# Patient Record
Sex: FEMALE | Race: BLACK OR AFRICAN AMERICAN | NOT HISPANIC OR LATINO | URBAN - METROPOLITAN AREA
[De-identification: names, ages, dates, MRNs, and addresses within clinical notes are randomized per-mention and may not be internally consistent; named-entity substitution may affect disease eponyms.]

---

## 2016-06-13 RX ORDER — INSULIN GLARGINE 100 [IU]/ML
40 INJECTION, SOLUTION SUBCUTANEOUS
Qty: 0 | Refills: 0 | COMMUNITY
Start: 2016-06-13

## 2017-03-27 ENCOUNTER — INPATIENT (INPATIENT)
Facility: HOSPITAL | Age: 77
LOS: 2 days | Discharge: HOME HEALTH | End: 2017-03-30
Attending: INTERNAL MEDICINE | Admitting: INTERNAL MEDICINE
Payer: MEDICARE

## 2017-03-27 VITALS
RESPIRATION RATE: 16 BRPM | SYSTOLIC BLOOD PRESSURE: 184 MMHG | OXYGEN SATURATION: 97 % | TEMPERATURE: 99 F | DIASTOLIC BLOOD PRESSURE: 101 MMHG | HEIGHT: 66 IN | HEART RATE: 90 BPM | WEIGHT: 182.1 LBS

## 2017-03-27 DIAGNOSIS — I25.10 ATHEROSCLEROTIC HEART DISEASE OF NATIVE CORONARY ARTERY WITHOUT ANGINA PECTORIS: ICD-10-CM

## 2017-03-27 DIAGNOSIS — E78.5 HYPERLIPIDEMIA, UNSPECIFIED: ICD-10-CM

## 2017-03-27 DIAGNOSIS — Z41.8 ENCOUNTER FOR OTHER PROCEDURES FOR PURPOSES OTHER THAN REMEDYING HEALTH STATE: ICD-10-CM

## 2017-03-27 DIAGNOSIS — I50.9 HEART FAILURE, UNSPECIFIED: ICD-10-CM

## 2017-03-27 DIAGNOSIS — R55 SYNCOPE AND COLLAPSE: ICD-10-CM

## 2017-03-27 DIAGNOSIS — E11.9 TYPE 2 DIABETES MELLITUS WITHOUT COMPLICATIONS: ICD-10-CM

## 2017-03-27 DIAGNOSIS — I10 ESSENTIAL (PRIMARY) HYPERTENSION: ICD-10-CM

## 2017-03-27 LAB
ALBUMIN SERPL ELPH-MCNC: 4.1 G/DL — SIGNIFICANT CHANGE UP (ref 3.3–5)
ALP SERPL-CCNC: 67 U/L — SIGNIFICANT CHANGE UP (ref 40–120)
ALT FLD-CCNC: 12 U/L — SIGNIFICANT CHANGE UP (ref 4–33)
APTT BLD: 33.5 SEC — SIGNIFICANT CHANGE UP (ref 27.5–37.4)
AST SERPL-CCNC: 15 U/L — SIGNIFICANT CHANGE UP (ref 4–32)
BASOPHILS # BLD AUTO: 0.01 K/UL — SIGNIFICANT CHANGE UP (ref 0–0.2)
BASOPHILS NFR BLD AUTO: 0.2 % — SIGNIFICANT CHANGE UP (ref 0–2)
BILIRUB SERPL-MCNC: 0.4 MG/DL — SIGNIFICANT CHANGE UP (ref 0.2–1.2)
BUN SERPL-MCNC: 11 MG/DL — SIGNIFICANT CHANGE UP (ref 7–23)
CALCIUM SERPL-MCNC: 9.8 MG/DL — SIGNIFICANT CHANGE UP (ref 8.4–10.5)
CHLORIDE SERPL-SCNC: 103 MMOL/L — SIGNIFICANT CHANGE UP (ref 98–107)
CK MB BLD-MCNC: 2.1 — SIGNIFICANT CHANGE UP (ref 0–2.5)
CK MB BLD-MCNC: 2.75 NG/ML — SIGNIFICANT CHANGE UP (ref 1–4.7)
CK MB BLD-MCNC: 3.2 NG/ML — SIGNIFICANT CHANGE UP (ref 1–4.7)
CK MB BLD-MCNC: 3.28 NG/ML — SIGNIFICANT CHANGE UP (ref 1–4.7)
CK MB BLD-MCNC: SIGNIFICANT CHANGE UP (ref 0–2.5)
CK SERPL-CCNC: 116 U/L — SIGNIFICANT CHANGE UP (ref 25–170)
CK SERPL-CCNC: 146 U/L — SIGNIFICANT CHANGE UP (ref 25–170)
CK SERPL-CCNC: 152 U/L — SIGNIFICANT CHANGE UP (ref 25–170)
CO2 SERPL-SCNC: 24 MMOL/L — SIGNIFICANT CHANGE UP (ref 22–31)
CREAT SERPL-MCNC: 0.72 MG/DL — SIGNIFICANT CHANGE UP (ref 0.5–1.3)
EOSINOPHIL # BLD AUTO: 0.02 K/UL — SIGNIFICANT CHANGE UP (ref 0–0.5)
EOSINOPHIL NFR BLD AUTO: 0.3 % — SIGNIFICANT CHANGE UP (ref 0–6)
GLUCOSE SERPL-MCNC: 105 MG/DL — HIGH (ref 70–99)
HCT VFR BLD CALC: 38.9 % — SIGNIFICANT CHANGE UP (ref 34.5–45)
HGB BLD-MCNC: 12.4 G/DL — SIGNIFICANT CHANGE UP (ref 11.5–15.5)
IMM GRANULOCYTES NFR BLD AUTO: 0.2 % — SIGNIFICANT CHANGE UP (ref 0–1.5)
INR BLD: 1.04 — SIGNIFICANT CHANGE UP (ref 0.88–1.17)
LYMPHOCYTES # BLD AUTO: 1.53 K/UL — SIGNIFICANT CHANGE UP (ref 1–3.3)
LYMPHOCYTES # BLD AUTO: 23.1 % — SIGNIFICANT CHANGE UP (ref 13–44)
MCHC RBC-ENTMCNC: 27.7 PG — SIGNIFICANT CHANGE UP (ref 27–34)
MCHC RBC-ENTMCNC: 31.9 % — LOW (ref 32–36)
MCV RBC AUTO: 86.8 FL — SIGNIFICANT CHANGE UP (ref 80–100)
MONOCYTES # BLD AUTO: 0.46 K/UL — SIGNIFICANT CHANGE UP (ref 0–0.9)
MONOCYTES NFR BLD AUTO: 7 % — SIGNIFICANT CHANGE UP (ref 2–14)
NEUTROPHILS # BLD AUTO: 4.58 K/UL — SIGNIFICANT CHANGE UP (ref 1.8–7.4)
NEUTROPHILS NFR BLD AUTO: 69.2 % — SIGNIFICANT CHANGE UP (ref 43–77)
PLATELET # BLD AUTO: 258 K/UL — SIGNIFICANT CHANGE UP (ref 150–400)
PMV BLD: 11.1 FL — SIGNIFICANT CHANGE UP (ref 7–13)
POTASSIUM SERPL-MCNC: 4.2 MMOL/L — SIGNIFICANT CHANGE UP (ref 3.5–5.3)
POTASSIUM SERPL-SCNC: 4.2 MMOL/L — SIGNIFICANT CHANGE UP (ref 3.5–5.3)
PROT SERPL-MCNC: 8 G/DL — SIGNIFICANT CHANGE UP (ref 6–8.3)
PROTHROM AB SERPL-ACNC: 11.7 SEC — SIGNIFICANT CHANGE UP (ref 9.8–13.1)
RBC # BLD: 4.48 M/UL — SIGNIFICANT CHANGE UP (ref 3.8–5.2)
RBC # FLD: 13.3 % — SIGNIFICANT CHANGE UP (ref 10.3–14.5)
SODIUM SERPL-SCNC: 142 MMOL/L — SIGNIFICANT CHANGE UP (ref 135–145)
TROPONIN T SERPL-MCNC: < 0.06 NG/ML — SIGNIFICANT CHANGE UP (ref 0–0.06)
WBC # BLD: 6.61 K/UL — SIGNIFICANT CHANGE UP (ref 3.8–10.5)
WBC # FLD AUTO: 6.61 K/UL — SIGNIFICANT CHANGE UP (ref 3.8–10.5)

## 2017-03-27 PROCEDURE — 71010: CPT | Mod: 26

## 2017-03-27 RX ORDER — TRAMADOL HYDROCHLORIDE 50 MG/1
50 TABLET ORAL ONCE
Qty: 0 | Refills: 0 | Status: DISCONTINUED | OUTPATIENT
Start: 2017-03-27 | End: 2017-03-27

## 2017-03-27 RX ORDER — INSULIN GLARGINE 100 [IU]/ML
40 INJECTION, SOLUTION SUBCUTANEOUS AT BEDTIME
Qty: 0 | Refills: 0 | Status: DISCONTINUED | OUTPATIENT
Start: 2017-03-27 | End: 2017-03-30

## 2017-03-27 RX ORDER — INSULIN LISPRO 100/ML
VIAL (ML) SUBCUTANEOUS
Qty: 0 | Refills: 0 | Status: DISCONTINUED | OUTPATIENT
Start: 2017-03-27 | End: 2017-03-30

## 2017-03-27 RX ORDER — SODIUM CHLORIDE 9 MG/ML
1000 INJECTION, SOLUTION INTRAVENOUS
Qty: 0 | Refills: 0 | Status: DISCONTINUED | OUTPATIENT
Start: 2017-03-27 | End: 2017-03-30

## 2017-03-27 RX ORDER — LISINOPRIL 2.5 MG/1
5 TABLET ORAL DAILY
Qty: 0 | Refills: 0 | Status: DISCONTINUED | OUTPATIENT
Start: 2017-03-27 | End: 2017-03-27

## 2017-03-27 RX ORDER — ASPIRIN/CALCIUM CARB/MAGNESIUM 324 MG
325 TABLET ORAL ONCE
Qty: 0 | Refills: 0 | Status: COMPLETED | OUTPATIENT
Start: 2017-03-27 | End: 2017-03-27

## 2017-03-27 RX ORDER — DEXTROSE 50 % IN WATER 50 %
25 SYRINGE (ML) INTRAVENOUS ONCE
Qty: 0 | Refills: 0 | Status: DISCONTINUED | OUTPATIENT
Start: 2017-03-27 | End: 2017-03-30

## 2017-03-27 RX ORDER — ACETAMINOPHEN 500 MG
650 TABLET ORAL EVERY 6 HOURS
Qty: 0 | Refills: 0 | Status: DISCONTINUED | OUTPATIENT
Start: 2017-03-27 | End: 2017-03-30

## 2017-03-27 RX ORDER — DEXTROSE 50 % IN WATER 50 %
12.5 SYRINGE (ML) INTRAVENOUS ONCE
Qty: 0 | Refills: 0 | Status: DISCONTINUED | OUTPATIENT
Start: 2017-03-27 | End: 2017-03-30

## 2017-03-27 RX ORDER — ASPIRIN/CALCIUM CARB/MAGNESIUM 324 MG
81 TABLET ORAL DAILY
Qty: 0 | Refills: 0 | Status: DISCONTINUED | OUTPATIENT
Start: 2017-03-28 | End: 2017-03-30

## 2017-03-27 RX ORDER — GLUCAGON INJECTION, SOLUTION 0.5 MG/.1ML
1 INJECTION, SOLUTION SUBCUTANEOUS ONCE
Qty: 0 | Refills: 0 | Status: DISCONTINUED | OUTPATIENT
Start: 2017-03-27 | End: 2017-03-30

## 2017-03-27 RX ORDER — AMLODIPINE BESYLATE 2.5 MG/1
5 TABLET ORAL DAILY
Qty: 0 | Refills: 0 | Status: DISCONTINUED | OUTPATIENT
Start: 2017-03-27 | End: 2017-03-30

## 2017-03-27 RX ORDER — DEXTROSE 50 % IN WATER 50 %
1 SYRINGE (ML) INTRAVENOUS ONCE
Qty: 0 | Refills: 0 | Status: DISCONTINUED | OUTPATIENT
Start: 2017-03-27 | End: 2017-03-30

## 2017-03-27 RX ORDER — ATORVASTATIN CALCIUM 80 MG/1
80 TABLET, FILM COATED ORAL AT BEDTIME
Qty: 0 | Refills: 0 | Status: DISCONTINUED | OUTPATIENT
Start: 2017-03-27 | End: 2017-03-30

## 2017-03-27 RX ORDER — GABAPENTIN 400 MG/1
100 CAPSULE ORAL
Qty: 0 | Refills: 0 | Status: DISCONTINUED | OUTPATIENT
Start: 2017-03-27 | End: 2017-03-30

## 2017-03-27 RX ORDER — ENOXAPARIN SODIUM 100 MG/ML
40 INJECTION SUBCUTANEOUS EVERY 24 HOURS
Qty: 0 | Refills: 0 | Status: DISCONTINUED | OUTPATIENT
Start: 2017-03-27 | End: 2017-03-30

## 2017-03-27 RX ORDER — INSULIN LISPRO 100/ML
VIAL (ML) SUBCUTANEOUS AT BEDTIME
Qty: 0 | Refills: 0 | Status: DISCONTINUED | OUTPATIENT
Start: 2017-03-27 | End: 2017-03-30

## 2017-03-27 RX ORDER — LISINOPRIL 2.5 MG/1
5 TABLET ORAL DAILY
Qty: 0 | Refills: 0 | Status: DISCONTINUED | OUTPATIENT
Start: 2017-03-28 | End: 2017-03-30

## 2017-03-27 RX ORDER — METOPROLOL TARTRATE 50 MG
25 TABLET ORAL
Qty: 0 | Refills: 0 | Status: DISCONTINUED | OUTPATIENT
Start: 2017-03-27 | End: 2017-03-29

## 2017-03-27 RX ORDER — ASPIRIN/CALCIUM CARB/MAGNESIUM 324 MG
81 TABLET ORAL DAILY
Qty: 0 | Refills: 0 | Status: DISCONTINUED | OUTPATIENT
Start: 2017-03-27 | End: 2017-03-27

## 2017-03-27 RX ADMIN — ATORVASTATIN CALCIUM 80 MILLIGRAM(S): 80 TABLET, FILM COATED ORAL at 22:35

## 2017-03-27 RX ADMIN — AMLODIPINE BESYLATE 5 MILLIGRAM(S): 2.5 TABLET ORAL at 19:36

## 2017-03-27 RX ADMIN — Medication 25 MILLIGRAM(S): at 19:36

## 2017-03-27 RX ADMIN — Medication 650 MILLIGRAM(S): at 12:25

## 2017-03-27 RX ADMIN — Medication 325 MILLIGRAM(S): at 12:25

## 2017-03-27 RX ADMIN — Medication 650 MILLIGRAM(S): at 13:15

## 2017-03-27 RX ADMIN — GABAPENTIN 100 MILLIGRAM(S): 400 CAPSULE ORAL at 19:36

## 2017-03-27 RX ADMIN — LISINOPRIL 5 MILLIGRAM(S): 2.5 TABLET ORAL at 12:25

## 2017-03-27 RX ADMIN — TRAMADOL HYDROCHLORIDE 50 MILLIGRAM(S): 50 TABLET ORAL at 22:35

## 2017-03-27 RX ADMIN — ENOXAPARIN SODIUM 40 MILLIGRAM(S): 100 INJECTION SUBCUTANEOUS at 19:37

## 2017-03-27 RX ADMIN — INSULIN GLARGINE 40 UNIT(S): 100 INJECTION, SOLUTION SUBCUTANEOUS at 22:35

## 2017-03-27 RX ADMIN — TRAMADOL HYDROCHLORIDE 50 MILLIGRAM(S): 50 TABLET ORAL at 23:30

## 2017-03-27 NOTE — H&P ADULT. - OTHER
Echo, March 2012: Mild segmental left ventricular systolic dysfunction with mild hypokinesis of the basal lateral and infero-lateral walls. Normal right ventricular size and function.

## 2017-03-27 NOTE — H&P ADULT. - RS GEN PE MLT RESP DETAILS PC
no rhonchi/clear to auscultation bilaterally/airway patent/no chest wall tenderness/no intercostal retractions/no rales/good air movement/no wheezes/breath sounds equal/respirations non-labored

## 2017-03-27 NOTE — H&P ADULT. - PROBLEM SELECTOR PLAN 1
Admit to telemetry, serial EKGs, serial cardiac enzymes, orthostatics  Fall precautions, ambulate with assistance  Check CBC, CMP, TSH, FLP, HgA1C, UA  Echocardiogram ordered  Will consider ischemic evaluation  PT eval ordered  F/U MD note

## 2017-03-27 NOTE — H&P ADULT. - PSH
S/P primary angioplasty with coronary stent  x1 in 2006 at [facility]  S/P TKR (total knee replacement)  left

## 2017-03-27 NOTE — ED PROVIDER NOTE - OBJECTIVE STATEMENT
76yo F w/ PMH T2DM, HTN, CAD s/p stent 2006, CVS w/o residual deficits, GERD brought in by daughter after syncopal episode this AM and onset of substernal chest pain. Pt reports eating breakfast, then going upstairs, then while dressing became dizzy, and fell forward onto abdomen on bed. Pt states she lost consciousness and next remembers being woken up by daughter. Denies hitting head. After episode pt said she began to have substernal chest pain that radiated bilaterally under both breasts, with some L neck and jaw pain. Denies diaphoresis. She also is complaining of some RLQ pain at baseline, but which was worse this AM. Her FS before breakfast was 139, which is lower than usual for her. Of note, she stopped taking her jentadueto Friday upon instruction from her PCP to decrease diarrhea. Denies fevers/chills. Denies dysuria. Denies sob.

## 2017-03-27 NOTE — PATIENT PROFILE ADULT. - NS TRANSFER PATIENT BELONGINGS
Clothing/Cell Phone/PDA (specify)/Jewelry/Money (specify)/2 rings, purse, clothes, pink I phone cell and ,

## 2017-03-27 NOTE — ED ADULT NURSE NOTE - CHIEF COMPLAINT QUOTE
pt c/o mid sternal chest pain radiating under both breasts x 30 minutes with dizziness. Daughter states pt had syncopal episode at home. Found pt half on bed and half on floor this morning passed out. [id number]

## 2017-03-27 NOTE — H&P ADULT. - PROBLEM SELECTOR PLAN 3
Monitor on telemetry  Continue Lisinopril, Metoprolol  Strict I&Os, monitor daily weights, 1500 cc fluid restriction, sodium restriction  Echocardiogram ordered

## 2017-03-27 NOTE — ED ADULT TRIAGE NOTE - CHIEF COMPLAINT QUOTE
pt c/o mid sternal chest pain radiating under both breasts x 30 minutes with dizziness. Daughter states pt had syncopal episode at home. Found pt half on bed and half on floor this morning passed out. SK=[id number]

## 2017-03-27 NOTE — H&P ADULT. - ASSESSMENT
78 y/o female with a PMHx of CAD S/P stent placement, ischemic cardiomyopathy with mild LV dysfunction, CVA with no residual deficits, HTN, HLD, DM and GERD presents to ED with chest pain, shortness of breath and a syncopal episode.

## 2017-03-27 NOTE — H&P ADULT. - NEGATIVE GASTROINTESTINAL SYMPTOMS
no nausea/no constipation/no change in bowel habits/no diarrhea/no flatulence/no vomiting/no melena/no hematochezia/no abdominal pain

## 2017-03-27 NOTE — H&P ADULT. - NEGATIVE CARDIOVASCULAR SYMPTOMS
no claudication/no peripheral edema/no dyspnea on exertion/no paroxysmal nocturnal dyspnea/no orthopnea

## 2017-03-27 NOTE — H&P ADULT. - PMH
CAD (coronary artery disease)  S/P stent placemenet 2006  CHF (congestive heart failure)    CVA (cerebral vascular accident)  no residual deficits  DM (diabetes mellitus)    GERD (gastroesophageal reflux disease)    HLD (hyperlipidemia)    HTN (hypertension)

## 2017-03-27 NOTE — ED PROVIDER NOTE - ATTENDING CONTRIBUTION TO CARE
Attending note:   After face to face evaluation of this patient, I concur with above noted hx, pe, and care plan for this patient. +Syncope again with no clear explanation.   +Palpitations on sitting up in ED, with no dysrhythmias.  Syncope onto her bed with no blunt trauma.     Evaluation in progress

## 2017-03-27 NOTE — H&P ADULT. - NEUROLOGICAL DETAILS
normal strength/alert and oriented x 3/responds to verbal commands/sensation intact/cranial nerves intact/responds to pain

## 2017-03-27 NOTE — H&P ADULT. - ATTENDING COMMENTS
Assessment  1. Syncope  2. Ischemic cardiomyopathy  3. H/o CVA  4. HTN      Plan  1. Neurology consult  2. TTE  3. ASA, statins, and beta blockers

## 2017-03-27 NOTE — H&P ADULT. - NEGATIVE OPHTHALMOLOGIC SYMPTOMS
no pain R/no loss of vision L/no blurred vision R/no loss of vision R/no blurred vision L/no pain L/no photophobia/no diplopia

## 2017-03-27 NOTE — H&P ADULT. - HISTORY OF PRESENT ILLNESS
78 y/o female with a PMHx of CAD S/P stent placement, ischemic cardiomyopathy with mild LV dysfunction, CVA with no residual deficits, HTN, HLD, DM and GERD presents to ED S/P syncopal episode this morning. Pt was getting ready for a doctor's appointment this morning when she started to experience dizziness described as "the room spinning," associated with shortness of breath and palpitations. 76 y/o female with a PMHx of CAD S/P stent placement, ischemic cardiomyopathy with mild LV dysfunction, CVA with no residual deficits, HTN, HLD, DM and GERD presents to ED S/P syncopal episode this morning. Pt was getting ready for a doctor's appointment this morning when she started to experience dizziness described as "the room spinning," associated with shortness of breath and palpitations. Shortly after, pt lost consciousness and was found by her daughter face down on her bed. Pt denies head or bodily trauma, but does not know how long she was unconscious for. Upon waking up, pt was complaining of muscle tightness (especially in her neck), right sided chest pain with radiation all across her chest, and down her right arm and right leg. Pt continued to experience shortness of breath, palpitations, and dizziness. Pt was brought in her by daughter. By the time she arrived to the ED, pt reports improvements in her symptoms. Pt denies fever, chills, recent travel, headache, visual deficits, orthopnea, abdominal pain, N/V/D/C, hematochezia, melena, dysuria, hematuria, tongue lacerations, seizures, convulsions, hemiparesis, urinary or fecal incontinence. Upon arrival to ED, EKG revealed NSR at 89 bpm with Q wave AVL, LVH. CE x1: Negative. CXR: Slightly underinflated but otherwise clear lungs. No pleural effusions or pneumothorax. Stable cardiac and mediastinal silhouettes including a left coronary stent. Trachea midline. Generalized osteopenia and spine and bilateral shoulder degenerative changes again noted. No acute or focally aggressive appearing osseous abnormalities.

## 2017-03-27 NOTE — H&P ADULT. - PROBLEM SELECTOR PLAN 2
Monitor on telemetry, serial cardiac enzymes, serial EKGs  Continue ASA, Lipitor, Metoprolol, Lisinopril  Will consider ischemic evaluation  Echocardiogram ordered  F/U MD note

## 2017-03-27 NOTE — ED PROVIDER NOTE - PMH
CAD (coronary artery disease)  s/p stent 2006  CVA (cerebral vascular accident)  no residual deficits  DM (diabetes mellitus)  x 5 years  GERD (gastroesophageal reflux disease)    HTN (hypertension)    Syncope and collapse

## 2017-03-27 NOTE — ED PROVIDER NOTE - CHIEF COMPLAINT
The patient is a 77y Female complaining of The patient is a 77y Female complaining of chest pain after syncopal episode at home.

## 2017-03-27 NOTE — H&P ADULT. - NEGATIVE NEUROLOGICAL SYMPTOMS
no facial palsy/no paresthesias/no tremors/no hemiparesis/no weakness/no loss of sensation/no difficulty walking/no confusion/no generalized seizures/no headache/no transient paralysis/no focal seizures

## 2017-03-27 NOTE — ED PROVIDER NOTE - PSH
CVA (cerebral infarction)    S/P primary angioplasty with coronary stent  x1 in 2006 at [facility]  S/P TKR (total knee replacement)  left

## 2017-03-28 LAB
APPEARANCE UR: SIGNIFICANT CHANGE UP
BILIRUB UR-MCNC: NEGATIVE — SIGNIFICANT CHANGE UP
BLOOD UR QL VISUAL: NEGATIVE — SIGNIFICANT CHANGE UP
BUN SERPL-MCNC: 18 MG/DL — SIGNIFICANT CHANGE UP (ref 7–23)
CALCIUM SERPL-MCNC: 9.4 MG/DL — SIGNIFICANT CHANGE UP (ref 8.4–10.5)
CHLORIDE SERPL-SCNC: 102 MMOL/L — SIGNIFICANT CHANGE UP (ref 98–107)
CHOLEST SERPL-MCNC: 115 MG/DL — LOW (ref 120–199)
CO2 SERPL-SCNC: 22 MMOL/L — SIGNIFICANT CHANGE UP (ref 22–31)
COLOR SPEC: SIGNIFICANT CHANGE UP
CREAT SERPL-MCNC: 0.83 MG/DL — SIGNIFICANT CHANGE UP (ref 0.5–1.3)
GLUCOSE SERPL-MCNC: 219 MG/DL — HIGH (ref 70–99)
GLUCOSE UR-MCNC: 500 — SIGNIFICANT CHANGE UP
HBA1C BLD-MCNC: 10.7 % — HIGH (ref 4–5.6)
HCT VFR BLD CALC: 37.4 % — SIGNIFICANT CHANGE UP (ref 34.5–45)
HDLC SERPL-MCNC: 45 MG/DL — SIGNIFICANT CHANGE UP (ref 45–65)
HGB BLD-MCNC: 11.9 G/DL — SIGNIFICANT CHANGE UP (ref 11.5–15.5)
KETONES UR-MCNC: NEGATIVE — SIGNIFICANT CHANGE UP
LEUKOCYTE ESTERASE UR-ACNC: HIGH
LIPID PNL WITH DIRECT LDL SERPL: 66 MG/DL — SIGNIFICANT CHANGE UP
MAGNESIUM SERPL-MCNC: 1.8 MG/DL — SIGNIFICANT CHANGE UP (ref 1.6–2.6)
MCHC RBC-ENTMCNC: 27.5 PG — SIGNIFICANT CHANGE UP (ref 27–34)
MCHC RBC-ENTMCNC: 31.8 % — LOW (ref 32–36)
MCV RBC AUTO: 86.6 FL — SIGNIFICANT CHANGE UP (ref 80–100)
MUCOUS THREADS # UR AUTO: SIGNIFICANT CHANGE UP
NITRITE UR-MCNC: NEGATIVE — SIGNIFICANT CHANGE UP
NON-SQ EPI CELLS # UR AUTO: 1 — SIGNIFICANT CHANGE UP
PH UR: 6 — SIGNIFICANT CHANGE UP (ref 4.6–8)
PLATELET # BLD AUTO: 262 K/UL — SIGNIFICANT CHANGE UP (ref 150–400)
PMV BLD: 11.4 FL — SIGNIFICANT CHANGE UP (ref 7–13)
POTASSIUM SERPL-MCNC: 4 MMOL/L — SIGNIFICANT CHANGE UP (ref 3.5–5.3)
POTASSIUM SERPL-SCNC: 4 MMOL/L — SIGNIFICANT CHANGE UP (ref 3.5–5.3)
PROT UR-MCNC: 100 — HIGH
RBC # BLD: 4.32 M/UL — SIGNIFICANT CHANGE UP (ref 3.8–5.2)
RBC # FLD: 13.6 % — SIGNIFICANT CHANGE UP (ref 10.3–14.5)
RBC CASTS # UR COMP ASSIST: HIGH (ref 0–?)
SODIUM SERPL-SCNC: 140 MMOL/L — SIGNIFICANT CHANGE UP (ref 135–145)
SP GR SPEC: 1.02 — SIGNIFICANT CHANGE UP (ref 1–1.03)
SQUAMOUS # UR AUTO: SIGNIFICANT CHANGE UP
TRIGL SERPL-MCNC: 201 MG/DL — HIGH (ref 10–149)
TSH SERPL-MCNC: 3.11 UIU/ML — SIGNIFICANT CHANGE UP (ref 0.27–4.2)
UROBILINOGEN FLD QL: NORMAL E.U. — SIGNIFICANT CHANGE UP (ref 0.1–0.2)
WBC # BLD: 6.19 K/UL — SIGNIFICANT CHANGE UP (ref 3.8–10.5)
WBC # FLD AUTO: 6.19 K/UL — SIGNIFICANT CHANGE UP (ref 3.8–10.5)
WBC UR QL: >50 — HIGH (ref 0–?)

## 2017-03-28 PROCEDURE — 93306 TTE W/DOPPLER COMPLETE: CPT | Mod: 26

## 2017-03-28 RX ADMIN — Medication 650 MILLIGRAM(S): at 18:30

## 2017-03-28 RX ADMIN — ENOXAPARIN SODIUM 40 MILLIGRAM(S): 100 INJECTION SUBCUTANEOUS at 17:52

## 2017-03-28 RX ADMIN — Medication 650 MILLIGRAM(S): at 17:52

## 2017-03-28 RX ADMIN — INSULIN GLARGINE 40 UNIT(S): 100 INJECTION, SOLUTION SUBCUTANEOUS at 22:22

## 2017-03-28 RX ADMIN — Medication 25 MILLIGRAM(S): at 06:21

## 2017-03-28 RX ADMIN — Medication 2: at 17:52

## 2017-03-28 RX ADMIN — Medication 25 MILLIGRAM(S): at 17:52

## 2017-03-28 RX ADMIN — LISINOPRIL 5 MILLIGRAM(S): 2.5 TABLET ORAL at 06:21

## 2017-03-28 RX ADMIN — Medication 1: at 13:15

## 2017-03-28 RX ADMIN — Medication 2: at 09:19

## 2017-03-28 RX ADMIN — ATORVASTATIN CALCIUM 80 MILLIGRAM(S): 80 TABLET, FILM COATED ORAL at 22:21

## 2017-03-28 RX ADMIN — Medication 81 MILLIGRAM(S): at 13:15

## 2017-03-28 RX ADMIN — GABAPENTIN 100 MILLIGRAM(S): 400 CAPSULE ORAL at 17:52

## 2017-03-28 RX ADMIN — AMLODIPINE BESYLATE 5 MILLIGRAM(S): 2.5 TABLET ORAL at 06:21

## 2017-03-28 RX ADMIN — GABAPENTIN 100 MILLIGRAM(S): 400 CAPSULE ORAL at 06:21

## 2017-03-29 LAB
BASOPHILS # BLD AUTO: 0.01 K/UL — SIGNIFICANT CHANGE UP (ref 0–0.2)
BASOPHILS NFR BLD AUTO: 0.2 % — SIGNIFICANT CHANGE UP (ref 0–2)
BUN SERPL-MCNC: 15 MG/DL — SIGNIFICANT CHANGE UP (ref 7–23)
CALCIUM SERPL-MCNC: 9 MG/DL — SIGNIFICANT CHANGE UP (ref 8.4–10.5)
CHLORIDE SERPL-SCNC: 99 MMOL/L — SIGNIFICANT CHANGE UP (ref 98–107)
CO2 SERPL-SCNC: 22 MMOL/L — SIGNIFICANT CHANGE UP (ref 22–31)
CREAT SERPL-MCNC: 0.73 MG/DL — SIGNIFICANT CHANGE UP (ref 0.5–1.3)
EOSINOPHIL # BLD AUTO: 0.07 K/UL — SIGNIFICANT CHANGE UP (ref 0–0.5)
EOSINOPHIL NFR BLD AUTO: 1.3 % — SIGNIFICANT CHANGE UP (ref 0–6)
GLUCOSE SERPL-MCNC: 210 MG/DL — HIGH (ref 70–99)
HCT VFR BLD CALC: 37.1 % — SIGNIFICANT CHANGE UP (ref 34.5–45)
HGB BLD-MCNC: 12 G/DL — SIGNIFICANT CHANGE UP (ref 11.5–15.5)
IMM GRANULOCYTES NFR BLD AUTO: 0.2 % — SIGNIFICANT CHANGE UP (ref 0–1.5)
LYMPHOCYTES # BLD AUTO: 2.11 K/UL — SIGNIFICANT CHANGE UP (ref 1–3.3)
LYMPHOCYTES # BLD AUTO: 39.8 % — SIGNIFICANT CHANGE UP (ref 13–44)
MAGNESIUM SERPL-MCNC: 1.8 MG/DL — SIGNIFICANT CHANGE UP (ref 1.6–2.6)
MCHC RBC-ENTMCNC: 27.7 PG — SIGNIFICANT CHANGE UP (ref 27–34)
MCHC RBC-ENTMCNC: 32.3 % — SIGNIFICANT CHANGE UP (ref 32–36)
MCV RBC AUTO: 85.7 FL — SIGNIFICANT CHANGE UP (ref 80–100)
MONOCYTES # BLD AUTO: 0.36 K/UL — SIGNIFICANT CHANGE UP (ref 0–0.9)
MONOCYTES NFR BLD AUTO: 6.8 % — SIGNIFICANT CHANGE UP (ref 2–14)
NEUTROPHILS # BLD AUTO: 2.74 K/UL — SIGNIFICANT CHANGE UP (ref 1.8–7.4)
NEUTROPHILS NFR BLD AUTO: 51.7 % — SIGNIFICANT CHANGE UP (ref 43–77)
PLATELET # BLD AUTO: 267 K/UL — SIGNIFICANT CHANGE UP (ref 150–400)
PMV BLD: 11.2 FL — SIGNIFICANT CHANGE UP (ref 7–13)
POTASSIUM SERPL-MCNC: 4 MMOL/L — SIGNIFICANT CHANGE UP (ref 3.5–5.3)
POTASSIUM SERPL-SCNC: 4 MMOL/L — SIGNIFICANT CHANGE UP (ref 3.5–5.3)
RBC # BLD: 4.33 M/UL — SIGNIFICANT CHANGE UP (ref 3.8–5.2)
RBC # FLD: 13.4 % — SIGNIFICANT CHANGE UP (ref 10.3–14.5)
SODIUM SERPL-SCNC: 137 MMOL/L — SIGNIFICANT CHANGE UP (ref 135–145)
WBC # BLD: 5.3 K/UL — SIGNIFICANT CHANGE UP (ref 3.8–10.5)
WBC # FLD AUTO: 5.3 K/UL — SIGNIFICANT CHANGE UP (ref 3.8–10.5)

## 2017-03-29 PROCEDURE — 99222 1ST HOSP IP/OBS MODERATE 55: CPT

## 2017-03-29 RX ORDER — CEFTRIAXONE 500 MG/1
1 INJECTION, POWDER, FOR SOLUTION INTRAMUSCULAR; INTRAVENOUS ONCE
Qty: 0 | Refills: 0 | Status: COMPLETED | OUTPATIENT
Start: 2017-03-29 | End: 2017-03-29

## 2017-03-29 RX ORDER — CEFTRIAXONE 500 MG/1
INJECTION, POWDER, FOR SOLUTION INTRAMUSCULAR; INTRAVENOUS
Qty: 0 | Refills: 0 | Status: DISCONTINUED | OUTPATIENT
Start: 2017-03-29 | End: 2017-03-30

## 2017-03-29 RX ORDER — CEFTRIAXONE 500 MG/1
1 INJECTION, POWDER, FOR SOLUTION INTRAMUSCULAR; INTRAVENOUS EVERY 24 HOURS
Qty: 0 | Refills: 0 | Status: DISCONTINUED | OUTPATIENT
Start: 2017-03-30 | End: 2017-03-30

## 2017-03-29 RX ADMIN — GABAPENTIN 100 MILLIGRAM(S): 400 CAPSULE ORAL at 05:09

## 2017-03-29 RX ADMIN — CEFTRIAXONE 100 GRAM(S): 500 INJECTION, POWDER, FOR SOLUTION INTRAMUSCULAR; INTRAVENOUS at 10:00

## 2017-03-29 RX ADMIN — Medication 81 MILLIGRAM(S): at 12:29

## 2017-03-29 RX ADMIN — LISINOPRIL 5 MILLIGRAM(S): 2.5 TABLET ORAL at 05:09

## 2017-03-29 RX ADMIN — ATORVASTATIN CALCIUM 80 MILLIGRAM(S): 80 TABLET, FILM COATED ORAL at 21:59

## 2017-03-29 RX ADMIN — Medication 650 MILLIGRAM(S): at 06:05

## 2017-03-29 RX ADMIN — Medication 25 MILLIGRAM(S): at 05:09

## 2017-03-29 RX ADMIN — Medication 2: at 12:29

## 2017-03-29 RX ADMIN — ENOXAPARIN SODIUM 40 MILLIGRAM(S): 100 INJECTION SUBCUTANEOUS at 17:13

## 2017-03-29 RX ADMIN — AMLODIPINE BESYLATE 5 MILLIGRAM(S): 2.5 TABLET ORAL at 05:09

## 2017-03-29 RX ADMIN — INSULIN GLARGINE 40 UNIT(S): 100 INJECTION, SOLUTION SUBCUTANEOUS at 21:59

## 2017-03-29 RX ADMIN — GABAPENTIN 100 MILLIGRAM(S): 400 CAPSULE ORAL at 17:12

## 2017-03-29 RX ADMIN — Medication 1: at 17:12

## 2017-03-29 RX ADMIN — Medication 2: at 09:59

## 2017-03-29 RX ADMIN — Medication 650 MILLIGRAM(S): at 05:09

## 2017-03-29 NOTE — DISCHARGE NOTE ADULT - CARE PLAN
Principal Discharge DX:	Syncopal episodes  Goal:	prevent future occurrences  Instructions for follow-up, activity and diet:	Keep hydrated to prevent dehydration. Sit or lie down right away if feeling dizzy or faint, move slowly and let yourself get used to one position before you move to another position, move your legs often if you must sit or  one position for a long time. Avoid exercise outside on a hot day. Follow up with primary care provider in 1 week. Please make appointment with your neurolgoist, Dr. Nissenbaum to schedule MRA Head and Neck.  Secondary Diagnosis:	CAD (coronary artery disease)  Instructions for follow-up, activity and diet:	Continue aspirin and Plavix, do not stop unless instructed by physician. Follow up with cardiologist in 1 week. Continue low cholesterol diet. Do not smoke, or drink alcohol.  Secondary Diagnosis:	DM (diabetes mellitus)  Instructions for follow-up, activity and diet:	Monitor fingersticks, low carbohydrate diet, minimize glucose intake.  Follow up with primary care physician for routine Hemoglobin A1C checks. Routine yearly eye and podiatry exams.  Secondary Diagnosis:	HLD (hyperlipidemia)  Instructions for follow-up, activity and diet:	Continue with medications as prescribed. Decrease the total number of fat you eat, include fish in your diet, eat foods with high fiber. Limit alcohol and do not smoke. Maintain a healthy weight and exercise regulary. Follow up with primary care provider in 1 week.  Secondary Diagnosis:	HTN (hypertension)  Instructions for follow-up, activity and diet:	Continue medications as prescribed. Monitor Blood pressure. Eat a low salt diet. Exercise to maintain a healthy weight. Limit alcohol and do not smoke. Follow up with primary care physician in 1 week. If any worsening symptoms notify provider.  Secondary Diagnosis:	CHF (congestive heart failure) Principal Discharge DX:	Syncopal episodes  Goal:	prevent future occurrences  Instructions for follow-up, activity and diet:	Keep hydrated to prevent dehydration. Sit or lie down right away if feeling dizzy or faint, move slowly and let yourself get used to one position before you move to another position, move your legs often if you must sit or  one position for a long time. Avoid exercise outside on a hot day. Follow up with primary care provider in 1 week. Please make appointment with your neurolgoist, Dr. Nissenbaum to schedule MRA Head and Neck.  Secondary Diagnosis:	CAD (coronary artery disease)  Instructions for follow-up, activity and diet:	Continue aspirin and Plavix, do not stop unless instructed by physician. Follow up with cardiologist in 1 week. Continue low cholesterol diet. Do not smoke, or drink alcohol.  Secondary Diagnosis:	DM (diabetes mellitus)  Instructions for follow-up, activity and diet:	Monitor fingersticks, low carbohydrate diet, minimize glucose intake.  Follow up with primary care physician for routine Hemoglobin A1C checks. Routine yearly eye and podiatry exams.  Secondary Diagnosis:	HLD (hyperlipidemia)  Instructions for follow-up, activity and diet:	Continue with medications as prescribed. Decrease the total number of fat you eat, include fish in your diet, eat foods with high fiber. Limit alcohol and do not smoke. Maintain a healthy weight and exercise regulary. Follow up with primary care provider in 1 week.  Secondary Diagnosis:	HTN (hypertension)  Instructions for follow-up, activity and diet:	Continue medications as prescribed. Monitor Blood pressure. Eat a low salt diet. Exercise to maintain a healthy weight. Limit alcohol and do not smoke. Follow up with primary care physician in 1 week. If any worsening symptoms notify provider.  Secondary Diagnosis:	UTI (urinary tract infection)  Instructions for follow-up, activity and diet:	Finish course of antibiotics.  Check Urine with PMD in 2 weeks. Principal Discharge DX:	Syncopal episodes  Goal:	prevent future occurrences  Instructions for follow-up, activity and diet:	Keep hydrated to prevent dehydration. Sit or lie down right away if feeling dizzy or faint, move slowly and let yourself get used to one position before you move to another position, move your legs often if you must sit or  one position for a long time. Avoid exercise outside on a hot day. Follow up with primary care provider in 1 week. Please make appointment with your neurolgoist, Dr. Nissenbaum to schedule MRA Head and Neck.  Secondary Diagnosis:	CAD (coronary artery disease)  Instructions for follow-up, activity and diet:	Continue aspirin, do not stop unless instructed by physician. Follow up with cardiologist in 1 week. Continue low cholesterol diet. Do not smoke, or drink alcohol.  Secondary Diagnosis:	DM (diabetes mellitus)  Instructions for follow-up, activity and diet:	Monitor fingersticks, low carbohydrate diet, minimize glucose intake.  Follow up with primary care physician for routine Hemoglobin A1C checks. Routine yearly eye and podiatry exams.  Secondary Diagnosis:	HLD (hyperlipidemia)  Instructions for follow-up, activity and diet:	Continue with medications as prescribed. Decrease the total number of fat you eat, include fish in your diet, eat foods with high fiber. Limit alcohol and do not smoke. Maintain a healthy weight and exercise regulary. Follow up with primary care provider in 1 week.  Secondary Diagnosis:	HTN (hypertension)  Instructions for follow-up, activity and diet:	Continue medications as prescribed. Monitor Blood pressure. Eat a low salt diet. Exercise to maintain a healthy weight. Limit alcohol and do not smoke. Follow up with primary care physician in 1 week. If any worsening symptoms notify provider.  Secondary Diagnosis:	UTI (urinary tract infection)  Instructions for follow-up, activity and diet:	Finish course of antibiotics.  Check Urine with PMD in 2 weeks.

## 2017-03-29 NOTE — DISCHARGE NOTE ADULT - HOSPITAL COURSE
76 y/o female with a PMHx of CAD S/P stent placement, ischemic cardiomyopathy with mild LV dysfunction, CVA with no residual deficits, HTN, HLD, DM and GERD presents to ED with chest pain, shortness of breath and a syncopal episode.     On admission EKG showed NSR at 89 bpm with Q wave AVL, LVH. ACS ruled out by two sets of negative cardiac enzymes. Chest xray showed slightly underinflated but otherwise clear lungs. No pleural effusions or pneumothorax. Stable cardiac and mediastinal silhouettes including a left coronary stent. Trachea midline. Generalized osteopenia and spine and bilateral shoulder degenerative changes again noted. No acute or focally aggressive appearing osseous abnormalities.    Echocardiogram showed mitral annular calcification, otherwise normal mitral valve. Minimal mitral regurgitation. Normal left ventricular systolic function. No segmental wall motion abnormalities. Mild diastolic dysfunction (Stage I). Normal right ventricular size and function.    Patient will have outpatient MRA Head and Neck with neurologist, Dr. Nissenbaum. Patient is stable and cleared for discharge. 78 y/o female with a PMHx of CAD S/P stent placement, ischemic cardiomyopathy with mild LV dysfunction, CVA with no residual deficits, HTN, HLD, DM and GERD presents to ED with chest pain, shortness of breath and a syncopal episode.     On admission EKG showed NSR at 89 bpm with Q wave AVL, LVH. ACS ruled out by two sets of negative cardiac enzymes. Chest xray showed slightly underinflated but otherwise clear lungs. No pleural effusions or pneumothorax. Stable cardiac and mediastinal silhouettes including a left coronary stent. Trachea midline. Generalized osteopenia and spine and bilateral shoulder degenerative changes again noted. No acute or focally aggressive appearing osseous abnormalities.    Echocardiogram showed mitral annular calcification, otherwise normal mitral valve. Minimal mitral regurgitation. Normal left ventricular systolic function. No segmental wall motion abnormalities. Mild diastolic dysfunction (Stage I). Normal right ventricular size and function.    s/p cardiac cath 3/30: LAD stent patent, OM stent patent, RCA 20%, EF 60%, LVEDP 16, RFA accessed  post cath there was no hematoma or bleed and pt. was stable for DC  D/W Dr. Arenas-the patient does NOT have any heart failure.    Patient will have outpatient MRA Head and Neck with neurologist, Dr. Nissenbaum. Patient is stable and cleared for discharge.

## 2017-03-29 NOTE — DISCHARGE NOTE ADULT - MEDICATION SUMMARY - MEDICATIONS TO TAKE
I will START or STAY ON the medications listed below when I get home from the hospital:    acetaminophen 325 mg oral tablet  -- 2 tab(s) by mouth every 6 hours, As needed, Mild Pain (1 - 3)  -- Indication: For pain    aspirin 81 mg oral delayed release tablet  -- 1 tab(s) by mouth once a day  -- Indication: For CVA (cerebral vascular accident)    lisinopril 5 mg oral tablet  -- 1 tab(s) by mouth once a day  -- Indication: For HTN (hypertension)    gabapentin 100 mg oral capsule  -- 1 cap(s) by mouth 2 times a day  -- Indication: For Neuropathy    insulin glargine 100 units/mL subcutaneous solution  -- 40  subcutaneous once a day (at bedtime)  -- Indication: For DM (diabetes mellitus)    atorvastatin 80 mg oral tablet  -- 1 tab(s) by mouth once a day (at bedtime)  -- Indication: For HLD (hyperlipidemia)    amLODIPine 5 mg oral tablet  -- 1 tab(s) by mouth once a day  -- Indication: For HTN (hypertension)    Ceftin 250 mg oral tablet  -- 1 tab(s) by mouth 2 times a day  -- Finish all this medication unless otherwise directed by prescriber.  Medication should be taken with plenty of water.  Take with food or milk.    -- Indication: For UTI

## 2017-03-29 NOTE — DISCHARGE NOTE ADULT - PROVIDER TOKENS
TOKEN:'[id number]' TOKVÍCTOR:'[id number]',QASIM:'[id number]' TOKEN:'[id number]:'[id number]'

## 2017-03-29 NOTE — DISCHARGE NOTE ADULT - PATIENT PORTAL LINK FT
“You can access the Follow[employer] Patient Portal, offered by [facility], by registering with the following website: [email or web address]”

## 2017-03-29 NOTE — DISCHARGE NOTE ADULT - SECONDARY DIAGNOSIS.
CAD (coronary artery disease) DM (diabetes mellitus) HLD (hyperlipidemia) HTN (hypertension) CHF (congestive heart failure) UTI (urinary tract infection)

## 2017-03-29 NOTE — DISCHARGE NOTE ADULT - CARE PROVIDER_API CALL
Dilcia Arenas (DO), Cardiology; Internal Medicine  [address]  [place] [address]  Phone: [phone]  Fax: [phone] Dilcia Arenas (), Cardiology; Internal Medicine  [address]  [place] [address]  Phone: [phone]  Fax: [phone]    Goldberg, Eric (MD), Internal Medicine  [address] [place] [address]  Phone: [phone]  Fax: [phone] Dilcia Arenas (DO), Cardiology; Internal Medicine  [address] [facility] [address]  [place] [address]  Phone: [phone]  Fax: [phone]    Goldberg, Eric (MD), Internal Medicine  [address] [place] [address]  Phone: [phone]  Fax: [phone]    Nissenbaum, Michael A (MD), Neurology  [address]  [place] [address]  Phone: [phone]  Fax: [phone]

## 2017-03-29 NOTE — DISCHARGE NOTE ADULT - PLAN OF CARE
prevent future occurrences Keep hydrated to prevent dehydration. Sit or lie down right away if feeling dizzy or faint, move slowly and let yourself get used to one position before you move to another position, move your legs often if you must sit or  one position for a long time. Avoid exercise outside on a hot day. Follow up with primary care provider in 1 week. Please make appointment with your neurolgoist, Dr. Nissenbaum to schedule MRA Head and Neck. Continue aspirin and Plavix, do not stop unless instructed by physician. Follow up with cardiologist in 1 week. Continue low cholesterol diet. Do not smoke, or drink alcohol. Monitor fingersticks, low carbohydrate diet, minimize glucose intake.  Follow up with primary care physician for routine Hemoglobin A1C checks. Routine yearly eye and podiatry exams. Continue with medications as prescribed. Decrease the total number of fat you eat, include fish in your diet, eat foods with high fiber. Limit alcohol and do not smoke. Maintain a healthy weight and exercise regulary. Follow up with primary care provider in 1 week. Continue medications as prescribed. Monitor Blood pressure. Eat a low salt diet. Exercise to maintain a healthy weight. Limit alcohol and do not smoke. Follow up with primary care physician in 1 week. If any worsening symptoms notify provider. Finish course of antibiotics.  Check Urine with PMD in 2 weeks. Continue aspirin, do not stop unless instructed by physician. Follow up with cardiologist in 1 week. Continue low cholesterol diet. Do not smoke, or drink alcohol.

## 2017-03-29 NOTE — DISCHARGE NOTE ADULT - ADDITIONAL INSTRUCTIONS
No driving x 24 hours. No strenuous activity for three weeks. Monitor site of procedure and notify your doctor for any  bleeding / swelling/redness/ discharge/pain. You may shower but no baths or swimming x one week. No heavy lifting x 1 week. No driving x 24 hours. No strenuous activity for three weeks. Monitor site of procedure and notify your doctor for any  bleeding / swelling/redness/ discharge/pain. You may shower but no baths or swimming x one week. No heavy lifting x 1 week.    f/U with Dr. Nissenbaum(neurologist) for outpatient MRA of head and neck-please call and make apt.

## 2017-03-29 NOTE — DISCHARGE NOTE ADULT - MEDICATION SUMMARY - MEDICATIONS TO STOP TAKING
I will STOP taking the medications listed below when I get home from the hospital:    metoprolol tartrate 25 mg oral tablet  -- 1 tab(s) by mouth 2 times a day    metroNIDAZOLE 500 mg oral tablet  -- 1 tab(s) by mouth every 8 hours    ciprofloxacin 500 mg oral tablet  -- 1 tab(s) by mouth every 12 hours

## 2017-03-30 VITALS
SYSTOLIC BLOOD PRESSURE: 142 MMHG | RESPIRATION RATE: 16 BRPM | TEMPERATURE: 98 F | HEART RATE: 76 BPM | DIASTOLIC BLOOD PRESSURE: 88 MMHG | OXYGEN SATURATION: 99 %

## 2017-03-30 LAB
BASOPHILS # BLD AUTO: 0.01 K/UL — SIGNIFICANT CHANGE UP (ref 0–0.2)
BASOPHILS NFR BLD AUTO: 0.2 % — SIGNIFICANT CHANGE UP (ref 0–2)
BUN SERPL-MCNC: 12 MG/DL — SIGNIFICANT CHANGE UP (ref 7–23)
BUN SERPL-MCNC: 12 MG/DL — SIGNIFICANT CHANGE UP (ref 7–23)
CALCIUM SERPL-MCNC: 9.7 MG/DL — SIGNIFICANT CHANGE UP (ref 8.4–10.5)
CALCIUM SERPL-MCNC: 9.7 MG/DL — SIGNIFICANT CHANGE UP (ref 8.4–10.5)
CHLORIDE SERPL-SCNC: 102 MMOL/L — SIGNIFICANT CHANGE UP (ref 98–107)
CHLORIDE SERPL-SCNC: 102 MMOL/L — SIGNIFICANT CHANGE UP (ref 98–107)
CO2 SERPL-SCNC: 24 MMOL/L — SIGNIFICANT CHANGE UP (ref 22–31)
CO2 SERPL-SCNC: 24 MMOL/L — SIGNIFICANT CHANGE UP (ref 22–31)
CREAT SERPL-MCNC: 0.81 MG/DL — SIGNIFICANT CHANGE UP (ref 0.5–1.3)
CREAT SERPL-MCNC: 0.81 MG/DL — SIGNIFICANT CHANGE UP (ref 0.5–1.3)
EOSINOPHIL # BLD AUTO: 0.06 K/UL — SIGNIFICANT CHANGE UP (ref 0–0.5)
EOSINOPHIL NFR BLD AUTO: 1 % — SIGNIFICANT CHANGE UP (ref 0–6)
GLUCOSE SERPL-MCNC: 138 MG/DL — HIGH (ref 70–99)
GLUCOSE SERPL-MCNC: 138 MG/DL — HIGH (ref 70–99)
HCT VFR BLD CALC: 39.6 % — SIGNIFICANT CHANGE UP (ref 34.5–45)
HCT VFR BLD CALC: 39.6 % — SIGNIFICANT CHANGE UP (ref 34.5–45)
HGB BLD-MCNC: 12.7 G/DL — SIGNIFICANT CHANGE UP (ref 11.5–15.5)
HGB BLD-MCNC: 12.7 G/DL — SIGNIFICANT CHANGE UP (ref 11.5–15.5)
IMM GRANULOCYTES NFR BLD AUTO: 0.2 % — SIGNIFICANT CHANGE UP (ref 0–1.5)
LYMPHOCYTES # BLD AUTO: 2.31 K/UL — SIGNIFICANT CHANGE UP (ref 1–3.3)
LYMPHOCYTES # BLD AUTO: 39.8 % — SIGNIFICANT CHANGE UP (ref 13–44)
MAGNESIUM SERPL-MCNC: 1.9 MG/DL — SIGNIFICANT CHANGE UP (ref 1.6–2.6)
MCHC RBC-ENTMCNC: 27.7 PG — SIGNIFICANT CHANGE UP (ref 27–34)
MCHC RBC-ENTMCNC: 27.7 PG — SIGNIFICANT CHANGE UP (ref 27–34)
MCHC RBC-ENTMCNC: 32.1 % — SIGNIFICANT CHANGE UP (ref 32–36)
MCHC RBC-ENTMCNC: 32.1 % — SIGNIFICANT CHANGE UP (ref 32–36)
MCV RBC AUTO: 86.5 FL — SIGNIFICANT CHANGE UP (ref 80–100)
MCV RBC AUTO: 86.5 FL — SIGNIFICANT CHANGE UP (ref 80–100)
MONOCYTES # BLD AUTO: 0.51 K/UL — SIGNIFICANT CHANGE UP (ref 0–0.9)
MONOCYTES NFR BLD AUTO: 8.8 % — SIGNIFICANT CHANGE UP (ref 2–14)
NEUTROPHILS # BLD AUTO: 2.91 K/UL — SIGNIFICANT CHANGE UP (ref 1.8–7.4)
NEUTROPHILS NFR BLD AUTO: 50 % — SIGNIFICANT CHANGE UP (ref 43–77)
PLATELET # BLD AUTO: 269 K/UL — SIGNIFICANT CHANGE UP (ref 150–400)
PLATELET # BLD AUTO: 269 K/UL — SIGNIFICANT CHANGE UP (ref 150–400)
PMV BLD: 11.5 FL — SIGNIFICANT CHANGE UP (ref 7–13)
PMV BLD: 11.5 FL — SIGNIFICANT CHANGE UP (ref 7–13)
POTASSIUM SERPL-MCNC: 4.2 MMOL/L — SIGNIFICANT CHANGE UP (ref 3.5–5.3)
POTASSIUM SERPL-MCNC: 4.2 MMOL/L — SIGNIFICANT CHANGE UP (ref 3.5–5.3)
POTASSIUM SERPL-SCNC: 4.2 MMOL/L — SIGNIFICANT CHANGE UP (ref 3.5–5.3)
POTASSIUM SERPL-SCNC: 4.2 MMOL/L — SIGNIFICANT CHANGE UP (ref 3.5–5.3)
RBC # BLD: 4.58 M/UL — SIGNIFICANT CHANGE UP (ref 3.8–5.2)
RBC # BLD: 4.58 M/UL — SIGNIFICANT CHANGE UP (ref 3.8–5.2)
RBC # FLD: 13.5 % — SIGNIFICANT CHANGE UP (ref 10.3–14.5)
RBC # FLD: 13.5 % — SIGNIFICANT CHANGE UP (ref 10.3–14.5)
SODIUM SERPL-SCNC: 141 MMOL/L — SIGNIFICANT CHANGE UP (ref 135–145)
SODIUM SERPL-SCNC: 141 MMOL/L — SIGNIFICANT CHANGE UP (ref 135–145)
WBC # BLD: 5.81 K/UL — SIGNIFICANT CHANGE UP (ref 3.8–10.5)
WBC # BLD: 5.81 K/UL — SIGNIFICANT CHANGE UP (ref 3.8–10.5)
WBC # FLD AUTO: 5.81 K/UL — SIGNIFICANT CHANGE UP (ref 3.8–10.5)
WBC # FLD AUTO: 5.81 K/UL — SIGNIFICANT CHANGE UP (ref 3.8–10.5)

## 2017-03-30 PROCEDURE — 93458 L HRT ARTERY/VENTRICLE ANGIO: CPT | Mod: 26

## 2017-03-30 RX ORDER — ATORVASTATIN CALCIUM 80 MG/1
1 TABLET, FILM COATED ORAL
Qty: 0 | Refills: 0 | DISCHARGE
Start: 2017-03-30

## 2017-03-30 RX ORDER — CEFUROXIME AXETIL 250 MG
1 TABLET ORAL
Qty: 10 | Refills: 0 | OUTPATIENT
Start: 2017-03-30 | End: 2017-04-04

## 2017-03-30 RX ORDER — ASPIRIN/CALCIUM CARB/MAGNESIUM 324 MG
1 TABLET ORAL
Qty: 0 | Refills: 0 | DISCHARGE
Start: 2017-03-30

## 2017-03-30 RX ORDER — GABAPENTIN 400 MG/1
1 CAPSULE ORAL
Qty: 0 | Refills: 0 | COMMUNITY
Start: 2017-03-30

## 2017-03-30 RX ORDER — ACETAMINOPHEN 500 MG
2 TABLET ORAL
Qty: 0 | Refills: 0 | COMMUNITY
Start: 2017-03-30

## 2017-03-30 RX ORDER — INSULIN LISPRO 100/ML
0 VIAL (ML) SUBCUTANEOUS
Qty: 0 | Refills: 0 | COMMUNITY
Start: 2017-03-30

## 2017-03-30 RX ORDER — LISINOPRIL 2.5 MG/1
1 TABLET ORAL
Qty: 0 | Refills: 0 | DISCHARGE
Start: 2017-03-30

## 2017-03-30 RX ORDER — AMLODIPINE BESYLATE 2.5 MG/1
1 TABLET ORAL
Qty: 0 | Refills: 0 | DISCHARGE
Start: 2017-03-30

## 2017-03-30 RX ORDER — SODIUM CHLORIDE 9 MG/ML
500 INJECTION INTRAMUSCULAR; INTRAVENOUS; SUBCUTANEOUS
Qty: 0 | Refills: 0 | Status: DISCONTINUED | OUTPATIENT
Start: 2017-03-30 | End: 2017-03-30

## 2017-03-30 RX ADMIN — LISINOPRIL 5 MILLIGRAM(S): 2.5 TABLET ORAL at 06:23

## 2017-03-30 RX ADMIN — GABAPENTIN 100 MILLIGRAM(S): 400 CAPSULE ORAL at 17:12

## 2017-03-30 RX ADMIN — AMLODIPINE BESYLATE 5 MILLIGRAM(S): 2.5 TABLET ORAL at 06:23

## 2017-03-30 RX ADMIN — CEFTRIAXONE 100 GRAM(S): 500 INJECTION, POWDER, FOR SOLUTION INTRAMUSCULAR; INTRAVENOUS at 11:01

## 2017-03-30 RX ADMIN — Medication 81 MILLIGRAM(S): at 11:01

## 2017-03-30 RX ADMIN — GABAPENTIN 100 MILLIGRAM(S): 400 CAPSULE ORAL at 06:23

## 2017-05-17 ENCOUNTER — INPATIENT (INPATIENT)
Facility: HOSPITAL | Age: 77
LOS: 5 days | Discharge: HOME | End: 2017-05-23
Attending: INTERNAL MEDICINE | Admitting: INTERNAL MEDICINE
Payer: MEDICARE

## 2017-05-17 VITALS
OXYGEN SATURATION: 100 % | TEMPERATURE: 98 F | SYSTOLIC BLOOD PRESSURE: 156 MMHG | HEART RATE: 86 BPM | HEIGHT: 66 IN | DIASTOLIC BLOOD PRESSURE: 91 MMHG | RESPIRATION RATE: 16 BRPM | WEIGHT: 182.1 LBS

## 2017-05-17 LAB
ALBUMIN SERPL ELPH-MCNC: 3.9 G/DL — SIGNIFICANT CHANGE UP (ref 3.3–5)
ALP SERPL-CCNC: 69 U/L — SIGNIFICANT CHANGE UP (ref 40–120)
ALT FLD-CCNC: 14 U/L — SIGNIFICANT CHANGE UP (ref 4–33)
APTT BLD: 33 SEC — SIGNIFICANT CHANGE UP (ref 27.5–37.4)
AST SERPL-CCNC: 17 U/L — SIGNIFICANT CHANGE UP (ref 4–32)
BILIRUB SERPL-MCNC: 0.3 MG/DL — SIGNIFICANT CHANGE UP (ref 0.2–1.2)
BUN SERPL-MCNC: 17 MG/DL — SIGNIFICANT CHANGE UP (ref 7–23)
CALCIUM SERPL-MCNC: 9.3 MG/DL — SIGNIFICANT CHANGE UP (ref 8.4–10.5)
CHLORIDE SERPL-SCNC: 101 MMOL/L — SIGNIFICANT CHANGE UP (ref 98–107)
CK MB BLD-MCNC: 2.35 NG/ML — SIGNIFICANT CHANGE UP (ref 1–4.7)
CK MB BLD-MCNC: SIGNIFICANT CHANGE UP (ref 0–2.5)
CK SERPL-CCNC: 140 U/L — SIGNIFICANT CHANGE UP (ref 25–170)
CO2 SERPL-SCNC: 21 MMOL/L — LOW (ref 22–31)
CREAT SERPL-MCNC: 0.8 MG/DL — SIGNIFICANT CHANGE UP (ref 0.5–1.3)
GLUCOSE SERPL-MCNC: 207 MG/DL — HIGH (ref 70–99)
HCT VFR BLD CALC: 36.6 % — SIGNIFICANT CHANGE UP (ref 34.5–45)
HGB BLD-MCNC: 11.7 G/DL — SIGNIFICANT CHANGE UP (ref 11.5–15.5)
INR BLD: 0.97 — SIGNIFICANT CHANGE UP (ref 0.88–1.17)
MCHC RBC-ENTMCNC: 27.7 PG — SIGNIFICANT CHANGE UP (ref 27–34)
MCHC RBC-ENTMCNC: 32 % — SIGNIFICANT CHANGE UP (ref 32–36)
MCV RBC AUTO: 86.7 FL — SIGNIFICANT CHANGE UP (ref 80–100)
PLATELET # BLD AUTO: 219 K/UL — SIGNIFICANT CHANGE UP (ref 150–400)
PMV BLD: 11.1 FL — SIGNIFICANT CHANGE UP (ref 7–13)
POTASSIUM SERPL-MCNC: 4.1 MMOL/L — SIGNIFICANT CHANGE UP (ref 3.5–5.3)
POTASSIUM SERPL-SCNC: 4.1 MMOL/L — SIGNIFICANT CHANGE UP (ref 3.5–5.3)
PROT SERPL-MCNC: 7.7 G/DL — SIGNIFICANT CHANGE UP (ref 6–8.3)
PROTHROM AB SERPL-ACNC: 10.9 SEC — SIGNIFICANT CHANGE UP (ref 9.8–13.1)
RBC # BLD: 4.22 M/UL — SIGNIFICANT CHANGE UP (ref 3.8–5.2)
RBC # FLD: 13.9 % — SIGNIFICANT CHANGE UP (ref 10.3–14.5)
SODIUM SERPL-SCNC: 137 MMOL/L — SIGNIFICANT CHANGE UP (ref 135–145)
TROPONIN T SERPL-MCNC: < 0.06 NG/ML — SIGNIFICANT CHANGE UP (ref 0–0.06)
WBC # BLD: 6.34 K/UL — SIGNIFICANT CHANGE UP (ref 3.8–10.5)
WBC # FLD AUTO: 6.34 K/UL — SIGNIFICANT CHANGE UP (ref 3.8–10.5)

## 2017-05-17 PROCEDURE — 71020: CPT | Mod: 26

## 2017-05-17 PROCEDURE — 70450 CT HEAD/BRAIN W/O DYE: CPT | Mod: 26

## 2017-05-17 PROCEDURE — 71275 CT ANGIOGRAPHY CHEST: CPT | Mod: 26

## 2017-05-17 PROCEDURE — 74174 CTA ABD&PLVS W/CONTRAST: CPT | Mod: 26

## 2017-05-17 RX ORDER — MECLIZINE HCL 12.5 MG
12.5 TABLET ORAL ONCE
Qty: 0 | Refills: 0 | Status: COMPLETED | OUTPATIENT
Start: 2017-05-17 | End: 2017-05-17

## 2017-05-17 RX ADMIN — Medication 12.5 MILLIGRAM(S): at 23:54

## 2017-05-17 NOTE — ED ADULT TRIAGE NOTE - CHIEF COMPLAINT QUOTE
Pt visiting son in the ER when she began having difficulty breathing and pain in her chest, as per daughter she has been experiencing these symptoms for about a week getting progressively worse, history of stents, had appointment with PMD Friday but unable to wait until that time

## 2017-05-17 NOTE — ED ADULT NURSE NOTE - OBJECTIVE STATEMENT
pt a*o x3 c/o sob and chest pain radiating to right side of neck and back. pt denies pain at this time. also c/o dizziness with movement x 2 weeks. vss. nad noted. md assessed. labs sent will monitor

## 2017-05-17 NOTE — ED PROVIDER NOTE - PSH
S/P primary angioplasty with coronary stent  x1 in 2006 at [facility]  S/P TKR (total knee replacement)  left

## 2017-05-17 NOTE — ED PROVIDER NOTE - PHYSICAL EXAMINATION
Lolis att: General: Well appearing, nontoxic, no acute distress; Head: Normocephalic Atraumatic; Eyes: PERRL, EOMI; ENT: Airway patent; Neck: supple; Chest: Lungs clear to auscultation bilateral; Cardiac: Regular rate and rhythm, no murmurs, rubs or gallops; Abdomen: soft, nontender, nondistended; no guarding or rebound; Musculoskeletal: Calves symmetric, nontender, no palpable cord; Skin: No rash, normal skin tone; Neuro: Alert and Oriented to person, place, and time; No focal deficit, CN 2-12 symmetric and intact, Left eye proptosis, RUE 4/5, RLE 4/5 poor effort.

## 2017-05-17 NOTE — ED PROVIDER NOTE - CRANIAL NERVE AND PUPILLARY EXAM
cough reflex intact/CORNEAL REFLEX NOT INTACT/tongue is midline/perrla, eomi, L eye ptosis, decreased sensation L face but equal strength/EXTRA-OCULAR MOVEMENT NOT INTACT:

## 2017-05-18 DIAGNOSIS — K21.9 GASTRO-ESOPHAGEAL REFLUX DISEASE WITHOUT ESOPHAGITIS: ICD-10-CM

## 2017-05-18 DIAGNOSIS — Z29.9 ENCOUNTER FOR PROPHYLACTIC MEASURES, UNSPECIFIED: ICD-10-CM

## 2017-05-18 DIAGNOSIS — E11.9 TYPE 2 DIABETES MELLITUS WITHOUT COMPLICATIONS: ICD-10-CM

## 2017-05-18 DIAGNOSIS — I10 ESSENTIAL (PRIMARY) HYPERTENSION: ICD-10-CM

## 2017-05-18 DIAGNOSIS — R07.9 CHEST PAIN, UNSPECIFIED: ICD-10-CM

## 2017-05-18 DIAGNOSIS — R06.02 SHORTNESS OF BREATH: ICD-10-CM

## 2017-05-18 DIAGNOSIS — I24.9 ACUTE ISCHEMIC HEART DISEASE, UNSPECIFIED: ICD-10-CM

## 2017-05-18 DIAGNOSIS — I50.9 HEART FAILURE, UNSPECIFIED: ICD-10-CM

## 2017-05-18 DIAGNOSIS — E78.4 OTHER HYPERLIPIDEMIA: ICD-10-CM

## 2017-05-18 DIAGNOSIS — R42 DIZZINESS AND GIDDINESS: ICD-10-CM

## 2017-05-18 PROBLEM — I63.9 CEREBRAL INFARCTION, UNSPECIFIED: Chronic | Status: ACTIVE | Noted: 2017-03-27

## 2017-05-18 LAB
BUN SERPL-MCNC: 11 MG/DL — SIGNIFICANT CHANGE UP (ref 7–23)
CALCIUM SERPL-MCNC: 9.1 MG/DL — SIGNIFICANT CHANGE UP (ref 8.4–10.5)
CHLORIDE SERPL-SCNC: 103 MMOL/L — SIGNIFICANT CHANGE UP (ref 98–107)
CHOLEST SERPL-MCNC: 158 MG/DL — SIGNIFICANT CHANGE UP (ref 120–199)
CK MB BLD-MCNC: 2.2 NG/ML — SIGNIFICANT CHANGE UP (ref 1–4.7)
CK MB BLD-MCNC: SIGNIFICANT CHANGE UP (ref 0–2.5)
CK SERPL-CCNC: 121 U/L — SIGNIFICANT CHANGE UP (ref 25–170)
CK SERPL-CCNC: 124 U/L — SIGNIFICANT CHANGE UP (ref 25–170)
CO2 SERPL-SCNC: 24 MMOL/L — SIGNIFICANT CHANGE UP (ref 22–31)
CREAT SERPL-MCNC: 0.72 MG/DL — SIGNIFICANT CHANGE UP (ref 0.5–1.3)
GLUCOSE SERPL-MCNC: 129 MG/DL — HIGH (ref 70–99)
HBA1C BLD-MCNC: 10.6 % — HIGH (ref 4–5.6)
HCT VFR BLD CALC: 36.6 % — SIGNIFICANT CHANGE UP (ref 34.5–45)
HDLC SERPL-MCNC: 51 MG/DL — SIGNIFICANT CHANGE UP (ref 45–65)
HGB BLD-MCNC: 11.6 G/DL — SIGNIFICANT CHANGE UP (ref 11.5–15.5)
LIPID PNL WITH DIRECT LDL SERPL: 90 MG/DL — SIGNIFICANT CHANGE UP
MCHC RBC-ENTMCNC: 27.5 PG — SIGNIFICANT CHANGE UP (ref 27–34)
MCHC RBC-ENTMCNC: 31.7 % — LOW (ref 32–36)
MCV RBC AUTO: 86.7 FL — SIGNIFICANT CHANGE UP (ref 80–100)
NT-PROBNP SERPL-SCNC: 44.5 PG/ML — SIGNIFICANT CHANGE UP
PLATELET # BLD AUTO: 212 K/UL — SIGNIFICANT CHANGE UP (ref 150–400)
PMV BLD: 11 FL — SIGNIFICANT CHANGE UP (ref 7–13)
POTASSIUM SERPL-MCNC: 3.9 MMOL/L — SIGNIFICANT CHANGE UP (ref 3.5–5.3)
POTASSIUM SERPL-SCNC: 3.9 MMOL/L — SIGNIFICANT CHANGE UP (ref 3.5–5.3)
RBC # BLD: 4.22 M/UL — SIGNIFICANT CHANGE UP (ref 3.8–5.2)
RBC # FLD: 14 % — SIGNIFICANT CHANGE UP (ref 10.3–14.5)
SODIUM SERPL-SCNC: 141 MMOL/L — SIGNIFICANT CHANGE UP (ref 135–145)
TRIGL SERPL-MCNC: 117 MG/DL — SIGNIFICANT CHANGE UP (ref 10–149)
TROPONIN T SERPL-MCNC: < 0.06 NG/ML — SIGNIFICANT CHANGE UP (ref 0–0.06)
TROPONIN T SERPL-MCNC: < 0.06 NG/ML — SIGNIFICANT CHANGE UP (ref 0–0.06)
TSH SERPL-MCNC: 2.65 UIU/ML — SIGNIFICANT CHANGE UP (ref 0.27–4.2)
WBC # BLD: 5.28 K/UL — SIGNIFICANT CHANGE UP (ref 3.8–10.5)
WBC # FLD AUTO: 5.28 K/UL — SIGNIFICANT CHANGE UP (ref 3.8–10.5)

## 2017-05-18 PROCEDURE — 93306 TTE W/DOPPLER COMPLETE: CPT | Mod: 26

## 2017-05-18 PROCEDURE — 93970 EXTREMITY STUDY: CPT | Mod: 26

## 2017-05-18 RX ORDER — ATORVASTATIN CALCIUM 80 MG/1
80 TABLET, FILM COATED ORAL AT BEDTIME
Qty: 0 | Refills: 0 | Status: DISCONTINUED | OUTPATIENT
Start: 2017-05-18 | End: 2017-05-23

## 2017-05-18 RX ORDER — LISINOPRIL 2.5 MG/1
5 TABLET ORAL DAILY
Qty: 0 | Refills: 0 | Status: DISCONTINUED | OUTPATIENT
Start: 2017-05-18 | End: 2017-05-23

## 2017-05-18 RX ORDER — DEXTROSE 50 % IN WATER 50 %
25 SYRINGE (ML) INTRAVENOUS ONCE
Qty: 0 | Refills: 0 | Status: DISCONTINUED | OUTPATIENT
Start: 2017-05-18 | End: 2017-05-23

## 2017-05-18 RX ORDER — INSULIN LISPRO 100/ML
VIAL (ML) SUBCUTANEOUS
Qty: 0 | Refills: 0 | Status: DISCONTINUED | OUTPATIENT
Start: 2017-05-18 | End: 2017-05-23

## 2017-05-18 RX ORDER — SODIUM CHLORIDE 9 MG/ML
3 INJECTION INTRAMUSCULAR; INTRAVENOUS; SUBCUTANEOUS EVERY 8 HOURS
Qty: 0 | Refills: 0 | Status: DISCONTINUED | OUTPATIENT
Start: 2017-05-18 | End: 2017-05-23

## 2017-05-18 RX ORDER — SODIUM CHLORIDE 9 MG/ML
1000 INJECTION, SOLUTION INTRAVENOUS
Qty: 0 | Refills: 0 | Status: DISCONTINUED | OUTPATIENT
Start: 2017-05-18 | End: 2017-05-23

## 2017-05-18 RX ORDER — ACETAMINOPHEN 500 MG
650 TABLET ORAL EVERY 6 HOURS
Qty: 0 | Refills: 0 | Status: DISCONTINUED | OUTPATIENT
Start: 2017-05-18 | End: 2017-05-23

## 2017-05-18 RX ORDER — GABAPENTIN 400 MG/1
100 CAPSULE ORAL
Qty: 0 | Refills: 0 | Status: DISCONTINUED | OUTPATIENT
Start: 2017-05-18 | End: 2017-05-23

## 2017-05-18 RX ORDER — DEXTROSE 50 % IN WATER 50 %
12.5 SYRINGE (ML) INTRAVENOUS ONCE
Qty: 0 | Refills: 0 | Status: DISCONTINUED | OUTPATIENT
Start: 2017-05-18 | End: 2017-05-23

## 2017-05-18 RX ORDER — INSULIN GLARGINE 100 [IU]/ML
50 INJECTION, SOLUTION SUBCUTANEOUS AT BEDTIME
Qty: 0 | Refills: 0 | Status: DISCONTINUED | OUTPATIENT
Start: 2017-05-18 | End: 2017-05-23

## 2017-05-18 RX ORDER — GLUCAGON INJECTION, SOLUTION 0.5 MG/.1ML
1 INJECTION, SOLUTION SUBCUTANEOUS ONCE
Qty: 0 | Refills: 0 | Status: DISCONTINUED | OUTPATIENT
Start: 2017-05-18 | End: 2017-05-23

## 2017-05-18 RX ORDER — AMLODIPINE BESYLATE 2.5 MG/1
5 TABLET ORAL DAILY
Qty: 0 | Refills: 0 | Status: DISCONTINUED | OUTPATIENT
Start: 2017-05-18 | End: 2017-05-23

## 2017-05-18 RX ORDER — DEXTROSE 50 % IN WATER 50 %
1 SYRINGE (ML) INTRAVENOUS ONCE
Qty: 0 | Refills: 0 | Status: DISCONTINUED | OUTPATIENT
Start: 2017-05-18 | End: 2017-05-23

## 2017-05-18 RX ORDER — INSULIN GLARGINE 100 [IU]/ML
40 INJECTION, SOLUTION SUBCUTANEOUS
Qty: 0 | Refills: 0 | COMMUNITY

## 2017-05-18 RX ORDER — INSULIN LISPRO 100/ML
VIAL (ML) SUBCUTANEOUS AT BEDTIME
Qty: 0 | Refills: 0 | Status: DISCONTINUED | OUTPATIENT
Start: 2017-05-18 | End: 2017-05-23

## 2017-05-18 RX ORDER — ENOXAPARIN SODIUM 100 MG/ML
40 INJECTION SUBCUTANEOUS EVERY 24 HOURS
Qty: 0 | Refills: 0 | Status: DISCONTINUED | OUTPATIENT
Start: 2017-05-18 | End: 2017-05-23

## 2017-05-18 RX ORDER — ASPIRIN/CALCIUM CARB/MAGNESIUM 324 MG
81 TABLET ORAL DAILY
Qty: 0 | Refills: 0 | Status: DISCONTINUED | OUTPATIENT
Start: 2017-05-18 | End: 2017-05-23

## 2017-05-18 RX ORDER — ASPIRIN/CALCIUM CARB/MAGNESIUM 324 MG
325 TABLET ORAL ONCE
Qty: 0 | Refills: 0 | Status: COMPLETED | OUTPATIENT
Start: 2017-05-18 | End: 2017-05-18

## 2017-05-18 RX ADMIN — INSULIN GLARGINE 50 UNIT(S): 100 INJECTION, SOLUTION SUBCUTANEOUS at 21:57

## 2017-05-18 RX ADMIN — LISINOPRIL 5 MILLIGRAM(S): 2.5 TABLET ORAL at 05:15

## 2017-05-18 RX ADMIN — SODIUM CHLORIDE 3 MILLILITER(S): 9 INJECTION INTRAMUSCULAR; INTRAVENOUS; SUBCUTANEOUS at 21:54

## 2017-05-18 RX ADMIN — Medication 325 MILLIGRAM(S): at 01:36

## 2017-05-18 RX ADMIN — Medication 81 MILLIGRAM(S): at 12:19

## 2017-05-18 RX ADMIN — Medication: at 12:46

## 2017-05-18 RX ADMIN — SODIUM CHLORIDE 3 MILLILITER(S): 9 INJECTION INTRAMUSCULAR; INTRAVENOUS; SUBCUTANEOUS at 13:01

## 2017-05-18 RX ADMIN — GABAPENTIN 100 MILLIGRAM(S): 400 CAPSULE ORAL at 05:15

## 2017-05-18 RX ADMIN — GABAPENTIN 100 MILLIGRAM(S): 400 CAPSULE ORAL at 17:02

## 2017-05-18 RX ADMIN — ATORVASTATIN CALCIUM 80 MILLIGRAM(S): 80 TABLET, FILM COATED ORAL at 21:58

## 2017-05-18 RX ADMIN — ENOXAPARIN SODIUM 40 MILLIGRAM(S): 100 INJECTION SUBCUTANEOUS at 05:15

## 2017-05-18 RX ADMIN — SODIUM CHLORIDE 3 MILLILITER(S): 9 INJECTION INTRAMUSCULAR; INTRAVENOUS; SUBCUTANEOUS at 05:17

## 2017-05-18 RX ADMIN — AMLODIPINE BESYLATE 5 MILLIGRAM(S): 2.5 TABLET ORAL at 05:15

## 2017-05-18 RX ADMIN — Medication 8: at 17:19

## 2017-05-18 NOTE — CONSULT NOTE ADULT - PROBLEM SELECTOR RECOMMENDATION 9
The etiology of SOB on exertion seems to be cardiac in nature: Patient has no pulmonary history and has never smoked: cta: no dissection as well as no ??pe: the report being updated: no emphysema on lung windows: given increased pedal edema and DM, cardiac evaluation is warranted   will need outpatient full PFT

## 2017-05-18 NOTE — H&P ADULT - ATTENDING COMMENTS
Pt. seen and examined.  Agree with above.    -Admit to tele  -Check TTE  -Discuss with radiology to see if PE ruled out on CT scan  -Further workup pending above.

## 2017-05-18 NOTE — H&P ADULT - PSH
S/P primary angioplasty with coronary stent  x1 in 2006 at [facility]  S/P TKR (total knee replacement)  left

## 2017-05-18 NOTE — H&P ADULT - HISTORY OF PRESENT ILLNESS
77F with a history of CAD s/p PCI, CHF, not on Lasix , HTN, Dyslipidemia, DM2, experiencing intermittent exertional substernal chest tightness that radiates to the L shoulder, episodes last for a few minutes and resolve on their own, Positive CEDEÑO after ambulating 1/2 block, positive dietary indiscretions with salty foods, b/l LE swelling, and dizziness on exertion. The pt denies falls, LOC, HA, tinnitus, focal areas of weakness, orthopnea.

## 2017-05-18 NOTE — H&P ADULT - NSHPLABSRESULTS_GEN_ALL_CORE
11.7   6.34  )-----------( 219      ( 17 May 2017 21:48 )             36.6   05-17    137  |  101  |  17  ----------------------------<  207<H>  4.1   |  21<L>  |  0.80    Ca    9.3      17 May 2017 21:48    TPro  7.7  /  Alb  3.9  /  TBili  0.3  /  DBili  x   /  AST  17  /  ALT  14  /  AlkPhos  69  05-17  CARDIAC MARKERS ( 17 May 2017 21:48 )  x     / < 0.06 ng/mL / 140 u/L / 2.35 ng/mL / x    EKG NSR @ 87b/ min ,LVH, Q wave V1 V2

## 2017-05-18 NOTE — H&P ADULT - NEGATIVE NEUROLOGICAL SYMPTOMS
no focal seizures/no headache/no weakness/no syncope/no transient paralysis/no loss of sensation/no difficulty walking

## 2017-05-18 NOTE — CONSULT NOTE ADULT - SUBJECTIVE AND OBJECTIVE BOX
Patient is a 77y old  Female who presents with a chief complaint of cp, sob (18 May 2017 05:17)      HPI:  77F with a history of CAD s/p PCI, CHF, not on Lasix , HTN, Dyslipidemia, DM2, experiencing intermittent exertional substernal chest tightness that radiates to the L shoulder, episodes last for a few minutes and resolve on their own, Positive CEDEÑO after ambulating 1/2 block, positive dietary indiscretions with salty foods, b/l LE swelling, and dizziness on exertion. The pt denies falls, LOC, HA, tinnitus, focal areas of weakness, orthopnea.    PATIENT ALSO SAID THAT SHE FELT DIZZY AFTER SHE CAME OUT OF HER CAR AND AS SOON AS SHE STARTED WALKING SHE GOT SOB : HER EXRCIASE CAPACITY IS ABOUT H JOSHUA A BLOCK: DENIES ANY PULMONARY HISTORY       [ ] Hx of PE/DVT  [ ] Hx COPD  [ ] Hx of Asthma  [ ] Hx of Hospitalization  [ ]  Hx of BiPAP/CPAP use  [ ] Hx of MEGAN    Allergies    No Known Allergies    Intolerances        PAST MEDICAL & SURGICAL HISTORY:  CHF (congestive heart failure)  HLD (hyperlipidemia)  Syncope and collapse  CVA (cerebral vascular accident): no residual deficits  GERD (gastroesophageal reflux disease)  DM (diabetes mellitus)  CAD (coronary artery disease): S/P stent placemenet 2006  HTN (hypertension)  S/P TKR (total knee replacement): left  S/P primary angioplasty with coronary stent: x1 in 2006 at [facility]  CVA (cerebral infarction)      FAMILY HISTORY:  No pertinent family history in first degree relatives      Social History:     REVIEW OF SYSTEMS      General:	DIZZINESS     Skin/Breast: NONE   	  Ophthalmologic:NONE  	  ENMT:	SELVIN     Respiratory and Thorax:SOB  	  Cardiovascular:	CHEST PAIN     Gastrointestinal:	NONE     Genitourinary:	NO SYMTPOMS     Musculoskeletal:	NONE     Neurological:	DIZZINESS     Psychiatric:	NONE     Hematology/Lymphatics:	NONE    Endocrine:	N    Allergic/Immunologic:	N    MEDICATIONS  (STANDING):  enoxaparin Injectable 40milliGRAM(s) SubCutaneous every 24 hours  sodium chloride 0.9% lock flush 3milliLiter(s) IV Push every 8 hours  insulin glargine Injectable (LANTUS) 50Unit(s) SubCutaneous at bedtime  insulin lispro (HumaLOG) corrective regimen sliding scale  SubCutaneous three times a day before meals  insulin lispro (HumaLOG) corrective regimen sliding scale  SubCutaneous at bedtime  dextrose 5%. 1000milliLiter(s) IV Continuous <Continuous>  dextrose 50% Injectable 12.5Gram(s) IV Push once  dextrose 50% Injectable 25Gram(s) IV Push once  dextrose 50% Injectable 25Gram(s) IV Push once  aspirin enteric coated 81milliGRAM(s) Oral daily  lisinopril 5milliGRAM(s) Oral daily  gabapentin 100milliGRAM(s) Oral two times a day  atorvastatin 80milliGRAM(s) Oral at bedtime  amLODIPine   Tablet 5milliGRAM(s) Oral daily    MEDICATIONS  (PRN):  dextrose Gel 1Dose(s) Oral once PRN Blood Glucose LESS THAN 70 milliGRAM(s)/deciliter  glucagon  Injectable 1milliGRAM(s) IntraMuscular once PRN Glucose LESS THAN 70 milligrams/deciliter  acetaminophen   Tablet 650milliGRAM(s) Oral every 6 hours PRN mild and moderate pain       T(F): 98.1, Max: 98.8 (05-18 @ 04:20)  HR: 73 (67 - 86)  BP: 134/78 (123/83 - 159/85)  BP(mean): --  ABP: --  RR: 18 (14 - 18)  SpO2: 98% (98% - 100%)  Wt(kg): --  CVP(mm Hg): --  CO: --  CI: --  PA: --  PA(mean): --  PA(direct): --  PCWP: --  LA: --  RA: --  SVR: --  SVRI: --  PVR: --  PVRI: --         I&O's Summary      Physical Exam:     heent: selvin   cons: no jvd  ext: 1 edema  skin: no adam  resp: no wheezing: o crackles  cvs: s1s2 normal: no significant murmur heard  GI: BS+  CNS: AXOX3  : NO LOPEZ     Labs:    CARDIAC MARKERS ( 18 May 2017 06:25 )  x     / < 0.06 ng/mL / 121 u/L / x     / x      CARDIAC MARKERS ( 18 May 2017 06:22 )  x     / < 0.06 ng/mL / 124 u/L / 2.20 ng/mL / x      CARDIAC MARKERS ( 17 May 2017 21:48 )  x     / < 0.06 ng/mL / 140 u/L / 2.35 ng/mL / x          CBC Full  -  ( 18 May 2017 06:25 )  WBC Count : 5.28 K/uL  Hemoglobin : 11.6 g/dL  Hematocrit : 36.6 %  Platelet Count - Automated : 212 K/uL  Mean Cell Volume : 86.7 fL  Mean Cell Hemoglobin : 27.5 pg  Mean Cell Hemoglobin Concentration : 31.7 %  Auto Neutrophil # : x  Auto Lymphocyte # : x  Auto Monocyte # : x  Auto Eosinophil # : x  Auto Basophil # : x  Auto Neutrophil % : x  Auto Lymphocyte % : x  Auto Monocyte % : x  Auto Eosinophil % : x  Auto Basophil % : x    05-18    141  |  103  |  11  ----------------------------<  129<H>  3.9   |  24  |  0.72    Ca    9.1      18 May 2017 06:25    TPro  7.7  /  Alb  3.9  /  TBili  0.3  /  DBili  x   /  AST  17  /  ALT  14  /  AlkPhos  69  05-17    CAPILLARY BLOOD GLUCOSE  182 (18 May 2017 12:21)  149 (18 May 2017 08:19)    LIVER FUNCTIONS - ( 17 May 2017 21:48 )  Alb: 3.9 g/dL / Pro: 7.7 g/dL / ALK PHOS: 69 u/L / ALT: 14 u/L / AST: 17 u/L / GGT: x           PT/INR - ( 17 May 2017 22:12 )   PT: 10.9 SEC;   INR: 0.97          PTT - ( 17 May 2017 22:12 )  PTT:33.0 SEC    D DImer: NONE       Cultures:             Studies    Chest X-RAYIMPRESSION:         CT SCAN Chest LUNGS AND LARGE AIRWAYS: Patent central airways.  Mild bibasilar   subsegmental atelectasis. No pulmonary nodules.  PLEURA: No pleural effusion.  VESSELS: Precontrast images show no evidence of intramural hematoma.   Postcontrast images show no evidence of thoracic or abdominal aortic   dissection.  The thoracic and abdominal aorta are normal in caliber with   no evidence of aneurysm. The proximal great vessels arising from the   aortic arch are unremarkable.  The celiac artery, SMA, renal arteries,   and YOLANDA are patent. The common iliac and internal and external iliac   arteries are patent and normal in caliber. There is atherosclerosis of   the vasculature.  HEART: Heart size is normal. No pericardial effusion.  MEDIASTINUM AND ELLA: No lymphadenopathy.  CHEST WALL AND LOWER NECK: Within normal limits.    CT Abdomen    Venous Dopplers: LE:   No evidence of deep venous thrombosis in the visualized bilateral lower   extremities.  Others    IMPRESSION:   No acute intracranial hemorrhage, mass effect, or midline shift.    Similar-appearing microvascular disease.

## 2017-05-18 NOTE — H&P ADULT - NEGATIVE ENMT SYMPTOMS
no hearing difficulty/no ear pain/no abnormal taste sensation/no sinus symptoms/no gum bleeding/no tinnitus/no nasal congestion/no nose bleeds

## 2017-05-19 LAB
BUN SERPL-MCNC: 16 MG/DL — SIGNIFICANT CHANGE UP (ref 7–23)
CALCIUM SERPL-MCNC: 9 MG/DL — SIGNIFICANT CHANGE UP (ref 8.4–10.5)
CHLORIDE SERPL-SCNC: 102 MMOL/L — SIGNIFICANT CHANGE UP (ref 98–107)
CO2 SERPL-SCNC: 22 MMOL/L — SIGNIFICANT CHANGE UP (ref 22–31)
CREAT SERPL-MCNC: 0.85 MG/DL — SIGNIFICANT CHANGE UP (ref 0.5–1.3)
GLUCOSE SERPL-MCNC: 175 MG/DL — HIGH (ref 70–99)
HCT VFR BLD CALC: 36.2 % — SIGNIFICANT CHANGE UP (ref 34.5–45)
HGB BLD-MCNC: 11.2 G/DL — LOW (ref 11.5–15.5)
MAGNESIUM SERPL-MCNC: 2 MG/DL — SIGNIFICANT CHANGE UP (ref 1.6–2.6)
MCHC RBC-ENTMCNC: 27.1 PG — SIGNIFICANT CHANGE UP (ref 27–34)
MCHC RBC-ENTMCNC: 30.9 % — LOW (ref 32–36)
MCV RBC AUTO: 87.4 FL — SIGNIFICANT CHANGE UP (ref 80–100)
PHOSPHATE SERPL-MCNC: 3.4 MG/DL — SIGNIFICANT CHANGE UP (ref 2.5–4.5)
PLATELET # BLD AUTO: 213 K/UL — SIGNIFICANT CHANGE UP (ref 150–400)
PMV BLD: 11 FL — SIGNIFICANT CHANGE UP (ref 7–13)
POTASSIUM SERPL-MCNC: 4.1 MMOL/L — SIGNIFICANT CHANGE UP (ref 3.5–5.3)
POTASSIUM SERPL-SCNC: 4.1 MMOL/L — SIGNIFICANT CHANGE UP (ref 3.5–5.3)
RBC # BLD: 4.14 M/UL — SIGNIFICANT CHANGE UP (ref 3.8–5.2)
RBC # FLD: 14 % — SIGNIFICANT CHANGE UP (ref 10.3–14.5)
SODIUM SERPL-SCNC: 139 MMOL/L — SIGNIFICANT CHANGE UP (ref 135–145)
WBC # BLD: 5.82 K/UL — SIGNIFICANT CHANGE UP (ref 3.8–10.5)
WBC # FLD AUTO: 5.82 K/UL — SIGNIFICANT CHANGE UP (ref 3.8–10.5)

## 2017-05-19 RX ADMIN — ATORVASTATIN CALCIUM 80 MILLIGRAM(S): 80 TABLET, FILM COATED ORAL at 22:40

## 2017-05-19 RX ADMIN — Medication 81 MILLIGRAM(S): at 14:58

## 2017-05-19 RX ADMIN — ENOXAPARIN SODIUM 40 MILLIGRAM(S): 100 INJECTION SUBCUTANEOUS at 05:51

## 2017-05-19 RX ADMIN — Medication 2: at 17:33

## 2017-05-19 RX ADMIN — Medication 2: at 09:44

## 2017-05-19 RX ADMIN — SODIUM CHLORIDE 3 MILLILITER(S): 9 INJECTION INTRAMUSCULAR; INTRAVENOUS; SUBCUTANEOUS at 22:37

## 2017-05-19 RX ADMIN — SODIUM CHLORIDE 3 MILLILITER(S): 9 INJECTION INTRAMUSCULAR; INTRAVENOUS; SUBCUTANEOUS at 14:57

## 2017-05-19 RX ADMIN — GABAPENTIN 100 MILLIGRAM(S): 400 CAPSULE ORAL at 05:51

## 2017-05-19 RX ADMIN — GABAPENTIN 100 MILLIGRAM(S): 400 CAPSULE ORAL at 17:33

## 2017-05-19 RX ADMIN — SODIUM CHLORIDE 3 MILLILITER(S): 9 INJECTION INTRAMUSCULAR; INTRAVENOUS; SUBCUTANEOUS at 05:51

## 2017-05-19 RX ADMIN — LISINOPRIL 5 MILLIGRAM(S): 2.5 TABLET ORAL at 05:52

## 2017-05-19 RX ADMIN — AMLODIPINE BESYLATE 5 MILLIGRAM(S): 2.5 TABLET ORAL at 05:51

## 2017-05-19 RX ADMIN — INSULIN GLARGINE 50 UNIT(S): 100 INJECTION, SOLUTION SUBCUTANEOUS at 22:39

## 2017-05-19 NOTE — PROGRESS NOTE ADULT - SUBJECTIVE AND OBJECTIVE BOX
Patient is a 77y old  Female who presents with a chief complaint of cp, sob (18 May 2017 05:17)      Pertinent ROS:     MEDICATIONS  (STANDING):  enoxaparin Injectable 40milliGRAM(s) SubCutaneous every 24 hours  sodium chloride 0.9% lock flush 3milliLiter(s) IV Push every 8 hours  insulin glargine Injectable (LANTUS) 50Unit(s) SubCutaneous at bedtime  insulin lispro (HumaLOG) corrective regimen sliding scale  SubCutaneous three times a day before meals  insulin lispro (HumaLOG) corrective regimen sliding scale  SubCutaneous at bedtime  dextrose 5%. 1000milliLiter(s) IV Continuous <Continuous>  dextrose 50% Injectable 12.5Gram(s) IV Push once  dextrose 50% Injectable 25Gram(s) IV Push once  dextrose 50% Injectable 25Gram(s) IV Push once  aspirin enteric coated 81milliGRAM(s) Oral daily  lisinopril 5milliGRAM(s) Oral daily  gabapentin 100milliGRAM(s) Oral two times a day  atorvastatin 80milliGRAM(s) Oral at bedtime  amLODIPine   Tablet 5milliGRAM(s) Oral daily    MEDICATIONS  (PRN):  dextrose Gel 1Dose(s) Oral once PRN Blood Glucose LESS THAN 70 milliGRAM(s)/deciliter  glucagon  Injectable 1milliGRAM(s) IntraMuscular once PRN Glucose LESS THAN 70 milligrams/deciliter  acetaminophen   Tablet 650milliGRAM(s) Oral every 6 hours PRN mild and moderate pain    Vital Signs Last 24 Hrs  T(C): 36.9, Max: 36.9 (05-19 @ 14:10)  T(F): 98.4, Max: 98.4 (05-19 @ 14:10)  HR: 75 (72 - 80)  BP: 132/88 (132/70 - 144/85)  BP(mean): --  RR: 18 (17 - 18)  SpO2: 97% (97% - 99%)    I&O's Summary    I & Os for current day (as of 19 May 2017 15:46)  =============================================  IN: 240 ml / OUT: 0 ml / NET: 240 ml      Physical Exam:   Const: no jvd  Respiratory: clear  CVS:s1s2 normal  GI:benign  CNS: axo 3  SKIN: no rash  : no torre     Labs:    CARDIAC MARKERS ( 18 May 2017 06:25 )  x     / < 0.06 ng/mL / 121 u/L / x     / x      CARDIAC MARKERS ( 18 May 2017 06:22 )  x     / < 0.06 ng/mL / 124 u/L / 2.20 ng/mL / x      CARDIAC MARKERS ( 17 May 2017 21:48 )  x     / < 0.06 ng/mL / 140 u/L / 2.35 ng/mL / x                                11.2   5.82  )-----------( 213      ( 19 May 2017 07:00 )             36.2     05-19    139  |  102  |  16  ----------------------------<  175<H>  4.1   |  22  |  0.85    Ca    9.0      19 May 2017 07:00  Phos  3.4     05-19  Mg     2.0     05-19    TPro  7.7  /  Alb  3.9  /  TBili  0.3  /  DBili  x   /  AST  17  /  ALT  14  /  AlkPhos  69  05-17    CAPILLARY BLOOD GLUCOSE  158 (19 May 2017 11:50)  173 (19 May 2017 08:34)  344 (18 May 2017 17:09)    LIVER FUNCTIONS - ( 17 May 2017 21:48 )  Alb: 3.9 g/dL / Pro: 7.7 g/dL / ALK PHOS: 69 u/L / ALT: 14 u/L / AST: 17 u/L / GGT: x           PT/INR - ( 17 May 2017 22:12 )   PT: 10.9 SEC;   INR: 0.97          PTT - ( 17 May 2017 22:12 )  PTT:33.0 SEC    D DImer  Cultures:     Studies  Chest X-RAY  CT SCAN Chest IMPRESSION:  No evidence of aortic dissection.  no pe  CT Abdomen  Venous Dopplers: LE:   Others

## 2017-05-19 NOTE — PROGRESS NOTE ADULT - SUBJECTIVE AND OBJECTIVE BOX
Subjective:   	pt seen and examined , no chest pain , n/ v at present.  MEDICATIONS:  MEDICATIONS  (STANDING):  enoxaparin Injectable 40milliGRAM(s) SubCutaneous every 24 hours  sodium chloride 0.9% lock flush 3milliLiter(s) IV Push every 8 hours  insulin glargine Injectable (LANTUS) 50Unit(s) SubCutaneous at bedtime  insulin lispro (HumaLOG) corrective regimen sliding scale  SubCutaneous three times a day before meals  insulin lispro (HumaLOG) corrective regimen sliding scale  SubCutaneous at bedtime  dextrose 5%. 1000milliLiter(s) IV Continuous <Continuous>  dextrose 50% Injectable 12.5Gram(s) IV Push once  dextrose 50% Injectable 25Gram(s) IV Push once  dextrose 50% Injectable 25Gram(s) IV Push once  aspirin enteric coated 81milliGRAM(s) Oral daily  lisinopril 5milliGRAM(s) Oral daily  gabapentin 100milliGRAM(s) Oral two times a day  atorvastatin 80milliGRAM(s) Oral at bedtime  amLODIPine   Tablet 5milliGRAM(s) Oral daily      PHYSICAL EXAM:  T(C): 36.8, Max: 37.1 (05-18 @ 04:20)  HR: 72 (67 - 76)  BP: 144/85 (132/70 - 147/99)  RR: 17 (17 - 18)  SpO2: 98% (97% - 100%)  Wt(kg): --  I&O's Summary    I & Os for current day (as of 19 May 2017 03:27)  =============================================  IN: 240 ml / OUT: 0 ml / NET: 240 ml    Height (cm): 167.6 (05-18 @ 05:00)  Weight (kg): 83 (05-18 @ 05:00)  BMI (kg/m2): 29.5 (05-18 @ 05:00)  BSA (m2): 1.93 (05-18 @ 05:00)    Appearance: Normal	  HEENT:   Normal oral mucosa, PERRL, EOMI	  Lymphatic: No lymphadenopathy , +2 pedal edema  Cardiovascular: Normal S1 S2, No JVD, No murmurs , Peripheral pulses palpable 2+ bilaterally  Respiratory: Lungs clear to auscultation, normal effort 	  Gastrointestinal:  Soft, Non-tender, + BS	  Skin: No rashes, No ecchymoses, No cyanosis, warm to touch  Musculoskeletal: Normal range of motion, normal strength  Psychiatry:  Mood & affect appropriate    TELEMETRY: 	  nsr ,   ECG:  	  RADIOLOGY:   cta: No evidence of aortic dissection.  ct head : No acute intracranial hemorrhage, mass effect, or midline shift.  DIAGNOSTIC TESTING:  [ ] Echocardiogram:    1. Mitral annular calcification, otherwise normal mitral  valve. Minimal mitral regurgitation.  2. Normal left ventricular internal dimensions and wall  thicknesses.  3. Normal left ventricular systolic function. No segmental  wall motion abnormalities.  4. Normal right ventricular size and function.  *** Compared with echocardiogram of 3/28/2017, no  significant changes noted  .[ ]  Catheterization  DIAGNOSTIC IMPRESSIONS: Patent stent LAD and LCx  Non-obstructive CAD:  [ ] Stress Test:    OTHER: 	        CARDIAC MARKERS:  CARDIAC MARKERS ( 18 May 2017 06:25 )  x     / < 0.06 ng/mL / 121 u/L / x     / x      CARDIAC MARKERS ( 18 May 2017 06:22 )  x     / < 0.06 ng/mL / 124 u/L / 2.20 ng/mL / x      CARDIAC MARKERS ( 17 May 2017 21:48 )  x     / < 0.06 ng/mL / 140 u/L / 2.35 ng/mL / x                                    11.6   5.28  )-----------( 212      ( 18 May 2017 06:25 )             36.6     05-18    141  |  103  |  11  ----------------------------<  129<H>  3.9   |  24  |  0.72    Ca    9.1      18 May 2017 06:25    TPro  7.7  /  Alb  3.9  /  TBili  0.3  /  DBili  x   /  AST  17  /  ALT  14  /  AlkPhos  69  05-17      HgA1c: Hemoglobin A1C, Whole Blood: 10.6 % (05-18 @ 06:25)    TSH: Thyroid Stimulating Hormone, Serum: 2.65 uIU/mL (05-18 @ 06:25)      ASSESSMENT/PLAN: 	77y Female with a history of CAD s/p PCI, CHF, not on Lasix , HTN, Dyslipidemia, DM2, experiencing intermittent exertional substernal chest tightness that radiates to the L shoulder.    cont asa, statin   cont ACE , Norvasc  trend cardiac enzymes  gi / dvt prophylaxis  keep k >4 , mag >2  no further cardiac work up needed at present   d/w dr garvin

## 2017-05-19 NOTE — PHYSICAL THERAPY INITIAL EVALUATION ADULT - PERTINENT HX OF CURRENT PROBLEM, REHAB EVAL
77F with a history of CAD s/p PCI, CHF, not on Lasix , HTN, Dyslipidemia, DM2, experiencing intermittent exertional substernal chest tightness that radiates to the L shoulder, episodes last for a few minutes and resolve on their own, Positive CEDEÑO after ambulating 1/2 block, positive dietary indiscretions with salty foods, b/l LE swelling, and dizziness on exertion.

## 2017-05-20 DIAGNOSIS — I10 ESSENTIAL (PRIMARY) HYPERTENSION: ICD-10-CM

## 2017-05-20 DIAGNOSIS — E11.9 TYPE 2 DIABETES MELLITUS WITHOUT COMPLICATIONS: ICD-10-CM

## 2017-05-20 LAB
BUN SERPL-MCNC: 18 MG/DL — SIGNIFICANT CHANGE UP (ref 7–23)
CALCIUM SERPL-MCNC: 9.4 MG/DL — SIGNIFICANT CHANGE UP (ref 8.4–10.5)
CHLORIDE SERPL-SCNC: 104 MMOL/L — SIGNIFICANT CHANGE UP (ref 98–107)
CO2 SERPL-SCNC: 22 MMOL/L — SIGNIFICANT CHANGE UP (ref 22–31)
CREAT SERPL-MCNC: 0.87 MG/DL — SIGNIFICANT CHANGE UP (ref 0.5–1.3)
GLUCOSE SERPL-MCNC: 144 MG/DL — HIGH (ref 70–99)
HCT VFR BLD CALC: 37.6 % — SIGNIFICANT CHANGE UP (ref 34.5–45)
HGB BLD-MCNC: 11.8 G/DL — SIGNIFICANT CHANGE UP (ref 11.5–15.5)
MAGNESIUM SERPL-MCNC: 2.1 MG/DL — SIGNIFICANT CHANGE UP (ref 1.6–2.6)
MCHC RBC-ENTMCNC: 27.6 PG — SIGNIFICANT CHANGE UP (ref 27–34)
MCHC RBC-ENTMCNC: 31.4 % — LOW (ref 32–36)
MCV RBC AUTO: 87.9 FL — SIGNIFICANT CHANGE UP (ref 80–100)
PLATELET # BLD AUTO: 225 K/UL — SIGNIFICANT CHANGE UP (ref 150–400)
PMV BLD: 11.1 FL — SIGNIFICANT CHANGE UP (ref 7–13)
POTASSIUM SERPL-MCNC: 4.2 MMOL/L — SIGNIFICANT CHANGE UP (ref 3.5–5.3)
POTASSIUM SERPL-SCNC: 4.2 MMOL/L — SIGNIFICANT CHANGE UP (ref 3.5–5.3)
RBC # BLD: 4.28 M/UL — SIGNIFICANT CHANGE UP (ref 3.8–5.2)
RBC # FLD: 13.9 % — SIGNIFICANT CHANGE UP (ref 10.3–14.5)
SODIUM SERPL-SCNC: 143 MMOL/L — SIGNIFICANT CHANGE UP (ref 135–145)
WBC # BLD: 5.92 K/UL — SIGNIFICANT CHANGE UP (ref 3.8–10.5)
WBC # FLD AUTO: 5.92 K/UL — SIGNIFICANT CHANGE UP (ref 3.8–10.5)

## 2017-05-20 PROCEDURE — 70551 MRI BRAIN STEM W/O DYE: CPT | Mod: 26

## 2017-05-20 PROCEDURE — 70547 MR ANGIOGRAPHY NECK W/O DYE: CPT | Mod: 26

## 2017-05-20 RX ADMIN — AMLODIPINE BESYLATE 5 MILLIGRAM(S): 2.5 TABLET ORAL at 06:16

## 2017-05-20 RX ADMIN — INSULIN GLARGINE 50 UNIT(S): 100 INJECTION, SOLUTION SUBCUTANEOUS at 22:06

## 2017-05-20 RX ADMIN — GABAPENTIN 100 MILLIGRAM(S): 400 CAPSULE ORAL at 18:18

## 2017-05-20 RX ADMIN — Medication 81 MILLIGRAM(S): at 11:36

## 2017-05-20 RX ADMIN — ENOXAPARIN SODIUM 40 MILLIGRAM(S): 100 INJECTION SUBCUTANEOUS at 06:16

## 2017-05-20 RX ADMIN — SODIUM CHLORIDE 3 MILLILITER(S): 9 INJECTION INTRAMUSCULAR; INTRAVENOUS; SUBCUTANEOUS at 22:03

## 2017-05-20 RX ADMIN — GABAPENTIN 100 MILLIGRAM(S): 400 CAPSULE ORAL at 06:16

## 2017-05-20 RX ADMIN — SODIUM CHLORIDE 3 MILLILITER(S): 9 INJECTION INTRAMUSCULAR; INTRAVENOUS; SUBCUTANEOUS at 06:14

## 2017-05-20 RX ADMIN — LISINOPRIL 5 MILLIGRAM(S): 2.5 TABLET ORAL at 06:16

## 2017-05-20 RX ADMIN — ATORVASTATIN CALCIUM 80 MILLIGRAM(S): 80 TABLET, FILM COATED ORAL at 22:05

## 2017-05-20 RX ADMIN — Medication 2: at 18:18

## 2017-05-20 RX ADMIN — SODIUM CHLORIDE 3 MILLILITER(S): 9 INJECTION INTRAMUSCULAR; INTRAVENOUS; SUBCUTANEOUS at 11:36

## 2017-05-20 NOTE — PROGRESS NOTE ADULT - SUBJECTIVE AND OBJECTIVE BOX
Patient is a 77y old  Female who presents with a chief complaint of cp, sob (18 May 2017 05:17)      SUBJECTIVE / OVERNIGHT EVENTS: No nausea, vomiting or diarrhea, no fever or chills, no dizziness or chest pain, no dysuria or hematuria, no jt pain or swelling.      MEDICATIONS  (STANDING):  enoxaparin Injectable 40milliGRAM(s) SubCutaneous every 24 hours  sodium chloride 0.9% lock flush 3milliLiter(s) IV Push every 8 hours  insulin glargine Injectable (LANTUS) 50Unit(s) SubCutaneous at bedtime  insulin lispro (HumaLOG) corrective regimen sliding scale  SubCutaneous three times a day before meals  insulin lispro (HumaLOG) corrective regimen sliding scale  SubCutaneous at bedtime  dextrose 5%. 1000milliLiter(s) IV Continuous <Continuous>  dextrose 50% Injectable 12.5Gram(s) IV Push once  dextrose 50% Injectable 25Gram(s) IV Push once  dextrose 50% Injectable 25Gram(s) IV Push once  aspirin enteric coated 81milliGRAM(s) Oral daily  lisinopril 5milliGRAM(s) Oral daily  gabapentin 100milliGRAM(s) Oral two times a day  atorvastatin 80milliGRAM(s) Oral at bedtime  amLODIPine   Tablet 5milliGRAM(s) Oral daily    MEDICATIONS  (PRN):  dextrose Gel 1Dose(s) Oral once PRN Blood Glucose LESS THAN 70 milliGRAM(s)/deciliter  glucagon  Injectable 1milliGRAM(s) IntraMuscular once PRN Glucose LESS THAN 70 milligrams/deciliter  acetaminophen   Tablet 650milliGRAM(s) Oral every 6 hours PRN mild and moderate pain      Vital Signs Last 24 Hrs  T(C): 36.3, Max: 36.9 (05-19 @ 14:10)  HR: 65 (65 - 77)  BP: 132/91 (132/88 - 133/83)  RR: 17 (17 - 18)  SpO2: 97% (97% - 98%)  Wt(kg): --  CAPILLARY BLOOD GLUCOSE  129 (20 May 2017 08:35)  188 (19 May 2017 21:33)  179 (19 May 2017 16:59)    I&O's Summary      PHYSICAL EXAM:  GENERAL: NAD, well-developed  HEAD:  Atraumatic, Normocephalic  EYES: EOMI, PERRLA, conjunctiva and sclera clear  NECK: Supple, No JVD  CHEST/LUNG: Clear to auscultation bilaterally; No wheeze  HEART: Regular rate and rhythm; No murmurs, rubs, or gallops  ABDOMEN: Soft, Nontender, Nondistended; Bowel sounds present  EXTREMITIES:  2+ Peripheral Pulses, No clubbing, cyanosis, or edema  PSYCH: AAOx3  NEUROLOGY: non-focal  SKIN: No rashes or lesions    LABS:                        11.8   5.92  )-----------( 225      ( 20 May 2017 05:44 )             37.6     05-20    143  |  104  |  18  ----------------------------<  144<H>  4.2   |  22  |  0.87    Ca    9.4      20 May 2017 05:44  Phos  3.4     05-19  Mg     2.1     05-20                RADIOLOGY & ADDITIONAL TESTS:    Imaging Personally Reviewed:    Consultant(s) Notes Reviewed:      Care Discussed with Consultants/Other Providers:

## 2017-05-20 NOTE — PROGRESS NOTE ADULT - SUBJECTIVE AND OBJECTIVE BOX
Patient is a 77y old  Female who presents with a chief complaint of cp, sob (18 May 2017 05:17)  pt says her SOB has improved but still feels some SOB.  In addition, she denies any pulmonary history      Any change in ROS: imrpoved SOB     MEDICATIONS  (STANDING):  enoxaparin Injectable 40milliGRAM(s) SubCutaneous every 24 hours  sodium chloride 0.9% lock flush 3milliLiter(s) IV Push every 8 hours  insulin glargine Injectable (LANTUS) 50Unit(s) SubCutaneous at bedtime  insulin lispro (HumaLOG) corrective regimen sliding scale  SubCutaneous three times a day before meals  insulin lispro (HumaLOG) corrective regimen sliding scale  SubCutaneous at bedtime  dextrose 5%. 1000milliLiter(s) IV Continuous <Continuous>  dextrose 50% Injectable 12.5Gram(s) IV Push once  dextrose 50% Injectable 25Gram(s) IV Push once  dextrose 50% Injectable 25Gram(s) IV Push once  aspirin enteric coated 81milliGRAM(s) Oral daily  lisinopril 5milliGRAM(s) Oral daily  gabapentin 100milliGRAM(s) Oral two times a day  atorvastatin 80milliGRAM(s) Oral at bedtime  amLODIPine   Tablet 5milliGRAM(s) Oral daily    MEDICATIONS  (PRN):  dextrose Gel 1Dose(s) Oral once PRN Blood Glucose LESS THAN 70 milliGRAM(s)/deciliter  glucagon  Injectable 1milliGRAM(s) IntraMuscular once PRN Glucose LESS THAN 70 milligrams/deciliter  acetaminophen   Tablet 650milliGRAM(s) Oral every 6 hours PRN mild and moderate pain    Vital Signs Last 24 Hrs  T(C): 36.8, Max: 36.8 (05-19 @ 22:24)  T(F): 98.2, Max: 98.2 (05-19 @ 22:24)  HR: 76 (65 - 77)  BP: 122/81 (122/81 - 133/83)  BP(mean): --  RR: 18 (17 - 18)  SpO2: 99% (97% - 99%)    I&O's Summary        Physical Exam:   GENERAL: NAD, well-groomed, well-developed  HEENT: SELVIN/   Atraumatic, Normocephalic  ENMT: No tonsillar erythema, exudates, or enlargement; Moist mucous membranes, Good dentition, No lesions  NECK: Supple, No JVD, Normal thyroid  CHEST/LUNG: Clear to percussion bilaterally; No rales, rhonchi, wheezing, or rubs  CVS: Regular rate and rhythm; No murmurs, rubs, or gallops  GI: : Soft, Nontender, Nondistended; Bowel sounds present  NERVOUS SYSTEM:  Alert & Oriented X3, Good concentration; Motor Strength 5/5 B/L upper and lower extremities; DTRs 2+ intact and symmetric  EXTREMITIES:  2+ Peripheral Pulses, No clubbing, cyanosis, or edema  LYMPH: No lymphadenopathy noted  SKIN: No rashes or lesions  ENDOCRINOLOGY: No Thyromegaly  PSYCH: Appropriate/demented/others    Labs:                              11.8   5.92  )-----------( 225      ( 20 May 2017 05:44 )             37.6     05-20    143  |  104  |  18  ----------------------------<  144<H>  4.2   |  22  |  0.87    Ca    9.4      20 May 2017 05:44  Phos  3.4     05-19  Mg     2.1     05-20      CAPILLARY BLOOD GLUCOSE  219 (20 May 2017 17:37)  129 (20 May 2017 08:35)  188 (19 May 2017 21:33)          Serum Pro-Brain Natriuretic Peptide: 44.50 pg/mL (05-17 @ 23:50)    Cultures:                                               Studies  Chest X-RAY  CT SCAN Chest       IMPRESSION:  No evidence of aortic dissection.  and no pe   CT Abdomen  Venous Dopplers: LE:   IMPRESSION:     No evidence of deep venous thrombosis in the visualized bilateral lower   extremities.  Others  IMPRESSION:      1. 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 Small left thalamic foci of infarct which   are old as well.    2. 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 Can correlate with Doppler evaluation as   clinically warranted. 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 May 20 2017  1:23PM

## 2017-05-20 NOTE — PROGRESS NOTE ADULT - SUBJECTIVE AND OBJECTIVE BOX
Patient is a 77y old  Female who presents with a chief complaint of dizziness    HPI:  77F with dizziness, improved overnight, still feels off balance. No ha/neck pain, no new weakness      Vital Signs Last 24 Hrs  T(C): 36.3, Max: 36.9 (05-19 @ 14:10)  T(F): 97.3, Max: 98.4 (05-19 @ 14:10)  HR: 65 (65 - 77)  BP: 132/91 (132/88 - 133/83)  BP(mean): --  RR: 17 (17 - 18)  SpO2: 97% (97% - 98%)    Physical Exam    Mental Status- AAO x 3, speech fluent without dysarthria, memory and fund of knowledge intact  CN- 2-12 intact  Motor- 5/5 x 4 ext, nl bulk and tone  Sensory- intact Lt/PP/propriception  Coordination- No dysmetria UE/LE  Gait- deferred

## 2017-05-20 NOTE — PROGRESS NOTE ADULT - SUBJECTIVE AND OBJECTIVE BOX
Subjective:  pt seen and examined , ROS negative  pt complaints at present  	  MEDICATIONS:  MEDICATIONS  (STANDING):  enoxaparin Injectable 40milliGRAM(s) SubCutaneous every 24 hours  sodium chloride 0.9% lock flush 3milliLiter(s) IV Push every 8 hours  insulin glargine Injectable (LANTUS) 50Unit(s) SubCutaneous at bedtime  insulin lispro (HumaLOG) corrective regimen sliding scale  SubCutaneous three times a day before meals  insulin lispro (HumaLOG) corrective regimen sliding scale  SubCutaneous at bedtime  dextrose 5%. 1000milliLiter(s) IV Continuous <Continuous>  dextrose 50% Injectable 12.5Gram(s) IV Push once  dextrose 50% Injectable 25Gram(s) IV Push once  dextrose 50% Injectable 25Gram(s) IV Push once  aspirin enteric coated 81milliGRAM(s) Oral daily  lisinopril 5milliGRAM(s) Oral daily  gabapentin 100milliGRAM(s) Oral two times a day  atorvastatin 80milliGRAM(s) Oral at bedtime  amLODIPine   Tablet 5milliGRAM(s) Oral daily      PHYSICAL EXAM:  T(C): 36.8, Max: 36.9 (05-19 @ 14:10)  HR: 77 (75 - 80)  BP: 133/83 (132/88 - 142/86)  RR: 17 (17 - 18)  SpO2: 98% (97% - 99%)  Wt(kg): --  I&O's Summary    I & Os for current day (as of 20 May 2017 04:53)  =============================================  IN: 240 ml / OUT: 0 ml / NET: 240 ml        Appearance: Normal	  HEENT:   Normal oral mucosa, PERRL, EOMI	  Lymphatic: No lymphadenopathy , +2 pedal  edema  Cardiovascular: Normal S1 S2, No JVD, No murmurs , Peripheral pulses palpable 2+ bilaterally  Respiratory: Lungs clear to auscultation, normal effort 	  Gastrointestinal:  Soft, Non-tender, + BS	  Skin: No rashes, No ecchymoses, No cyanosis, warm to touch  Musculoskeletal: Normal range of motion, normal strength  Psychiatry:  Mood & affect appropriate    TELEMETRY: 	  NSR  ECG:  	Diagnosis Line Normal sinus rhythm  Voltage criteria for left ventricular hypertrophy  RADIOLOGY:  ct angio :  IMPRESSION:  No evidence of aortic dissection.  DIAGNOSTIC TESTING:  [ ] Echocardiogram: CONCLUSIONS:  1. Mitral annular calcification, otherwise normal mitral  valve. Minimal mitral regurgitation.  2. Normal left ventricular internal dimensions and wall  thicknesses.  3. Normal left ventricular systolic function. No segmental  wall motion abnormalities.  4. Normal right ventricular size and function.  *** Compared with echocardiogram of 3/28/2017, no  significant changes noted.  ------------------------------------------------------------------------  Confirmed on  5/18/2017 - 16:50:08 Ciro Varma M.D.  [ ]  Catheterization:  [ ] Stress Test:    OTHER: 	    LABS:	 	    CARDIAC MARKERS:  CARDIAC MARKERS ( 18 May 2017 06:25 )  x     / < 0.06 ng/mL / 121 u/L / x     / x      CARDIAC MARKERS ( 18 May 2017 06:22 )  x     / < 0.06 ng/mL / 124 u/L / 2.20 ng/mL / x      CARDIAC MARKERS ( 17 May 2017 21:48 )  x     / < 0.06 ng/mL / 140 u/L / 2.35 ng/mL / x                                    11.2   5.82  )-----------( 213      ( 19 May 2017 07:00 )             36.2     05-19    139  |  102  |  16  ----------------------------<  175<H>  4.1   |  22  |  0.85    Ca    9.0      19 May 2017 07:00  Phos  3.4     05-19  Mg     2.0     05-19        ASSESSMENT/PLAN: 	77y Female  with a history of CAD s/p PCI, CHF, not on Lasix , HTN, Dyslipidemia, DM2, experiencing intermittent exertional substernal chest tightness that radiates to the L shoulder.    cont asa, statin   cont ACE , Norvasc  trend cardiac enzymes  gi / dvt prophylaxis  keep k >4 , mag >2  nst pending   neuro follow up   dc planning  d/w dr Lindquist Subjective:  pt seen and examined , ROS negative  pt complaints at present  	  MEDICATIONS:  MEDICATIONS  (STANDING):  enoxaparin Injectable 40milliGRAM(s) SubCutaneous every 24 hours  sodium chloride 0.9% lock flush 3milliLiter(s) IV Push every 8 hours  insulin glargine Injectable (LANTUS) 50Unit(s) SubCutaneous at bedtime  insulin lispro (HumaLOG) corrective regimen sliding scale  SubCutaneous three times a day before meals  insulin lispro (HumaLOG) corrective regimen sliding scale  SubCutaneous at bedtime  dextrose 5%. 1000milliLiter(s) IV Continuous <Continuous>  dextrose 50% Injectable 12.5Gram(s) IV Push once  dextrose 50% Injectable 25Gram(s) IV Push once  dextrose 50% Injectable 25Gram(s) IV Push once  aspirin enteric coated 81milliGRAM(s) Oral daily  lisinopril 5milliGRAM(s) Oral daily  gabapentin 100milliGRAM(s) Oral two times a day  atorvastatin 80milliGRAM(s) Oral at bedtime  amLODIPine   Tablet 5milliGRAM(s) Oral daily      PHYSICAL EXAM:  T(C): 36.8, Max: 36.9 (05-19 @ 14:10)  HR: 77 (75 - 80)  BP: 133/83 (132/88 - 142/86)  RR: 17 (17 - 18)  SpO2: 98% (97% - 99%)  Wt(kg): --  I&O's Summary    I & Os for current day (as of 20 May 2017 04:53)  =============================================  IN: 240 ml / OUT: 0 ml / NET: 240 ml        Appearance: Normal	  HEENT:   Normal oral mucosa, PERRL, EOMI	  Lymphatic: No lymphadenopathy , +2 pedal  edema  Cardiovascular: Normal S1 S2, No JVD, No murmurs , Peripheral pulses palpable 2+ bilaterally  Respiratory: Lungs clear to auscultation, normal effort 	  Gastrointestinal:  Soft, Non-tender, + BS	  Skin: No rashes, No ecchymoses, No cyanosis, warm to touch  Musculoskeletal: Normal range of motion, normal strength  Psychiatry:  Mood & affect appropriate    TELEMETRY: 	  NSR  ECG:  	Diagnosis Line Normal sinus rhythm  Voltage criteria for left ventricular hypertrophy  RADIOLOGY:  ct angio :  IMPRESSION:  No evidence of aortic dissection.  DIAGNOSTIC TESTING:  [ ] Echocardiogram: CONCLUSIONS:  1. Mitral annular calcification, otherwise normal mitral  valve. Minimal mitral regurgitation.  2. Normal left ventricular internal dimensions and wall  thicknesses.  3. Normal left ventricular systolic function. No segmental  wall motion abnormalities.  4. Normal right ventricular size and function.  *** Compared with echocardiogram of 3/28/2017, no  significant changes noted.  ------------------------------------------------------------------------  Confirmed on  5/18/2017 - 16:50:08 Ciro Varma M.D.  [ ]  Catheterization:  [ ] Stress Test:    OTHER: 	    LABS:	 	    CARDIAC MARKERS:  CARDIAC MARKERS ( 18 May 2017 06:25 )  x     / < 0.06 ng/mL / 121 u/L / x     / x      CARDIAC MARKERS ( 18 May 2017 06:22 )  x     / < 0.06 ng/mL / 124 u/L / 2.20 ng/mL / x      CARDIAC MARKERS ( 17 May 2017 21:48 )  x     / < 0.06 ng/mL / 140 u/L / 2.35 ng/mL / x                                    11.2   5.82  )-----------( 213      ( 19 May 2017 07:00 )             36.2     05-19    139  |  102  |  16  ----------------------------<  175<H>  4.1   |  22  |  0.85    Ca    9.0      19 May 2017 07:00  Phos  3.4     05-19  Mg     2.0     05-19        ASSESSMENT/PLAN: 	77y Female  with a history of CAD s/p PCI, CHF, not on Lasix , HTN, Dyslipidemia, DM2, experiencing intermittent exertional substernal chest tightness that radiates to the L shoulder.    cont asa, statin   cont ACE , Norvasc  gi / dvt prophylaxis  keep k >4 , mag >2  nst pending   neuro follow up   dc planning  d/w dr Lindquist

## 2017-05-21 LAB
BUN SERPL-MCNC: 19 MG/DL — SIGNIFICANT CHANGE UP (ref 7–23)
CALCIUM SERPL-MCNC: 9.3 MG/DL — SIGNIFICANT CHANGE UP (ref 8.4–10.5)
CHLORIDE SERPL-SCNC: 104 MMOL/L — SIGNIFICANT CHANGE UP (ref 98–107)
CO2 SERPL-SCNC: 21 MMOL/L — LOW (ref 22–31)
CREAT SERPL-MCNC: 0.83 MG/DL — SIGNIFICANT CHANGE UP (ref 0.5–1.3)
GLUCOSE SERPL-MCNC: 122 MG/DL — HIGH (ref 70–99)
HCT VFR BLD CALC: 39.3 % — SIGNIFICANT CHANGE UP (ref 34.5–45)
HGB BLD-MCNC: 12.4 G/DL — SIGNIFICANT CHANGE UP (ref 11.5–15.5)
MAGNESIUM SERPL-MCNC: 2.1 MG/DL — SIGNIFICANT CHANGE UP (ref 1.6–2.6)
MCHC RBC-ENTMCNC: 27.4 PG — SIGNIFICANT CHANGE UP (ref 27–34)
MCHC RBC-ENTMCNC: 31.6 % — LOW (ref 32–36)
MCV RBC AUTO: 86.8 FL — SIGNIFICANT CHANGE UP (ref 80–100)
PLATELET # BLD AUTO: 236 K/UL — SIGNIFICANT CHANGE UP (ref 150–400)
PMV BLD: 11.3 FL — SIGNIFICANT CHANGE UP (ref 7–13)
POTASSIUM SERPL-MCNC: 4.2 MMOL/L — SIGNIFICANT CHANGE UP (ref 3.5–5.3)
POTASSIUM SERPL-SCNC: 4.2 MMOL/L — SIGNIFICANT CHANGE UP (ref 3.5–5.3)
RBC # BLD: 4.53 M/UL — SIGNIFICANT CHANGE UP (ref 3.8–5.2)
RBC # FLD: 13.8 % — SIGNIFICANT CHANGE UP (ref 10.3–14.5)
SODIUM SERPL-SCNC: 140 MMOL/L — SIGNIFICANT CHANGE UP (ref 135–145)
WBC # BLD: 6.42 K/UL — SIGNIFICANT CHANGE UP (ref 3.8–10.5)
WBC # FLD AUTO: 6.42 K/UL — SIGNIFICANT CHANGE UP (ref 3.8–10.5)

## 2017-05-21 RX ADMIN — AMLODIPINE BESYLATE 5 MILLIGRAM(S): 2.5 TABLET ORAL at 06:20

## 2017-05-21 RX ADMIN — LISINOPRIL 5 MILLIGRAM(S): 2.5 TABLET ORAL at 06:20

## 2017-05-21 RX ADMIN — SODIUM CHLORIDE 3 MILLILITER(S): 9 INJECTION INTRAMUSCULAR; INTRAVENOUS; SUBCUTANEOUS at 06:08

## 2017-05-21 RX ADMIN — GABAPENTIN 100 MILLIGRAM(S): 400 CAPSULE ORAL at 06:20

## 2017-05-21 RX ADMIN — INSULIN GLARGINE 50 UNIT(S): 100 INJECTION, SOLUTION SUBCUTANEOUS at 22:29

## 2017-05-21 RX ADMIN — ENOXAPARIN SODIUM 40 MILLIGRAM(S): 100 INJECTION SUBCUTANEOUS at 06:20

## 2017-05-21 RX ADMIN — GABAPENTIN 100 MILLIGRAM(S): 400 CAPSULE ORAL at 17:17

## 2017-05-21 RX ADMIN — Medication 81 MILLIGRAM(S): at 11:30

## 2017-05-21 RX ADMIN — ATORVASTATIN CALCIUM 80 MILLIGRAM(S): 80 TABLET, FILM COATED ORAL at 22:28

## 2017-05-21 RX ADMIN — SODIUM CHLORIDE 3 MILLILITER(S): 9 INJECTION INTRAMUSCULAR; INTRAVENOUS; SUBCUTANEOUS at 22:29

## 2017-05-21 RX ADMIN — SODIUM CHLORIDE 3 MILLILITER(S): 9 INJECTION INTRAMUSCULAR; INTRAVENOUS; SUBCUTANEOUS at 13:07

## 2017-05-21 NOTE — PROGRESS NOTE ADULT - SUBJECTIVE AND OBJECTIVE BOX
Patient is a 77y old  Female who presents with a chief complaint of dizziness    HPI:  No events noted, no new complaints. no sig dizziness at this time, no weakness    MRI - old cva, mild-mod R carotid stenosis  Vital Signs Last 24 Hrs  T(C): 36.6, Max: 36.8 (05-20 @ 15:08)  T(F): 97.9, Max: 98.2 (05-20 @ 15:08)  HR: 66 (66 - 76)  BP: 132/85 (122/81 - 143/89)  BP(mean): --  RR: 17 (17 - 18)  SpO2: 100% (96% - 100%)    Physical Exam    Mental Status- AAO x 3, speech fluent without dysarthria, memory and fund of knowledge intact  CN- 2-12 intact  Motor- 5/5 x 4 ext, nl bulk and tone  Sensory- intact Lt/PP/propriception  Coordination- No dysmetria UE/LE  Gait- deferred

## 2017-05-21 NOTE — PROGRESS NOTE ADULT - SUBJECTIVE AND OBJECTIVE BOX
Patient is a 77y old  Female who presents with a chief complaint of cp, sob (18 May 2017 05:17)      SUBJECTIVE / OVERNIGHT EVENTS: No nausea, vomiting or diarrhea, no fever or chills, no dizziness or chest pain, no dysuria or hematuria, no jt pain or swelling    MEDICATIONS  (STANDING):  enoxaparin Injectable 40milliGRAM(s) SubCutaneous every 24 hours  sodium chloride 0.9% lock flush 3milliLiter(s) IV Push every 8 hours  insulin glargine Injectable (LANTUS) 50Unit(s) SubCutaneous at bedtime  insulin lispro (HumaLOG) corrective regimen sliding scale  SubCutaneous three times a day before meals  insulin lispro (HumaLOG) corrective regimen sliding scale  SubCutaneous at bedtime  dextrose 5%. 1000milliLiter(s) IV Continuous <Continuous>  dextrose 50% Injectable 12.5Gram(s) IV Push once  dextrose 50% Injectable 25Gram(s) IV Push once  dextrose 50% Injectable 25Gram(s) IV Push once  aspirin enteric coated 81milliGRAM(s) Oral daily  lisinopril 5milliGRAM(s) Oral daily  gabapentin 100milliGRAM(s) Oral two times a day  atorvastatin 80milliGRAM(s) Oral at bedtime  amLODIPine   Tablet 5milliGRAM(s) Oral daily    MEDICATIONS  (PRN):  dextrose Gel 1Dose(s) Oral once PRN Blood Glucose LESS THAN 70 milliGRAM(s)/deciliter  glucagon  Injectable 1milliGRAM(s) IntraMuscular once PRN Glucose LESS THAN 70 milligrams/deciliter  acetaminophen   Tablet 650milliGRAM(s) Oral every 6 hours PRN mild and moderate pain      Vital Signs Last 24 Hrs  T(C): 36.6, Max: 36.8 (05-20 @ 15:08)  HR: 66 (66 - 76)  BP: 132/85 (122/81 - 143/89)  RR: 17 (17 - 18)  SpO2: 100% (96% - 100%)  Wt(kg): --  CAPILLARY BLOOD GLUCOSE  115 (21 May 2017 08:30)  225 (20 May 2017 22:33)  219 (20 May 2017 17:37)    I&O's Summary    I & Os for current day (as of 21 May 2017 11:16)  =============================================  IN: 0 ml / OUT: 600 ml / NET: -600 ml      PHYSICAL EXAM:  GENERAL: NAD, well-developed  HEAD:  Atraumatic, Normocephalic  EYES: EOMI, PERRLA, conjunctiva and sclera clear  NECK: Supple, No JVD  CHEST/LUNG: Clear to auscultation bilaterally; No wheeze  HEART: Regular rate and rhythm; No murmurs, rubs, or gallops  ABDOMEN: Soft, Nontender, Nondistended; Bowel sounds present  EXTREMITIES:  2+ Peripheral Pulses, No clubbing, cyanosis, or edema  PSYCH: AAOx3  NEUROLOGY: non-focal  SKIN: No rashes or lesions    LABS:                        12.4   6.42  )-----------( 236      ( 21 May 2017 05:30 )             39.3     05-21    140  |  104  |  19  ----------------------------<  122<H>  4.2   |  21<L>  |  0.83    Ca    9.3      21 May 2017 05:30  Mg     2.1     05-21                  Consultant(s) Notes Reviewed:      Care Discussed with Consultants/Other Providers:

## 2017-05-21 NOTE — PROGRESS NOTE ADULT - SUBJECTIVE AND OBJECTIVE BOX
Subjective: pt seen and examined, no complaints , ROS -  	  MEDICATIONS:  MEDICATIONS  (STANDING):  enoxaparin Injectable 40milliGRAM(s) SubCutaneous every 24 hours  sodium chloride 0.9% lock flush 3milliLiter(s) IV Push every 8 hours  insulin glargine Injectable (LANTUS) 50Unit(s) SubCutaneous at bedtime  insulin lispro (HumaLOG) corrective regimen sliding scale  SubCutaneous three times a day before meals  insulin lispro (HumaLOG) corrective regimen sliding scale  SubCutaneous at bedtime  dextrose 5%. 1000milliLiter(s) IV Continuous <Continuous>  dextrose 50% Injectable 12.5Gram(s) IV Push once  dextrose 50% Injectable 25Gram(s) IV Push once  dextrose 50% Injectable 25Gram(s) IV Push once  aspirin enteric coated 81milliGRAM(s) Oral daily  lisinopril 5milliGRAM(s) Oral daily  gabapentin 100milliGRAM(s) Oral two times a day  atorvastatin 80milliGRAM(s) Oral at bedtime  amLODIPine   Tablet 5milliGRAM(s) Oral daily      PHYSICAL EXAM:  T(C): 36.7, Max: 36.8 (05-20 @ 15:08)  HR: 76 (65 - 76)  BP: 143/89 (122/81 - 143/89)  RR: 17 (17 - 18)  SpO2: 96% (96% - 99%)  Wt(kg): --  I&O's Summary    I & Os for current day (as of 21 May 2017 06:00)  =============================================  IN: 0 ml / OUT: 600 ml / NET: -600 ml        Appearance: Normal	  HEENT:   Normal oral mucosa, PERRL, EOMI	  Lymphatic: No lymphadenopathy , rufina lower ext  edema  Cardiovascular: Normal S1 S2, No JVD, No murmurs , Peripheral pulses palpable 2+ bilaterally  Respiratory: Lungs clear to auscultation, normal effort 	  Gastrointestinal:  Soft, Non-tender, + BS	  Skin: No rashes, No ecchymoses, No cyanosis, warm to touch  Musculoskeletal: Normal range of motion, normal strength  Psychiatry:  Mood & affect appropriate    TELEMETRY: 	    ECG:  	Diagnosis Line Normal sinus rhythm  Voltage criteria for left ventricular hypertrophy  RADIOLOGY:  ct angio :  IMPRESSION:  No evidence of aortic dissection.  DIAGNOSTIC TESTING:  [ ] Echocardiogram: CONCLUSIONS:  1. Mitral annular calcification, otherwise normal mitral  valve. Minimal mitral regurgitation.  2. Normal left ventricular internal dimensions and wall  thicknesses.  3. Normal left ventricular systolic function. No segmental  wall motion abnormalities.  4. Normal right ventricular size and function.  *** Compared with echocardiogram of 3/28/2017, no  significant changes noted.  ------------------------------------------------------------------------  Confirmed on  5/18/2017 - 16:50:08 Ciro Varma M.D.  [ ]  Catheterization:  [ ] Stress Test:    OTHER:   Mri : Head with out contrast  IMPRESSION:      1. 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 Small left thalamic foci of infarct which   are old as well.    2. 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 Can correlate with Doppler evaluation as   clinically warranted. 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    LABS:	 	    CARDIAC MARKERS:  CARDIAC MARKERS ( 18 May 2017 06:25 )  x     / < 0.06 ng/mL / 121 u/L / x     / x      CARDIAC MARKERS ( 18 May 2017 06:22 )  x     / < 0.06 ng/mL / 124 u/L / 2.20 ng/mL / x                                    11.8   5.92  )-----------( 225      ( 20 May 2017 05:44 )             37.6     05-20    143  |  104  |  18  ----------------------------<  144<H>  4.2   |  22  |  0.87    Ca    9.4      20 May 2017 05:44  Phos  3.4     05-19  Mg     2.1     05-20      proBNP:   Lipid Profile:   HgA1c:   TSH:     ASSESSMENT/PLAN: 	77y Female  with a history of CAD s/p PCI, CHF, not on Lasix , HTN, Dyslipidemia, DM2, experiencing intermittent exertional substernal chest tightness that radiates to the L shoulder.    cont asa, statin   cont ACE , Norvasc  gi / dvt prophylaxis  keep k >4 , mag >2  nst pending   neuro follow up  Mri noted.- if nst neg d/c planning     dc planning  d/w dr Lindquist

## 2017-05-22 PROCEDURE — 93016 CV STRESS TEST SUPVJ ONLY: CPT | Mod: GC

## 2017-05-22 PROCEDURE — 78452 HT MUSCLE IMAGE SPECT MULT: CPT | Mod: 26

## 2017-05-22 PROCEDURE — 93018 CV STRESS TEST I&R ONLY: CPT | Mod: GC

## 2017-05-22 RX ADMIN — Medication: at 09:01

## 2017-05-22 RX ADMIN — ATORVASTATIN CALCIUM 80 MILLIGRAM(S): 80 TABLET, FILM COATED ORAL at 22:17

## 2017-05-22 RX ADMIN — ENOXAPARIN SODIUM 40 MILLIGRAM(S): 100 INJECTION SUBCUTANEOUS at 06:03

## 2017-05-22 RX ADMIN — Medication 81 MILLIGRAM(S): at 11:28

## 2017-05-22 RX ADMIN — SODIUM CHLORIDE 3 MILLILITER(S): 9 INJECTION INTRAMUSCULAR; INTRAVENOUS; SUBCUTANEOUS at 06:02

## 2017-05-22 RX ADMIN — GABAPENTIN 100 MILLIGRAM(S): 400 CAPSULE ORAL at 17:16

## 2017-05-22 RX ADMIN — Medication: at 17:24

## 2017-05-22 RX ADMIN — SODIUM CHLORIDE 3 MILLILITER(S): 9 INJECTION INTRAMUSCULAR; INTRAVENOUS; SUBCUTANEOUS at 13:14

## 2017-05-22 RX ADMIN — AMLODIPINE BESYLATE 5 MILLIGRAM(S): 2.5 TABLET ORAL at 06:03

## 2017-05-22 RX ADMIN — LISINOPRIL 5 MILLIGRAM(S): 2.5 TABLET ORAL at 06:03

## 2017-05-22 RX ADMIN — INSULIN GLARGINE 50 UNIT(S): 100 INJECTION, SOLUTION SUBCUTANEOUS at 22:17

## 2017-05-22 RX ADMIN — SODIUM CHLORIDE 3 MILLILITER(S): 9 INJECTION INTRAMUSCULAR; INTRAVENOUS; SUBCUTANEOUS at 22:00

## 2017-05-22 RX ADMIN — GABAPENTIN 100 MILLIGRAM(S): 400 CAPSULE ORAL at 06:03

## 2017-05-22 NOTE — DISCHARGE NOTE ADULT - MEDICATION SUMMARY - MEDICATIONS TO STOP TAKING
I will STOP taking the medications listed below when I get home from the hospital:    Ceftin 250 mg oral tablet  -- 1 tab(s) by mouth 2 times a day  -- Finish all this medication unless otherwise directed by prescriber.  Medication should be taken with plenty of water.  Take with food or milk.

## 2017-05-22 NOTE — DISCHARGE NOTE ADULT - PATIENT PORTAL LINK FT
“You can access the [employer] Patient Portal, offered by [facility], by registering with the following website: [email or web address]”

## 2017-05-22 NOTE — PROGRESS NOTE ADULT - SUBJECTIVE AND OBJECTIVE BOX
Patient is a 77y old  Female who presents with a chief complaint of cp, sob (18 May 2017 05:17)      SUBJECTIVE / OVERNIGHT EVENTS: No nausea, vomiting or diarrhea, no fever or chills, no dizziness or chest pain, no dysuria or hematuria, no jt pain or swelling  Breathing better    MEDICATIONS  (STANDING):  enoxaparin Injectable 40milliGRAM(s) SubCutaneous every 24 hours  sodium chloride 0.9% lock flush 3milliLiter(s) IV Push every 8 hours  insulin glargine Injectable (LANTUS) 50Unit(s) SubCutaneous at bedtime  insulin lispro (HumaLOG) corrective regimen sliding scale  SubCutaneous three times a day before meals  insulin lispro (HumaLOG) corrective regimen sliding scale  SubCutaneous at bedtime  dextrose 5%. 1000milliLiter(s) IV Continuous <Continuous>  dextrose 50% Injectable 12.5Gram(s) IV Push once  dextrose 50% Injectable 25Gram(s) IV Push once  dextrose 50% Injectable 25Gram(s) IV Push once  aspirin enteric coated 81milliGRAM(s) Oral daily  lisinopril 5milliGRAM(s) Oral daily  gabapentin 100milliGRAM(s) Oral two times a day  atorvastatin 80milliGRAM(s) Oral at bedtime  amLODIPine   Tablet 5milliGRAM(s) Oral daily    MEDICATIONS  (PRN):  dextrose Gel 1Dose(s) Oral once PRN Blood Glucose LESS THAN 70 milliGRAM(s)/deciliter  glucagon  Injectable 1milliGRAM(s) IntraMuscular once PRN Glucose LESS THAN 70 milligrams/deciliter  acetaminophen   Tablet 650milliGRAM(s) Oral every 6 hours PRN mild and moderate pain      Vital Signs Last 24 Hrs  T(C): 36.4, Max: 36.6 (05-21 @ 22:19)  HR: 78 (78 - 84)  BP: 132/68 (116/78 - 132/68)  RR: 17 (17 - 17)  SpO2: 96% (96% - 98%)  Wt(kg): --  CAPILLARY BLOOD GLUCOSE  138 (22 May 2017 13:27)  162 (22 May 2017 08:32)  181 (21 May 2017 21:43)  116 (21 May 2017 17:40)    I&O's Summary      PHYSICAL EXAM:  GENERAL: NAD, well-developed  HEAD:  Atraumatic, Normocephalic  EYES: EOMI, PERRLA, conjunctiva and sclera clear  NECK: Supple, No JVD  CHEST/LUNG: Clear to auscultation bilaterally; No wheeze  HEART: Regular rate and rhythm; No murmurs, rubs, or gallops  ABDOMEN: Soft, Nontender, Nondistended; Bowel sounds present  EXTREMITIES:  2+ Peripheral Pulses, No clubbing, cyanosis, or edema  PSYCH: AAOx3  NEUROLOGY: non-focal  SKIN: No rashes or lesions    LABS:                        12.4   6.42  )-----------( 236      ( 21 May 2017 05:30 )             39.3     05-21    140  |  104  |  19  ----------------------------<  122<H>  4.2   |  21<L>  |  0.83    Ca    9.3      21 May 2017 05:30  Mg     2.1     05-21                  Consultant(s) Notes Reviewed:      Care Discussed with Consultants/Other Providers:

## 2017-05-22 NOTE — PROGRESS NOTE ADULT - SUBJECTIVE AND OBJECTIVE BOX
Patient is a 77y old  Female who presents with a chief complaint of cp, sob (22 May 2017 16:49)  denies any SOB now       Any change in ROS: none     MEDICATIONS  (STANDING):  enoxaparin Injectable 40milliGRAM(s) SubCutaneous every 24 hours  sodium chloride 0.9% lock flush 3milliLiter(s) IV Push every 8 hours  insulin glargine Injectable (LANTUS) 50Unit(s) SubCutaneous at bedtime  insulin lispro (HumaLOG) corrective regimen sliding scale  SubCutaneous three times a day before meals  insulin lispro (HumaLOG) corrective regimen sliding scale  SubCutaneous at bedtime  dextrose 5%. 1000milliLiter(s) IV Continuous <Continuous>  dextrose 50% Injectable 12.5Gram(s) IV Push once  dextrose 50% Injectable 25Gram(s) IV Push once  dextrose 50% Injectable 25Gram(s) IV Push once  aspirin enteric coated 81milliGRAM(s) Oral daily  lisinopril 5milliGRAM(s) Oral daily  gabapentin 100milliGRAM(s) Oral two times a day  atorvastatin 80milliGRAM(s) Oral at bedtime  amLODIPine   Tablet 5milliGRAM(s) Oral daily    MEDICATIONS  (PRN):  dextrose Gel 1Dose(s) Oral once PRN Blood Glucose LESS THAN 70 milliGRAM(s)/deciliter  glucagon  Injectable 1milliGRAM(s) IntraMuscular once PRN Glucose LESS THAN 70 milligrams/deciliter  acetaminophen   Tablet 650milliGRAM(s) Oral every 6 hours PRN mild and moderate pain    Vital Signs Last 24 Hrs  T(C): 36.4, Max: 36.6 (05-21 @ 22:19)  T(F): 97.6, Max: 97.8 (05-21 @ 22:19)  HR: 78 (78 - 84)  BP: 132/68 (116/78 - 132/68)  BP(mean): --  RR: 17 (17 - 17)  SpO2: 96% (96% - 98%)    I&O's Summary        Physical Exam:   GENERAL: NAD, well-groomed, well-developed  HEENT: SELVIN/   Atraumatic, Normocephalic  ENMT: No tonsillar erythema, exudates, or enlargement; Moist mucous membranes, Good dentition, No lesions  NECK: Supple, No JVD, Normal thyroid  CHEST/LUNG: Clear to percussion bilaterally; No rales, rhonchi, wheezing, or rubs  CVS: Regular rate and rhythm; No murmurs, rubs, or gallops  GI: : Soft, Nontender, Nondistended; Bowel sounds present  NERVOUS SYSTEM:  Alert & Oriented X3, Good concentration; Motor Strength 5/5 B/L upper and lower extremities; DTRs 2+ intact and symmetric  EXTREMITIES:  2+ Peripheral Pulses, No clubbing, cyanosis, or edema  LYMPH: No lymphadenopathy noted  SKIN: No rashes or lesions  ENDOCRINOLOGY: No Thyromegaly  PSYCH: Appropriate/demented/others    Labs:                              12.4   6.42  )-----------( 236      ( 21 May 2017 05:30 )             39.3     05-21    140  |  104  |  19  ----------------------------<  122<H>  4.2   |  21<L>  |  0.83    Ca    9.3      21 May 2017 05:30  Mg     2.1     05-21      CAPILLARY BLOOD GLUCOSE  171 (22 May 2017 17:23)  138 (22 May 2017 13:27)  162 (22 May 2017 08:32)  181 (21 May 2017 21:43)            Cultures:                                               Studies  Chest X-RAY  CT SCAN Chest   CT Abdomen  Venous Dopplers: LE:   Others

## 2017-05-22 NOTE — DISCHARGE NOTE ADULT - NS AS DC HF EDUCATION INSTRUCTIONS
Low salt diet/Monitor Weight Daily/Call Primary Care Provider for follow-up after discharge/Activities as tolerated/Report weight gain of 2 or more pounds in one day or 3 or more pounds in one week, worsening shortness of breath, fatigue, weakness, increased swelling of hands and feet to primary care provider

## 2017-05-22 NOTE — PROGRESS NOTE ADULT - PROBLEM SELECTOR PLAN 1
A1c  > 10  but surprisingly FS are less than 200  Will monitor for now  Consider endo
FS are less than 200  A1c However is > 10  Will monitor FS for now  Consider endo
? why: not orthostatic, ct head normal: MRI noted: old infarct: defer to neurology
? why: not orthostatic, ct head normal: neuro to see
FS are less than 200  A1c However is > 10  Will monitor FS for now  Consider endo
MRI done: neuro tania nunez
not dizzy now

## 2017-05-22 NOTE — PROGRESS NOTE ADULT - PROBLEM SELECTOR PROBLEM 2
CHF (congestive heart failure)
CHF (congestive heart failure)
Shortness of breath
CHF (congestive heart failure)
Shortness of breath

## 2017-05-22 NOTE — PROGRESS NOTE ADULT - SUBJECTIVE AND OBJECTIVE BOX
Subjective: pt seen and examined, no complaints  	  MEDICATIONS:    enoxaparin Injectable 40milliGRAM(s) SubCutaneous every 24 hours  sodium chloride 0.9% lock flush 3milliLiter(s) IV Push every 8 hours  insulin glargine Injectable (LANTUS) 50Unit(s) SubCutaneous at bedtime  insulin lispro (HumaLOG) corrective regimen sliding scale  SubCutaneous three times a day before meals  insulin lispro (HumaLOG) corrective regimen sliding scale  SubCutaneous at bedtime  dextrose 5%. 1000milliLiter(s) IV Continuous <Continuous>  dextrose Gel 1Dose(s) Oral once PRN  dextrose 50% Injectable 12.5Gram(s) IV Push once  dextrose 50% Injectable 25Gram(s) IV Push once  dextrose 50% Injectable 25Gram(s) IV Push once  glucagon  Injectable 1milliGRAM(s) IntraMuscular once PRN  acetaminophen   Tablet 650milliGRAM(s) Oral every 6 hours PRN  aspirin enteric coated 81milliGRAM(s) Oral daily  lisinopril 5milliGRAM(s) Oral daily  gabapentin 100milliGRAM(s) Oral two times a day  atorvastatin 80milliGRAM(s) Oral at bedtime  amLODIPine   Tablet 5milliGRAM(s) Oral daily                            12.4   6.42  )-----------( 236      ( 21 May 2017 05:30 )             39.3       05-21    140  |  104  |  19  ----------------------------<  122<H>  4.2   |  21<L>  |  0.83    Ca    9.3      21 May 2017 05:30  Mg     2.1     05-21              T(C): 36.3, Max: 36.6 (05-21 @ 22:19)  HR: 78 (78 - 84)  BP: 118/72 (116/78 - 118/72)  RR: 17 (17 - 17)  SpO2: 96% (96% - 98%)  Wt(kg): --    I&O's Summary        Appearance: Normal	  HEENT:   Normal oral mucosa, PERRL, EOMI	  Lymphatic: No lymphadenopathy , rufina lower ext  edema  Cardiovascular: Normal S1 S2, No JVD, No murmurs , Peripheral pulses palpable 2+ bilaterally  Respiratory: Lungs clear to auscultation, normal effort 	  Gastrointestinal:  Soft, Non-tender, + BS	  Skin: No rashes, No ecchymoses, No cyanosis, warm to touch  Musculoskeletal: Normal range of motion, normal strength  Psychiatry:  Mood & affect appropriate    TELEMETRY: 	  NSR no events    ECG:  	Diagnosis Line Normal sinus rhythm  Voltage criteria for left ventricular hypertrophy  RADIOLOGY:  ct angio :  IMPRESSION:  No evidence of aortic dissection.  DIAGNOSTIC TESTING:  [ ] Echocardiogram: CONCLUSIONS:  1. Mitral annular calcification, otherwise normal mitral  valve. Minimal mitral regurgitation.  2. Normal left ventricular internal dimensions and wall  thicknesses.  3. Normal left ventricular systolic function. No segmental  wall motion abnormalities.  4. Normal right ventricular size and function.  *** Compared with echocardiogram of 3/28/2017, no  significant changes noted.  ------------------------------------------------------------------------  Confirmed on  5/18/2017 - 16:50:08 Ciro Varma M.D.      [ ]  Catheterization: DIAGNOSTIC IMPRESSIONS: Patent stent LAD and LCx  Non-obstructive CAD  DIAGNOSTIC RECOMMENDATIONS: Medical therapy  INTERVENTIONAL IMPRESSIONS: Patent stent LAD and LCx  Non-obstructive CAD  INTERVENTIONAL RECOMMENDATIONS: Medical therapy  Prepared and signed by  Nia Izaguirre M.D.  Signed 04/02/2017 15:36:14      [ ] Stress Test:    OTHER:     Mri : Head with out contrast  IMPRESSION:      1. 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 Small left thalamic foci of infarct which   are old as well.    2. 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 Can correlate with Doppler evaluation as   clinically warranted. 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    LABS:	 	    CARDIAC MARKERS:  CARDIAC MARKERS ( 18 May 2017 06:25 )  x     / < 0.06 ng/mL / 121 u/L / x     / x      CARDIAC MARKERS ( 18 May 2017 06:22 )  x     / < 0.06 ng/mL / 124 u/L / 2.20 ng/mL / x                                    11.8   5.92  )-----------( 225      ( 20 May 2017 05:44 )             37.6     05-20    143  |  104  |  18  ----------------------------<  144<H>  4.2   |  22  |  0.87    Ca    9.4      20 May 2017 05:44  Phos  3.4     05-19  Mg     2.1     05-20      proBNP:   Lipid Profile:   HgA1c:   TSH:     ASSESSMENT/PLAN: 	77y Female  with a history of CAD s/p PCI, CHF, not on Lasix , HTN, Dyslipidemia, DM2, experiencing intermittent exertional substernal chest tightness that radiates to the L shoulder who ruled out for ACS with non obstructive CAD and patent stents  on cath last month :     -cont asa, statin   -cont ACE , Norvasc  - gi / dvt prophylaxis  - keep k >4 , mag >2  - neuro work up negative - no further inpatient studies per neuro - can have outpatient w/u of possible mild left carotid stenosis per neuro  - nst pending  - negative dc home   - d/w patient and family at bedside   - outpatient f/u with Dr. Koenig upon dc

## 2017-05-22 NOTE — PROGRESS NOTE ADULT - PROBLEM SELECTOR PROBLEM 1
DM (diabetes mellitus)
DM (diabetes mellitus)
Dizziness
DM (diabetes mellitus)
Dizziness

## 2017-05-22 NOTE — PROGRESS NOTE ADULT - PROBLEM SELECTOR PROBLEM 3
HTN (hypertension)
HTN (hypertension)
Essential hypertension
HTN (hypertension)

## 2017-05-22 NOTE — PROGRESS NOTE ADULT - PROBLEM SELECTOR PLAN 3
Monitor   acceptable for now
controlled

## 2017-05-22 NOTE — DISCHARGE NOTE ADULT - CARE PLAN
Principal Discharge DX:	CAD (coronary artery disease)  Goal:	prevent worsening CAD  Instructions for follow-up, activity and diet:	follow up with Cardiology in one week - call for appointment

## 2017-05-22 NOTE — DISCHARGE NOTE ADULT - HOSPITAL COURSE
77F with a history of CAD s/p PCI, CHF, not on Lasix , HTN, Dyslipidemia, DM2, experiencing intermittent exertional substernal chest tightness that radiates to the L shoulder, episodes last for a few minutes and resolve on their own.    +CP- r/o ACS- CTPA showed no evidence of aortic dissection    orthostatic negative   NSR @ 87b/ min , LVH, Q wave V1 V2  CE negative X 3  H & H = 11.7/ 36.6  BUN/ Creatinine= 17/ 0.8  HgbA1C 10.6%  Glucose = 207  BNP = 44.5  5/18: CTA Chest Abd , Pelvis: No evidence of aortic dissection  5/18: CTH: No acute intracranial hemorrhage, mass effect, or midline shift.  Similar-appearing microvascular disease.  5/18: LE doppler: no DVT  5/18; Pulm: DR JONES  The etiology of SOB on exertion seems to be cardiac in nature: Patient has no pulmonary history and has never smoked: cta: no dissection as well as no ?pe: the report being updated: no emphysema on lung windows: given increased pedal edema and DM, cardiac evaluation is warranted will need outpatient full PFT. Check orthostatics    5/18: Echo: EF 69% Mitral annular calcification, otherwise normal mitral valve. Minimal mitral regurgitation.  Normal left ventricular internal dimensions and wall  thicknesses. Normal left ventricular systolic function. No segmental wall motion abnormalities. Normal right ventricular size and function.  Compared with echocardiogram of 3/28/2017, no significant changes noted.  5/19/17 > Pulm > 77F admitted for ACS, DIZZINESS AND CHEST PAIN ON EXERTION: HAS NO PULMONARY HISTORY   Patient has been doing pretty good: today she tells me she is not SOB     5/20 MRI/A Head/neck: 1. 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 Small left thalamic foci of infarct which   are old as well. 2. 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 Can correlate with Doppler evaluation as clinically warranted. 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 * There are medium sized, moderate defects in anterolateral, inferolateral walls that are fixed,  suggestive of infarction with mild seamus-infarct inferolateral ischemia. * Post-stress gated wall motion analysis was performed (LVEF = 52 %;LVEDV = 85 ml.), revealing normal overall LV  function, despite segmental wall motion abnoralitiy. There was proximal to mid anterolateral hypokinesis and  diminished systolic thickening.    Discussed with MD - pt stable for discharge home, pt with no complaints. ST reviewed with MD

## 2017-05-22 NOTE — DISCHARGE NOTE ADULT - MEDICATION SUMMARY - MEDICATIONS TO TAKE
I will START or STAY ON the medications listed below when I get home from the hospital:    acetaminophen 325 mg oral tablet  -- 2 tab(s) by mouth every 6 hours, As needed, Mild Pain (1 - 3)  -- Indication: For Pain    aspirin 81 mg oral delayed release tablet  -- 1 tab(s) by mouth once a day  -- Indication: For CAD    lisinopril 5 mg oral tablet  -- 1 tab(s) by mouth once a day  -- Indication: For HTN (hypertension)    gabapentin 100 mg oral capsule  -- 1 cap(s) by mouth 2 times a day  -- Indication: For neuropathy    insulin glargine 100 units/mL subcutaneous solution  -- 50  subcutaneous once a day (at bedtime)  -- Indication: For DM (diabetes mellitus)    atorvastatin 80 mg oral tablet  -- 1 tab(s) by mouth once a day (at bedtime)  -- Indication: For HLD    amLODIPine 5 mg oral tablet  -- 1 tab(s) by mouth once a day  -- Indication: For HTN (hypertension)

## 2017-05-22 NOTE — DISCHARGE NOTE ADULT - CARE PROVIDER_API CALL
Fadi Koenig), Cardiology  [address] [facility] [address]  [place] [address]  Phone: [phone]  Fax: [phone]    Arie Aden), Critical Care Medicine; Internal Medicine; Pulmonary Disease; Sleep Medicine  [address] [place] [address]  Phone: [phone]  Fax: [phone]

## 2017-05-22 NOTE — PROGRESS NOTE ADULT - PROBLEM SELECTOR PLAN 2
Feels less SOB  EF nl
Feels less SOB  EF nl  Mx per cardiology
Etiology is not clear  resolved: no real pulm hx: no aortic dissection and no pe  no hx of chf: probnp is normal  need outpatient PFT
Etiology is not clear  resolved: no real pulm hx: no aortic dissection and no pe  no hx of chf: probnp is normal  need outpatient PFT
Etiology is not clear: Improved significantly   resolved: no real pulm hx: no aortic dissection and no pe  no hx of chf: probnp is normal  need outpatient PFT
Feels less SOB  EF nl
resolved: no real pulm mx: no aortic dissection and no pe

## 2017-05-22 NOTE — PROGRESS NOTE ADULT - SUBJECTIVE AND OBJECTIVE BOX
Pt seen and examined, no new complaints, no ha/visual changes, no significant dizziness    Vital Signs Last 24 Hrs  T(C): 36.3, Max: 36.6 (05-21 @ 22:19)  T(F): 97.4, Max: 97.8 (05-21 @ 22:19)  HR: 78 (68 - 84)  BP: 118/72 (116/78 - 130/80)  BP(mean): --  RR: 17 (17 - 17)  SpO2: 96% (96% - 98%)    Physical Exam    Mental Status- AAO x 3, speech fluent without dysarthria, memory and fund of knowledge intact  CN- 2-12 intact  Motor- 5/5 x 4 ext, nl bulk and tone  Sensory- intact Lt/PP/propriception  Coordination- No dysmetria UE/LE  Gait- deferred

## 2017-05-22 NOTE — DISCHARGE NOTE ADULT - ADDITIONAL INSTRUCTIONS
follow up with  on June 1st at 11:45am  follow up with Pulmonary in one week - call for appointment for PFT's

## 2017-05-23 ENCOUNTER — SPOT CHECK NEW (OUTPATIENT)
Dept: URBAN - NONMETROPOLITAN AREA CLINIC 4 | Facility: CLINIC | Age: 77
Setting detail: DERMATOLOGY
End: 2017-05-23

## 2017-05-23 VITALS
HEART RATE: 71 BPM | SYSTOLIC BLOOD PRESSURE: 120 MMHG | OXYGEN SATURATION: 95 % | TEMPERATURE: 98 F | RESPIRATION RATE: 18 BRPM | DIASTOLIC BLOOD PRESSURE: 84 MMHG

## 2017-05-23 DIAGNOSIS — Z08 ENCOUNTER FOR FOLLOW-UP EXAMINATION AFTER COMPLETED TREATMENT FOR MALIGNANT NEOPLASM: ICD-10-CM

## 2017-05-23 LAB
BASOPHILS # BLD AUTO: 0.01 K/UL — SIGNIFICANT CHANGE UP (ref 0–0.2)
BASOPHILS NFR BLD AUTO: 0.1 % — SIGNIFICANT CHANGE UP (ref 0–2)
BUN SERPL-MCNC: 22 MG/DL — SIGNIFICANT CHANGE UP (ref 7–23)
CALCIUM SERPL-MCNC: 8.9 MG/DL — SIGNIFICANT CHANGE UP (ref 8.4–10.5)
CHLORIDE SERPL-SCNC: 104 MMOL/L — SIGNIFICANT CHANGE UP (ref 98–107)
CO2 SERPL-SCNC: 23 MMOL/L — SIGNIFICANT CHANGE UP (ref 22–31)
CREAT SERPL-MCNC: 0.92 MG/DL — SIGNIFICANT CHANGE UP (ref 0.5–1.3)
EOSINOPHIL # BLD AUTO: 0.09 K/UL — SIGNIFICANT CHANGE UP (ref 0–0.5)
EOSINOPHIL NFR BLD AUTO: 1.3 % — SIGNIFICANT CHANGE UP (ref 0–6)
GLUCOSE SERPL-MCNC: 153 MG/DL — HIGH (ref 70–99)
HCT VFR BLD CALC: 37 % — SIGNIFICANT CHANGE UP (ref 34.5–45)
HGB BLD-MCNC: 11.7 G/DL — SIGNIFICANT CHANGE UP (ref 11.5–15.5)
IMM GRANULOCYTES NFR BLD AUTO: 0.1 % — SIGNIFICANT CHANGE UP (ref 0–1.5)
LYMPHOCYTES # BLD AUTO: 2.54 K/UL — SIGNIFICANT CHANGE UP (ref 1–3.3)
LYMPHOCYTES # BLD AUTO: 37.8 % — SIGNIFICANT CHANGE UP (ref 13–44)
MAGNESIUM SERPL-MCNC: 2 MG/DL — SIGNIFICANT CHANGE UP (ref 1.6–2.6)
MCHC RBC-ENTMCNC: 27.7 PG — SIGNIFICANT CHANGE UP (ref 27–34)
MCHC RBC-ENTMCNC: 31.6 % — LOW (ref 32–36)
MCV RBC AUTO: 87.7 FL — SIGNIFICANT CHANGE UP (ref 80–100)
MONOCYTES # BLD AUTO: 0.65 K/UL — SIGNIFICANT CHANGE UP (ref 0–0.9)
MONOCYTES NFR BLD AUTO: 9.7 % — SIGNIFICANT CHANGE UP (ref 2–14)
NEUTROPHILS # BLD AUTO: 3.42 K/UL — SIGNIFICANT CHANGE UP (ref 1.8–7.4)
NEUTROPHILS NFR BLD AUTO: 51 % — SIGNIFICANT CHANGE UP (ref 43–77)
PLATELET # BLD AUTO: 231 K/UL — SIGNIFICANT CHANGE UP (ref 150–400)
PMV BLD: 11.3 FL — SIGNIFICANT CHANGE UP (ref 7–13)
POTASSIUM SERPL-MCNC: 4.2 MMOL/L — SIGNIFICANT CHANGE UP (ref 3.5–5.3)
POTASSIUM SERPL-SCNC: 4.2 MMOL/L — SIGNIFICANT CHANGE UP (ref 3.5–5.3)
RBC # BLD: 4.22 M/UL — SIGNIFICANT CHANGE UP (ref 3.8–5.2)
RBC # FLD: 14 % — SIGNIFICANT CHANGE UP (ref 10.3–14.5)
SODIUM SERPL-SCNC: 141 MMOL/L — SIGNIFICANT CHANGE UP (ref 135–145)
WBC # BLD: 6.72 K/UL — SIGNIFICANT CHANGE UP (ref 3.8–10.5)
WBC # FLD AUTO: 6.72 K/UL — SIGNIFICANT CHANGE UP (ref 3.8–10.5)

## 2017-05-23 PROCEDURE — 11100 BX SKIN SUBCUTANEOUS&/MUCOUS MEMBRANE 1 LESION: CPT

## 2017-05-23 RX ADMIN — ENOXAPARIN SODIUM 40 MILLIGRAM(S): 100 INJECTION SUBCUTANEOUS at 05:58

## 2017-05-23 RX ADMIN — GABAPENTIN 100 MILLIGRAM(S): 400 CAPSULE ORAL at 05:58

## 2017-05-23 RX ADMIN — SODIUM CHLORIDE 3 MILLILITER(S): 9 INJECTION INTRAMUSCULAR; INTRAVENOUS; SUBCUTANEOUS at 05:58

## 2017-05-23 RX ADMIN — AMLODIPINE BESYLATE 5 MILLIGRAM(S): 2.5 TABLET ORAL at 05:58

## 2017-05-23 RX ADMIN — LISINOPRIL 5 MILLIGRAM(S): 2.5 TABLET ORAL at 05:58

## 2017-05-23 NOTE — PROGRESS NOTE ADULT - SUBJECTIVE AND OBJECTIVE BOX
HPI:  Pt seen, dizziness improving, no new complaints      Vital Signs Last 24 Hrs  T(C): 36.7, Max: 36.7 (05-22 @ 22:14)  T(F): 98, Max: 98 (05-22 @ 22:14)  HR: 71 (71 - 78)  BP: 120/84 (120/84 - 135/92)  BP(mean): --  RR: 18 (17 - 18)  SpO2: 95% (95% - 99%)    Physical Exam    Mental Status- AAO x 3, speech fluent without dysarthria, memory and fund of knowledge intact  CN- 2-12 intact  Motor- 5/5 x 4 ext, nl bulk and tone  Sensory- intact Lt/PP/propriception  Coordination- No dysmetria UE/LE  Gait- slightly unsteady

## 2017-05-23 NOTE — PROGRESS NOTE ADULT - SUBJECTIVE AND OBJECTIVE BOX
Subjective: pt seen and examined, no complaints  	  MEDICATIONS:  enoxaparin Injectable 40milliGRAM(s) SubCutaneous every 24 hours  sodium chloride 0.9% lock flush 3milliLiter(s) IV Push every 8 hours  insulin glargine Injectable (LANTUS) 50Unit(s) SubCutaneous at bedtime  insulin lispro (HumaLOG) corrective regimen sliding scale  SubCutaneous three times a day before meals  insulin lispro (HumaLOG) corrective regimen sliding scale  SubCutaneous at bedtime  dextrose 5%. 1000milliLiter(s) IV Continuous <Continuous>  dextrose Gel 1Dose(s) Oral once PRN  dextrose 50% Injectable 12.5Gram(s) IV Push once  dextrose 50% Injectable 25Gram(s) IV Push once  dextrose 50% Injectable 25Gram(s) IV Push once  glucagon  Injectable 1milliGRAM(s) IntraMuscular once PRN  acetaminophen   Tablet 650milliGRAM(s) Oral every 6 hours PRN  aspirin enteric coated 81milliGRAM(s) Oral daily  lisinopril 5milliGRAM(s) Oral daily  gabapentin 100milliGRAM(s) Oral two times a day  atorvastatin 80milliGRAM(s) Oral at bedtime  amLODIPine   Tablet 5milliGRAM(s) Oral daily                            11.7   6.72  )-----------( 231      ( 23 May 2017 05:40 )             37.0       05-23    141  |  104  |  22  ----------------------------<  153<H>  4.2   |  23  |  0.92    Ca    8.9      23 May 2017 05:40  Mg     2.0     05-23              T(C): 36.7, Max: 36.7 (05-22 @ 22:14)  HR: 71 (71 - 78)  BP: 120/84 (120/84 - 135/92)  RR: 18 (17 - 18)  SpO2: 95% (95% - 99%)  Wt(kg): --    I&O's Summary    I & Os for current day (as of 23 May 2017 11:52)  =============================================  IN: 0 ml / OUT: 200 ml / NET: -200 ml      Appearance: Normal	  HEENT:   Normal oral mucosa, PERRL, EOMI	  Lymphatic: No lymphadenopathy , rufina lower ext  edema  Cardiovascular: Normal S1 S2, No JVD, No murmurs , Peripheral pulses palpable 2+ bilaterally  Respiratory: Lungs clear to auscultation, normal effort 	  Gastrointestinal:  Soft, Non-tender, + BS	  Skin: No rashes, No ecchymoses, No cyanosis, warm to touch  Musculoskeletal: Normal range of motion, normal strength  Psychiatry:  Mood & affect appropriate    TELEMETRY: 	  NSR no events    ECG:  	Diagnosis Line Normal sinus rhythm  Voltage criteria for left ventricular hypertrophy  RADIOLOGY:  ct angio :  IMPRESSION:  No evidence of aortic dissection.  DIAGNOSTIC TESTING:  [ ] Echocardiogram: CONCLUSIONS:  1. Mitral annular calcification, otherwise normal mitral  valve. Minimal mitral regurgitation.  2. Normal left ventricular internal dimensions and wall  thicknesses.  3. Normal left ventricular systolic function. No segmental  wall motion abnormalities.  4. Normal right ventricular size and function.  *** Compared with echocardiogram of 3/28/2017, no  significant changes noted.  ------------------------------------------------------------------------  Confirmed on  5/18/2017 - 16:50:08 Ciro Varma M.D.      [ ]  Catheterization: DIAGNOSTIC IMPRESSIONS: Patent stent LAD and LCx  Non-obstructive CAD  DIAGNOSTIC RECOMMENDATIONS: Medical therapy  INTERVENTIONAL IMPRESSIONS: Patent stent LAD and LCx  Non-obstructive CAD  INTERVENTIONAL RECOMMENDATIONS: Medical therapy  Prepared and signed by  Nia Izaguirre M.D.  Signed 04/02/2017 15:36:14      [ ] Stress Test:    OTHER:     Mri : Head with out contrast  IMPRESSION:      1. 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 Small left thalamic foci of infarct which   are old as well.    2. 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 Can correlate with Doppler evaluation as   clinically warranted. 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    LABS:	 	    CARDIAC MARKERS:  CARDIAC MARKERS ( 18 May 2017 06:25 )  x     / < 0.06 ng/mL / 121 u/L / x     / x      CARDIAC MARKERS ( 18 May 2017 06:22 )  x     / < 0.06 ng/mL / 124 u/L / 2.20 ng/mL / x                                    11.8   5.92  )-----------( 225      ( 20 May 2017 05:44 )             37.6     05-20    143  |  104  |  18  ----------------------------<  144<H>  4.2   |  22  |  0.87    Ca    9.4      20 May 2017 05:44  Phos  3.4     05-19  Mg     2.1     05-20      proBNP:   Lipid Profile:   HgA1c:   TSH:     ASSESSMENT/PLAN: 	77y Female  with a history of CAD s/p PCI, CHF, not on Lasix , HTN, Dyslipidemia, DM2, experiencing intermittent exertional substernal chest tightness that radiates to the L shoulder who ruled out for ACS with non obstructive CAD and patent stents  on cath last month  with negative PE/DVT and negative ACS:      -cont asa, statin   -cont ACE , Norvasc  - outpatient PFTS per pulm   - neuro work up negative - no further inpatient studies per neuro - can have outpatient w/u of possible mild left carotid stenosis per neuro  - NST -  There are medium sized, moderate defects in anterolateral, inferolateral walls that are fixed, suggestive of infarction with mild seamus-infarct inferolateral ischemia- as the patients symptoms were atypical, she ruled out for ACS, had no tele events , and h ad a very recent cath with patent stents and otherwise non obs CAD, will c/w medical management   - d/w patient and family at bedside   - outpatient f/u with Dr. Koenig upon dc 6/1 @1595a  - [place] home

## 2017-05-23 NOTE — PROGRESS NOTE ADULT - ASSESSMENT
76 yo F with dizziness, no posterior fossa lesion.  1. May follow up as outpt for asymptomatic carotid stenosis.  2. PT  3. cont asa
77F admitted for ACS, DIZZINESS AND CHEST PAIN ON EXERTION: HAS NO PULMONARY HISTORY     Patient has been doing pretty good: today she tells me she is not SOB
78 yo F with dizziness, no posterior fossa lesion.  1. May follow up as outpt for asymptomatic carotid stenosis.  2. PT  3. cont asa
78 yo F with dizziness, no posterior fossa lesion.  1. May follow up as outpt for asymptomatic carotid stenosis.  2. PT  3. cont asa
77F admitted for ACS, DIZZINESS AND CHEST PAIN ON EXERTION: HAS NO PULMONARY HISTORY     Patient has been doing pretty good: today she tells me she is not SOB

## 2017-05-23 NOTE — PROGRESS NOTE ADULT - ATTENDING COMMENTS
Agree with above.  DC planning
Pt. seen and examined.  -Pt. complains of persistent dizziness today - CTH negative  -Neuro eval   -Pt. reports chest pain - check NST   -DC planning pending above.
Pt. seen and examined.  Agree with above.   -NST with fixed defects and minimal seamus-infarct ischemia  -Given atypical symptoms and mostly fixed defects, recommend medical management of CAD  -No futher CV workup needed at this time  -Neuro follow up.
CARDIOLOGY ATTENDING    Agree with above. MRI showed old infarcts - no new or acute findings. Recent cath showed patent stents. Awaiting NST. D/C home if NST unremarkable and cleared by neuro.
CARDIOLOGY ATTENDING    Patient seen and examined. Agree with above. MRI showed old infarcts - no new or acute findings. Recent cath showed patent stents. Awaiting NST. D/C home if NST unremarkable and cleared by neuro.
Dc plan per cardiology
dc planning

## 2017-06-12 ENCOUNTER — EXCISION (OUTPATIENT)
Dept: URBAN - METROPOLITAN AREA CLINIC 15 | Facility: CLINIC | Age: 77
Setting detail: DERMATOLOGY
End: 2017-06-12

## 2017-06-12 DIAGNOSIS — Z41.9 ENCOUNTER FOR PROCEDURE FOR PURPOSES OTHER THAN REMEDYING HEALTH STATE, UNSPECIFIED: ICD-10-CM

## 2017-06-12 PROCEDURE — 12042 INTMD RPR N-HF/GENIT2.6-7.5: CPT

## 2017-06-12 PROCEDURE — 17311 MOHS 1 STAGE H/N/HF/G: CPT

## 2017-06-19 ENCOUNTER — MOHS SURGERY - FOLLOW UP (OUTPATIENT)
Dept: URBAN - METROPOLITAN AREA CLINIC 15 | Facility: CLINIC | Age: 77
Setting detail: DERMATOLOGY
End: 2017-06-19

## 2017-06-19 DIAGNOSIS — L82.1 OTHER SEBORRHEIC KERATOSIS: ICD-10-CM

## 2017-06-19 DIAGNOSIS — L85.3 XEROSIS CUTIS: ICD-10-CM

## 2017-06-19 DIAGNOSIS — D22.5 MELANOCYTIC NEVI OF TRUNK: ICD-10-CM

## 2017-06-19 PROCEDURE — 99024 POSTOP FOLLOW-UP VISIT: CPT

## 2017-06-28 NOTE — DISCHARGE NOTE ADULT - IF YOU ARE A SMOKER, IT IS IMPORTANT FOR YOUR HEALTH TO STOP SMOKING. PLEASE BE AWARE THAT SECOND HAND SMOKE IS ALSO HARMFUL.
Statement Selected
no abdominal pain, no bloating, no constipation, no diarrhea, no nausea and no vomiting.

## 2017-07-20 ENCOUNTER — OTHER- (OUTPATIENT)
Dept: URBAN - NONMETROPOLITAN AREA CLINIC 4 | Facility: CLINIC | Age: 77
Setting detail: DERMATOLOGY
End: 2017-07-20

## 2017-07-20 DIAGNOSIS — L30.8 OTHER SPECIFIED DERMATITIS: ICD-10-CM

## 2017-07-20 PROCEDURE — 99024 POSTOP FOLLOW-UP VISIT: CPT

## 2017-11-14 ENCOUNTER — INPATIENT (INPATIENT)
Facility: HOSPITAL | Age: 77
LOS: 2 days | Discharge: HOME | End: 2017-11-17
Attending: INTERNAL MEDICINE | Admitting: INTERNAL MEDICINE
Payer: MEDICARE

## 2017-11-14 VITALS
HEART RATE: 86 BPM | SYSTOLIC BLOOD PRESSURE: 177 MMHG | DIASTOLIC BLOOD PRESSURE: 102 MMHG | OXYGEN SATURATION: 98 % | RESPIRATION RATE: 18 BRPM | TEMPERATURE: 98 F

## 2017-11-14 LAB
ALBUMIN SERPL ELPH-MCNC: 4.3 G/DL — SIGNIFICANT CHANGE UP (ref 3.3–5)
ALP SERPL-CCNC: 65 U/L — SIGNIFICANT CHANGE UP (ref 40–120)
ALT FLD-CCNC: 16 U/L — SIGNIFICANT CHANGE UP (ref 4–33)
AST SERPL-CCNC: 18 U/L — SIGNIFICANT CHANGE UP (ref 4–32)
BASE EXCESS BLDV CALC-SCNC: 3 MMOL/L — SIGNIFICANT CHANGE UP
BASOPHILS # BLD AUTO: 0.01 K/UL — SIGNIFICANT CHANGE UP (ref 0–0.2)
BASOPHILS NFR BLD AUTO: 0.1 % — SIGNIFICANT CHANGE UP (ref 0–2)
BILIRUB SERPL-MCNC: 0.4 MG/DL — SIGNIFICANT CHANGE UP (ref 0.2–1.2)
BLOOD GAS VENOUS - CREATININE: 0.82 MG/DL — SIGNIFICANT CHANGE UP (ref 0.5–1.3)
BUN SERPL-MCNC: 15 MG/DL — SIGNIFICANT CHANGE UP (ref 7–23)
CALCIUM SERPL-MCNC: 9.6 MG/DL — SIGNIFICANT CHANGE UP (ref 8.4–10.5)
CHLORIDE BLDV-SCNC: 105 MMOL/L — SIGNIFICANT CHANGE UP (ref 96–108)
CHLORIDE SERPL-SCNC: 101 MMOL/L — SIGNIFICANT CHANGE UP (ref 98–107)
CK MB BLD-MCNC: 2.37 NG/ML — SIGNIFICANT CHANGE UP (ref 1–4.7)
CK MB BLD-MCNC: SIGNIFICANT CHANGE UP (ref 0–2.5)
CK SERPL-CCNC: 92 U/L — SIGNIFICANT CHANGE UP (ref 25–170)
CO2 SERPL-SCNC: 25 MMOL/L — SIGNIFICANT CHANGE UP (ref 22–31)
CREAT SERPL-MCNC: 0.92 MG/DL — SIGNIFICANT CHANGE UP (ref 0.5–1.3)
EOSINOPHIL # BLD AUTO: 0.02 K/UL — SIGNIFICANT CHANGE UP (ref 0–0.5)
EOSINOPHIL NFR BLD AUTO: 0.2 % — SIGNIFICANT CHANGE UP (ref 0–6)
GAS PNL BLDV: 137 MMOL/L — SIGNIFICANT CHANGE UP (ref 136–146)
GLUCOSE BLDV-MCNC: 61 — LOW (ref 70–99)
GLUCOSE SERPL-MCNC: 62 MG/DL — LOW (ref 70–99)
HCO3 BLDV-SCNC: 26 MMOL/L — SIGNIFICANT CHANGE UP (ref 20–27)
HCT VFR BLD CALC: 38.1 % — SIGNIFICANT CHANGE UP (ref 34.5–45)
HCT VFR BLDV CALC: 39.5 % — SIGNIFICANT CHANGE UP (ref 34.5–45)
HGB BLD-MCNC: 12.2 G/DL — SIGNIFICANT CHANGE UP (ref 11.5–15.5)
HGB BLDV-MCNC: 12.9 G/DL — SIGNIFICANT CHANGE UP (ref 11.5–15.5)
IMM GRANULOCYTES # BLD AUTO: 0.03 # — SIGNIFICANT CHANGE UP
IMM GRANULOCYTES NFR BLD AUTO: 0.3 % — SIGNIFICANT CHANGE UP (ref 0–1.5)
LACTATE BLDV-MCNC: 2.9 MMOL/L — HIGH (ref 0.5–2)
LYMPHOCYTES # BLD AUTO: 2.27 K/UL — SIGNIFICANT CHANGE UP (ref 1–3.3)
LYMPHOCYTES # BLD AUTO: 25 % — SIGNIFICANT CHANGE UP (ref 13–44)
MCHC RBC-ENTMCNC: 27.7 PG — SIGNIFICANT CHANGE UP (ref 27–34)
MCHC RBC-ENTMCNC: 32 % — SIGNIFICANT CHANGE UP (ref 32–36)
MCV RBC AUTO: 86.4 FL — SIGNIFICANT CHANGE UP (ref 80–100)
MONOCYTES # BLD AUTO: 0.77 K/UL — SIGNIFICANT CHANGE UP (ref 0–0.9)
MONOCYTES NFR BLD AUTO: 8.5 % — SIGNIFICANT CHANGE UP (ref 2–14)
NEUTROPHILS # BLD AUTO: 5.97 K/UL — SIGNIFICANT CHANGE UP (ref 1.8–7.4)
NEUTROPHILS NFR BLD AUTO: 65.9 % — SIGNIFICANT CHANGE UP (ref 43–77)
NRBC # FLD: 0 — SIGNIFICANT CHANGE UP
PCO2 BLDV: 51 MMHG — SIGNIFICANT CHANGE UP (ref 41–51)
PH BLDV: 7.36 PH — SIGNIFICANT CHANGE UP (ref 7.32–7.43)
PLATELET # BLD AUTO: 233 K/UL — SIGNIFICANT CHANGE UP (ref 150–400)
PMV BLD: 11.5 FL — SIGNIFICANT CHANGE UP (ref 7–13)
PO2 BLDV: 47 MMHG — HIGH (ref 35–40)
POTASSIUM BLDV-SCNC: 3.2 MMOL/L — LOW (ref 3.4–4.5)
POTASSIUM SERPL-MCNC: 3.5 MMOL/L — SIGNIFICANT CHANGE UP (ref 3.5–5.3)
POTASSIUM SERPL-SCNC: 3.5 MMOL/L — SIGNIFICANT CHANGE UP (ref 3.5–5.3)
PROT SERPL-MCNC: 8 G/DL — SIGNIFICANT CHANGE UP (ref 6–8.3)
RBC # BLD: 4.41 M/UL — SIGNIFICANT CHANGE UP (ref 3.8–5.2)
RBC # FLD: 12.9 % — SIGNIFICANT CHANGE UP (ref 10.3–14.5)
SAO2 % BLDV: 79.4 % — SIGNIFICANT CHANGE UP (ref 60–85)
SODIUM SERPL-SCNC: 140 MMOL/L — SIGNIFICANT CHANGE UP (ref 135–145)
TROPONIN T SERPL-MCNC: < 0.06 NG/ML — SIGNIFICANT CHANGE UP (ref 0–0.06)
WBC # BLD: 9.07 K/UL — SIGNIFICANT CHANGE UP (ref 3.8–10.5)
WBC # FLD AUTO: 9.07 K/UL — SIGNIFICANT CHANGE UP (ref 3.8–10.5)

## 2017-11-14 PROCEDURE — 71275 CT ANGIOGRAPHY CHEST: CPT | Mod: 26

## 2017-11-14 PROCEDURE — 70450 CT HEAD/BRAIN W/O DYE: CPT | Mod: 26

## 2017-11-14 PROCEDURE — 71010: CPT | Mod: 26

## 2017-11-14 PROCEDURE — 74174 CTA ABD&PLVS W/CONTRAST: CPT | Mod: 26

## 2017-11-14 RX ORDER — SODIUM CHLORIDE 9 MG/ML
1000 INJECTION INTRAMUSCULAR; INTRAVENOUS; SUBCUTANEOUS ONCE
Qty: 0 | Refills: 0 | Status: COMPLETED | OUTPATIENT
Start: 2017-11-14 | End: 2017-11-14

## 2017-11-14 RX ADMIN — SODIUM CHLORIDE 1000 MILLILITER(S): 9 INJECTION INTRAMUSCULAR; INTRAVENOUS; SUBCUTANEOUS at 22:56

## 2017-11-14 NOTE — ED PROVIDER NOTE - ATTENDING CONTRIBUTION TO CARE
CARLOS Attending Note - Dr. Alvarez  78 y/o female pmh htn, dm, hld, cad, cva c/o near syncope x today that began with left arm pain that then involved the chest and then the back and both legs.  PE: pt is alert and oriented, perrl, ent normal, membranes are moist, neck supple. no lymphadenopathy or thyroid enlargement, No JVD.  Chest clear to P&A, Heart- reg rhythm without murmur, rubs or gallops, radial pulses equal bilaterally.  Abd is soft, non-tender, Bowel sounds are active. no mass or organomegaly. : No CVA tenderness. Neuro:  Pt alert and oriented x 3. Perrl    Distal neurosensory is intact. Motor function is 5/5 strength bilaterally.  No focal deficits. Extremities:  No edema.  Skin: warm and dry.  Impression:  Syncope with chest pain  Plan: Labs, R/O Aortic dissection with CTA, and admission for serial EKG's, and Cardiac enzymes and syncope work-up.

## 2017-11-14 NOTE — ED PROVIDER NOTE - MEDICAL DECISION MAKING DETAILS
78 y/o female w/ chest pain, abd pain, sob, and near syncope- labs, ekg, cta chest/abd, head ct, admit

## 2017-11-14 NOTE — ED ADULT NURSE NOTE - OBJECTIVE STATEMENT
Pt received A&Ox3 to ED Rm 27 with c/o bilat hand pain that radiated to midsternal chest area. Also c/o intermit palp, SOB, dizziness. Denies all other adverse systems at this time. RR equal & unlabored. Awaiting CT/xray results. FAmily at bedside. Will monitor.

## 2017-11-14 NOTE — ED PROVIDER NOTE - OBJECTIVE STATEMENT
78 y/o female pmh htn, dm, hld, cad, cva c/o near syncope x today. Pt admits to being in the kitchen, cooking when she developed left head pain. Pt then developed chest and abd pain, sob and lightheadedness. Pt admits to feeling like she was going to pass out, leaned on the kitchen table and then made it to her bedroom when she fell onto the bed. Pt denies full LOC< but felt like she was going to pass out. Pt now c/o R leg pain. Pt denies palpitations, diaphoresis, n/v/d, weakness, slurred speech, fever or chills.

## 2017-11-14 NOTE — ED ADULT TRIAGE NOTE - CHIEF COMPLAINT QUOTE
Pt c/o intermittent CP, SOB, dizziness, lightheadedness, and rufina arm pain lasting 1h x1 year.  Appears comfortable

## 2017-11-15 DIAGNOSIS — I10 ESSENTIAL (PRIMARY) HYPERTENSION: ICD-10-CM

## 2017-11-15 DIAGNOSIS — R55 SYNCOPE AND COLLAPSE: ICD-10-CM

## 2017-11-15 DIAGNOSIS — E78.5 HYPERLIPIDEMIA, UNSPECIFIED: ICD-10-CM

## 2017-11-15 DIAGNOSIS — I50.9 HEART FAILURE, UNSPECIFIED: ICD-10-CM

## 2017-11-15 DIAGNOSIS — Z29.9 ENCOUNTER FOR PROPHYLACTIC MEASURES, UNSPECIFIED: ICD-10-CM

## 2017-11-15 DIAGNOSIS — I25.10 ATHEROSCLEROTIC HEART DISEASE OF NATIVE CORONARY ARTERY WITHOUT ANGINA PECTORIS: ICD-10-CM

## 2017-11-15 DIAGNOSIS — E11.9 TYPE 2 DIABETES MELLITUS WITHOUT COMPLICATIONS: ICD-10-CM

## 2017-11-15 LAB
APPEARANCE UR: CLEAR — SIGNIFICANT CHANGE UP
BACTERIA # UR AUTO: SIGNIFICANT CHANGE UP
BILIRUB UR-MCNC: NEGATIVE — SIGNIFICANT CHANGE UP
BLOOD UR QL VISUAL: NEGATIVE — SIGNIFICANT CHANGE UP
BUN SERPL-MCNC: 12 MG/DL — SIGNIFICANT CHANGE UP (ref 7–23)
CALCIUM SERPL-MCNC: 8.6 MG/DL — SIGNIFICANT CHANGE UP (ref 8.4–10.5)
CHLORIDE SERPL-SCNC: 99 MMOL/L — SIGNIFICANT CHANGE UP (ref 98–107)
CHOLEST SERPL-MCNC: 175 MG/DL — SIGNIFICANT CHANGE UP (ref 120–199)
CK MB BLD-MCNC: 2.94 NG/ML — SIGNIFICANT CHANGE UP (ref 1–4.7)
CK SERPL-CCNC: 121 U/L — SIGNIFICANT CHANGE UP (ref 25–170)
CO2 SERPL-SCNC: 25 MMOL/L — SIGNIFICANT CHANGE UP (ref 22–31)
COLOR SPEC: SIGNIFICANT CHANGE UP
CREAT SERPL-MCNC: 0.78 MG/DL — SIGNIFICANT CHANGE UP (ref 0.5–1.3)
GLUCOSE BLDC GLUCOMTR-MCNC: 270 MG/DL — HIGH (ref 70–99)
GLUCOSE BLDC GLUCOMTR-MCNC: 286 MG/DL — HIGH (ref 70–99)
GLUCOSE BLDC GLUCOMTR-MCNC: 286 MG/DL — HIGH (ref 70–99)
GLUCOSE BLDC GLUCOMTR-MCNC: 304 MG/DL — HIGH (ref 70–99)
GLUCOSE SERPL-MCNC: 260 MG/DL — HIGH (ref 70–99)
GLUCOSE UR-MCNC: 500 — SIGNIFICANT CHANGE UP
HBA1C BLD-MCNC: 11.7 % — HIGH (ref 4–5.6)
HCT VFR BLD CALC: 36.2 % — SIGNIFICANT CHANGE UP (ref 34.5–45)
HDLC SERPL-MCNC: 51 MG/DL — SIGNIFICANT CHANGE UP (ref 45–65)
HGB BLD-MCNC: 11.3 G/DL — LOW (ref 11.5–15.5)
KETONES UR-MCNC: NEGATIVE — SIGNIFICANT CHANGE UP
LEUKOCYTE ESTERASE UR-ACNC: HIGH
LIPID PNL WITH DIRECT LDL SERPL: 107 MG/DL — SIGNIFICANT CHANGE UP
MAGNESIUM SERPL-MCNC: 1.8 MG/DL — SIGNIFICANT CHANGE UP (ref 1.6–2.6)
MCHC RBC-ENTMCNC: 26.8 PG — LOW (ref 27–34)
MCHC RBC-ENTMCNC: 31.2 % — LOW (ref 32–36)
MCV RBC AUTO: 86 FL — SIGNIFICANT CHANGE UP (ref 80–100)
MUCOUS THREADS # UR AUTO: SIGNIFICANT CHANGE UP
NITRITE UR-MCNC: NEGATIVE — SIGNIFICANT CHANGE UP
NON-SQ EPI CELLS # UR AUTO: <1 — SIGNIFICANT CHANGE UP
NRBC # FLD: 0 — SIGNIFICANT CHANGE UP
PH UR: 6 — SIGNIFICANT CHANGE UP (ref 4.6–8)
PHOSPHATE SERPL-MCNC: 3.7 MG/DL — SIGNIFICANT CHANGE UP (ref 2.5–4.5)
PLATELET # BLD AUTO: 197 K/UL — SIGNIFICANT CHANGE UP (ref 150–400)
PMV BLD: 11.5 FL — SIGNIFICANT CHANGE UP (ref 7–13)
POTASSIUM SERPL-MCNC: 3.9 MMOL/L — SIGNIFICANT CHANGE UP (ref 3.5–5.3)
POTASSIUM SERPL-SCNC: 3.9 MMOL/L — SIGNIFICANT CHANGE UP (ref 3.5–5.3)
PROT UR-MCNC: NEGATIVE — SIGNIFICANT CHANGE UP
RBC # BLD: 4.21 M/UL — SIGNIFICANT CHANGE UP (ref 3.8–5.2)
RBC # FLD: 12.9 % — SIGNIFICANT CHANGE UP (ref 10.3–14.5)
RBC CASTS # UR COMP ASSIST: SIGNIFICANT CHANGE UP (ref 0–?)
SODIUM SERPL-SCNC: 141 MMOL/L — SIGNIFICANT CHANGE UP (ref 135–145)
SP GR SPEC: 1.01 — SIGNIFICANT CHANGE UP (ref 1–1.03)
SQUAMOUS # UR AUTO: SIGNIFICANT CHANGE UP
TRIGL SERPL-MCNC: 132 MG/DL — SIGNIFICANT CHANGE UP (ref 10–149)
TROPONIN T SERPL-MCNC: < 0.06 NG/ML — SIGNIFICANT CHANGE UP (ref 0–0.06)
TSH SERPL-MCNC: 1.76 UIU/ML — SIGNIFICANT CHANGE UP (ref 0.27–4.2)
UROBILINOGEN FLD QL: NORMAL E.U. — SIGNIFICANT CHANGE UP (ref 0.1–0.2)
WBC # BLD: 6.83 K/UL — SIGNIFICANT CHANGE UP (ref 3.8–10.5)
WBC # FLD AUTO: 6.83 K/UL — SIGNIFICANT CHANGE UP (ref 3.8–10.5)
WBC CLUMPS #/AREA URNS HPF: PRESENT — HIGH (ref 0–?)
WBC UR QL: SIGNIFICANT CHANGE UP (ref 0–?)

## 2017-11-15 PROCEDURE — 70553 MRI BRAIN STEM W/O & W/DYE: CPT | Mod: 26

## 2017-11-15 PROCEDURE — 95819 EEG AWAKE AND ASLEEP: CPT | Mod: 26

## 2017-11-15 RX ORDER — INSULIN GLARGINE 100 [IU]/ML
50 INJECTION, SOLUTION SUBCUTANEOUS
Qty: 0 | Refills: 0 | COMMUNITY

## 2017-11-15 RX ORDER — INSULIN GLARGINE 100 [IU]/ML
35 INJECTION, SOLUTION SUBCUTANEOUS AT BEDTIME
Qty: 0 | Refills: 0 | Status: DISCONTINUED | OUTPATIENT
Start: 2017-11-15 | End: 2017-11-16

## 2017-11-15 RX ORDER — LISINOPRIL 2.5 MG/1
5 TABLET ORAL DAILY
Qty: 0 | Refills: 0 | Status: DISCONTINUED | OUTPATIENT
Start: 2017-11-15 | End: 2017-11-17

## 2017-11-15 RX ORDER — INSULIN GLARGINE 100 [IU]/ML
40 INJECTION, SOLUTION SUBCUTANEOUS AT BEDTIME
Qty: 0 | Refills: 0 | Status: DISCONTINUED | OUTPATIENT
Start: 2017-11-15 | End: 2017-11-15

## 2017-11-15 RX ORDER — ATORVASTATIN CALCIUM 80 MG/1
80 TABLET, FILM COATED ORAL AT BEDTIME
Qty: 0 | Refills: 0 | Status: DISCONTINUED | OUTPATIENT
Start: 2017-11-15 | End: 2017-11-17

## 2017-11-15 RX ORDER — DEXTROSE 50 % IN WATER 50 %
12.5 SYRINGE (ML) INTRAVENOUS ONCE
Qty: 0 | Refills: 0 | Status: DISCONTINUED | OUTPATIENT
Start: 2017-11-15 | End: 2017-11-17

## 2017-11-15 RX ORDER — DEXTROSE 50 % IN WATER 50 %
50 SYRINGE (ML) INTRAVENOUS ONCE
Qty: 0 | Refills: 0 | Status: COMPLETED | OUTPATIENT
Start: 2017-11-15 | End: 2017-11-15

## 2017-11-15 RX ORDER — INFLUENZA VIRUS VACCINE 15; 15; 15; 15 UG/.5ML; UG/.5ML; UG/.5ML; UG/.5ML
0.5 SUSPENSION INTRAMUSCULAR ONCE
Qty: 0 | Refills: 0 | Status: DISCONTINUED | OUTPATIENT
Start: 2017-11-15 | End: 2017-11-17

## 2017-11-15 RX ORDER — DEXTROSE 50 % IN WATER 50 %
25 SYRINGE (ML) INTRAVENOUS ONCE
Qty: 0 | Refills: 0 | Status: DISCONTINUED | OUTPATIENT
Start: 2017-11-15 | End: 2017-11-17

## 2017-11-15 RX ORDER — INSULIN LISPRO 100/ML
10 VIAL (ML) SUBCUTANEOUS
Qty: 0 | Refills: 0 | Status: DISCONTINUED | OUTPATIENT
Start: 2017-11-15 | End: 2017-11-16

## 2017-11-15 RX ORDER — INSULIN LISPRO 100/ML
VIAL (ML) SUBCUTANEOUS AT BEDTIME
Qty: 0 | Refills: 0 | Status: DISCONTINUED | OUTPATIENT
Start: 2017-11-15 | End: 2017-11-17

## 2017-11-15 RX ORDER — AMLODIPINE BESYLATE 2.5 MG/1
5 TABLET ORAL DAILY
Qty: 0 | Refills: 0 | Status: DISCONTINUED | OUTPATIENT
Start: 2017-11-15 | End: 2017-11-17

## 2017-11-15 RX ORDER — HYDROCHLOROTHIAZIDE 25 MG
25 TABLET ORAL DAILY
Qty: 0 | Refills: 0 | Status: DISCONTINUED | OUTPATIENT
Start: 2017-11-15 | End: 2017-11-17

## 2017-11-15 RX ORDER — GLUCAGON INJECTION, SOLUTION 0.5 MG/.1ML
1 INJECTION, SOLUTION SUBCUTANEOUS ONCE
Qty: 0 | Refills: 0 | Status: DISCONTINUED | OUTPATIENT
Start: 2017-11-15 | End: 2017-11-17

## 2017-11-15 RX ORDER — INSULIN LISPRO 100/ML
7 VIAL (ML) SUBCUTANEOUS
Qty: 0 | Refills: 0 | Status: DISCONTINUED | OUTPATIENT
Start: 2017-11-15 | End: 2017-11-15

## 2017-11-15 RX ORDER — SODIUM CHLORIDE 9 MG/ML
1000 INJECTION, SOLUTION INTRAVENOUS
Qty: 0 | Refills: 0 | Status: DISCONTINUED | OUTPATIENT
Start: 2017-11-15 | End: 2017-11-17

## 2017-11-15 RX ORDER — INSULIN LISPRO 100/ML
VIAL (ML) SUBCUTANEOUS
Qty: 0 | Refills: 0 | Status: DISCONTINUED | OUTPATIENT
Start: 2017-11-15 | End: 2017-11-17

## 2017-11-15 RX ORDER — ASPIRIN/CALCIUM CARB/MAGNESIUM 324 MG
81 TABLET ORAL DAILY
Qty: 0 | Refills: 0 | Status: DISCONTINUED | OUTPATIENT
Start: 2017-11-15 | End: 2017-11-17

## 2017-11-15 RX ORDER — DEXTROSE 50 % IN WATER 50 %
1 SYRINGE (ML) INTRAVENOUS ONCE
Qty: 0 | Refills: 0 | Status: DISCONTINUED | OUTPATIENT
Start: 2017-11-15 | End: 2017-11-17

## 2017-11-15 RX ORDER — HEPARIN SODIUM 5000 [USP'U]/ML
5000 INJECTION INTRAVENOUS; SUBCUTANEOUS EVERY 12 HOURS
Qty: 0 | Refills: 0 | Status: DISCONTINUED | OUTPATIENT
Start: 2017-11-15 | End: 2017-11-17

## 2017-11-15 RX ADMIN — Medication 25 MILLIGRAM(S): at 06:44

## 2017-11-15 RX ADMIN — Medication 81 MILLIGRAM(S): at 12:29

## 2017-11-15 RX ADMIN — Medication 3: at 17:46

## 2017-11-15 RX ADMIN — LISINOPRIL 5 MILLIGRAM(S): 2.5 TABLET ORAL at 06:44

## 2017-11-15 RX ADMIN — AMLODIPINE BESYLATE 5 MILLIGRAM(S): 2.5 TABLET ORAL at 06:44

## 2017-11-15 RX ADMIN — Medication 7 UNIT(S): at 13:18

## 2017-11-15 RX ADMIN — HEPARIN SODIUM 5000 UNIT(S): 5000 INJECTION INTRAVENOUS; SUBCUTANEOUS at 17:11

## 2017-11-15 RX ADMIN — Medication 3: at 09:56

## 2017-11-15 RX ADMIN — HEPARIN SODIUM 5000 UNIT(S): 5000 INJECTION INTRAVENOUS; SUBCUTANEOUS at 06:44

## 2017-11-15 RX ADMIN — ATORVASTATIN CALCIUM 80 MILLIGRAM(S): 80 TABLET, FILM COATED ORAL at 22:40

## 2017-11-15 RX ADMIN — Medication 10 UNIT(S): at 17:48

## 2017-11-15 RX ADMIN — INSULIN GLARGINE 35 UNIT(S): 100 INJECTION, SOLUTION SUBCUTANEOUS at 22:35

## 2017-11-15 RX ADMIN — Medication 4: at 13:18

## 2017-11-15 RX ADMIN — Medication 50 MILLILITER(S): at 01:12

## 2017-11-15 RX ADMIN — Medication 1: at 22:40

## 2017-11-15 RX ADMIN — Medication 7 UNIT(S): at 09:57

## 2017-11-15 NOTE — H&P ADULT - NSHPREVIEWOFSYSTEMS_GEN_ALL_CORE
Constitutional: No fever, fatigue or weight loss.  Skin: No rash.  Eyes: No recent vision problems or eye pain.  ENT: No congestion, ear pain, or sore throat.  Endocrine: No thyroid problems.  Cardiovascular: + chest pain. palpitation.  Respiratory: No cough, shortness of breath, congestion, or wheezing.  Gastrointestinal: + abdominal pain. No  nausea, vomiting, or diarrhea.  Genitourinary: No dysuria.  Musculoskeletal: No joint swelling.  Neurologic: No headache. + near syncope.

## 2017-11-15 NOTE — CONSULT NOTE ADULT - PROBLEM SELECTOR RECOMMENDATION 9
Will increase Lantus to 35 units at bed time.  Will increase Humalog to 10 units before each meal in addition to Humalog correction scale coverage.  Patient counseled for compliance with consistent low carb diet.

## 2017-11-15 NOTE — H&P ADULT - ASSESSMENT
76 y/o F with h/o HTN, DM, HLD, CAD, CVA, GERD, CHF presents to the ED for near syncope. Admit to telemetry.

## 2017-11-15 NOTE — EEG REPORT - NS EEG TEXT BOX
[facility]  Report of Routine EEG with Video    [facility]: [address], [address] [place] [address], Phone: [phone]  [facility]: [address] [place] [address], Phone: [phone]  Office: [address] [facility], [facility] [address], [place] [address], Phone: [phone]    Patient Name: Sherrill Olivo    Age: 77 y  : 1940  Patient ID: -, MRN #: -, Location: [id number]  Referring Physician: Romero Parrish    EEG #: [id number]  Study Date: 11/15/2017		    Technical Information:					  On Instrument: [id number]  Placement and Labeling of Electrodes:  The EEG was performed utilizing 20 channels referential EEG connections (coronal over temporal over parasagittal montage) using all standard 10-20 electrode placements with EKG.  Recording was at a sampling rate of 256 samples per second per channel.  Time synchronized digital video recording was done simultaneously with EEG recording.  A low light infrared camera was used for low light recording.  Yinka and seizure detection algorithms were utilized.    History:  THIS IS A PORTABLE EEG  76 YO FEMALE  526-A  SYNCOPE AND COLLAPSE  HX- CHF,HLD,DM,CAD,CVA,GERD,HTN  PT ON A PORTABLE CARDIAC MONITOR  CONCERN FOR SEIZURE    Medication	  <Medication>LIPITOR	  <Medication>ZESTRIL	  <Medication>NORVASC	  <Medication>LANTUS	  <Medication>HUMALOG	  <Medication>HEPARIN	  <Medication>ECOTRIN	    Study Interpretation:    FINDINGS:  The background was continuous, spontaneously variable and reactive.  During wakefulness, the posteriorly dominant rhythm consisted of symmetric, well modulated 8.5 Hz activity, with an amplitude to 30 uV, that attenuated to eye opening.  Low amplitude central beta was noted in wakefulness.    Background Slowing:  Generalized slowing: none was present.  Focal slowing: intermittent irregular delta activity over the left frontal region, at times extending cento-temporally.     Sleep Background:  Drowsiness was characterized by fragmentation, attenuation, and slowing of the background activity.      Epileptiform Activity:   No epileptiform discharges were present.    Events:  No clinical events were recorded.  No seizures were recorded.    Activation Procedures:   -Hyperventilation was not performed.    -Photic stimulation was not performed.    Artifacts:  Intermittent myogenic and movement artifacts were noted.    ECG:  The heart rate on single channel ECG was predominantly between 60-80 BPM.    EEG Classification / Summary:  Abnormal Routine EEG Study   -Intermittent delta slowing over the left frontal region    Clinical Impression:  These findings are consistent with mild nonspecific focal dysfunction in the left frontal region. There were no epileptiform abnormalities recorded.        Petrona Cruz DO   Fellow in Neurophysiology/Epilepsy, [facility] [place]      Juarez Crain MD			    Attending Physician, [facility]  Report of Routine EEG with Video    [facility]: [address], [address] [place] [address], Phone: [phone]  [facility]: [address] [place] [address], Phone: [phone]  Office: [address] [facility], [facility] [address], [place] [address], Phone: [phone]    Patient Name: Sherrill Olivo    Age: 77 y  : 1940  Patient ID: -, MRN #: -, Location: [id number]  Referring Physician: Romero Parrish    EEG #: [id number]  Study Date: 11/15/2017		    Technical Information:					  On Instrument: [id number]  Placement and Labeling of Electrodes:  The EEG was performed utilizing 20 channels referential EEG connections (coronal over temporal over parasagittal montage) using all standard 10-20 electrode placements with EKG.  Recording was at a sampling rate of 256 samples per second per channel.  Time synchronized digital video recording was done simultaneously with EEG recording.  A low light infrared camera was used for low light recording.  Yinka and seizure detection algorithms were utilized.    History:  THIS IS A PORTABLE EEG  76 YO FEMALE  526-A  SYNCOPE AND COLLAPSE  HX- CHF,HLD,DM,CAD,CVA,GERD,HTN  PT ON A PORTABLE CARDIAC MONITOR  CONCERN FOR SEIZURE    Medication	  <Medication>LIPITOR	  <Medication>ZESTRIL	  <Medication>NORVASC	  <Medication>LANTUS	  <Medication>HUMALOG	  <Medication>HEPARIN	  <Medication>ECOTRIN	    Study Interpretation:    FINDINGS:  The background was continuous, spontaneously variable and reactive.  During wakefulness, the posteriorly dominant rhythm consisted of symmetric, well modulated 8.5 Hz activity, with an amplitude to 30 uV, that attenuated to eye opening.  Low amplitude central beta was noted in wakefulness.    Background Slowing:  Generalized slowing: none was present.  Focal slowing: intermittent irregular delta activity over the left hemisphere, maximal in the left frontal region    Sleep Background:  Drowsiness was characterized by fragmentation, attenuation, and slowing of the background activity.      Epileptiform Activity:   No epileptiform discharges were present.    Events:  No clinical events were recorded.  No seizures were recorded.    Activation Procedures:   -Hyperventilation was not performed.    -Photic stimulation was not performed.    Artifacts:  Intermittent myogenic and movement artifacts were noted.    ECG:  The heart rate on single channel ECG was predominantly between 60-80 BPM.    EEG Classification / Summary:  Abnormal Routine EEG Study   -Intermittent Slowing, Lateralized Left hemisphere, Maximum Left Frontal    Clinical Impression:  These findings are consistent with mild cerebral dysfunction in the left hemisphere. There were no epileptiform abnormalities recorded.        Petrona Cruz DO   Fellow in Neurophysiology/Epilepsy, [facility] [place]      Juarez Crain MD			    Attending Physician, [facility] [place]

## 2017-11-15 NOTE — H&P ADULT - NSHPPHYSICALEXAM_GEN_ALL_CORE
GENERAL APPEARANCE: Well developed, well nourished, alert and cooperative, and appears to be in no acute distress.  HEAD: normocephalic.  EYES: PERRL, EOMI.   EARS: External auditory canals clear, hearing grossly intact.  NECK: Neck supple, non-tender without lymphadenopathy, masses or thyromegaly.  CARDIAC: Normal S1 and S2. No S3, S4 or murmurs. Rhythm is regular.   LUNGS: Clear to auscultation and percussion without rales, rhonchi, wheezing or diminished breath sounds.  ABDOMEN: Positive bowel sounds. Soft, nondistended, nontender. No guarding or rebound. No masses.  EXTREMITIES: No significant deformity or joint abnormality. No edema. Peripheral pulses intact.   NEUROLOGICAL: CN II-XII intact. Strength and sensation symmetric and intact throughout.   SKIN: Skin normal color, texture and turgor with no lesions or eruptions.  PSYCHIATRIC: The mental examination revealed the patient was oriented to person, place, and time. The patient was able to demonstrate good judgement and reason, without hallucinations, abnormal affect or abnormal behaviors during the examination. Patient is not suicidal.

## 2017-11-15 NOTE — H&P ADULT - HISTORY OF PRESENT ILLNESS
76 y/o F with h/o HTN, DM, HLD, CAD, CVA, GERD, CHF presents to the ED for near syncope. Pt states she was in her kitchen, cooking and she started developing pain. Pt states the pain started 76 y/o F with h/o HTN, DM, HLD, CAD, CVA, GERD, CHF presents to the ED for near syncope. Pt states she was in her kitchen, cooking and she started developing pain. Pt states the pain started in her left arm and it went across her chest into her lower abdomen. Pt states the pain lasted for about 2-3 hours. Pt states she felt like she was going to pass out and made it to the bedroom. Pt denies any LOC or head trauma. Pt states she was laying in her bed the whole entire time until EMS came. Pt denies LOC, syncope,fever, chills, numbness, tingling, dysuria, palpitations, shortness of breath, N/V/D/C, dysuria, urinary/bowel incontinence or any other complaints at this time.

## 2017-11-15 NOTE — CONSULT NOTE ADULT - SUBJECTIVE AND OBJECTIVE BOX
HPI:  76 y/o F with h/o HTN, DM, HLD, CAD, CVA, GERD, CHF presents to the ED for near syncope. Pt states she was in her kitchen, cooking and she started developing pain. Pt states the pain started in her left arm and it went across her chest into her lower abdomen. Pt states the pain lasted for about 2-3 hours. Pt states she felt like she was going to pass out and made it to the bedroom. Pt denies any LOC or head trauma. Pt states she was laying in her bed the whole entire time until EMS came. Pt denies LOC, syncope,fever, chills, numbness, tingling, dysuria, palpitations, shortness of breath, N/V/D/C, dysuria, urinary/bowel incontinence or any other complaints at this time. (15 Nov 2017 04:11)  Patient has history of diabetes, on oral meds at home, no recent hypoglycemic episodes, no polyuria polydipsia. Patient follows up with PCP for diabetes management.    PAST MEDICAL & SURGICAL HISTORY:  CHF (congestive heart failure)  HLD (hyperlipidemia)  CVA (cerebral vascular accident): no residual deficits  GERD (gastroesophageal reflux disease)  DM (diabetes mellitus)  CAD (coronary artery disease): S/P stent placemenet 2006  HTN (hypertension)  S/P TKR (total knee replacement): left  S/P primary angioplasty with coronary stent: x1 in 2006 at [facility]      FAMILY HISTORY:  No pertinent family history in first degree relatives      Social History:    Outpatient Medications:    MEDICATIONS  (STANDING):  amLODIPine   Tablet 5 milliGRAM(s) Oral daily  aspirin enteric coated 81 milliGRAM(s) Oral daily  atorvastatin 80 milliGRAM(s) Oral at bedtime  dextrose 5%. 1000 milliLiter(s) (50 mL/Hr) IV Continuous <Continuous>  dextrose 50% Injectable 12.5 Gram(s) IV Push once  dextrose 50% Injectable 25 Gram(s) IV Push once  dextrose 50% Injectable 25 Gram(s) IV Push once  heparin  Injectable 5000 Unit(s) SubCutaneous every 12 hours  hydrochlorothiazide 25 milliGRAM(s) Oral daily  influenza   Vaccine 0.5 milliLiter(s) IntraMuscular once  insulin glargine Injectable (LANTUS) 35 Unit(s) SubCutaneous at bedtime  insulin lispro (HumaLOG) corrective regimen sliding scale   SubCutaneous three times a day before meals  insulin lispro (HumaLOG) corrective regimen sliding scale   SubCutaneous at bedtime  insulin lispro Injectable (HumaLOG) 10 Unit(s) SubCutaneous three times a day before meals  lisinopril 5 milliGRAM(s) Oral daily    MEDICATIONS  (PRN):  dextrose Gel 1 Dose(s) Oral once PRN Blood Glucose LESS THAN 70 milliGRAM(s)/deciliter  glucagon  Injectable 1 milliGRAM(s) IntraMuscular once PRN Glucose LESS THAN 70 milligrams/deciliter      Allergies    No Known Allergies    Intolerances      Review of Systems:  Constitutional: No fever, no chills  Eyes: No blurry vision  Neuro: No tremors  HEENT: No pain, no neck swelling  Cardiovascular: No chest pain, no palpitations  Respiratory: Has SOB, no cough  GI: No nausea, vomiting, abdominal pain  : No dysuria  Skin: no rash  MSK: Has leg swelling, no foot ulcers.  Psych: no depression  Endocrine: no polyuria, polydipsia    ALL OTHER SYSTEMS REVIEWED AND NEGATIVE    UNABLE TO OBTAIN    PHYSICAL EXAM:  VITALS: T(C): 36.7 (11-15-17 @ 12:53)  T(F): 98 (11-15-17 @ 12:53), Max: 98.5 (11-14-17 @ 18:59)  HR: 74 (11-15-17 @ 12:53) (68 - 86)  BP: 156/97 (11-15-17 @ 12:53) (145/81 - 177/102)  RR:  (16 - 18)  SpO2:  (98% - 100%)  Wt(kg): --  GENERAL: NAD, well-groomed, well-developed  EYES: No proptosis, no lid lag  HEENT:  Atraumatic, Normocephalic  THYROID: Normal size, no palpable nodules  RESPIRATORY: Clear to auscultation bilaterally; No rales, rhonchi, wheezing  CARDIOVASCULAR: Si S2, No murmurs;  GI: Soft, non distended, normal bowel sounds  SKIN: Dry, intact, No rashes or lesions  MUSCULOSKELETAL: Has BL lower extremity edema.  NEURO:  no tremor, sensation decreased in feet BL,    POCT Blood Glucose.: 270 mg/dL (11-15-17 @ 17:42)  POCT Blood Glucose.: 304 mg/dL (11-15-17 @ 12:55)  POCT Blood Glucose.: 286 mg/dL (11-15-17 @ 09:28)                            11.3   6.83  )-----------( 197      ( 15 Nov 2017 03:50 )             36.2       11-15    141  |  99  |  12  ----------------------------<  260<H>  3.9   |  25  |  0.78    EGFR if : 84  EGFR if non : 72    Ca    8.6      11-15  Mg     1.8     11-15  Phos  3.7     11-15    TPro  8.0  /  Alb  4.3  /  TBili  0.4  /  DBili  x   /  AST  18  /  ALT  16  /  AlkPhos  65  11-14      Thyroid Function Tests:  11-15 @ 03:50 TSH 1.76 FreeT4 -- T3 -- Anti TPO -- Anti Thyroglobulin Ab -- TSI --      Hemoglobin A1C, Whole Blood: 11.7 % <H> [4.0 - 5.6] (11-15-17 @ 03:50)      11-15 Chol 175  HDL 51 Trig 132    Radiology:

## 2017-11-15 NOTE — PROGRESS NOTE ADULT - SUBJECTIVE AND OBJECTIVE BOX
consult to be dictated  pt with loc, likely vasovagal, though given prolonged duration r/o seizure  plan  1. mri brain  2. eeg

## 2017-11-15 NOTE — CONSULT NOTE ADULT - SUBJECTIVE AND OBJECTIVE BOX
cc syncope  Elem 78 y/o F with h/o HTN, DM, HLD, CAD, CVA, GERD, CHF presents to the ED for near syncope. Pt states she was in her kitchen, cooking and she started developing pain. Pt states the pain started in her left arm and it went across her chest into her lower abdomen. Pt states the pain lasted for about 2-3 hours. Pt states she felt like she was going to pass out and made it to the bedroom. Pt denies any LOC or head trauma. Pt states she was laying in her bed the whole entire time until EMS came. Pt denies LOC, syncope, fever, chills, numbness, tingling, dysuria, palpitations, shortness of breath, N/V/D/C, dysuria, urinary/bowel incontinence or any other complaints at this time.     Allergies NKDA    REVIEW OF SYSTEMS:    CONSTITUTIONAL: No weakness, fevers or chills  EYES/ENT: No visual changes;  No vertigo or throat pain   NECK: No pain or stiffness  RESPIRATORY: No cough, wheezing, hemoptysis; No shortness of breath  CARDIOVASCULAR: No chest pain or palpitations  GASTROINTESTINAL: No abdominal or epigastric pain. No nausea, vomiting, or hematemesis; No diarrhea or constipation. No melena or hematochezia.  GENITOURINARY: No dysuria, frequency or hematuria  NEUROLOGICAL: No numbness or weakness  SKIN: No itching, burning, rashes, or lesions   All other review of systems is negative unless indicated above.    Home Medications:   * Patient Currently Takes Medications as of 15-Nov-2017 03:40 documented in Structured Notes  · 	lisinopril 5 mg oral tablet: 1 tab(s) orally once a day  · 	atorvastatin 80 mg oral tablet: 1 tab(s) orally once a day (at bedtime)  · 	aspirin 81 mg oral delayed release tablet: 1 tab(s) orally once a day  · 	amLODIPine 5 mg oral tablet: 1 tab(s) orally once a day  · 	insulin glargine 100 units/mL subcutaneous solution: 40  subcutaneous once a day (at bedtime)  · 	NovoLOG 100 units/mL subcutaneous solution: 7 unit(s) subcutaneous 3 times a day  · 	hydroCHLOROthiazide 25 mg oral tablet: 1 tab(s) orally once a day    .    Patient History:   Past Medical History:CAD (coronary artery disease)  S/P stent placemenet 2006  CHF (congestive heart failure)    CVA (cerebral vascular accident)  no residual deficits  DM (diabetes mellitus)    GERD (gastroesophageal reflux disease)    HLD (hyperlipidemia)    HTN (hypertension).    Past Surgical History:  S/P primary angioplasty with coronary stent  x1 in 2006 at [facility]  S/P TKR (total knee replacement)  left.    Family History:  No pertinent family history in first degree relatives.    Social History:  Social History (marital status, living situation, occupation, tobacco use, alcohol and drug use, and sexual history): .  Lives with daughter.  No Nicotine use  No ETOH use

## 2017-11-15 NOTE — H&P ADULT - PROBLEM SELECTOR PLAN 4
Routine blood glucose check.   Continue with current insulin regimen and start sliding scale.   Check hemoglobin a1c.   Low carb diet not well controlled  obtain endo consult

## 2017-11-15 NOTE — H&P ADULT - PSH
S/P primary angioplasty with coronary stent  x1 in 2006 at [facility]  S/P TKR (total knee replacement)  left

## 2017-11-15 NOTE — CONSULT NOTE ADULT - ASSESSMENT
Assessment  DMT2: 77y Female with DM T2 with hyperglycemia on insulin, blood sugars fluctuating , A1C high, had no hypoglycemia,  eating meals,  non compliant with low carb diet.  HTN: Controlled, On med.  HLD:  On Tx.  CKD: Stable  CHF/CAD: On medications, monitored.

## 2017-11-15 NOTE — H&P ADULT - NSHPLABSRESULTS_GEN_ALL_CORE
LABS:                        11.3   6.83  )-----------( 197      ( 15 Nov 2017 03:50 )             36.2     11-14    140  |  101  |  15  ----------------------------<  62<L>  3.5   |  25  |  0.92    Ca    9.6      14 Nov 2017 22:16    TPro  8.0  /  Alb  4.3  /  TBili  0.4  /  DBili  x   /  AST  18  /  ALT  16  /  AlkPhos  65  11-14        CAPILLARY BLOOD GLUCOSE      EKG shows NSR@ 82 bpm TWI in AVL, V1-V2, Q wave in V1-V2 , I, AVL

## 2017-11-15 NOTE — CONSULT NOTE ADULT - ASSESSMENT
78 y/o F with h/o HTN, DM, HLD, CAD, CVA, GERD, CHF presents to the ED for near syncope. Admit to telemetry.      Near syncope.  Plan: Admit to telemetry.    cath recently unremarkable  likely vagal induced  N  Orthostatic every 24 hours.Trend CE.    Previous TTE from May 2017 reviewed.   check cbc,tsh,lipid, hemoglobin a1c, bmp with mag and phos.   f/u MD note EURO FU     CHF (congestive heart failure).  Plan: chronic diastolic  Pt is euvolemic at this time. Pt is euvolemic at this time.   Continue with HCTZ.      HLD (hyperlipidemia).  Plan: Check lipid  Continue with current medications.   Low salt, low cholesterol diet.     DM (diabetes mellitus).  Plan: not well controlled  obtain endo consult. Routine blood glucose check.   Continue with current insulin regimen and start sliding scale.   Check hemoglobin a1c.   Low carb diet      CAD (coronary artery disease).  Plan: Continue with ASA  Low salt diet, low cholesterol diet.      HTN (hypertension). Plan: Routine blood pressure check.  Continue with current medications.   Low salt,low cholesterol, DASH diet.

## 2017-11-16 LAB
BACTERIA UR CULT: SIGNIFICANT CHANGE UP
BUN SERPL-MCNC: 20 MG/DL — SIGNIFICANT CHANGE UP (ref 7–23)
CALCIUM SERPL-MCNC: 8.8 MG/DL — SIGNIFICANT CHANGE UP (ref 8.4–10.5)
CHLORIDE SERPL-SCNC: 100 MMOL/L — SIGNIFICANT CHANGE UP (ref 98–107)
CO2 SERPL-SCNC: 24 MMOL/L — SIGNIFICANT CHANGE UP (ref 22–31)
CREAT SERPL-MCNC: 0.92 MG/DL — SIGNIFICANT CHANGE UP (ref 0.5–1.3)
GLUCOSE BLDC GLUCOMTR-MCNC: 102 MG/DL — HIGH (ref 70–99)
GLUCOSE BLDC GLUCOMTR-MCNC: 117 MG/DL — HIGH (ref 70–99)
GLUCOSE BLDC GLUCOMTR-MCNC: 161 MG/DL — HIGH (ref 70–99)
GLUCOSE BLDC GLUCOMTR-MCNC: 238 MG/DL — HIGH (ref 70–99)
GLUCOSE BLDC GLUCOMTR-MCNC: 80 MG/DL — SIGNIFICANT CHANGE UP (ref 70–99)
GLUCOSE BLDC GLUCOMTR-MCNC: 89 MG/DL — SIGNIFICANT CHANGE UP (ref 70–99)
GLUCOSE SERPL-MCNC: 220 MG/DL — HIGH (ref 70–99)
HCT VFR BLD CALC: 37.9 % — SIGNIFICANT CHANGE UP (ref 34.5–45)
HGB BLD-MCNC: 11.7 G/DL — SIGNIFICANT CHANGE UP (ref 11.5–15.5)
MAGNESIUM SERPL-MCNC: 1.8 MG/DL — SIGNIFICANT CHANGE UP (ref 1.6–2.6)
MCHC RBC-ENTMCNC: 27.1 PG — SIGNIFICANT CHANGE UP (ref 27–34)
MCHC RBC-ENTMCNC: 30.9 % — LOW (ref 32–36)
MCV RBC AUTO: 87.9 FL — SIGNIFICANT CHANGE UP (ref 80–100)
NRBC # FLD: 0 — SIGNIFICANT CHANGE UP
PLATELET # BLD AUTO: 160 K/UL — SIGNIFICANT CHANGE UP (ref 150–400)
PMV BLD: 12.5 FL — SIGNIFICANT CHANGE UP (ref 7–13)
POTASSIUM SERPL-MCNC: 3.9 MMOL/L — SIGNIFICANT CHANGE UP (ref 3.5–5.3)
POTASSIUM SERPL-SCNC: 3.9 MMOL/L — SIGNIFICANT CHANGE UP (ref 3.5–5.3)
RBC # BLD: 4.31 M/UL — SIGNIFICANT CHANGE UP (ref 3.8–5.2)
RBC # FLD: 13.2 % — SIGNIFICANT CHANGE UP (ref 10.3–14.5)
SODIUM SERPL-SCNC: 138 MMOL/L — SIGNIFICANT CHANGE UP (ref 135–145)
SPECIMEN SOURCE: SIGNIFICANT CHANGE UP
WBC # BLD: 5.44 K/UL — SIGNIFICANT CHANGE UP (ref 3.8–10.5)
WBC # FLD AUTO: 5.44 K/UL — SIGNIFICANT CHANGE UP (ref 3.8–10.5)

## 2017-11-16 RX ORDER — INSULIN LISPRO 100/ML
13 VIAL (ML) SUBCUTANEOUS
Qty: 0 | Refills: 0 | Status: DISCONTINUED | OUTPATIENT
Start: 2017-11-16 | End: 2017-11-16

## 2017-11-16 RX ORDER — INSULIN GLARGINE 100 [IU]/ML
42 INJECTION, SOLUTION SUBCUTANEOUS AT BEDTIME
Qty: 0 | Refills: 0 | Status: DISCONTINUED | OUTPATIENT
Start: 2017-11-16 | End: 2017-11-16

## 2017-11-16 RX ORDER — SODIUM CHLORIDE 9 MG/ML
1000 INJECTION INTRAMUSCULAR; INTRAVENOUS; SUBCUTANEOUS
Qty: 0 | Refills: 0 | Status: COMPLETED | OUTPATIENT
Start: 2017-11-16 | End: 2017-11-16

## 2017-11-16 RX ORDER — INSULIN LISPRO 100/ML
10 VIAL (ML) SUBCUTANEOUS
Qty: 0 | Refills: 0 | Status: DISCONTINUED | OUTPATIENT
Start: 2017-11-16 | End: 2017-11-17

## 2017-11-16 RX ORDER — INSULIN GLARGINE 100 [IU]/ML
40 INJECTION, SOLUTION SUBCUTANEOUS AT BEDTIME
Qty: 0 | Refills: 0 | Status: DISCONTINUED | OUTPATIENT
Start: 2017-11-16 | End: 2017-11-17

## 2017-11-16 RX ORDER — ACETAMINOPHEN 500 MG
650 TABLET ORAL EVERY 6 HOURS
Qty: 0 | Refills: 0 | Status: DISCONTINUED | OUTPATIENT
Start: 2017-11-16 | End: 2017-11-17

## 2017-11-16 RX ADMIN — LISINOPRIL 5 MILLIGRAM(S): 2.5 TABLET ORAL at 10:05

## 2017-11-16 RX ADMIN — Medication 650 MILLIGRAM(S): at 22:40

## 2017-11-16 RX ADMIN — Medication 10 UNIT(S): at 18:32

## 2017-11-16 RX ADMIN — SODIUM CHLORIDE 500 MILLILITER(S): 9 INJECTION INTRAMUSCULAR; INTRAVENOUS; SUBCUTANEOUS at 03:11

## 2017-11-16 RX ADMIN — HEPARIN SODIUM 5000 UNIT(S): 5000 INJECTION INTRAVENOUS; SUBCUTANEOUS at 06:34

## 2017-11-16 RX ADMIN — Medication 13 UNIT(S): at 14:53

## 2017-11-16 RX ADMIN — Medication 25 MILLIGRAM(S): at 10:05

## 2017-11-16 RX ADMIN — HEPARIN SODIUM 5000 UNIT(S): 5000 INJECTION INTRAVENOUS; SUBCUTANEOUS at 18:34

## 2017-11-16 RX ADMIN — ATORVASTATIN CALCIUM 80 MILLIGRAM(S): 80 TABLET, FILM COATED ORAL at 22:29

## 2017-11-16 RX ADMIN — Medication 2: at 10:07

## 2017-11-16 RX ADMIN — Medication 81 MILLIGRAM(S): at 14:52

## 2017-11-16 RX ADMIN — INSULIN GLARGINE 40 UNIT(S): 100 INJECTION, SOLUTION SUBCUTANEOUS at 22:30

## 2017-11-16 RX ADMIN — Medication 650 MILLIGRAM(S): at 23:20

## 2017-11-16 RX ADMIN — Medication 10 UNIT(S): at 10:08

## 2017-11-16 RX ADMIN — AMLODIPINE BESYLATE 5 MILLIGRAM(S): 2.5 TABLET ORAL at 10:06

## 2017-11-16 RX ADMIN — Medication 100 MILLIGRAM(S): at 18:35

## 2017-11-16 NOTE — PROGRESS NOTE ADULT - SUBJECTIVE AND OBJECTIVE BOX
mri brain and eeg results reviewed. No emergent findings. may follow up with me as outpt for further rneurological testing

## 2017-11-16 NOTE — PROGRESS NOTE ADULT - SUBJECTIVE AND OBJECTIVE BOX
Patient is a 77y old  Female who presents with a chief complaint of near syncope (15 Nov 2017 04:11)  NAD      INTERVAL HPI/OVERNIGHT EVENTS:  T(C): 36.5 (17 @ 14:40), Max: 37.3 (11-15-17 @ 20:34)  HR: 74 (17 @ 14:40) (69 - 75)  BP: 129/87 (17 @ 14:40) (128/76 - 151/90)  RR: 16 (17 @ 14:40) (16 - 18)  SpO2: 100% (17 @ 14:40) (100% - 100%)  Wt(kg): --  I&O's Summary      LABS:                        11.7   5.44  )-----------( 160      ( 2017 06:10 )             37.9     -    138  |  100  |  20  ----------------------------<  220<H>  3.9   |  24  |  0.92    Ca    8.8      2017 06:50  Phos  3.7     -15  Mg     1.8         TPro  8.0  /  Alb  4.3  /  TBili  0.4  /  DBili  x   /  AST  18  /  ALT  16  /  AlkPhos  65  11-14      Urinalysis Basic - ( 15 Nov 2017 02:50 )    Color: PLYEL / Appearance: CLEAR / S.015 / pH: 6.0  Gluc: 500 / Ketone: NEGATIVE  / Bili: NEGATIVE / Urobili: NORMAL E.U.   Blood: NEGATIVE / Protein: NEGATIVE / Nitrite: NEGATIVE   Leuk Esterase: MODERATE / RBC: 2-5 / WBC 10-25   Sq Epi: MOD / Non Sq Epi: x / Bacteria: FEW      CAPILLARY BLOOD GLUCOSE      POCT Blood Glucose.: 102 mg/dL (2017 14:30)  POCT Blood Glucose.: 89 mg/dL (2017 12:52)  POCT Blood Glucose.: 238 mg/dL (2017 09:16)  POCT Blood Glucose.: 286 mg/dL (15 Nov 2017 21:58)  POCT Blood Glucose.: 270 mg/dL (15 Nov 2017 17:42)        Urinalysis Basic - ( 15 Nov 2017 02:50 )    Color: PLYEL / Appearance: CLEAR / S.015 / pH: 6.0  Gluc: 500 / Ketone: NEGATIVE  / Bili: NEGATIVE / Urobili: NORMAL E.U.   Blood: NEGATIVE / Protein: NEGATIVE / Nitrite: NEGATIVE   Leuk Esterase: MODERATE / RBC: 2-5 / WBC 10-25   Sq Epi: MOD / Non Sq Epi: x / Bacteria: FEW        MEDICATIONS  (STANDING):  amLODIPine   Tablet 5 milliGRAM(s) Oral daily  aspirin enteric coated 81 milliGRAM(s) Oral daily  atorvastatin 80 milliGRAM(s) Oral at bedtime  dextrose 5%. 1000 milliLiter(s) (50 mL/Hr) IV Continuous <Continuous>  dextrose 50% Injectable 12.5 Gram(s) IV Push once  dextrose 50% Injectable 25 Gram(s) IV Push once  dextrose 50% Injectable 25 Gram(s) IV Push once  heparin  Injectable 5000 Unit(s) SubCutaneous every 12 hours  hydrochlorothiazide 25 milliGRAM(s) Oral daily  influenza   Vaccine 0.5 milliLiter(s) IntraMuscular once  insulin glargine Injectable (LANTUS) 42 Unit(s) SubCutaneous at bedtime  insulin lispro (HumaLOG) corrective regimen sliding scale   SubCutaneous three times a day before meals  insulin lispro (HumaLOG) corrective regimen sliding scale   SubCutaneous at bedtime  insulin lispro Injectable (HumaLOG) 13 Unit(s) SubCutaneous three times a day before meals  lisinopril 5 milliGRAM(s) Oral daily    MEDICATIONS  (PRN):  acetaminophen   Tablet. 650 milliGRAM(s) Oral every 6 hours PRN Mild, moderate, or severe pain or temp >99.0F, or at discretion of provider  benzonatate 100 milliGRAM(s) Oral every 8 hours PRN Cough  dextrose Gel 1 Dose(s) Oral once PRN Blood Glucose LESS THAN 70 milliGRAM(s)/deciliter  glucagon  Injectable 1 milliGRAM(s) IntraMuscular once PRN Glucose LESS THAN 70 milligrams/deciliter          PHYSICAL EXAM:  GENERAL: NAD, well-groomed, well-developed  HEAD:  Atraumatic, Normocephalic  CHEST/LUNG: Clear to percussion bilaterally; No rales, rhonchi, wheezing, or rubs  HEART: Regular rate and rhythm; No murmurs, rubs, or gallops  ABDOMEN: Soft, Nontender, Nondistended; Bowel sounds present  EXTREMITIES:  2+ Peripheral Pulses, No clubbing, cyanosis, or edema  LYMPH: No lymphadenopathy noted  SKIN: No rashes or lesions    Care Discussed with Consultants/Other Providers [+ ] YES  [ ] NO

## 2017-11-16 NOTE — PHYSICAL THERAPY INITIAL EVALUATION ADULT - CRITERIA FOR SKILLED THERAPEUTIC INTERVENTIONS
impairments found/anticipated discharge recommendation/rehab potential/anticipated equipment needs at discharge/therapy frequency/predicted duration of therapy intervention

## 2017-11-16 NOTE — PROGRESS NOTE ADULT - SUBJECTIVE AND OBJECTIVE BOX
Subjective: Patient seen and examined. No new events except as noted.     SUBJECTIVE/ROS:  No chest pain, or sob.       MEDICATIONS:  MEDICATIONS  (STANDING):  amLODIPine   Tablet 5 milliGRAM(s) Oral daily  aspirin enteric coated 81 milliGRAM(s) Oral daily  atorvastatin 80 milliGRAM(s) Oral at bedtime  dextrose 5%. 1000 milliLiter(s) (50 mL/Hr) IV Continuous <Continuous>  dextrose 50% Injectable 12.5 Gram(s) IV Push once  dextrose 50% Injectable 25 Gram(s) IV Push once  dextrose 50% Injectable 25 Gram(s) IV Push once  heparin  Injectable 5000 Unit(s) SubCutaneous every 12 hours  hydrochlorothiazide 25 milliGRAM(s) Oral daily  influenza   Vaccine 0.5 milliLiter(s) IntraMuscular once  insulin glargine Injectable (LANTUS) 42 Unit(s) SubCutaneous at bedtime  insulin lispro (HumaLOG) corrective regimen sliding scale   SubCutaneous three times a day before meals  insulin lispro (HumaLOG) corrective regimen sliding scale   SubCutaneous at bedtime  insulin lispro Injectable (HumaLOG) 13 Unit(s) SubCutaneous three times a day before meals  lisinopril 5 milliGRAM(s) Oral daily      PHYSICAL EXAM:  T(C): 36.8 (11-16-17 @ 06:29), Max: 37.3 (11-15-17 @ 20:34)  HR: 72 (11-16-17 @ 10:04) (69 - 75)  BP: 128/76 (11-16-17 @ 10:04) (128/76 - 156/97)  RR: 18 (11-16-17 @ 06:29) (18 - 18)  SpO2: 100% (11-16-17 @ 06:29) (100% - 100%)  Wt(kg): --  I&O's Summary    Height (cm): 167.64 (11-15 @ 12:53)  Weight (kg): 83 (11-15 @ 12:53)  BMI (kg/m2): 29.5 (11-15 @ 12:53)  BSA (m2): 1.93 (11-15 @ 12:53)    JVP: Normal  Neck: supple  Lung: clear   CV: S1 S2 , Murmur:  Abd: soft  Ext: No edema  neuro: Awake / alert  Psych: flat affect  Skin: normal       LABS/DATA:    CARDIAC MARKERS:  CARDIAC MARKERS ( 15 Nov 2017 03:50 )  x     / < 0.06 ng/mL / 121 u/L / 2.94 ng/mL / x      CARDIAC MARKERS ( 14 Nov 2017 22:16 )  x     / < 0.06 ng/mL / 92 u/L / 2.37 ng/mL / x                                    11.7   5.44  )-----------( 160      ( 16 Nov 2017 06:10 )             37.9     11-16    138  |  100  |  20  ----------------------------<  220<H>  3.9   |  24  |  0.92    Ca    8.8      16 Nov 2017 06:50  Phos  3.7     11-15  Mg     1.8     11-16    TPro  8.0  /  Alb  4.3  /  TBili  0.4  /  DBili  x   /  AST  18  /  ALT  16  /  AlkPhos  65  11-14    proBNP:   Lipid Profile:   HgA1c:   TSH:     TELE:  EKG:

## 2017-11-16 NOTE — PROGRESS NOTE ADULT - ASSESSMENT
78 y/o F with h/o HTN, DM, HLD, CAD, CVA, GERD, CHF presents to the ED for near syncope. Admit to telemetry.

## 2017-11-16 NOTE — PROGRESS NOTE ADULT - PROBLEM SELECTOR PLAN 1
Will decrease Lantus to 40 units at bed time.  Will decrease Humalog to 10 units before each meal in addition to Humalog correction scale coverage.  Patient counseled for compliance with consistent low carb diet.

## 2017-11-16 NOTE — PROGRESS NOTE ADULT - ASSESSMENT
78 y/o F with h/o HTN, DM, HLD, CAD, CVA, GERD, CHF presents to the ED for near syncope. Admit to telemetry.      Near syncope.  Plan: Admit to telemetry.    cath recently unremarkable  likely vagal induced  N  Orthostatic every 24 hours.Trend CE.    Previous TTE from May 2017 reviewed.   check cbc,tsh,lipid, hemoglobin a1c, bmp with mag and phos.   f/u MD note EURO FU     CHF (congestive heart failure).  Plan: chronic diastolic  Pt is euvolemic at this time. Pt is euvolemic at this time.   Continue with HCTZ.      HLD (hyperlipidemia).  Plan: Check lipid  Continue with current medications.   Low salt, low cholesterol diet.     DM (diabetes mellitus).  Plan: not well controlled  obtain endo consult. Routine blood glucose check.   Continue with current insulin regimen and start sliding scale.   Check hemoglobin a1c.   Low carb diet      CAD (coronary artery disease).  Plan: Continue with ASA  Low salt diet, low cholesterol diet.      HTN (hypertension). Plan: Routine blood pressure check.  Continue with current medications.

## 2017-11-16 NOTE — PHYSICAL THERAPY INITIAL EVALUATION ADULT - ADDITIONAL COMMENTS
Pt. left sitting at edge of bed, NAD, all lines/devices intact, call deutsch in reach, RN Mamta aware

## 2017-11-16 NOTE — PROGRESS NOTE ADULT - SUBJECTIVE AND OBJECTIVE BOX
Chief complaint  Patient is a 77y old  Female who presents with a chief complaint of near syncope (15 Nov 2017 04:11)   Review of systems  Patient in bed, looks comfortable, no fever,  no hypoglycemia.    Labs and Fingersticks    CAPILLARY BLOOD GLUCOSE      Hemoglobin A1C, Whole Blood: 11.7 <H> (11-15 @ 03:50)    Calcium, Total Serum: 8.8 (11-16 @ 06:50)  Calcium, Total Serum: 8.6 (11-15 @ 03:50)  Calcium, Total Serum: 9.6 (11-14 @ 22:16)  Albumin, Serum: 4.3 (11-14 @ 22:16)    Alanine Aminotransferase (ALT/SGPT): 16 (11-14 @ 22:16)  Alkaline Phosphatase, Serum: 65 (11-14 @ 22:16)  Aspartate Aminotransferase (AST/SGOT): 18 (11-14 @ 22:16)        11-16    138  |  100  |  20  ----------------------------<  220<H>  3.9   |  24  |  0.92    Ca    8.8      16 Nov 2017 06:50  Phos  3.7     11-15  Mg     1.8     11-16    TPro  8.0  /  Alb  4.3  /  TBili  0.4  /  DBili  x   /  AST  18  /  ALT  16  /  AlkPhos  65  11-14                        11.7   5.44  )-----------( 160      ( 16 Nov 2017 06:10 )             37.9     Medications  MEDICATIONS  (STANDING):  amLODIPine   Tablet 5 milliGRAM(s) Oral daily  aspirin enteric coated 81 milliGRAM(s) Oral daily  atorvastatin 80 milliGRAM(s) Oral at bedtime  dextrose 5%. 1000 milliLiter(s) (50 mL/Hr) IV Continuous <Continuous>  dextrose 50% Injectable 12.5 Gram(s) IV Push once  dextrose 50% Injectable 25 Gram(s) IV Push once  dextrose 50% Injectable 25 Gram(s) IV Push once  heparin  Injectable 5000 Unit(s) SubCutaneous every 12 hours  hydrochlorothiazide 25 milliGRAM(s) Oral daily  influenza   Vaccine 0.5 milliLiter(s) IntraMuscular once  insulin glargine Injectable (LANTUS) 40 Unit(s) SubCutaneous at bedtime  insulin lispro (HumaLOG) corrective regimen sliding scale   SubCutaneous three times a day before meals  insulin lispro (HumaLOG) corrective regimen sliding scale   SubCutaneous at bedtime  insulin lispro Injectable (HumaLOG) 10 Unit(s) SubCutaneous three times a day before meals  lisinopril 5 milliGRAM(s) Oral daily      Physical Exam  General: Patient comfortable in bed  Vital Signs Last 12 Hrs  T(F): 97.7 (11-16-17 @ 14:40), Max: 98.2 (11-16-17 @ 06:29)  HR: 74 (11-16-17 @ 14:40) (69 - 74)  BP: 129/87 (11-16-17 @ 14:40) (128/76 - 142/88)  BP(mean): --  RR: 16 (11-16-17 @ 14:40) (16 - 18)  SpO2: 100% (11-16-17 @ 14:40) (100% - 100%)  Neck: No palpable thyroid nodules.  CVS: S1S2, No murmurs  Respiratory: No wheezing, no crepitations  GI: Abdomen soft, bowel sounds positive  Musculoskeletal: Positive edema lower extremities.   Skin: No skin rashes, no ecchymosis    Diagnostics

## 2017-11-16 NOTE — PHYSICAL THERAPY INITIAL EVALUATION ADULT - GENERAL OBSERVATIONS, REHAB EVAL
Consult received, chart reviewed. Patient received sitting at edge of bed, NAD. Patient agreed to EVALUATION from Physical Therapist.

## 2017-11-16 NOTE — PROGRESS NOTE ADULT - ASSESSMENT
Assessment  DMT2: 77y Female with DM T2 with hyperglycemia on insulin, blood sugars trending, A1C high, had no hypoglycemia,  eating meals,  non compliant with low carb diet.  HTN: Controlled, On med.  HLD:  On Tx.  CKD: Stable  CHF/CAD: On medications, monitored.

## 2017-11-17 ENCOUNTER — TRANSCRIPTION ENCOUNTER (OUTPATIENT)
Age: 77
End: 2017-11-17

## 2017-11-17 VITALS
OXYGEN SATURATION: 100 % | TEMPERATURE: 98 F | HEART RATE: 88 BPM | DIASTOLIC BLOOD PRESSURE: 82 MMHG | SYSTOLIC BLOOD PRESSURE: 139 MMHG | RESPIRATION RATE: 17 BRPM

## 2017-11-17 LAB
BUN SERPL-MCNC: 16 MG/DL — SIGNIFICANT CHANGE UP (ref 7–23)
CALCIUM SERPL-MCNC: 9.2 MG/DL — SIGNIFICANT CHANGE UP (ref 8.4–10.5)
CHLORIDE SERPL-SCNC: 101 MMOL/L — SIGNIFICANT CHANGE UP (ref 98–107)
CO2 SERPL-SCNC: 25 MMOL/L — SIGNIFICANT CHANGE UP (ref 22–31)
CREAT SERPL-MCNC: 0.96 MG/DL — SIGNIFICANT CHANGE UP (ref 0.5–1.3)
GLUCOSE BLDC GLUCOMTR-MCNC: 135 MG/DL — HIGH (ref 70–99)
GLUCOSE BLDC GLUCOMTR-MCNC: 152 MG/DL — HIGH (ref 70–99)
GLUCOSE BLDC GLUCOMTR-MCNC: 178 MG/DL — HIGH (ref 70–99)
GLUCOSE SERPL-MCNC: 156 MG/DL — HIGH (ref 70–99)
HCT VFR BLD CALC: 38.3 % — SIGNIFICANT CHANGE UP (ref 34.5–45)
HGB BLD-MCNC: 12.1 G/DL — SIGNIFICANT CHANGE UP (ref 11.5–15.5)
MAGNESIUM SERPL-MCNC: 2 MG/DL — SIGNIFICANT CHANGE UP (ref 1.6–2.6)
MCHC RBC-ENTMCNC: 27.1 PG — SIGNIFICANT CHANGE UP (ref 27–34)
MCHC RBC-ENTMCNC: 31.6 % — LOW (ref 32–36)
MCV RBC AUTO: 85.7 FL — SIGNIFICANT CHANGE UP (ref 80–100)
NRBC # FLD: 0 — SIGNIFICANT CHANGE UP
PLATELET # BLD AUTO: 208 K/UL — SIGNIFICANT CHANGE UP (ref 150–400)
PMV BLD: 11.6 FL — SIGNIFICANT CHANGE UP (ref 7–13)
POTASSIUM SERPL-MCNC: 3.5 MMOL/L — SIGNIFICANT CHANGE UP (ref 3.5–5.3)
POTASSIUM SERPL-SCNC: 3.5 MMOL/L — SIGNIFICANT CHANGE UP (ref 3.5–5.3)
RBC # BLD: 4.47 M/UL — SIGNIFICANT CHANGE UP (ref 3.8–5.2)
RBC # FLD: 13 % — SIGNIFICANT CHANGE UP (ref 10.3–14.5)
SODIUM SERPL-SCNC: 139 MMOL/L — SIGNIFICANT CHANGE UP (ref 135–145)
WBC # BLD: 5 K/UL — SIGNIFICANT CHANGE UP (ref 3.8–10.5)
WBC # FLD AUTO: 5 K/UL — SIGNIFICANT CHANGE UP (ref 3.8–10.5)

## 2017-11-17 PROCEDURE — 93306 TTE W/DOPPLER COMPLETE: CPT | Mod: 26

## 2017-11-17 RX ORDER — INSULIN GLARGINE 100 [IU]/ML
40 INJECTION, SOLUTION SUBCUTANEOUS
Qty: 0 | Refills: 0 | COMMUNITY

## 2017-11-17 RX ORDER — INSULIN LISPRO 100/ML
10 VIAL (ML) SUBCUTANEOUS
Qty: 2 | Refills: 0
Start: 2017-11-17 | End: 2017-12-17

## 2017-11-17 RX ORDER — ENOXAPARIN SODIUM 100 MG/ML
40 INJECTION SUBCUTANEOUS
Qty: 3 | Refills: 0
Start: 2017-11-17 | End: 2017-12-17

## 2017-11-17 RX ORDER — POTASSIUM CHLORIDE 20 MEQ
40 PACKET (EA) ORAL ONCE
Qty: 0 | Refills: 0 | Status: COMPLETED | OUTPATIENT
Start: 2017-11-17 | End: 2017-11-17

## 2017-11-17 RX ORDER — INSULIN ASPART 100 [IU]/ML
7 INJECTION, SOLUTION SUBCUTANEOUS
Qty: 0 | Refills: 0 | COMMUNITY

## 2017-11-17 RX ADMIN — Medication 10 UNIT(S): at 18:09

## 2017-11-17 RX ADMIN — AMLODIPINE BESYLATE 5 MILLIGRAM(S): 2.5 TABLET ORAL at 06:50

## 2017-11-17 RX ADMIN — Medication 25 MILLIGRAM(S): at 06:50

## 2017-11-17 RX ADMIN — Medication 1: at 18:09

## 2017-11-17 RX ADMIN — Medication 1: at 10:12

## 2017-11-17 RX ADMIN — LISINOPRIL 5 MILLIGRAM(S): 2.5 TABLET ORAL at 06:50

## 2017-11-17 RX ADMIN — Medication 81 MILLIGRAM(S): at 13:45

## 2017-11-17 RX ADMIN — Medication 10 UNIT(S): at 13:45

## 2017-11-17 RX ADMIN — HEPARIN SODIUM 5000 UNIT(S): 5000 INJECTION INTRAVENOUS; SUBCUTANEOUS at 06:50

## 2017-11-17 RX ADMIN — Medication 40 MILLIEQUIVALENT(S): at 10:12

## 2017-11-17 RX ADMIN — Medication 10 UNIT(S): at 10:13

## 2017-11-17 NOTE — DISCHARGE NOTE ADULT - MEDICATION SUMMARY - MEDICATIONS TO CHANGE
I will SWITCH the dose or number of times a day I take the medications listed below when I get home from the hospital:    NovoLOG 100 units/mL subcutaneous solution  -- 7 unit(s) subcutaneous 3 times a day

## 2017-11-17 NOTE — PROGRESS NOTE ADULT - ASSESSMENT
76 y/o F with h/o HTN, DM, HLD, CAD, CVA, GERD, CHF presents to the ED for near syncope. Admit to telemetry.      Near syncope.  Plan: Admit to telemetry.    cath recently unremarkable  likely vagal induced  N  Orthostatic every 24 hours.Trend CE.    Previous TTE from May 2017 reviewed.   check cbc,tsh,lipid, hemoglobin a1c, bmp with mag and phos.   f/u MD note EURO FU     CHF (congestive heart failure).  Plan: chronic diastolic  Pt is euvolemic at this time. Pt is euvolemic at this time.   Continue with HCTZ.      HLD (hyperlipidemia).  Plan: Check lipid  Continue with current medications.   Low salt, low cholesterol diet.     DM (diabetes mellitus).  Plan: not well controlled  obtain endo consult. Routine blood glucose check.   Continue with current insulin regimen and start sliding scale.   Check hemoglobin a1c.   Low carb diet      CAD (coronary artery disease).  Plan: Continue with ASA  Low salt diet, low cholesterol diet.      HTN (hypertension). Plan: Routine blood pressure check.  Continue with current medications.

## 2017-11-17 NOTE — PROGRESS NOTE ADULT - SUBJECTIVE AND OBJECTIVE BOX
Chief complaint  Patient is a 77y old  Female who presents with a chief complaint of near syncope (17 Nov 2017 10:36)   Review of systems  Patient in bed, looks comfortable, no fever, no hypoglycemia.    Labs and Fingersticks    CAPILLARY BLOOD GLUCOSE        Calcium, Total Serum: 9.2 (11-17 @ 05:30)  Calcium, Total Serum: 8.8 (11-16 @ 06:50)          11-17    139  |  101  |  16  ----------------------------<  156<H>  3.5   |  25  |  0.96    Ca    9.2      17 Nov 2017 05:30  Mg     2.0     11-17                          12.1   5.00  )-----------( 208      ( 17 Nov 2017 05:30 )             38.3     Medications  MEDICATIONS  (STANDING):  amLODIPine   Tablet 5 milliGRAM(s) Oral daily  aspirin enteric coated 81 milliGRAM(s) Oral daily  atorvastatin 80 milliGRAM(s) Oral at bedtime  dextrose 5%. 1000 milliLiter(s) (50 mL/Hr) IV Continuous <Continuous>  dextrose 50% Injectable 12.5 Gram(s) IV Push once  dextrose 50% Injectable 25 Gram(s) IV Push once  dextrose 50% Injectable 25 Gram(s) IV Push once  heparin  Injectable 5000 Unit(s) SubCutaneous every 12 hours  hydrochlorothiazide 25 milliGRAM(s) Oral daily  influenza   Vaccine 0.5 milliLiter(s) IntraMuscular once  insulin glargine Injectable (LANTUS) 40 Unit(s) SubCutaneous at bedtime  insulin lispro (HumaLOG) corrective regimen sliding scale   SubCutaneous three times a day before meals  insulin lispro (HumaLOG) corrective regimen sliding scale   SubCutaneous at bedtime  insulin lispro Injectable (HumaLOG) 10 Unit(s) SubCutaneous three times a day before meals  lisinopril 5 milliGRAM(s) Oral daily      Physical Exam  General: Patient comfortable in bed  Vital Signs Last 12 Hrs  T(F): 97.8 (11-17-17 @ 11:00), Max: 97.8 (11-17-17 @ 06:45)  HR: 63 (11-17-17 @ 11:00) (63 - 69)  BP: 133/86 (11-17-17 @ 11:00) (133/86 - 137/79)  BP(mean): --  RR: 16 (11-17-17 @ 11:00) (16 - 18)  SpO2: 96% (11-17-17 @ 11:00) (95% - 96%)  Neck: No palpable thyroid nodules.  CVS: S1S2, No murmurs  Respiratory: No wheezing, no crepitations  GI: Abdomen soft, bowel sounds positive  Musculoskeletal: Positive edema lower extremities.   Skin: No skin rashes, no ecchymosis    Diagnostics

## 2017-11-17 NOTE — DISCHARGE NOTE ADULT - PATIENT PORTAL LINK FT
“You can access the [employer] Patient Portal, offered by [facility], by registering with the following website: [email or web address]”

## 2017-11-17 NOTE — DISCHARGE NOTE ADULT - HOSPITAL COURSE
76 y/o F with h/o HTN, DM, HLD, CAD, CVA, GERD, CHF presents to the ED for near syncope. Pt states she was in her kitchen, cooking and she started developing pain. Pt states the pain started in her left arm and it went across her chest into her lower abdomen. Pt states the pain lasted for about 2-3 hours. Pt states she felt like she was going to pass out and made it to the bedroom. Pt denies any LOC or head trauma. Pt states she was laying in her bed the whole entire time until EMS came. Pt denies LOC, syncope,fever, chills, numbness, tingling, dysuria, palpitations, shortness of breath, N/V/D/C, dysuria, urinary/bowel incontinence or any other complaints at this time.     Hospital course:  CEX2: negative  EKG shows NSR @ 92 bpm TWI in AVL, V1-V2 Q waves in V1-V2, I, AVL unchanged from previous ekg  A1C 11.7  CT brain: No CT evidence of acute intracranial hemorrhage, brain edema, mass effect or acute territorial infarct.  Cerebral volume loss and small vessel ischemic changes are grossly stable since 05/17/2017.  Follow-up MRI can be obtained as clinically warranted.   CTA chest and abdomen: No thoracic aortic aneurysm or dissection.  Coronary artery stents in place.  No pulmonary embolism. No evidence of pneumonia or congestive heart failure.Chronic and nonemergent findings as discussed above.  Abdomen/pelvis:No abdominal aortic aneurysm or dissection.  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  Short segment moderate to severe stenosis in the proximal right main renal artery.No evidence of bowel obstruction, active inflammatory process or transabdominal source for infection.Chronic and nonemergent findings as discussed above.  EEG:  mild nonspecific focal dysfunction in the left frontal region. There were no epileptiform abnormalities recorded.   MRI Brain: Stable exam. No evidence of acute cerebral ischemia. Thalamic and left cerebellar lacunae as well as microvascular type white matter changes.     Pt with prior cath noted no significant occlusion. CE negative low suspicion of ACS. TTE ******. + orthostatic and was given fluid. Syncope likely 2/2 to vasovagal vs orthostatic. Neurology consulted, recommended EEG and MRI. EEG with no evidence of seizure, MRI unremarkable. Hospital course also noted with elevated A1C, endocrine consulted and insulin regimen tailored. PT recommended home with PT. Pt cleared for discharge. 76 y/o F with h/o HTN, DM, HLD, CAD, CVA, GERD, CHF presents to the ED for near syncope. Pt states she was in her kitchen, cooking and she started developing pain. Pt states the pain started in her left arm and it went across her chest into her lower abdomen. Pt states the pain lasted for about 2-3 hours. Pt states she felt like she was going to pass out and made it to the bedroom. Pt denies any LOC or head trauma. Pt states she was laying in her bed the whole entire time until EMS came. Pt denies LOC, syncope,fever, chills, numbness, tingling, dysuria, palpitations, shortness of breath, N/V/D/C, dysuria, urinary/bowel incontinence or any other complaints at this time.     Hospital course:  CEX2: negative  EKG shows NSR @ 92 bpm TWI in AVL, V1-V2 Q waves in V1-V2, I, AVL unchanged from previous ekg  A1C 11.7  CT brain: No CT evidence of acute intracranial hemorrhage, brain edema, mass effect or acute territorial infarct.  Cerebral volume loss and small vessel ischemic changes are grossly stable since 05/17/2017.  Follow-up MRI can be obtained as clinically warranted.   CTA chest and abdomen: No thoracic aortic aneurysm or dissection.  Coronary artery stents in place.  No pulmonary embolism. No evidence of pneumonia or congestive heart failure.Chronic and nonemergent findings as discussed above.  Abdomen/pelvis:No abdominal aortic aneurysm or dissection.  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  Short segment moderate to severe stenosis in the proximal right main renal artery.No evidence of bowel obstruction, active inflammatory process or transabdominal source for infection.Chronic and nonemergent findings as discussed above.  EEG:  mild nonspecific focal dysfunction in the left frontal region. There were no epileptiform abnormalities recorded.   MRI Brain: Stable exam. No evidence of acute cerebral ischemia. Thalamic and left cerebellar lacunae as well as microvascular type white matter changes.   TTE: Mitral annular calcification, otherwise normal mitral valve. Minimal mitral regurgitation. Normal left ventricular systolic function. No segmental wall motion abnormalities. Normal right ventricular size and function.    Pt with prior cath noted no significant occlusion. CE negative low suspicion of ACS. TTE unremarkable. + orthostatic and was given fluid. Syncope likely 2/2 to vasovagal vs orthostatic. Neurology consulted, recommended EEG and MRI. EEG with no evidence of seizure, MRI unremarkable. Hospital course also noted with elevated A1C, endocrine consulted and insulin regimen tailored. PT recommended home with PT. Pt cleared for discharge.

## 2017-11-17 NOTE — DISCHARGE NOTE ADULT - PROVIDER TOKENS
FREE:[LAST:[Dr Alonzo],PHONE:[(   )    -],FAX:[(   )    -],ADDRESS:[caardiologist]],FREE:[LAST:[Dr Eric Goldberg],PHONE:[(   )    -],FAX:[(   )    -],ADDRESS:[PCP]],TOKEN:'[id number]'

## 2017-11-17 NOTE — PROGRESS NOTE ADULT - PROBLEM SELECTOR PLAN 1
Will continue current insulin regimen for now. Will continue monitoring FS, log, will Follow up.  DC home on current insulin regimen, FU 2 weeks   Patient counseled for compliance with consistent low carb diet.

## 2017-11-17 NOTE — PROGRESS NOTE ADULT - ASSESSMENT
Assessment  DMT2: 77y Female with DM T2 with hyperglycemia on insulin, blood sugars improved, A1C high, had no hypoglycemia,  eating meals,  non compliant with low carb diet.  HTN: Controlled, On med.  HLD:  On Tx.  CKD: Stable  CHF/CAD: On medications, monitored.

## 2017-11-17 NOTE — DISCHARGE NOTE ADULT - CARE PLAN
Principal Discharge DX:	Near syncope  Goal:	prevent episode of dizziness  Instructions for follow-up, activity and diet:	Fall precaution. Follow up with your PCP in 2-3 weeks. your cardiac enzymes are negative  Secondary Diagnosis:	DM (diabetes mellitus)  Instructions for follow-up, activity and diet:	your A1C is elevated at 11.7, your insulin dose is changed. please follow up with Dr Cisneros in 2 weeks. Continue diet modification. Avoid complex carbohydrates such as bread, pasta, cereal, white rice, white potatoes, etc. Avoid concentrated sugar as found in desserts, candy, soda, juice, etc. Consume a diet based on lean protein (chicken, fish) and vegetables.  Secondary Diagnosis:	CHF (congestive heart failure)  Instructions for follow-up, activity and diet:	continue home medication and follow up with your cardiologist in 2 weeks  Secondary Diagnosis:	HTN (hypertension)  Instructions for follow-up, activity and diet:	Continue medications as currently prescribed. Follow up with your PMD for further management of disease. Dash diet.  Secondary Diagnosis:	HLD (hyperlipidemia)

## 2017-11-17 NOTE — PROGRESS NOTE ADULT - SUBJECTIVE AND OBJECTIVE BOX
Patient is a 77y old  Female who presents with a chief complaint of near syncope (17 Nov 2017 10:36)      INTERVAL HPI/OVERNIGHT EVENTS:  T(C): 36.7 (11-17-17 @ 15:29), Max: 36.7 (11-16-17 @ 19:55)  HR: 88 (11-17-17 @ 15:29) (63 - 88)  BP: 139/82 (11-17-17 @ 15:29) (133/86 - 144/85)  RR: 17 (11-17-17 @ 15:29) (16 - 18)  SpO2: 100% (11-17-17 @ 15:29) (95% - 100%)  Wt(kg): --  I&O's Summary    16 Nov 2017 07:01  -  17 Nov 2017 07:00  --------------------------------------------------------  IN: 600 mL / OUT: 325 mL / NET: 275 mL        LABS:                        12.1   5.00  )-----------( 208      ( 17 Nov 2017 05:30 )             38.3     11-17    139  |  101  |  16  ----------------------------<  156<H>  3.5   |  25  |  0.96    Ca    9.2      17 Nov 2017 05:30  Mg     2.0     11-17          CAPILLARY BLOOD GLUCOSE      POCT Blood Glucose.: 178 mg/dL (17 Nov 2017 17:41)  POCT Blood Glucose.: 135 mg/dL (17 Nov 2017 13:03)  POCT Blood Glucose.: 152 mg/dL (17 Nov 2017 09:05)  POCT Blood Glucose.: 161 mg/dL (16 Nov 2017 22:03)            MEDICATIONS  (STANDING):  amLODIPine   Tablet 5 milliGRAM(s) Oral daily  aspirin enteric coated 81 milliGRAM(s) Oral daily  atorvastatin 80 milliGRAM(s) Oral at bedtime  dextrose 5%. 1000 milliLiter(s) (50 mL/Hr) IV Continuous <Continuous>  dextrose 50% Injectable 12.5 Gram(s) IV Push once  dextrose 50% Injectable 25 Gram(s) IV Push once  dextrose 50% Injectable 25 Gram(s) IV Push once  heparin  Injectable 5000 Unit(s) SubCutaneous every 12 hours  hydrochlorothiazide 25 milliGRAM(s) Oral daily  influenza   Vaccine 0.5 milliLiter(s) IntraMuscular once  insulin glargine Injectable (LANTUS) 40 Unit(s) SubCutaneous at bedtime  insulin lispro (HumaLOG) corrective regimen sliding scale   SubCutaneous three times a day before meals  insulin lispro (HumaLOG) corrective regimen sliding scale   SubCutaneous at bedtime  insulin lispro Injectable (HumaLOG) 10 Unit(s) SubCutaneous three times a day before meals  lisinopril 5 milliGRAM(s) Oral daily    MEDICATIONS  (PRN):  acetaminophen   Tablet. 650 milliGRAM(s) Oral every 6 hours PRN Mild, moderate, or severe pain or temp >99.0F, or at discretion of provider  benzonatate 100 milliGRAM(s) Oral every 8 hours PRN Cough  dextrose Gel 1 Dose(s) Oral once PRN Blood Glucose LESS THAN 70 milliGRAM(s)/deciliter  glucagon  Injectable 1 milliGRAM(s) IntraMuscular once PRN Glucose LESS THAN 70 milligrams/deciliter          PHYSICAL EXAM:  GENERAL: NAD, well-groomed, well-developed  HEAD:  Atraumatic, Normocephalic  CHEST/LUNG: Clear to percussion bilaterally; No rales, rhonchi, wheezing, or rubs  HEART: Regular rate and rhythm; No murmurs, rubs, or gallops  ABDOMEN: Soft, Nontender, Nondistended; Bowel sounds present  EXTREMITIES:  2+ Peripheral Pulses, No clubbing, cyanosis, or edema  LYMPH: No lymphadenopathy noted  SKIN: No rashes or lesions    Care Discussed with Consultants/Other Providers [+ ] YES  [ ] NO

## 2017-11-17 NOTE — DISCHARGE NOTE ADULT - PLAN OF CARE
prevent episode of dizziness Fall precaution. Follow up with your PCP in 2-3 weeks. your cardiac enzymes are negative your A1C is elevated at 11.7, your insulin dose is changed. please follow up with Dr Cisneros in 2 weeks. Continue diet modification. Avoid complex carbohydrates such as bread, pasta, cereal, white rice, white potatoes, etc. Avoid concentrated sugar as found in desserts, candy, soda, juice, etc. Consume a diet based on lean protein (chicken, fish) and vegetables. continue home medication and follow up with your cardiologist in 2 weeks Continue medications as currently prescribed. Follow up with your PMD for further management of disease. Dash diet.

## 2017-11-17 NOTE — PROGRESS NOTE ADULT - SUBJECTIVE AND OBJECTIVE BOX
Subjective: Patient seen and examined. No new events except as noted.     SUBJECTIVE/ROS:    No chest pain, or sob.     MEDICATIONS:  MEDICATIONS  (STANDING):  amLODIPine   Tablet 5 milliGRAM(s) Oral daily  aspirin enteric coated 81 milliGRAM(s) Oral daily  atorvastatin 80 milliGRAM(s) Oral at bedtime  dextrose 5%. 1000 milliLiter(s) (50 mL/Hr) IV Continuous <Continuous>  dextrose 50% Injectable 12.5 Gram(s) IV Push once  dextrose 50% Injectable 25 Gram(s) IV Push once  dextrose 50% Injectable 25 Gram(s) IV Push once  heparin  Injectable 5000 Unit(s) SubCutaneous every 12 hours  hydrochlorothiazide 25 milliGRAM(s) Oral daily  influenza   Vaccine 0.5 milliLiter(s) IntraMuscular once  insulin glargine Injectable (LANTUS) 40 Unit(s) SubCutaneous at bedtime  insulin lispro (HumaLOG) corrective regimen sliding scale   SubCutaneous three times a day before meals  insulin lispro (HumaLOG) corrective regimen sliding scale   SubCutaneous at bedtime  insulin lispro Injectable (HumaLOG) 10 Unit(s) SubCutaneous three times a day before meals  lisinopril 5 milliGRAM(s) Oral daily  potassium chloride    Tablet ER 40 milliEquivalent(s) Oral once      PHYSICAL EXAM:  T(C): 36.6 (11-17-17 @ 06:45), Max: 36.9 (11-16-17 @ 17:00)  HR: 69 (11-17-17 @ 06:45) (69 - 84)  BP: 137/79 (11-17-17 @ 06:45) (123/81 - 144/85)  RR: 18 (11-17-17 @ 06:45) (16 - 18)  SpO2: 95% (11-17-17 @ 06:45) (95% - 100%)  Wt(kg): --  I&O's Summary    16 Nov 2017 07:01  -  17 Nov 2017 07:00  --------------------------------------------------------  IN: 600 mL / OUT: 325 mL / NET: 275 mL          Appearance: Normal	  HEENT:   Normal oral mucosa, PERRL, EOMI	  Cardiovascular: Normal S1 S2,    Murmur:   Neck: JVP normal  Respiratory: Lungs clear to auscultation  Gastrointestinal:  Soft, Non-tender, + BS	  Skin: normal   Neuro: No gross deficits.   Psychiatry:  Mood & affect appropriate  Ext: No edema      LABS/DATA:    CARDIAC MARKERS:  CARDIAC MARKERS ( 15 Nov 2017 03:50 )  x     / < 0.06 ng/mL / 121 u/L / 2.94 ng/mL / x      CARDIAC MARKERS ( 14 Nov 2017 22:16 )  x     / < 0.06 ng/mL / 92 u/L / 2.37 ng/mL / x                                    12.1   5.00  )-----------( 208      ( 17 Nov 2017 05:30 )             38.3     11-17    139  |  101  |  16  ----------------------------<  156<H>  3.5   |  25  |  0.96    Ca    9.2      17 Nov 2017 05:30  Mg     2.0     11-17      proBNP:   Lipid Profile:   HgA1c:   TSH:     TELE:  EKG:

## 2017-11-17 NOTE — DISCHARGE NOTE ADULT - CARE PROVIDER_API CALL
Dr Alonzo,   caardiologist  Phone: (   )    -  Fax: (   )    -    Dr Eric Goldberg,   PCP  Phone: (   )    -  Fax: (   )    -    David Cisneros), EndocrinologyMetabDiabetes; Internal Medicine  [address] [place] [address]  Phone: [phone]  Fax: [phone]

## 2017-11-17 NOTE — PROGRESS NOTE ADULT - PROBLEM SELECTOR PLAN 3
Check lipid  Continue with current medications.   Low salt, low cholesterol diet PHYSICAL EXAM:  GENERAL: NAD, well-groomed, well-developed  HEAD:  Atraumatic, Normocephalic  EYES: EOMI, PERRLA, conjunctiva and sclera clear  ENMT: No tonsillar erythema, exudates, or enlargement; Moist mucous membranes, Good dentition, No lesions.  hard of hearing  NECK: Supple, No JVD, Normal thyroid  CHEST/LUNG: Clear to percussion bilaterally; No rales, rhonchi, wheezing, or rubs  HEART: Regular rate and rhythm; No murmurs, rubs, or gallops  ABDOMEN: Soft, Nontender, Nondistended; Bowel sounds present.  no suprapubic tenderness.  +contreras draining red urine  EXTREMITIES:  2+ Peripheral Pulses, No clubbing, cyanosis, or edema  LYMPH: No lymphadenopathy noted  SKIN: No rashes or lesions  NERVOUS SYSTEM:  Alert & Oriented X1, Good concentration; Motor Strength 5/5 B/L upper and lower extremities; Vital Signs Last 24 Hrs: T(F): 97.5, Max: 97.5 / HR: 91 (52 - 110) / BP: 153/90 (98/55 - 153/90) / RR: 15 (15 - 18) / SpO2: 99% (99% - 100%)    PHYSICAL EXAM:  GENERAL: NAD, well-groomed, well-developed  HEAD:  Atraumatic, Normocephalic  EYES: EOMI, PERRLA, conjunctiva and sclera clear  ENMT: No tonsillar erythema, exudates, or enlargement; Moist mucous membranes, Good dentition, No lesions.  hard of hearing  NECK: Supple, No JVD, Normal thyroid  CHEST/LUNG: Clear to percussion bilaterally; No rales, rhonchi, wheezing, or rubs  HEART: Regular rate and rhythm; + AS murmur, no rubs, or gallops  ABDOMEN: Soft, Nontender, Nondistended; Bowel sounds present.  no suprapubic tenderness.  +contreras draining red urine  EXTREMITIES:  2+ Peripheral Pulses, No clubbing, cyanosis, or edema  LYMPH: No lymphadenopathy noted  SKIN: No rashes or lesions  NERVOUS SYSTEM:  Alert & Oriented X1, Good concentration; Motor Strength 5/5 B/L upper and lower extremities; Vital Signs Last 24 Hrs: T(F): 97.5, Max: 97.5 / HR: 91 (52 - 110) / BP: 153/90 (98/55 - 153/90) / RR: 15 (15 - 18) / SpO2: 99% (99% - 100%)    PHYSICAL EXAM:  GENERAL: NAD, well-groomed, well-developed, AOx1 (name), breathing comfortably on RA  HEAD:  Atraumatic, Normocephalic  EYES: EOMI, PERRLA, conjunctiva and sclera clear  ENMT: No tonsillar erythema, exudates, or enlargement; Moist mucous membranes, Good dentition, No lesions.  hard of hearing  NECK: Supple, No JVD, Normal thyroid  CHEST/LUNG: Clear to percussion bilaterally; No rales, rhonchi, wheezing, or rubs  HEART: Regular rate and rhythm; 4/6 AS murmur, S2 not appreciated, no rubs, or gallops  ABDOMEN: Soft, Nontender, Nondistended; Bowel sounds present.  no suprapubic tenderness.  +Carrion draining red urine, no clots, +blood at urethral meatus  EXTREMITIES:  2+ Peripheral Pulses, No clubbing, cyanosis, or edema  LYMPH: No lymphadenopathy noted  SKIN: No rashes or lesions  NERVOUS SYSTEM:  Alert & Oriented X1, Good concentration; Motor Strength 5/5 B/L upper and lower extremities;

## 2017-11-29 ENCOUNTER — SKIN CHECK (OUTPATIENT)
Dept: URBAN - NONMETROPOLITAN AREA CLINIC 4 | Facility: CLINIC | Age: 77
Setting detail: DERMATOLOGY
End: 2017-11-29

## 2017-11-29 DIAGNOSIS — L82.1 OTHER SEBORRHEIC KERATOSIS: ICD-10-CM

## 2017-11-29 PROCEDURE — 11100 BX SKIN SUBCUTANEOUS&/MUCOUS MEMBRANE 1 LESION: CPT

## 2017-11-29 PROCEDURE — 99024 POSTOP FOLLOW-UP VISIT: CPT

## 2017-12-14 ENCOUNTER — MOHS SURGERY-ROUTINE (OUTPATIENT)
Dept: URBAN - METROPOLITAN AREA CLINIC 15 | Facility: CLINIC | Age: 77
Setting detail: DERMATOLOGY
End: 2017-12-14

## 2017-12-14 DIAGNOSIS — Z85.828 PERSONAL HISTORY OF OTHER MALIGNANT NEOPLASM OF SKIN: ICD-10-CM

## 2017-12-14 DIAGNOSIS — L57.9 SKIN CHANGES DUE TO CHRONIC EXPOSURE TO NONIONIZING RADIATION, UNSPECIFIED: ICD-10-CM

## 2017-12-14 PROCEDURE — 17314 MOHS ADDL STAGE T/A/L: CPT

## 2017-12-14 PROCEDURE — 15271 SKIN SUB GRAFT TRNK/ARM/LEG: CPT

## 2017-12-14 PROCEDURE — 17313 MOHS 1 STAGE T/A/L: CPT

## 2017-12-14 RX ORDER — HYDROCODONE BITARTRATE AND ACETAMINOPHEN 5; 325 MG/1; MG/1
1 TABLET TABLET ORAL
Qty: 8 | Refills: 0
Start: 2017-12-14

## 2017-12-14 RX ORDER — HYDROCODONE BITARTRATE AND ACETAMINOPHEN 5; 325 MG/1; MG/1
1 TABLET TABLET ORAL
Qty: 8 | Refills: 0 | Status: DISCONTINUED
Start: 2017-12-14 | End: 2017-12-14

## 2017-12-14 RX ORDER — MUPIROCIN 20 MG/G
1 APPLICATION OINTMENT TOPICAL BID
Qty: 30 | Refills: 1
Start: 2017-12-14

## 2017-12-14 RX ORDER — DOXYCYCLINE HYCLATE 100 MG/1
1 CAPSULE CAPSULE, GELATIN COATED ORAL BID
Qty: 14 | Refills: 0
Start: 2017-12-14

## 2017-12-28 ENCOUNTER — MOHS SURGERY - FOLLOW UP (OUTPATIENT)
Dept: URBAN - METROPOLITAN AREA CLINIC 15 | Facility: CLINIC | Age: 77
Setting detail: DERMATOLOGY
End: 2017-12-28

## 2017-12-28 DIAGNOSIS — D48.5 NEOPLASM OF UNCERTAIN BEHAVIOR OF SKIN: ICD-10-CM

## 2017-12-28 DIAGNOSIS — B07.8 OTHER VIRAL WARTS: ICD-10-CM

## 2017-12-28 PROCEDURE — 99024 POSTOP FOLLOW-UP VISIT: CPT

## 2018-01-04 ENCOUNTER — MOHS SURGERY - FOLLOW UP (OUTPATIENT)
Dept: URBAN - METROPOLITAN AREA CLINIC 15 | Facility: CLINIC | Age: 78
Setting detail: DERMATOLOGY
End: 2018-01-04

## 2018-01-04 DIAGNOSIS — Z48.817 ENCOUNTER FOR SURGICAL AFTERCARE FOLLOWING SURGERY ON THE SKIN AND SUBCUTANEOUS TISSUE: ICD-10-CM

## 2018-01-04 PROCEDURE — 99024 POSTOP FOLLOW-UP VISIT: CPT

## 2018-05-23 ENCOUNTER — OTHER- (OUTPATIENT)
Dept: URBAN - NONMETROPOLITAN AREA CLINIC 4 | Facility: CLINIC | Age: 78
Setting detail: DERMATOLOGY
End: 2018-05-23

## 2018-05-23 DIAGNOSIS — L70.0 ACNE VULGARIS: ICD-10-CM

## 2018-05-23 PROCEDURE — 11100 BX SKIN SUBCUTANEOUS&/MUCOUS MEMBRANE 1 LESION: CPT

## 2018-05-23 PROCEDURE — 99213 OFFICE O/P EST LOW 20 MIN: CPT

## 2018-05-23 RX ORDER — CLOBETASOL PROPIONATE 0.5 MG/G
1 APPLICATION CREAM TOPICAL BID
Qty: 60 | Refills: 2
Start: 2018-05-23

## 2018-06-21 ENCOUNTER — MOHS SURGERY-ROUTINE (OUTPATIENT)
Dept: URBAN - METROPOLITAN AREA CLINIC 15 | Facility: CLINIC | Age: 78
Setting detail: DERMATOLOGY
End: 2018-06-21

## 2018-06-21 DIAGNOSIS — Z48.817 ENCOUNTER FOR SURGICAL AFTERCARE FOLLOWING SURGERY ON THE SKIN AND SUBCUTANEOUS TISSUE: ICD-10-CM

## 2018-06-21 PROCEDURE — 99213 OFFICE O/P EST LOW 20 MIN: CPT

## 2018-06-21 PROCEDURE — 17313 MOHS 1 STAGE T/A/L: CPT

## 2018-06-21 PROCEDURE — 17314 MOHS ADDL STAGE T/A/L: CPT

## 2018-06-21 PROCEDURE — 15271 SKIN SUB GRAFT TRNK/ARM/LEG: CPT

## 2018-06-21 RX ORDER — FLUOCINONIDE 0.5 MG/G
1 APPLICATION CREAM TOPICAL DAILY
Qty: 60 | Refills: 0
Start: 2018-06-21

## 2018-06-21 RX ORDER — HYDROXYZINE HYDROCHLORIDE 25 MG/1
0.5/HALF TABLET TABLET, FILM COATED ORAL
Qty: 60 | Refills: 0
Start: 2018-06-21

## 2018-07-06 ENCOUNTER — MOHS SURGERY - FOLLOW UP (OUTPATIENT)
Dept: URBAN - METROPOLITAN AREA CLINIC 15 | Facility: CLINIC | Age: 78
Setting detail: DERMATOLOGY
End: 2018-07-06

## 2018-07-06 DIAGNOSIS — Z48.02 ENCOUNTER FOR REMOVAL OF SUTURES: ICD-10-CM

## 2018-07-06 PROCEDURE — 99024 POSTOP FOLLOW-UP VISIT: CPT

## 2018-07-12 ENCOUNTER — EMERGENCY (EMERGENCY)
Facility: HOSPITAL | Age: 78
LOS: 1 days | Discharge: HOME | End: 2018-07-12
Attending: EMERGENCY MEDICINE | Admitting: EMERGENCY MEDICINE
Payer: MEDICARE

## 2018-07-12 VITALS
OXYGEN SATURATION: 100 % | SYSTOLIC BLOOD PRESSURE: 134 MMHG | DIASTOLIC BLOOD PRESSURE: 87 MMHG | RESPIRATION RATE: 16 BRPM | HEART RATE: 84 BPM

## 2018-07-12 VITALS
DIASTOLIC BLOOD PRESSURE: 85 MMHG | HEART RATE: 80 BPM | OXYGEN SATURATION: 98 % | RESPIRATION RATE: 17 BRPM | SYSTOLIC BLOOD PRESSURE: 149 MMHG | TEMPERATURE: 98 F

## 2018-07-12 LAB
ALBUMIN SERPL ELPH-MCNC: 4.6 G/DL — SIGNIFICANT CHANGE UP (ref 3.3–5)
ALP SERPL-CCNC: 68 U/L — SIGNIFICANT CHANGE UP (ref 40–120)
ALT FLD-CCNC: 18 U/L — SIGNIFICANT CHANGE UP (ref 4–33)
APPEARANCE UR: SIGNIFICANT CHANGE UP
AST SERPL-CCNC: 18 U/L — SIGNIFICANT CHANGE UP (ref 4–32)
BACTERIA # UR AUTO: SIGNIFICANT CHANGE UP
BASOPHILS # BLD AUTO: 0.02 K/UL — SIGNIFICANT CHANGE UP (ref 0–0.2)
BASOPHILS NFR BLD AUTO: 0.2 % — SIGNIFICANT CHANGE UP (ref 0–2)
BILIRUB SERPL-MCNC: 0.4 MG/DL — SIGNIFICANT CHANGE UP (ref 0.2–1.2)
BILIRUB UR-MCNC: NEGATIVE — SIGNIFICANT CHANGE UP
BLOOD UR QL VISUAL: HIGH
BUN SERPL-MCNC: 22 MG/DL — SIGNIFICANT CHANGE UP (ref 7–23)
CALCIUM SERPL-MCNC: 9.9 MG/DL — SIGNIFICANT CHANGE UP (ref 8.4–10.5)
CHLORIDE SERPL-SCNC: 99 MMOL/L — SIGNIFICANT CHANGE UP (ref 98–107)
CO2 SERPL-SCNC: 28 MMOL/L — SIGNIFICANT CHANGE UP (ref 22–31)
COLOR SPEC: YELLOW — SIGNIFICANT CHANGE UP
CREAT SERPL-MCNC: 0.99 MG/DL — SIGNIFICANT CHANGE UP (ref 0.5–1.3)
EOSINOPHIL # BLD AUTO: 0.03 K/UL — SIGNIFICANT CHANGE UP (ref 0–0.5)
EOSINOPHIL NFR BLD AUTO: 0.4 % — SIGNIFICANT CHANGE UP (ref 0–6)
GLUCOSE SERPL-MCNC: 70 MG/DL — SIGNIFICANT CHANGE UP (ref 70–99)
GLUCOSE UR-MCNC: >1000 — SIGNIFICANT CHANGE UP
HCT VFR BLD CALC: 39.6 % — SIGNIFICANT CHANGE UP (ref 34.5–45)
HGB BLD-MCNC: 12.4 G/DL — SIGNIFICANT CHANGE UP (ref 11.5–15.5)
IMM GRANULOCYTES # BLD AUTO: 0.02 # — SIGNIFICANT CHANGE UP
IMM GRANULOCYTES NFR BLD AUTO: 0.2 % — SIGNIFICANT CHANGE UP (ref 0–1.5)
KETONES UR-MCNC: NEGATIVE — SIGNIFICANT CHANGE UP
LEUKOCYTE ESTERASE UR-ACNC: HIGH
LYMPHOCYTES # BLD AUTO: 2.61 K/UL — SIGNIFICANT CHANGE UP (ref 1–3.3)
LYMPHOCYTES # BLD AUTO: 31.7 % — SIGNIFICANT CHANGE UP (ref 13–44)
MCHC RBC-ENTMCNC: 27.4 PG — SIGNIFICANT CHANGE UP (ref 27–34)
MCHC RBC-ENTMCNC: 31.3 % — LOW (ref 32–36)
MCV RBC AUTO: 87.4 FL — SIGNIFICANT CHANGE UP (ref 80–100)
MONOCYTES # BLD AUTO: 0.78 K/UL — SIGNIFICANT CHANGE UP (ref 0–0.9)
MONOCYTES NFR BLD AUTO: 9.5 % — SIGNIFICANT CHANGE UP (ref 2–14)
MUCOUS THREADS # UR AUTO: SIGNIFICANT CHANGE UP
NEUTROPHILS # BLD AUTO: 4.77 K/UL — SIGNIFICANT CHANGE UP (ref 1.8–7.4)
NEUTROPHILS NFR BLD AUTO: 58 % — SIGNIFICANT CHANGE UP (ref 43–77)
NITRITE UR-MCNC: NEGATIVE — SIGNIFICANT CHANGE UP
NON-SQ EPI CELLS # UR AUTO: <1 — SIGNIFICANT CHANGE UP
NRBC # FLD: 0 — SIGNIFICANT CHANGE UP
PH UR: 6 — SIGNIFICANT CHANGE UP (ref 4.6–8)
PLATELET # BLD AUTO: 221 K/UL — SIGNIFICANT CHANGE UP (ref 150–400)
PMV BLD: 10.8 FL — SIGNIFICANT CHANGE UP (ref 7–13)
POTASSIUM SERPL-MCNC: 3.7 MMOL/L — SIGNIFICANT CHANGE UP (ref 3.5–5.3)
POTASSIUM SERPL-SCNC: 3.7 MMOL/L — SIGNIFICANT CHANGE UP (ref 3.5–5.3)
PROT SERPL-MCNC: 8.7 G/DL — HIGH (ref 6–8.3)
PROT UR-MCNC: 30 MG/DL — HIGH
RBC # BLD: 4.53 M/UL — SIGNIFICANT CHANGE UP (ref 3.8–5.2)
RBC # FLD: 13 % — SIGNIFICANT CHANGE UP (ref 10.3–14.5)
RBC CASTS # UR COMP ASSIST: SIGNIFICANT CHANGE UP (ref 0–?)
SODIUM SERPL-SCNC: 140 MMOL/L — SIGNIFICANT CHANGE UP (ref 135–145)
SP GR SPEC: 1.02 — SIGNIFICANT CHANGE UP (ref 1–1.04)
SQUAMOUS # UR AUTO: SIGNIFICANT CHANGE UP
UROBILINOGEN FLD QL: NORMAL MG/DL — SIGNIFICANT CHANGE UP
WBC # BLD: 8.23 K/UL — SIGNIFICANT CHANGE UP (ref 3.8–10.5)
WBC # FLD AUTO: 8.23 K/UL — SIGNIFICANT CHANGE UP (ref 3.8–10.5)
WBC UR QL: >50 — HIGH (ref 0–?)

## 2018-07-12 PROCEDURE — 93010 ELECTROCARDIOGRAM REPORT: CPT

## 2018-07-12 PROCEDURE — 99284 EMERGENCY DEPT VISIT MOD MDM: CPT | Mod: 25,GC

## 2018-07-12 RX ORDER — CEPHALEXIN 500 MG
1 CAPSULE ORAL
Qty: 21 | Refills: 0 | OUTPATIENT
Start: 2018-07-12 | End: 2018-07-18

## 2018-07-12 RX ORDER — ACETAMINOPHEN 500 MG
975 TABLET ORAL ONCE
Qty: 0 | Refills: 0 | Status: COMPLETED | OUTPATIENT
Start: 2018-07-12 | End: 2018-07-12

## 2018-07-12 RX ORDER — SODIUM CHLORIDE 9 MG/ML
1000 INJECTION INTRAMUSCULAR; INTRAVENOUS; SUBCUTANEOUS ONCE
Qty: 0 | Refills: 0 | Status: COMPLETED | OUTPATIENT
Start: 2018-07-12 | End: 2018-07-12

## 2018-07-12 RX ORDER — CEFTRIAXONE 500 MG/1
1 INJECTION, POWDER, FOR SOLUTION INTRAMUSCULAR; INTRAVENOUS EVERY 24 HOURS
Qty: 0 | Refills: 0 | Status: DISCONTINUED | OUTPATIENT
Start: 2018-07-12 | End: 2018-07-16

## 2018-07-12 RX ORDER — GABAPENTIN 400 MG/1
300 CAPSULE ORAL ONCE
Qty: 0 | Refills: 0 | Status: COMPLETED | OUTPATIENT
Start: 2018-07-12 | End: 2018-07-12

## 2018-07-12 RX ADMIN — SODIUM CHLORIDE 1000 MILLILITER(S): 9 INJECTION INTRAMUSCULAR; INTRAVENOUS; SUBCUTANEOUS at 19:19

## 2018-07-12 RX ADMIN — GABAPENTIN 300 MILLIGRAM(S): 400 CAPSULE ORAL at 19:19

## 2018-07-12 RX ADMIN — Medication 975 MILLIGRAM(S): at 19:19

## 2018-07-12 RX ADMIN — CEFTRIAXONE 100 GRAM(S): 500 INJECTION, POWDER, FOR SOLUTION INTRAMUSCULAR; INTRAVENOUS at 22:12

## 2018-07-12 NOTE — ED ADULT NURSE NOTE - OBJECTIVE STATEMENT
Patient received AA&Ox3 c/o generalized weakness, frequency, increased neuropathy that started yesterday but worsened today - pt. normally takes lyrica but pain was not tolerable.

## 2018-07-12 NOTE — ED PROVIDER NOTE - OBJECTIVE STATEMENT
77 y/o F with h/o IDDM, CAD s/p stent, HTN, DM presents with complaint of bilateral upper and lower extremity pain, cramping and tingling. Patient reports intermittent neuropathy x years and was previously taking lyrica but does not have any more. Began having pain approximately 2-3 hours prior to arrival and has not taken anything for pain. Was on cruise for 11 days and felt well prior to today. Complaining of generalized weakness and increased urinary frequency. Denies fever, chills, chest pain, SOB, abdominal pain, nausea/vomiting/diarrhea, dysuria.

## 2018-07-12 NOTE — ED PROVIDER NOTE - ATTENDING CONTRIBUTION TO CARE
DR. WILSON, ATTENDING MD-  I performed a face to face bedside interview with patient regarding history of present illness, review of symptoms and past medical history. I completed an independent physical exam.  I have discussed patient's plan of care with the resident.   Documentation as above in the note.    Bruce: c/o diffuse muscle pains and weakness today.  Patient is s/p 11 day cruise but did not have any N/V/D during the cruise.  Did notice having increased urination during the cruise, however.  Patient does not having a history of similar pains in the past for which she was on lyrica.  On my exam, patient is in NAD, Awake, alert and oriented to the situation.  CN2-12 intact, 5/5 strength throughout bilateral upper and lower extremities, no sens deficits, normal FTN.    A/P diffuse myalgias, weakness, unclear etiology.  I am not suspecting CVA, GBS or other acute neurological cause.  Her EKG and her sx do not suggest acute coronary sydnrome.  Possibly UTI given her urinary complaints?  Also has h/o similar pains in past secondary to likely diabetic neuropathy.  [place] check labs including UA, re-assess

## 2018-07-12 NOTE — ED PROVIDER NOTE - MEDICAL DECISION MAKING DETAILS
77 y/o F with h/o CAD s/p stent, DM, HTN presents with bilateral upper and lower extremity cramping, tingling and pain. Likely diabetic neuropathy. Not endorsing any infectious symptoms and has normal exam. labs, IVF, pain control, reassess

## 2018-07-12 NOTE — ED PROVIDER NOTE - PROGRESS NOTE DETAILS
MD CHO:  I was called to evaluate for code stroke.  Pt states she returned from cruise yesterday.  Today at approximately 1:30 felt global fatigue, particularly in b/l LE's, cramping pain in b/l LE's.  No facial droop, clear speech, a&ox3, motor 5/5 x4 ext, distal sensory grossly intact x4 ext, normal gait, normal heel to shin, normal finger to nose.  FS wnl in triage.  No code stroke.

## 2018-07-12 NOTE — ED ADULT TRIAGE NOTE - CHIEF COMPLAINT QUOTE
pt return from an 11 day cruise to  yesterday, c/o dizziness, weakness, and blurred vision today at 2pm. f/s 103.  MD Pelletier ambay for eval. no code stroke called.

## 2018-07-12 NOTE — ED PROVIDER NOTE - CHIEF COMPLAINT
The patient is a 78y Female complaining of The patient is a 78y Female complaining of pain, cramping.

## 2018-07-12 NOTE — ED PROVIDER NOTE - PSH
S/P primary angioplasty with coronary stent  x1 in 2006 at [facility]  S/P TKR (total knee replacement)  left

## 2018-07-12 NOTE — ED PROVIDER NOTE - CARE PLAN
Principal Discharge DX:	Myalgia Principal Discharge DX:	Myalgia  Secondary Diagnosis:	UTI (urinary tract infection)

## 2018-07-13 RX ORDER — CEPHALEXIN 500 MG
1 CAPSULE ORAL
Qty: 21 | Refills: 0
Start: 2018-07-13 | End: 2018-07-19

## 2018-07-14 LAB
BACTERIA UR CULT: SIGNIFICANT CHANGE UP
SPECIMEN SOURCE: SIGNIFICANT CHANGE UP

## 2018-07-15 NOTE — ED POST DISCHARGE NOTE - RESULT SUMMARY
KARLI Morrissey: UCX contaminated. Pt presented w/ urinary frequency. Called pt back, states she is starting to feel better but still has some frequency, is on day 2 of keflex. Recommended follow up with PMD, if sx continue to improve, to repeat UA/UC after completion of abx. If sx persist or if pt develops n/v, fever, flank pain to return to the ER. Pt understands and agrees with plan. Copies of labs faxed to PMD Dr. Lyndsey Gilbert at [phone]

## 2018-08-06 ENCOUNTER — MOHS SURGERY - FOLLOW UP (OUTPATIENT)
Dept: URBAN - METROPOLITAN AREA CLINIC 15 | Facility: CLINIC | Age: 78
Setting detail: DERMATOLOGY
End: 2018-08-06

## 2018-08-06 DIAGNOSIS — L57.0 ACTINIC KERATOSIS: ICD-10-CM

## 2018-08-06 DIAGNOSIS — D22.5 MELANOCYTIC NEVI OF TRUNK: ICD-10-CM

## 2018-08-06 DIAGNOSIS — L82.1 OTHER SEBORRHEIC KERATOSIS: ICD-10-CM

## 2018-08-06 DIAGNOSIS — Z85.828 PERSONAL HISTORY OF OTHER MALIGNANT NEOPLASM OF SKIN: ICD-10-CM

## 2018-08-06 DIAGNOSIS — D18.01 HEMANGIOMA OF SKIN AND SUBCUTANEOUS TISSUE: ICD-10-CM

## 2018-08-06 PROCEDURE — 11900 INJECT SKIN LESIONS </W 7: CPT

## 2018-08-06 PROCEDURE — 29580 STRAPPING UNNA BOOT: CPT

## 2018-08-07 PROBLEM — L28.1 PRURIGO NODULARIS: Status: RESOLVED | Noted: 2018-08-07

## 2018-08-16 ENCOUNTER — OTHER- (OUTPATIENT)
Dept: URBAN - NONMETROPOLITAN AREA CLINIC 4 | Facility: CLINIC | Age: 78
Setting detail: DERMATOLOGY
End: 2018-08-16

## 2018-08-16 DIAGNOSIS — L57.9 SKIN CHANGES DUE TO CHRONIC EXPOSURE TO NONIONIZING RADIATION, UNSPECIFIED: ICD-10-CM

## 2018-08-16 DIAGNOSIS — L23.7 ALLERGIC CONTACT DERMATITIS DUE TO PLANTS, EXCEPT FOOD: ICD-10-CM

## 2018-08-16 PROCEDURE — 99024 POSTOP FOLLOW-UP VISIT: CPT

## 2018-09-19 ENCOUNTER — FOLLOW-UP (OUTPATIENT)
Dept: URBAN - NONMETROPOLITAN AREA CLINIC 4 | Facility: CLINIC | Age: 78
Setting detail: DERMATOLOGY
End: 2018-09-19

## 2018-09-19 DIAGNOSIS — L30.9 DERMATITIS, UNSPECIFIED: ICD-10-CM

## 2018-09-19 PROCEDURE — 99213 OFFICE O/P EST LOW 20 MIN: CPT

## 2018-12-21 NOTE — ED ADULT TRIAGE NOTE - NS ED TRIAGE AVPU SCALE
Telephone Encounter by Adeline Dunham CMA at 05/08/17 03:27 PM     Author:  Adeline Dunham CMA Service:  (none) Author Type:  Certified Medical Assistant     Filed:  05/08/17 03:27 PM Encounter Date:  3/25/2017 Status:  Signed     :  Adeline Dunham CMA (Certified Medical Assistant)            To non- clinical[PR1.1M]    Patient due for an appointment with PCP  PLEASE SCHEDULE.[PR1.1T]      Revision History        User Key Date/Time User Provider Type Action    > PR1.1 05/08/17 03:27 PM Adeline Dunham CMA Certified Medical Assistant Sign    M - Manual, T - Template             Alert-The patient is alert, awake and responds to voice. The patient is oriented to time, place, and person. The triage nurse is able to obtain subjective information.

## 2019-03-05 RX ORDER — SULFACETAMIDE SODIUM 100 MG/ML
1 APPLICATION LOTION TOPICAL DAILY
Qty: 118 | Refills: 3
Start: 2019-03-05

## 2019-04-29 ENCOUNTER — MOHS SURGERY-ROUTINE (OUTPATIENT)
Dept: URBAN - METROPOLITAN AREA CLINIC 11 | Facility: CLINIC | Age: 79
Setting detail: DERMATOLOGY
End: 2019-04-29

## 2019-04-29 PROCEDURE — 17313 MOHS 1 STAGE T/A/L: CPT

## 2019-04-29 PROCEDURE — 15273 SKIN SUB GRFT T/ARM/LG CHILD: CPT

## 2019-05-06 ENCOUNTER — MOHS SURGERY - FOLLOW UP (OUTPATIENT)
Dept: URBAN - METROPOLITAN AREA CLINIC 11 | Facility: CLINIC | Age: 79
Setting detail: DERMATOLOGY
End: 2019-05-06

## 2019-05-06 DIAGNOSIS — L57.0 ACTINIC KERATOSIS: ICD-10-CM

## 2019-05-06 PROCEDURE — 99024 POSTOP FOLLOW-UP VISIT: CPT

## 2019-05-20 ENCOUNTER — MOHS SURGERY - FOLLOW UP (OUTPATIENT)
Dept: URBAN - METROPOLITAN AREA CLINIC 11 | Facility: CLINIC | Age: 79
Setting detail: DERMATOLOGY
End: 2019-05-20

## 2019-05-20 DIAGNOSIS — C44.212 BASAL CELL CARCINOMA OF SKIN OF RIGHT EAR AND EXTERNAL AURICULAR CANAL: ICD-10-CM

## 2019-05-20 PROCEDURE — 99213 OFFICE O/P EST LOW 20 MIN: CPT

## 2019-08-28 ENCOUNTER — INPATIENT (INPATIENT)
Facility: HOSPITAL | Age: 79
LOS: 6 days | Discharge: HOME HEALTH | End: 2019-09-04
Attending: INTERNAL MEDICINE | Admitting: INTERNAL MEDICINE
Payer: MEDICARE

## 2019-08-28 VITALS
RESPIRATION RATE: 16 BRPM | TEMPERATURE: 98 F | SYSTOLIC BLOOD PRESSURE: 159 MMHG | HEART RATE: 65 BPM | DIASTOLIC BLOOD PRESSURE: 92 MMHG | OXYGEN SATURATION: 100 %

## 2019-08-28 PROBLEM — E78.5 HYPERLIPIDEMIA, UNSPECIFIED: Chronic | Status: ACTIVE | Noted: 2017-03-27

## 2019-08-28 PROBLEM — I50.9 HEART FAILURE, UNSPECIFIED: Chronic | Status: ACTIVE | Noted: 2017-03-27

## 2019-08-28 LAB
ALBUMIN SERPL ELPH-MCNC: 4.6 G/DL — SIGNIFICANT CHANGE UP (ref 3.3–5)
ALP SERPL-CCNC: 79 U/L — SIGNIFICANT CHANGE UP (ref 40–120)
ALT FLD-CCNC: 13 U/L — SIGNIFICANT CHANGE UP (ref 4–33)
ANION GAP SERPL CALC-SCNC: 12 MMO/L — SIGNIFICANT CHANGE UP (ref 7–14)
APTT BLD: 34.3 SEC — SIGNIFICANT CHANGE UP (ref 27.5–36.3)
AST SERPL-CCNC: 17 U/L — SIGNIFICANT CHANGE UP (ref 4–32)
BASOPHILS # BLD AUTO: 0.02 K/UL — SIGNIFICANT CHANGE UP (ref 0–0.2)
BASOPHILS NFR BLD AUTO: 0.3 % — SIGNIFICANT CHANGE UP (ref 0–2)
BILIRUB SERPL-MCNC: 0.6 MG/DL — SIGNIFICANT CHANGE UP (ref 0.2–1.2)
BUN SERPL-MCNC: 12 MG/DL — SIGNIFICANT CHANGE UP (ref 7–23)
CALCIUM SERPL-MCNC: 10 MG/DL — SIGNIFICANT CHANGE UP (ref 8.4–10.5)
CHLORIDE SERPL-SCNC: 102 MMOL/L — SIGNIFICANT CHANGE UP (ref 98–107)
CO2 SERPL-SCNC: 26 MMOL/L — SIGNIFICANT CHANGE UP (ref 22–31)
CREAT SERPL-MCNC: 0.89 MG/DL — SIGNIFICANT CHANGE UP (ref 0.5–1.3)
EOSINOPHIL # BLD AUTO: 0.09 K/UL — SIGNIFICANT CHANGE UP (ref 0–0.5)
EOSINOPHIL NFR BLD AUTO: 1.2 % — SIGNIFICANT CHANGE UP (ref 0–6)
GLUCOSE SERPL-MCNC: 121 MG/DL — HIGH (ref 70–99)
HCT VFR BLD CALC: 39 % — SIGNIFICANT CHANGE UP (ref 34.5–45)
HGB BLD-MCNC: 12.3 G/DL — SIGNIFICANT CHANGE UP (ref 11.5–15.5)
IMM GRANULOCYTES NFR BLD AUTO: 0.3 % — SIGNIFICANT CHANGE UP (ref 0–1.5)
INR BLD: 1.02 — SIGNIFICANT CHANGE UP (ref 0.88–1.17)
LIDOCAIN IGE QN: 14.5 U/L — SIGNIFICANT CHANGE UP (ref 7–60)
LYMPHOCYTES # BLD AUTO: 2.54 K/UL — SIGNIFICANT CHANGE UP (ref 1–3.3)
LYMPHOCYTES # BLD AUTO: 32.6 % — SIGNIFICANT CHANGE UP (ref 13–44)
MCHC RBC-ENTMCNC: 27.8 PG — SIGNIFICANT CHANGE UP (ref 27–34)
MCHC RBC-ENTMCNC: 31.5 % — LOW (ref 32–36)
MCV RBC AUTO: 88.2 FL — SIGNIFICANT CHANGE UP (ref 80–100)
MONOCYTES # BLD AUTO: 0.67 K/UL — SIGNIFICANT CHANGE UP (ref 0–0.9)
MONOCYTES NFR BLD AUTO: 8.6 % — SIGNIFICANT CHANGE UP (ref 2–14)
NEUTROPHILS # BLD AUTO: 4.46 K/UL — SIGNIFICANT CHANGE UP (ref 1.8–7.4)
NEUTROPHILS NFR BLD AUTO: 57 % — SIGNIFICANT CHANGE UP (ref 43–77)
NRBC # FLD: 0 K/UL — SIGNIFICANT CHANGE UP (ref 0–0)
NT-PROBNP SERPL-SCNC: 332.3 PG/ML — SIGNIFICANT CHANGE UP
PLATELET # BLD AUTO: 227 K/UL — SIGNIFICANT CHANGE UP (ref 150–400)
PMV BLD: 10.8 FL — SIGNIFICANT CHANGE UP (ref 7–13)
POTASSIUM SERPL-MCNC: 3.9 MMOL/L — SIGNIFICANT CHANGE UP (ref 3.5–5.3)
POTASSIUM SERPL-SCNC: 3.9 MMOL/L — SIGNIFICANT CHANGE UP (ref 3.5–5.3)
PROT SERPL-MCNC: 8.6 G/DL — HIGH (ref 6–8.3)
PROTHROM AB SERPL-ACNC: 11.6 SEC — SIGNIFICANT CHANGE UP (ref 9.8–13.1)
RBC # BLD: 4.42 M/UL — SIGNIFICANT CHANGE UP (ref 3.8–5.2)
RBC # FLD: 13.6 % — SIGNIFICANT CHANGE UP (ref 10.3–14.5)
SODIUM SERPL-SCNC: 140 MMOL/L — SIGNIFICANT CHANGE UP (ref 135–145)
TROPONIN T, HIGH SENSITIVITY: 24 NG/L — SIGNIFICANT CHANGE UP (ref ?–14)
TROPONIN T, HIGH SENSITIVITY: 24 NG/L — SIGNIFICANT CHANGE UP (ref ?–14)
WBC # BLD: 7.8 K/UL — SIGNIFICANT CHANGE UP (ref 3.8–10.5)
WBC # FLD AUTO: 7.8 K/UL — SIGNIFICANT CHANGE UP (ref 3.8–10.5)

## 2019-08-28 PROCEDURE — 93971 EXTREMITY STUDY: CPT | Mod: 26,LT

## 2019-08-28 PROCEDURE — 71275 CT ANGIOGRAPHY CHEST: CPT | Mod: 26

## 2019-08-28 PROCEDURE — 76705 ECHO EXAM OF ABDOMEN: CPT | Mod: 26

## 2019-08-28 RX ORDER — ACETAMINOPHEN 500 MG
975 TABLET ORAL ONCE
Refills: 0 | Status: COMPLETED | OUTPATIENT
Start: 2019-08-28 | End: 2019-08-28

## 2019-08-28 RX ADMIN — Medication 975 MILLIGRAM(S): at 19:00

## 2019-08-28 RX ADMIN — Medication 975 MILLIGRAM(S): at 23:30

## 2019-08-28 NOTE — ED ADULT TRIAGE NOTE - CHIEF COMPLAINT QUOTE
Pt c/o reproducible and tender left sided chest pain x 1 month since having 2 stents placed. Pt denies no change or worsening symptoms.

## 2019-08-28 NOTE — ED PROVIDER NOTE - ATTENDING CONTRIBUTION TO CARE
I performed a face to face bedside interview with patient regarding history of present illness, review of symptoms and past medical history. I completed an independent physical exam.  I have discussed patient's plan of care.   I agree with note as stated above, having amended the EMR as needed to reflect my findings. I have discussed the assessment and plan of care.  This includes during the time I functioned as the attending physician for this patient.  Attending Contribution to Care: agree with plan of resident. hypertension, diabetes presents with chest pain x 3 days and SOB and palpitations x 2 months after pt had stents placed. Chest pain is intermittent, non-radiating and rated 4/10. Pt denies any nausea/vomiting/headaches. Pt visited primary care doctor for symptoms and was advised to go to the ER. Flew earlier this month, has LLE swelling/pain, states pain worse with breathing in. admitting to persistent chest pain, with no sob at rest. cta neg forpe. us neg for dvt. trop equivocal. pt stable for admission for high risk cp.

## 2019-08-28 NOTE — ED ADULT NURSE NOTE - NSIMPLEMENTINTERV_GEN_ALL_ED
Implemented All Fall with Harm Risk Interventions:  [place] to call system. Call bell, personal items and telephone within reach. Instruct patient to call for assistance. Room bathroom lighting operational. Non-slip footwear when patient is off stretcher. Physically safe environment: no spills, clutter or unnecessary equipment. Stretcher in lowest position, wheels locked, appropriate side rails in place. Provide visual cue, wrist band, yellow gown, etc. Monitor gait and stability. Monitor for mental status changes and reorient to person, place, and time. Review medications for side effects contributing to fall risk. Reinforce activity limits and safety measures with patient and family. Provide visual clues: red socks.

## 2019-08-28 NOTE — ED PROVIDER NOTE - PROGRESS NOTE DETAILS
Dr. Andres Hernandez, PGY-2: received sign out this pt awaiting CTA and duplex. Scans without emergent pathology. Admitted to Dr. Reaves for further cardiac testing as heart score is elevated to 5-6. Will admit on tele.

## 2019-08-28 NOTE — ED PROVIDER NOTE - CLINICAL SUMMARY MEDICAL DECISION MAKING FREE TEXT BOX
79 year old female pmhx hypertension, diabetes presents with chest pain x 3 days and SOB and palpitations x 2 months after pt had stents placed. Moderate risk by wells, will get CTA, US abdomen due to RUQ and reassess, if eri PE likely admit vs cdu for cardiac workup for ACS due to decreased exercise tolerance.  vss clinically stable.

## 2019-08-28 NOTE — ED ADULT NURSE NOTE - OBJECTIVE STATEMENT
Pt received in spot 27, A&OX4, NAD.  c/o chest pain and SOB for since stent replacement.  Pt also c/o B/L lower extremity swelling, L worse than R.  Pt well-appearing, respirations even and unlabored on room air.  Unable to obtain labs or IV access.  MD Pritchett aware of need of ultrasounded IV.  Will continue to monitor.

## 2019-08-28 NOTE — ED PROVIDER NOTE - CARE PLAN
Principal Discharge DX:	Chest pain, unspecified type Principal Discharge DX:	Chest pain, unspecified type  Secondary Diagnosis:	Dyspnea

## 2019-08-28 NOTE — ED PROVIDER NOTE - OBJECTIVE STATEMENT
79 year old female pmhx hypertension, diabetes presents with chest pain x 3 days and SOB and palpitations x 2 months after pt had stents placed. Chest pain is intermittent, non-radiating and rated 4/10. Pt denies any nausea/vomiting/headaches. Pt visited primary care doctor for symptoms and was advised to go to the ER. 79 year old female pmhx hypertension, diabetes presents with chest pain x 3 days and SOB and palpitations x 2 months after pt had stents placed.     Chest pain is intermittent, non-radiating and rated 4/10. Pt denies any nausea/vomiting/headaches. Pt visited primary care doctor for symptoms and was advised to go to the ER.   Flew earlier this month, has LLE swelling/pain, states pain worse with breathing in. sTATES she is more tired with less exertion. Denies F/C or cough.

## 2019-08-29 ENCOUNTER — TRANSCRIPTION ENCOUNTER (OUTPATIENT)
Age: 79
End: 2019-08-29

## 2019-08-29 DIAGNOSIS — I10 ESSENTIAL (PRIMARY) HYPERTENSION: ICD-10-CM

## 2019-08-29 DIAGNOSIS — I20.0 UNSTABLE ANGINA: ICD-10-CM

## 2019-08-29 DIAGNOSIS — E11.65 TYPE 2 DIABETES MELLITUS WITH HYPERGLYCEMIA: ICD-10-CM

## 2019-08-29 DIAGNOSIS — I50.32 CHRONIC DIASTOLIC (CONGESTIVE) HEART FAILURE: ICD-10-CM

## 2019-08-29 DIAGNOSIS — Z29.9 ENCOUNTER FOR PROPHYLACTIC MEASURES, UNSPECIFIED: ICD-10-CM

## 2019-08-29 DIAGNOSIS — R07.9 CHEST PAIN, UNSPECIFIED: ICD-10-CM

## 2019-08-29 DIAGNOSIS — I25.110 ATHEROSCLEROTIC HEART DISEASE OF NATIVE CORONARY ARTERY WITH UNSTABLE ANGINA PECTORIS: ICD-10-CM

## 2019-08-29 LAB
ANION GAP SERPL CALC-SCNC: 14 MMO/L — SIGNIFICANT CHANGE UP (ref 7–14)
APTT BLD: 58.3 SEC — HIGH (ref 27.5–36.3)
APTT BLD: 78.7 SEC — HIGH (ref 27.5–36.3)
BASOPHILS # BLD AUTO: 0.02 K/UL — SIGNIFICANT CHANGE UP (ref 0–0.2)
BASOPHILS NFR BLD AUTO: 0.3 % — SIGNIFICANT CHANGE UP (ref 0–2)
BLD GP AB SCN SERPL QL: NEGATIVE — SIGNIFICANT CHANGE UP
BUN SERPL-MCNC: 13 MG/DL — SIGNIFICANT CHANGE UP (ref 7–23)
CALCIUM SERPL-MCNC: 9.1 MG/DL — SIGNIFICANT CHANGE UP (ref 8.4–10.5)
CHLORIDE SERPL-SCNC: 101 MMOL/L — SIGNIFICANT CHANGE UP (ref 98–107)
CHOLEST SERPL-MCNC: 209 MG/DL — HIGH (ref 120–199)
CO2 SERPL-SCNC: 23 MMOL/L — SIGNIFICANT CHANGE UP (ref 22–31)
CREAT SERPL-MCNC: 0.83 MG/DL — SIGNIFICANT CHANGE UP (ref 0.5–1.3)
EOSINOPHIL # BLD AUTO: 0.1 K/UL — SIGNIFICANT CHANGE UP (ref 0–0.5)
EOSINOPHIL NFR BLD AUTO: 1.6 % — SIGNIFICANT CHANGE UP (ref 0–6)
GLUCOSE BLDC GLUCOMTR-MCNC: 138 MG/DL — HIGH (ref 70–99)
GLUCOSE BLDC GLUCOMTR-MCNC: 145 MG/DL — HIGH (ref 70–99)
GLUCOSE BLDC GLUCOMTR-MCNC: 147 MG/DL — HIGH (ref 70–99)
GLUCOSE BLDC GLUCOMTR-MCNC: 150 MG/DL — HIGH (ref 70–99)
GLUCOSE BLDC GLUCOMTR-MCNC: 151 MG/DL — HIGH (ref 70–99)
GLUCOSE BLDC GLUCOMTR-MCNC: 174 MG/DL — HIGH (ref 70–99)
GLUCOSE SERPL-MCNC: 155 MG/DL — HIGH (ref 70–99)
HCT VFR BLD CALC: 36.8 % — SIGNIFICANT CHANGE UP (ref 34.5–45)
HCT VFR BLD CALC: 38.8 % — SIGNIFICANT CHANGE UP (ref 34.5–45)
HDLC SERPL-MCNC: 47 MG/DL — SIGNIFICANT CHANGE UP (ref 45–65)
HGB BLD-MCNC: 11.8 G/DL — SIGNIFICANT CHANGE UP (ref 11.5–15.5)
HGB BLD-MCNC: 12 G/DL — SIGNIFICANT CHANGE UP (ref 11.5–15.5)
IMM GRANULOCYTES NFR BLD AUTO: 0.3 % — SIGNIFICANT CHANGE UP (ref 0–1.5)
LIPID PNL WITH DIRECT LDL SERPL: 140 MG/DL — SIGNIFICANT CHANGE UP
LYMPHOCYTES # BLD AUTO: 2.26 K/UL — SIGNIFICANT CHANGE UP (ref 1–3.3)
LYMPHOCYTES # BLD AUTO: 36.2 % — SIGNIFICANT CHANGE UP (ref 13–44)
MCHC RBC-ENTMCNC: 27.5 PG — SIGNIFICANT CHANGE UP (ref 27–34)
MCHC RBC-ENTMCNC: 28 PG — SIGNIFICANT CHANGE UP (ref 27–34)
MCHC RBC-ENTMCNC: 30.9 % — LOW (ref 32–36)
MCHC RBC-ENTMCNC: 32.1 % — SIGNIFICANT CHANGE UP (ref 32–36)
MCV RBC AUTO: 87.2 FL — SIGNIFICANT CHANGE UP (ref 80–100)
MCV RBC AUTO: 89 FL — SIGNIFICANT CHANGE UP (ref 80–100)
MONOCYTES # BLD AUTO: 0.55 K/UL — SIGNIFICANT CHANGE UP (ref 0–0.9)
MONOCYTES NFR BLD AUTO: 8.8 % — SIGNIFICANT CHANGE UP (ref 2–14)
NEUTROPHILS # BLD AUTO: 3.29 K/UL — SIGNIFICANT CHANGE UP (ref 1.8–7.4)
NEUTROPHILS NFR BLD AUTO: 52.8 % — SIGNIFICANT CHANGE UP (ref 43–77)
NRBC # FLD: 0 K/UL — SIGNIFICANT CHANGE UP (ref 0–0)
NRBC # FLD: 0 K/UL — SIGNIFICANT CHANGE UP (ref 0–0)
PLATELET # BLD AUTO: 210 K/UL — SIGNIFICANT CHANGE UP (ref 150–400)
PLATELET # BLD AUTO: 218 K/UL — SIGNIFICANT CHANGE UP (ref 150–400)
PMV BLD: 10.9 FL — SIGNIFICANT CHANGE UP (ref 7–13)
PMV BLD: 11.5 FL — SIGNIFICANT CHANGE UP (ref 7–13)
POTASSIUM SERPL-MCNC: 3.5 MMOL/L — SIGNIFICANT CHANGE UP (ref 3.5–5.3)
POTASSIUM SERPL-SCNC: 3.5 MMOL/L — SIGNIFICANT CHANGE UP (ref 3.5–5.3)
RBC # BLD: 4.22 M/UL — SIGNIFICANT CHANGE UP (ref 3.8–5.2)
RBC # BLD: 4.36 M/UL — SIGNIFICANT CHANGE UP (ref 3.8–5.2)
RBC # FLD: 13.6 % — SIGNIFICANT CHANGE UP (ref 10.3–14.5)
RBC # FLD: 13.7 % — SIGNIFICANT CHANGE UP (ref 10.3–14.5)
RH IG SCN BLD-IMP: POSITIVE — SIGNIFICANT CHANGE UP
SODIUM SERPL-SCNC: 138 MMOL/L — SIGNIFICANT CHANGE UP (ref 135–145)
TRIGL SERPL-MCNC: 207 MG/DL — HIGH (ref 10–149)
TROPONIN T, HIGH SENSITIVITY: 24 NG/L — SIGNIFICANT CHANGE UP (ref ?–14)
WBC # BLD: 6.24 K/UL — SIGNIFICANT CHANGE UP (ref 3.8–10.5)
WBC # BLD: 6.27 K/UL — SIGNIFICANT CHANGE UP (ref 3.8–10.5)
WBC # FLD AUTO: 6.24 K/UL — SIGNIFICANT CHANGE UP (ref 3.8–10.5)
WBC # FLD AUTO: 6.27 K/UL — SIGNIFICANT CHANGE UP (ref 3.8–10.5)

## 2019-08-29 PROCEDURE — 99223 1ST HOSP IP/OBS HIGH 75: CPT

## 2019-08-29 RX ORDER — ATORVASTATIN CALCIUM 80 MG/1
80 TABLET, FILM COATED ORAL AT BEDTIME
Refills: 0 | Status: DISCONTINUED | OUTPATIENT
Start: 2019-08-29 | End: 2019-09-04

## 2019-08-29 RX ORDER — INSULIN GLARGINE 100 [IU]/ML
30 INJECTION, SOLUTION SUBCUTANEOUS AT BEDTIME
Refills: 0 | Status: DISCONTINUED | OUTPATIENT
Start: 2019-08-29 | End: 2019-09-04

## 2019-08-29 RX ORDER — DEXTROSE 50 % IN WATER 50 %
15 SYRINGE (ML) INTRAVENOUS ONCE
Refills: 0 | Status: DISCONTINUED | OUTPATIENT
Start: 2019-08-29 | End: 2019-09-04

## 2019-08-29 RX ORDER — GLUCAGON INJECTION, SOLUTION 0.5 MG/.1ML
1 INJECTION, SOLUTION SUBCUTANEOUS ONCE
Refills: 0 | Status: DISCONTINUED | OUTPATIENT
Start: 2019-08-29 | End: 2019-09-04

## 2019-08-29 RX ORDER — SODIUM CHLORIDE 9 MG/ML
1000 INJECTION, SOLUTION INTRAVENOUS
Refills: 0 | Status: DISCONTINUED | OUTPATIENT
Start: 2019-08-29 | End: 2019-09-04

## 2019-08-29 RX ORDER — INSULIN LISPRO 100/ML
VIAL (ML) SUBCUTANEOUS
Refills: 0 | Status: DISCONTINUED | OUTPATIENT
Start: 2019-08-29 | End: 2019-08-29

## 2019-08-29 RX ORDER — FUROSEMIDE 40 MG
20 TABLET ORAL ONCE
Refills: 0 | Status: COMPLETED | OUTPATIENT
Start: 2019-08-29 | End: 2019-08-29

## 2019-08-29 RX ORDER — INSULIN LISPRO 100/ML
VIAL (ML) SUBCUTANEOUS
Refills: 0 | Status: DISCONTINUED | OUTPATIENT
Start: 2019-08-29 | End: 2019-09-04

## 2019-08-29 RX ORDER — ASPIRIN/CALCIUM CARB/MAGNESIUM 324 MG
81 TABLET ORAL DAILY
Refills: 0 | Status: DISCONTINUED | OUTPATIENT
Start: 2019-08-30 | End: 2019-09-04

## 2019-08-29 RX ORDER — INSULIN LISPRO 100/ML
VIAL (ML) SUBCUTANEOUS EVERY 6 HOURS
Refills: 0 | Status: DISCONTINUED | OUTPATIENT
Start: 2019-08-29 | End: 2019-08-29

## 2019-08-29 RX ORDER — LOSARTAN POTASSIUM 100 MG/1
100 TABLET, FILM COATED ORAL DAILY
Refills: 0 | Status: DISCONTINUED | OUTPATIENT
Start: 2019-08-29 | End: 2019-09-04

## 2019-08-29 RX ORDER — TICAGRELOR 90 MG/1
90 TABLET ORAL EVERY 12 HOURS
Refills: 0 | Status: DISCONTINUED | OUTPATIENT
Start: 2019-08-29 | End: 2019-09-04

## 2019-08-29 RX ORDER — DEXTROSE 50 % IN WATER 50 %
25 SYRINGE (ML) INTRAVENOUS ONCE
Refills: 0 | Status: DISCONTINUED | OUTPATIENT
Start: 2019-08-29 | End: 2019-09-04

## 2019-08-29 RX ORDER — FUROSEMIDE 40 MG
20 TABLET ORAL DAILY
Refills: 0 | Status: DISCONTINUED | OUTPATIENT
Start: 2019-08-30 | End: 2019-09-04

## 2019-08-29 RX ORDER — INSULIN LISPRO 100/ML
VIAL (ML) SUBCUTANEOUS AT BEDTIME
Refills: 0 | Status: DISCONTINUED | OUTPATIENT
Start: 2019-08-29 | End: 2019-09-04

## 2019-08-29 RX ORDER — ACETAMINOPHEN 500 MG
1000 TABLET ORAL ONCE
Refills: 0 | Status: COMPLETED | OUTPATIENT
Start: 2019-08-29 | End: 2019-08-29

## 2019-08-29 RX ORDER — HEPARIN SODIUM 5000 [USP'U]/ML
5300 INJECTION INTRAVENOUS; SUBCUTANEOUS EVERY 6 HOURS
Refills: 0 | Status: DISCONTINUED | OUTPATIENT
Start: 2019-08-29 | End: 2019-08-29

## 2019-08-29 RX ORDER — DEXTROSE 50 % IN WATER 50 %
12.5 SYRINGE (ML) INTRAVENOUS ONCE
Refills: 0 | Status: DISCONTINUED | OUTPATIENT
Start: 2019-08-29 | End: 2019-09-04

## 2019-08-29 RX ORDER — HEPARIN SODIUM 5000 [USP'U]/ML
INJECTION INTRAVENOUS; SUBCUTANEOUS
Qty: 25000 | Refills: 0 | Status: DISCONTINUED | OUTPATIENT
Start: 2019-08-29 | End: 2019-08-29

## 2019-08-29 RX ORDER — CARVEDILOL PHOSPHATE 80 MG/1
3.12 CAPSULE, EXTENDED RELEASE ORAL EVERY 12 HOURS
Refills: 0 | Status: DISCONTINUED | OUTPATIENT
Start: 2019-08-29 | End: 2019-09-04

## 2019-08-29 RX ORDER — LATANOPROST 0.05 MG/ML
1 SOLUTION/ DROPS OPHTHALMIC; TOPICAL AT BEDTIME
Refills: 0 | Status: DISCONTINUED | OUTPATIENT
Start: 2019-08-29 | End: 2019-09-04

## 2019-08-29 RX ORDER — INSULIN LISPRO 100/ML
VIAL (ML) SUBCUTANEOUS AT BEDTIME
Refills: 0 | Status: DISCONTINUED | OUTPATIENT
Start: 2019-08-29 | End: 2019-08-29

## 2019-08-29 RX ORDER — HEPARIN SODIUM 5000 [USP'U]/ML
5000 INJECTION INTRAVENOUS; SUBCUTANEOUS ONCE
Refills: 0 | Status: COMPLETED | OUTPATIENT
Start: 2019-08-29 | End: 2019-08-29

## 2019-08-29 RX ORDER — ASPIRIN/CALCIUM CARB/MAGNESIUM 324 MG
325 TABLET ORAL ONCE
Refills: 0 | Status: COMPLETED | OUTPATIENT
Start: 2019-08-29 | End: 2019-08-29

## 2019-08-29 RX ADMIN — Medication 400 MILLIGRAM(S): at 22:43

## 2019-08-29 RX ADMIN — LATANOPROST 1 DROP(S): 0.05 SOLUTION/ DROPS OPHTHALMIC; TOPICAL at 22:43

## 2019-08-29 RX ADMIN — CARVEDILOL PHOSPHATE 3.12 MILLIGRAM(S): 80 CAPSULE, EXTENDED RELEASE ORAL at 18:11

## 2019-08-29 RX ADMIN — HEPARIN SODIUM 5000 UNIT(S): 5000 INJECTION INTRAVENOUS; SUBCUTANEOUS at 02:24

## 2019-08-29 RX ADMIN — ATORVASTATIN CALCIUM 80 MILLIGRAM(S): 80 TABLET, FILM COATED ORAL at 22:43

## 2019-08-29 RX ADMIN — TICAGRELOR 90 MILLIGRAM(S): 90 TABLET ORAL at 05:57

## 2019-08-29 RX ADMIN — HEPARIN SODIUM 900 UNIT(S)/HR: 5000 INJECTION INTRAVENOUS; SUBCUTANEOUS at 14:58

## 2019-08-29 RX ADMIN — Medication 20 MILLIGRAM(S): at 13:29

## 2019-08-29 RX ADMIN — HEPARIN SODIUM 900 UNIT(S)/HR: 5000 INJECTION INTRAVENOUS; SUBCUTANEOUS at 07:43

## 2019-08-29 RX ADMIN — CARVEDILOL PHOSPHATE 3.12 MILLIGRAM(S): 80 CAPSULE, EXTENDED RELEASE ORAL at 05:57

## 2019-08-29 RX ADMIN — LOSARTAN POTASSIUM 100 MILLIGRAM(S): 100 TABLET, FILM COATED ORAL at 05:57

## 2019-08-29 RX ADMIN — Medication 1000 MILLIGRAM(S): at 23:30

## 2019-08-29 RX ADMIN — INSULIN GLARGINE 30 UNIT(S): 100 INJECTION, SOLUTION SUBCUTANEOUS at 21:47

## 2019-08-29 RX ADMIN — TICAGRELOR 90 MILLIGRAM(S): 90 TABLET ORAL at 18:11

## 2019-08-29 RX ADMIN — Medication 325 MILLIGRAM(S): at 02:24

## 2019-08-29 RX ADMIN — HEPARIN SODIUM 1000 UNIT(S)/HR: 5000 INJECTION INTRAVENOUS; SUBCUTANEOUS at 02:25

## 2019-08-29 NOTE — H&P ADULT - NSHPSOCIALHISTORY_GEN_ALL_CORE
Lives at home with son or daughter.  Visits family in [place].  Former accountaint.  Denies etoh, tobacco, drugs

## 2019-08-29 NOTE — DISCHARGE NOTE PROVIDER - HOSPITAL COURSE
78 yo F hx of CAD s/p stents 2006, 2019, T2DM, CVA, HTN/HLD p/w chest pain        Hospital Course: Ms. Olivo is an 79 year old woman with a history of CAD s/p stents (2006 LAD/LCx and more recently in 6/2019 in [place]), DM, HTN, HLD, prior CVA who presents with chest pain, palpitations, and shortness of breath. Heparin gtt started for concern for unstable angina. Ms. Olivo is an 79 year old woman with a history of CAD s/p stents (2006 LAD/LCx and more recently in 6/2019 in [place]), DM, HTN, HLD, prior CVA who presents with chest pain, palpitations, and shortness of breath. Heparin gtt started for concern for unstable angina. No significant cardiac enzyme delta and Heparin discontinued. A CTA chest was obtained which ruled out a pulmonary embolus. Pt was started on IV Lasix for volume status. Echocardiogram with Normal left ventricular systolic function. NST obtained which was  abnormal showing anterolateral ischemia, pt was then taken for an angiogram which showed non-osbstructive disease. Pt is now medically cleared for discharge home with outpt cardiology follow up.

## 2019-08-29 NOTE — H&P ADULT - PROBLEM SELECTOR PLAN 2
-Management of possible ACS as above  -Continue prior home coreg, ticag, losartan to replace valsart, ASA  -Check A1c, lipids  -[facility] 3/2017 w/ patent LAD/L Cx stents and <30% stenosis throughout

## 2019-08-29 NOTE — H&P ADULT - PROBLEM SELECTOR PLAN 1
-History concerning for new onset angina which would qualify for unstable angina  -Recent stents per patient - patient reports compliance with DAPT.    -EKG w diffuse T wave flattening.  Troponin 24 x2.  -.3  -CT PE/LED w/o PE or DVT  -NGUYEN score 4, Heart score 6  -Will continue ASA/brillinta/carvedilol/losartan to replace valsart  -Add atorvastatin 80 and check lipids  -Check A1c  -No current chest pain but if chest pain would start nitroglycerin  -Start heparin gtt and keep NPO for possible stress or cath

## 2019-08-29 NOTE — DISCHARGE NOTE PROVIDER - CARE PROVIDER_API CALL
Oma Dumont (MD)  Cardiovascular Disease; Internal Medicine; Interventional Cardiology  [address]  [place] [address]  Phone: [phone]  Fax: [phone]  Follow Up Time: Lyndsey Gilbert (MD)  Medicine  [address] [facility] [place], [address]  [place] [address]  Phone: [phone]  Fax: [phone]  Follow Up Time:

## 2019-08-29 NOTE — H&P ADULT - HISTORY OF PRESENT ILLNESS
Ms. Olivo is an 79 year old woman with a history of CAD s/p stents (2006 LAD/LCx and more recently in 6/2019 in [place]), DM, HTN, HLD, prior CVA who presents with chest pain.    She reports she had a syncopal event in [place] back in June and was admitted to the hospital and had stents placed in her heart.  She was doing ok until about 1 week ago, when she noticed chest tightness, shortness of breath, palpitations associated with exertion.  This would happen with light exertion, last for about 10-15 minutes, and improve with rest.  No associated nausea, vomitting, diaphoresis but possibly some radiation to the left jaw and b/l shoulders.  She rates the pain as a 7-8/10 and feels she probably had more than 5 episodes of pain a day always associated with exertion and none at rest.  She also noticed some chronic LLE swelling.  She went in to see her PCP yesterday and was directed to [facility] ED.    In the ED, she was found to be hypertensive at 140-160s with otherwise unremarkable vitals.  Diagnostics revealed negative troponins x2 over 2 hours.  EKG showed old Q waves in L and V2 and new diffuse nonspecific t wave flattening.  BNP was 332.5.  CT PE/LED showed no DVT or PE.  A RUQ ultrasound was also performed and showed no cholelithiasis or cholecystitis.  She was given apap and admitted to medicine for further evaluation.    On evaluation, she gave the above history and denied current chest pain.  She denies missing any doses of her medicines except for the PM doses yesterday when she was in the hospital. Ms. Olivo is an 79 year old woman with a history of CAD s/p stents (2006 LAD/LCx and more recently in 6/2019 in [place]), DM, HTN, HLD, prior CVA who presents with chest pain.    She reports she had a syncopal event in [place] back in June and was admitted to the hospital and had stents placed in her heart.  She was doing ok until about 1 week ago, when she noticed chest tightness, shortness of breath, palpitations associated with exertion.  This would happen with light exertion, last for about 10-15 minutes, and improve with rest.  No associated nausea, vomitting, diaphoresis but possibly some radiation to the left jaw and b/l shoulders.  She rates the pain as a 7-8/10 and feels she probably had more than 5 episodes of pain a day always associated with exertion and none at rest.  She also noticed some chronic LLE swelling.  Denies previous chest pain with exertion.  She went in to see her PCP yesterday and was directed to [facility] ED.    In the ED, she was found to be hypertensive at 140-160s with otherwise unremarkable vitals.  Diagnostics revealed negative troponins x2 over 2 hours.  EKG showed old Q waves in L and V2 and new diffuse nonspecific t wave flattening.  BNP was 332.5.  CT PE/LED showed no DVT or PE.  A RUQ ultrasound was also performed and showed no cholelithiasis or cholecystitis.  She was given apap and admitted to medicine for further evaluation.    On evaluation, she gave the above history and denied current chest pain.  She denies missing any doses of her medicines except for the PM doses yesterday when she was in the hospital.

## 2019-08-29 NOTE — H&P ADULT - NSHPLABSRESULTS_GEN_ALL_CORE
Diagnostics reviewed and remarkable for:  CBC wnl  Coags wnl  BMP/LFTs wnl  Trop 24 x2  Lipase 14.5  .3  CT pe negative for PE and with bibasilar streaky atelectasis  LED negative for DVT  RUQ w/o cholelithiasis or acute cholecystitis  EKG personally reviewed and Q wave in AVL/V2 old.  Diffuse T wave flattening.  NSR.  Loss of R wave progression in V3

## 2019-08-29 NOTE — CONSULT NOTE ADULT - ATTENDING COMMENTS
Patient seen and examined.  Agree with above.   -Admitted with chest pain and orthopnea for several weeks  -no significant delta trop  -can therefore dc hep  -check cpk  -check tte  -obtain prior cath report    Oma Dumont MD

## 2019-08-29 NOTE — CONSULT NOTE ADULT - SUBJECTIVE AND OBJECTIVE BOX
HISTORY OF PRESENT ILLNESS: HPI:    79 year old woman with a history of CAD s/p stents (2006 LAD/LCx and more recently in 6/2019 in [place] - report in chart), DM, HTN, HLD, prior CVA who presents with chest pain.  She reports she had a syncopal event in [place] (@ [facility] [place]) in June, now s/p cardiac cath w/1 stent.  She was doing ok until about 1 week ago, when she noticed chest tightness, shortness of breath, palpitations associated with exertion.  Endorses sob and manzo relieved by rest.  No associated nausea, vomiting, diaphoresis but possibly some radiation to the left jaw and b/l shoulders.  She rates the pain as a 7-8/10 and feels she probably had more than 5 episodes of pain a day always associated with exertion. Presents with chronic LLE edema on lasix per PMD. Denies cough, palpitations, dizziness, lightheadedness,       PAST MEDICAL & SURGICAL HISTORY:  CHF (congestive heart failure)  HLD (hyperlipidemia)  CVA (cerebral vascular accident): no residual deficits  GERD (gastroesophageal reflux disease)  DM (diabetes mellitus)  CAD (coronary artery disease): S/P stent placemenet 2006, and reportedly 6/2019 in [place]  HTN (hypertension)  S/P TKR (total knee replacement): left  S/P primary angioplasty with coronary stent: x1 in 2006 at [facility]      MEDICATIONS:  MEDICATIONS  (STANDING):  atorvastatin 80 milliGRAM(s) Oral at bedtime  carvedilol 3.125 milliGRAM(s) Oral every 12 hours  dextrose 5%. 1000 milliLiter(s) (50 mL/Hr) IV Continuous <Continuous>  dextrose 50% Injectable 12.5 Gram(s) IV Push once  dextrose 50% Injectable 25 Gram(s) IV Push once  dextrose 50% Injectable 25 Gram(s) IV Push once  insulin glargine Injectable (LANTUS) 30 Unit(s) SubCutaneous at bedtime  insulin lispro (HumaLOG) corrective regimen sliding scale   SubCutaneous every 6 hours  latanoprost 0.005% Ophthalmic Solution 1 Drop(s) Both EYES at bedtime  losartan 100 milliGRAM(s) Oral daily  ticagrelor 90 milliGRAM(s) Oral every 12 hours    Allergies    No Known Allergies    Intolerances      FAMILY HISTORY:  FH: diabetes mellitus    Non-contributary for premature coronary disease or sudden cardiac death    SOCIAL HISTORY:    [ X] Non-smoker  [ ] Smoker  [ ] Alcohol    REVIEW OF SYSTEMS:  [X ]chest pain  [ X ]shortness of breath  [  ]palpitations  [  ]syncope  [ ]near syncope [ ]upper extremity weakness   [ ] lower extremity weakness  [  ]diplopia  [  ]altered mental status   [  ]fevers  [ ]chills [ ]nausea  [ ]vomitting  [  ]dysphagia    [ ]abdominal pain  [ ]melena  [ ]BRBPR    [  ]epistaxis  [  ]rash    [ ]lower extremity edema        [ ] All others negative	  [ ] Unable to obtain    LABS:	 	    CARDIAC MARKERS:                          11.8   6.27  )-----------( 210      ( 29 Aug 2019 13:52 )             36.8     Hb Trend: 11.8<--, 12.0<--, 12.3<--    08-29    138  |  101  |  13  ----------------------------<  155<H>  3.5   |  23  |  0.83    Ca    9.1      29 Aug 2019 06:22    TPro  8.6<H>  /  Alb  4.6  /  TBili  0.6  /  DBili  x   /  AST  17  /  ALT  13  /  AlkPhos  79  08-28    Creatinine Trend: 0.83<--, 0.89<--    Coags:  PT/INR - ( 28 Aug 2019 20:09 )   PT: 11.6 SEC;   INR: 1.02        PTT - ( 29 Aug 2019 13:52 )  PTT:58.3 SEC    proBNP: Serum Pro-Brain Natriuretic Peptide: 332.3 pg/mL (08-28 @ 20:09)    Lipid Profile:   HgA1c:   TSH:         PHYSICAL EXAM:  T(C): 36.7 (08-29-19 @ 11:26), Max: 37.1 (08-28-19 @ 20:37)  HR: 59 (08-29-19 @ 11:26) (59 - 67)  BP: 129/69 (08-29-19 @ 11:26) (129/69 - 168/76)  RR: 18 (08-29-19 @ 11:26) (16 - 18)  SpO2: 99% (08-29-19 @ 11:26) (97% - 100%)  Wt(kg): --  I&O's Summary    28 Aug 2019 07:01  -  29 Aug 2019 07:00  --------------------------------------------------------  IN: 0 mL / OUT: 0 mL / NET: 0 mL        Gen: Appears well in NAD  HEENT:  (-)icterus (-)pallor  CV: N S1 S2 1/6 SHANIQUA (+)2 Pulses B/l  Resp:  diminished BS B/L, normal effort  GI: (+) BS Soft, NT, ND  Lymph:  (-)Edema, (-)obvious lymphadenopathy  Skin: Warm to touch, Normal turgor  Psych: Appropriate mood and affect    TELEMETRY: 	NSR 64, no events overnight       ECG:  	    < from: 12 Lead ECG (08.28.19 @ 15:58) >  Normal sinus rhythm  Moderate voltage criteria for LVH, may be normal variant  Cannot rule out Septal infarct , age undetermined  Abnormal ECG    < end of copied text >    RADIOLOGY:         CXR:   < from: Xray Chest 1 View AP/PA (11.14.17 @ 22:15) >  IMPRESSION: Clear lungs.    HIREN MCCONNELL M.D., RADIOLOGY RESIDENT  This document has been electronically signed.  LUIZ BALDWIN M.D.; ATTENDING RADIOLOGIST  This document has been electronically signed. Nov 15 2017  8:15AM    < end of copied text >    < from: Transthoracic Echocardiogram (11.17.17 @ 13:59) >  CONCLUSIONS:  1. Mitral annular calcification, otherwise normal mitral  valve. Minimal mitral regurgitation.  2. Normal left ventricular systolic function. No segmental  wall motion abnormalities.  3. Normal right ventricular size and function.  ------------------------------------------------------------------------  Confirmed on  11/17/2017 - 16:14:55 by Rohan Varma M.D.    < end of copied text >      ASSESSMENT/PLAN: 	    79 year old woman with a history of CAD s/p stents (2006 LAD/LCx and more recently in 6/2019 in [place] @ [facility] [place]), DM, HTN, HLD, prior CVA who presents with chest pain. Cardiology consulted for r/o ACS, management of CAD s/[place] stents [place] hospital.    -- no cp now, sob improved  -- trop 24/24/24, indeterminant  -- last echo as noted above 2017, nml lv function, no segmental wma  -- check repeat echo eval lv function, structural heart  -- recent cath 5/2019 reports to be faxed from [facility] [place] in [place]  -- cont ASA and Brilinta recent stent, CAD   -- report to be reviewed w/attending  -- further cardiac work up pending above HISTORY OF PRESENT ILLNESS: HPI:    79 year old woman with a history of CAD s/p stents (2006 LAD/LCx and more recently in 6/2019 in [place] - report in chart), DM, HTN, HLD, prior CVA who presents with chest pain.  She reports she had a syncopal event in [place] (@ [facility]) in June, now s/p cardiac cath w/1 stent.  She was doing ok until about 1 week ago, when she noticed chest tightness, shortness of breath, palpitations associated with exertion.  Endorses sob and manzo relieved by rest.  No associated nausea, vomiting, diaphoresis but possibly some radiation to the left jaw and b/l shoulders.  She rates the pain as a 7-8/10 and feels she probably had more than 5 episodes of pain a day always associated with exertion. Presents with chronic LLE edema on lasix per PMD. Denies cough, palpitations, dizziness, lightheadedness,       PAST MEDICAL & SURGICAL HISTORY:  CHF (congestive heart failure)  HLD (hyperlipidemia)  CVA (cerebral vascular accident): no residual deficits  GERD (gastroesophageal reflux disease)  DM (diabetes mellitus)  CAD (coronary artery disease): S/P stent placemenet 2006, and reportedly 6/2019 in [place]  HTN (hypertension)  S/P TKR (total knee replacement): left  S/P primary angioplasty with coronary stent: x1 in 2006 at [facility]      MEDICATIONS:  MEDICATIONS  (STANDING):  atorvastatin 80 milliGRAM(s) Oral at bedtime  carvedilol 3.125 milliGRAM(s) Oral every 12 hours  dextrose 5%. 1000 milliLiter(s) (50 mL/Hr) IV Continuous <Continuous>  dextrose 50% Injectable 12.5 Gram(s) IV Push once  dextrose 50% Injectable 25 Gram(s) IV Push once  dextrose 50% Injectable 25 Gram(s) IV Push once  insulin glargine Injectable (LANTUS) 30 Unit(s) SubCutaneous at bedtime  insulin lispro (HumaLOG) corrective regimen sliding scale   SubCutaneous every 6 hours  latanoprost 0.005% Ophthalmic Solution 1 Drop(s) Both EYES at bedtime  losartan 100 milliGRAM(s) Oral daily  ticagrelor 90 milliGRAM(s) Oral every 12 hours    Allergies    No Known Allergies    Intolerances      FAMILY HISTORY:  FH: diabetes mellitus    Non-contributary for premature coronary disease or sudden cardiac death    SOCIAL HISTORY:    [ X] Non-smoker  [ ] Smoker  [ ] Alcohol    REVIEW OF SYSTEMS:  [X ]chest pain  [ X ]shortness of breath  [  ]palpitations  [  ]syncope  [ ]near syncope [ ]upper extremity weakness   [ ] lower extremity weakness  [  ]diplopia  [  ]altered mental status   [  ]fevers  [ ]chills [ ]nausea  [ ]vomitting  [  ]dysphagia    [ ]abdominal pain  [ ]melena  [ ]BRBPR    [  ]epistaxis  [  ]rash    [ ]lower extremity edema        [ ] All others negative	  [ ] Unable to obtain    LABS:	 	    CARDIAC MARKERS:                          11.8   6.27  )-----------( 210      ( 29 Aug 2019 13:52 )             36.8     Hb Trend: 11.8<--, 12.0<--, 12.3<--    08-29    138  |  101  |  13  ----------------------------<  155<H>  3.5   |  23  |  0.83    Ca    9.1      29 Aug 2019 06:22    TPro  8.6<H>  /  Alb  4.6  /  TBili  0.6  /  DBili  x   /  AST  17  /  ALT  13  /  AlkPhos  79  08-28    Creatinine Trend: 0.83<--, 0.89<--    Coags:  PT/INR - ( 28 Aug 2019 20:09 )   PT: 11.6 SEC;   INR: 1.02        PTT - ( 29 Aug 2019 13:52 )  PTT:58.3 SEC    proBNP: Serum Pro-Brain Natriuretic Peptide: 332.3 pg/mL (08-28 @ 20:09)    Lipid Profile:   HgA1c:   TSH:         PHYSICAL EXAM:  T(C): 36.7 (08-29-19 @ 11:26), Max: 37.1 (08-28-19 @ 20:37)  HR: 59 (08-29-19 @ 11:26) (59 - 67)  BP: 129/69 (08-29-19 @ 11:26) (129/69 - 168/76)  RR: 18 (08-29-19 @ 11:26) (16 - 18)  SpO2: 99% (08-29-19 @ 11:26) (97% - 100%)  Wt(kg): --  I&O's Summary    28 Aug 2019 07:01  -  29 Aug 2019 07:00  --------------------------------------------------------  IN: 0 mL / OUT: 0 mL / NET: 0 mL        Gen: Appears well in NAD  HEENT:  (-)icterus (-)pallor  CV: N S1 S2 1/6 SHANIQUA (+)2 Pulses B/l  Resp:  diminished BS B/L, normal effort  GI: (+) BS Soft, NT, ND  Lymph:  (-)Edema, (-)obvious lymphadenopathy  Skin: Warm to touch, Normal turgor  Psych: Appropriate mood and affect    TELEMETRY: 	NSR 64, no events overnight       ECG:  	    < from: 12 Lead ECG (08.28.19 @ 15:58) >  Normal sinus rhythm  Moderate voltage criteria for LVH, may be normal variant  Cannot rule out Septal infarct , age undetermined  Abnormal ECG    < end of copied text >    RADIOLOGY:         CXR:   < from: Xray Chest 1 View AP/PA (11.14.17 @ 22:15) >  IMPRESSION: Clear lungs.    HIREN MCCONNELL M.D., RADIOLOGY RESIDENT  This document has been electronically signed.  LUIZ BALDWIN M.D.; ATTENDING RADIOLOGIST  This document has been electronically signed. Nov 15 2017  8:15AM    < end of copied text >    < from: Transthoracic Echocardiogram (11.17.17 @ 13:59) >  CONCLUSIONS:  1. Mitral annular calcification, otherwise normal mitral  valve. Minimal mitral regurgitation.  2. Normal left ventricular systolic function. No segmental  wall motion abnormalities.  3. Normal right ventricular size and function.  ------------------------------------------------------------------------  Confirmed on  11/17/2017 - 16:14:55 by Rohan Varma M.D.    < end of copied text >      ASSESSMENT/PLAN: 	    79 year old woman with a history of CAD s/p stents (2006 LAD/LCx and more recently in 6/2019 in [place] @ [facility]), DM, HTN, HLD, prior CVA who presents with chest pain. Cardiology consulted for r/o ACS, management of CAD s/p stents [place] hospital.    -- no cp now, sob improved  -- c/o manzo, vol overload on exam, le pitting edema, start iv lasix 20 mg daily for now  -- trop 24/24/24, indeterminant  -- last echo as noted above 2017, nml lv function, no segmental wma  -- check repeat echo eval lv function, structural heart  -- recent cath 5/2019 reports to be faxed from [facility] in [place]  -- cont ASA and Brilinta recent stent, CAD   -- report to be reviewed w/attending  -- further cardiac work up pending above

## 2019-08-29 NOTE — H&P ADULT - NSHPPHYSICALEXAM_GEN_ALL_CORE
Physical exam:  Constitutional-Vitals signs reviewed and remarkable for hypertensive to 140-160s, awake, alert, not in acute distress  Neuro-awake, alert, appropriately interactive, moving all extremities,  face symmetric  Eyes-pupils equally round and reactive to light, non icteric, no discharge noted  Ears, nose, mouth, throat-no abnormal discharge, moist mucous membranes, oropharynx clear without noted exudate  CV-regular rate and rhythm, no rubs, murmurs, gallops appreciated, trace bilateral pitting extremity edema, increased nonpitting edema on the left, palpable distal pulses (radial, dorsalis pedis, posterior tibial)  Resp-clear to auscultation bilaterally, normal respiratory effort,   GI-abdomen soft and nontender, no rebound or guarding present  -not examined  MSK-normal range of motion of bilateral elbows and knees, no obvious deformities   Skin-warm, dry, no rash appreciated  Psych-calm, cooperative, normal affect, not attending to internal stimuli

## 2019-08-29 NOTE — DISCHARGE NOTE PROVIDER - NSDCCPCAREPLAN_GEN_ALL_CORE_FT
PRINCIPAL DISCHARGE DIAGNOSIS  Diagnosis: Chest pain, unspecified type  Assessment and Plan of Treatment:       SECONDARY DISCHARGE DIAGNOSES  Diagnosis: Dyspnea  Assessment and Plan of Treatment: PRINCIPAL DISCHARGE DIAGNOSIS  Diagnosis: Chest pain, unspecified type  Assessment and Plan of Treatment: Your stress test was abnormal , you underwent a cardiac cath on *** it showed****  Follow up with ***  any new meds for discharge ---?      SECONDARY DISCHARGE DIAGNOSES  Diagnosis: Dyspnea  Assessment and Plan of Treatment: as above  CT Chest negative  likely cardiac cause PRINCIPAL DISCHARGE DIAGNOSIS  Diagnosis: Chest pain, unspecified type  Assessment and Plan of Treatment: Your stress test was abnormal , you underwent an angiogram which showed some atherosclerosis . Continue taking your medications and follow up with your PCP.      SECONDARY DISCHARGE DIAGNOSES  Diagnosis: Dyspnea  Assessment and Plan of Treatment: You had a CT chest which resulted okay.

## 2019-08-29 NOTE — H&P ADULT - NSICDXPASTMEDICALHX_GEN_ALL_CORE_FT
PAST MEDICAL HISTORY:  CAD (coronary artery disease) S/P stent placemenet 2006, and reportedly 6/2019 in [place]    CHF (congestive heart failure)     CVA (cerebral vascular accident) no residual deficits    DM (diabetes mellitus)     GERD (gastroesophageal reflux disease)     HLD (hyperlipidemia)     HTN (hypertension)

## 2019-08-29 NOTE — H&P ADULT - ASSESSMENT
79 year old woman with a history of CAD s/p stents (2006 LAD/LCx and more recently in 6/2019 in [place]), DM, HTN, HLD, prior CVA who presents with chest pain.

## 2019-08-29 NOTE — H&P ADULT - PROBLEM SELECTOR PLAN 5
Reportedly with history of CHF  TTE 11/2017 w/ normal EF  -Hold home lasix in case need for cath with contrast for kidney protection  -Monitor volume status  -Daily weight; I&Os

## 2019-08-29 NOTE — H&P ADULT - NSICDXPASTSURGICALHX_GEN_ALL_CORE_FT
PAST SURGICAL HISTORY:  S/P primary angioplasty with coronary stent x1 in 2006 at [facility]    S/P TKR (total knee replacement) left

## 2019-08-29 NOTE — H&P ADULT - NSHPREVIEWOFSYSTEMS_GEN_ALL_CORE
ROS:  Constitutional:  (+) fatigue; (-) fevers, chills, sweats, unintentional weight loss, sick contacts, recent travel, trauma  Ears/Nose/Mouth/Throat: (+) none ; (-) throat pain, rhinorrhea, dysphagia/odynophagia  CV: (+) chest pain, chronic lower extremity edema, palpitations; (-) chest pain orthopnea, PND  Resp: (+) shortness of breath; (-) cough  GI: (+) chronic RUQ abdominal pain, occasional diarrhea ; (-) nausea, vomitting, constipation, melana, hematochezia  : (+) none; (-) dysuria, hematuria  MSK: (+) none; (-) joint pain  Skin: (+) none; (-) rash  Neuro: (+) none; (-) HA, vision changes, weakness, confusion, lightheadedness/dizziness  Endo: (+) none; (-) heat/cold intolerance  Heme/Lymph: (+) none; (-) swollen lymph nodes

## 2019-08-30 LAB
ANION GAP SERPL CALC-SCNC: 13 MMO/L — SIGNIFICANT CHANGE UP (ref 7–14)
BUN SERPL-MCNC: 15 MG/DL — SIGNIFICANT CHANGE UP (ref 7–23)
CALCIUM SERPL-MCNC: 9.4 MG/DL — SIGNIFICANT CHANGE UP (ref 8.4–10.5)
CHLORIDE SERPL-SCNC: 102 MMOL/L — SIGNIFICANT CHANGE UP (ref 98–107)
CO2 SERPL-SCNC: 24 MMOL/L — SIGNIFICANT CHANGE UP (ref 22–31)
CREAT SERPL-MCNC: 0.94 MG/DL — SIGNIFICANT CHANGE UP (ref 0.5–1.3)
GLUCOSE BLDC GLUCOMTR-MCNC: 116 MG/DL — HIGH (ref 70–99)
GLUCOSE BLDC GLUCOMTR-MCNC: 158 MG/DL — HIGH (ref 70–99)
GLUCOSE BLDC GLUCOMTR-MCNC: 171 MG/DL — HIGH (ref 70–99)
GLUCOSE BLDC GLUCOMTR-MCNC: 196 MG/DL — HIGH (ref 70–99)
GLUCOSE SERPL-MCNC: 156 MG/DL — HIGH (ref 70–99)
HBA1C BLD-MCNC: 6.6 % — HIGH (ref 4–5.6)
POTASSIUM SERPL-MCNC: 3.5 MMOL/L — SIGNIFICANT CHANGE UP (ref 3.5–5.3)
POTASSIUM SERPL-SCNC: 3.5 MMOL/L — SIGNIFICANT CHANGE UP (ref 3.5–5.3)
SODIUM SERPL-SCNC: 139 MMOL/L — SIGNIFICANT CHANGE UP (ref 135–145)

## 2019-08-30 PROCEDURE — 93306 TTE W/DOPPLER COMPLETE: CPT | Mod: 26

## 2019-08-30 RX ORDER — ACETAMINOPHEN 500 MG
650 TABLET ORAL EVERY 6 HOURS
Refills: 0 | Status: DISCONTINUED | OUTPATIENT
Start: 2019-08-30 | End: 2019-09-04

## 2019-08-30 RX ADMIN — Medication 81 MILLIGRAM(S): at 12:33

## 2019-08-30 RX ADMIN — ATORVASTATIN CALCIUM 80 MILLIGRAM(S): 80 TABLET, FILM COATED ORAL at 21:34

## 2019-08-30 RX ADMIN — Medication 20 MILLIGRAM(S): at 05:40

## 2019-08-30 RX ADMIN — INSULIN GLARGINE 30 UNIT(S): 100 INJECTION, SOLUTION SUBCUTANEOUS at 21:34

## 2019-08-30 RX ADMIN — CARVEDILOL PHOSPHATE 3.12 MILLIGRAM(S): 80 CAPSULE, EXTENDED RELEASE ORAL at 05:40

## 2019-08-30 RX ADMIN — LATANOPROST 1 DROP(S): 0.05 SOLUTION/ DROPS OPHTHALMIC; TOPICAL at 21:34

## 2019-08-30 RX ADMIN — LOSARTAN POTASSIUM 100 MILLIGRAM(S): 100 TABLET, FILM COATED ORAL at 05:40

## 2019-08-30 RX ADMIN — Medication 650 MILLIGRAM(S): at 14:48

## 2019-08-30 RX ADMIN — Medication 650 MILLIGRAM(S): at 21:34

## 2019-08-30 RX ADMIN — Medication 1: at 09:09

## 2019-08-30 RX ADMIN — Medication 650 MILLIGRAM(S): at 22:25

## 2019-08-30 RX ADMIN — Medication 650 MILLIGRAM(S): at 13:53

## 2019-08-30 RX ADMIN — TICAGRELOR 90 MILLIGRAM(S): 90 TABLET ORAL at 05:40

## 2019-08-30 RX ADMIN — Medication 1: at 12:32

## 2019-08-30 NOTE — PROGRESS NOTE ADULT - ATTENDING COMMENTS
Patient seen and examined.  Agree with above.  -continue with dapt  -check tte to evaluate LV function  -obtain cath report  -further workup pending above    Oma Dumont MD

## 2019-08-31 LAB
ANION GAP SERPL CALC-SCNC: 14 MMO/L — SIGNIFICANT CHANGE UP (ref 7–14)
BUN SERPL-MCNC: 18 MG/DL — SIGNIFICANT CHANGE UP (ref 7–23)
CALCIUM SERPL-MCNC: 9.4 MG/DL — SIGNIFICANT CHANGE UP (ref 8.4–10.5)
CHLORIDE SERPL-SCNC: 104 MMOL/L — SIGNIFICANT CHANGE UP (ref 98–107)
CO2 SERPL-SCNC: 24 MMOL/L — SIGNIFICANT CHANGE UP (ref 22–31)
CREAT SERPL-MCNC: 0.92 MG/DL — SIGNIFICANT CHANGE UP (ref 0.5–1.3)
GLUCOSE BLDC GLUCOMTR-MCNC: 134 MG/DL — HIGH (ref 70–99)
GLUCOSE BLDC GLUCOMTR-MCNC: 138 MG/DL — HIGH (ref 70–99)
GLUCOSE BLDC GLUCOMTR-MCNC: 149 MG/DL — HIGH (ref 70–99)
GLUCOSE BLDC GLUCOMTR-MCNC: 176 MG/DL — HIGH (ref 70–99)
GLUCOSE SERPL-MCNC: 156 MG/DL — HIGH (ref 70–99)
HCT VFR BLD CALC: 35.1 % — SIGNIFICANT CHANGE UP (ref 34.5–45)
HGB BLD-MCNC: 10.8 G/DL — LOW (ref 11.5–15.5)
MCHC RBC-ENTMCNC: 27.4 PG — SIGNIFICANT CHANGE UP (ref 27–34)
MCHC RBC-ENTMCNC: 30.8 % — LOW (ref 32–36)
MCV RBC AUTO: 89.1 FL — SIGNIFICANT CHANGE UP (ref 80–100)
NRBC # FLD: 0 K/UL — SIGNIFICANT CHANGE UP (ref 0–0)
PLATELET # BLD AUTO: 198 K/UL — SIGNIFICANT CHANGE UP (ref 150–400)
PMV BLD: 11.6 FL — SIGNIFICANT CHANGE UP (ref 7–13)
POTASSIUM SERPL-MCNC: 3.6 MMOL/L — SIGNIFICANT CHANGE UP (ref 3.5–5.3)
POTASSIUM SERPL-SCNC: 3.6 MMOL/L — SIGNIFICANT CHANGE UP (ref 3.5–5.3)
RBC # BLD: 3.94 M/UL — SIGNIFICANT CHANGE UP (ref 3.8–5.2)
RBC # FLD: 13.6 % — SIGNIFICANT CHANGE UP (ref 10.3–14.5)
SODIUM SERPL-SCNC: 142 MMOL/L — SIGNIFICANT CHANGE UP (ref 135–145)
WBC # BLD: 6.08 K/UL — SIGNIFICANT CHANGE UP (ref 3.8–10.5)
WBC # FLD AUTO: 6.08 K/UL — SIGNIFICANT CHANGE UP (ref 3.8–10.5)

## 2019-08-31 RX ORDER — POTASSIUM CHLORIDE 20 MEQ
20 PACKET (EA) ORAL DAILY
Refills: 0 | Status: DISCONTINUED | OUTPATIENT
Start: 2019-08-31 | End: 2019-09-04

## 2019-08-31 RX ADMIN — Medication 20 MILLIEQUIVALENT(S): at 13:16

## 2019-08-31 RX ADMIN — Medication 1: at 13:17

## 2019-08-31 RX ADMIN — Medication 20 MILLIGRAM(S): at 06:18

## 2019-08-31 RX ADMIN — TICAGRELOR 90 MILLIGRAM(S): 90 TABLET ORAL at 06:18

## 2019-08-31 RX ADMIN — Medication 650 MILLIGRAM(S): at 21:51

## 2019-08-31 RX ADMIN — LATANOPROST 1 DROP(S): 0.05 SOLUTION/ DROPS OPHTHALMIC; TOPICAL at 21:45

## 2019-08-31 RX ADMIN — Medication 650 MILLIGRAM(S): at 09:21

## 2019-08-31 RX ADMIN — Medication 650 MILLIGRAM(S): at 22:21

## 2019-08-31 RX ADMIN — CARVEDILOL PHOSPHATE 3.12 MILLIGRAM(S): 80 CAPSULE, EXTENDED RELEASE ORAL at 18:21

## 2019-08-31 RX ADMIN — INSULIN GLARGINE 30 UNIT(S): 100 INJECTION, SOLUTION SUBCUTANEOUS at 21:42

## 2019-08-31 RX ADMIN — LOSARTAN POTASSIUM 100 MILLIGRAM(S): 100 TABLET, FILM COATED ORAL at 06:18

## 2019-08-31 RX ADMIN — CARVEDILOL PHOSPHATE 3.12 MILLIGRAM(S): 80 CAPSULE, EXTENDED RELEASE ORAL at 06:18

## 2019-08-31 RX ADMIN — Medication 81 MILLIGRAM(S): at 13:17

## 2019-08-31 RX ADMIN — ATORVASTATIN CALCIUM 80 MILLIGRAM(S): 80 TABLET, FILM COATED ORAL at 21:45

## 2019-08-31 RX ADMIN — Medication 650 MILLIGRAM(S): at 10:30

## 2019-08-31 RX ADMIN — TICAGRELOR 90 MILLIGRAM(S): 90 TABLET ORAL at 18:21

## 2019-08-31 NOTE — PROGRESS NOTE ADULT - ATTENDING COMMENTS
Patient seen and examined.  Agree with above.   -TTE with normal LV function  -obtain prior cath report  -check nst      Oma Dumont MD

## 2019-09-01 LAB
ANION GAP SERPL CALC-SCNC: 13 MMO/L — SIGNIFICANT CHANGE UP (ref 7–14)
BUN SERPL-MCNC: 20 MG/DL — SIGNIFICANT CHANGE UP (ref 7–23)
CALCIUM SERPL-MCNC: 9.3 MG/DL — SIGNIFICANT CHANGE UP (ref 8.4–10.5)
CHLORIDE SERPL-SCNC: 104 MMOL/L — SIGNIFICANT CHANGE UP (ref 98–107)
CO2 SERPL-SCNC: 24 MMOL/L — SIGNIFICANT CHANGE UP (ref 22–31)
CREAT SERPL-MCNC: 0.91 MG/DL — SIGNIFICANT CHANGE UP (ref 0.5–1.3)
GLUCOSE BLDC GLUCOMTR-MCNC: 135 MG/DL — HIGH (ref 70–99)
GLUCOSE BLDC GLUCOMTR-MCNC: 177 MG/DL — HIGH (ref 70–99)
GLUCOSE BLDC GLUCOMTR-MCNC: 189 MG/DL — HIGH (ref 70–99)
GLUCOSE SERPL-MCNC: 140 MG/DL — HIGH (ref 70–99)
HCT VFR BLD CALC: 32.8 % — LOW (ref 34.5–45)
HGB BLD-MCNC: 10.4 G/DL — LOW (ref 11.5–15.5)
MCHC RBC-ENTMCNC: 27.7 PG — SIGNIFICANT CHANGE UP (ref 27–34)
MCHC RBC-ENTMCNC: 31.7 % — LOW (ref 32–36)
MCV RBC AUTO: 87.5 FL — SIGNIFICANT CHANGE UP (ref 80–100)
NRBC # FLD: 0 K/UL — SIGNIFICANT CHANGE UP (ref 0–0)
PLATELET # BLD AUTO: 211 K/UL — SIGNIFICANT CHANGE UP (ref 150–400)
PMV BLD: 11.3 FL — SIGNIFICANT CHANGE UP (ref 7–13)
POTASSIUM SERPL-MCNC: 3.9 MMOL/L — SIGNIFICANT CHANGE UP (ref 3.5–5.3)
POTASSIUM SERPL-SCNC: 3.9 MMOL/L — SIGNIFICANT CHANGE UP (ref 3.5–5.3)
RBC # BLD: 3.75 M/UL — LOW (ref 3.8–5.2)
RBC # FLD: 13.6 % — SIGNIFICANT CHANGE UP (ref 10.3–14.5)
SODIUM SERPL-SCNC: 141 MMOL/L — SIGNIFICANT CHANGE UP (ref 135–145)
WBC # BLD: 6.78 K/UL — SIGNIFICANT CHANGE UP (ref 3.8–10.5)
WBC # FLD AUTO: 6.78 K/UL — SIGNIFICANT CHANGE UP (ref 3.8–10.5)

## 2019-09-01 PROCEDURE — 93018 CV STRESS TEST I&R ONLY: CPT | Mod: GC

## 2019-09-01 PROCEDURE — 78452 HT MUSCLE IMAGE SPECT MULT: CPT | Mod: 26

## 2019-09-01 PROCEDURE — 93016 CV STRESS TEST SUPVJ ONLY: CPT | Mod: GC

## 2019-09-01 RX ADMIN — INSULIN GLARGINE 30 UNIT(S): 100 INJECTION, SOLUTION SUBCUTANEOUS at 22:38

## 2019-09-01 RX ADMIN — CARVEDILOL PHOSPHATE 3.12 MILLIGRAM(S): 80 CAPSULE, EXTENDED RELEASE ORAL at 17:26

## 2019-09-01 RX ADMIN — Medication 20 MILLIEQUIVALENT(S): at 11:38

## 2019-09-01 RX ADMIN — Medication 1: at 09:25

## 2019-09-01 RX ADMIN — Medication 650 MILLIGRAM(S): at 22:46

## 2019-09-01 RX ADMIN — Medication 20 MILLIGRAM(S): at 05:01

## 2019-09-01 RX ADMIN — Medication 1: at 18:02

## 2019-09-01 RX ADMIN — Medication 650 MILLIGRAM(S): at 23:27

## 2019-09-01 RX ADMIN — Medication 81 MILLIGRAM(S): at 11:38

## 2019-09-01 RX ADMIN — LATANOPROST 1 DROP(S): 0.05 SOLUTION/ DROPS OPHTHALMIC; TOPICAL at 22:37

## 2019-09-01 RX ADMIN — TICAGRELOR 90 MILLIGRAM(S): 90 TABLET ORAL at 05:01

## 2019-09-01 RX ADMIN — CARVEDILOL PHOSPHATE 3.12 MILLIGRAM(S): 80 CAPSULE, EXTENDED RELEASE ORAL at 05:00

## 2019-09-01 RX ADMIN — TICAGRELOR 90 MILLIGRAM(S): 90 TABLET ORAL at 17:26

## 2019-09-01 RX ADMIN — LOSARTAN POTASSIUM 100 MILLIGRAM(S): 100 TABLET, FILM COATED ORAL at 05:00

## 2019-09-01 RX ADMIN — ATORVASTATIN CALCIUM 80 MILLIGRAM(S): 80 TABLET, FILM COATED ORAL at 22:39

## 2019-09-01 NOTE — PROGRESS NOTE ADULT - ATTENDING COMMENTS
Patient seen and examined.  Agree with above.   -NST abnormal showing anterolateral ischemia  -Recommend cardiac cath     Oma Dumont MD

## 2019-09-02 LAB
ANION GAP SERPL CALC-SCNC: 13 MMO/L — SIGNIFICANT CHANGE UP (ref 7–14)
BUN SERPL-MCNC: 21 MG/DL — SIGNIFICANT CHANGE UP (ref 7–23)
CALCIUM SERPL-MCNC: 9.3 MG/DL — SIGNIFICANT CHANGE UP (ref 8.4–10.5)
CHLORIDE SERPL-SCNC: 103 MMOL/L — SIGNIFICANT CHANGE UP (ref 98–107)
CO2 SERPL-SCNC: 22 MMOL/L — SIGNIFICANT CHANGE UP (ref 22–31)
CREAT SERPL-MCNC: 0.9 MG/DL — SIGNIFICANT CHANGE UP (ref 0.5–1.3)
GLUCOSE BLDC GLUCOMTR-MCNC: 114 MG/DL — HIGH (ref 70–99)
GLUCOSE BLDC GLUCOMTR-MCNC: 119 MG/DL — HIGH (ref 70–99)
GLUCOSE BLDC GLUCOMTR-MCNC: 148 MG/DL — HIGH (ref 70–99)
GLUCOSE BLDC GLUCOMTR-MCNC: 158 MG/DL — HIGH (ref 70–99)
GLUCOSE SERPL-MCNC: 142 MG/DL — HIGH (ref 70–99)
HCT VFR BLD CALC: 34.8 % — SIGNIFICANT CHANGE UP (ref 34.5–45)
HGB BLD-MCNC: 11.1 G/DL — LOW (ref 11.5–15.5)
MCHC RBC-ENTMCNC: 27.9 PG — SIGNIFICANT CHANGE UP (ref 27–34)
MCHC RBC-ENTMCNC: 31.9 % — LOW (ref 32–36)
MCV RBC AUTO: 87.4 FL — SIGNIFICANT CHANGE UP (ref 80–100)
NRBC # FLD: 0 K/UL — SIGNIFICANT CHANGE UP (ref 0–0)
PLATELET # BLD AUTO: 219 K/UL — SIGNIFICANT CHANGE UP (ref 150–400)
PMV BLD: 11.3 FL — SIGNIFICANT CHANGE UP (ref 7–13)
POTASSIUM SERPL-MCNC: 3.9 MMOL/L — SIGNIFICANT CHANGE UP (ref 3.5–5.3)
POTASSIUM SERPL-SCNC: 3.9 MMOL/L — SIGNIFICANT CHANGE UP (ref 3.5–5.3)
RBC # BLD: 3.98 M/UL — SIGNIFICANT CHANGE UP (ref 3.8–5.2)
RBC # FLD: 13.5 % — SIGNIFICANT CHANGE UP (ref 10.3–14.5)
SODIUM SERPL-SCNC: 138 MMOL/L — SIGNIFICANT CHANGE UP (ref 135–145)
WBC # BLD: 6.02 K/UL — SIGNIFICANT CHANGE UP (ref 3.8–10.5)
WBC # FLD AUTO: 6.02 K/UL — SIGNIFICANT CHANGE UP (ref 3.8–10.5)

## 2019-09-02 RX ADMIN — INSULIN GLARGINE 30 UNIT(S): 100 INJECTION, SOLUTION SUBCUTANEOUS at 22:01

## 2019-09-02 RX ADMIN — TICAGRELOR 90 MILLIGRAM(S): 90 TABLET ORAL at 18:46

## 2019-09-02 RX ADMIN — Medication 1: at 09:14

## 2019-09-02 RX ADMIN — ATORVASTATIN CALCIUM 80 MILLIGRAM(S): 80 TABLET, FILM COATED ORAL at 22:01

## 2019-09-02 RX ADMIN — Medication 20 MILLIEQUIVALENT(S): at 14:00

## 2019-09-02 RX ADMIN — Medication 81 MILLIGRAM(S): at 14:01

## 2019-09-02 RX ADMIN — TICAGRELOR 90 MILLIGRAM(S): 90 TABLET ORAL at 05:52

## 2019-09-02 RX ADMIN — CARVEDILOL PHOSPHATE 3.12 MILLIGRAM(S): 80 CAPSULE, EXTENDED RELEASE ORAL at 18:46

## 2019-09-02 RX ADMIN — LATANOPROST 1 DROP(S): 0.05 SOLUTION/ DROPS OPHTHALMIC; TOPICAL at 22:01

## 2019-09-02 RX ADMIN — Medication 20 MILLIGRAM(S): at 05:52

## 2019-09-02 RX ADMIN — CARVEDILOL PHOSPHATE 3.12 MILLIGRAM(S): 80 CAPSULE, EXTENDED RELEASE ORAL at 05:51

## 2019-09-02 RX ADMIN — LOSARTAN POTASSIUM 100 MILLIGRAM(S): 100 TABLET, FILM COATED ORAL at 05:51

## 2019-09-02 NOTE — PROGRESS NOTE ADULT - ATTENDING COMMENTS
Patient seen and examined.  Agree with above.  -Cath tomorrow to evaluate abnormal NST    Oma Dumont MD

## 2019-09-03 LAB
ANION GAP SERPL CALC-SCNC: 12 MMO/L — SIGNIFICANT CHANGE UP (ref 7–14)
BUN SERPL-MCNC: 22 MG/DL — SIGNIFICANT CHANGE UP (ref 7–23)
CALCIUM SERPL-MCNC: 9.5 MG/DL — SIGNIFICANT CHANGE UP (ref 8.4–10.5)
CHLORIDE SERPL-SCNC: 101 MMOL/L — SIGNIFICANT CHANGE UP (ref 98–107)
CO2 SERPL-SCNC: 23 MMOL/L — SIGNIFICANT CHANGE UP (ref 22–31)
CREAT SERPL-MCNC: 0.9 MG/DL — SIGNIFICANT CHANGE UP (ref 0.5–1.3)
GLUCOSE BLDC GLUCOMTR-MCNC: 140 MG/DL — HIGH (ref 70–99)
GLUCOSE BLDC GLUCOMTR-MCNC: 146 MG/DL — HIGH (ref 70–99)
GLUCOSE BLDC GLUCOMTR-MCNC: 152 MG/DL — HIGH (ref 70–99)
GLUCOSE BLDC GLUCOMTR-MCNC: 77 MG/DL — SIGNIFICANT CHANGE UP (ref 70–99)
GLUCOSE SERPL-MCNC: 151 MG/DL — HIGH (ref 70–99)
HCT VFR BLD CALC: 35.5 % — SIGNIFICANT CHANGE UP (ref 34.5–45)
HGB BLD-MCNC: 11.4 G/DL — LOW (ref 11.5–15.5)
MCHC RBC-ENTMCNC: 27.9 PG — SIGNIFICANT CHANGE UP (ref 27–34)
MCHC RBC-ENTMCNC: 32.1 % — SIGNIFICANT CHANGE UP (ref 32–36)
MCV RBC AUTO: 86.8 FL — SIGNIFICANT CHANGE UP (ref 80–100)
NRBC # FLD: 0 K/UL — SIGNIFICANT CHANGE UP (ref 0–0)
PLATELET # BLD AUTO: 242 K/UL — SIGNIFICANT CHANGE UP (ref 150–400)
PMV BLD: 11.6 FL — SIGNIFICANT CHANGE UP (ref 7–13)
POTASSIUM SERPL-MCNC: 4.3 MMOL/L — SIGNIFICANT CHANGE UP (ref 3.5–5.3)
POTASSIUM SERPL-SCNC: 4.3 MMOL/L — SIGNIFICANT CHANGE UP (ref 3.5–5.3)
RBC # BLD: 4.09 M/UL — SIGNIFICANT CHANGE UP (ref 3.8–5.2)
RBC # FLD: 13.7 % — SIGNIFICANT CHANGE UP (ref 10.3–14.5)
SODIUM SERPL-SCNC: 136 MMOL/L — SIGNIFICANT CHANGE UP (ref 135–145)
WBC # BLD: 7.06 K/UL — SIGNIFICANT CHANGE UP (ref 3.8–10.5)
WBC # FLD AUTO: 7.06 K/UL — SIGNIFICANT CHANGE UP (ref 3.8–10.5)

## 2019-09-03 PROCEDURE — 93458 L HRT ARTERY/VENTRICLE ANGIO: CPT | Mod: 26

## 2019-09-03 PROCEDURE — 76937 US GUIDE VASCULAR ACCESS: CPT | Mod: 26

## 2019-09-03 RX ADMIN — TICAGRELOR 90 MILLIGRAM(S): 90 TABLET ORAL at 19:04

## 2019-09-03 RX ADMIN — Medication 20 MILLIGRAM(S): at 06:58

## 2019-09-03 RX ADMIN — Medication 81 MILLIGRAM(S): at 11:10

## 2019-09-03 RX ADMIN — INSULIN GLARGINE 30 UNIT(S): 100 INJECTION, SOLUTION SUBCUTANEOUS at 21:14

## 2019-09-03 RX ADMIN — Medication 20 MILLIEQUIVALENT(S): at 11:11

## 2019-09-03 RX ADMIN — LATANOPROST 1 DROP(S): 0.05 SOLUTION/ DROPS OPHTHALMIC; TOPICAL at 21:14

## 2019-09-03 RX ADMIN — LOSARTAN POTASSIUM 100 MILLIGRAM(S): 100 TABLET, FILM COATED ORAL at 06:58

## 2019-09-03 RX ADMIN — CARVEDILOL PHOSPHATE 3.12 MILLIGRAM(S): 80 CAPSULE, EXTENDED RELEASE ORAL at 19:04

## 2019-09-03 RX ADMIN — TICAGRELOR 90 MILLIGRAM(S): 90 TABLET ORAL at 06:58

## 2019-09-03 RX ADMIN — ATORVASTATIN CALCIUM 80 MILLIGRAM(S): 80 TABLET, FILM COATED ORAL at 21:14

## 2019-09-03 RX ADMIN — Medication 1: at 09:05

## 2019-09-03 RX ADMIN — CARVEDILOL PHOSPHATE 3.12 MILLIGRAM(S): 80 CAPSULE, EXTENDED RELEASE ORAL at 06:58

## 2019-09-03 NOTE — PROGRESS NOTE ADULT - ASSESSMENT
Problem/Plan - 1:  ·  Problem: Unstable angina pectoris.  Plan: - telemonitor  cw current meds  ischemia caba as per cards    Problem/Plan - 2:  ·  Problem: Coronary artery disease involving native coronary artery of native heart with unstable angina pectoris.  Plan: -Management of possible ACS as above  -Cards following     Problem/Plan - 3:  ·  Problem: Type 2 diabetes mellitus with hyperglycemia, with long-term current use of insulin.  Plan:cmonitor FS  ISS     Problem/Plan - 4:  ·  Problem: Essential hypertension.  Plan: -Continue home meds    Problem/Plan - 5:  ·  Problem: Chronic diastolic congestive heart failure.  Plan: Reportedly with history of CHFTTE 11/2017 w/ normal EF  - check ECHO   -Daily weight; I&Os.
Problem/Plan - 1:  ·  Problem: Unstable angina pectoris.  Plan: - telemonitor  cw current meds  ischemia caba as per cards    Problem/Plan - 2:  ·  Problem: Coronary artery disease involving native coronary artery of native heart with unstable angina pectoris.  Plan: -Management of possible ACS as above  -Cards following     Problem/Plan - 3:  ·  Problem: Type 2 diabetes mellitus with hyperglycemia, with long-term current use of insulin.  Plan:cmonitor FS  ISS     Problem/Plan - 4:  ·  Problem: Essential hypertension.  Plan: -Continue home meds    Problem/Plan - 5:  ·  Problem: Chronic diastolic congestive heart failure.  Plan: Reportedly with history of CHFTTE 11/2017 w/ normal EF  - check ECHO   -Daily weight; I&Os.
Problem/Plan - 1:  ·  Problem: Unstable angina pectoris.  Plan: - telemonitor  cw current meds  ischemia caba as per cards  cath today     Problem/Plan - 2:  ·  Problem: Coronary artery disease involving native coronary artery of native heart with unstable angina pectoris.  Plan: -Management of possible ACS as above  -Cards following     Problem/Plan - 3:  ·  Problem: Type 2 diabetes mellitus with hyperglycemia, with long-term current use of insulin.  Plan:cmonitor FS  ISS     Problem/Plan - 4:  ·  Problem: Essential hypertension.  Plan: -Continue home meds    Problem/Plan - 5:  ·  Problem: Chronic diastolic congestive heart failure.  Plan: Reportedly with history of CHFTTE 11/2017 w/ normal EF  - check ECHO   -Daily weight; I&Os.
Problem/Plan - 1:  ·  Problem: Unstable angina pectoris.  Plan: - telemonitor  cw current meds  ischemia caba as per cards  check ECHO     Problem/Plan - 2:  ·  Problem: Coronary artery disease involving native coronary artery of native heart with unstable angina pectoris.  Plan: -Management of possible ACS as above  -Cards following     Problem/Plan - 3:  ·  Problem: Type 2 diabetes mellitus with hyperglycemia, with long-term current use of insulin.  Plan:cmonitor FS  ISS     Problem/Plan - 4:  ·  Problem: Essential hypertension.  Plan: -Continue home meds    Problem/Plan - 5:  ·  Problem: Chronic diastolic congestive heart failure.  Plan: Reportedly with history of CHFTTE 11/2017 w/ normal EF  - check ECHO   -Daily weight; I&Os.
Problem/Plan - 1:  ·  Problem: Unstable angina pectoris.  Plan: -History concerning for new onset angina which would qualify for unstable angina  -Recent stents per patient - patient reports compliance with DAPT.    -EKG w diffuse T wave flattening.  Troponin 24 x2.  -.3  -CT PE/LED w/o PE or DVT  -NGUYEN score 4, Heart score 6  -Will continue ASA/brillinta/carvedilol/losartan to replace valsart  -Add atorvastatin 80 and check lipids  -Check A1c  -No current chest pain but if chest pain would start nitroglycerin  -Start heparin gtt and keep NPO for possible stress or cath.     Problem/Plan - 2:  ·  Problem: Coronary artery disease involving native coronary artery of native heart with unstable angina pectoris.  Plan: -Management of possible ACS as above  -Continue prior home coreg, ticag, losartan to replace valsart, ASA  -Check A1c, lipids  -[facility] 3/2017 w/ patent LAD/L Cx stents and <30% stenosis throughout.     Problem/Plan - 3:  ·  Problem: Type 2 diabetes mellitus with hyperglycemia, with long-term current use of insulin.  Plan: Home regimen levemir 40, novolog 10 qac-Dose reduce to lantus 30.  Hold mealtime with NPO status  -A1c, SSI.     Problem/Plan - 4:  ·  Problem: Essential hypertension.  Plan: -Continue home coreg.  Replace valsartan with losartan due to formulary.     Problem/Plan - 5:  ·  Problem: Chronic diastolic congestive heart failure.  Plan: Reportedly with history of CHF  TTE 11/2017 w/ normal EF  -Hold home lasix in case need for cath with contrast for kidney protection  -Monitor volume status  -Daily weight; I&Os.     Problem/Plan - 6:  Problem: Need for prophylactic measure. Plan: Therapeutically anticoagulated.
Problem/Plan - 1:  ·  Problem: Unstable angina pectoris.  Plan: - telemonitor  cw current meds  ischemia caba as per cards  check ECHO     Problem/Plan - 2:  ·  Problem: Coronary artery disease involving native coronary artery of native heart with unstable angina pectoris.  Plan: -Management of possible ACS as above  -Cards following     Problem/Plan - 3:  ·  Problem: Type 2 diabetes mellitus with hyperglycemia, with long-term current use of insulin.  Plan:cmonitor FS  ISS     Problem/Plan - 4:  ·  Problem: Essential hypertension.  Plan: -Continue home meds    Problem/Plan - 5:  ·  Problem: Chronic diastolic congestive heart failure.  Plan: Reportedly with history of CHFTTE 11/2017 w/ normal EF  - check ECHO   -Daily weight; I&Os.

## 2019-09-03 NOTE — CHART NOTE - NSCHARTNOTEFT_GEN_A_CORE
YVONNE LUKE  [id number]    Patient status post cath via RRA. Dressing clean/dry/ intact. Without hematoma. Pulses present, capillary refill appropriate. Patient c/o mild tingling on right thumb.  Site clean, dry, intact.  Will continue to follow closely.      Vital Signs Last 24 Hrs  T(C): 36.9 (03 Sep 2019 21:12), Max: 36.9 (03 Sep 2019 21:12)  T(F): 98.4 (03 Sep 2019 21:12), Max: 98.4 (03 Sep 2019 21:12)  HR: 71 (03 Sep 2019 21:12) (64 - 81)  BP: 141/67 (03 Sep 2019 21:12) (103/70 - 141/67)  BP(mean): --  RR: 16 (03 Sep 2019 21:12) (16 - 18)  SpO2: 96% (03 Sep 2019 21:12) (96% - 100%)

## 2019-09-04 ENCOUNTER — TRANSCRIPTION ENCOUNTER (OUTPATIENT)
Age: 79
End: 2019-09-04

## 2019-09-04 VITALS
RESPIRATION RATE: 16 BRPM | OXYGEN SATURATION: 99 % | SYSTOLIC BLOOD PRESSURE: 127 MMHG | DIASTOLIC BLOOD PRESSURE: 75 MMHG | TEMPERATURE: 98 F | HEART RATE: 66 BPM

## 2019-09-04 LAB
ANION GAP SERPL CALC-SCNC: 15 MMO/L — HIGH (ref 7–14)
BUN SERPL-MCNC: 24 MG/DL — HIGH (ref 7–23)
CALCIUM SERPL-MCNC: 9.5 MG/DL — SIGNIFICANT CHANGE UP (ref 8.4–10.5)
CHLORIDE SERPL-SCNC: 103 MMOL/L — SIGNIFICANT CHANGE UP (ref 98–107)
CO2 SERPL-SCNC: 21 MMOL/L — LOW (ref 22–31)
CREAT SERPL-MCNC: 0.86 MG/DL — SIGNIFICANT CHANGE UP (ref 0.5–1.3)
GLUCOSE BLDC GLUCOMTR-MCNC: 114 MG/DL — HIGH (ref 70–99)
GLUCOSE BLDC GLUCOMTR-MCNC: 161 MG/DL — HIGH (ref 70–99)
GLUCOSE SERPL-MCNC: 161 MG/DL — HIGH (ref 70–99)
HCT VFR BLD CALC: 37 % — SIGNIFICANT CHANGE UP (ref 34.5–45)
HGB BLD-MCNC: 11.2 G/DL — LOW (ref 11.5–15.5)
MCHC RBC-ENTMCNC: 27.1 PG — SIGNIFICANT CHANGE UP (ref 27–34)
MCHC RBC-ENTMCNC: 30.3 % — LOW (ref 32–36)
MCV RBC AUTO: 89.4 FL — SIGNIFICANT CHANGE UP (ref 80–100)
NRBC # FLD: 0 K/UL — SIGNIFICANT CHANGE UP (ref 0–0)
PLATELET # BLD AUTO: 222 K/UL — SIGNIFICANT CHANGE UP (ref 150–400)
PMV BLD: 11.3 FL — SIGNIFICANT CHANGE UP (ref 7–13)
POTASSIUM SERPL-MCNC: 4.5 MMOL/L — SIGNIFICANT CHANGE UP (ref 3.5–5.3)
POTASSIUM SERPL-SCNC: 4.5 MMOL/L — SIGNIFICANT CHANGE UP (ref 3.5–5.3)
RBC # BLD: 4.14 M/UL — SIGNIFICANT CHANGE UP (ref 3.8–5.2)
RBC # FLD: 13.7 % — SIGNIFICANT CHANGE UP (ref 10.3–14.5)
SODIUM SERPL-SCNC: 139 MMOL/L — SIGNIFICANT CHANGE UP (ref 135–145)
WBC # BLD: 6.56 K/UL — SIGNIFICANT CHANGE UP (ref 3.8–10.5)
WBC # FLD AUTO: 6.56 K/UL — SIGNIFICANT CHANGE UP (ref 3.8–10.5)

## 2019-09-04 RX ORDER — ATORVASTATIN CALCIUM 80 MG/1
1 TABLET, FILM COATED ORAL
Qty: 30 | Refills: 0
Start: 2019-09-04 | End: 2019-10-03

## 2019-09-04 RX ADMIN — LOSARTAN POTASSIUM 100 MILLIGRAM(S): 100 TABLET, FILM COATED ORAL at 06:00

## 2019-09-04 RX ADMIN — TICAGRELOR 90 MILLIGRAM(S): 90 TABLET ORAL at 06:00

## 2019-09-04 RX ADMIN — CARVEDILOL PHOSPHATE 3.12 MILLIGRAM(S): 80 CAPSULE, EXTENDED RELEASE ORAL at 06:00

## 2019-09-04 RX ADMIN — Medication 1: at 09:30

## 2019-09-04 RX ADMIN — Medication 20 MILLIGRAM(S): at 06:00

## 2019-09-04 RX ADMIN — Medication 81 MILLIGRAM(S): at 12:12

## 2019-09-04 NOTE — PROGRESS NOTE ADULT - REASON FOR ADMISSION
CC: chest pain

## 2019-09-04 NOTE — PROGRESS NOTE ADULT - PROVIDER SPECIALTY LIST ADULT
Cardiology
Hospitalist
Cardiology
Cardiology

## 2019-09-04 NOTE — PROGRESS NOTE ADULT - SUBJECTIVE AND OBJECTIVE BOX
Patient denies chest pain or shortness of breath.   Review of systems otherwise (-)  	  MEDICATIONS:  MEDICATIONS  (STANDING):  aspirin enteric coated 81 milliGRAM(s) Oral daily  atorvastatin 80 milliGRAM(s) Oral at bedtime  carvedilol 3.125 milliGRAM(s) Oral every 12 hours  dextrose 5%. 1000 milliLiter(s) (50 mL/Hr) IV Continuous <Continuous>  dextrose 50% Injectable 12.5 Gram(s) IV Push once  dextrose 50% Injectable 25 Gram(s) IV Push once  dextrose 50% Injectable 25 Gram(s) IV Push once  furosemide   Injectable 20 milliGRAM(s) IV Push daily  insulin glargine Injectable (LANTUS) 30 Unit(s) SubCutaneous at bedtime  insulin lispro (HumaLOG) corrective regimen sliding scale   SubCutaneous three times a day before meals  insulin lispro (HumaLOG) corrective regimen sliding scale   SubCutaneous at bedtime  latanoprost 0.005% Ophthalmic Solution 1 Drop(s) Both EYES at bedtime  losartan 100 milliGRAM(s) Oral daily  ticagrelor 90 milliGRAM(s) Oral every 12 hours      LABS:	 	    CARDIAC MARKERS:                       11.8   6.27  )-----------( 210      ( 29 Aug 2019 13:52 )             36.8     Hemoglobin: 11.8 g/dL (08-29 @ 13:52)  Hemoglobin: 12.0 g/dL (08-29 @ 05:35)  Hemoglobin: 12.3 g/dL (08-28 @ 20:09)    08-30    139  |  102  |  15  ----------------------------<  156<H>  3.5   |  24  |  0.94    Ca    9.4      30 Aug 2019 07:18    TPro  8.6<H>  /  Alb  4.6  /  TBili  0.6  /  DBili  x   /  AST  17  /  ALT  13  /  AlkPhos  79  08-28    Creatinine Trend: 0.94<--, 0.83<--, 0.89<--    COAGS:       proBNP:   Lipid Profile:   HgA1c: Hemoglobin A1C, Whole Blood: 6.6 % (08-30 @ 07:18)    TSH:     PHYSICAL EXAM:  T(C): 36.7 (08-30-19 @ 10:07), Max: 36.8 (08-29-19 @ 21:39)  HR: 72 (08-30-19 @ 10:07) (71 - 98)  BP: 130/68 (08-30-19 @ 10:07) (130/68 - 143/58)  RR: 17 (08-30-19 @ 10:07) (17 - 18)  SpO2: 96% (08-30-19 @ 10:07) (96% - 98%)  Wt(kg): --  I&O's Summary    29 Aug 2019 07:01  -  30 Aug 2019 07:00  --------------------------------------------------------  IN: 0 mL / OUT: 450 mL / NET: -450 mL    30 Aug 2019 07:01  -  30 Aug 2019 12:47  --------------------------------------------------------  IN: 0 mL / OUT: 200 mL / NET: -200 mL    Gen: Appears well in NAD  HEENT:  (-)icterus (-)pallor  CV: N S1 S2 1/6 SHANIQUA (+)2 Pulses B/l  Resp:  diminished BS at bases B/L, normal effort  GI: (+) BS Soft, NT, ND  Lymph:  (-)Edema, (-)obvious lymphadenopathy  Skin: Warm to touch, Normal turgor  Psych: Appropriate mood and affect      TELEMETRY: 	  NSR 67, with PVCs     ASSESSMENT/PLAN:     79 year old woman with a history of CAD s/p stents (2006 LAD/LCx and more recently in 6/2019 in [place] @ [facility]), DM, HTN, HLD, prior CVA who presents with chest pain. Cardiology consulted for r/o ACS, management of CAD s/[place] stents [place] hospital.    -- no cp now, sob improved  -- volume status improved, cont iv lasix    -- trop 24/24/24, indeterminant  -- last echo as noted above 2017, nml lv function, no segmental wma  -- check repeat echo eval lv function, structural heart  -- recent cath 5/2019 reports to be faxed from [facility] fax [phone] Tel [phone]  -- cont ASA and Brilinta recent stent, CAD   -- further cardiac work up pending above and on receipt of PCI in May 2019
Patient is a 79y old  Female who presents with a chief complaint of CC: chest pain (01 Sep 2019 09:28)      INTERVAL HPI/OVERNIGHT EVENTS:  T(C): 36.3 (09-01-19 @ 17:20), Max: 37.1 (08-31-19 @ 21:47)  HR: 81 (09-01-19 @ 17:20) (62 - 81)  BP: 129/91 (09-01-19 @ 17:20) (129/91 - 149/74)  RR: 18 (09-01-19 @ 17:20) (18 - 18)  SpO2: 97% (09-01-19 @ 17:20) (95% - 100%)  Wt(kg): --  I&O's Summary    31 Aug 2019 07:01  -  01 Sep 2019 07:00  --------------------------------------------------------  IN: 620 mL / OUT: 400 mL / NET: 220 mL    01 Sep 2019 07:01  -  01 Sep 2019 17:27  --------------------------------------------------------  IN: 436 mL / OUT: 0 mL / NET: 436 mL        LABS:                        10.4   6.78  )-----------( 211      ( 01 Sep 2019 04:59 )             32.8     09-01    141  |  104  |  20  ----------------------------<  140<H>  3.9   |  24  |  0.91    Ca    9.3      01 Sep 2019 04:59          CAPILLARY BLOOD GLUCOSE      POCT Blood Glucose.: 177 mg/dL (01 Sep 2019 09:07)  POCT Blood Glucose.: 138 mg/dL (31 Aug 2019 21:21)  POCT Blood Glucose.: 134 mg/dL (31 Aug 2019 17:52)            MEDICATIONS  (STANDING):  aspirin enteric coated 81 milliGRAM(s) Oral daily  atorvastatin 80 milliGRAM(s) Oral at bedtime  carvedilol 3.125 milliGRAM(s) Oral every 12 hours  dextrose 5%. 1000 milliLiter(s) (50 mL/Hr) IV Continuous <Continuous>  dextrose 50% Injectable 12.5 Gram(s) IV Push once  dextrose 50% Injectable 25 Gram(s) IV Push once  dextrose 50% Injectable 25 Gram(s) IV Push once  furosemide   Injectable 20 milliGRAM(s) IV Push daily  insulin glargine Injectable (LANTUS) 30 Unit(s) SubCutaneous at bedtime  insulin lispro (HumaLOG) corrective regimen sliding scale   SubCutaneous three times a day before meals  insulin lispro (HumaLOG) corrective regimen sliding scale   SubCutaneous at bedtime  latanoprost 0.005% Ophthalmic Solution 1 Drop(s) Both EYES at bedtime  losartan 100 milliGRAM(s) Oral daily  potassium chloride    Tablet ER 20 milliEquivalent(s) Oral daily  ticagrelor 90 milliGRAM(s) Oral every 12 hours    MEDICATIONS  (PRN):  acetaminophen   Tablet .. 650 milliGRAM(s) Oral every 6 hours PRN Mild Pain (1 - 3), Moderate Pain (4 - 6)  dextrose 40% Gel 15 Gram(s) Oral once PRN Blood Glucose LESS THAN 70 milliGRAM(s)/deciliter  glucagon  Injectable 1 milliGRAM(s) IntraMuscular once PRN Glucose LESS THAN 70 milligrams/deciliter          PHYSICAL EXAM:  GENERAL: NAD, well-groomed, well-developed  HEAD:  Atraumatic, Normocephalic  CHEST/LUNG: Clear to percussion bilaterally; No rales, rhonchi, wheezing, or rubs  HEART: Regular rate and rhythm; No murmurs, rubs, or gallops  ABDOMEN: Soft, Nontender, Nondistended; Bowel sounds present  EXTREMITIES:  2+ Peripheral Pulses, No clubbing, cyanosis, or edema  LYMPH: No lymphadenopathy noted  SKIN: No rashes or lesions    Care Discussed with Consultants/Other Providers [ ] YES  [ ] NO
Patient is a 79y old  Female who presents with a chief complaint of CC: chest pain (02 Sep 2019 09:53)      INTERVAL HPI/OVERNIGHT EVENTS:  T(C): 36.7 (09-02-19 @ 17:27), Max: 36.9 (09-01-19 @ 22:34)  HR: 76 (09-02-19 @ 17:27) (63 - 76)  BP: 134/80 (09-02-19 @ 17:27) (111/68 - 134/80)  RR: 18 (09-02-19 @ 17:27) (16 - 18)  SpO2: 100% (09-02-19 @ 17:27) (96% - 100%)  Wt(kg): --  I&O's Summary    01 Sep 2019 07:01  -  02 Sep 2019 07:00  --------------------------------------------------------  IN: 736 mL / OUT: 1000 mL / NET: -264 mL    02 Sep 2019 07:01  -  02 Sep 2019 18:56  --------------------------------------------------------  IN: 0 mL / OUT: 500 mL / NET: -500 mL        LABS:                        11.1   6.02  )-----------( 219      ( 02 Sep 2019 06:40 )             34.8     09-02    138  |  103  |  21  ----------------------------<  142<H>  3.9   |  22  |  0.90    Ca    9.3      02 Sep 2019 06:40          CAPILLARY BLOOD GLUCOSE      POCT Blood Glucose.: 114 mg/dL (02 Sep 2019 17:59)  POCT Blood Glucose.: 148 mg/dL (02 Sep 2019 13:05)  POCT Blood Glucose.: 158 mg/dL (02 Sep 2019 09:02)  POCT Blood Glucose.: 135 mg/dL (01 Sep 2019 22:20)            MEDICATIONS  (STANDING):  aspirin enteric coated 81 milliGRAM(s) Oral daily  atorvastatin 80 milliGRAM(s) Oral at bedtime  carvedilol 3.125 milliGRAM(s) Oral every 12 hours  dextrose 5%. 1000 milliLiter(s) (50 mL/Hr) IV Continuous <Continuous>  dextrose 50% Injectable 12.5 Gram(s) IV Push once  dextrose 50% Injectable 25 Gram(s) IV Push once  dextrose 50% Injectable 25 Gram(s) IV Push once  furosemide   Injectable 20 milliGRAM(s) IV Push daily  insulin glargine Injectable (LANTUS) 30 Unit(s) SubCutaneous at bedtime  insulin lispro (HumaLOG) corrective regimen sliding scale   SubCutaneous three times a day before meals  insulin lispro (HumaLOG) corrective regimen sliding scale   SubCutaneous at bedtime  latanoprost 0.005% Ophthalmic Solution 1 Drop(s) Both EYES at bedtime  losartan 100 milliGRAM(s) Oral daily  potassium chloride    Tablet ER 20 milliEquivalent(s) Oral daily  ticagrelor 90 milliGRAM(s) Oral every 12 hours    MEDICATIONS  (PRN):  acetaminophen   Tablet .. 650 milliGRAM(s) Oral every 6 hours PRN Mild Pain (1 - 3), Moderate Pain (4 - 6)  dextrose 40% Gel 15 Gram(s) Oral once PRN Blood Glucose LESS THAN 70 milliGRAM(s)/deciliter  glucagon  Injectable 1 milliGRAM(s) IntraMuscular once PRN Glucose LESS THAN 70 milligrams/deciliter          PHYSICAL EXAM:  GENERAL: NAD, well-groomed, well-developed  HEAD:  Atraumatic, Normocephalic  CHEST/LUNG: Clear to percussion bilaterally; No rales, rhonchi, wheezing, or rubs  HEART: Regular rate and rhythm; No murmurs, rubs, or gallops  ABDOMEN: Soft, Nontender, Nondistended; Bowel sounds present  EXTREMITIES:  2+ Peripheral Pulses, No clubbing, cyanosis, or edema  LYMPH: No lymphadenopathy noted  SKIN: No rashes or lesions    Care Discussed with Consultants/Other Providers [ ] YES  [ ] NO
Patient is a 79y old  Female who presents with a chief complaint of CC: chest pain (03 Sep 2019 07:41)      INTERVAL HPI/OVERNIGHT EVENTS:  T(C): 36.7 (09-03-19 @ 09:36), Max: 36.7 (09-03-19 @ 06:56)  HR: 64 (09-03-19 @ 09:36) (64 - 81)  BP: 103/70 (09-03-19 @ 09:36) (103/70 - 133/77)  RR: 18 (09-03-19 @ 09:36) (16 - 18)  SpO2: 98% (09-03-19 @ 09:36) (96% - 100%)  Wt(kg): --  I&O's Summary    02 Sep 2019 07:01  -  03 Sep 2019 07:00  --------------------------------------------------------  IN: 150 mL / OUT: 500 mL / NET: -350 mL        LABS:                        11.4   7.06  )-----------( 242      ( 03 Sep 2019 07:32 )             35.5     09-03    136  |  101  |  22  ----------------------------<  151<H>  4.3   |  23  |  0.90    Ca    9.5      03 Sep 2019 07:32          CAPILLARY BLOOD GLUCOSE      POCT Blood Glucose.: 146 mg/dL (03 Sep 2019 17:35)  POCT Blood Glucose.: 77 mg/dL (03 Sep 2019 15:26)  POCT Blood Glucose.: 152 mg/dL (03 Sep 2019 08:52)  POCT Blood Glucose.: 119 mg/dL (02 Sep 2019 21:12)            MEDICATIONS  (STANDING):  aspirin enteric coated 81 milliGRAM(s) Oral daily  atorvastatin 80 milliGRAM(s) Oral at bedtime  carvedilol 3.125 milliGRAM(s) Oral every 12 hours  dextrose 5%. 1000 milliLiter(s) (50 mL/Hr) IV Continuous <Continuous>  dextrose 50% Injectable 12.5 Gram(s) IV Push once  dextrose 50% Injectable 25 Gram(s) IV Push once  dextrose 50% Injectable 25 Gram(s) IV Push once  furosemide   Injectable 20 milliGRAM(s) IV Push daily  insulin glargine Injectable (LANTUS) 30 Unit(s) SubCutaneous at bedtime  insulin lispro (HumaLOG) corrective regimen sliding scale   SubCutaneous three times a day before meals  insulin lispro (HumaLOG) corrective regimen sliding scale   SubCutaneous at bedtime  latanoprost 0.005% Ophthalmic Solution 1 Drop(s) Both EYES at bedtime  losartan 100 milliGRAM(s) Oral daily  potassium chloride    Tablet ER 20 milliEquivalent(s) Oral daily  ticagrelor 90 milliGRAM(s) Oral every 12 hours    MEDICATIONS  (PRN):  acetaminophen   Tablet .. 650 milliGRAM(s) Oral every 6 hours PRN Mild Pain (1 - 3), Moderate Pain (4 - 6)  dextrose 40% Gel 15 Gram(s) Oral once PRN Blood Glucose LESS THAN 70 milliGRAM(s)/deciliter  glucagon  Injectable 1 milliGRAM(s) IntraMuscular once PRN Glucose LESS THAN 70 milligrams/deciliter          PHYSICAL EXAM:  GENERAL: NAD, well-groomed, well-developed  HEAD:  Atraumatic, Normocephalic  CHEST/LUNG: Clear to percussion bilaterally; No rales, rhonchi, wheezing, or rubs  HEART: Regular rate and rhythm; No murmurs, rubs, or gallops  ABDOMEN: Soft, Nontender, Nondistended; Bowel sounds present  EXTREMITIES:  2+ Peripheral Pulses, No clubbing, cyanosis, or edema  LYMPH: No lymphadenopathy noted  SKIN: No rashes or lesions    Care Discussed with Consultants/Other Providers [ ] YES  [ ] NO
Patient is a 79y old  Female who presents with a chief complaint of CC: chest pain (29 Aug 2019 16:02)      INTERVAL HPI/OVERNIGHT EVENTS:  T(C): 36.7 (08-29-19 @ 11:26), Max: 37.1 (08-28-19 @ 20:37)  HR: 59 (08-29-19 @ 11:26) (59 - 67)  BP: 129/69 (08-29-19 @ 11:26) (129/69 - 168/76)  RR: 18 (08-29-19 @ 11:26) (16 - 18)  SpO2: 99% (08-29-19 @ 11:26) (97% - 100%)  Wt(kg): --  I&O's Summary    28 Aug 2019 07:01  -  29 Aug 2019 07:00  --------------------------------------------------------  IN: 0 mL / OUT: 0 mL / NET: 0 mL        LABS:                        11.8   6.27  )-----------( 210      ( 29 Aug 2019 13:52 )             36.8     08-29    138  |  101  |  13  ----------------------------<  155<H>  3.5   |  23  |  0.83    Ca    9.1      29 Aug 2019 06:22    TPro  8.6<H>  /  Alb  4.6  /  TBili  0.6  /  DBili  x   /  AST  17  /  ALT  13  /  AlkPhos  79  08-28    PT/INR - ( 28 Aug 2019 20:09 )   PT: 11.6 SEC;   INR: 1.02          PTT - ( 29 Aug 2019 13:52 )  PTT:58.3 SEC    CAPILLARY BLOOD GLUCOSE      POCT Blood Glucose.: 145 mg/dL (29 Aug 2019 12:25)  POCT Blood Glucose.: 174 mg/dL (29 Aug 2019 09:03)  POCT Blood Glucose.: 147 mg/dL (29 Aug 2019 07:46)  POCT Blood Glucose.: 151 mg/dL (29 Aug 2019 06:08)  POCT Blood Glucose.: 87 mg/dL (28 Aug 2019 16:41)            MEDICATIONS  (STANDING):  atorvastatin 80 milliGRAM(s) Oral at bedtime  carvedilol 3.125 milliGRAM(s) Oral every 12 hours  dextrose 5%. 1000 milliLiter(s) (50 mL/Hr) IV Continuous <Continuous>  dextrose 50% Injectable 12.5 Gram(s) IV Push once  dextrose 50% Injectable 25 Gram(s) IV Push once  dextrose 50% Injectable 25 Gram(s) IV Push once  insulin glargine Injectable (LANTUS) 30 Unit(s) SubCutaneous at bedtime  insulin lispro (HumaLOG) corrective regimen sliding scale   SubCutaneous every 6 hours  latanoprost 0.005% Ophthalmic Solution 1 Drop(s) Both EYES at bedtime  losartan 100 milliGRAM(s) Oral daily  ticagrelor 90 milliGRAM(s) Oral every 12 hours    MEDICATIONS  (PRN):  dextrose 40% Gel 15 Gram(s) Oral once PRN Blood Glucose LESS THAN 70 milliGRAM(s)/deciliter  glucagon  Injectable 1 milliGRAM(s) IntraMuscular once PRN Glucose LESS THAN 70 milligrams/deciliter          PHYSICAL EXAM:  GENERAL: NAD, well-groomed, well-developed  HEAD:  Atraumatic, Normocephalic  CHEST/LUNG: Clear to percussion bilaterally; No rales, rhonchi, wheezing, or rubs  HEART: Regular rate and rhythm; No murmurs, rubs, or gallops  ABDOMEN: Soft, Nontender, Nondistended; Bowel sounds present  EXTREMITIES:  2+ Peripheral Pulses, No clubbing, cyanosis, or edema  LYMPH: No lymphadenopathy noted  SKIN: No rashes or lesions    Care Discussed with Consultants/Other Providers [ ] YES  [ ] NO
Patient is a 79y old  Female who presents with a chief complaint of CC: chest pain (30 Aug 2019 12:46)      INTERVAL HPI/OVERNIGHT EVENTS:  T(C): 36.4 (08-30-19 @ 14:12), Max: 36.8 (08-29-19 @ 21:39)  HR: 76 (08-30-19 @ 14:12) (71 - 98)  BP: 146/74 (08-30-19 @ 14:12) (130/68 - 146/74)  RR: 16 (08-30-19 @ 14:12) (16 - 18)  SpO2: 97% (08-30-19 @ 14:12) (96% - 98%)  Wt(kg): --  I&O's Summary    29 Aug 2019 07:01  -  30 Aug 2019 07:00  --------------------------------------------------------  IN: 0 mL / OUT: 450 mL / NET: -450 mL    30 Aug 2019 07:01  -  30 Aug 2019 17:19  --------------------------------------------------------  IN: 0 mL / OUT: 200 mL / NET: -200 mL        LABS:                        11.8   6.27  )-----------( 210      ( 29 Aug 2019 13:52 )             36.8     08-30    139  |  102  |  15  ----------------------------<  156<H>  3.5   |  24  |  0.94    Ca    9.4      30 Aug 2019 07:18    TPro  8.6<H>  /  Alb  4.6  /  TBili  0.6  /  DBili  x   /  AST  17  /  ALT  13  /  AlkPhos  79  08-28    PT/INR - ( 28 Aug 2019 20:09 )   PT: 11.6 SEC;   INR: 1.02          PTT - ( 29 Aug 2019 13:52 )  PTT:58.3 SEC    CAPILLARY BLOOD GLUCOSE      POCT Blood Glucose.: 171 mg/dL (30 Aug 2019 12:26)  POCT Blood Glucose.: 158 mg/dL (30 Aug 2019 08:54)  POCT Blood Glucose.: 150 mg/dL (29 Aug 2019 21:35)  POCT Blood Glucose.: 138 mg/dL (29 Aug 2019 17:40)            MEDICATIONS  (STANDING):  aspirin enteric coated 81 milliGRAM(s) Oral daily  atorvastatin 80 milliGRAM(s) Oral at bedtime  carvedilol 3.125 milliGRAM(s) Oral every 12 hours  dextrose 5%. 1000 milliLiter(s) (50 mL/Hr) IV Continuous <Continuous>  dextrose 50% Injectable 12.5 Gram(s) IV Push once  dextrose 50% Injectable 25 Gram(s) IV Push once  dextrose 50% Injectable 25 Gram(s) IV Push once  furosemide   Injectable 20 milliGRAM(s) IV Push daily  insulin glargine Injectable (LANTUS) 30 Unit(s) SubCutaneous at bedtime  insulin lispro (HumaLOG) corrective regimen sliding scale   SubCutaneous three times a day before meals  insulin lispro (HumaLOG) corrective regimen sliding scale   SubCutaneous at bedtime  latanoprost 0.005% Ophthalmic Solution 1 Drop(s) Both EYES at bedtime  losartan 100 milliGRAM(s) Oral daily  ticagrelor 90 milliGRAM(s) Oral every 12 hours    MEDICATIONS  (PRN):  acetaminophen   Tablet .. 650 milliGRAM(s) Oral every 6 hours PRN Mild Pain (1 - 3), Moderate Pain (4 - 6)  dextrose 40% Gel 15 Gram(s) Oral once PRN Blood Glucose LESS THAN 70 milliGRAM(s)/deciliter  glucagon  Injectable 1 milliGRAM(s) IntraMuscular once PRN Glucose LESS THAN 70 milligrams/deciliter          PHYSICAL EXAM:  GENERAL: NAD, well-groomed, well-developed  HEAD:  Atraumatic, Normocephalic  CHEST/LUNG: Clear to percussion bilaterally; No rales, rhonchi, wheezing, or rubs  HEART: Regular rate and rhythm; No murmurs, rubs, or gallops  ABDOMEN: Soft, Nontender, Nondistended; Bowel sounds present  EXTREMITIES:  2+ Peripheral Pulses, No clubbing, cyanosis, or edema  LYMPH: No lymphadenopathy noted  SKIN: No rashes or lesions    Care Discussed with Consultants/Other Providers [ ] YES  [ ] NO
S:     SOB improved, still w/manzo.  Review of systems otherwise (-)      acetaminophen   Tablet .. 650 milliGRAM(s) Oral every 6 hours PRN  aspirin enteric coated 81 milliGRAM(s) Oral daily  atorvastatin 80 milliGRAM(s) Oral at bedtime  carvedilol 3.125 milliGRAM(s) Oral every 12 hours  dextrose 40% Gel 15 Gram(s) Oral once PRN  dextrose 5%. 1000 milliLiter(s) IV Continuous <Continuous>  dextrose 50% Injectable 12.5 Gram(s) IV Push once  dextrose 50% Injectable 25 Gram(s) IV Push once  dextrose 50% Injectable 25 Gram(s) IV Push once  furosemide   Injectable 20 milliGRAM(s) IV Push daily  glucagon  Injectable 1 milliGRAM(s) IntraMuscular once PRN  insulin glargine Injectable (LANTUS) 30 Unit(s) SubCutaneous at bedtime  insulin lispro (HumaLOG) corrective regimen sliding scale   SubCutaneous three times a day before meals  insulin lispro (HumaLOG) corrective regimen sliding scale   SubCutaneous at bedtime  latanoprost 0.005% Ophthalmic Solution 1 Drop(s) Both EYES at bedtime  losartan 100 milliGRAM(s) Oral daily  potassium chloride    Tablet ER 20 milliEquivalent(s) Oral daily  ticagrelor 90 milliGRAM(s) Oral every 12 hours                        11.1   6.02  )-----------( 219      ( 02 Sep 2019 06:40 )             34.8     Hemoglobin: 11.1 g/dL (09-02 @ 06:40)  Hemoglobin: 10.4 g/dL (09-01 @ 04:59)  Hemoglobin: 10.8 g/dL (08-31 @ 07:11)  Hemoglobin: 11.8 g/dL (08-29 @ 13:52)    09-02    138  |  103  |  21  ----------------------------<  142<H>  3.9   |  22  |  0.90    Ca    9.3      02 Sep 2019 06:40    Creatinine Trend: 0.90<--, 0.91<--, 0.92<--, 0.94<--, 0.83<--, 0.89<--    COAGS:           T(C): 36.7 (09-03-19 @ 06:56), Max: 36.7 (09-02-19 @ 17:27)  HR: 72 (09-03-19 @ 06:56) (72 - 81)  BP: 113/59 (09-03-19 @ 06:56) (113/59 - 134/80)  RR: 16 (09-03-19 @ 06:56) (16 - 18)  SpO2: 98% (09-03-19 @ 06:56) (96% - 100%)  Wt(kg): --    I&O's Summary    02 Sep 2019 07:01  -  03 Sep 2019 07:00  --------------------------------------------------------  IN: 150 mL / OUT: 500 mL / NET: -350 mL          Gen: Appears well in NAD  HEENT:  (-)icterus (-)pallor  CV: N S1 S2 1/6 SHANIQUA (+)2 Pulses B/l  Resp:  CTA b/l, normal effort  GI: (+) BS Soft, NT, ND  Lymph:  (+) Trace B/L LE Edema, (-)obvious lymphadenopathy  Skin: Warm to touch, Normal turgor  Psych: Appropriate mood and affect    TELEMETRY:    NSR 60s, PVC    DATA:  < from: Transthoracic Echocardiogram (08.30.19 @ 16:50) >  CONCLUSIONS:  1. Mitral annular calcification, otherwise normal mitral  valve. Mild mitral regurgitation.  2. Mildly dilated left atrium.  LA volume index = 37 cc/m2.  3. Normal left ventricular systolic function. No segmental  wall motion abnormalities.  4. Mild diastolic dysfunction (Stage I).  5. Normal right ventricular size and function.  ------------------------------------------------------------------------  Confirmed on  8/30/2019 - 17:56:59 by Rohan Varma M.D.  ------------------------------------------------------------------------    < end of copied text >    < from: Nuclear Stress Test-Pharmacologic (09.01.19 @ 08:41) >  IMPRESSIONS:Abnormal Study  * Myocardial Perfusion SPECT results are abnormal.  * There is a medium sized, moderate defect in  anterolateral wall that is predominantly reversible,  suggestive of infarction with moderate seamus-infarct  ischemia.There is a medium sized, moderate defect in  inferolateral wall that is fixed, suggestive of infarct.  * Post-stressgated wall motion analysis was performed  (LVEF = 48 %;LVEDV = 91 ml.), revealing mild hypokinesis.  Ther anterolateral wall appears hypokinetic.    < end of copied text >       ASSESSMENT/PLAN:     79 year old woman with a history of CAD s/p stents (2006 LAD/LCx and more recently in 6/2019 in [place] @ [facility]), DM, HTN, HLD, prior CVA who presents with chest pain. Cardiology consulted for r/o ACS, management of CAD s/p stents at [place]-state hospital.    -- trop 24/24/24, indeterminant  -- cont iv lasix, volume status improved, poss transition to PO lasix in next 24-48 hrs  -- cont ASA and Brilinta recent stent/CAD   -- last echo as noted above 2017, nml lv function, no segmental wma; repeat echo normal lv sys function, no wma   -- recent cath 5/2019 reports to be faxed from [facility] fax [phone] Tel [phone]  -- documentation received (in chart) notes PCI to LAD, awaiting formal cath report   -- Abnormal NST with anterolateral ischemia  -- plan for cardiac cath today, will f/u results   -- further cardiac work up pending above
S:   Patient denies chest pain or shortness of breath.   Review of systems otherwise (-)      acetaminophen   Tablet .. 650 milliGRAM(s) Oral every 6 hours PRN  aspirin enteric coated 81 milliGRAM(s) Oral daily  atorvastatin 80 milliGRAM(s) Oral at bedtime  carvedilol 3.125 milliGRAM(s) Oral every 12 hours  dextrose 40% Gel 15 Gram(s) Oral once PRN  dextrose 5%. 1000 milliLiter(s) IV Continuous <Continuous>  dextrose 50% Injectable 12.5 Gram(s) IV Push once  dextrose 50% Injectable 25 Gram(s) IV Push once  dextrose 50% Injectable 25 Gram(s) IV Push once  furosemide   Injectable 20 milliGRAM(s) IV Push daily  glucagon  Injectable 1 milliGRAM(s) IntraMuscular once PRN  insulin glargine Injectable (LANTUS) 30 Unit(s) SubCutaneous at bedtime  insulin lispro (HumaLOG) corrective regimen sliding scale   SubCutaneous three times a day before meals  insulin lispro (HumaLOG) corrective regimen sliding scale   SubCutaneous at bedtime  latanoprost 0.005% Ophthalmic Solution 1 Drop(s) Both EYES at bedtime  losartan 100 milliGRAM(s) Oral daily  potassium chloride    Tablet ER 20 milliEquivalent(s) Oral daily  ticagrelor 90 milliGRAM(s) Oral every 12 hours                            10.4   6.78  )-----------( 211      ( 01 Sep 2019 04:59 )             32.8       Hemoglobin: 10.4 g/dL (09-01 @ 04:59)  Hemoglobin: 10.8 g/dL (08-31 @ 07:11)  Hemoglobin: 11.8 g/dL (08-29 @ 13:52)  Hemoglobin: 12.0 g/dL (08-29 @ 05:35)  Hemoglobin: 12.3 g/dL (08-28 @ 20:09)    09-01    141  |  104  |  20  ----------------------------<  140<H>  3.9   |  24  |  0.91    Ca    9.3      01 Sep 2019 04:59    Creatinine Trend: 0.91<--, 0.92<--, 0.94<--, 0.83<--, 0.89<--    COAGS:     T(C): 37 (09-01-19 @ 05:05), Max: 37.1 (08-31-19 @ 21:47)  HR: 62 (09-01-19 @ 05:05) (62 - 79)  BP: 137/71 (09-01-19 @ 05:05) (128/85 - 149/74)  RR: 18 (09-01-19 @ 05:05) (18 - 18)  SpO2: 100% (09-01-19 @ 05:05) (95% - 100%)  Wt(kg): --    I&O's Summary    31 Aug 2019 07:01  -  01 Sep 2019 07:00  --------------------------------------------------------  IN: 620 mL / OUT: 400 mL / NET: 220 mL      Gen: Appears well in NAD  HEENT:  (-)icterus (-)pallor  CV: N S1 S2 1/6 SHANIQUA (+)2 Pulses B/l  Resp:  diminished BS at bases B/L, normal effort  GI: (+) BS Soft, NT, ND  Lymph:  (-)Edema, (-)obvious lymphadenopathy  Skin: Warm to touch, Normal turgor  Psych: Appropriate mood and affect      TELEMETRY: 	  NSR 93, with PVCs    DATA:  < from: Transthoracic Echocardiogram (08.30.19 @ 16:50) >  CONCLUSIONS:  1. Mitral annular calcification, otherwise normal mitral  valve. Mild mitral regurgitation.  2. Mildly dilated left atrium.  LA volume index = 37 cc/m2.  3. Normal left ventricular systolic function. No segmental  wall motion abnormalities.  4. Mild diastolic dysfunction (Stage I).  5. Normal right ventricular size and function.  ------------------------------------------------------------------------  Confirmed on  8/30/2019 - 17:56:59 by Rohan Varma M.D.  ------------------------------------------------------------------------    < end of copied text >     ASSESSMENT/PLAN:     79 year old woman with a history of CAD s/p stents (2006 LAD/LCx and more recently in 6/2019 in [place] @ [facility]), DM, HTN, HLD, prior CVA who presents with chest pain. Cardiology consulted for r/o ACS, management of CAD s/[place] stents [place] hospital.    -- no cp now, sob improved  -- trop 24/24/24, indeterminant  -- cont iv lasix, volume status improved, plan to change to po tomorrow   -- cont ASA and Brilinta recent stent, CAD   -- last echo as noted above 2017, nml lv function, no segmental wma  -- repeat echo as noted, normal lv sys function, no wma   -- recent cath 5/2019 reports to be faxed from [facility] fax [phone] Tel [phone]  -- documentation received (in chart) notes PCI to LAD, awaiting formal cath report   -- awaiting resting images of NST today, will follow results   -- further cardiac work up pending above and on receipt of PCI in May 2019
S:  SOB improved      MEDICATIONS  (STANDING):  aspirin enteric coated 81 milliGRAM(s) Oral daily  atorvastatin 80 milliGRAM(s) Oral at bedtime  carvedilol 3.125 milliGRAM(s) Oral every 12 hours  furosemide   Injectable 20 milliGRAM(s) IV Push daily  insulin glargine Injectable (LANTUS) 30 Unit(s) SubCutaneous at bedtime  insulin lispro (HumaLOG) corrective regimen sliding scale   SubCutaneous three times a day before meals  insulin lispro (HumaLOG) corrective regimen sliding scale   SubCutaneous at bedtime  latanoprost 0.005% Ophthalmic Solution 1 Drop(s) Both EYES at bedtime  losartan 100 milliGRAM(s) Oral daily  potassium chloride    Tablet ER 20 milliEquivalent(s) Oral daily  ticagrelor 90 milliGRAM(s) Oral every 12 hours    MEDICATIONS  (PRN):  acetaminophen   Tablet .. 650 milliGRAM(s) Oral every 6 hours PRN Mild Pain (1 - 3), Moderate Pain (4 - 6)  dextrose 40% Gel 15 Gram(s) Oral once PRN Blood Glucose LESS THAN 70 milliGRAM(s)/deciliter  glucagon  Injectable 1 milliGRAM(s) IntraMuscular once PRN Glucose LESS THAN 70 milligrams/deciliter      LABS:                      11.2   6.56  )-----------( 222      ( 04 Sep 2019 05:35 )             37.0     139  |  103  |  24<H>  ----------------------------<  161<H>  4.5   |  21<L>  |  0.86    Ca    9.5      04 Sep 2019 05:35    Creatinine Trend: 0.86<--, 0.90<--, 0.90<--, 0.91<--, 0.92<--, 0.94<--     PHYSICAL EXAM  Vital Signs Last 24 Hrs  T(C): 36.5 (04 Sep 2019 13:01), Max: 36.9 (03 Sep 2019 21:12)  T(F): 97.7 (04 Sep 2019 13:01), Max: 98.4 (03 Sep 2019 21:12)  HR: 66 (04 Sep 2019 13:01) (66 - 80)  BP: 127/75 (04 Sep 2019 13:01) (118/80 - 141/67)  RR: 16 (04 Sep 2019 13:01) (16 - 18)  SpO2: 99% (04 Sep 2019 13:01) (96% - 99%)    Gen: Appears well in NAD  HEENT:  (-)icterus (-)pallor  CV: N S1 S2 1/6 SHANIQUA (+)2 Pulses B/l  Resp:  CTA b/l, normal effort  GI: (+) BS Soft, NT, ND  Lymph:  (+) Trace B/L LE Edema, (-)obvious lymphadenopathy  Skin: Warm to touch, Normal turgor  Psych: Appropriate mood and affect    TELEMETRY:    NSR 60s, PVC    DATA:  < from: Transthoracic Echocardiogram (08.30.19 @ 16:50) >  CONCLUSIONS:  1. Mitral annular calcification, otherwise normal mitral  valve. Mild mitral regurgitation.  2. Mildly dilated left atrium.  LA volume index = 37 cc/m2.  3. Normal left ventricular systolic function. No segmental  wall motion abnormalities.  4. Mild diastolic dysfunction (Stage I).  5. Normal right ventricular size and function.  ------------------------------------------------------------------------  Confirmed on  8/30/2019 - 17:56:59 by Rohan Varma M.D.  ------------------------------------------------------------------------    < end of copied text >    < from: Nuclear Stress Test-Pharmacologic (09.01.19 @ 08:41) >  IMPRESSIONS:Abnormal Study  * Myocardial Perfusion SPECT results are abnormal.  * There is a medium sized, moderate defect in  anterolateral wall that is predominantly reversible,  suggestive of infarction with moderate seamus-infarct  ischemia.There is a medium sized, moderate defect in  inferolateral wall that is fixed, suggestive of infarct.  * Post-stressgated wall motion analysis was performed  (LVEF = 48 %;LVEDV = 91 ml.), revealing mild hypokinesis.  Ther anterolateral wall appears hypokinetic.  < end of copied text >    < from: Cardiac Cath Lab - Adult (09.03.19 @ 14:03) >  VENTRICLES: No left ventriculogram was performed.  CORONARY VESSELS: The coronary circulation is right dominant.  LM:   --  LM: Angiography showed mild atherosclerosis with no flow limiting  lesions.  LAD:   --  Proximal LAD: There was a diffuse 10 % stenosis at the site of a  prior stent. The lesion was eccentric. There was NGUYEN grade 3 flow through  the vessel (brisk flow).  --  Mid LAD: There was a diffuse 20 % stenosis at the site of a prior  stent. The lesion was eccentric. There was NGUYEN grade 3 flow through the  vessel (brisk flow).  --  D1: There was a tubular 30 % stenosis at the site of a prior stent. The  lesion was smoothly contoured. There was NGUYEN grade 3 flow through the  vessel (brisk flow).  CX:   --  Circumflex: Angiography showed mild atherosclerosis with no flow  limiting lesions.  RI:   --  Ramus intermedius: There was a diffuse 30 % stenosis at the site  of a prior stent. The lesion was eccentric. There was NGUYEN grade 3 flow  through the vessel (brisk flow).  RCA:   --  Mid RCA: There was a tubular 20 % stenosis at a site with no  prior intervention. The lesion was eccentric. There was NGUYEN grade 3 flow  through the vessel (brisk flow).  --  RPDA: Angiography showed mild atherosclerosis with no flow limiting  lesions.  --  RPLS: Angiography showed mild atherosclerosis with no flow limiting  lesions.  COMPLICATIONS: There were no complications.  DIAGNOSTIC IMPRESSIONS: Patent stents LAD, D-1 and Ramus  No significant obstructive CAD  DIAGNOSTIC RECOMMENDATIONS: Medical therapy  INTERVENTIONAL IMPRESSIONS: Patent stents LAD, D-1 and Ramus  No significant obstructive CAD  INTERVENTIONAL RECOMMENDATIONS: Medical therapy  Prepared and signed by  Nia Izaguirre M.D.  Signed 09/03/2019 16:11:54    < end of copied text >         ASSESSMENT/PLAN:     79 year old woman with a history of CAD s/p stents (2006 LAD/LCx and more recently in 6/2019 in [place] @ [facility]), DM, HTN, HLD, prior CVA who presents with chest pain. Cardiology consulted for r/o ACS, management of CAD s/p stents at [place]-of-state hospital.    --Pt admitted with indeterminant Trops, s/p Abnormal NST as above, s/p C with patent stents and non obs CAD  --change to PO lasix  --HD stable  --DC planning  --F/U Dr Koenig in 1 week
S: Pt reports intermittent SOB but overall improved. Has CEDEÑO as well. Review of systems otherwise (-)      MEDICATIONS  (STANDING):  aspirin enteric coated 81 milliGRAM(s) Oral daily  atorvastatin 80 milliGRAM(s) Oral at bedtime  carvedilol 3.125 milliGRAM(s) Oral every 12 hours  dextrose 5%. 1000 milliLiter(s) (50 mL/Hr) IV Continuous <Continuous>  dextrose 50% Injectable 12.5 Gram(s) IV Push once  dextrose 50% Injectable 25 Gram(s) IV Push once  dextrose 50% Injectable 25 Gram(s) IV Push once  furosemide   Injectable 20 milliGRAM(s) IV Push daily  insulin glargine Injectable (LANTUS) 30 Unit(s) SubCutaneous at bedtime  insulin lispro (HumaLOG) corrective regimen sliding scale   SubCutaneous three times a day before meals  insulin lispro (HumaLOG) corrective regimen sliding scale   SubCutaneous at bedtime  latanoprost 0.005% Ophthalmic Solution 1 Drop(s) Both EYES at bedtime  losartan 100 milliGRAM(s) Oral daily  potassium chloride    Tablet ER 20 milliEquivalent(s) Oral daily  ticagrelor 90 milliGRAM(s) Oral every 12 hours    MEDICATIONS  (PRN):  acetaminophen   Tablet .. 650 milliGRAM(s) Oral every 6 hours PRN Mild Pain (1 - 3), Moderate Pain (4 - 6)  dextrose 40% Gel 15 Gram(s) Oral once PRN Blood Glucose LESS THAN 70 milliGRAM(s)/deciliter  glucagon  Injectable 1 milliGRAM(s) IntraMuscular once PRN Glucose LESS THAN 70 milligrams/deciliter      LABS:                            11.1   6.02  )-----------( 219      ( 02 Sep 2019 06:40 )             34.8     Hemoglobin: 11.1 g/dL (09-02 @ 06:40)  Hemoglobin: 10.4 g/dL (09-01 @ 04:59)  Hemoglobin: 10.8 g/dL (08-31 @ 07:11)  Hemoglobin: 11.8 g/dL (08-29 @ 13:52)  Hemoglobin: 12.0 g/dL (08-29 @ 05:35)    09-02    138  |  103  |  21  ----------------------------<  142<H>  3.9   |  22  |  0.90    Ca    9.3      02 Sep 2019 06:40      Creatinine Trend: 0.90<--, 0.91<--, 0.92<--, 0.94<--, 0.83<--, 0.89<--             09-01-19 @ 07:01  -  09-02-19 @ 07:00  --------------------------------------------------------  IN: 736 mL / OUT: 1000 mL / NET: -264 mL        PHYSICAL EXAM  Vital Signs Last 24 Hrs  T(C): 36.6 (02 Sep 2019 05:48), Max: 36.9 (01 Sep 2019 22:34)  T(F): 97.8 (02 Sep 2019 05:48), Max: 98.4 (01 Sep 2019 22:34)  HR: 64 (02 Sep 2019 05:48) (63 - 81)  BP: 111/68 (02 Sep 2019 05:48) (111/68 - 136/76)  BP(mean): --  RR: 18 (02 Sep 2019 05:48) (16 - 18)  SpO2: 98% (02 Sep 2019 05:48) (96% - 100%)      Gen: Appears well in NAD  HEENT:  (-)icterus (-)pallor  CV: N S1 S2 1/6 SHANIQUA (+)2 Pulses B/l  Resp:  CTA b/l, normal effort  GI: (+) BS Soft, NT, ND  Lymph:  (+) Trace B/L LE Edema, (-)obvious lymphadenopathy  Skin: Warm to touch, Normal turgor  Psych: Appropriate mood and affect      TELEMETRY: NSR 60s, PVC    DATA:  < from: Transthoracic Echocardiogram (08.30.19 @ 16:50) >  CONCLUSIONS:  1. Mitral annular calcification, otherwise normal mitral  valve. Mild mitral regurgitation.  2. Mildly dilated left atrium.  LA volume index = 37 cc/m2.  3. Normal left ventricular systolic function. No segmental  wall motion abnormalities.  4. Mild diastolic dysfunction (Stage I).  5. Normal right ventricular size and function.  ------------------------------------------------------------------------  Confirmed on  8/30/2019 - 17:56:59 by Rohan Varma M.D.  ------------------------------------------------------------------------    < end of copied text >    < from: Nuclear Stress Test-Pharmacologic (09.01.19 @ 08:41) >  IMPRESSIONS:Abnormal Study  * Myocardial Perfusion SPECT results are abnormal.  * There is a medium sized, moderate defect in  anterolateral wall that is predominantly reversible,  suggestive of infarction with moderate seamus-infarct  ischemia.There is a medium sized, moderate defect in  inferolateral wall that is fixed, suggestive of infarct.  * Post-stressgated wall motion analysis was performed  (LVEF = 48 %;LVEDV = 91 ml.), revealing mild hypokinesis.  Ther anterolateral wall appears hypokinetic.    < end of copied text >       ASSESSMENT/PLAN:     79 year old woman with a history of CAD s/p stents (2006 LAD/LCx and more recently in 6/2019 in [place] @ [facility]), DM, HTN, HLD, prior CVA who presents with chest pain. Cardiology consulted for r/o ACS, management of CAD s/p stents at [place]-of-state hospital.    -- trop 24/24/24, indeterminant  -- cont iv lasix, volume status improved, poss transition to PO lasix in next 24-48 hrs  -- cont ASA and Brilinta recent stent/CAD   -- last echo as noted above 2017, nml lv function, no segmental wma  -- repeat echo as noted, normal lv sys function, no wma   -- recent cath 5/2019 reports to be faxed from [facility] fax [phone] Tel [phone]  -- documentation received (in chart) notes PCI to LAD, awaiting formal cath report   -- Abnormal NST with anterolateral ischemia  -- Recommend cardiac cath given the above - patient agreeable to cath    Abdias Haas PA-C  [facility]  Pager: [phone]
Patient is a 79y old  Female who presents with a chief complaint of CC: chest pain (31 Aug 2019 14:08)      INTERVAL HPI/OVERNIGHT EVENTS:  T(C): 36.6 (08-31-19 @ 17:00), Max: 37 (08-31-19 @ 10:00)  HR: 64 (08-31-19 @ 17:00) (64 - 80)  BP: 132/67 (08-31-19 @ 17:00) (128/85 - 143/76)  RR: 18 (08-31-19 @ 17:00) (18 - 18)  SpO2: 100% (08-31-19 @ 17:00) (95% - 100%)  Wt(kg): --  I&O's Summary    30 Aug 2019 07:01  -  31 Aug 2019 07:00  --------------------------------------------------------  IN: 0 mL / OUT: 200 mL / NET: -200 mL    31 Aug 2019 07:01  -  31 Aug 2019 19:11  --------------------------------------------------------  IN: 250 mL / OUT: 0 mL / NET: 250 mL        LABS:                        10.8   6.08  )-----------( 198      ( 31 Aug 2019 07:11 )             35.1     08-31    142  |  104  |  18  ----------------------------<  156<H>  3.6   |  24  |  0.92    Ca    9.4      31 Aug 2019 07:28          CAPILLARY BLOOD GLUCOSE      POCT Blood Glucose.: 134 mg/dL (31 Aug 2019 17:52)  POCT Blood Glucose.: 176 mg/dL (31 Aug 2019 13:09)  POCT Blood Glucose.: 149 mg/dL (31 Aug 2019 08:41)  POCT Blood Glucose.: 196 mg/dL (30 Aug 2019 21:24)            MEDICATIONS  (STANDING):  aspirin enteric coated 81 milliGRAM(s) Oral daily  atorvastatin 80 milliGRAM(s) Oral at bedtime  carvedilol 3.125 milliGRAM(s) Oral every 12 hours  dextrose 5%. 1000 milliLiter(s) (50 mL/Hr) IV Continuous <Continuous>  dextrose 50% Injectable 12.5 Gram(s) IV Push once  dextrose 50% Injectable 25 Gram(s) IV Push once  dextrose 50% Injectable 25 Gram(s) IV Push once  furosemide   Injectable 20 milliGRAM(s) IV Push daily  insulin glargine Injectable (LANTUS) 30 Unit(s) SubCutaneous at bedtime  insulin lispro (HumaLOG) corrective regimen sliding scale   SubCutaneous three times a day before meals  insulin lispro (HumaLOG) corrective regimen sliding scale   SubCutaneous at bedtime  latanoprost 0.005% Ophthalmic Solution 1 Drop(s) Both EYES at bedtime  losartan 100 milliGRAM(s) Oral daily  potassium chloride    Tablet ER 20 milliEquivalent(s) Oral daily  ticagrelor 90 milliGRAM(s) Oral every 12 hours    MEDICATIONS  (PRN):  acetaminophen   Tablet .. 650 milliGRAM(s) Oral every 6 hours PRN Mild Pain (1 - 3), Moderate Pain (4 - 6)  dextrose 40% Gel 15 Gram(s) Oral once PRN Blood Glucose LESS THAN 70 milliGRAM(s)/deciliter  glucagon  Injectable 1 milliGRAM(s) IntraMuscular once PRN Glucose LESS THAN 70 milligrams/deciliter          PHYSICAL EXAM:  GENERAL: NAD, well-groomed, well-developed  HEAD:  Atraumatic, Normocephalic  CHEST/LUNG: Clear to percussion bilaterally; No rales, rhonchi, wheezing, or rubs  HEART: Regular rate and rhythm; No murmurs, rubs, or gallops  ABDOMEN: Soft, Nontender, Nondistended; Bowel sounds present  EXTREMITIES:  2+ Peripheral Pulses, No clubbing, cyanosis, or edema  LYMPH: No lymphadenopathy noted  SKIN: No rashes or lesions    Care Discussed with Consultants/Other Providers [ ] YES  [ ] NO
S:   Patient denies chest pain or shortness of breath.   Review of systems otherwise (-)      acetaminophen   Tablet .. 650 milliGRAM(s) Oral every 6 hours PRN  aspirin enteric coated 81 milliGRAM(s) Oral daily  atorvastatin 80 milliGRAM(s) Oral at bedtime  carvedilol 3.125 milliGRAM(s) Oral every 12 hours  dextrose 40% Gel 15 Gram(s) Oral once PRN  dextrose 5%. 1000 milliLiter(s) IV Continuous <Continuous>  dextrose 50% Injectable 12.5 Gram(s) IV Push once  dextrose 50% Injectable 25 Gram(s) IV Push once  dextrose 50% Injectable 25 Gram(s) IV Push once  furosemide   Injectable 20 milliGRAM(s) IV Push daily  glucagon  Injectable 1 milliGRAM(s) IntraMuscular once PRN  insulin glargine Injectable (LANTUS) 30 Unit(s) SubCutaneous at bedtime  insulin lispro (HumaLOG) corrective regimen sliding scale   SubCutaneous three times a day before meals  insulin lispro (HumaLOG) corrective regimen sliding scale   SubCutaneous at bedtime  latanoprost 0.005% Ophthalmic Solution 1 Drop(s) Both EYES at bedtime  losartan 100 milliGRAM(s) Oral daily  potassium chloride    Tablet ER 20 milliEquivalent(s) Oral daily  ticagrelor 90 milliGRAM(s) Oral every 12 hours                       10.8   6.08  )-----------( 198      ( 31 Aug 2019 07:11 )             35.1     Hemoglobin: 10.8 g/dL (08-31 @ 07:11)  Hemoglobin: 11.8 g/dL (08-29 @ 13:52)  Hemoglobin: 12.0 g/dL (08-29 @ 05:35)  Hemoglobin: 12.3 g/dL (08-28 @ 20:09)    08-31    142  |  104  |  18  ----------------------------<  156<H>  3.6   |  24  |  0.92    Ca    9.4      31 Aug 2019 07:28      Creatinine Trend: 0.92<--, 0.94<--, 0.83<--, 0.89<--    COAGS:     T(C): 37 (08-31-19 @ 10:00), Max: 37 (08-31-19 @ 10:00)  HR: 65 (08-31-19 @ 10:00) (65 - 80)  BP: 142/76 (08-31-19 @ 10:00) (132/64 - 146/74)  RR: 18 (08-31-19 @ 10:00) (16 - 18)  SpO2: 98% (08-31-19 @ 10:00) (95% - 98%)  Wt(kg): --    I&O's Summary    30 Aug 2019 07:01  -  31 Aug 2019 07:00  --------------------------------------------------------  IN: 0 mL / OUT: 200 mL / NET: -200 mL      Gen: Appears well in NAD  HEENT:  (-)icterus (-)pallor  CV: N S1 S2 1/6 SHANIQUA (+)2 Pulses B/l  Resp:  diminished BS at bases B/L, normal effort  GI: (+) BS Soft, NT, ND  Lymph:  (-)Edema, (-)obvious lymphadenopathy  Skin: Warm to touch, Normal turgor  Psych: Appropriate mood and affect      TELEMETRY: 	  NSR 67, with PVCs    DATA:  < from: Transthoracic Echocardiogram (08.30.19 @ 16:50) >  CONCLUSIONS:  1. Mitral annular calcification, otherwise normal mitral  valve. Mild mitral regurgitation.  2. Mildly dilated left atrium.  LA volume index = 37 cc/m2.  3. Normal left ventricular systolic function. No segmental  wall motion abnormalities.  4. Mild diastolic dysfunction (Stage I).  5. Normal right ventricular size and function.  ------------------------------------------------------------------------  Confirmed on  8/30/2019 - 17:56:59 by Rohan Varma M.D.  ------------------------------------------------------------------------    < end of copied text >     ASSESSMENT/PLAN:     79 year old woman with a history of CAD s/p stents (2006 LAD/LCx and more recently in 6/2019 in [place] @ [facility]), DM, HTN, HLD, prior CVA who presents with chest pain. Cardiology consulted for r/o ACS, management of CAD s/p stents at [place]-state hospital.    -- no cp now, sob improved  -- trop 24/24/24, indeterminant  -- volume status improved, cont iv lasix    -- last echo as noted above 2017, nml lv function, no segmental wma  -- check repeat echo as noted, normal lv sys function, no wma   -- recent cath 5/2019 reports to be faxed from [facility] fax [phone] Tel [phone]  -- cont ASA and Brilinta recent stent, CAD   -- plan for NST on Sunday, will f/u results   -- further cardiac work up pending above and on receipt of PCI in May 2019

## 2019-09-04 NOTE — DISCHARGE NOTE NURSING/CASE MANAGEMENT/SOCIAL WORK - PATIENT PORTAL LINK FT
You can access the FollowMyHealth Patient Portal offered by [facility] by registering at the following website: [email or web address]. By joining [employer]’s FollowMyHealth portal, you will also be able to view your health information using other applications (apps) compatible with our system.

## 2019-09-05 ENCOUNTER — INPATIENT (INPATIENT)
Facility: HOSPITAL | Age: 79
LOS: 2 days | Discharge: HOME | End: 2019-09-08
Attending: INTERNAL MEDICINE | Admitting: INTERNAL MEDICINE
Payer: MEDICARE

## 2019-09-05 VITALS
SYSTOLIC BLOOD PRESSURE: 112 MMHG | DIASTOLIC BLOOD PRESSURE: 76 MMHG | RESPIRATION RATE: 16 BRPM | OXYGEN SATURATION: 100 % | HEART RATE: 69 BPM | TEMPERATURE: 97 F

## 2019-09-05 DIAGNOSIS — R55 SYNCOPE AND COLLAPSE: ICD-10-CM

## 2019-09-05 LAB
ALBUMIN SERPL ELPH-MCNC: 4.6 G/DL — SIGNIFICANT CHANGE UP (ref 3.3–5)
ALP SERPL-CCNC: 77 U/L — SIGNIFICANT CHANGE UP (ref 40–120)
ALT FLD-CCNC: 18 U/L — SIGNIFICANT CHANGE UP (ref 4–33)
ANION GAP SERPL CALC-SCNC: 14 MMO/L — SIGNIFICANT CHANGE UP (ref 7–14)
APTT BLD: 34.5 SEC — SIGNIFICANT CHANGE UP (ref 27.5–36.3)
AST SERPL-CCNC: 33 U/L — HIGH (ref 4–32)
BILIRUB SERPL-MCNC: 1.1 MG/DL — SIGNIFICANT CHANGE UP (ref 0.2–1.2)
BUN SERPL-MCNC: 31 MG/DL — HIGH (ref 7–23)
CALCIUM SERPL-MCNC: 10.1 MG/DL — SIGNIFICANT CHANGE UP (ref 8.4–10.5)
CHLORIDE SERPL-SCNC: 102 MMOL/L — SIGNIFICANT CHANGE UP (ref 98–107)
CO2 SERPL-SCNC: 21 MMOL/L — LOW (ref 22–31)
CREAT SERPL-MCNC: 1.21 MG/DL — SIGNIFICANT CHANGE UP (ref 0.5–1.3)
D DIMER BLD IA.RAPID-MCNC: 641 NG/ML — SIGNIFICANT CHANGE UP
GLUCOSE SERPL-MCNC: 69 MG/DL — LOW (ref 70–99)
HCT VFR BLD CALC: 40.7 % — SIGNIFICANT CHANGE UP (ref 34.5–45)
HGB BLD-MCNC: 12.8 G/DL — SIGNIFICANT CHANGE UP (ref 11.5–15.5)
INR BLD: 1.03 — SIGNIFICANT CHANGE UP (ref 0.88–1.17)
MCHC RBC-ENTMCNC: 27.6 PG — SIGNIFICANT CHANGE UP (ref 27–34)
MCHC RBC-ENTMCNC: 31.4 % — LOW (ref 32–36)
MCV RBC AUTO: 87.7 FL — SIGNIFICANT CHANGE UP (ref 80–100)
NRBC # FLD: 0 K/UL — SIGNIFICANT CHANGE UP (ref 0–0)
PLATELET # BLD AUTO: 268 K/UL — SIGNIFICANT CHANGE UP (ref 150–400)
PMV BLD: 11.1 FL — SIGNIFICANT CHANGE UP (ref 7–13)
POTASSIUM SERPL-MCNC: 5.5 MMOL/L — HIGH (ref 3.5–5.3)
POTASSIUM SERPL-SCNC: 5.5 MMOL/L — HIGH (ref 3.5–5.3)
PROT SERPL-MCNC: 9.3 G/DL — HIGH (ref 6–8.3)
PROTHROM AB SERPL-ACNC: 11.8 SEC — SIGNIFICANT CHANGE UP (ref 9.8–13.1)
RBC # BLD: 4.64 M/UL — SIGNIFICANT CHANGE UP (ref 3.8–5.2)
RBC # FLD: 13.8 % — SIGNIFICANT CHANGE UP (ref 10.3–14.5)
SODIUM SERPL-SCNC: 137 MMOL/L — SIGNIFICANT CHANGE UP (ref 135–145)
TROPONIN T, HIGH SENSITIVITY: 21 NG/L — SIGNIFICANT CHANGE UP (ref ?–14)
TROPONIN T, HIGH SENSITIVITY: 21 NG/L — SIGNIFICANT CHANGE UP (ref ?–14)
WBC # BLD: 8.61 K/UL — SIGNIFICANT CHANGE UP (ref 3.8–10.5)
WBC # FLD AUTO: 8.61 K/UL — SIGNIFICANT CHANGE UP (ref 3.8–10.5)

## 2019-09-05 PROCEDURE — 70450 CT HEAD/BRAIN W/O DYE: CPT | Mod: 26

## 2019-09-05 PROCEDURE — 72125 CT NECK SPINE W/O DYE: CPT | Mod: 26

## 2019-09-05 PROCEDURE — 99233 SBSQ HOSP IP/OBS HIGH 50: CPT

## 2019-09-05 PROCEDURE — 71045 X-RAY EXAM CHEST 1 VIEW: CPT | Mod: 26

## 2019-09-05 PROCEDURE — 99223 1ST HOSP IP/OBS HIGH 75: CPT

## 2019-09-05 PROCEDURE — 71275 CT ANGIOGRAPHY CHEST: CPT | Mod: 26

## 2019-09-05 PROCEDURE — 93010 ELECTROCARDIOGRAM REPORT: CPT

## 2019-09-05 RX ORDER — ONDANSETRON 8 MG/1
4 TABLET, FILM COATED ORAL EVERY 6 HOURS
Refills: 0 | Status: DISCONTINUED | OUTPATIENT
Start: 2019-09-05 | End: 2019-09-08

## 2019-09-05 RX ORDER — OXYCODONE HYDROCHLORIDE 5 MG/1
5 TABLET ORAL ONCE
Refills: 0 | Status: DISCONTINUED | OUTPATIENT
Start: 2019-09-05 | End: 2019-09-05

## 2019-09-05 RX ORDER — ACETAMINOPHEN 500 MG
650 TABLET ORAL ONCE
Refills: 0 | Status: COMPLETED | OUTPATIENT
Start: 2019-09-05 | End: 2019-09-05

## 2019-09-05 RX ORDER — INFLUENZA VIRUS VACCINE 15; 15; 15; 15 UG/.5ML; UG/.5ML; UG/.5ML; UG/.5ML
0.5 SUSPENSION INTRAMUSCULAR ONCE
Refills: 0 | Status: COMPLETED | OUTPATIENT
Start: 2019-09-05 | End: 2019-09-05

## 2019-09-05 RX ADMIN — Medication 650 MILLIGRAM(S): at 16:30

## 2019-09-05 RX ADMIN — Medication 650 MILLIGRAM(S): at 15:26

## 2019-09-05 RX ADMIN — OXYCODONE HYDROCHLORIDE 5 MILLIGRAM(S): 5 TABLET ORAL at 18:56

## 2019-09-05 RX ADMIN — OXYCODONE HYDROCHLORIDE 5 MILLIGRAM(S): 5 TABLET ORAL at 17:57

## 2019-09-05 NOTE — ED PROVIDER NOTE - PHYSICAL EXAMINATION
General: well-appearing elderly woman in no acute distress  Head: normocephalic, atraumatic  Eyes: PERRL, evidence of cataract surgery, clear tearing of left eye with no evidence of injection or purulent discharge  Mouth: moist mucous membranes, no oropharyngeal erythema  Neck: supple neck  CV: normal rate and rhythm, no LE edema or tenderness to palpation, peripheral pulses 2+ bilaterally  Respiratory: clear to auscultation bilaterally  Abdomen: soft, nontender, mild tenderness to palpation of right flank  : mild suprapubic tenderness to palpation, no CVAT  MSK: no Cspine tenderness to palpation, no midline tenderness to palpation  Neuro: alert and oriented x3, CN III-XII intact, speech clear, no pronator drift, no dysmetria, strength 5/5 UE and LE bilaterally, sensation reported decreased in left arm and leg  Skin: no rash on right chest or abdomen  Extremities: full ROM, no tenderness to palpation of joints

## 2019-09-05 NOTE — ED ADULT NURSE REASSESSMENT NOTE - NS ED NURSE REASSESS COMMENT FT1
Pt. with room assignment 441A. called to give report. NAHUN Stockton will call back. stated she is currently with another patient.

## 2019-09-05 NOTE — PATIENT PROFILE ADULT - NSPROEXTENSIONSOFSELF_GEN_A_NUR
NJ LAP GASTRIC BYPASS/RUDY-EN-Y N/A 9/11/2017    Procedure: BYPASS GASTRIC  RUDY-EN-Y LAPAROSCOPIC;  Surgeon: Tori Tao 61    Colon cancer Maternal Grandmother 80    none

## 2019-09-05 NOTE — ED PROVIDER NOTE - CLINICAL SUMMARY MEDICAL DECISION MAKING FREE TEXT BOX
78yo woman PMH CAD s/p stent on plavix, brilinta, asa, HTN, HLD, DM, CVA presents after episode of diaphoresis, n/v, syncope. Pt is high risk for cardiac etiology but will consider PE, CVA, metabolic/infectious etiology. Will obtain cbc, cmp, trop, d-dimer, lipase, cxr, CTH, CT cspine. Will continue to reassess but will likely require admission for further work up.

## 2019-09-05 NOTE — ED PROVIDER NOTE - PSH
S/P primary angioplasty with coronary stent  x1 in 2006 at [facility]  S/P TKR (total knee replacement)  left

## 2019-09-05 NOTE — ED PROVIDER NOTE - PMH
CAD (coronary artery disease)  S/P stent placemenet 2006, and reportedly 6/2019 in [place]  CHF (congestive heart failure)    CVA (cerebral vascular accident)  no residual deficits  DM (diabetes mellitus)    GERD (gastroesophageal reflux disease)    HLD (hyperlipidemia)    HTN (hypertension)

## 2019-09-05 NOTE — ED PROVIDER NOTE - ATTENDING CONTRIBUTION TO CARE
78yo woman PMH CAD s/p stent on plavix, brilinta, asa, HTN, HLD, DM, CVA presents after episode of diaphoresis, n/v, syncope. Pt is high risk for cardiac etiology but will consider PE, CVA, metabolic/infectious etiology. Will obtain cbc, cmp, trop, d-dimer, lipase, cxr, CTH, CT cspine. Will continue to reassess but will likely require admission for further work up.    FELICITA Ojeda: I have personally performed a face to face bedside history and physical examination of this patient. I have discussed the history, examination, review of systems, assessment and plan of management with the resident. I have reviewed the electronic medical record and amended it to reflect my history, review of systems, physical exam, assessment and plan.

## 2019-09-05 NOTE — ED ADULT TRIAGE NOTE - CHIEF COMPLAINT QUOTE
pt bibems from home, pt here for syncopal episode with nausea, vomiting, and diarrhea while on the toilet, upon ems arrival pt diaphoretic but not hypotensive, was here yesterday for a chest pain work up. no injuries, did not fall

## 2019-09-05 NOTE — ED PROVIDER NOTE - OBJECTIVE STATEMENT
80yo woman PMH CAD s/p stent on brilinta, plavix, and asa, HTN, HLD, DM presents after an episode of diaphoresis, n/v, and syncope this morning. Pt reports that she had sudden onset diaphoresis and nausea and went to restroom and had a BM and noted that she felt worse and called for her daughter at which point she started to have vomiting and was told that she had intermittent loss of consciousness but did not fall off the toilet. She was recently discharged from the hospital yesterday and was feeling better at time of discharge until this episode. She has right axillary pain that radiated down right arm and a shooting sensation and numbness down her left arm. She reports sensation of numbness in left arm and leg but no focal weakness. No change in vision or headache.

## 2019-09-05 NOTE — ED PROVIDER NOTE - NS ED ROS FT
General: no fevers or chills, +diaphoresis  Head: +LOC  Eyes: no vision change  ENT: no nasal discharge/congestion, no sore throat  CV: +right chest pain radiating to right arm  Resp: no SOB, no cough  GI: +n/v, no abdominal pain  : no dysuria  MSK: +bilateral neck pain radiating down arms  Skin: no new rash  Neuro: no focal weakness, +decreased sensation in left arm and leg

## 2019-09-05 NOTE — ED ADULT NURSE REASSESSMENT NOTE - NS ED NURSE REASSESS COMMENT FT1
Report received from YVONNE Farmer.  Pt. A&O x4 with c/o syncope, CM to Sinus Tachy.  s/p CT scan to to R/o PE +Ddimer. no acute distress noted. will continue to monitor. ST

## 2019-09-06 DIAGNOSIS — R55 SYNCOPE AND COLLAPSE: ICD-10-CM

## 2019-09-06 DIAGNOSIS — I25.10 ATHEROSCLEROTIC HEART DISEASE OF NATIVE CORONARY ARTERY WITHOUT ANGINA PECTORIS: ICD-10-CM

## 2019-09-06 DIAGNOSIS — I50.32 CHRONIC DIASTOLIC (CONGESTIVE) HEART FAILURE: ICD-10-CM

## 2019-09-06 DIAGNOSIS — I50.22 CHRONIC SYSTOLIC (CONGESTIVE) HEART FAILURE: ICD-10-CM

## 2019-09-06 DIAGNOSIS — E11.69 TYPE 2 DIABETES MELLITUS WITH OTHER SPECIFIED COMPLICATION: ICD-10-CM

## 2019-09-06 DIAGNOSIS — K21.9 GASTRO-ESOPHAGEAL REFLUX DISEASE WITHOUT ESOPHAGITIS: ICD-10-CM

## 2019-09-06 DIAGNOSIS — I10 ESSENTIAL (PRIMARY) HYPERTENSION: ICD-10-CM

## 2019-09-06 LAB
ALBUMIN SERPL ELPH-MCNC: 4.5 G/DL — SIGNIFICANT CHANGE UP (ref 3.3–5)
ALP SERPL-CCNC: 73 U/L — SIGNIFICANT CHANGE UP (ref 40–120)
ALT FLD-CCNC: 16 U/L — SIGNIFICANT CHANGE UP (ref 4–33)
ANION GAP SERPL CALC-SCNC: 17 MMO/L — HIGH (ref 7–14)
APPEARANCE UR: CLEAR — SIGNIFICANT CHANGE UP
AST SERPL-CCNC: 17 U/L — SIGNIFICANT CHANGE UP (ref 4–32)
BACTERIA # UR AUTO: NEGATIVE — SIGNIFICANT CHANGE UP
BASOPHILS # BLD AUTO: 0.02 K/UL — SIGNIFICANT CHANGE UP (ref 0–0.2)
BASOPHILS NFR BLD AUTO: 0.3 % — SIGNIFICANT CHANGE UP (ref 0–2)
BILIRUB SERPL-MCNC: 1.1 MG/DL — SIGNIFICANT CHANGE UP (ref 0.2–1.2)
BILIRUB UR-MCNC: NEGATIVE — SIGNIFICANT CHANGE UP
BLOOD UR QL VISUAL: NEGATIVE — SIGNIFICANT CHANGE UP
BUN SERPL-MCNC: 30 MG/DL — HIGH (ref 7–23)
CALCIUM SERPL-MCNC: 9.8 MG/DL — SIGNIFICANT CHANGE UP (ref 8.4–10.5)
CHLORIDE SERPL-SCNC: 100 MMOL/L — SIGNIFICANT CHANGE UP (ref 98–107)
CO2 SERPL-SCNC: 23 MMOL/L — SIGNIFICANT CHANGE UP (ref 22–31)
COLOR SPEC: SIGNIFICANT CHANGE UP
CREAT SERPL-MCNC: 1.13 MG/DL — SIGNIFICANT CHANGE UP (ref 0.5–1.3)
EOSINOPHIL # BLD AUTO: 0.06 K/UL — SIGNIFICANT CHANGE UP (ref 0–0.5)
EOSINOPHIL NFR BLD AUTO: 0.9 % — SIGNIFICANT CHANGE UP (ref 0–6)
GLUCOSE BLDC GLUCOMTR-MCNC: 108 MG/DL — HIGH (ref 70–99)
GLUCOSE BLDC GLUCOMTR-MCNC: 109 MG/DL — HIGH (ref 70–99)
GLUCOSE BLDC GLUCOMTR-MCNC: 136 MG/DL — HIGH (ref 70–99)
GLUCOSE BLDC GLUCOMTR-MCNC: 145 MG/DL — HIGH (ref 70–99)
GLUCOSE BLDC GLUCOMTR-MCNC: 184 MG/DL — HIGH (ref 70–99)
GLUCOSE SERPL-MCNC: 126 MG/DL — HIGH (ref 70–99)
GLUCOSE UR-MCNC: NEGATIVE — SIGNIFICANT CHANGE UP
HCT VFR BLD CALC: 38.8 % — SIGNIFICANT CHANGE UP (ref 34.5–45)
HGB BLD-MCNC: 11.9 G/DL — SIGNIFICANT CHANGE UP (ref 11.5–15.5)
HYALINE CASTS # UR AUTO: NEGATIVE — SIGNIFICANT CHANGE UP
IMM GRANULOCYTES NFR BLD AUTO: 0.4 % — SIGNIFICANT CHANGE UP (ref 0–1.5)
KETONES UR-MCNC: NEGATIVE — SIGNIFICANT CHANGE UP
LEUKOCYTE ESTERASE UR-ACNC: SIGNIFICANT CHANGE UP
LYMPHOCYTES # BLD AUTO: 2.56 K/UL — SIGNIFICANT CHANGE UP (ref 1–3.3)
LYMPHOCYTES # BLD AUTO: 36.4 % — SIGNIFICANT CHANGE UP (ref 13–44)
MAGNESIUM SERPL-MCNC: 2.1 MG/DL — SIGNIFICANT CHANGE UP (ref 1.6–2.6)
MCHC RBC-ENTMCNC: 27.5 PG — SIGNIFICANT CHANGE UP (ref 27–34)
MCHC RBC-ENTMCNC: 30.7 % — LOW (ref 32–36)
MCV RBC AUTO: 89.6 FL — SIGNIFICANT CHANGE UP (ref 80–100)
MONOCYTES # BLD AUTO: 0.66 K/UL — SIGNIFICANT CHANGE UP (ref 0–0.9)
MONOCYTES NFR BLD AUTO: 9.4 % — SIGNIFICANT CHANGE UP (ref 2–14)
NEUTROPHILS # BLD AUTO: 3.7 K/UL — SIGNIFICANT CHANGE UP (ref 1.8–7.4)
NEUTROPHILS NFR BLD AUTO: 52.6 % — SIGNIFICANT CHANGE UP (ref 43–77)
NITRITE UR-MCNC: NEGATIVE — SIGNIFICANT CHANGE UP
NRBC # FLD: 0 K/UL — SIGNIFICANT CHANGE UP (ref 0–0)
PH UR: 5.5 — SIGNIFICANT CHANGE UP (ref 5–8)
PHOSPHATE SERPL-MCNC: 4.7 MG/DL — HIGH (ref 2.5–4.5)
PLATELET # BLD AUTO: 240 K/UL — SIGNIFICANT CHANGE UP (ref 150–400)
PMV BLD: 12.2 FL — SIGNIFICANT CHANGE UP (ref 7–13)
POTASSIUM SERPL-MCNC: 4 MMOL/L — SIGNIFICANT CHANGE UP (ref 3.5–5.3)
POTASSIUM SERPL-SCNC: 4 MMOL/L — SIGNIFICANT CHANGE UP (ref 3.5–5.3)
PROT SERPL-MCNC: 8.3 G/DL — SIGNIFICANT CHANGE UP (ref 6–8.3)
PROT UR-MCNC: NEGATIVE — SIGNIFICANT CHANGE UP
RBC # BLD: 4.33 M/UL — SIGNIFICANT CHANGE UP (ref 3.8–5.2)
RBC # FLD: 13.8 % — SIGNIFICANT CHANGE UP (ref 10.3–14.5)
RBC CASTS # UR COMP ASSIST: SIGNIFICANT CHANGE UP (ref 0–?)
SODIUM SERPL-SCNC: 140 MMOL/L — SIGNIFICANT CHANGE UP (ref 135–145)
SP GR SPEC: 1.02 — SIGNIFICANT CHANGE UP (ref 1–1.04)
SQUAMOUS # UR AUTO: SIGNIFICANT CHANGE UP
UROBILINOGEN FLD QL: NORMAL — SIGNIFICANT CHANGE UP
WBC # BLD: 7.03 K/UL — SIGNIFICANT CHANGE UP (ref 3.8–10.5)
WBC # FLD AUTO: 7.03 K/UL — SIGNIFICANT CHANGE UP (ref 3.8–10.5)
WBC UR QL: HIGH (ref 0–?)

## 2019-09-06 RX ORDER — INSULIN LISPRO 100/ML
VIAL (ML) SUBCUTANEOUS AT BEDTIME
Refills: 0 | Status: DISCONTINUED | OUTPATIENT
Start: 2019-09-06 | End: 2019-09-08

## 2019-09-06 RX ORDER — ATORVASTATIN CALCIUM 80 MG/1
80 TABLET, FILM COATED ORAL AT BEDTIME
Refills: 0 | Status: DISCONTINUED | OUTPATIENT
Start: 2019-09-06 | End: 2019-09-08

## 2019-09-06 RX ORDER — ACETAMINOPHEN 500 MG
650 TABLET ORAL ONCE
Refills: 0 | Status: COMPLETED | OUTPATIENT
Start: 2019-09-06 | End: 2019-09-06

## 2019-09-06 RX ORDER — FUROSEMIDE 40 MG
20 TABLET ORAL DAILY
Refills: 0 | Status: DISCONTINUED | OUTPATIENT
Start: 2019-09-06 | End: 2019-09-08

## 2019-09-06 RX ORDER — HEPARIN SODIUM 5000 [USP'U]/ML
5000 INJECTION INTRAVENOUS; SUBCUTANEOUS EVERY 8 HOURS
Refills: 0 | Status: DISCONTINUED | OUTPATIENT
Start: 2019-09-06 | End: 2019-09-08

## 2019-09-06 RX ORDER — INSULIN LISPRO 100/ML
VIAL (ML) SUBCUTANEOUS
Refills: 0 | Status: DISCONTINUED | OUTPATIENT
Start: 2019-09-06 | End: 2019-09-08

## 2019-09-06 RX ORDER — CARVEDILOL PHOSPHATE 80 MG/1
3.12 CAPSULE, EXTENDED RELEASE ORAL EVERY 12 HOURS
Refills: 0 | Status: DISCONTINUED | OUTPATIENT
Start: 2019-09-06 | End: 2019-09-08

## 2019-09-06 RX ORDER — DEXTROSE 50 % IN WATER 50 %
15 SYRINGE (ML) INTRAVENOUS ONCE
Refills: 0 | Status: DISCONTINUED | OUTPATIENT
Start: 2019-09-06 | End: 2019-09-08

## 2019-09-06 RX ORDER — LATANOPROST 0.05 MG/ML
1 SOLUTION/ DROPS OPHTHALMIC; TOPICAL AT BEDTIME
Refills: 0 | Status: DISCONTINUED | OUTPATIENT
Start: 2019-09-06 | End: 2019-09-08

## 2019-09-06 RX ORDER — ASPIRIN/CALCIUM CARB/MAGNESIUM 324 MG
81 TABLET ORAL DAILY
Refills: 0 | Status: DISCONTINUED | OUTPATIENT
Start: 2019-09-06 | End: 2019-09-08

## 2019-09-06 RX ORDER — DEXTROSE 50 % IN WATER 50 %
12.5 SYRINGE (ML) INTRAVENOUS ONCE
Refills: 0 | Status: DISCONTINUED | OUTPATIENT
Start: 2019-09-06 | End: 2019-09-08

## 2019-09-06 RX ORDER — DEXTROSE 50 % IN WATER 50 %
25 SYRINGE (ML) INTRAVENOUS ONCE
Refills: 0 | Status: DISCONTINUED | OUTPATIENT
Start: 2019-09-06 | End: 2019-09-08

## 2019-09-06 RX ORDER — TICAGRELOR 90 MG/1
90 TABLET ORAL EVERY 12 HOURS
Refills: 0 | Status: DISCONTINUED | OUTPATIENT
Start: 2019-09-06 | End: 2019-09-08

## 2019-09-06 RX ORDER — GLUCAGON INJECTION, SOLUTION 0.5 MG/.1ML
1 INJECTION, SOLUTION SUBCUTANEOUS ONCE
Refills: 0 | Status: DISCONTINUED | OUTPATIENT
Start: 2019-09-06 | End: 2019-09-08

## 2019-09-06 RX ORDER — INSULIN GLARGINE 100 [IU]/ML
30 INJECTION, SOLUTION SUBCUTANEOUS AT BEDTIME
Refills: 0 | Status: DISCONTINUED | OUTPATIENT
Start: 2019-09-06 | End: 2019-09-08

## 2019-09-06 RX ORDER — SODIUM CHLORIDE 9 MG/ML
1000 INJECTION, SOLUTION INTRAVENOUS
Refills: 0 | Status: DISCONTINUED | OUTPATIENT
Start: 2019-09-06 | End: 2019-09-08

## 2019-09-06 RX ORDER — INSULIN GLARGINE 100 [IU]/ML
30 INJECTION, SOLUTION SUBCUTANEOUS ONCE
Refills: 0 | Status: DISCONTINUED | OUTPATIENT
Start: 2019-09-06 | End: 2019-09-06

## 2019-09-06 RX ORDER — INSULIN LISPRO 100/ML
5 VIAL (ML) SUBCUTANEOUS
Refills: 0 | Status: DISCONTINUED | OUTPATIENT
Start: 2019-09-06 | End: 2019-09-08

## 2019-09-06 RX ADMIN — Medication 5 UNIT(S): at 12:39

## 2019-09-06 RX ADMIN — CARVEDILOL PHOSPHATE 3.12 MILLIGRAM(S): 80 CAPSULE, EXTENDED RELEASE ORAL at 17:49

## 2019-09-06 RX ADMIN — INSULIN GLARGINE 30 UNIT(S): 100 INJECTION, SOLUTION SUBCUTANEOUS at 22:59

## 2019-09-06 RX ADMIN — ATORVASTATIN CALCIUM 80 MILLIGRAM(S): 80 TABLET, FILM COATED ORAL at 21:59

## 2019-09-06 RX ADMIN — TICAGRELOR 90 MILLIGRAM(S): 90 TABLET ORAL at 06:42

## 2019-09-06 RX ADMIN — HEPARIN SODIUM 5000 UNIT(S): 5000 INJECTION INTRAVENOUS; SUBCUTANEOUS at 15:03

## 2019-09-06 RX ADMIN — HEPARIN SODIUM 5000 UNIT(S): 5000 INJECTION INTRAVENOUS; SUBCUTANEOUS at 21:59

## 2019-09-06 RX ADMIN — TICAGRELOR 90 MILLIGRAM(S): 90 TABLET ORAL at 17:49

## 2019-09-06 RX ADMIN — Medication 5 UNIT(S): at 08:36

## 2019-09-06 RX ADMIN — Medication 81 MILLIGRAM(S): at 12:39

## 2019-09-06 RX ADMIN — Medication 650 MILLIGRAM(S): at 00:17

## 2019-09-06 RX ADMIN — Medication 650 MILLIGRAM(S): at 01:17

## 2019-09-06 RX ADMIN — HEPARIN SODIUM 5000 UNIT(S): 5000 INJECTION INTRAVENOUS; SUBCUTANEOUS at 06:42

## 2019-09-06 RX ADMIN — Medication 20 MILLIGRAM(S): at 06:42

## 2019-09-06 RX ADMIN — Medication 5 UNIT(S): at 17:48

## 2019-09-06 RX ADMIN — LATANOPROST 1 DROP(S): 0.05 SOLUTION/ DROPS OPHTHALMIC; TOPICAL at 22:59

## 2019-09-06 RX ADMIN — CARVEDILOL PHOSPHATE 3.12 MILLIGRAM(S): 80 CAPSULE, EXTENDED RELEASE ORAL at 06:42

## 2019-09-06 RX ADMIN — Medication 1: at 08:37

## 2019-09-06 NOTE — H&P ADULT - NSICDXPASTSURGICALHX_GEN_ALL_CORE_FT
PAST SURGICAL HISTORY:  S/P primary angioplasty with coronary stent x1 in 2006 at [facility]    S/P TKR (total knee replacement) left

## 2019-09-06 NOTE — H&P ADULT - PROBLEM SELECTOR PLAN 3
- Monitor finger sticks   - Lantus 30units qHS + humalog 5units qAC + sliding scale. Titrate as needed

## 2019-09-06 NOTE — PHYSICAL THERAPY INITIAL EVALUATION ADULT - ADDITIONAL COMMENTS
Patient lives with her daughter in an apartment that has 4 steps to enter building and 10 steps to get up to her apartment. Prior to admission, patient ambulated independently without assistive device.

## 2019-09-06 NOTE — PHYSICAL THERAPY INITIAL EVALUATION ADULT - CRITERIA FOR SKILLED THERAPEUTIC INTERVENTIONS
impairments found/rehab potential/risk reduction/prevention/functional limitations in following categories/therapy frequency/predicted duration of therapy intervention/anticipated discharge recommendation

## 2019-09-06 NOTE — CONSULT NOTE ADULT - SUBJECTIVE AND OBJECTIVE BOX
[facility]        Patient is a 79y old  Female who presents with a chief complaint of syncope (06 Sep 2019 13:34)    Excerpt from H&P, 78 yo F with h/o CAD s/p stent, HTN, HLD, DM presenting after syncope. Pt states that she has been feeling nauseous and went to the bathroom. She had a BM and began feeling diaphoretic, along with palpitations. Felt some cramping in her neck that radiated to her hands then vomited and lost consciousness. She did not fall off the toilet or hit her head. She had shortness of breath and chest tightness. Prior to this pt had felt well. She was recently admitted for chest pain, discharged the day prior. She denies any headache, no blurred vision, no focal weakness.   pt reports that she went to the bathroom felt vertiginous difficulty controlling her secretions, she felt like she was close to passing out. Her daughter came in the room and was helping her       *****PAST MEDICAL / Surgical  HISTORY:  PAST MEDICAL & SURGICAL HISTORY:  CHF (congestive heart failure)  HLD (hyperlipidemia)  CVA (cerebral vascular accident): no residual deficits  GERD (gastroesophageal reflux disease)  DM (diabetes mellitus)  CAD (coronary artery disease): S/P stent placemenet , and reportedly 2019 in [place]  HTN (hypertension)  S/P TKR (total knee replacement): left  S/P primary angioplasty with coronary stent: x1 in  at [facility]           *****FAMILY HISTORY:  FAMILY HISTORY:  FH: diabetes mellitus           *****SOCIAL HISTORY:  Alcohol: None  Smoking: None         *****ALLERGIES:   Allergies    No Known Allergies    Intolerances             *****MEDICATIONS: current medication reviewed and documented.   MEDICATIONS  (STANDING):  aspirin enteric coated 81 milliGRAM(s) Oral daily  atorvastatin 80 milliGRAM(s) Oral at bedtime  carvedilol 3.125 milliGRAM(s) Oral every 12 hours  dextrose 5%. 1000 milliLiter(s) (50 mL/Hr) IV Continuous <Continuous>  dextrose 50% Injectable 12.5 Gram(s) IV Push once  dextrose 50% Injectable 25 Gram(s) IV Push once  dextrose 50% Injectable 25 Gram(s) IV Push once  furosemide    Tablet 20 milliGRAM(s) Oral daily  heparin  Injectable 5000 Unit(s) SubCutaneous every 8 hours  influenza   Vaccine 0.5 milliLiter(s) IntraMuscular once  insulin glargine Injectable (LANTUS) 30 Unit(s) SubCutaneous at bedtime  insulin lispro (HumaLOG) corrective regimen sliding scale   SubCutaneous three times a day before meals  insulin lispro (HumaLOG) corrective regimen sliding scale   SubCutaneous at bedtime  insulin lispro Injectable (HumaLOG) 5 Unit(s) SubCutaneous three times a day before meals  latanoprost 0.005% Ophthalmic Solution 1 Drop(s) Both EYES at bedtime  sodium chloride 0.9% lock flush 3 milliLiter(s) IV Push every 8 hours  ticagrelor 90 milliGRAM(s) Oral every 12 hours    MEDICATIONS  (PRN):  dextrose 40% Gel 15 Gram(s) Oral once PRN Blood Glucose LESS THAN 70 milliGRAM(s)/deciliter  glucagon  Injectable 1 milliGRAM(s) IntraMuscular once PRN Glucose LESS THAN 70 milligrams/deciliter  ondansetron Injectable 4 milliGRAM(s) IV Push every 6 hours PRN Nausea and/or Vomiting           *****REVIEW OF SYSTEM:  GEN: no fever, no chills, no pain  RESP: no SOB, no cough, no sputum  CVS: no chest pain, no palpitations, no edema  GI: no abdominal pain, no nausea, no vomiting, no constipation, no diarrhea  : no dysurea, no frequency, no hematurea  Neuro: no headache, no dizziness  PSYCH: no anxiety, no depression  Derm : no itching, no rash         *****VITAL SIGNS:  T(C): 36.5 (19 @ 05:30), Max: 36.7 (19 @ 14:51)  HR: 65 (19 @ 05:30) (65 - 68)  BP: 125/66 (19 @ 05:30) (113/74 - 139/67)  RR: 17 (19 @ 05:30) (17 - 18)  SpO2: 99% (19 @ 05:30) (97% - 100%)  Wt(kg): --     @ 07:01  -   @ 07:00  --------------------------------------------------------  IN: 360 mL / OUT: 850 mL / NET: -490 mL             *****PHYSICAL EXAM:   Alert oriented x 3   Attention comprehension are fair. Able to name, repeat, read without any difficulty.   Able to follow 3 step commands.     EOMI fundi not visualized,  VFF to confrontration  No facial asymmetry   Tongue is midline   Palate elevates symmetrically   Moving all 4 ext symmetrically no pronator drift   Reflexes are diminished throughout   sensation is  symmetric  Gait : not assessed.  B/L down going toes               *****LAB AND IMAGIN.9   7.03  )-----------( 240      ( 06 Sep 2019 04:39 )             38.8               -    140  |  100  |  30<H>  ----------------------------<  126<H>  4.0   |  23  |  1.13    Ca    9.8      06 Sep 2019 04:39  Phos  4.7     -  Mg     2.1     -    TPro  8.3  /  Alb  4.5  /  TBili  1.1  /  DBili  x   /  AST  17  /  ALT  16  /  AlkPhos  73  -    PT/INR - ( 05 Sep 2019 16:40 )   PT: 11.8 SEC;   INR: 1.03          PTT - ( 05 Sep 2019 16:40 )  PTT:34.5 SEC                        Urinalysis Basic - ( 06 Sep 2019 10:53 )    Color: LIGHT YELLOW / Appearance: CLEAR / S.016 / pH: 5.5  Gluc: NEGATIVE / Ketone: NEGATIVE  / Bili: NEGATIVE / Urobili: NORMAL   Blood: NEGATIVE / Protein: NEGATIVE / Nitrite: NEGATIVE   Leuk Esterase: SMALL / RBC: 0-2 / WBC 6-10   Sq Epi: FEW / Non Sq Epi: x / Bacteria: NEGATIVE        [All pertinent recent Imaging reports reviewed]         *****A S S E S S M E N T   A N D   P L A N :     Excerpt from H&P, 78 yo F with h/o CAD s/p stent, HTN, HLD, DM presenting after syncope. Pt states that she has been feeling nauseous and went to the bathroom. She had a BM and began feeling diaphoretic, along with palpitations. Felt some cramping in her neck that radiated to her hands then vomited and lost consciousness. She did not fall off the toilet or hit her head. She had shortness of breath and chest tightness. Prior to this pt had felt well. She was recently admitted for chest pain, discharged the day prior. She denies any headache, no blurred vision, no focal weakness. No recent fevers or chills.         Problem/Recommendations 1: tia vs. bppv   r/o vertebrobasilar insuff  mri/mra pending   ck orthostatics   b12/folate/tsh/a1c            ___________________________  Will follow with you.  Thank you,  Sherrill Paez MD  Diplomate of the American Board of Neurology and Psychiatry.  Diplomate of the American Board of Vascular Neurology.   [facility] ([facility]   Ph: [phone]    Differential diagnosis and plan of care discussed with patient after the evaluation.   Advanced care planning options discussed.   Pain assessed and judicious use of narcotics when appropriate was discussed.  Importance of Fall prevention discussed.  Counseling on Smoking and Alcohol cessation was offered when appropriate.  Counseling on Diet, exercise, and medication compliance was done.     62 minutes spent on the total encounter;  more than 50 % of the visit was spent on counseling  and or coordinating care by the attending physician.    Thank you for allowing me to participate in the care of this karthik patient. Please do not hesitate to call me if you have any questions.     This and subsequent notes were partially created using voice recognition software and will  inherently be subject to errors including those of syntax and sound alike substitutions which may escape proofreading. In such instances original meaning may be extrapolated by contextual derivation.

## 2019-09-06 NOTE — CONSULT NOTE ADULT - ATTENDING COMMENTS
Patient seen and examined.  Agree with above.   -Admitted with syncope  -?vagal  -monitor tele  -repeat orthostatics    Oma Dumont MD

## 2019-09-06 NOTE — CONSULT NOTE ADULT - SUBJECTIVE AND OBJECTIVE BOX
HPI:    79 year old female, hx CAD, PCI 2006, and Recent PCI to LAD and Lcx in 6/2019 in [place] at [facility], DM, HTN, HLD, hx CVA, recently admitted with CP, s/p TTE and LHC with patent stents and non obs CAD, presenting from home after a syncopal episode, suspect vasovagal.      Pt states that she has been feeling nauseous and went to the bathroom. She had a BM and began feeling diaphoretic, along with palpitations. Felt some cramping in her neck that radiated to her hands then vomited and lost consciousness. She did not fall off the toilet or hit her head. She had shortness of breath and chest tightness.  She denies any headache, no blurred vision, no focal weakness. No recent fevers or chills.       PAST MEDICAL & SURGICAL HISTORY:  CHF (congestive heart failure)  HLD (hyperlipidemia)  CVA (cerebral vascular accident): no residual deficits  GERD (gastroesophageal reflux disease)  DM (diabetes mellitus)  CAD (coronary artery disease): S/P stent placemenet 2006, and reportedly 6/2019 in [place]  HTN (hypertension)  S/P TKR (total knee replacement): left  S/P primary angioplasty with coronary stent: x1 in 2006 at [facility]      MEDICATIONS  (STANDING):  aspirin enteric coated 81 milliGRAM(s) Oral daily  atorvastatin 80 milliGRAM(s) Oral at bedtime  carvedilol 3.125 milliGRAM(s) Oral every 12 hours  dextrose 5%. 1000 milliLiter(s) (50 mL/Hr) IV Continuous <Continuous>  dextrose 50% Injectable 12.5 Gram(s) IV Push once  dextrose 50% Injectable 25 Gram(s) IV Push once  dextrose 50% Injectable 25 Gram(s) IV Push once  furosemide    Tablet 20 milliGRAM(s) Oral daily  heparin  Injectable 5000 Unit(s) SubCutaneous every 8 hours  influenza   Vaccine 0.5 milliLiter(s) IntraMuscular once  insulin glargine Injectable (LANTUS) 30 Unit(s) SubCutaneous at bedtime  insulin lispro (HumaLOG) corrective regimen sliding scale   SubCutaneous three times a day before meals  insulin lispro (HumaLOG) corrective regimen sliding scale   SubCutaneous at bedtime  insulin lispro Injectable (HumaLOG) 5 Unit(s) SubCutaneous three times a day before meals  latanoprost 0.005% Ophthalmic Solution 1 Drop(s) Both EYES at bedtime  ticagrelor 90 milliGRAM(s) Oral every 12 hours      Allergies  No Known Allergies    FAMILY HISTORY:  FH: diabetes mellitus  Noncontributory for premature coronary disease or sudden cardiac death    SOCIAL HISTORY:    [x ] Non-smoker  [ ] Smoker  [ ] Alcohol      REVIEW OF SYSTEMS:  [ ]chest pain  [  ]shortness of breath  [  ]palpitations  [x  ]syncope  [ ]near syncope [ ]upper extremity weakness   [ ] lower extremity weakness  [  ]diplopia  [  ]altered mental status   [  ]fevers  [ ]chills [ ]nausea  [ ]vomitting  [  ]dysphagia    [ ]abdominal pain  [ ]melena  [ ]BRBPR    [  ]epistaxis  [  ]rash  [ ]lower extremity edema      [x ] All others negative	  [ ] Unable to obtain    PHYSICAL EXAM:  T(C): 36.6 (09-05-19 @ 23:16), Max: 36.9 (09-05-19 @ 15:25)  HR: 75 (09-06-19 @ 06:39) (70 - 75)  BP: 115/56 (09-06-19 @ 06:39) (115/56 - 131/85)  RR: 17 (09-05-19 @ 23:16) (17 - 18)  SpO2: 100% (09-05-19 @ 23:16) (100% - 100%)  Wt(kg): --    Appearance: Normal	  HEENT:   Normal oral mucosa, PERRL, EOMI	  Lymphatic: No lymphadenopathy , no edema  Cardiovascular: Normal S1 S2, No JVD, No murmurs , Peripheral pulses palpable 2+ bilaterally  Respiratory: Lungs clear to auscultation, normal effort 	  Gastrointestinal:  Soft, Non-tender, + BS	  Skin: No rashes, No ecchymoses, No cyanosis, warm to touch  Musculoskeletal: Normal range of motion, normal strength  Psychiatry:  Mood & affect appropriate    DIAGNOSTIC DATA:    TELEMETRY: 	 NSR     ECG:  	NSR, narrow QRS    Echo: < from: Transthoracic Echocardiogram (08.30.19 @ 16:50) >  CONCLUSIONS:  1. Mitral annular calcification, otherwise normal mitral  valve. Mild mitral regurgitation.  2. Mildly dilated left atrium.  LA volume index = 37 cc/m2.  3. Normal left ventricular systolic function. No segmental  wall motion abnormalities.  4. Mild diastolic dysfunction (Stage I).  5. Normal right ventricular size and function.  ------------------------------------------------------------------------  Confirmed on  8/30/2019 - 17:56:59 by Rohan Varma M.D.  ------------------------------------------------------------------------      Cath:< from: Cardiac Cath Lab - Adult (09.03.19 @ 14:03) >  VENTRICLES: No left ventriculogram was performed.  CORONARY VESSELS: The coronary circulation is right dominant.  LM:   --  LM: Angiography showed mild atherosclerosis with no flow limiting  lesions.  LAD:   --  Proximal LAD: There was a diffuse 10 % stenosis at the site of a  prior stent. The lesion was eccentric. There was NGUYEN grade 3 flow through  the vessel (brisk flow).  --  Mid LAD: There was a diffuse 20 % stenosis at the site of a prior  stent. The lesion was eccentric. There was NGUYEN grade 3 flow through the  vessel (brisk flow).  --  D1: There was a tubular 30 % stenosis at the site of a prior stent. The  lesion was smoothly contoured. There was NGUYEN grade 3 flow through the  vessel (brisk flow).  CX:   --  Circumflex: Angiography showed mild atherosclerosis with no flow  limiting lesions.  RI:   --  Ramus intermedius: There was a diffuse 30 % stenosis at the site  of a prior stent. The lesion was eccentric. There was NGUYEN grade 3 flow  through the vessel (brisk flow).  RCA:   --  Mid RCA: There was a tubular 20 % stenosis at a site with no  prior intervention. The lesion was eccentric. There was NGUYEN grade 3 flow  through the vessel (brisk flow).  --  RPDA: Angiography showed mild atherosclerosis with no flow limiting  lesions.  --  RPLS: Angiography showed mild atherosclerosis with no flow limiting  lesions.  COMPLICATIONS: There were no complications.  DIAGNOSTIC IMPRESSIONS: Patent stents LAD, D-1 and Ramus  No significant obstructive CAD  DIAGNOSTIC RECOMMENDATIONS: Medical therapy  INTERVENTIONAL IMPRESSIONS: Patent stents LAD, D-1 and Ramus  No significant obstructive CAD  INTERVENTIONAL RECOMMENDATIONS: Medical therapy  Prepared and signed by  Nia Izaguirre M.D.  Signed 09/03/2019 16:11:54    < end of copied text >    < from: CT Cervical Spine No Cont (09.05.19 @ 17:22) >  IMPRESSION:    Head CT: No evidence for intracranial hemorrhage, mass effect, or   displaced calvarial fracture.    Cervical spine CT: No evidence for acute displaced fracture or traumatic   malalignment.     Moderate to severe disc herniation at the C4-C5, C5-C6 and C6-C7 with   ligamentous hypertrophy causing moderate to severe central canal and   neuroforaminal narrowing. Consider MRI for further evaluation.    < end of copied text >    	  LABS:	 	                            11.9   7.03  )-----------( 240      ( 06 Sep 2019 04:39 )             38.8     09-06    140  |  100  |  30<H>  ----------------------------<  126<H>  4.0   |  23  |  1.13    Ca    9.8      06 Sep 2019 04:39  Phos  4.7     09-06  Mg     2.1     09-06    TPro  8.3  /  Alb  4.5  /  TBili  1.1  /  DBili  x   /  AST  17  /  ALT  16  /  AlkPhos  73  09-06    Trop T 21--21    ASSESSMENT/PLAN:   79 year old female, hx CAD, PCI 2006, and Recent PCI to LAD and Lcx in 6/2019 in [place] at [facility], DM, HTN, HLD, hx CVA, recently admitted with CP, s/p TTE and LHC with patent stents and non obs CAD, presenting from home after a syncopal episode, suspect vasovagal.      --repeat orthostatics, they were slightly positive  --Recent cath with patent stents and non obs CAD, EKG with no acute ST-T changes.  Pt with chest tightness during episode and indeterminate tropinin.  do not suspect ACS, trend CK and CKMB.  --abnormal CT C spine, f/u Orthospine/Neuro sx Eval  --cont tele

## 2019-09-06 NOTE — H&P ADULT - HISTORY OF PRESENT ILLNESS
78 yo F with h/o CAD s/p stent, HTN, HLD, DM presenting after syncope. Pt states that she has been feeling nauseous and went to the bathroom. She had a BM and began feeling diaphoretic, along with palpitations. Felt some cramping in her neck that radiated to her hands then vomited and lost consciousness. She did not fall off the toilet or hit her head. She had shortness of breath and chest tightness. Prior to this pt had felt well. She was recently admitted for chest pain, discharged the day prior. She denies any headache, no blurred vision, no focal weakness. No recent fevers or chills.     In ED pt was given Tylenol and oxycodone  VS:  120/64  73  98.4  18  100% on RA

## 2019-09-06 NOTE — H&P ADULT - NSHPREVIEWOFSYSTEMS_GEN_ALL_CORE
REVIEW OF SYSTEMS:    CONSTITUTIONAL: No weakness, fevers or chills, no weight loss  EYES/ENT: No visual changes;  No dysphagia or odynophagia, no tinnitus  NECK: No pain or stiffness  RESPIRATORY: No cough, wheezing, hemoptysis; No shortness of breath  CARDIOVASCULAR: No chest pain or palpitations; No lower extremity edema  GASTROINTESTINAL: No abdominal or epigastric pain. + NAUSEA AND VOMITING, or hematemesis; No diarrhea or constipation. No melena or hematochezia.  MUSCULOSKELETAL: No joint pain, swelling, erythema or warmth, no back pain  GENITOURINARY: No dysuria, frequency or hematuria, no suprapubic pain  NEUROLOGICAL: No numbness or weakness, no headache, + SYNCOPE, no gait abnormalities   SKIN: No itching, burning, rashes, or lesions   All other review of systems is negative unless indicated above.

## 2019-09-06 NOTE — H&P ADULT - NSHPPHYSICALEXAM_GEN_ALL_CORE
T(C): 36.6 (09-05-19 @ 23:16), Max: 36.9 (09-05-19 @ 15:25)  HR: 74 (09-05-19 @ 23:16) (69 - 74)  BP: 131/85 (09-05-19 @ 23:16) (112/76 - 131/85)  RR: 17 (09-05-19 @ 23:16) (16 - 18)  SpO2: 100% (09-05-19 @ 23:16) (100% - 100%)    GENERAL: No acute distress, well-developed  HEAD:  Atraumatic, Normocephalic  ENT: EOMI, PERRLA, conjunctiva and sclera clear, Neck supple, No JVD, moist mucosa, no pharyngeal erythema, no tonsillar enlargement or exudate  CHEST/LUNG: Clear to auscultation bilaterally; No wheeze, equal breath sounds bilaterally   HEART: Regular rate and rhythm; No murmurs, rubs, or gallops  ABDOMEN: Soft, Nontender, Nondistended; Bowel sounds present, no organomegaly  EXTREMITIES:  2+ Peripheral Pulses, No clubbing, cyanosis, or edema  PSYCH: AAOx3, normal affect, normal behavior  NEUROLOGY: non-focal, cranial nerves intact  SKIN: Normal color, No rashes or lesions

## 2019-09-06 NOTE — H&P ADULT - NSHPLABSRESULTS_GEN_ALL_CORE
.  LABS:                         12.8   8.61  )-----------( 268      ( 05 Sep 2019 15:10 )             40.7     09-05    137  |  102  |  31<H>  ----------------------------<  69<L>  5.5<H>   |  21<L>  |  1.21    Ca    10.1      05 Sep 2019 15:10    TPro  9.3<H>  /  Alb  4.6  /  TBili  1.1  /  DBili  x   /  AST  33<H>  /  ALT  18  /  AlkPhos  77  09-05    PT/INR - ( 05 Sep 2019 16:40 )   PT: 11.8 SEC;   INR: 1.03          PTT - ( 05 Sep 2019 16:40 )  PTT:34.5 SEC              RADIOLOGY, EKG & ADDITIONAL TESTS: Reviewed.

## 2019-09-06 NOTE — PHYSICAL THERAPY INITIAL EVALUATION ADULT - DISCHARGE DISPOSITION, PT EVAL
home w/ home PT/Home with skilled home PT for gait training, transfer training, and LE strengthening

## 2019-09-06 NOTE — PHYSICAL THERAPY INITIAL EVALUATION ADULT - PERTINENT HX OF CURRENT PROBLEM, REHAB EVAL
Keon is a 79 year old female who was admitted to [facility] [place] following syncope and collapse. Patient PMH includes CAD, HTN, HLD, DM. Patient recently admitted for chest pain and was discharged two days ago

## 2019-09-06 NOTE — H&P ADULT - PROBLEM SELECTOR PLAN 1
- Likely vasovagal given prodrome of symptoms  - CTA neg for PE  - Check orthostatics   - PT consult , fall precautions - Likely vasovagal given prodrome of symptoms  - CTA neg for PE  - Troponin neg, ? new TWI on EKG. Will repeat   - Check orthostatics   - PT consult , fall precautions

## 2019-09-06 NOTE — CHART NOTE - NSCHARTNOTEFT_GEN_A_CORE
pt seen and examined bedside  H and p done by hospitalist earlier  cards and neuro called for consult

## 2019-09-07 LAB
ANION GAP SERPL CALC-SCNC: 13 MMO/L — SIGNIFICANT CHANGE UP (ref 7–14)
BUN SERPL-MCNC: 25 MG/DL — HIGH (ref 7–23)
CALCIUM SERPL-MCNC: 9.5 MG/DL — SIGNIFICANT CHANGE UP (ref 8.4–10.5)
CHLORIDE SERPL-SCNC: 101 MMOL/L — SIGNIFICANT CHANGE UP (ref 98–107)
CO2 SERPL-SCNC: 26 MMOL/L — SIGNIFICANT CHANGE UP (ref 22–31)
CREAT SERPL-MCNC: 1.05 MG/DL — SIGNIFICANT CHANGE UP (ref 0.5–1.3)
GLUCOSE BLDC GLUCOMTR-MCNC: 108 MG/DL — HIGH (ref 70–99)
GLUCOSE BLDC GLUCOMTR-MCNC: 155 MG/DL — HIGH (ref 70–99)
GLUCOSE BLDC GLUCOMTR-MCNC: 159 MG/DL — HIGH (ref 70–99)
GLUCOSE BLDC GLUCOMTR-MCNC: 93 MG/DL — SIGNIFICANT CHANGE UP (ref 70–99)
GLUCOSE SERPL-MCNC: 135 MG/DL — HIGH (ref 70–99)
HCT VFR BLD CALC: 36 % — SIGNIFICANT CHANGE UP (ref 34.5–45)
HGB BLD-MCNC: 10.9 G/DL — LOW (ref 11.5–15.5)
MCHC RBC-ENTMCNC: 27.3 PG — SIGNIFICANT CHANGE UP (ref 27–34)
MCHC RBC-ENTMCNC: 30.3 % — LOW (ref 32–36)
MCV RBC AUTO: 90 FL — SIGNIFICANT CHANGE UP (ref 80–100)
NRBC # FLD: 0 K/UL — SIGNIFICANT CHANGE UP (ref 0–0)
PLATELET # BLD AUTO: 238 K/UL — SIGNIFICANT CHANGE UP (ref 150–400)
PMV BLD: 11.9 FL — SIGNIFICANT CHANGE UP (ref 7–13)
POTASSIUM SERPL-MCNC: 4.4 MMOL/L — SIGNIFICANT CHANGE UP (ref 3.5–5.3)
POTASSIUM SERPL-SCNC: 4.4 MMOL/L — SIGNIFICANT CHANGE UP (ref 3.5–5.3)
RBC # BLD: 4 M/UL — SIGNIFICANT CHANGE UP (ref 3.8–5.2)
RBC # FLD: 13.6 % — SIGNIFICANT CHANGE UP (ref 10.3–14.5)
SODIUM SERPL-SCNC: 140 MMOL/L — SIGNIFICANT CHANGE UP (ref 135–145)
WBC # BLD: 5.93 K/UL — SIGNIFICANT CHANGE UP (ref 3.8–10.5)
WBC # FLD AUTO: 5.93 K/UL — SIGNIFICANT CHANGE UP (ref 3.8–10.5)

## 2019-09-07 PROCEDURE — 70549 MR ANGIOGRAPH NECK W/O&W/DYE: CPT | Mod: 26

## 2019-09-07 PROCEDURE — 70544 MR ANGIOGRAPHY HEAD W/O DYE: CPT | Mod: 26,59

## 2019-09-07 PROCEDURE — 70551 MRI BRAIN STEM W/O DYE: CPT | Mod: 26

## 2019-09-07 RX ORDER — NYSTATIN CREAM 100000 [USP'U]/G
1 CREAM TOPICAL
Refills: 0 | Status: DISCONTINUED | OUTPATIENT
Start: 2019-09-07 | End: 2019-09-08

## 2019-09-07 RX ORDER — SODIUM CHLORIDE 9 MG/ML
3 INJECTION INTRAMUSCULAR; INTRAVENOUS; SUBCUTANEOUS EVERY 8 HOURS
Refills: 0 | Status: DISCONTINUED | OUTPATIENT
Start: 2019-09-07 | End: 2019-09-08

## 2019-09-07 RX ADMIN — LATANOPROST 1 DROP(S): 0.05 SOLUTION/ DROPS OPHTHALMIC; TOPICAL at 22:14

## 2019-09-07 RX ADMIN — Medication 20 MILLIGRAM(S): at 06:00

## 2019-09-07 RX ADMIN — Medication 81 MILLIGRAM(S): at 13:51

## 2019-09-07 RX ADMIN — INSULIN GLARGINE 30 UNIT(S): 100 INJECTION, SOLUTION SUBCUTANEOUS at 22:15

## 2019-09-07 RX ADMIN — Medication 1: at 08:35

## 2019-09-07 RX ADMIN — HEPARIN SODIUM 5000 UNIT(S): 5000 INJECTION INTRAVENOUS; SUBCUTANEOUS at 22:14

## 2019-09-07 RX ADMIN — Medication 5 UNIT(S): at 17:54

## 2019-09-07 RX ADMIN — NYSTATIN CREAM 1 APPLICATION(S): 100000 CREAM TOPICAL at 17:56

## 2019-09-07 RX ADMIN — SODIUM CHLORIDE 3 MILLILITER(S): 9 INJECTION INTRAMUSCULAR; INTRAVENOUS; SUBCUTANEOUS at 22:14

## 2019-09-07 RX ADMIN — SODIUM CHLORIDE 3 MILLILITER(S): 9 INJECTION INTRAMUSCULAR; INTRAVENOUS; SUBCUTANEOUS at 13:45

## 2019-09-07 RX ADMIN — TICAGRELOR 90 MILLIGRAM(S): 90 TABLET ORAL at 17:54

## 2019-09-07 RX ADMIN — SODIUM CHLORIDE 3 MILLILITER(S): 9 INJECTION INTRAMUSCULAR; INTRAVENOUS; SUBCUTANEOUS at 06:00

## 2019-09-07 RX ADMIN — HEPARIN SODIUM 5000 UNIT(S): 5000 INJECTION INTRAVENOUS; SUBCUTANEOUS at 06:00

## 2019-09-07 RX ADMIN — ATORVASTATIN CALCIUM 80 MILLIGRAM(S): 80 TABLET, FILM COATED ORAL at 22:14

## 2019-09-07 RX ADMIN — TICAGRELOR 90 MILLIGRAM(S): 90 TABLET ORAL at 06:00

## 2019-09-07 RX ADMIN — CARVEDILOL PHOSPHATE 3.12 MILLIGRAM(S): 80 CAPSULE, EXTENDED RELEASE ORAL at 17:54

## 2019-09-07 RX ADMIN — Medication 5 UNIT(S): at 13:53

## 2019-09-07 RX ADMIN — HEPARIN SODIUM 5000 UNIT(S): 5000 INJECTION INTRAVENOUS; SUBCUTANEOUS at 13:52

## 2019-09-07 RX ADMIN — Medication 5 UNIT(S): at 08:36

## 2019-09-07 RX ADMIN — CARVEDILOL PHOSPHATE 3.12 MILLIGRAM(S): 80 CAPSULE, EXTENDED RELEASE ORAL at 06:00

## 2019-09-07 NOTE — PROGRESS NOTE ADULT - SUBJECTIVE AND OBJECTIVE BOX
S:  no cp, sob improving, no c/o dizziness, lightheadedness       aspirin enteric coated 81 milliGRAM(s) Oral daily  atorvastatin 80 milliGRAM(s) Oral at bedtime  carvedilol 3.125 milliGRAM(s) Oral every 12 hours  dextrose 40% Gel 15 Gram(s) Oral once PRN  dextrose 5%. 1000 milliLiter(s) IV Continuous <Continuous>  dextrose 50% Injectable 12.5 Gram(s) IV Push once  dextrose 50% Injectable 25 Gram(s) IV Push once  dextrose 50% Injectable 25 Gram(s) IV Push once  furosemide    Tablet 20 milliGRAM(s) Oral daily  glucagon  Injectable 1 milliGRAM(s) IntraMuscular once PRN  heparin  Injectable 5000 Unit(s) SubCutaneous every 8 hours  influenza   Vaccine 0.5 milliLiter(s) IntraMuscular once  insulin glargine Injectable (LANTUS) 30 Unit(s) SubCutaneous at bedtime  insulin lispro (HumaLOG) corrective regimen sliding scale   SubCutaneous three times a day before meals  insulin lispro (HumaLOG) corrective regimen sliding scale   SubCutaneous at bedtime  insulin lispro Injectable (HumaLOG) 5 Unit(s) SubCutaneous three times a day before meals  latanoprost 0.005% Ophthalmic Solution 1 Drop(s) Both EYES at bedtime  ondansetron Injectable 4 milliGRAM(s) IV Push every 6 hours PRN  sodium chloride 0.9% lock flush 3 milliLiter(s) IV Push every 8 hours  ticagrelor 90 milliGRAM(s) Oral every 12 hours                       10.9   5.93  )-----------( 238      ( 07 Sep 2019 05:50 )             36.0     Hemoglobin: 10.9 g/dL (09-07 @ 05:50)  Hemoglobin: 11.9 g/dL (09-06 @ 04:39)  Hemoglobin: 12.8 g/dL (09-05 @ 15:10)  Hemoglobin: 11.2 g/dL (09-04 @ 05:35)  Hemoglobin: 11.4 g/dL (09-03 @ 07:32)    09-07    140  |  101  |  25<H>  ----------------------------<  135<H>  4.4   |  26  |  1.05    Ca    9.5      07 Sep 2019 05:50  Phos  4.7     09-06  Mg     2.1     09-06    TPro  8.3  /  Alb  4.5  /  TBili  1.1  /  DBili  x   /  AST  17  /  ALT  16  /  AlkPhos  73  09-06    Creatinine Trend: 1.05<--, 1.13<--, 1.21<--, 0.86<--, 0.90<--, 0.90<--    COAGS:     T(C): 36.5 (09-07-19 @ 05:30), Max: 36.7 (09-06-19 @ 14:51)  HR: 65 (09-07-19 @ 05:30) (65 - 68)  BP: 125/66 (09-07-19 @ 05:30) (113/74 - 139/67)  RR: 17 (09-07-19 @ 05:30) (17 - 18)  SpO2: 99% (09-07-19 @ 05:30) (97% - 100%)  Wt(kg): --    I&O's Summary    06 Sep 2019 07:01  -  07 Sep 2019 07:00  --------------------------------------------------------  IN: 360 mL / OUT: 850 mL / NET: -490 mL      Gen: Appears well in NAD  HEENT:  (-)icterus (-)pallor  CV: N S1 S2 1/6 SHANIQUA (+)2 Pulses B/l  Resp:  CTA b/l, normal effort  GI: (+) BS Soft, NT, ND  Lymph:  (+) Trace B/L LE Edema, (-)obvious lymphadenopathy  Skin: Warm to touch, Normal turgor  Psych: Appropriate mood and affect    TELEMETRY:    NSR 60s, PVC    DATA:  < from: Transthoracic Echocardiogram (08.30.19 @ 16:50) >  CONCLUSIONS:  1. Mitral annular calcification, otherwise normal mitral  valve. Mild mitral regurgitation.  2. Mildly dilated left atrium.  LA volume index = 37 cc/m2.  3. Normal left ventricular systolic function. No segmental  wall motion abnormalities.  4. Mild diastolic dysfunction (Stage I).  5. Normal right ventricular size and function.  ------------------------------------------------------------------------  Confirmed on  8/30/2019 - 17:56:59 by Rohan Varma M.D.  ------------------------------------------------------------------------    < end of copied text >    < from: Nuclear Stress Test-Pharmacologic (09.01.19 @ 08:41) >  IMPRESSIONS:Abnormal Study  * Myocardial Perfusion SPECT results are abnormal.  * There is a medium sized, moderate defect in  anterolateral wall that is predominantly reversible,  suggestive of infarction with moderate seamus-infarct  ischemia.There is a medium sized, moderate defect in  inferolateral wall that is fixed, suggestive of infarct.  * Post-stressgated wall motion analysis was performed  (LVEF = 48 %;LVEDV = 91 ml.), revealing mild hypokinesis.  Ther anterolateral wall appears hypokinetic.  < end of copied text >    < from: Cardiac Cath Lab - Adult (09.03.19 @ 14:03) >  VENTRICLES: No left ventriculogram was performed.  CORONARY VESSELS: The coronary circulation is right dominant.  LM:   --  LM: Angiography showed mild atherosclerosis with no flow limiting  lesions.  LAD:   --  Proximal LAD: There was a diffuse 10 % stenosis at the site of a  prior stent. The lesion was eccentric. There was NGUYEN grade 3 flow through  the vessel (brisk flow).  --  Mid LAD: There was a diffuse 20 % stenosis at the site of a prior  stent. The lesion was eccentric. There was NGUYEN grade 3 flow through the  vessel (brisk flow).  --  D1: There was a tubular 30 % stenosis at the site of a prior stent. The  lesion was smoothly contoured. There was NGUYEN grade 3 flow through the  vessel (brisk flow).  CX:   --  Circumflex: Angiography showed mild atherosclerosis with no flow  limiting lesions.  RI:   --  Ramus intermedius: There was a diffuse 30 % stenosis at the site  of a prior stent. The lesion was eccentric. There was NGUYEN grade 3 flow  through the vessel (brisk flow).  RCA:   --  Mid RCA: There was a tubular 20 % stenosis at a site with no  prior intervention. The lesion was eccentric. There was NGUYEN grade 3 flow  through the vessel (brisk flow).  --  RPDA: Angiography showed mild atherosclerosis with no flow limiting  lesions.  --  RPLS: Angiography showed mild atherosclerosis with no flow limiting  lesions.  COMPLICATIONS: There were no complications.  DIAGNOSTIC IMPRESSIONS: Patent stents LAD, D-1 and Ramus  No significant obstructive CAD  DIAGNOSTIC RECOMMENDATIONS: Medical therapy  INTERVENTIONAL IMPRESSIONS: Patent stents LAD, D-1 and Ramus  No significant obstructive CAD  INTERVENTIONAL RECOMMENDATIONS: Medical therapy  Prepared and signed by  Nia Izaguirre M.D.  Signed 09/03/2019 16:11:54    < end of copied text >         ASSESSMENT/PLAN:     79 year old woman with a history of CAD s/p stents (2006 LAD/LCx and more recently in 6/2019 in [facility]), DM, HTN, HLD, prior CVA who presents with chest pain. Cardiology consulted for r/o ACS, management of CAD s/[place] stents [place] hospital.    --Pt admitted with indeterminant Trops, s/p Abnormal NST as above, s/p LHC with patent stents and non obs CAD  --continue oral lasix, keep O>I   --HD stable  --DC planning  --F/U Dr Koenig in 1 week S:  no cp, sob improving, no c/o dizziness, lightheadedness       aspirin enteric coated 81 milliGRAM(s) Oral daily  atorvastatin 80 milliGRAM(s) Oral at bedtime  carvedilol 3.125 milliGRAM(s) Oral every 12 hours  dextrose 40% Gel 15 Gram(s) Oral once PRN  dextrose 5%. 1000 milliLiter(s) IV Continuous <Continuous>  dextrose 50% Injectable 12.5 Gram(s) IV Push once  dextrose 50% Injectable 25 Gram(s) IV Push once  dextrose 50% Injectable 25 Gram(s) IV Push once  furosemide    Tablet 20 milliGRAM(s) Oral daily  glucagon  Injectable 1 milliGRAM(s) IntraMuscular once PRN  heparin  Injectable 5000 Unit(s) SubCutaneous every 8 hours  influenza   Vaccine 0.5 milliLiter(s) IntraMuscular once  insulin glargine Injectable (LANTUS) 30 Unit(s) SubCutaneous at bedtime  insulin lispro (HumaLOG) corrective regimen sliding scale   SubCutaneous three times a day before meals  insulin lispro (HumaLOG) corrective regimen sliding scale   SubCutaneous at bedtime  insulin lispro Injectable (HumaLOG) 5 Unit(s) SubCutaneous three times a day before meals  latanoprost 0.005% Ophthalmic Solution 1 Drop(s) Both EYES at bedtime  ondansetron Injectable 4 milliGRAM(s) IV Push every 6 hours PRN  sodium chloride 0.9% lock flush 3 milliLiter(s) IV Push every 8 hours  ticagrelor 90 milliGRAM(s) Oral every 12 hours                       10.9   5.93  )-----------( 238      ( 07 Sep 2019 05:50 )             36.0     Hemoglobin: 10.9 g/dL (09-07 @ 05:50)  Hemoglobin: 11.9 g/dL (09-06 @ 04:39)  Hemoglobin: 12.8 g/dL (09-05 @ 15:10)  Hemoglobin: 11.2 g/dL (09-04 @ 05:35)  Hemoglobin: 11.4 g/dL (09-03 @ 07:32)    09-07    140  |  101  |  25<H>  ----------------------------<  135<H>  4.4   |  26  |  1.05    Ca    9.5      07 Sep 2019 05:50  Phos  4.7     09-06  Mg     2.1     09-06    TPro  8.3  /  Alb  4.5  /  TBili  1.1  /  DBili  x   /  AST  17  /  ALT  16  /  AlkPhos  73  09-06    Creatinine Trend: 1.05<--, 1.13<--, 1.21<--, 0.86<--, 0.90<--, 0.90<--    COAGS:     T(C): 36.5 (09-07-19 @ 05:30), Max: 36.7 (09-06-19 @ 14:51)  HR: 65 (09-07-19 @ 05:30) (65 - 68)  BP: 125/66 (09-07-19 @ 05:30) (113/74 - 139/67)  RR: 17 (09-07-19 @ 05:30) (17 - 18)  SpO2: 99% (09-07-19 @ 05:30) (97% - 100%)  Wt(kg): --    I&O's Summary    06 Sep 2019 07:01  -  07 Sep 2019 07:00  --------------------------------------------------------  IN: 360 mL / OUT: 850 mL / NET: -490 mL      Gen: Appears well in NAD  HEENT:  (-)icterus (-)pallor  CV: N S1 S2 1/6 SHANIQUA (+)2 Pulses B/l  Resp:  CTA b/l, normal effort  GI: (+) BS Soft, NT, ND  Lymph:  (+) Trace B/L LE Edema, (-)obvious lymphadenopathy  Skin: Warm to touch, Normal turgor  Psych: Appropriate mood and affect    TELEMETRY:    NSR 70s, PVC    DATA:  < from: Transthoracic Echocardiogram (08.30.19 @ 16:50) >  CONCLUSIONS:  1. Mitral annular calcification, otherwise normal mitral  valve. Mild mitral regurgitation.  2. Mildly dilated left atrium.  LA volume index = 37 cc/m2.  3. Normal left ventricular systolic function. No segmental  wall motion abnormalities.  4. Mild diastolic dysfunction (Stage I).  5. Normal right ventricular size and function.  ------------------------------------------------------------------------  Confirmed on  8/30/2019 - 17:56:59 by Rohan Varma M.D.  ------------------------------------------------------------------------    < end of copied text >    < from: Nuclear Stress Test-Pharmacologic (09.01.19 @ 08:41) >  IMPRESSIONS:Abnormal Study  * Myocardial Perfusion SPECT results are abnormal.  * There is a medium sized, moderate defect in  anterolateral wall that is predominantly reversible,  suggestive of infarction with moderate seamus-infarct  ischemia.There is a medium sized, moderate defect in  inferolateral wall that is fixed, suggestive of infarct.  * Post-stressgated wall motion analysis was performed  (LVEF = 48 %;LVEDV = 91 ml.), revealing mild hypokinesis.  Ther anterolateral wall appears hypokinetic.  < end of copied text >    < from: Cardiac Cath Lab - Adult (09.03.19 @ 14:03) >  VENTRICLES: No left ventriculogram was performed.  CORONARY VESSELS: The coronary circulation is right dominant.  LM:   --  LM: Angiography showed mild atherosclerosis with no flow limiting  lesions.  LAD:   --  Proximal LAD: There was a diffuse 10 % stenosis at the site of a  prior stent. The lesion was eccentric. There was NGUYEN grade 3 flow through  the vessel (brisk flow).  --  Mid LAD: There was a diffuse 20 % stenosis at the site of a prior  stent. The lesion was eccentric. There was NGUYEN grade 3 flow through the  vessel (brisk flow).  --  D1: There was a tubular 30 % stenosis at the site of a prior stent. The  lesion was smoothly contoured. There was NGUYEN grade 3 flow through the  vessel (brisk flow).  CX:   --  Circumflex: Angiography showed mild atherosclerosis with no flow  limiting lesions.  RI:   --  Ramus intermedius: There was a diffuse 30 % stenosis at the site  of a prior stent. The lesion was eccentric. There was NGUYEN grade 3 flow  through the vessel (brisk flow).  RCA:   --  Mid RCA: There was a tubular 20 % stenosis at a site with no  prior intervention. The lesion was eccentric. There was NGUYEN grade 3 flow  through the vessel (brisk flow).  --  RPDA: Angiography showed mild atherosclerosis with no flow limiting  lesions.  --  RPLS: Angiography showed mild atherosclerosis with no flow limiting  lesions.  COMPLICATIONS: There were no complications.  DIAGNOSTIC IMPRESSIONS: Patent stents LAD, D-1 and Ramus  No significant obstructive CAD  DIAGNOSTIC RECOMMENDATIONS: Medical therapy  INTERVENTIONAL IMPRESSIONS: Patent stents LAD, D-1 and Ramus  No significant obstructive CAD  INTERVENTIONAL RECOMMENDATIONS: Medical therapy  Prepared and signed by  Nia Izgauirre M.D.  Signed 09/03/2019 16:11:54    < end of copied text >         ASSESSMENT/PLAN:     79 year old woman with a history of CAD s/p stents (2006 LAD/LCx and more recently in 6/2019 in [facility]), DM, HTN, HLD, prior CVA who presents with chest pain. Cardiology consulted for r/o ACS, management of CAD s/p stents at [place] hospital.    --Pt admitted with indeterminant Trops, s/p Abnormal NST as above, s/p LHC with patent stents and non obs CAD  --continue oral lasix, keep O>I   --HD stable  --DC planning  --F/U Dr Koenig in 1 week S:  no cp, sob improving, no c/o dizziness, lightheadedness       aspirin enteric coated 81 milliGRAM(s) Oral daily  atorvastatin 80 milliGRAM(s) Oral at bedtime  carvedilol 3.125 milliGRAM(s) Oral every 12 hours  dextrose 40% Gel 15 Gram(s) Oral once PRN  dextrose 5%. 1000 milliLiter(s) IV Continuous <Continuous>  dextrose 50% Injectable 12.5 Gram(s) IV Push once  dextrose 50% Injectable 25 Gram(s) IV Push once  dextrose 50% Injectable 25 Gram(s) IV Push once  furosemide    Tablet 20 milliGRAM(s) Oral daily  glucagon  Injectable 1 milliGRAM(s) IntraMuscular once PRN  heparin  Injectable 5000 Unit(s) SubCutaneous every 8 hours  influenza   Vaccine 0.5 milliLiter(s) IntraMuscular once  insulin glargine Injectable (LANTUS) 30 Unit(s) SubCutaneous at bedtime  insulin lispro (HumaLOG) corrective regimen sliding scale   SubCutaneous three times a day before meals  insulin lispro (HumaLOG) corrective regimen sliding scale   SubCutaneous at bedtime  insulin lispro Injectable (HumaLOG) 5 Unit(s) SubCutaneous three times a day before meals  latanoprost 0.005% Ophthalmic Solution 1 Drop(s) Both EYES at bedtime  ondansetron Injectable 4 milliGRAM(s) IV Push every 6 hours PRN  sodium chloride 0.9% lock flush 3 milliLiter(s) IV Push every 8 hours  ticagrelor 90 milliGRAM(s) Oral every 12 hours                       10.9   5.93  )-----------( 238      ( 07 Sep 2019 05:50 )             36.0     Hemoglobin: 10.9 g/dL (09-07 @ 05:50)  Hemoglobin: 11.9 g/dL (09-06 @ 04:39)  Hemoglobin: 12.8 g/dL (09-05 @ 15:10)  Hemoglobin: 11.2 g/dL (09-04 @ 05:35)  Hemoglobin: 11.4 g/dL (09-03 @ 07:32)    09-07    140  |  101  |  25<H>  ----------------------------<  135<H>  4.4   |  26  |  1.05    Ca    9.5      07 Sep 2019 05:50  Phos  4.7     09-06  Mg     2.1     09-06    TPro  8.3  /  Alb  4.5  /  TBili  1.1  /  DBili  x   /  AST  17  /  ALT  16  /  AlkPhos  73  09-06    Creatinine Trend: 1.05<--, 1.13<--, 1.21<--, 0.86<--, 0.90<--, 0.90<--    COAGS:     T(C): 36.5 (09-07-19 @ 05:30), Max: 36.7 (09-06-19 @ 14:51)  HR: 65 (09-07-19 @ 05:30) (65 - 68)  BP: 125/66 (09-07-19 @ 05:30) (113/74 - 139/67)  RR: 17 (09-07-19 @ 05:30) (17 - 18)  SpO2: 99% (09-07-19 @ 05:30) (97% - 100%)  Wt(kg): --    I&O's Summary    06 Sep 2019 07:01  -  07 Sep 2019 07:00  --------------------------------------------------------  IN: 360 mL / OUT: 850 mL / NET: -490 mL      Gen: Appears well in NAD  HEENT:  (-)icterus (-)pallor  CV: N S1 S2 1/6 SHANIQUA (+)2 Pulses B/l  Resp:  CTA b/l, normal effort  GI: (+) BS Soft, NT, ND  Lymph:  (+) Trace B/L LE Edema, (-)obvious lymphadenopathy  Skin: Warm to touch, Normal turgor  Psych: Appropriate mood and affect    TELEMETRY:    NSR 70s, PVC    DATA:  < from: Transthoracic Echocardiogram (08.30.19 @ 16:50) >  CONCLUSIONS:  1. Mitral annular calcification, otherwise normal mitral  valve. Mild mitral regurgitation.  2. Mildly dilated left atrium.  LA volume index = 37 cc/m2.  3. Normal left ventricular systolic function. No segmental  wall motion abnormalities.  4. Mild diastolic dysfunction (Stage I).  5. Normal right ventricular size and function.  ------------------------------------------------------------------------  Confirmed on  8/30/2019 - 17:56:59 by Rohan Varma M.D.  ------------------------------------------------------------------------    < end of copied text >    < from: Nuclear Stress Test-Pharmacologic (09.01.19 @ 08:41) >  IMPRESSIONS:Abnormal Study  * Myocardial Perfusion SPECT results are abnormal.  * There is a medium sized, moderate defect in  anterolateral wall that is predominantly reversible,  suggestive of infarction with moderate seamus-infarct  ischemia.There is a medium sized, moderate defect in  inferolateral wall that is fixed, suggestive of infarct.  * Post-stressgated wall motion analysis was performed  (LVEF = 48 %;LVEDV = 91 ml.), revealing mild hypokinesis.  Ther anterolateral wall appears hypokinetic.  < end of copied text >    < from: Cardiac Cath Lab - Adult (09.03.19 @ 14:03) >  VENTRICLES: No left ventriculogram was performed.  CORONARY VESSELS: The coronary circulation is right dominant.  LM:   --  LM: Angiography showed mild atherosclerosis with no flow limiting  lesions.  LAD:   --  Proximal LAD: There was a diffuse 10 % stenosis at the site of a  prior stent. The lesion was eccentric. There was NGUYEN grade 3 flow through  the vessel (brisk flow).  --  Mid LAD: There was a diffuse 20 % stenosis at the site of a prior  stent. The lesion was eccentric. There was NGUYEN grade 3 flow through the  vessel (brisk flow).  --  D1: There was a tubular 30 % stenosis at the site of a prior stent. The  lesion was smoothly contoured. There was NGUYEN grade 3 flow through the  vessel (brisk flow).  CX:   --  Circumflex: Angiography showed mild atherosclerosis with no flow  limiting lesions.  RI:   --  Ramus intermedius: There was a diffuse 30 % stenosis at the site  of a prior stent. The lesion was eccentric. There was NGUYEN grade 3 flow  through the vessel (brisk flow).  RCA:   --  Mid RCA: There was a tubular 20 % stenosis at a site with no  prior intervention. The lesion was eccentric. There was NGUYEN grade 3 flow  through the vessel (brisk flow).  --  RPDA: Angiography showed mild atherosclerosis with no flow limiting  lesions.  --  RPLS: Angiography showed mild atherosclerosis with no flow limiting  lesions.  COMPLICATIONS: There were no complications.  DIAGNOSTIC IMPRESSIONS: Patent stents LAD, D-1 and Ramus  No significant obstructive CAD  DIAGNOSTIC RECOMMENDATIONS: Medical therapy  INTERVENTIONAL IMPRESSIONS: Patent stents LAD, D-1 and Ramus  No significant obstructive CAD  INTERVENTIONAL RECOMMENDATIONS: Medical therapy  Prepared and signed by  Nia Izaguirre M.D.  Signed 09/03/2019 16:11:54    < end of copied text >         ASSESSMENT/PLAN:     79 year old woman with a history of CAD s/p stents (2006 LAD/LCx and more recently in 6/2019 in [place] @ [facility]), DM, HTN, HLD, prior CVA who presents with chest pain. Cardiology consulted for r/o ACS, management of CAD s/p stents at out-of-state hospital.    --Pt admitted with indeterminant Trops, s/p Abnormal NST as above, s/p LHC with patent stents and non obs CAD  --continue oral lasix, keep O>I   --HD stable  --DC planning  --on d/c please follow with Dr. Koenig on 9/12/19 at 11:30 am, at our [place] office    [phone]  [place] [address]  Phone: [phone]

## 2019-09-07 NOTE — PROGRESS NOTE ADULT - SUBJECTIVE AND OBJECTIVE BOX
[facility]      Seen earlier today, and examined.  - Today, patient is without complaints.           *****MEDICATIONS: Current medication reviewed and documented.    MEDICATIONS  (STANDING):  aspirin enteric coated 81 milliGRAM(s) Oral daily  atorvastatin 80 milliGRAM(s) Oral at bedtime  carvedilol 3.125 milliGRAM(s) Oral every 12 hours  dextrose 5%. 1000 milliLiter(s) (50 mL/Hr) IV Continuous <Continuous>  dextrose 50% Injectable 12.5 Gram(s) IV Push once  dextrose 50% Injectable 25 Gram(s) IV Push once  dextrose 50% Injectable 25 Gram(s) IV Push once  furosemide    Tablet 20 milliGRAM(s) Oral daily  heparin  Injectable 5000 Unit(s) SubCutaneous every 8 hours  influenza   Vaccine 0.5 milliLiter(s) IntraMuscular once  insulin glargine Injectable (LANTUS) 30 Unit(s) SubCutaneous at bedtime  insulin lispro (HumaLOG) corrective regimen sliding scale   SubCutaneous three times a day before meals  insulin lispro (HumaLOG) corrective regimen sliding scale   SubCutaneous at bedtime  insulin lispro Injectable (HumaLOG) 5 Unit(s) SubCutaneous three times a day before meals  latanoprost 0.005% Ophthalmic Solution 1 Drop(s) Both EYES at bedtime  nystatin Powder 1 Application(s) Topical two times a day  sodium chloride 0.9% lock flush 3 milliLiter(s) IV Push every 8 hours  ticagrelor 90 milliGRAM(s) Oral every 12 hours    MEDICATIONS  (PRN):  dextrose 40% Gel 15 Gram(s) Oral once PRN Blood Glucose LESS THAN 70 milliGRAM(s)/deciliter  glucagon  Injectable 1 milliGRAM(s) IntraMuscular once PRN Glucose LESS THAN 70 milligrams/deciliter  ondansetron Injectable 4 milliGRAM(s) IV Push every 6 hours PRN Nausea and/or Vomiting          ***** VITAL SIGNS:  T(F): 98.4 (19 @ 17:52), Max: 98.4 (19 @ 17:52)  HR: 67 (19 @ 17:52) (62 - 68)  BP: 145/83 (19 @ 17:52) (118/79 - 145/83)  RR: 17 (19 @ 17:52) (17 - 18)  SpO2: 100% (19 @ 17:52) (97% - 100%)  Wt(kg): --  ,   I&O's Summary    06 Sep 2019 07:01  -  07 Sep 2019 07:00  --------------------------------------------------------  IN: 360 mL / OUT: 850 mL / NET: -490 mL             *****PHYSICAL EXAM: Alert oriented x 3   Attention comprehension are fair. Able to name, repeat, read without any difficulty.   Able to follow 3 step commands.     EOMI fundi not visualized,  VFF to confrontration  No facial asymmetry   Tongue is midline   Palate elevates symmetrically   Moving all 4 ext symmetrically no pronator drift   Reflexes are diminished throughout   sensation is  symmetric  Gait : not assessed.  B/L down going toes          *****LAB AND IMAGING:                        10.9   5.93  )-----------( 238      ( 07 Sep 2019 05:50 )             36.0               09-07    140  |  101  |  25<H>  ----------------------------<  135<H>  4.4   |  26  |  1.05    Ca    9.5      07 Sep 2019 05:50  Phos  4.7     -  Mg     2.1         TPro  8.3  /  Alb  4.5  /  TBili  1.1  /  DBili  x   /  AST  17  /  ALT  16  /  AlkPhos  73                         < from: MR Head No Cont (19 @ 13:02) >    MRI brain:  1. No acute intracranial hemorrhage or evidence of acute ischemia.  2. Multiple chronic lacunar infarcts, as discussed.  3. Multiple nonspecific abnormal white matter foci of T2/FLAIR   prolongation statistically favoring microvascular disease.    MRA head:  1. Occlusion of the right V4 segment of unknown timeframe.  2. Mild focal stenosis involving the left P2 segment.    MRA neck:  1. Occlusion of the right vertebral artery of unknown timeframe.  2. Otherwise, no large vessel occlusion or major stenosis.  3. 1.1 cm heterogeneous signal right-sided thyroid nodule. Recommend   ultrasound correlation.    < end of copied text >  Urinalysis Basic - ( 06 Sep 2019 10:53 )    Color: LIGHT YELLOW / Appearance: CLEAR / S.016 / pH: 5.5  Gluc: NEGATIVE / Ketone: NEGATIVE  / Bili: NEGATIVE / Urobili: NORMAL   Blood: NEGATIVE / Protein: NEGATIVE / Nitrite: NEGATIVE   Leuk Esterase: SMALL / RBC: 0-2 / WBC 6-10   Sq Epi: FEW / Non Sq Epi: x / Bacteria: NEGATIVE  Hemoglobin A1C, Whole Blood: 6.6: High Risk (prediabetic)    5.7 - 6.4 %  Diabetic, diagnostic           > 6.5 %  ADA diabetic treatment goal    < 7.0 %    HbA1C values may not accurately reflect mean blood glucose  in patients with Hb variants.  Suggest clinical correlation. % (19 @ 07:18)    Vitamin B12, Serum: 529 pg/mL (05.10.16 @ 07:45)    Folate, Serum: 15.3 ng/mL (.12 @ 21:02)    Thyroid Stimulating Hormone, Serum: 1.76 uIU/mL (1115.17 @ 03:50)        [All pertinent recent Imaging/Reports reviewed       *****A S S E S S M E N T   A N D   P L A N :      ]Excerpt from H&P, 78 yo F with h/o CAD s/p stent, HTN, HLD, DM presenting after syncope. Pt states that she has been feeling nauseous and went to the bathroom. She had a BM and began feeling diaphoretic, along with palpitations. Felt some cramping in her neck that radiated to her hands then vomited and lost consciousness. She did not fall off the toilet or hit her head. She had shortness of breath and chest tightness. Prior to this pt had felt well. She was recently admitted for chest pain, discharged the day prior. She denies any headache, no blurred vision, no focal weakness. No recent fevers or chills.         Problem/Recommendations 1: tia vs. 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 Doubt that she is symptomatic from this as there is good flow into the basilar artery.      orthostatics + from sitting to standing ? whether there is a diabetic autonomic neuropathy   bp goal normotensive. ? allow permissive htn   on asa  elevated ldl 140 add statin   would add plavix for 3 months       DM with neuropathy  ha1c is 6.6       Thank you for allowing me to participate in the care of this patient. Please do not hesitate to call me if you have any  questions.        ________________  Sherrill Paez MD  [facility] ([facility]  [phone]      30 minutes spent on total encounter; more than 50 % of the visit was  spent counseling about plan of care, compliance to diet/exercise and medication regimen and or  coordinating care by the attending physician.      It is advised that stroke patients follow up with SANDY Willams @ [phone] in 1- 2 weeks.   Others please follow up with Dr. Michael Nissenbaum [phone] [facility]      Seen earlier today, and examined.  - Today, patient is without complaints.           *****MEDICATIONS: Current medication reviewed and documented.    MEDICATIONS  (STANDING):  aspirin enteric coated 81 milliGRAM(s) Oral daily  atorvastatin 80 milliGRAM(s) Oral at bedtime  carvedilol 3.125 milliGRAM(s) Oral every 12 hours  dextrose 5%. 1000 milliLiter(s) (50 mL/Hr) IV Continuous <Continuous>  dextrose 50% Injectable 12.5 Gram(s) IV Push once  dextrose 50% Injectable 25 Gram(s) IV Push once  dextrose 50% Injectable 25 Gram(s) IV Push once  furosemide    Tablet 20 milliGRAM(s) Oral daily  heparin  Injectable 5000 Unit(s) SubCutaneous every 8 hours  influenza   Vaccine 0.5 milliLiter(s) IntraMuscular once  insulin glargine Injectable (LANTUS) 30 Unit(s) SubCutaneous at bedtime  insulin lispro (HumaLOG) corrective regimen sliding scale   SubCutaneous three times a day before meals  insulin lispro (HumaLOG) corrective regimen sliding scale   SubCutaneous at bedtime  insulin lispro Injectable (HumaLOG) 5 Unit(s) SubCutaneous three times a day before meals  latanoprost 0.005% Ophthalmic Solution 1 Drop(s) Both EYES at bedtime  nystatin Powder 1 Application(s) Topical two times a day  sodium chloride 0.9% lock flush 3 milliLiter(s) IV Push every 8 hours  ticagrelor 90 milliGRAM(s) Oral every 12 hours    MEDICATIONS  (PRN):  dextrose 40% Gel 15 Gram(s) Oral once PRN Blood Glucose LESS THAN 70 milliGRAM(s)/deciliter  glucagon  Injectable 1 milliGRAM(s) IntraMuscular once PRN Glucose LESS THAN 70 milligrams/deciliter  ondansetron Injectable 4 milliGRAM(s) IV Push every 6 hours PRN Nausea and/or Vomiting          ***** VITAL SIGNS:  T(F): 98.4 (19 @ 17:52), Max: 98.4 (19 @ 17:52)  HR: 67 (19 @ 17:52) (62 - 68)  BP: 145/83 (19 @ 17:52) (118/79 - 145/83)  RR: 17 (19 @ 17:52) (17 - 18)  SpO2: 100% (19 @ 17:52) (97% - 100%)  Wt(kg): --  ,   I&O's Summary    06 Sep 2019 07:01  -  07 Sep 2019 07:00  --------------------------------------------------------  IN: 360 mL / OUT: 850 mL / NET: -490 mL             *****PHYSICAL EXAM: Alert oriented x 3   Attention comprehension are fair. Able to name, repeat, read without any difficulty.   Able to follow 3 step commands.     EOMI fundi not visualized,  VFF to confrontration  No facial asymmetry   Tongue is midline   Palate elevates symmetrically   Moving all 4 ext symmetrically no pronator drift   Reflexes are diminished throughout   sensation is  symmetric  Gait : not assessed.  B/L down going toes          *****LAB AND IMAGING:                        10.9   5.93  )-----------( 238      ( 07 Sep 2019 05:50 )             36.0               09-07    140  |  101  |  25<H>  ----------------------------<  135<H>  4.4   |  26  |  1.05    Ca    9.5      07 Sep 2019 05:50  Phos  4.7     -  Mg     2.1         TPro  8.3  /  Alb  4.5  /  TBili  1.1  /  DBili  x   /  AST  17  /  ALT  16  /  AlkPhos  73                         < from: MR Head No Cont (19 @ 13:02) >    MRI brain:  1. No acute intracranial hemorrhage or evidence of acute ischemia.  2. Multiple chronic lacunar infarcts, as discussed.  3. Multiple nonspecific abnormal white matter foci of T2/FLAIR   prolongation statistically favoring microvascular disease.    MRA head:  1. Occlusion of the right V4 segment of unknown timeframe.  2. Mild focal stenosis involving the left P2 segment.    MRA neck:  1. Occlusion of the right vertebral artery of unknown timeframe.  2. Otherwise, no large vessel occlusion or major stenosis.  3. 1.1 cm heterogeneous signal right-sided thyroid nodule. Recommend   ultrasound correlation.    < end of copied text >  Urinalysis Basic - ( 06 Sep 2019 10:53 )    Color: LIGHT YELLOW / Appearance: CLEAR / S.016 / pH: 5.5  Gluc: NEGATIVE / Ketone: NEGATIVE  / Bili: NEGATIVE / Urobili: NORMAL   Blood: NEGATIVE / Protein: NEGATIVE / Nitrite: NEGATIVE   Leuk Esterase: SMALL / RBC: 0-2 / WBC 6-10   Sq Epi: FEW / Non Sq Epi: x / Bacteria: NEGATIVE  Hemoglobin A1C, Whole Blood: 6.6: High Risk (prediabetic)    5.7 - 6.4 %  Diabetic, diagnostic           > 6.5 %  ADA diabetic treatment goal    < 7.0 %    HbA1C values may not accurately reflect mean blood glucose  in patients with Hb variants.  Suggest clinical correlation. % (19 @ 07:18)    Vitamin B12, Serum: 529 pg/mL (05.10.16 @ 07:45)    Folate, Serum: 15.3 ng/mL (12 @ 21:02)    Thyroid Stimulating Hormone, Serum: 1.76 uIU/mL (15.17 @ 03:50)        [All pertinent recent Imaging/Reports reviewed       *****A S S E S S M E N T   A N D   P L A N :      ]Excerpt from H&P, 80 yo F with h/o CAD s/p stent, HTN, HLD, DM presenting after syncope. Pt states that she has been feeling nauseous and went to the bathroom. She had a BM and began feeling diaphoretic, along with palpitations. Felt some cramping in her neck that radiated to her hands then vomited and lost consciousness. She did not fall off the toilet or hit her head. She had shortness of breath and chest tightness. Prior to this pt had felt well. She was recently admitted for chest pain, discharged the day prior. She denies any headache, no blurred vision, no focal weakness. No recent fevers or chills.         Problem/Recommendations 1: tia vs. 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 Doubt that she is symptomatic from this as there is good flow into the basilar artery.      orthostatics + from sitting to standing ? whether there is a diabetic autonomic neuropathy   bp goal normotensive. ? allow permissive htn   on asa and brillinta for secondary prophylaxis.  elevated ldl 140 add statin          DM with neuropathy  ha1c is 6.6       Thank you for allowing me to participate in the care of this patient. Please do not hesitate to call me if you have any  questions.        ________________  Sherrill Paez MD  [facility]rological [facility] ([facility]  [phone]      30 minutes spent on total encounter; more than 50 % of the visit was  spent counseling about plan of care, compliance to diet/exercise and medication regimen and or  coordinating care by the attending physician.      It is advised that stroke patients follow up with SANDY Willams @ [phone] in 1- 2 weeks.   Others please follow up with Dr. Michael Nissenbaum [phone]

## 2019-09-07 NOTE — PROGRESS NOTE ADULT - ASSESSMENT
80 yo F with h/o CAD s/p stent, HTN, HLD, DM presenting after syncope.     Problem/Plan - 1:  ·  Problem: Syncope, unspecified syncope type.  Plan: - Likely vasovagal given prodrome of symptoms  - CTA neg for PE  - cards fu   - check ct as per neuro    Problem/Plan - 2:  ·  Problem: Essential hypertension.  Plan: - Monitor BP  - Continue home meds.     Problem/Plan - 3:  ·  Problem: Type 2 diabetes mellitus with other specified complication, with long-term current use of insulin.  Plan: - Monitor finger sticks   - basal, bolus     Problem/Plan - 4:  ·  Problem: Coronary artery disease involving native coronary artery of native heart without angina pectoris.  Plan: - Continue ASA, statin and Brilinta.     Problem/Plan - 5:  ·  Problem: Chronic diastolic congestive heart failure. Plan: - Pt appears euvolemic  - Continue coreg, valsartan not available- will need alternative. Continue lasix.     Problem/Plan - 6:  Problem: Gastroesophageal reflux disease without esophagitis. Plan: - PPI.

## 2019-09-08 ENCOUNTER — TRANSCRIPTION ENCOUNTER (OUTPATIENT)
Age: 79
End: 2019-09-08

## 2019-09-08 VITALS
DIASTOLIC BLOOD PRESSURE: 72 MMHG | HEART RATE: 66 BPM | RESPIRATION RATE: 18 BRPM | TEMPERATURE: 98 F | OXYGEN SATURATION: 100 % | SYSTOLIC BLOOD PRESSURE: 141 MMHG

## 2019-09-08 LAB
ANION GAP SERPL CALC-SCNC: 14 MMO/L — SIGNIFICANT CHANGE UP (ref 7–14)
BUN SERPL-MCNC: 19 MG/DL — SIGNIFICANT CHANGE UP (ref 7–23)
CALCIUM SERPL-MCNC: 9.3 MG/DL — SIGNIFICANT CHANGE UP (ref 8.4–10.5)
CHLORIDE SERPL-SCNC: 101 MMOL/L — SIGNIFICANT CHANGE UP (ref 98–107)
CO2 SERPL-SCNC: 25 MMOL/L — SIGNIFICANT CHANGE UP (ref 22–31)
CREAT SERPL-MCNC: 0.91 MG/DL — SIGNIFICANT CHANGE UP (ref 0.5–1.3)
GLUCOSE BLDC GLUCOMTR-MCNC: 129 MG/DL — HIGH (ref 70–99)
GLUCOSE BLDC GLUCOMTR-MCNC: 139 MG/DL — HIGH (ref 70–99)
GLUCOSE SERPL-MCNC: 117 MG/DL — HIGH (ref 70–99)
HCT VFR BLD CALC: 37.3 % — SIGNIFICANT CHANGE UP (ref 34.5–45)
HGB BLD-MCNC: 11.5 G/DL — SIGNIFICANT CHANGE UP (ref 11.5–15.5)
MAGNESIUM SERPL-MCNC: 2.3 MG/DL — SIGNIFICANT CHANGE UP (ref 1.6–2.6)
MCHC RBC-ENTMCNC: 27.1 PG — SIGNIFICANT CHANGE UP (ref 27–34)
MCHC RBC-ENTMCNC: 30.8 % — LOW (ref 32–36)
MCV RBC AUTO: 87.8 FL — SIGNIFICANT CHANGE UP (ref 80–100)
NRBC # FLD: 0 K/UL — SIGNIFICANT CHANGE UP (ref 0–0)
PHOSPHATE SERPL-MCNC: 4 MG/DL — SIGNIFICANT CHANGE UP (ref 2.5–4.5)
PLATELET # BLD AUTO: 249 K/UL — SIGNIFICANT CHANGE UP (ref 150–400)
PMV BLD: 11.8 FL — SIGNIFICANT CHANGE UP (ref 7–13)
POTASSIUM SERPL-MCNC: 3.8 MMOL/L — SIGNIFICANT CHANGE UP (ref 3.5–5.3)
POTASSIUM SERPL-SCNC: 3.8 MMOL/L — SIGNIFICANT CHANGE UP (ref 3.5–5.3)
RBC # BLD: 4.25 M/UL — SIGNIFICANT CHANGE UP (ref 3.8–5.2)
RBC # FLD: 13.3 % — SIGNIFICANT CHANGE UP (ref 10.3–14.5)
SODIUM SERPL-SCNC: 140 MMOL/L — SIGNIFICANT CHANGE UP (ref 135–145)
WBC # BLD: 6.34 K/UL — SIGNIFICANT CHANGE UP (ref 3.8–10.5)
WBC # FLD AUTO: 6.34 K/UL — SIGNIFICANT CHANGE UP (ref 3.8–10.5)

## 2019-09-08 PROCEDURE — 99238 HOSP IP/OBS DSCHRG MGMT 30/<: CPT

## 2019-09-08 RX ORDER — VALSARTAN 80 MG/1
1 TABLET ORAL
Qty: 0 | Refills: 0 | DISCHARGE

## 2019-09-08 RX ORDER — INSULIN ASPART 100 [IU]/ML
10 INJECTION, SOLUTION SUBCUTANEOUS
Qty: 0 | Refills: 0 | DISCHARGE

## 2019-09-08 RX ORDER — NYSTATIN CREAM 100000 [USP'U]/G
1 CREAM TOPICAL
Qty: 1 | Refills: 0
Start: 2019-09-08 | End: 2019-09-17

## 2019-09-08 RX ORDER — INSULIN DETEMIR 100/ML (3)
40 INSULIN PEN (ML) SUBCUTANEOUS
Qty: 0 | Refills: 0 | DISCHARGE

## 2019-09-08 RX ORDER — LATANOPROST 0.05 MG/ML
1 SOLUTION/ DROPS OPHTHALMIC; TOPICAL
Qty: 0 | Refills: 0 | DISCHARGE
Start: 2019-09-08

## 2019-09-08 RX ORDER — BIMATOPROST 0.3 MG/ML
1 SOLUTION/ DROPS OPHTHALMIC
Qty: 0 | Refills: 0 | DISCHARGE

## 2019-09-08 RX ADMIN — Medication 20 MILLIGRAM(S): at 05:55

## 2019-09-08 RX ADMIN — SODIUM CHLORIDE 3 MILLILITER(S): 9 INJECTION INTRAMUSCULAR; INTRAVENOUS; SUBCUTANEOUS at 14:09

## 2019-09-08 RX ADMIN — HEPARIN SODIUM 5000 UNIT(S): 5000 INJECTION INTRAVENOUS; SUBCUTANEOUS at 14:08

## 2019-09-08 RX ADMIN — TICAGRELOR 90 MILLIGRAM(S): 90 TABLET ORAL at 05:55

## 2019-09-08 RX ADMIN — CARVEDILOL PHOSPHATE 3.12 MILLIGRAM(S): 80 CAPSULE, EXTENDED RELEASE ORAL at 05:55

## 2019-09-08 RX ADMIN — HEPARIN SODIUM 5000 UNIT(S): 5000 INJECTION INTRAVENOUS; SUBCUTANEOUS at 05:56

## 2019-09-08 RX ADMIN — SODIUM CHLORIDE 3 MILLILITER(S): 9 INJECTION INTRAMUSCULAR; INTRAVENOUS; SUBCUTANEOUS at 05:54

## 2019-09-08 RX ADMIN — Medication 81 MILLIGRAM(S): at 12:55

## 2019-09-08 RX ADMIN — Medication 5 UNIT(S): at 08:58

## 2019-09-08 RX ADMIN — Medication 5 UNIT(S): at 12:55

## 2019-09-08 RX ADMIN — NYSTATIN CREAM 1 APPLICATION(S): 100000 CREAM TOPICAL at 05:55

## 2019-09-08 NOTE — DISCHARGE NOTE PROVIDER - NSDCHHNEEDSERVICE_GEN_ALL_CORE
Teaching and training/Rehabilitation services/Observation and assessment/Medication teaching and assessment

## 2019-09-08 NOTE — DISCHARGE NOTE PROVIDER - HOSPITAL COURSE
79 year old woman with a history of CAD s/p stents (2006 LAD/LCx and more recently in 6/2019 in [facility]), DM, HTN, HLD, prior CVA who presents with syncope and chest pain. Cardiology consulted for r/o ACS, management of CAD s/p stents [place] hospital.        --Pt admitted with indeterminant Trops    -Pt. was seen and evaluated by cardilogist Dr. Dumont    --s/p Abnormal NST as above, s/p LHC with patent stents and non obs CAD    --continue oral lasix    --HD stable    -MRI/A head and neck: No acute pathology.  Pt. was seen and evaluated by Neurologist DR. Paez    --DC planning    --on d/c will follow up with Dr. Koenig on 9/12/19 at 11:30 am, at the [facility]    Stable for DC 9/8/19.  D/W attending.

## 2019-09-08 NOTE — PROGRESS NOTE ADULT - SUBJECTIVE AND OBJECTIVE BOX
[facility]      Seen earlier today, and examined.  - Today, patient is without complaints.           *****MEDICATIONS: Current medication reviewed and documented.    MEDICATIONS  (STANDING):  aspirin enteric coated 81 milliGRAM(s) Oral daily  atorvastatin 80 milliGRAM(s) Oral at bedtime  carvedilol 3.125 milliGRAM(s) Oral every 12 hours  dextrose 5%. 1000 milliLiter(s) (50 mL/Hr) IV Continuous <Continuous>  dextrose 50% Injectable 12.5 Gram(s) IV Push once  dextrose 50% Injectable 25 Gram(s) IV Push once  dextrose 50% Injectable 25 Gram(s) IV Push once  furosemide    Tablet 20 milliGRAM(s) Oral daily  heparin  Injectable 5000 Unit(s) SubCutaneous every 8 hours  insulin glargine Injectable (LANTUS) 30 Unit(s) SubCutaneous at bedtime  insulin lispro (HumaLOG) corrective regimen sliding scale   SubCutaneous three times a day before meals  insulin lispro (HumaLOG) corrective regimen sliding scale   SubCutaneous at bedtime  insulin lispro Injectable (HumaLOG) 5 Unit(s) SubCutaneous three times a day before meals  latanoprost 0.005% Ophthalmic Solution 1 Drop(s) Both EYES at bedtime  nystatin Powder 1 Application(s) Topical two times a day  sodium chloride 0.9% lock flush 3 milliLiter(s) IV Push every 8 hours  ticagrelor 90 milliGRAM(s) Oral every 12 hours    MEDICATIONS  (PRN):  dextrose 40% Gel 15 Gram(s) Oral once PRN Blood Glucose LESS THAN 70 milliGRAM(s)/deciliter  glucagon  Injectable 1 milliGRAM(s) IntraMuscular once PRN Glucose LESS THAN 70 milligrams/deciliter  ondansetron Injectable 4 milliGRAM(s) IV Push every 6 hours PRN Nausea and/or Vomiting          ***** VITAL SIGNS:  T(F): 97.9 (09-08-19 @ 14:06), Max: 98.3 (09-07-19 @ 22:12)  HR: 66 (09-08-19 @ 14:06) (64 - 72)  BP: 141/72 (09-08-19 @ 14:06) (126/73 - 141/72)  RR: 18 (09-08-19 @ 14:06) (17 - 18)  SpO2: 100% (09-08-19 @ 14:06) (99% - 100%)  Wt(kg): --  ,   I&O's Summary    07 Sep 2019 07:01  -  08 Sep 2019 07:00  --------------------------------------------------------  IN: 160 mL / OUT: 1100 mL / NET: -940 mL             *****PHYSICAL EXAM:  Alert oriented x 3   Attention comprehension are fair. Able to name, repeat, read without any difficulty.   Able to follow 3 step commands.     EOMI fundi not visualized,  VFF to confrontration  No facial asymmetry   Tongue is midline   Palate elevates symmetrically   Moving all 4 ext symmetrically no pronator drift   Reflexes are diminished throughout   sensation is  symmetric  Gait : not assessed.  B/L down going toes     Ca    9.3      08 Sep 2019 06:10  Phos  4.0     09-08  Mg     2.3     09-08                           [All pertinent recent Imaging/Reports reviewed]           *****A S S E S S M E N T   A N D   P L A N :  ]Excerpt from H&P, 80 yo F with h/o CAD s/p stent, HTN, HLD, DM presenting after syncope. Pt states that she has been feeling nauseous and went to the bathroom. She had a BM and began feeling diaphoretic, along with palpitations. Felt some cramping in her neck that radiated to her hands then vomited and lost consciousness. She did not fall off the toilet or hit her head. She had shortness of breath and chest tightness. Prior to this pt had felt well. She was recently admitted for chest pain, discharged the day prior. She denies any headache, no blurred vision, no focal weakness. No recent fevers or chills.         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 Doubt that she is symptomatic from this as there is good flow into the basilar artery.      orthostatics + from sitting to standing ? whether there is a diabetic autonomic neuropathy   bp goal normotensive. ? allow permissive htn   on asa and brillinta and statin  for secondary prophylaxis.  elevated ldl 140           DM with neuropathy  ha1c is 6.6         Thank you for allowing me to participate in the care of this patient. Please do not hesitate to call me if you have any  questions.        ________________  Sherrill Paez MD  [facility] ([facility]  [phone]      30 minutes spent on total encounter; more than 50 % of the visit was  spent counseling about plan of care, compliance to diet/exercise and medication regimen and or  coordinating care by the attending physician.      It is advised that stroke patients follow up with SANDY Willams @ [phone] in 1- 2 weeks.   Others please follow up with Dr. Michael Nissenbaum [phone]

## 2019-09-08 NOTE — PROGRESS NOTE ADULT - SUBJECTIVE AND OBJECTIVE BOX
S:  no cp, sob or dizziness       aspirin enteric coated 81 milliGRAM(s) Oral daily  atorvastatin 80 milliGRAM(s) Oral at bedtime  carvedilol 3.125 milliGRAM(s) Oral every 12 hours  dextrose 40% Gel 15 Gram(s) Oral once PRN  dextrose 5%. 1000 milliLiter(s) IV Continuous <Continuous>  dextrose 50% Injectable 12.5 Gram(s) IV Push once  dextrose 50% Injectable 25 Gram(s) IV Push once  dextrose 50% Injectable 25 Gram(s) IV Push once  furosemide    Tablet 20 milliGRAM(s) Oral daily  glucagon  Injectable 1 milliGRAM(s) IntraMuscular once PRN  heparin  Injectable 5000 Unit(s) SubCutaneous every 8 hours  influenza   Vaccine 0.5 milliLiter(s) IntraMuscular once  insulin glargine Injectable (LANTUS) 30 Unit(s) SubCutaneous at bedtime  insulin lispro (HumaLOG) corrective regimen sliding scale   SubCutaneous three times a day before meals  insulin lispro (HumaLOG) corrective regimen sliding scale   SubCutaneous at bedtime  insulin lispro Injectable (HumaLOG) 5 Unit(s) SubCutaneous three times a day before meals  latanoprost 0.005% Ophthalmic Solution 1 Drop(s) Both EYES at bedtime  nystatin Powder 1 Application(s) Topical two times a day  ondansetron Injectable 4 milliGRAM(s) IV Push every 6 hours PRN  sodium chloride 0.9% lock flush 3 milliLiter(s) IV Push every 8 hours  ticagrelor 90 milliGRAM(s) Oral every 12 hours                       11.5   6.34  )-----------( 249      ( 08 Sep 2019 06:10 )             37.3     Hemoglobin: 11.5 g/dL (09-08 @ 06:10)  Hemoglobin: 10.9 g/dL (09-07 @ 05:50)  Hemoglobin: 11.9 g/dL (09-06 @ 04:39)  Hemoglobin: 12.8 g/dL (09-05 @ 15:10)  Hemoglobin: 11.2 g/dL (09-04 @ 05:35)    09-08    140  |  101  |  19  ----------------------------<  117<H>  3.8   |  25  |  0.91    Ca    9.3      08 Sep 2019 06:10  Phos  4.0     09-08  Mg     2.3     09-08    Creatinine Trend: 0.91<--, 1.05<--, 1.13<--, 1.21<--, 0.86<--, 0.90<--    COAGS:     T(C): 36.7 (09-08-19 @ 05:52), Max: 36.9 (09-07-19 @ 17:52)  HR: 64 (09-08-19 @ 05:52) (62 - 72)  BP: 137/89 (09-08-19 @ 05:52) (118/79 - 145/83)  RR: 17 (09-08-19 @ 05:52) (17 - 18)  SpO2: 99% (09-08-19 @ 05:52) (98% - 100%)  Wt(kg): --    I&O's Summary    07 Sep 2019 07:01  -  08 Sep 2019 07:00  --------------------------------------------------------  IN: 160 mL / OUT: 1100 mL / NET: -940 mL      Gen: Appears well in NAD  HEENT:  (-)icterus (-)pallor  CV: N S1 S2 1/6 SHANIQUA (+)2 Pulses B/l  Resp:  CTA b/l, normal effort  GI: (+) BS Soft, NT, ND  Lymph:  (+) Trace B/L LE Edema, (-)obvious lymphadenopathy  Skin: Warm to touch, Normal turgor  Psych: Appropriate mood and affect    TELEMETRY:    NSR 70s, PVC    DATA:  < from: Transthoracic Echocardiogram (08.30.19 @ 16:50) >  CONCLUSIONS:  1. Mitral annular calcification, otherwise normal mitral  valve. Mild mitral regurgitation.  2. Mildly dilated left atrium.  LA volume index = 37 cc/m2.  3. Normal left ventricular systolic function. No segmental  wall motion abnormalities.  4. Mild diastolic dysfunction (Stage I).  5. Normal right ventricular size and function.  ------------------------------------------------------------------------  Confirmed on  8/30/2019 - 17:56:59 by Rohan Varma M.D.  ------------------------------------------------------------------------    < end of copied text >    < from: Nuclear Stress Test-Pharmacologic (09.01.19 @ 08:41) >  IMPRESSIONS:Abnormal Study  * Myocardial Perfusion SPECT results are abnormal.  * There is a medium sized, moderate defect in  anterolateral wall that is predominantly reversible,  suggestive of infarction with moderate seamus-infarct  ischemia.There is a medium sized, moderate defect in  inferolateral wall that is fixed, suggestive of infarct.  * Post-stressgated wall motion analysis was performed  (LVEF = 48 %;LVEDV = 91 ml.), revealing mild hypokinesis.  Ther anterolateral wall appears hypokinetic.  < end of copied text >    < from: Cardiac Cath Lab - Adult (09.03.19 @ 14:03) >  VENTRICLES: No left ventriculogram was performed.  CORONARY VESSELS: The coronary circulation is right dominant.  LM:   --  LM: Angiography showed mild atherosclerosis with no flow limiting  lesions.  LAD:   --  Proximal LAD: There was a diffuse 10 % stenosis at the site of a  prior stent. The lesion was eccentric. There was NGUYEN grade 3 flow through  the vessel (brisk flow).  --  Mid LAD: There was a diffuse 20 % stenosis at the site of a prior  stent. The lesion was eccentric. There was NGUYEN grade 3 flow through the  vessel (brisk flow).  --  D1: There was a tubular 30 % stenosis at the site of a prior stent. The  lesion was smoothly contoured. There was NGUYEN grade 3 flow through the  vessel (brisk flow).  CX:   --  Circumflex: Angiography showed mild atherosclerosis with no flow  limiting lesions.  RI:   --  Ramus intermedius: There was a diffuse 30 % stenosis at the site  of a prior stent. The lesion was eccentric. There was NGUYEN grade 3 flow  through the vessel (brisk flow).  RCA:   --  Mid RCA: There was a tubular 20 % stenosis at a site with no  prior intervention. The lesion was eccentric. There was NGUYEN grade 3 flow  through the vessel (brisk flow).  --  RPDA: Angiography showed mild atherosclerosis with no flow limiting  lesions.  --  RPLS: Angiography showed mild atherosclerosis with no flow limiting  lesions.  COMPLICATIONS: There were no complications.  DIAGNOSTIC IMPRESSIONS: Patent stents LAD, D-1 and Ramus  No significant obstructive CAD  DIAGNOSTIC RECOMMENDATIONS: Medical therapy  INTERVENTIONAL IMPRESSIONS: Patent stents LAD, D-1 and Ramus  No significant obstructive CAD  INTERVENTIONAL RECOMMENDATIONS: Medical therapy  Prepared and signed by  Nia Izaguirre M.D.  Signed 09/03/2019 16:11:54    < end of copied text >         ASSESSMENT/PLAN:     79 year old woman with a history of CAD s/p stents (2006 LAD/LCx and more recently in 6/2019 in [facility]), DM, HTN, HLD, prior CVA who presents with chest pain. Cardiology consulted for r/o ACS, management of CAD s/p stents at [place] hospital.    --Pt admitted with indeterminant Trops  --s/p Abnormal NST as above, s/p LHC with patent stents and non obs CAD  --continue oral lasix, keep O>I   --HD stable  --DC planning  --on d/c please follow with Dr. Koenig on 9/12/19 at 11:30 am, at our [place] office    [address] [facility]  [place] [address]  Phone: [phone]

## 2019-09-08 NOTE — DISCHARGE NOTE PROVIDER - NSDCCPCAREPLAN_GEN_ALL_CORE_FT
PRINCIPAL DISCHARGE DIAGNOSIS  Diagnosis: Transient ischemic attack (TIA)  Assessment and Plan of Treatment: continue current medications  F/U with Dr. Mahesh Francois in 1 week      SECONDARY DISCHARGE DIAGNOSES  Diagnosis: Essential hypertension  Assessment and Plan of Treatment: Low sodium and fat diet, continue anti-hypertensive medications, and follow up with primary care physician.    Diagnosis: Type 2 diabetes mellitus with other specified complication, with long-term current use of insulin  Assessment and Plan of Treatment: Monitor finger sticks pre-meal and bedtime, low salt, fat and carbohydrate diet, minimize glucose intake.  Exercise daily for at least 30 minutes and weight loss.  Follow up with primary care physician and endocrinologist for routine Hemoglobin A1C checks and management.  Follow up with your ophthalmologist for routine yearly vision exams.    Diagnosis: Gastroesophageal reflux disease without esophagitis  Assessment and Plan of Treatment: Avoid fatty, fried foods, acidic foods such as tomatoes, lime and chocolate. Continue to take your medications as prescribed, exercise daily and weight loss.    Diagnosis: Chronic diastolic congestive heart failure  Assessment and Plan of Treatment: Low salt diet, fluid restriction to 1500 ml daily, monitor your fluid intake and weight daily, exercise as tolerated 30 minutes daily, and follow up with your physician within 1 to 2 weeks.    Diagnosis: Coronary artery disease involving native coronary artery of native heart without angina pectoris  Assessment and Plan of Treatment: Continue current meds, do not stop unless instructed by your physician.  Continue low salt, fat, cholesterol and carbohydrate diet. Follow up with cardiologist and primary care physician's routine appointment.

## 2019-09-08 NOTE — DISCHARGE NOTE NURSING/CASE MANAGEMENT/SOCIAL WORK - PATIENT PORTAL LINK FT
You can access the FollowMyHealth Patient Portal offered by [facility] by registering at the following website: [email or web address]. By joining [employer]’s FollowMyHealth portal, you will also be able to view your health information using other applications (apps) compatible with our system.

## 2019-09-08 NOTE — DISCHARGE NOTE PROVIDER - CARE PROVIDER_API CALL
Sheldon Francois (DO)  Neurology; Vascular Neurology  [address]  [place] [address]  Phone: [phone]  Fax: [phone]  Follow Up Time:

## 2019-09-08 NOTE — DISCHARGE NOTE NURSING/CASE MANAGEMENT/SOCIAL WORK - NSSCNAMETXT_GEN_ALL_CORE
[facility]. Nurse to visit on the day following discharge. Other appropriate services to be arranged thereafter.   Please contact the home care agency at the above phone number if you have not heard from them by approximately 12 noon on the day after your hospital discharge.

## 2019-09-08 NOTE — DISCHARGE NOTE NURSING/CASE MANAGEMENT/SOCIAL WORK - NSDCPEPTSTRK_GEN_ALL_CORE
Risk factors for stroke/Stroke support groups for patients, families, and friends/Call 911 for stroke/Need for follow up after discharge/Stroke education booklet/Stroke warning signs and symptoms/Signs and symptoms of stroke/Prescribed medications

## 2020-05-18 NOTE — PATIENT PROFILE ADULT. - AS SC BRADEN NUTRITION
(3) adequate Eucrisa Pregnancy And Lactation Text: This medication has not been assigned a Pregnancy Risk Category but animal studies failed to show danger with the topical medication. It is unknown if the medication is excreted in breast milk.

## 2020-12-09 NOTE — PATIENT PROFILE ADULT. - VISION (WITH CORRECTIVE LENSES IF THE PATIENT USUALLY WEARS THEM):
Ostomy RN in to see patient to begin teaching of colostomy and plan of care with having an ostomy. Plan is for surgery on Friday 12/11 for colostomy placement. Ostomy RN to do marking of patient on Thursday per Dr. Hugo. Initiated teaching of emptying, diet, activity and what to expect shira-operatively. All questions answered per the patient's satisfaction at this time. Will continue to monitor while patient is admitted. Time with patient 25 min.   Normal vision: sees adequately in most situations; can see medication labels, newsprint

## 2021-03-12 NOTE — H&P ADULT - PROBLEM SELECTOR PLAN 3
Repot given to Delta at [facility]. Home regimen levemir 40, novolog 10 qac  -Dose reduce to lantus 30.  Hold mealtime with NPO status  -A1c, SSI.

## 2021-06-03 RX ORDER — DOXYCYCLINE HYCLATE 20 MG/1
1 TABLET TABLET, FILM COATED ORAL TWICE A DAY
Qty: 60 | Refills: 1
Start: 2021-06-03

## 2021-07-23 NOTE — DISCHARGE NOTE ADULT - SMOKING EVEN A SINGLE PUFF INCREASES THE LIKELIHOOD OF A FULL RELAPSE, WITHDRAWAL SYMPTOMS PEAK WITHIN 1-2 WEEKS, BUT CAN PERSIST FOR MONTHS
The patient has no complaints or issues, she was reminded to stop the baby aspirin at 36 weeks, she went to continue doing fetal kick counts, return my office in 1 week  Statement Selected

## 2021-11-29 NOTE — PHYSICAL THERAPY INITIAL EVALUATION ADULT - SITTING BALANCE: STATIC
Patient Seen in: [facility] [place]      normal balance

## 2022-01-18 NOTE — PATIENT PROFILE ADULT - NSPROEXTENSIONSOFSELF_GEN_A_NUR
[address] [address]  Dept: [phone]  Dept Fax: [phone]        1/18/22    Patient: Anibal Udnerwood  YOB: 1950    Dear Bogdan Dumont PA-C,    I had the pleasure of seeing one of your patients, Deja Whitley today in the office today. Below are the relevant portions of my assessment and plan of care. IMPRESSION:  1. Urinary urgency        PLAN:  He did well with gemtesa. Voiding issues much improved. Will start vesicare. Return in about 3 months (around 4/18/2022). Prescriptions Ordered:  Orders Placed This Encounter   Medications    solifenacin (VESICARE) 5 MG tablet     Sig: Take 1 tablet by mouth daily     Dispense:  30 tablet     Refill:  5     Orders Placed:  No orders of the defined types were placed in this encounter. Thank you for allowing me to participate in the care of this patient. I will keep you updated on this patient's follow up and I look forward to serving you and your patients again in the future.         Carson Pelaez MD none

## 2022-07-12 NOTE — ED PROVIDER NOTE - NS ED NOTE TRAVEL COUNTRIES
[de-identified] : 59 year old male followup bilateral knee pain.  He is an avid  and wants to continue his sport.  Since his last visit he had great improvement with his left knee pain after a CSI.  He also is now in a [employer] trial for the left knee.  He would like a CSI for his right knee as this is bothering him.  His right knee is no eligible for the [employer] trial.   He denies locking, catching or instability.  Miguel Republic

## 2022-08-01 NOTE — ED ADULT NURSE NOTE - HOW OFTEN DO YOU HAVE A DRINK CONTAINING ALCOHOL?
08/01/22 1613   Group Therapy Session   Group Attendance attended group session   Time Session Began 1500   Time Session Ended 1545   Total Time patient participated (minutes) 40   Total # Attendees 7   Group Type psychotherapeutic;psychoeducation   Group Topic Covered coping skills/lifestyle management;structured socialization   Group Session Detail Mindfulness and Coping Skills   Patient Response/Contribution cooperative with task;listened actively   Patient Response Detail Pt was cooperative throughout group.  Pt listened actively and participated at times.  Pt left for a short time and later returned.  Pt continued to participate when peers had off topic conversations.      Never

## 2022-08-03 NOTE — PATIENT PROFILE ADULT. - MEDICATION HERBAL REMEDIES, PROFILE
[Contraception/ Emergency Contraception/ Safe Sexual Practices] : contraception, emergency contraception, safe sexual practices [Preconception Care/ Fertility options] : preconception care, fertility options no

## 2022-08-12 ENCOUNTER — INPATIENT (INPATIENT)
Facility: HOSPITAL | Age: 82
LOS: 6 days | Discharge: HOME HEALTH | End: 2022-08-19
Attending: INTERNAL MEDICINE | Admitting: INTERNAL MEDICINE

## 2022-08-12 VITALS
SYSTOLIC BLOOD PRESSURE: 146 MMHG | HEART RATE: 81 BPM | RESPIRATION RATE: 20 BRPM | HEIGHT: 66 IN | DIASTOLIC BLOOD PRESSURE: 87 MMHG | OXYGEN SATURATION: 100 %

## 2022-08-12 DIAGNOSIS — I50.33 ACUTE ON CHRONIC DIASTOLIC (CONGESTIVE) HEART FAILURE: ICD-10-CM

## 2022-08-12 DIAGNOSIS — E87.70 FLUID OVERLOAD, UNSPECIFIED: ICD-10-CM

## 2022-08-12 DIAGNOSIS — J81.1 CHRONIC PULMONARY EDEMA: ICD-10-CM

## 2022-08-12 DIAGNOSIS — I25.10 ATHEROSCLEROTIC HEART DISEASE OF NATIVE CORONARY ARTERY WITHOUT ANGINA PECTORIS: ICD-10-CM

## 2022-08-12 DIAGNOSIS — J96.01 ACUTE RESPIRATORY FAILURE WITH HYPOXIA: ICD-10-CM

## 2022-08-12 LAB
ALBUMIN SERPL ELPH-MCNC: 4.1 G/DL — SIGNIFICANT CHANGE UP (ref 3.3–5)
ALBUMIN SERPL ELPH-MCNC: 4.2 G/DL — SIGNIFICANT CHANGE UP (ref 3.3–5)
ALP SERPL-CCNC: 55 U/L — SIGNIFICANT CHANGE UP (ref 40–120)
ALP SERPL-CCNC: 64 U/L — SIGNIFICANT CHANGE UP (ref 40–120)
ALT FLD-CCNC: 10 U/L — SIGNIFICANT CHANGE UP (ref 4–33)
ALT FLD-CCNC: <5 U/L — LOW (ref 4–33)
ANION GAP SERPL CALC-SCNC: 12 MMOL/L — SIGNIFICANT CHANGE UP (ref 7–14)
ANION GAP SERPL CALC-SCNC: 14 MMOL/L — SIGNIFICANT CHANGE UP (ref 7–14)
APTT BLD: 34.1 SEC — SIGNIFICANT CHANGE UP (ref 27–36.3)
AST SERPL-CCNC: 17 U/L — SIGNIFICANT CHANGE UP (ref 4–32)
AST SERPL-CCNC: 34 U/L — HIGH (ref 4–32)
B PERT DNA SPEC QL NAA+PROBE: SIGNIFICANT CHANGE UP
B PERT+PARAPERT DNA PNL SPEC NAA+PROBE: SIGNIFICANT CHANGE UP
BASE EXCESS BLDV CALC-SCNC: -0.4 MMOL/L — SIGNIFICANT CHANGE UP (ref -2–3)
BASOPHILS # BLD AUTO: 0.01 K/UL — SIGNIFICANT CHANGE UP (ref 0–0.2)
BASOPHILS NFR BLD AUTO: 0.1 % — SIGNIFICANT CHANGE UP (ref 0–2)
BILIRUB SERPL-MCNC: 0.8 MG/DL — SIGNIFICANT CHANGE UP (ref 0.2–1.2)
BILIRUB SERPL-MCNC: 0.8 MG/DL — SIGNIFICANT CHANGE UP (ref 0.2–1.2)
BORDETELLA PARAPERTUSSIS (RAPRVP): SIGNIFICANT CHANGE UP
BUN SERPL-MCNC: 15 MG/DL — SIGNIFICANT CHANGE UP (ref 7–23)
BUN SERPL-MCNC: 15 MG/DL — SIGNIFICANT CHANGE UP (ref 7–23)
C PNEUM DNA SPEC QL NAA+PROBE: SIGNIFICANT CHANGE UP
CALCIUM SERPL-MCNC: 9.1 MG/DL — SIGNIFICANT CHANGE UP (ref 8.4–10.5)
CALCIUM SERPL-MCNC: 9.3 MG/DL — SIGNIFICANT CHANGE UP (ref 8.4–10.5)
CHLORIDE SERPL-SCNC: 100 MMOL/L — SIGNIFICANT CHANGE UP (ref 98–107)
CHLORIDE SERPL-SCNC: 100 MMOL/L — SIGNIFICANT CHANGE UP (ref 98–107)
CO2 BLDV-SCNC: 28.3 MMOL/L — HIGH (ref 22–26)
CO2 SERPL-SCNC: 21 MMOL/L — LOW (ref 22–31)
CO2 SERPL-SCNC: 22 MMOL/L — SIGNIFICANT CHANGE UP (ref 22–31)
CREAT SERPL-MCNC: 0.99 MG/DL — SIGNIFICANT CHANGE UP (ref 0.5–1.3)
CREAT SERPL-MCNC: 1.08 MG/DL — SIGNIFICANT CHANGE UP (ref 0.5–1.3)
EGFR: 51 ML/MIN/1.73M2 — LOW
EGFR: 56 ML/MIN/1.73M2 — LOW
EOSINOPHIL # BLD AUTO: 0.07 K/UL — SIGNIFICANT CHANGE UP (ref 0–0.5)
EOSINOPHIL NFR BLD AUTO: 0.7 % — SIGNIFICANT CHANGE UP (ref 0–6)
FLUAV SUBTYP SPEC NAA+PROBE: SIGNIFICANT CHANGE UP
FLUBV RNA SPEC QL NAA+PROBE: SIGNIFICANT CHANGE UP
GLUCOSE BLDC GLUCOMTR-MCNC: 205 MG/DL — HIGH (ref 70–99)
GLUCOSE BLDC GLUCOMTR-MCNC: 216 MG/DL — HIGH (ref 70–99)
GLUCOSE SERPL-MCNC: 227 MG/DL — HIGH (ref 70–99)
GLUCOSE SERPL-MCNC: 236 MG/DL — HIGH (ref 70–99)
HADV DNA SPEC QL NAA+PROBE: SIGNIFICANT CHANGE UP
HCO3 BLDV-SCNC: 27 MMOL/L — SIGNIFICANT CHANGE UP (ref 22–29)
HCOV 229E RNA SPEC QL NAA+PROBE: SIGNIFICANT CHANGE UP
HCOV HKU1 RNA SPEC QL NAA+PROBE: SIGNIFICANT CHANGE UP
HCOV NL63 RNA SPEC QL NAA+PROBE: SIGNIFICANT CHANGE UP
HCOV OC43 RNA SPEC QL NAA+PROBE: SIGNIFICANT CHANGE UP
HCT VFR BLD CALC: 38.4 % — SIGNIFICANT CHANGE UP (ref 34.5–45)
HGB BLD-MCNC: 11.8 G/DL — SIGNIFICANT CHANGE UP (ref 11.5–15.5)
HMPV RNA SPEC QL NAA+PROBE: SIGNIFICANT CHANGE UP
HPIV1 RNA SPEC QL NAA+PROBE: SIGNIFICANT CHANGE UP
HPIV2 RNA SPEC QL NAA+PROBE: SIGNIFICANT CHANGE UP
HPIV3 RNA SPEC QL NAA+PROBE: SIGNIFICANT CHANGE UP
HPIV4 RNA SPEC QL NAA+PROBE: SIGNIFICANT CHANGE UP
IANC: 7.13 K/UL — SIGNIFICANT CHANGE UP (ref 1.8–7.4)
IMM GRANULOCYTES NFR BLD AUTO: 0.5 % — SIGNIFICANT CHANGE UP (ref 0–1.5)
INR BLD: 1.28 RATIO — HIGH (ref 0.88–1.16)
LYMPHOCYTES # BLD AUTO: 1.85 K/UL — SIGNIFICANT CHANGE UP (ref 1–3.3)
LYMPHOCYTES # BLD AUTO: 18.9 % — SIGNIFICANT CHANGE UP (ref 13–44)
M PNEUMO DNA SPEC QL NAA+PROBE: SIGNIFICANT CHANGE UP
MCHC RBC-ENTMCNC: 27.5 PG — SIGNIFICANT CHANGE UP (ref 27–34)
MCHC RBC-ENTMCNC: 30.7 GM/DL — LOW (ref 32–36)
MCV RBC AUTO: 89.5 FL — SIGNIFICANT CHANGE UP (ref 80–100)
MONOCYTES # BLD AUTO: 0.68 K/UL — SIGNIFICANT CHANGE UP (ref 0–0.9)
MONOCYTES NFR BLD AUTO: 6.9 % — SIGNIFICANT CHANGE UP (ref 2–14)
NEUTROPHILS # BLD AUTO: 7.13 K/UL — SIGNIFICANT CHANGE UP (ref 1.8–7.4)
NEUTROPHILS NFR BLD AUTO: 72.9 % — SIGNIFICANT CHANGE UP (ref 43–77)
NRBC # BLD: 0 /100 WBCS — SIGNIFICANT CHANGE UP
NRBC # FLD: 0 K/UL — SIGNIFICANT CHANGE UP
NT-PROBNP SERPL-SCNC: 413 PG/ML — HIGH
PCO2 BLDV: 53 MMHG — HIGH (ref 39–42)
PH BLDV: 7.31 — LOW (ref 7.32–7.43)
PLATELET # BLD AUTO: 248 K/UL — SIGNIFICANT CHANGE UP (ref 150–400)
PO2 BLDV: 45 MMHG — SIGNIFICANT CHANGE UP
POTASSIUM SERPL-MCNC: 4.6 MMOL/L — SIGNIFICANT CHANGE UP (ref 3.5–5.3)
POTASSIUM SERPL-MCNC: SIGNIFICANT CHANGE UP MMOL/L (ref 3.5–5.3)
POTASSIUM SERPL-SCNC: 4.6 MMOL/L — SIGNIFICANT CHANGE UP (ref 3.5–5.3)
POTASSIUM SERPL-SCNC: SIGNIFICANT CHANGE UP MMOL/L (ref 3.5–5.3)
PROT SERPL-MCNC: 7.7 G/DL — SIGNIFICANT CHANGE UP (ref 6–8.3)
PROT SERPL-MCNC: SIGNIFICANT CHANGE UP G/DL (ref 6–8.3)
PROTHROM AB SERPL-ACNC: 14.9 SEC — HIGH (ref 10.5–13.4)
RAPID RVP RESULT: SIGNIFICANT CHANGE UP
RBC # BLD: 4.29 M/UL — SIGNIFICANT CHANGE UP (ref 3.8–5.2)
RBC # FLD: 14.1 % — SIGNIFICANT CHANGE UP (ref 10.3–14.5)
RSV RNA SPEC QL NAA+PROBE: SIGNIFICANT CHANGE UP
RV+EV RNA SPEC QL NAA+PROBE: SIGNIFICANT CHANGE UP
SAO2 % BLDV: 72.8 % — SIGNIFICANT CHANGE UP
SARS-COV-2 RNA SPEC QL NAA+PROBE: SIGNIFICANT CHANGE UP
SODIUM SERPL-SCNC: 133 MMOL/L — LOW (ref 135–145)
SODIUM SERPL-SCNC: 136 MMOL/L — SIGNIFICANT CHANGE UP (ref 135–145)
TROPONIN T, HIGH SENSITIVITY RESULT: 21 NG/L — SIGNIFICANT CHANGE UP
WBC # BLD: 9.79 K/UL — SIGNIFICANT CHANGE UP (ref 3.8–10.5)
WBC # FLD AUTO: 9.79 K/UL — SIGNIFICANT CHANGE UP (ref 3.8–10.5)

## 2022-08-12 PROCEDURE — 71045 X-RAY EXAM CHEST 1 VIEW: CPT | Mod: 26

## 2022-08-12 PROCEDURE — 99285 EMERGENCY DEPT VISIT HI MDM: CPT | Mod: 25

## 2022-08-12 PROCEDURE — 93010 ELECTROCARDIOGRAM REPORT: CPT

## 2022-08-12 RX ORDER — DEXTROSE 50 % IN WATER 50 %
25 SYRINGE (ML) INTRAVENOUS ONCE
Refills: 0 | Status: DISCONTINUED | OUTPATIENT
Start: 2022-08-12 | End: 2022-08-19

## 2022-08-12 RX ORDER — INSULIN LISPRO 100/ML
5 VIAL (ML) SUBCUTANEOUS
Refills: 0 | Status: DISCONTINUED | OUTPATIENT
Start: 2022-08-12 | End: 2022-08-19

## 2022-08-12 RX ORDER — NITROGLYCERIN 6.5 MG
0.5 CAPSULE, EXTENDED RELEASE ORAL ONCE
Refills: 0 | Status: COMPLETED | OUTPATIENT
Start: 2022-08-12 | End: 2022-08-12

## 2022-08-12 RX ORDER — APIXABAN 2.5 MG/1
5 TABLET, FILM COATED ORAL EVERY 12 HOURS
Refills: 0 | Status: DISCONTINUED | OUTPATIENT
Start: 2022-08-12 | End: 2022-08-16

## 2022-08-12 RX ORDER — SODIUM CHLORIDE 9 MG/ML
1000 INJECTION, SOLUTION INTRAVENOUS
Refills: 0 | Status: DISCONTINUED | OUTPATIENT
Start: 2022-08-12 | End: 2022-08-19

## 2022-08-12 RX ORDER — INSULIN LISPRO 100/ML
VIAL (ML) SUBCUTANEOUS
Refills: 0 | Status: DISCONTINUED | OUTPATIENT
Start: 2022-08-12 | End: 2022-08-19

## 2022-08-12 RX ORDER — DEXTROSE 50 % IN WATER 50 %
12.5 SYRINGE (ML) INTRAVENOUS ONCE
Refills: 0 | Status: DISCONTINUED | OUTPATIENT
Start: 2022-08-12 | End: 2022-08-19

## 2022-08-12 RX ORDER — LATANOPROST 0.05 MG/ML
1 SOLUTION/ DROPS OPHTHALMIC; TOPICAL AT BEDTIME
Refills: 0 | Status: DISCONTINUED | OUTPATIENT
Start: 2022-08-12 | End: 2022-08-19

## 2022-08-12 RX ORDER — ACETAMINOPHEN 500 MG
975 TABLET ORAL ONCE
Refills: 0 | Status: COMPLETED | OUTPATIENT
Start: 2022-08-12 | End: 2022-08-12

## 2022-08-12 RX ORDER — ASPIRIN/CALCIUM CARB/MAGNESIUM 324 MG
162 TABLET ORAL ONCE
Refills: 0 | Status: COMPLETED | OUTPATIENT
Start: 2022-08-12 | End: 2022-08-12

## 2022-08-12 RX ORDER — ASPIRIN/CALCIUM CARB/MAGNESIUM 324 MG
81 TABLET ORAL DAILY
Refills: 0 | Status: DISCONTINUED | OUTPATIENT
Start: 2022-08-12 | End: 2022-08-19

## 2022-08-12 RX ORDER — FUROSEMIDE 40 MG
40 TABLET ORAL ONCE
Refills: 0 | Status: COMPLETED | OUTPATIENT
Start: 2022-08-12 | End: 2022-08-12

## 2022-08-12 RX ORDER — FUROSEMIDE 40 MG
40 TABLET ORAL
Refills: 0 | Status: DISCONTINUED | OUTPATIENT
Start: 2022-08-12 | End: 2022-08-16

## 2022-08-12 RX ORDER — ENOXAPARIN SODIUM 100 MG/ML
40 INJECTION SUBCUTANEOUS EVERY 24 HOURS
Refills: 0 | Status: DISCONTINUED | OUTPATIENT
Start: 2022-08-12 | End: 2022-08-12

## 2022-08-12 RX ORDER — CARVEDILOL PHOSPHATE 80 MG/1
3.12 CAPSULE, EXTENDED RELEASE ORAL EVERY 12 HOURS
Refills: 0 | Status: DISCONTINUED | OUTPATIENT
Start: 2022-08-12 | End: 2022-08-19

## 2022-08-12 RX ORDER — LISINOPRIL 2.5 MG/1
2.5 TABLET ORAL DAILY
Refills: 0 | Status: DISCONTINUED | OUTPATIENT
Start: 2022-08-12 | End: 2022-08-19

## 2022-08-12 RX ORDER — TICAGRELOR 90 MG/1
60 TABLET ORAL EVERY 12 HOURS
Refills: 0 | Status: DISCONTINUED | OUTPATIENT
Start: 2022-08-12 | End: 2022-08-15

## 2022-08-12 RX ORDER — GLUCAGON INJECTION, SOLUTION 0.5 MG/.1ML
1 INJECTION, SOLUTION SUBCUTANEOUS ONCE
Refills: 0 | Status: DISCONTINUED | OUTPATIENT
Start: 2022-08-12 | End: 2022-08-19

## 2022-08-12 RX ORDER — ATORVASTATIN CALCIUM 80 MG/1
80 TABLET, FILM COATED ORAL AT BEDTIME
Refills: 0 | Status: DISCONTINUED | OUTPATIENT
Start: 2022-08-12 | End: 2022-08-19

## 2022-08-12 RX ORDER — INSULIN GLARGINE 100 [IU]/ML
15 INJECTION, SOLUTION SUBCUTANEOUS AT BEDTIME
Refills: 0 | Status: DISCONTINUED | OUTPATIENT
Start: 2022-08-12 | End: 2022-08-19

## 2022-08-12 RX ORDER — DEXTROSE 50 % IN WATER 50 %
15 SYRINGE (ML) INTRAVENOUS ONCE
Refills: 0 | Status: DISCONTINUED | OUTPATIENT
Start: 2022-08-12 | End: 2022-08-19

## 2022-08-12 RX ADMIN — TICAGRELOR 60 MILLIGRAM(S): 90 TABLET ORAL at 22:52

## 2022-08-12 RX ADMIN — INSULIN GLARGINE 15 UNIT(S): 100 INJECTION, SOLUTION SUBCUTANEOUS at 22:52

## 2022-08-12 RX ADMIN — LATANOPROST 1 DROP(S): 0.05 SOLUTION/ DROPS OPHTHALMIC; TOPICAL at 22:52

## 2022-08-12 RX ADMIN — Medication 40 MILLIGRAM(S): at 14:54

## 2022-08-12 RX ADMIN — Medication 0.5 INCH(S): at 07:37

## 2022-08-12 RX ADMIN — Medication 2: at 18:34

## 2022-08-12 RX ADMIN — Medication 40 MILLIGRAM(S): at 07:36

## 2022-08-12 RX ADMIN — APIXABAN 5 MILLIGRAM(S): 2.5 TABLET, FILM COATED ORAL at 18:32

## 2022-08-12 RX ADMIN — Medication 5 UNIT(S): at 18:36

## 2022-08-12 RX ADMIN — ATORVASTATIN CALCIUM 80 MILLIGRAM(S): 80 TABLET, FILM COATED ORAL at 22:52

## 2022-08-12 RX ADMIN — Medication 162 MILLIGRAM(S): at 07:53

## 2022-08-12 RX ADMIN — Medication 975 MILLIGRAM(S): at 14:53

## 2022-08-12 RX ADMIN — CARVEDILOL PHOSPHATE 3.12 MILLIGRAM(S): 80 CAPSULE, EXTENDED RELEASE ORAL at 18:32

## 2022-08-12 NOTE — CONSULT NOTE ADULT - SUBJECTIVE AND OBJECTIVE BOX
Pulmonary Consult  08-12-22 @ 11:26    Patient is a 82y old  Female who presents with a chief complaint of     HPI: 83 y/o F (resides in [place], visiting family in [place] since early July) with PMH of CAD (s/p stent placement), diastolic CHF, DM, GERD, CVA (with no residual deficits), HLD. Presents to the ED with c/o SOB with associated LE edema, orthopnea/PND. Also with associated chest pressure with radiation to L arm x1 day. Reportedly with O2 sats 77% on RA on EMS arrival, placed on CPAP en route. CXR with pulmonary edema, small b/l pl effusions. Pulmonary called to consult for SOB. Never smoker, denies hx of lung disease/inhaler use. Denies hx of PE/DVT. Prior COVID infection in 2020, was hospitalized in [place] but not intubated. Notes that she has been experiencing progressively worsening SOB/LE over the past few days. Endorses compliance with her medications but does report increased salt intake while here in [place]. Dyspnea improving, denies CP at this time. + dry cough with no sputum production. Denies fever, chills, pleuritic chest pain. O2 sats 98% on 3LNC.     ?FOLLOWING PRESENT  [ no ] Hx of PE/DVT, [ no ] Hx COPD, [ no ] Hx of Asthma, [ no ] Hx of Hospitalization, [ yes ]  Hx of BiPAP/CPAP use, [ no ] Hx of MEGAN    No Known Allergies    PAST MEDICAL & SURGICAL HISTORY:  HTN (hypertension)  CAD (coronary artery disease)  DM (diabetes mellitus)  GERD (gastroesophageal reflux disease)  CVA (cerebral vascular accident)  HLD (hyperlipidemia)  CHF (congestive heart failure)  S/P primary angioplasty with coronary stent  S/P TKR (total knee replacement)    FAMILY HISTORY:  FH: diabetes mellitus    Social History: [ never smoker ] TOBACCO                  [ no ] ETOH                                 [ no ] IVDA/DRUGS    REVIEW OF SYSTEMS    General:	weakness    Skin/Breast: x  	  Ophthalmologic: x  	  ENMT:	 x    Respiratory and Thorax: per above  	  Cardiovascular:	per above    Gastrointestinal:	 x    Genitourinary:	 x    Musculoskeletal:	 x    Neurological:	x    Psychiatric:	x    Hematology/Lymphatics:	 x    Endocrine:	x    Allergic/Immunologic:	 x    MEDICATIONS  (STANDING):  acetaminophen     Tablet .. 975 milliGRAM(s) Oral once    Vital Signs Last 24 Hrs  T(C): --  T(F): --  HR: 70 (12 Aug 2022 08:31) (70 - 81)  BP: 114/69 (12 Aug 2022 08:31) (114/69 - 157/93)  BP(mean): --  RR: 22 (12 Aug 2022 08:31) (20 - 25)  SpO2: 100% (12 Aug 2022 08:31) (100% - 100%)    Parameters below as of 12 Aug 2022 08:31  Patient On (Oxygen Delivery Method): nasal cannula  O2 Flow (L/min): 2  Orthostatic VS    Physical Exam:   GENERAL: NAD  HEENT: SELVIN  ENMT: No tonsillar erythema, exudates, or enlargement  NECK: Supple, No JVD  CHEST/LUNG: B/l crackles  CVS: Regular rate and rhythm  GI: : Soft, Nontender, Nondistended  NERVOUS SYSTEM:  Alert & Oriented X3  EXTREMITIES:  2+ Peripheral Pulses, +2 LE edema  SKIN: No rashes or lesions  PSYCH: Appropriate    Labs:  -0.4<53<4>>45<<7.315>>-0.4<<3><<4><<5<<459>>SARS-CoV-2: NotDetec (12 Aug 2022 07:26)                              11.8   9.79  )-----------( 248      ( 12 Aug 2022 07:16 )             38.4     08-12    136  |  100  |  15  ----------------------------<  227<H>  4.6   |  22  |  1.08  08-12    133<L>  |  100  |  15  ----------------------------<  236<H>  TNP   |  21<L>  |  0.99    Ca    9.3      12 Aug 2022 09:38  Ca    9.1      12 Aug 2022 07:16    TPro  7.7  /  Alb  4.1  /  TBili  0.8  /  DBili  x   /  AST  17  /  ALT  10  /  AlkPhos  64  08-12  TPro  TNP  /  Alb  4.2  /  TBili  0.8  /  DBili  x   /  AST  34<H>  /  ALT  <5<L>  /  AlkPhos  55  08-12    LIVER FUNCTIONS - ( 12 Aug 2022 09:38 )  Alb: 4.1 g/dL / Pro: 7.7 g/dL / ALK PHOS: 64 U/L / ALT: 10 U/L / AST: 17 U/L / GGT: x           PT/INR - ( 12 Aug 2022 07:43 )   PT: 14.9 sec;   INR: 1.28 ratio      PTT - ( 12 Aug 2022 07:43 )  PTT:34.1 sec    D DImer  Serum Pro-Brain Natriuretic Peptide: 413 pg/mL (08-12 @ 07:16)    Studies  Chest X-RAY cx< from: Xray Chest 1 View- PORTABLE-Urgent (08.12.22 @ 07:25) >    INTERPRETATION:  EXAMINATION: XR CHEST URGENT    CLINICAL INDICATION: Chest Pain    TECHNIQUE: Single frontal, portable view of the chestwas obtained.    COMPARISON: Chest x-ray 9/5/2019.    FINDINGS:  Cardiomegaly with moderate pulmonary venous hypertension/pulmonary edema   and small bilateral pleural effusions.  No pneumothorax.  No acute bony abnormality. Moderate to severe left glenohumeral   arthropathy; slightly progressed when compared to 2019 study.    IMPRESSION:  Cardiomegaly with moderate pulmonary venous congestion/pulmonary edema   and small bilateral pleural effusions.    --- End of Report ---    < end of copied text >    < from: Transthoracic Echocardiogram (08.30.19 @ 16:50) >  OBSERVATIONS:  Mitral Valve: Mitral annular calcification, otherwise  normal mitralvalve. Mild mitral regurgitation.  Aortic Root: Normal aortic root.  Aortic Valve: Aortic valve leaflet morphology not well  visualized.  Left Atrium: Mildly dilated left atrium.  LA volume index =  37 cc/m2.  Left Ventricle: Normal left ventricular systolic function.  No segmental wall motion abnormalities. Mild diastolic  dysfunction (Stage I).  Right Heart: Normal right atrium. Normal right ventricular  size and function. Normal tricuspid valve. Minimal  tricuspid regurgitation. Normal pulmonic valve.  Pericardium/PleuraNormal pericardium with no pericardial  effusion.  ------------------------------------------------------------------------  CONCLUSIONS:  1. Mitral annular calcification, otherwise normal mitral  valve. Mild mitral regurgitation.  2. Mildly dilated left atrium.  LA volume index = 37 cc/m2.  3. Normal left ventricular systolic function. No segmental  wall motion abnormalities.  4. Mild diastolic dysfunction (Stage I).  5. Normal right ventricular size and function.    < end of copied text >

## 2022-08-12 NOTE — ED PROVIDER NOTE - NSICDXPASTSURGICALHX_GEN_ALL_CORE_FT
PAST SURGICAL HISTORY:  S/P primary angioplasty with coronary stent x1 in 2006 at [facility]    S/P TKR (total knee replacement) left

## 2022-08-12 NOTE — H&P ADULT - HISTORY OF PRESENT ILLNESS
81yo woman  resides in [place], but occasionally visits [place] to see family, and when she feels ill she comes to [facility].  Her last visit in 2019 was for syncope.  She had a normal LVEF at that time, and underwent coronary angiography to check the patency of her stents. No intervention made at that time.  This time, she presents with acute onset of shortness of breath, around 4AM today.  Unable to breath, even sitting upright.  Family COVID-swabbed her at home, and then called 911 to bring her to the hospital.  Aware of worsening LE edema for a few weeks that has been stable.  Tightness/fullness in the abdomen, with early satiety after meals, +bendopnea.  Orthopnea/PND at night requiring 4 pillows to sleep comfortably, also increasing over the last few weeks.  Some chest pressure this morning, radiating to the left arm.  82yoF w/Hx of CAD w/2 stents on eliquis, DM, HTN, HLD, CHF on lasix 40mg PO, BIBEMS w/SOB. Pt woke up at 4am feeling SOB, upon EMS arrival was satting 77% on RA, improved to 100% on NRB however pt had inc. WOB so she was put on CPAP en route. On arrival to ED pt w/SOB and chest pain radiating into L shoulder and leg swelling, otherwise no complaints. She denies HA, palpitations, abd pain, n/v/d/c, fevers/chills, dysuria/hematuria, hematochezia/melena, numbness/tingling, focal weakness.

## 2022-08-12 NOTE — ED ADULT NURSE REASSESSMENT NOTE - NS ED NURSE REASSESS COMMENT FT1
Break coverage RN: Pt A&Ox3 resting on stretcher. Respirations even and unlabored, sating 100% 3L o2 via NC, NSR on monitor. IV site patent, no redness or swelling noted. Pt offers no complaints at this time, well appearing. NAD noted. Pending bed assignment. bed in lowest position, side rails up, call bell in hand, safety maintained.
assumed care of patient. patient resting on stretcher, appears comfortable. reports "feeling better". cardiac monitoring maintained. 100% on 5 L NC. will continue to monitor

## 2022-08-12 NOTE — ED PROVIDER NOTE - ATTENDING CONTRIBUTION TO CARE
MD Banks:  patient seen and evaluated with the resident.  I was present for key portions of the History & Physical, and I agree with the Impression & Plan.  MD Banks:  83 yo F, c/o SOB.    Duration: woke up with SOB 5am.  Worse:  any exertion, laying flat.    Associated Sx: bipedal edema, HTN.  Context:  MHx CHF,   VS: tachy, hypertensive, hypoxic.    Physical Exam: elderly F, +respiratory distress, NCAT, PERRL, EOMI, +JVD, tachy, bilateral rales, Abd: s/nd/nt, Ext: 2+ BIPEDAL edema, Neuro: AAOx3.  Gait not assessed.    Impression:  acute on chronic CHF exacerbation.  Patient with respiratory distress, hypoxia, and lower extremity edema.   Plan:  BP control with NTG, diuresis, O2, +/- BIPAP.  Given extent of hypoxia upon arrival (77% at home, 88% in ED), will likely need admit to [place] of the day.  Patient is visiting from [place]

## 2022-08-12 NOTE — CONSULT NOTE ADULT - SUBJECTIVE AND OBJECTIVE BOX
Cardiology    HISTORY OF PRESENT ILLNESS:   Ms Olivo is a very pleasant 83yo woman known to our office from seeing Dr Altamirano and Dr Koenig. She resides in [place], but occasionally visits [place] to see family, and when she feels ill she comes to [facility].  Her last visit in 2019 was for syncope.  She had a normal LVEF at that time, and underwent coronary angiography to check the patency of her stents. No intervention made at that time.  This time, she presents with acute onset of shortness of breath, around 4AM today.  Unable to breath, even sitting upright.  Family COVID-swabbed her at home, and then called 911 to bring her to the hospital.  Aware of worsening LE edema for a few weeks that has been stable.  Tightness/fullness in the abdomen, with early satiety after meals, +bendopnea.  Orthopnea/PND at night requiring 4 pillows to sleep comfortably, also increasing over the last few weeks.  Some chest pressure this morning, radiating to the left arm.  A 10 pt ROS is otherwise negative.    PAST MEDICAL & SURGICAL HISTORY:  HTN (hypertension)  CAD (coronary artery disease)  S/P stent placemenet 2006, and reportedly 6/2019 in [place]  DM (diabetes mellitus)  GERD (gastroesophageal reflux disease)  CVA (cerebral vascular accident)  no residual deficits  HLD (hyperlipidemia)  CHF (congestive heart failure)  S/P primary angioplasty with coronary stent  x1 in 2006 at [facility]  S/P TKR (total knee replacement)  left    MEDICATIONS  (STANDING):  acetaminophen     Tablet .. 975 milliGRAM(s) Oral once      Allergies    No Known Allergies    Intolerances    FAMILY HISTORY:  FH: diabetes mellitus    Non-contributary for premature coronary disease or sudden cardiac death    SOCIAL HISTORY:    [x ] Non-smoker  [ ] Smoker  [ ] Alcohol    PHYSICAL EXAM:  T(C): --  HR: 70 (08-12-22 @ 08:31) (70 - 81)  BP: 114/69 (08-12-22 @ 08:31) (114/69 - 157/93)  RR: 22 (08-12-22 @ 08:31) (20 - 25)  SpO2: 100% (08-12-22 @ 08:31) (100% - 100%)  Wt(kg): --    General: Well nourished, no acute distress, alert and oriented x 3, nondiaphoretic.  Head: normocephalic, no trauma  Neck: +JVD, no bruit, supple, not enlarged  CV: S1S2, no S3, regular rate, rhythm is SINUS, no murmurs.    Lungs: rales BL.  no accessory muscle use.  Abdomen: bowel sounds +, soft, nontender, nondistended, +hepatomegaly.  Extremities: no clubbing or cyanosis.  2+ pitting edema to mid-shin bilaterally  Neuro: Moves all 4 extremities, sensation intact x 4 extremities  Skin: warm and moist, normal turgor  Psych: Mood and affect are appropriate for circumstances  MSK: normal range of motion and strength x4 extremities.    TELEMETRY: NSR	    ECG: NSR, PVCs 	  Echo:  < from: Transthoracic Echocardiogram (08.30.19 @ 16:50) >  OBSERVATIONS:  Mitral Valve: Mitral annular calcification, otherwise  normal mitralvalve. Mild mitral regurgitation.  Aortic Root: Normal aortic root.  Aortic Valve: Aortic valve leaflet morphology not well  visualized.  Left Atrium: Mildly dilated left atrium.  LA volume index =  37 cc/m2.  Left Ventricle: Normal left ventricular systolic function.  No segmental wall motion abnormalities. Mild diastolic  dysfunction (Stage I).  Right Heart: Normal right atrium. Normal right ventricular  size and function. Normal tricuspid valve. Minimal  tricuspid regurgitation. Normal pulmonic valve.  Pericardium/PleuraNormal pericardium with no pericardial  effusion.    < end of copied text >  	  LABS:	 	                          11.8   9.79  )-----------( 248      ( 12 Aug 2022 07:16 )             38.4     08-12    133<L>  |  100  |  15  ----------------------------<  236<H>  TNP   |  21<L>  |  0.99    Ca    9.1      12 Aug 2022 07:16  Magnesium, Serum: 2.3 mg/dL (09.08.19 @ 06:10)  TPro  TNP  /  Alb  4.2  /  TBili  0.8  /  DBili  x   /  AST  34<H>  /  ALT  <5<L>  /  AlkPhos  55  08-12    proBNP: Serum Pro-Brain Natriuretic Peptide: 413 pg/mL (08-12 @ 07:16)    ASSESSMENT/PLAN: 	Ms Olivo is an 82y Female known to our group thru sporadic care when she visits from [place].  Here for acute on set shortness of breath and chest pain this morning.  Having worsening edema ,bendopnea, orthopnea/PND over days to weeks.  Has hx of CAD, last stents in 2019, last coronary angio ~2020 with patent stents ([place]), historically normal LVEF with mild diastolic dysfunction. No history of arrhythmia.    Should still be taking Aspirin 81mg daily.  In 2019 was still on Ticagrelor 90mg BID.  Could remain on Ticagrelor at maintenance dose (60mg BID) or Clopidogrel 75mg daily, as she's had no bleeding episodes in the interim.  Lasix, 40mg IV, twice daily.  Checking K, levels.  Replace K to 4-4.5 on a daily basis.  Responding well to nitroglycerin paste. Order Isosorbide dinitrate 10mg TID.  Start Losartan 12.5mg daily.  If she is on home beta blockers, can maintain current dose. Otherwise would avoid increasing BB dose during acute CHF.  Order an Echocardiogram.   Ischemic workup to follow.  Will have followup at [facility] ~1-2 weeks post discharge.        Poncho Lopez M.D.  Cardiac Electrophysiology    office [phone]  pager [phone]

## 2022-08-12 NOTE — H&P ADULT - ASSESSMENT
82yoF w/Hx of CAD w/2 stents on eliquis, DM, HTN, HLD, CHF on lasix 40mg PO, BIBEMS w/SOB. Pt woke up at 4am feeling SOB, upon EMS arrival was satting 77% on RA, improved to 100% on NRB however pt had inc. WOB so she was put on CPAP en route. On arrival to ED pt w/SOB and chest pain radiating into L shoulder and leg swelling, otherwise no complaints. She denies HA, palpitations, abd pain, n/v/d/c, fevers/chills, dysuria/hematuria, hematochezia/melena, numbness/tingling, focal weakness.     Acute respiratory failure with hypoxia and hypercapnia.   - sec to acute on chronic CHF exacerbation  - telemonitor  - sp BIPAP  - diuresis as per cards   - I and O  - DAILY WEIGHTS    CAD  - sp PCI  - cw asa, brillinta  - cards fu     DM  - monitor FS  - basal, bolus     HT  - cw home meds

## 2022-08-12 NOTE — H&P ADULT - NSHPLABSRESULTS_GEN_ALL_CORE
Lab Results:  CBC  CBC Full  -  ( 12 Aug 2022 07:16 )  WBC Count : 9.79 K/uL  RBC Count : 4.29 M/uL  Hemoglobin : 11.8 g/dL  Hematocrit : 38.4 %  Platelet Count - Automated : 248 K/uL  Mean Cell Volume : 89.5 fL  Mean Cell Hemoglobin : 27.5 pg  Mean Cell Hemoglobin Concentration : 30.7 gm/dL  Auto Neutrophil # : 7.13 K/uL  Auto Lymphocyte # : 1.85 K/uL  Auto Monocyte # : 0.68 K/uL  Auto Eosinophil # : 0.07 K/uL  Auto Basophil # : 0.01 K/uL  Auto Neutrophil % : 72.9 %  Auto Lymphocyte % : 18.9 %  Auto Monocyte % : 6.9 %  Auto Eosinophil % : 0.7 %  Auto Basophil % : 0.1 %    .		Differential:	[] Automated		[] Manual  Chemistry                        11.8   9.79  )-----------( 248      ( 12 Aug 2022 07:16 )             38.4     08-12    136  |  100  |  15  ----------------------------<  227<H>  4.6   |  22  |  1.08    Ca    9.3      12 Aug 2022 09:38    TPro  7.7  /  Alb  4.1  /  TBili  0.8  /  DBili  x   /  AST  17  /  ALT  10  /  AlkPhos  64  08-12    LIVER FUNCTIONS - ( 12 Aug 2022 09:38 )  Alb: 4.1 g/dL / Pro: 7.7 g/dL / ALK PHOS: 64 U/L / ALT: 10 U/L / AST: 17 U/L / GGT: x           PT/INR - ( 12 Aug 2022 07:43 )   PT: 14.9 sec;   INR: 1.28 ratio         PTT - ( 12 Aug 2022 07:43 )  PTT:34.1 sec          MICROBIOLOGY/CULTURES:      RADIOLOGY RESULTS: reviewed

## 2022-08-12 NOTE — ED PROVIDER NOTE - PHYSICAL EXAMINATION
Gen: AAOx3, non-toxic tired-appearing woman in mild distress  Head: NCAT  HEENT: EOMI, oral mucosa moist, normal conjunctiva  Lung: +diffuse crackles, inc. WOB, satting 93% on 6L NC improved to 100% on 15L NRB  CV: RRR, no murmurs, rubs or gallops  Abd: soft, NTND, no guarding, no CVA tenderness  MSK: no visible deformities  Neuro: No focal sensory or motor deficits  Skin: Warm, well perfused, no rash; 2+ pitting edema up to knees b/l  Psych: normal affect.   ~Ute Loya M.D. Resident

## 2022-08-12 NOTE — ED ADULT TRIAGE NOTE - SPO2 (%)
Other (Free Text): intralesional normal saline injected by  - [facility] at no cost Detail Level: Zone Note Text (......Xxx Chief Complaint.): This diagnosis correlates with the Render Risk Assessment In Note?: no 100

## 2022-08-12 NOTE — CONSULT NOTE ADULT - ASSESSMENT
81 y/o F (resides in [place], visiting family in [place] since early July) with PMH of CAD (s/p stent placement), diastolic CHF, DM, GERD, CVA (with no residual deficits), HLD. Presents to the ED with c/o SOB with associated LE edema, orthopnea/PND. Also with associated chest pressure with radiation to L arm x1 day. Reportedly with O2 sats 77% on RA on EMS arrival, placed on CPAP en route. CXR with pulmonary edema, small b/l pl effusions. Pulmonary called to consult for SOB.

## 2022-08-12 NOTE — ED ADULT NURSE NOTE - OBJECTIVE STATEMENT
Pt A&Ox4, ambulatory at baseline. Pt BIBEMS labored respirations/ tachypneic , on Cpap. pmhx: cardiac stent, HTN, CHF, CVA, on Eliquis. SOB since approx 4am. As per EMS 77% o2 on RA, resolved with CPap 100%. Pt 100% 15L NR currently, placed on cardiac monitor. 60mg Lasix administered. PT appears more comfortable.

## 2022-08-12 NOTE — CONSULT NOTE ADULT - PROBLEM SELECTOR RECOMMENDATION 9
2nd to pulmonary edema  -Presents to ED with SOB x few days, placed on CPAP per EMS (hypoxic to 77% on EMS arrival with increased WOB)  -CXR with pulmonary edema, small b/l pl effusions  -Keep O>I as tolerated  -CPAP/BIPAP now d/c, pt awake & alert with improvement in symptoms, can continue to monitor off NIV at this time  -VBG noted, repeat in AM  -Keep sats >90% with supplemental O2 (currently 3LNC). Wean O2 as tolerated.

## 2022-08-12 NOTE — CHART NOTE - NSCHARTNOTEFT_GEN_A_CORE
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 Pt pharmacy called for verification ([facility] in [place], [place]). Will order Eliquis. Discussed with both Dr Reaves and Dr Aden.

## 2022-08-12 NOTE — ED ADULT TRIAGE NOTE - CHIEF COMPLAINT QUOTE
Patient arrives with SOB since 0400, found to be 77% on RA. Placed on CPAP en route satting 100%. Charge RN made aware, taken directly back to room 25.

## 2022-08-12 NOTE — ED ADULT NURSE NOTE - NSIMPLEMENTINTERV_GEN_ALL_ED
Implemented All Fall with Harm Risk Interventions:  [place] to call system. Call bell, personal items and telephone within reach. Instruct patient to call for assistance. Room bathroom lighting operational. Non-slip footwear when patient is off stretcher. Physically safe environment: no spills, clutter or unnecessary equipment. Stretcher in lowest position, wheels locked, appropriate side rails in place. Provide visual cue, wrist band, yellow gown, etc. Monitor gait and stability. Monitor for mental status changes and reorient to person, place, and time. Review medications for side effects contributing to fall risk. Reinforce activity limits and safety measures with patient and family. Provide visual clues: red socks.

## 2022-08-12 NOTE — CONSULT NOTE ADULT - PROBLEM SELECTOR RECOMMENDATION 2
CXR with pulmonary edema, small b/l pl effusions  -S/p Lasix 40 mg IVP x1 in ED  -Keep O>I as tolerated  -Check TTE.

## 2022-08-12 NOTE — ED PROVIDER NOTE - HIV OFFER
Opt out Eucrisa Counseling: Patient may experience a mild burning sensation during topical application. Eucrisa is not approved in children less than 2 years of age.

## 2022-08-12 NOTE — H&P ADULT - NSHPPHYSICALEXAM_GEN_ALL_CORE
General: WN/WD NAD  PERRLA  Neurology: A&Ox3, nonfocal, SOTELO x 4  Respiratory:  B/L crackles +  CV: RRR, S1S2, no murmurs, rubs or gallops  Abdominal: Soft, NT, ND +BS, Last BM  Extremities: edema +  Skin Normal

## 2022-08-12 NOTE — ED PROVIDER NOTE - CLINICAL SUMMARY MEDICAL DECISION MAKING FREE TEXT BOX
Impression:  acute on chronic CHF exacerbation.  Patient with respiratory distress, hypoxia, and lower extremity edema.   Plan:  BP control with NTG, diuresis, O2, +/- BIPAP.  Given extent of hypoxia upon arrival (77% at home, 88% in ED), will likely need admit to [place] of the day.  Patient is visiting from [place]

## 2022-08-12 NOTE — CONSULT NOTE ADULT - NS ATTEND AMEND GEN_ALL_CORE FT
son: [phone]: DA: called her son who knows about her medical issues: ::  Hx and physical reviewed: and discussed with p[t and son: She came in h ere with increasing SOB and desaturation:   Currently she is no to n bipap/ cpap and feels comfortable:     she is on Eliquis ; as she had covid last year in october: and developed a clot: and it was prescribed at that time: she is  on 5 mg bid since October 2021: :    currently she is in pulm edema:  :  unlikely to have PE or dvt as she is already on Eliquis for it: :  Her VBG shows mild ac hypercapnic resp failure: :  no need for bipap at this time:   support with bipap if resp distress increases:  She has no asthma or copd or emphysema and had not been using nebs or pumps at home!:    sumit acp

## 2022-08-12 NOTE — ED PROVIDER NOTE - NS ED ROS FT
GENERAL: No fever or chills, EYES: no change in vision, HEENT: no trouble swallowing or speaking, CARDIAC: +chest pain, PULMONARY: no cough, +SOB, GI: no abdominal pain, no nausea, no vomiting, no diarrhea or constipation, : No changes in urination, SKIN: no rashes, NEURO: no headache, MSK: No joint pain     All other ROS negative unless otherwise specified in HPI.     ~Ute Loya M.D. Resident

## 2022-08-12 NOTE — H&P ADULT - NSICDXPASTSURGICALHX_GEN_ALL_CORE_FT
PAST SURGICAL HISTORY:  S/P primary angioplasty with coronary stent x1 in 2006 at [facility]    S/P TKR (total knee replacement) left

## 2022-08-12 NOTE — ED PROVIDER NOTE - OBJECTIVE STATEMENT
Pt is an 82yoF w/Hx of CAD w/2 stents on eliquis, DM, HTN, HLD, CHF on lasix 40mg PO, BIBEMS w/SOB. Pt woke up at 4am feeling SOB, upon EMS arrival was satting 77% on RA, improved to 100% on NRB however pt had inc. WOB so she was put on CPAP en route. On arrival to ED pt w/SOB and chest pain radiating into L shoulder and leg swelling, otherwise no complaints. She denies HA, palpitations, abd pain, n/v/d/c, fevers/chills, dysuria/hematuria, hematochezia/melena, numbness/tingling, focal weakness.    Son, FELICITAJERRICAMitch Olivo: [phone]

## 2022-08-13 LAB
A1C WITH ESTIMATED AVERAGE GLUCOSE RESULT: 6.9 % — HIGH (ref 4–5.6)
ALBUMIN SERPL ELPH-MCNC: 4.3 G/DL — SIGNIFICANT CHANGE UP (ref 3.3–5)
ALP SERPL-CCNC: 67 U/L — SIGNIFICANT CHANGE UP (ref 40–120)
ALT FLD-CCNC: 9 U/L — SIGNIFICANT CHANGE UP (ref 4–33)
ANION GAP SERPL CALC-SCNC: 15 MMOL/L — HIGH (ref 7–14)
AST SERPL-CCNC: 13 U/L — SIGNIFICANT CHANGE UP (ref 4–32)
BASE EXCESS BLDV CALC-SCNC: 4.9 MMOL/L — HIGH (ref -2–3)
BILIRUB SERPL-MCNC: 0.8 MG/DL — SIGNIFICANT CHANGE UP (ref 0.2–1.2)
BUN SERPL-MCNC: 21 MG/DL — SIGNIFICANT CHANGE UP (ref 7–23)
CALCIUM SERPL-MCNC: 9.7 MG/DL — SIGNIFICANT CHANGE UP (ref 8.4–10.5)
CHLORIDE SERPL-SCNC: 100 MMOL/L — SIGNIFICANT CHANGE UP (ref 98–107)
CO2 BLDV-SCNC: 33 MMOL/L — HIGH (ref 22–26)
CO2 SERPL-SCNC: 24 MMOL/L — SIGNIFICANT CHANGE UP (ref 22–31)
CREAT SERPL-MCNC: 1.09 MG/DL — SIGNIFICANT CHANGE UP (ref 0.5–1.3)
EGFR: 50 ML/MIN/1.73M2 — LOW
ESTIMATED AVERAGE GLUCOSE: 151 — SIGNIFICANT CHANGE UP
GLUCOSE BLDC GLUCOMTR-MCNC: 119 MG/DL — HIGH (ref 70–99)
GLUCOSE BLDC GLUCOMTR-MCNC: 148 MG/DL — HIGH (ref 70–99)
GLUCOSE BLDC GLUCOMTR-MCNC: 160 MG/DL — HIGH (ref 70–99)
GLUCOSE BLDC GLUCOMTR-MCNC: 171 MG/DL — HIGH (ref 70–99)
GLUCOSE BLDC GLUCOMTR-MCNC: 172 MG/DL — HIGH (ref 70–99)
GLUCOSE SERPL-MCNC: 159 MG/DL — HIGH (ref 70–99)
HCO3 BLDV-SCNC: 31 MMOL/L — HIGH (ref 22–29)
HCT VFR BLD CALC: 40 % — SIGNIFICANT CHANGE UP (ref 34.5–45)
HGB BLD-MCNC: 12.1 G/DL — SIGNIFICANT CHANGE UP (ref 11.5–15.5)
MCHC RBC-ENTMCNC: 27.6 PG — SIGNIFICANT CHANGE UP (ref 27–34)
MCHC RBC-ENTMCNC: 30.3 GM/DL — LOW (ref 32–36)
MCV RBC AUTO: 91.3 FL — SIGNIFICANT CHANGE UP (ref 80–100)
NRBC # BLD: 0 /100 WBCS — SIGNIFICANT CHANGE UP
NRBC # FLD: 0 K/UL — SIGNIFICANT CHANGE UP
PCO2 BLDV: 53 MMHG — HIGH (ref 39–42)
PH BLDV: 7.38 — SIGNIFICANT CHANGE UP (ref 7.32–7.43)
PLATELET # BLD AUTO: 233 K/UL — SIGNIFICANT CHANGE UP (ref 150–400)
PO2 BLDV: 37 MMHG — SIGNIFICANT CHANGE UP
POTASSIUM SERPL-MCNC: 3.9 MMOL/L — SIGNIFICANT CHANGE UP (ref 3.5–5.3)
POTASSIUM SERPL-SCNC: 3.9 MMOL/L — SIGNIFICANT CHANGE UP (ref 3.5–5.3)
PROT SERPL-MCNC: 7.9 G/DL — SIGNIFICANT CHANGE UP (ref 6–8.3)
RBC # BLD: 4.38 M/UL — SIGNIFICANT CHANGE UP (ref 3.8–5.2)
RBC # FLD: 13.5 % — SIGNIFICANT CHANGE UP (ref 10.3–14.5)
SAO2 % BLDV: 62 % — SIGNIFICANT CHANGE UP
SODIUM SERPL-SCNC: 139 MMOL/L — SIGNIFICANT CHANGE UP (ref 135–145)
WBC # BLD: 6.29 K/UL — SIGNIFICANT CHANGE UP (ref 3.8–10.5)
WBC # FLD AUTO: 6.29 K/UL — SIGNIFICANT CHANGE UP (ref 3.8–10.5)

## 2022-08-13 RX ORDER — ISOSORBIDE DINITRATE 5 MG/1
10 TABLET ORAL THREE TIMES A DAY
Refills: 0 | Status: DISCONTINUED | OUTPATIENT
Start: 2022-08-13 | End: 2022-08-19

## 2022-08-13 RX ADMIN — Medication 1: at 09:24

## 2022-08-13 RX ADMIN — Medication 40 MILLIGRAM(S): at 13:39

## 2022-08-13 RX ADMIN — CARVEDILOL PHOSPHATE 3.12 MILLIGRAM(S): 80 CAPSULE, EXTENDED RELEASE ORAL at 18:17

## 2022-08-13 RX ADMIN — TICAGRELOR 60 MILLIGRAM(S): 90 TABLET ORAL at 05:24

## 2022-08-13 RX ADMIN — ISOSORBIDE DINITRATE 10 MILLIGRAM(S): 5 TABLET ORAL at 18:17

## 2022-08-13 RX ADMIN — CARVEDILOL PHOSPHATE 3.12 MILLIGRAM(S): 80 CAPSULE, EXTENDED RELEASE ORAL at 05:24

## 2022-08-13 RX ADMIN — APIXABAN 5 MILLIGRAM(S): 2.5 TABLET, FILM COATED ORAL at 05:24

## 2022-08-13 RX ADMIN — ATORVASTATIN CALCIUM 80 MILLIGRAM(S): 80 TABLET, FILM COATED ORAL at 22:44

## 2022-08-13 RX ADMIN — LISINOPRIL 2.5 MILLIGRAM(S): 2.5 TABLET ORAL at 05:24

## 2022-08-13 RX ADMIN — Medication 5 UNIT(S): at 13:38

## 2022-08-13 RX ADMIN — Medication 5 UNIT(S): at 09:24

## 2022-08-13 RX ADMIN — Medication 5 UNIT(S): at 18:17

## 2022-08-13 RX ADMIN — INSULIN GLARGINE 15 UNIT(S): 100 INJECTION, SOLUTION SUBCUTANEOUS at 22:45

## 2022-08-13 RX ADMIN — Medication 81 MILLIGRAM(S): at 13:39

## 2022-08-13 RX ADMIN — TICAGRELOR 60 MILLIGRAM(S): 90 TABLET ORAL at 18:16

## 2022-08-13 RX ADMIN — APIXABAN 5 MILLIGRAM(S): 2.5 TABLET, FILM COATED ORAL at 18:17

## 2022-08-13 RX ADMIN — Medication 40 MILLIGRAM(S): at 05:24

## 2022-08-13 NOTE — PROGRESS NOTE ADULT - SUBJECTIVE AND OBJECTIVE BOX
Patient is a 82y old  Female who presents with a chief complaint of SOB (13 Aug 2022 09:07)    Date of servie : 08-13-22 @ 18:57  INTERVAL HPI/OVERNIGHT EVENTS:  T(C): 36.8 (08-13-22 @ 13:00), Max: 37.2 (08-12-22 @ 19:25)  HR: 65 (08-13-22 @ 13:00) (65 - 79)  BP: 119/66 (08-13-22 @ 13:00) (119/66 - 148/82)  RR: 15 (08-13-22 @ 13:00) (15 - 18)  SpO2: 100% (08-13-22 @ 13:00) (100% - 100%)  Wt(kg): --  I&O's Summary      LABS:                        12.1   6.29  )-----------( 233      ( 13 Aug 2022 05:40 )             40.0     08-13    139  |  100  |  21  ----------------------------<  159<H>  3.9   |  24  |  1.09    Ca    9.7      13 Aug 2022 05:40    TPro  7.9  /  Alb  4.3  /  TBili  0.8  /  DBili  x   /  AST  13  /  ALT  9   /  AlkPhos  67  08-13    PT/INR - ( 12 Aug 2022 07:43 )   PT: 14.9 sec;   INR: 1.28 ratio         PTT - ( 12 Aug 2022 07:43 )  PTT:34.1 sec    CAPILLARY BLOOD GLUCOSE      POCT Blood Glucose.: 119 mg/dL (13 Aug 2022 17:39)  POCT Blood Glucose.: 148 mg/dL (13 Aug 2022 12:41)  POCT Blood Glucose.: 172 mg/dL (13 Aug 2022 08:46)  POCT Blood Glucose.: 171 mg/dL (13 Aug 2022 01:42)  POCT Blood Glucose.: 205 mg/dL (12 Aug 2022 22:50)            MEDICATIONS  (STANDING):  apixaban 5 milliGRAM(s) Oral every 12 hours  aspirin enteric coated 81 milliGRAM(s) Oral daily  atorvastatin 80 milliGRAM(s) Oral at bedtime  carvedilol 3.125 milliGRAM(s) Oral every 12 hours  dextrose 5%. 1000 milliLiter(s) (50 mL/Hr) IV Continuous <Continuous>  dextrose 5%. 1000 milliLiter(s) (100 mL/Hr) IV Continuous <Continuous>  dextrose 50% Injectable 25 Gram(s) IV Push once  dextrose 50% Injectable 12.5 Gram(s) IV Push once  dextrose 50% Injectable 25 Gram(s) IV Push once  furosemide   Injectable 40 milliGRAM(s) IV Push two times a day  glucagon  Injectable 1 milliGRAM(s) IntraMuscular once  insulin glargine Injectable (LANTUS) 15 Unit(s) SubCutaneous at bedtime  insulin lispro (ADMELOG) corrective regimen sliding scale   SubCutaneous three times a day before meals  insulin lispro Injectable (ADMELOG) 5 Unit(s) SubCutaneous three times a day before meals  isosorbide   dinitrate Tablet (ISORDIL) 10 milliGRAM(s) Oral three times a day  latanoprost 0.005% Ophthalmic Solution 1 Drop(s) Both EYES at bedtime  lisinopril 2.5 milliGRAM(s) Oral daily  ticagrelor 60 milliGRAM(s) Oral every 12 hours    MEDICATIONS  (PRN):  dextrose Oral Gel 15 Gram(s) Oral once PRN Blood Glucose LESS THAN 70 milliGRAM(s)/deciliter          PHYSICAL EXAM:  GENERAL: NAD, well-groomed, well-developed  HEAD:  Atraumatic, Normocephalic  CHEST/LUNG: Clear to percussion bilaterally; No rales, rhonchi, wheezing, or rubs  HEART: Regular rate and rhythm; No murmurs, rubs, or gallops  ABDOMEN: Soft, Nontender, Nondistended; Bowel sounds present  EXTREMITIES:  2+ Peripheral Pulses, No clubbing, cyanosis, or edema  LYMPH: No lymphadenopathy noted  SKIN: No rashes or lesions    Care Discussed with Consultants/Other Providers [ ] YES  [ ] NO

## 2022-08-13 NOTE — PROVIDER CONTACT NOTE (OTHER) - ACTION/TREATMENT ORDERED:
As per KARLI Cox, okay to continue to monitor pulse ox with vitals and continue to look for new pulse px cord.

## 2022-08-13 NOTE — PATIENT PROFILE ADULT - FALL HARM RISK - HARM RISK INTERVENTIONS

## 2022-08-13 NOTE — PROGRESS NOTE ADULT - SUBJECTIVE AND OBJECTIVE BOX
pt seen and examined, no complaints, ROS - .     apixaban 5 milliGRAM(s) Oral every 12 hours  aspirin enteric coated 81 milliGRAM(s) Oral daily  atorvastatin 80 milliGRAM(s) Oral at bedtime  carvedilol 3.125 milliGRAM(s) Oral every 12 hours  dextrose 5%. 1000 milliLiter(s) IV Continuous <Continuous>  dextrose 5%. 1000 milliLiter(s) IV Continuous <Continuous>  dextrose 50% Injectable 25 Gram(s) IV Push once  dextrose 50% Injectable 12.5 Gram(s) IV Push once  dextrose 50% Injectable 25 Gram(s) IV Push once  dextrose Oral Gel 15 Gram(s) Oral once PRN  furosemide   Injectable 40 milliGRAM(s) IV Push two times a day  glucagon  Injectable 1 milliGRAM(s) IntraMuscular once  insulin glargine Injectable (LANTUS) 15 Unit(s) SubCutaneous at bedtime  insulin lispro (ADMELOG) corrective regimen sliding scale   SubCutaneous three times a day before meals  insulin lispro Injectable (ADMELOG) 5 Unit(s) SubCutaneous three times a day before meals  latanoprost 0.005% Ophthalmic Solution 1 Drop(s) Both EYES at bedtime  lisinopril 2.5 milliGRAM(s) Oral daily  ticagrelor 60 milliGRAM(s) Oral every 12 hours                            12.1   6.29  )-----------( 233      ( 13 Aug 2022 05:40 )             40.0       Hemoglobin: 12.1 g/dL (08-13 @ 05:40)  Hemoglobin: 11.8 g/dL (08-12 @ 07:16)      08-13    139  |  100  |  21  ----------------------------<  159<H>  3.9   |  24  |  1.09    Ca    9.7      13 Aug 2022 05:40    TPro  7.9  /  Alb  4.3  /  TBili  0.8  /  DBili  x   /  AST  13  /  ALT  9   /  AlkPhos  67  08-13    Creatinine Trend: 1.09<--, 1.08<--, 0.99<--    COAGS:           T(C): 36.7 (08-13-22 @ 05:15), Max: 37.2 (08-12-22 @ 19:25)  HR: 72 (08-13-22 @ 05:15) (70 - 79)  BP: 136/89 (08-13-22 @ 05:15) (136/89 - 148/82)  RR: 17 (08-13-22 @ 05:15) (17 - 20)  SpO2: 100% (08-13-22 @ 05:15) (100% - 100%)  Wt(kg): --    I&O's Summary  General: Well nourished, no acute distress, alert and oriented x 3, nondiaphoretic.  Head: normocephalic, no trauma  Neck: +JVD, no bruit, supple, not enlarged  CV: S1S2, no S3, regular rate, rhythm is SINUS, no murmurs.    Lungs: rales BL.  no accessory muscle use.  Abdomen: bowel sounds +, soft, nontender, nondistended, +hepatomegaly.  Extremities: no clubbing or cyanosis.  2+ pitting edema to mid-shin bilaterally  Neuro: Moves all 4 extremities, sensation intact x 4 extremities  Skin: warm and moist, normal turgor  Psych: Mood and affect are appropriate for circumstances  MSK: normal range of motion and strength x4 extremities.    TELEMETRY: NSR	      Echo:  < from: Transthoracic Echocardiogram (08.30.19 @ 16:50) >  OBSERVATIONS:  Mitral Valve: Mitral annular calcification, otherwise  normal mitralvalve. Mild mitral regurgitation.  Aortic Root: Normal aortic root.  Aortic Valve: Aortic valve leaflet morphology not well  visualized.  Left Atrium: Mildly dilated left atrium.  LA volume index =  37 cc/m2.  Left Ventricle: Normal left ventricular systolic function.  No segmental wall motion abnormalities. Mild diastolic  dysfunction (Stage I).  Right Heart: Normal right atrium. Normal right ventricular  size and function. Normal tricuspid valve. Minimal  tricuspid regurgitation. Normal pulmonic valve.  Pericardium/PleuraNormal pericardium with no pericardial  effusion.    < end of copied text >  	  LABS:	 	                          11.8   9.79  )-----------( 248      ( 12 Aug 2022 07:16 )             38.4     08-12    133<L>  |  100  |  15  ----------------------------<  236<H>  TNP   |  21<L>  |  0.99    Ca    9.1      12 Aug 2022 07:16  Magnesium, Serum: 2.3 mg/dL (09.08.19 @ 06:10)  TPro  TNP  /  Alb  4.2  /  TBili  0.8  /  DBili  x   /  AST  34<H>  /  ALT  <5<L>  /  AlkPhos  55  08-12    proBNP: Serum Pro-Brain Natriuretic Peptide: 413 pg/mL (08-12 @ 07:16)    ASSESSMENT/PLAN: 	Ms Olivo is an 82y Female known to our group thru sporadic care when she visits from [place].  Here for acute on set shortness of breath and chest pain this morning.  Having worsening edema ,bendopnea, orthopnea/PND over days to weeks.  Has hx of CAD, last stents in 2019, last coronary angio ~2020 with patent stents ([place]), historically normal LVEF with mild diastolic dysfunction. No history of arrhythmia.    cont eliquis , DAPT , statin    tolerating BB  Lasix, 40mg IV, twice daily.  Checking K, levels.  Replace K to 4-4.5 on a daily basis.  Responding well to nitroglycerin paste. Order Isosorbide dinitrate 10mg TID.  tolerating ACE      Echocardiogram pending   Ischemic workup to follow.  Will have followup at [facility] ~1-2 weeks post discharge.

## 2022-08-13 NOTE — PROVIDER CONTACT NOTE (OTHER) - SITUATION
Continuous pulse ox cord is not functioning properly on tele monitor. Multiple cords and tele boxes switched to try and monitor oxygen continuously

## 2022-08-13 NOTE — PROVIDER CONTACT NOTE (OTHER) - ASSESSMENT
Patient is on 1L NC and pulse ox is at 99% via vitals machine. Patient is asymptomatic and complains of SOB on exertion. Pt is resting comfortably in bed

## 2022-08-14 LAB
ANION GAP SERPL CALC-SCNC: 15 MMOL/L — HIGH (ref 7–14)
BUN SERPL-MCNC: 29 MG/DL — HIGH (ref 7–23)
CALCIUM SERPL-MCNC: 9.4 MG/DL — SIGNIFICANT CHANGE UP (ref 8.4–10.5)
CHLORIDE SERPL-SCNC: 99 MMOL/L — SIGNIFICANT CHANGE UP (ref 98–107)
CO2 SERPL-SCNC: 25 MMOL/L — SIGNIFICANT CHANGE UP (ref 22–31)
CREAT SERPL-MCNC: 1.25 MG/DL — SIGNIFICANT CHANGE UP (ref 0.5–1.3)
EGFR: 42 ML/MIN/1.73M2 — LOW
GLUCOSE BLDC GLUCOMTR-MCNC: 145 MG/DL — HIGH (ref 70–99)
GLUCOSE BLDC GLUCOMTR-MCNC: 167 MG/DL — HIGH (ref 70–99)
GLUCOSE BLDC GLUCOMTR-MCNC: 178 MG/DL — HIGH (ref 70–99)
GLUCOSE BLDC GLUCOMTR-MCNC: 184 MG/DL — HIGH (ref 70–99)
GLUCOSE SERPL-MCNC: 134 MG/DL — HIGH (ref 70–99)
HCT VFR BLD CALC: 37.6 % — SIGNIFICANT CHANGE UP (ref 34.5–45)
HGB BLD-MCNC: 11.6 G/DL — SIGNIFICANT CHANGE UP (ref 11.5–15.5)
MAGNESIUM SERPL-MCNC: 2 MG/DL — SIGNIFICANT CHANGE UP (ref 1.6–2.6)
MCHC RBC-ENTMCNC: 27.4 PG — SIGNIFICANT CHANGE UP (ref 27–34)
MCHC RBC-ENTMCNC: 30.9 GM/DL — LOW (ref 32–36)
MCV RBC AUTO: 88.9 FL — SIGNIFICANT CHANGE UP (ref 80–100)
NRBC # BLD: 0 /100 WBCS — SIGNIFICANT CHANGE UP
NRBC # FLD: 0 K/UL — SIGNIFICANT CHANGE UP
PHOSPHATE SERPL-MCNC: 4.6 MG/DL — HIGH (ref 2.5–4.5)
PLATELET # BLD AUTO: 242 K/UL — SIGNIFICANT CHANGE UP (ref 150–400)
POTASSIUM SERPL-MCNC: 3.6 MMOL/L — SIGNIFICANT CHANGE UP (ref 3.5–5.3)
POTASSIUM SERPL-SCNC: 3.6 MMOL/L — SIGNIFICANT CHANGE UP (ref 3.5–5.3)
RBC # BLD: 4.23 M/UL — SIGNIFICANT CHANGE UP (ref 3.8–5.2)
RBC # FLD: 13.5 % — SIGNIFICANT CHANGE UP (ref 10.3–14.5)
SODIUM SERPL-SCNC: 139 MMOL/L — SIGNIFICANT CHANGE UP (ref 135–145)
WBC # BLD: 7.33 K/UL — SIGNIFICANT CHANGE UP (ref 3.8–10.5)
WBC # FLD AUTO: 7.33 K/UL — SIGNIFICANT CHANGE UP (ref 3.8–10.5)

## 2022-08-14 PROCEDURE — 71045 X-RAY EXAM CHEST 1 VIEW: CPT | Mod: 26

## 2022-08-14 RX ORDER — NYSTATIN/TRIAMCINOLONE ACET
1 OINTMENT (GRAM) TOPICAL ONCE
Refills: 0 | Status: COMPLETED | OUTPATIENT
Start: 2022-08-14 | End: 2022-08-14

## 2022-08-14 RX ORDER — ACETAMINOPHEN 500 MG
650 TABLET ORAL ONCE
Refills: 0 | Status: COMPLETED | OUTPATIENT
Start: 2022-08-14 | End: 2022-08-14

## 2022-08-14 RX ADMIN — CARVEDILOL PHOSPHATE 3.12 MILLIGRAM(S): 80 CAPSULE, EXTENDED RELEASE ORAL at 05:57

## 2022-08-14 RX ADMIN — LISINOPRIL 2.5 MILLIGRAM(S): 2.5 TABLET ORAL at 05:57

## 2022-08-14 RX ADMIN — Medication 5 UNIT(S): at 09:21

## 2022-08-14 RX ADMIN — TICAGRELOR 60 MILLIGRAM(S): 90 TABLET ORAL at 05:57

## 2022-08-14 RX ADMIN — APIXABAN 5 MILLIGRAM(S): 2.5 TABLET, FILM COATED ORAL at 18:36

## 2022-08-14 RX ADMIN — Medication 40 MILLIGRAM(S): at 05:56

## 2022-08-14 RX ADMIN — Medication 1: at 09:22

## 2022-08-14 RX ADMIN — ISOSORBIDE DINITRATE 10 MILLIGRAM(S): 5 TABLET ORAL at 05:57

## 2022-08-14 RX ADMIN — Medication 81 MILLIGRAM(S): at 18:37

## 2022-08-14 RX ADMIN — Medication 650 MILLIGRAM(S): at 01:10

## 2022-08-14 RX ADMIN — Medication 5 UNIT(S): at 18:35

## 2022-08-14 RX ADMIN — Medication 5 UNIT(S): at 13:23

## 2022-08-14 RX ADMIN — ISOSORBIDE DINITRATE 10 MILLIGRAM(S): 5 TABLET ORAL at 21:58

## 2022-08-14 RX ADMIN — CARVEDILOL PHOSPHATE 3.12 MILLIGRAM(S): 80 CAPSULE, EXTENDED RELEASE ORAL at 18:36

## 2022-08-14 RX ADMIN — Medication 650 MILLIGRAM(S): at 03:28

## 2022-08-14 RX ADMIN — TICAGRELOR 60 MILLIGRAM(S): 90 TABLET ORAL at 18:37

## 2022-08-14 RX ADMIN — LATANOPROST 1 DROP(S): 0.05 SOLUTION/ DROPS OPHTHALMIC; TOPICAL at 21:59

## 2022-08-14 RX ADMIN — Medication 40 MILLIGRAM(S): at 18:36

## 2022-08-14 RX ADMIN — APIXABAN 5 MILLIGRAM(S): 2.5 TABLET, FILM COATED ORAL at 05:57

## 2022-08-14 RX ADMIN — Medication 1 APPLICATION(S): at 05:58

## 2022-08-14 RX ADMIN — INSULIN GLARGINE 15 UNIT(S): 100 INJECTION, SOLUTION SUBCUTANEOUS at 22:05

## 2022-08-14 RX ADMIN — ISOSORBIDE DINITRATE 10 MILLIGRAM(S): 5 TABLET ORAL at 11:00

## 2022-08-14 RX ADMIN — ATORVASTATIN CALCIUM 80 MILLIGRAM(S): 80 TABLET, FILM COATED ORAL at 21:59

## 2022-08-14 NOTE — PROGRESS NOTE ADULT - SUBJECTIVE AND OBJECTIVE BOX
Date of Service: 08-14-22 @ 12:38    Patient is a 82y old  Female who presents with a chief complaint of SOB (14 Aug 2022 09:45)      Any change in ROS:  doing ok: no sob: no cough:     MEDICATIONS  (STANDING):  apixaban 5 milliGRAM(s) Oral every 12 hours  aspirin enteric coated 81 milliGRAM(s) Oral daily  atorvastatin 80 milliGRAM(s) Oral at bedtime  carvedilol 3.125 milliGRAM(s) Oral every 12 hours  dextrose 5%. 1000 milliLiter(s) (100 mL/Hr) IV Continuous <Continuous>  dextrose 5%. 1000 milliLiter(s) (50 mL/Hr) IV Continuous <Continuous>  dextrose 50% Injectable 25 Gram(s) IV Push once  dextrose 50% Injectable 12.5 Gram(s) IV Push once  dextrose 50% Injectable 25 Gram(s) IV Push once  furosemide   Injectable 40 milliGRAM(s) IV Push two times a day  glucagon  Injectable 1 milliGRAM(s) IntraMuscular once  insulin glargine Injectable (LANTUS) 15 Unit(s) SubCutaneous at bedtime  insulin lispro (ADMELOG) corrective regimen sliding scale   SubCutaneous three times a day before meals  insulin lispro Injectable (ADMELOG) 5 Unit(s) SubCutaneous three times a day before meals  isosorbide   dinitrate Tablet (ISORDIL) 10 milliGRAM(s) Oral three times a day  latanoprost 0.005% Ophthalmic Solution 1 Drop(s) Both EYES at bedtime  lisinopril 2.5 milliGRAM(s) Oral daily  ticagrelor 60 milliGRAM(s) Oral every 12 hours    MEDICATIONS  (PRN):  dextrose Oral Gel 15 Gram(s) Oral once PRN Blood Glucose LESS THAN 70 milliGRAM(s)/deciliter    Vital Signs Last 24 Hrs  T(C): 36.7 (14 Aug 2022 05:45), Max: 37.1 (13 Aug 2022 18:00)  T(F): 98.1 (14 Aug 2022 05:45), Max: 98.7 (13 Aug 2022 18:00)  HR: 66 (14 Aug 2022 05:45) (65 - 68)  BP: 135/75 (14 Aug 2022 05:45) (119/66 - 135/75)  BP(mean): --  RR: 18 (14 Aug 2022 05:45) (15 - 18)  SpO2: 100% (14 Aug 2022 05:45) (96% - 100%)    Parameters below as of 14 Aug 2022 05:45  Patient On (Oxygen Delivery Method): room air        I&O's Summary        Physical Exam:   GENERAL: NAD, well-groomed, well-developed  HEENT: SELVIN/   Atraumatic, Normocephalic  ENMT: No tonsillar erythema, exudates, or enlargement; Moist mucous membranes, Good dentition, No lesions  NECK: Supple, No JVD, Normal thyroid  CHEST/LUNG: Clear to auscultaion  CVS: Regular rate and rhythm; No murmurs, rubs, or gallops  GI: : Soft, Nontender, Nondistended; Bowel sounds present  NERVOUS SYSTEM:  Alert & Oriented X3  EXTREMITIES:  - edema  LYMPH: No lymphadenopathy noted  SKIN: No rashes or lesions  ENDOCRINOLOGY: No Thyromegaly  PSYCH: Appropriate    Labs:  31, 27                            11.6   7.33  )-----------( 242      ( 14 Aug 2022 05:40 )             37.6                         12.1   6.29  )-----------( 233      ( 13 Aug 2022 05:40 )             40.0                         11.8   9.79  )-----------( 248      ( 12 Aug 2022 07:16 )             38.4     08-14    139  |  99  |  29<H>  ----------------------------<  134<H>  3.6   |  25  |  1.25  08-13    139  |  100  |  21  ----------------------------<  159<H>  3.9   |  24  |  1.09  08-12    136  |  100  |  15  ----------------------------<  227<H>  4.6   |  22  |  1.08  08-12    133<L>  |  100  |  15  ----------------------------<  236<H>  TNP   |  21<L>  |  0.99    Ca    9.4      14 Aug 2022 05:40  Ca    9.7      13 Aug 2022 05:40  Phos  4.6     08-14  Mg     2.00     08-14    TPro  7.9  /  Alb  4.3  /  TBili  0.8  /  DBili  x   /  AST  13  /  ALT  9   /  AlkPhos  67  08-13  TPro  7.7  /  Alb  4.1  /  TBili  0.8  /  DBili  x   /  AST  17  /  ALT  10  /  AlkPhos  64  08-12  TPro  TNP  /  Alb  4.2  /  TBili  0.8  /  DBili  x   /  AST  34<H>  /  ALT  <5<L>  /  AlkPhos  55  08-12    CAPILLARY BLOOD GLUCOSE      POCT Blood Glucose.: 167 mg/dL (14 Aug 2022 12:15)  POCT Blood Glucose.: 184 mg/dL (14 Aug 2022 08:52)  POCT Blood Glucose.: 160 mg/dL (13 Aug 2022 22:13)  POCT Blood Glucose.: 119 mg/dL (13 Aug 2022 17:39)  POCT Blood Glucose.: 148 mg/dL (13 Aug 2022 12:41)      LIVER FUNCTIONS - ( 13 Aug 2022 05:40 )  Alb: 4.3 g/dL / Pro: 7.9 g/dL / ALK PHOS: 67 U/L / ALT: 9 U/L / AST: 13 U/L / GGT: x               Serum Pro-Brain Natriuretic Peptide: 413 pg/mL (08-12 @ 07:16)      ra< from: Xray Chest 1 View- PORTABLE-Urgent (08.12.22 @ 07:25) >        INTERPRETATION:  EXAMINATION: XR CHEST URGENT    CLINICAL INDICATION: Chest Pain    TECHNIQUE: Single frontal, portable view of the chestwas obtained.    COMPARISON: Chest x-ray 9/5/2019.    FINDINGS:  Cardiomegaly with moderate pulmonary venous hypertension/pulmonary edema   and small bilateral pleural effusions.  No pneumothorax.  No acute bony abnormality. Moderate to severe left glenohumeral   arthropathy; slightly progressed when compared to 2019 study.    IMPRESSION:  Cardiomegaly with moderate pulmonary venous congestion/pulmonary edema   and small bilateral pleural effusions.    --- End of Report ---          AMOR GOTTI; Resident Radiologist  This document has been electronically signed.  LUIZ BALDWIN MD; Attending Radiologist  This document has been electronically signed. Aug 12 2022  7:44AM    < end of copied text >    RECENT CULTURES:        RESPIRATORY CULTURES:    < from: Transthoracic Echocardiogram (08.30.19 @ 16:50) >  7 cc/m2.  Left Ventricle: Normal left ventricular systolic function.  No segmental wall motion abnormalities. Mild diastolic  dysfunction (Stage I).  Right Heart: Normal right atrium. Normal right ventricular  size and function. Normal tricuspid valve. Minimal  tricuspid regurgitation. Normal pulmonic valve.  Pericardium/PleuraNormal pericardium with no pericardial  effusion.  ------------------------------------------------------------------------  CONCLUSIONS:  1. Mitral annular calcification, otherwise normal mitral  valve. Mild mitral regurgitation.  2. Mildly dilated left atrium.  LA volume index = 37 cc/m2.  3. Normal left ventricular systolic function. No segmental  wall motion abnormalities.  4. Mild diastolic dysfunction (Stage I).  5. Normal right ventricular size and function.  ------------------------------------------------------------------------  Confirmed on  8/30/2019 - 17:56:59 by Rohan Varma M.D.  ------------------------------------------------------------------------    < end of copied text >        Studies  Chest X-RAY  CT SCAN Chest   Venous Dopplers: LE:   CT Abdomen  Others

## 2022-08-14 NOTE — PROGRESS NOTE ADULT - SUBJECTIVE AND OBJECTIVE BOX
Patient is a 82y old  Female who presents with a chief complaint of SOB (14 Aug 2022 12:37)    Date of servie : 08-14-22 @ 14:46  INTERVAL HPI/OVERNIGHT EVENTS:  T(C): 36.6 (08-14-22 @ 12:43), Max: 37.1 (08-13-22 @ 18:00)  HR: 65 (08-14-22 @ 12:43) (65 - 68)  BP: 114/62 (08-14-22 @ 12:43) (114/62 - 135/75)  RR: 18 (08-14-22 @ 12:43) (15 - 18)  SpO2: 97% (08-14-22 @ 12:43) (96% - 100%)  Wt(kg): --  I&O's Summary      LABS:                        11.6   7.33  )-----------( 242      ( 14 Aug 2022 05:40 )             37.6     08-14    139  |  99  |  29<H>  ----------------------------<  134<H>  3.6   |  25  |  1.25    Ca    9.4      14 Aug 2022 05:40  Phos  4.6     08-14  Mg     2.00     08-14    TPro  7.9  /  Alb  4.3  /  TBili  0.8  /  DBili  x   /  AST  13  /  ALT  9   /  AlkPhos  67  08-13        CAPILLARY BLOOD GLUCOSE      POCT Blood Glucose.: 167 mg/dL (14 Aug 2022 12:15)  POCT Blood Glucose.: 184 mg/dL (14 Aug 2022 08:52)  POCT Blood Glucose.: 160 mg/dL (13 Aug 2022 22:13)  POCT Blood Glucose.: 119 mg/dL (13 Aug 2022 17:39)            MEDICATIONS  (STANDING):  apixaban 5 milliGRAM(s) Oral every 12 hours  aspirin enteric coated 81 milliGRAM(s) Oral daily  atorvastatin 80 milliGRAM(s) Oral at bedtime  carvedilol 3.125 milliGRAM(s) Oral every 12 hours  dextrose 5%. 1000 milliLiter(s) (100 mL/Hr) IV Continuous <Continuous>  dextrose 5%. 1000 milliLiter(s) (50 mL/Hr) IV Continuous <Continuous>  dextrose 50% Injectable 25 Gram(s) IV Push once  dextrose 50% Injectable 12.5 Gram(s) IV Push once  dextrose 50% Injectable 25 Gram(s) IV Push once  furosemide   Injectable 40 milliGRAM(s) IV Push two times a day  glucagon  Injectable 1 milliGRAM(s) IntraMuscular once  insulin glargine Injectable (LANTUS) 15 Unit(s) SubCutaneous at bedtime  insulin lispro (ADMELOG) corrective regimen sliding scale   SubCutaneous three times a day before meals  insulin lispro Injectable (ADMELOG) 5 Unit(s) SubCutaneous three times a day before meals  isosorbide   dinitrate Tablet (ISORDIL) 10 milliGRAM(s) Oral three times a day  latanoprost 0.005% Ophthalmic Solution 1 Drop(s) Both EYES at bedtime  lisinopril 2.5 milliGRAM(s) Oral daily  ticagrelor 60 milliGRAM(s) Oral every 12 hours    MEDICATIONS  (PRN):  dextrose Oral Gel 15 Gram(s) Oral once PRN Blood Glucose LESS THAN 70 milliGRAM(s)/deciliter          PHYSICAL EXAM:  GENERAL: NAD, well-groomed, well-developed  HEAD:  Atraumatic, Normocephalic  CHEST/LUNG: Clear to percussion bilaterally; No rales, rhonchi, wheezing, or rubs  HEART: Regular rate and rhythm; No murmurs, rubs, or gallops  ABDOMEN: Soft, Nontender, Nondistended; Bowel sounds present  EXTREMITIES:  2+ Peripheral Pulses, No clubbing, cyanosis, or edema  LYMPH: No lymphadenopathy noted  SKIN: No rashes or lesions    Care Discussed with Consultants/Other Providers [ ] YES  [ ] NO

## 2022-08-14 NOTE — PROGRESS NOTE ADULT - SUBJECTIVE AND OBJECTIVE BOX
pt seen and examined, no chest pain or sob          apixaban 5 milliGRAM(s) Oral every 12 hours  aspirin enteric coated 81 milliGRAM(s) Oral daily  atorvastatin 80 milliGRAM(s) Oral at bedtime  carvedilol 3.125 milliGRAM(s) Oral every 12 hours  dextrose 5%. 1000 milliLiter(s) IV Continuous <Continuous>  dextrose 5%. 1000 milliLiter(s) IV Continuous <Continuous>  dextrose 50% Injectable 25 Gram(s) IV Push once  dextrose 50% Injectable 12.5 Gram(s) IV Push once  dextrose 50% Injectable 25 Gram(s) IV Push once  dextrose Oral Gel 15 Gram(s) Oral once PRN  furosemide   Injectable 40 milliGRAM(s) IV Push two times a day  glucagon  Injectable 1 milliGRAM(s) IntraMuscular once  insulin glargine Injectable (LANTUS) 15 Unit(s) SubCutaneous at bedtime  insulin lispro (ADMELOG) corrective regimen sliding scale   SubCutaneous three times a day before meals  insulin lispro Injectable (ADMELOG) 5 Unit(s) SubCutaneous three times a day before meals  isosorbide   dinitrate Tablet (ISORDIL) 10 milliGRAM(s) Oral three times a day  latanoprost 0.005% Ophthalmic Solution 1 Drop(s) Both EYES at bedtime  lisinopril 2.5 milliGRAM(s) Oral daily  ticagrelor 60 milliGRAM(s) Oral every 12 hours                            11.6   7.33  )-----------( 242      ( 14 Aug 2022 05:40 )             37.6       Hemoglobin: 11.6 g/dL (08-14 @ 05:40)  Hemoglobin: 12.1 g/dL (08-13 @ 05:40)  Hemoglobin: 11.8 g/dL (08-12 @ 07:16)      08-14    139  |  99  |  29<H>  ----------------------------<  134<H>  3.6   |  25  |  1.25    Ca    9.4      14 Aug 2022 05:40  Phos  4.6     08-14  Mg     2.00     08-14    TPro  7.9  /  Alb  4.3  /  TBili  0.8  /  DBili  x   /  AST  13  /  ALT  9   /  AlkPhos  67  08-13    Creatinine Trend: 1.25<--, 1.09<--, 1.08<--, 0.99<--    COAGS:           T(C): 36.7 (08-14-22 @ 05:45), Max: 37.1 (08-13-22 @ 18:00)  HR: 66 (08-14-22 @ 05:45) (65 - 68)  BP: 135/75 (08-14-22 @ 05:45) (119/66 - 135/75)  RR: 18 (08-14-22 @ 05:45) (15 - 18)  SpO2: 100% (08-14-22 @ 05:45) (96% - 100%)  Wt(kg): --    I&O's Summary       General: Well nourished, no acute distress, alert and oriented x 3, nondiaphoretic.  Head: normocephalic, no trauma  Neck: +JVD, no bruit, supple, not enlarged  CV: S1S2, no S3, regular rate, rhythm is SINUS, no murmurs.    Lungs: rales BL.  no accessory muscle use.  Abdomen: bowel sounds +, soft, nontender, nondistended, +hepatomegaly.  Extremities: no clubbing or cyanosis.  2+ pitting edema to mid-shin bilaterally  Neuro: Moves all 4 extremities, sensation intact x 4 extremities  Skin: warm and moist, normal turgor  Psych: Mood and affect are appropriate for circumstances  MSK: normal range of motion and strength x4 extremities.    TELEMETRY: NSR	      Echo:  < from: Transthoracic Echocardiogram (08.30.19 @ 16:50) >  OBSERVATIONS:  Mitral Valve: Mitral annular calcification, otherwise  normal mitralvalve. Mild mitral regurgitation.  Aortic Root: Normal aortic root.  Aortic Valve: Aortic valve leaflet morphology not well  visualized.  Left Atrium: Mildly dilated left atrium.  LA volume index =  37 cc/m2.  Left Ventricle: Normal left ventricular systolic function.  No segmental wall motion abnormalities. Mild diastolic  dysfunction (Stage I).  Right Heart: Normal right atrium. Normal right ventricular  size and function. Normal tricuspid valve. Minimal  tricuspid regurgitation. Normal pulmonic valve.  Pericardium/PleuraNormal pericardium with no pericardial  effusion.        ASSESSMENT/PLAN: 	Ms Olivo is an 82y Female known to our group thru sporadic care when she visits from [place].  Here for acute on set shortness of breath and chest pain this morning.  Having worsening edema ,bendopnea, orthopnea/PND over days to weeks.  Has hx of CAD, last stents in 2019, last coronary angio ~2020 with patent stents ([place]), historically normal LVEF with mild diastolic dysfunction. No history of arrhythmia.    cont eliquis , DAPT , statin    tolerating BB  Lasix, 40mg IV, twice daily.  Checking K, levels.  Replace K to 4-4.5 on a daily basis.  Responding well to nitroglycerin paste. Order Isosorbide dinitrate 10mg TID.  tolerating ACE    Echo pending  Will have followup at [facility] ~1-2 weeks post discharge.

## 2022-08-14 NOTE — PROGRESS NOTE ADULT - ASSESSMENT
83 y/o F (resides in [place], visiting family in [place] since early July) with PMH of CAD (s/p stent placement), diastolic CHF, DM, GERD, CVA (with no residual deficits), HLD. Presents to the ED with c/o SOB with associated LE edema, orthopnea/PND. Also with associated chest pressure with radiation to L arm x1 day. Reportedly with O2 sats 77% on RA on EMS arrival, placed on CPAP en route. CXR with pulmonary edema, small b/l pl effusions. Pulmonary called to consult for SOB.

## 2022-08-15 DIAGNOSIS — I26.99 OTHER PULMONARY EMBOLISM WITHOUT ACUTE COR PULMONALE: ICD-10-CM

## 2022-08-15 LAB
ANION GAP SERPL CALC-SCNC: 14 MMOL/L — SIGNIFICANT CHANGE UP (ref 7–14)
BUN SERPL-MCNC: 35 MG/DL — HIGH (ref 7–23)
CALCIUM SERPL-MCNC: 9.4 MG/DL — SIGNIFICANT CHANGE UP (ref 8.4–10.5)
CHLORIDE SERPL-SCNC: 98 MMOL/L — SIGNIFICANT CHANGE UP (ref 98–107)
CO2 SERPL-SCNC: 23 MMOL/L — SIGNIFICANT CHANGE UP (ref 22–31)
CREAT SERPL-MCNC: 1.29 MG/DL — SIGNIFICANT CHANGE UP (ref 0.5–1.3)
EGFR: 41 ML/MIN/1.73M2 — LOW
GLUCOSE BLDC GLUCOMTR-MCNC: 143 MG/DL — HIGH (ref 70–99)
GLUCOSE BLDC GLUCOMTR-MCNC: 183 MG/DL — HIGH (ref 70–99)
GLUCOSE BLDC GLUCOMTR-MCNC: 201 MG/DL — HIGH (ref 70–99)
GLUCOSE BLDC GLUCOMTR-MCNC: 216 MG/DL — HIGH (ref 70–99)
GLUCOSE SERPL-MCNC: 175 MG/DL — HIGH (ref 70–99)
HCT VFR BLD CALC: 35.3 % — SIGNIFICANT CHANGE UP (ref 34.5–45)
HGB BLD-MCNC: 11.4 G/DL — LOW (ref 11.5–15.5)
MAGNESIUM SERPL-MCNC: 2.1 MG/DL — SIGNIFICANT CHANGE UP (ref 1.6–2.6)
MCHC RBC-ENTMCNC: 27.8 PG — SIGNIFICANT CHANGE UP (ref 27–34)
MCHC RBC-ENTMCNC: 32.3 GM/DL — SIGNIFICANT CHANGE UP (ref 32–36)
MCV RBC AUTO: 86.1 FL — SIGNIFICANT CHANGE UP (ref 80–100)
NRBC # BLD: 0 /100 WBCS — SIGNIFICANT CHANGE UP
NRBC # FLD: 0 K/UL — SIGNIFICANT CHANGE UP
PHOSPHATE SERPL-MCNC: 4.4 MG/DL — SIGNIFICANT CHANGE UP (ref 2.5–4.5)
PLATELET # BLD AUTO: 244 K/UL — SIGNIFICANT CHANGE UP (ref 150–400)
POTASSIUM SERPL-MCNC: 3.4 MMOL/L — LOW (ref 3.5–5.3)
POTASSIUM SERPL-SCNC: 3.4 MMOL/L — LOW (ref 3.5–5.3)
RBC # BLD: 4.1 M/UL — SIGNIFICANT CHANGE UP (ref 3.8–5.2)
RBC # FLD: 13.6 % — SIGNIFICANT CHANGE UP (ref 10.3–14.5)
SODIUM SERPL-SCNC: 135 MMOL/L — SIGNIFICANT CHANGE UP (ref 135–145)
WBC # BLD: 6.88 K/UL — SIGNIFICANT CHANGE UP (ref 3.8–10.5)
WBC # FLD AUTO: 6.88 K/UL — SIGNIFICANT CHANGE UP (ref 3.8–10.5)

## 2022-08-15 PROCEDURE — 93306 TTE W/DOPPLER COMPLETE: CPT | Mod: 26

## 2022-08-15 RX ORDER — POTASSIUM CHLORIDE 20 MEQ
40 PACKET (EA) ORAL EVERY 4 HOURS
Refills: 0 | Status: COMPLETED | OUTPATIENT
Start: 2022-08-15 | End: 2022-08-15

## 2022-08-15 RX ADMIN — Medication 40 MILLIGRAM(S): at 13:15

## 2022-08-15 RX ADMIN — CARVEDILOL PHOSPHATE 3.12 MILLIGRAM(S): 80 CAPSULE, EXTENDED RELEASE ORAL at 05:45

## 2022-08-15 RX ADMIN — Medication 1: at 09:23

## 2022-08-15 RX ADMIN — ISOSORBIDE DINITRATE 10 MILLIGRAM(S): 5 TABLET ORAL at 05:50

## 2022-08-15 RX ADMIN — Medication 5 UNIT(S): at 18:22

## 2022-08-15 RX ADMIN — ISOSORBIDE DINITRATE 10 MILLIGRAM(S): 5 TABLET ORAL at 13:15

## 2022-08-15 RX ADMIN — Medication 40 MILLIGRAM(S): at 05:44

## 2022-08-15 RX ADMIN — Medication 5 UNIT(S): at 09:23

## 2022-08-15 RX ADMIN — INSULIN GLARGINE 15 UNIT(S): 100 INJECTION, SOLUTION SUBCUTANEOUS at 23:00

## 2022-08-15 RX ADMIN — CARVEDILOL PHOSPHATE 3.12 MILLIGRAM(S): 80 CAPSULE, EXTENDED RELEASE ORAL at 17:04

## 2022-08-15 RX ADMIN — Medication 40 MILLIEQUIVALENT(S): at 09:24

## 2022-08-15 RX ADMIN — TICAGRELOR 60 MILLIGRAM(S): 90 TABLET ORAL at 05:45

## 2022-08-15 RX ADMIN — APIXABAN 5 MILLIGRAM(S): 2.5 TABLET, FILM COATED ORAL at 17:04

## 2022-08-15 RX ADMIN — ATORVASTATIN CALCIUM 80 MILLIGRAM(S): 80 TABLET, FILM COATED ORAL at 21:17

## 2022-08-15 RX ADMIN — Medication 2: at 13:14

## 2022-08-15 RX ADMIN — Medication 81 MILLIGRAM(S): at 13:15

## 2022-08-15 RX ADMIN — LISINOPRIL 2.5 MILLIGRAM(S): 2.5 TABLET ORAL at 05:46

## 2022-08-15 RX ADMIN — LATANOPROST 1 DROP(S): 0.05 SOLUTION/ DROPS OPHTHALMIC; TOPICAL at 21:18

## 2022-08-15 RX ADMIN — ISOSORBIDE DINITRATE 10 MILLIGRAM(S): 5 TABLET ORAL at 17:05

## 2022-08-15 RX ADMIN — Medication 40 MILLIEQUIVALENT(S): at 13:13

## 2022-08-15 RX ADMIN — Medication 5 UNIT(S): at 13:13

## 2022-08-15 RX ADMIN — APIXABAN 5 MILLIGRAM(S): 2.5 TABLET, FILM COATED ORAL at 05:44

## 2022-08-15 NOTE — CHART NOTE - NSCHARTNOTEFT_GEN_A_CORE
Discussed with patients son KARL Olivo ( phone number in provider note from ED)   As per son patient was started on Eliquis in October of 2021 . Placed on eliquis for a " blood clot " in her lungs following COVID .   For CAD patient had coronary stent 2019 and was on Brilinta for 1 year. At the end of 2020 Brilinta was discontinued following episode of diverticulitis .     Above discussed with cardiology . Will discontinue Brilinta at this time .   Continue eliquis for hx of PE and Aspirin .     PCP in [place] : Chidi Leon  endocrinologist : Lesa Moser   Cardiology : Dr. Peña in [facility]  [phone] Discussed with patients son KARL Olivo ( phone number in provider note from ED)   As per son patient was started on Eliquis in October of 2021 . Placed on eliquis for a " blood clot " in her lungs following COVID .   For CAD patient had coronary stent 2019 and was on Brilinta for 1 year. At the end of 2020 Brilinta was discontinued following episode of diverticulitis .     Above discussed with cardiology . Will discontinue Brilinta at this time .   Continue eliquis for hx of PE and Aspirin .     PCP in [place] : Chidi Leon  endocrinologist : Lesa Moser   Cardiology : Dr. Peña in [facility]  [phone]    Addendum 5:15pm   : fluid restriction , daily weight, intake and outpt added Pt is at this time refusing to remove upper denture which she states is securely glued. Anesthesiologist will be notified.

## 2022-08-15 NOTE — PROGRESS NOTE ADULT - SUBJECTIVE AND OBJECTIVE BOX
Patient is a 82y old  Female who presents with a chief complaint of SOB (15 Aug 2022 13:06)    Date of servie : 08-15-22 @ 13:26  INTERVAL HPI/OVERNIGHT EVENTS:  T(C): 36.9 (08-15-22 @ 13:05), Max: 36.9 (08-15-22 @ 13:05)  HR: 72 (08-15-22 @ 13:05) (69 - 72)  BP: 121/77 (08-15-22 @ 13:05) (121/77 - 145/83)  RR: 18 (08-15-22 @ 13:05) (17 - 18)  SpO2: 100% (08-15-22 @ 13:05) (98% - 100%)  Wt(kg): --  I&O's Summary    14 Aug 2022 07:01  -  15 Aug 2022 07:00  --------------------------------------------------------  IN: 300 mL / OUT: 1100 mL / NET: -800 mL        LABS:                        11.4   6.88  )-----------( 244      ( 15 Aug 2022 07:54 )             35.3     08-15    135  |  98  |  35<H>  ----------------------------<  175<H>  3.4<L>   |  23  |  1.29    Ca    9.4      15 Aug 2022 07:54  Phos  4.4     08-15  Mg     2.10     08-15          CAPILLARY BLOOD GLUCOSE      POCT Blood Glucose.: 216 mg/dL (15 Aug 2022 12:23)  POCT Blood Glucose.: 183 mg/dL (15 Aug 2022 09:16)  POCT Blood Glucose.: 178 mg/dL (14 Aug 2022 22:01)  POCT Blood Glucose.: 145 mg/dL (14 Aug 2022 17:46)            MEDICATIONS  (STANDING):  apixaban 5 milliGRAM(s) Oral every 12 hours  aspirin enteric coated 81 milliGRAM(s) Oral daily  atorvastatin 80 milliGRAM(s) Oral at bedtime  carvedilol 3.125 milliGRAM(s) Oral every 12 hours  dextrose 5%. 1000 milliLiter(s) (100 mL/Hr) IV Continuous <Continuous>  dextrose 5%. 1000 milliLiter(s) (50 mL/Hr) IV Continuous <Continuous>  dextrose 50% Injectable 25 Gram(s) IV Push once  dextrose 50% Injectable 12.5 Gram(s) IV Push once  dextrose 50% Injectable 25 Gram(s) IV Push once  furosemide   Injectable 40 milliGRAM(s) IV Push two times a day  glucagon  Injectable 1 milliGRAM(s) IntraMuscular once  insulin glargine Injectable (LANTUS) 15 Unit(s) SubCutaneous at bedtime  insulin lispro (ADMELOG) corrective regimen sliding scale   SubCutaneous three times a day before meals  insulin lispro Injectable (ADMELOG) 5 Unit(s) SubCutaneous three times a day before meals  isosorbide   dinitrate Tablet (ISORDIL) 10 milliGRAM(s) Oral three times a day  latanoprost 0.005% Ophthalmic Solution 1 Drop(s) Both EYES at bedtime  lisinopril 2.5 milliGRAM(s) Oral daily  ticagrelor 60 milliGRAM(s) Oral every 12 hours    MEDICATIONS  (PRN):  dextrose Oral Gel 15 Gram(s) Oral once PRN Blood Glucose LESS THAN 70 milliGRAM(s)/deciliter          PHYSICAL EXAM:  GENERAL: NAD, well-groomed, well-developed  HEAD:  Atraumatic, Normocephalic  CHEST/LUNG: Clear to percussion bilaterally; No rales, rhonchi, wheezing, or rubs  HEART: Regular rate and rhythm; No murmurs, rubs, or gallops  ABDOMEN: Soft, Nontender, Nondistended; Bowel sounds present  EXTREMITIES:  2+ Peripheral Pulses, No clubbing, cyanosis, or edema  LYMPH: No lymphadenopathy noted  SKIN: No rashes or lesions    Care Discussed with Consultants/Other Providers [ ] YES  [ ] NO

## 2022-08-15 NOTE — PROGRESS NOTE ADULT - PROBLEM SELECTOR PLAN 1
she is on elliquis since oct 2021 : she had covid at that time:  currently sheis on full dose:   she will follow with her pulmonologist in her on [facility]

## 2022-08-15 NOTE — PROGRESS NOTE ADULT - SUBJECTIVE AND OBJECTIVE BOX
Date of service 8/15/22    pt seen and examined, no chest pain or sob     Review of Systems:   Constitutional: [ ] fevers, [ ] chills.   Skin: [ ] dry skin. [ ] rashes.  Psychiatric: [ ] depression, [ ] anxiety.   Gastrointestinal: [ ] BRBPR, [ ] melena.   Neurological: [ ] confusion. [ ] seizures. [ ] shuffling gait.   Ears,Nose,Mouth and Throat: [ ] ear pain [ ] sore throat.   Eyes: [ ] diplopia.   Respiratory: [ ] hemoptysis. [ ] shortness of breath  Cardiovascular: See HPI above  Hematologic/Lymphatic: [ ] anemia. [ ] painful nodes. [ ] prolonged bleeding.   Genitourinary: [ ] hematuria. [ ] flank pain.   Endocrine: [ ] significant change in weight. [ ] intolerance to heat and cold.     Review of systems [ x] otherwise negative, [ ] otherwise unable to obtain    FH: no family history of sudden cardiac death in first degree relatives    SH: [ ] tobacco, [ ] alcohol, [ ] drugs    apixaban 5 milliGRAM(s) Oral every 12 hours  aspirin enteric coated 81 milliGRAM(s) Oral daily  atorvastatin 80 milliGRAM(s) Oral at bedtime  carvedilol 3.125 milliGRAM(s) Oral every 12 hours  dextrose 5%. 1000 milliLiter(s) IV Continuous <Continuous>  dextrose 5%. 1000 milliLiter(s) IV Continuous <Continuous>  dextrose 50% Injectable 25 Gram(s) IV Push once  dextrose 50% Injectable 12.5 Gram(s) IV Push once  dextrose 50% Injectable 25 Gram(s) IV Push once  dextrose Oral Gel 15 Gram(s) Oral once PRN  furosemide   Injectable 40 milliGRAM(s) IV Push two times a day  glucagon  Injectable 1 milliGRAM(s) IntraMuscular once  insulin glargine Injectable (LANTUS) 15 Unit(s) SubCutaneous at bedtime  insulin lispro (ADMELOG) corrective regimen sliding scale   SubCutaneous three times a day before meals  insulin lispro Injectable (ADMELOG) 5 Unit(s) SubCutaneous three times a day before meals  isosorbide   dinitrate Tablet (ISORDIL) 10 milliGRAM(s) Oral three times a day  latanoprost 0.005% Ophthalmic Solution 1 Drop(s) Both EYES at bedtime  lisinopril 2.5 milliGRAM(s) Oral daily  ticagrelor 60 milliGRAM(s) Oral every 12 hours                            11.4   6.88  )-----------( 244      ( 15 Aug 2022 07:54 )             35.3     135  |  98  |  35<H>  ----------------------------<  175<H>  3.4<L>   |  23  |  1.29    Ca    9.4      15 Aug 2022 07:54  Phos  4.4     08-15  Mg     2.10     08-15      T(C): 36.9 (08-15-22 @ 13:05), Max: 36.9 (08-15-22 @ 13:05)  HR: 72 (08-15-22 @ 13:05) (69 - 72)  BP: 121/77 (08-15-22 @ 13:05) (121/77 - 145/83)  RR: 18 (08-15-22 @ 13:05) (17 - 18)  SpO2: 100% (08-15-22 @ 13:05) (98% - 100%)  Wt(kg): --    General: Well nourished in no acute distress. Alert and Oriented * 3.   Head: Normocephalic and atraumatic.   Neck: No JVD. No bruits. Supple. Does not appear to be enlarged.   Cardiovascular: + S1,S2 ; RRR Soft systolic murmur at the left lower sternal border. No rubs noted.    Lungs: CTA b/l. No rhonchi, rales or wheezes.   Abdomen: + BS, soft. Non tender. Non distended. No rebound. No guarding.   Extremities: No clubbing/cyanosis/edema.   Neurologic: Moves all four extremities. Full range of motion.   Skin: Warm and moist. The patient's skin has normal elasticity and good skin turgor.   Psychiatric: Appropriate mood and affect.  Musculoskeletal: Normal range of motion, normal strength    DATA    TELEMETRY: NSR	    < from: Transthoracic Echocardiogram (08.30.19 @ 16:50) >  OBSERVATIONS:  Mitral Valve: Mitral annular calcification, otherwise  normal mitralvalve. Mild mitral regurgitation.  Aortic Root: Normal aortic root.  Aortic Valve: Aortic valve leaflet morphology not well  visualized.  Left Atrium: Mildly dilated left atrium.  LA volume index =  37 cc/m2.  Left Ventricle: Normal left ventricular systolic function.  No segmental wall motion abnormalities. Mild diastolic  dysfunction (Stage I).  Right Heart: Normal right atrium. Normal right ventricular  size and function. Normal tricuspid valve. Minimal  tricuspid regurgitation. Normal pulmonic valve.  Pericardium/PleuraNormal pericardium with no pericardial  effusion.    < from: TTE with Doppler (w/Cont) (08.15.22 @ 08:22) >  ------------------------------------------------------------------------  CONCLUSIONS:  1. Mildly dilated left atrium.  LA volume index = 38 cc/m2.  2. Mild segmental left ventricular systolic dysfunction.  The mid and apical anteroseptal and apical inferior walls  are hypokinetic.  3. Reversal of the E-A  waves of the mitral inflow pattern  is consistent with diastolic LV dysfunction.  4. Normal right ventricular size and function.  *** Compared with echocardiogram of 8/30/2019, there are  new wall motion abnormalities.  ------------------------------------------------------------------------  Confirmed on  8/15/2022 - 09:50:41 by Camden Olmos M.D.  ------------------------------------------------------------------------    < end of copied text >      < from: Cardiac Cath Lab - Adult (09.03.19 @ 14:03) >  VENTRICLES: No left ventriculogram was performed.  CORONARY VESSELS: The coronary circulation is right dominant.  LM:   --  LM: Angiography showed mild atherosclerosis with no flow limiting  lesions.  LAD:   --  Proximal LAD: There was a diffuse 10 % stenosis at the site of a  prior stent. The lesion was eccentric. There was NGUYEN grade 3 flow through  the vessel (brisk flow).  --  Mid LAD: There was a diffuse 20 % stenosis at the site of a prior  stent. The lesion was eccentric. There was NGUYEN grade 3 flow through the  vessel (brisk flow).  --  D1: There was a tubular 30 % stenosis at the site of a prior stent. The  lesion was smoothly contoured. There was NGUYEN grade 3 flow through the  vessel (brisk flow).  CX:   --  Circumflex: Angiography showed mild atherosclerosis with no flow  limiting lesions.  RI:   --  Ramus intermedius: There was a diffuse 30 % stenosis at the site  of a prior stent. The lesion was eccentric. There was NGUYEN grade 3 flow  through the vessel (brisk flow).  RCA:   --  Mid RCA: There was a tubular 20 % stenosis at a site with no  prior intervention. The lesion was eccentric. There was NGUYEN grade 3 flow  through the vessel (brisk flow).  --  RPDA: Angiography showed mild atherosclerosis with no flow limiting  lesions.  --  RPLS: Angiography showed mild atherosclerosis with no flow limiting  lesions.  COMPLICATIONS: There were no complications.  DIAGNOSTIC IMPRESSIONS: Patent stents LAD, D-1 and Ramus  No significant obstructive CAD  DIAGNOSTIC RECOMMENDATIONS: Medical therapy  INTERVENTIONAL IMPRESSIONS: Patent stents LAD, D-1 and Ramus  No significant obstructive CAD  INTERVENTIONAL RECOMMENDATIONS: Medical therapy  Prepared and signed by  Nia Izaguirre M.D.  Signed 09/03/2019 16:11:54    < end of copied text >        ASSESSMENT/PLAN: 	Ms Olivo is an 82y Female known to our group thru sporadic care when she visits from [place], with PMH CAD, remote PCI 2006, last cath here in 2019 as above with patent stents, last coronary angio ~2020 with patent stents ([place]) admitted with acute onset shortness of breath c/w acute CHF and chest pain.    --CHF compensated, on IV lasix  --Pt on DAPT, though reports no recent PCI  --unclear why she is also on ELiquis  --Obtaining OP records or collateral from HCP would be helpful  --Echo with new wall motion abnormalities  --will d/w interventional cardiologist regarding invasive cardiac work up pending above

## 2022-08-15 NOTE — PROGRESS NOTE ADULT - SUBJECTIVE AND OBJECTIVE BOX
Date of Service: 08-15-22 @ 13:06    Patient is a 82y old  Female who presents with a chief complaint of SOB (14 Aug 2022 14:46)      Any change in ROS: she is doing ok: no sob:  on room air:       MEDICATIONS  (STANDING):  apixaban 5 milliGRAM(s) Oral every 12 hours  aspirin enteric coated 81 milliGRAM(s) Oral daily  atorvastatin 80 milliGRAM(s) Oral at bedtime  carvedilol 3.125 milliGRAM(s) Oral every 12 hours  dextrose 5%. 1000 milliLiter(s) (100 mL/Hr) IV Continuous <Continuous>  dextrose 5%. 1000 milliLiter(s) (50 mL/Hr) IV Continuous <Continuous>  dextrose 50% Injectable 25 Gram(s) IV Push once  dextrose 50% Injectable 12.5 Gram(s) IV Push once  dextrose 50% Injectable 25 Gram(s) IV Push once  furosemide   Injectable 40 milliGRAM(s) IV Push two times a day  glucagon  Injectable 1 milliGRAM(s) IntraMuscular once  insulin glargine Injectable (LANTUS) 15 Unit(s) SubCutaneous at bedtime  insulin lispro (ADMELOG) corrective regimen sliding scale   SubCutaneous three times a day before meals  insulin lispro Injectable (ADMELOG) 5 Unit(s) SubCutaneous three times a day before meals  isosorbide   dinitrate Tablet (ISORDIL) 10 milliGRAM(s) Oral three times a day  latanoprost 0.005% Ophthalmic Solution 1 Drop(s) Both EYES at bedtime  lisinopril 2.5 milliGRAM(s) Oral daily  potassium chloride    Tablet ER 40 milliEquivalent(s) Oral every 4 hours  ticagrelor 60 milliGRAM(s) Oral every 12 hours    MEDICATIONS  (PRN):  dextrose Oral Gel 15 Gram(s) Oral once PRN Blood Glucose LESS THAN 70 milliGRAM(s)/deciliter    Vital Signs Last 24 Hrs  T(C): 36.8 (15 Aug 2022 05:30), Max: 36.8 (15 Aug 2022 05:30)  T(F): 98.2 (15 Aug 2022 05:30), Max: 98.2 (15 Aug 2022 05:30)  HR: 72 (15 Aug 2022 05:30) (69 - 72)  BP: 132/76 (15 Aug 2022 05:30) (132/76 - 145/83)  BP(mean): --  RR: 17 (15 Aug 2022 05:30) (17 - 18)  SpO2: 99% (15 Aug 2022 05:30) (98% - 99%)    Parameters below as of 15 Aug 2022 05:30  Patient On (Oxygen Delivery Method): room air        I&O's Summary    14 Aug 2022 07:01  -  15 Aug 2022 07:00  --------------------------------------------------------  IN: 300 mL / OUT: 1100 mL / NET: -800 mL          Physical Exam:   GENERAL: NAD, well-groomed, well-developed  HEENT: SELVIN/   Atraumatic, Normocephalic  ENMT: No tonsillar erythema, exudates, or enlargement; Moist mucous membranes, Good dentition, No lesions  NECK: Supple, No JVD, Normal thyroid  CHEST/LUNG: Clear to auscultaion  CVS: Regular rate and rhythm; No murmurs, rubs, or gallops  GI: : Soft, Nontender, Nondistended; Bowel sounds present  NERVOUS SYSTEM:  Alert & Oriented X3  EXTREMITIES:  2- edema  LYMPH: No lymphadenopathy noted  SKIN: No rashes or lesions  ENDOCRINOLOGY: No Thyromegaly  PSYCH: Appropriate    Labs:  31, 27                            11.4   6.88  )-----------( 244      ( 15 Aug 2022 07:54 )             35.3                         11.6   7.33  )-----------( 242      ( 14 Aug 2022 05:40 )             37.6                         12.1   6.29  )-----------( 233      ( 13 Aug 2022 05:40 )             40.0                         11.8   9.79  )-----------( 248      ( 12 Aug 2022 07:16 )             38.4     08-15    135  |  98  |  35<H>  ----------------------------<  175<H>  3.4<L>   |  23  |  1.29  08-14    139  |  99  |  29<H>  ----------------------------<  134<H>  3.6   |  25  |  1.25  08-13    139  |  100  |  21  ----------------------------<  159<H>  3.9   |  24  |  1.09  08-12    136  |  100  |  15  ----------------------------<  227<H>  4.6   |  22  |  1.08  08-12    133<L>  |  100  |  15  ----------------------------<  236<H>  TNP   |  21<L>  |  0.99    Ca    9.4      15 Aug 2022 07:54  Ca    9.4      14 Aug 2022 05:40  Phos  4.4     08-15  Phos  4.6     08-14  Mg     2.10     08-15  Mg     2.00     08-14    TPro  7.9  /  Alb  4.3  /  TBili  0.8  /  DBili  x   /  AST  13  /  ALT  9   /  AlkPhos  67  08-13  TPro  7.7  /  Alb  4.1  /  TBili  0.8  /  DBili  x   /  AST  17  /  ALT  10  /  AlkPhos  64  08-12  TPro  TNP  /  Alb  4.2  /  TBili  0.8  /  DBili  x   /  AST  34<H>  /  ALT  <5<L>  /  AlkPhos  55  08-12    CAPILLARY BLOOD GLUCOSE      POCT Blood Glucose.: 216 mg/dL (15 Aug 2022 12:23)  POCT Blood Glucose.: 183 mg/dL (15 Aug 2022 09:16)  POCT Blood Glucose.: 178 mg/dL (14 Aug 2022 22:01)  POCT Blood Glucose.: 145 mg/dL (14 Aug 2022 17:46)        rad< from: Xray Chest 1 View- PORTABLE-Urgent (08.12.22 @ 07:25) >  TECHNIQUE: Single frontal, portable view of the chestwas obtained.    COMPARISON: Chest x-ray 9/5/2019.    FINDINGS:  Cardiomegaly with moderate pulmonary venous hypertension/pulmonary edema   and small bilateral pleural effusions.  No pneumothorax.  No acute bony abnormality. Moderate to severe left glenohumeral   arthropathy; slightly progressed when compared to 2019 study.    IMPRESSION:  Cardiomegaly with moderate pulmonary venous congestion/pulmonary edema   and small bilateral pleural effusions.    --- End of Report ---          AMOR GOTTI; Resident Radiologist  This document has been electronically signed.  LUIZ BALDWIN MD; Attending Radiologist  This document has been electronically signed. Aug 12 2022  7:44AM    < end of copied text >            RECENT CULTURES:        RESPIRATORY CULTURES:          Studies  Chest X-RAY  CT SCAN Chest   Venous Dopplers: LE:   CT Abdomen  Others  < from: TTE with Doppler (w/Cont) (08.15.22 @ 08:22) >  E-A  waves of the mitral inflow pattern is consistent with  diastolic LV dysfunction.  Right Heart: Normal right atrium. Normal right ventricular  size and function. Normal tricuspid valve. Normal pulmonic  valve.  Pericardium/PleuraNormal pericardium with no pericardial  effusion.  ------------------------------------------------------------------------  CONCLUSIONS:  1. Mildly dilated left atrium.  LA volume index = 38 cc/m2.  2. Mild segmental left ventricular systolic dysfunction.  The mid and apical anteroseptal and apical inferior walls  are hypokinetic.  3. Reversal of the E-A  waves of the mitral inflow pattern  is consistent with diastolic LV dysfunction.  4. Normal right ventricular size and function.  *** Compared with echocardiogram of 8/30/2019, there are  new wall motion abnormalities.  ------------------------------------------------------------------------  Confirmed on  8/15/2022 - 09:50:41 by Camden Olmos M.D.  ------------------------------------------------------------------------    < end of copied text >

## 2022-08-15 NOTE — PROGRESS NOTE ADULT - ASSESSMENT
82yoF w/Hx of CAD w/2 stents on eliquis, DM, HTN, HLD, CHF on lasix 40mg PO, BIBEMS w/SOB. Pt woke up at 4am feeling SOB, upon EMS arrival was satting 77% on RA, improved to 100% on NRB however pt had inc. WOB so she was put on CPAP en route. On arrival to ED pt w/SOB and chest pain radiating into L shoulder and leg swelling, otherwise no complaints. She denies HA, palpitations, abd pain, n/v/d/c, fevers/chills, dysuria/hematuria, hematochezia/melena, numbness/tingling, focal weakness.     Acute respiratory failure with hypoxia and hypercapnia.   - sec to acute on chronic CHF exacerbation  - telemonitor  - sp BIPAP  - diuresis as per cards   - I and O      CAD  - sp PCI  - cw asa, brillinta  - cards fu     DM  - monitor FS  - basal, bolus     HTN  - cw home meds

## 2022-08-15 NOTE — PROGRESS NOTE ADULT - ASSESSMENT
83 y/o F (resides in [place], visiting family in [place] since early July) with PMH of CAD (s/p stent placement), diastolic CHF, DM, GERD, CVA (with no residual deficits), HLD. Presents to the ED with c/o SOB with associated LE edema, orthopnea/PND. Also with associated chest pressure with radiation to L arm x1 day. Reportedly with O2 sats 77% on RA on EMS arrival, placed on CPAP en route. CXR with pulmonary edema, small b/l pl effusions. Pulmonary called to consult for SOB.

## 2022-08-16 LAB
ANION GAP SERPL CALC-SCNC: 14 MMOL/L — SIGNIFICANT CHANGE UP (ref 7–14)
APTT BLD: 168.5 SEC — SIGNIFICANT CHANGE UP (ref 27–36.3)
APTT BLD: 43.4 SEC — HIGH (ref 27–36.3)
BUN SERPL-MCNC: 40 MG/DL — HIGH (ref 7–23)
CALCIUM SERPL-MCNC: 9.6 MG/DL — SIGNIFICANT CHANGE UP (ref 8.4–10.5)
CHLORIDE SERPL-SCNC: 101 MMOL/L — SIGNIFICANT CHANGE UP (ref 98–107)
CO2 SERPL-SCNC: 23 MMOL/L — SIGNIFICANT CHANGE UP (ref 22–31)
CREAT SERPL-MCNC: 1.35 MG/DL — HIGH (ref 0.5–1.3)
EGFR: 39 ML/MIN/1.73M2 — LOW
GLUCOSE BLDC GLUCOMTR-MCNC: 146 MG/DL — HIGH (ref 70–99)
GLUCOSE BLDC GLUCOMTR-MCNC: 164 MG/DL — HIGH (ref 70–99)
GLUCOSE BLDC GLUCOMTR-MCNC: 221 MG/DL — HIGH (ref 70–99)
GLUCOSE BLDC GLUCOMTR-MCNC: 235 MG/DL — HIGH (ref 70–99)
GLUCOSE SERPL-MCNC: 205 MG/DL — HIGH (ref 70–99)
HCT VFR BLD CALC: 36.3 % — SIGNIFICANT CHANGE UP (ref 34.5–45)
HCT VFR BLD CALC: 38 % — SIGNIFICANT CHANGE UP (ref 34.5–45)
HGB BLD-MCNC: 11.2 G/DL — LOW (ref 11.5–15.5)
HGB BLD-MCNC: 11.5 G/DL — SIGNIFICANT CHANGE UP (ref 11.5–15.5)
MAGNESIUM SERPL-MCNC: 2.3 MG/DL — SIGNIFICANT CHANGE UP (ref 1.6–2.6)
MCHC RBC-ENTMCNC: 27.2 PG — SIGNIFICANT CHANGE UP (ref 27–34)
MCHC RBC-ENTMCNC: 27.3 PG — SIGNIFICANT CHANGE UP (ref 27–34)
MCHC RBC-ENTMCNC: 30.3 GM/DL — LOW (ref 32–36)
MCHC RBC-ENTMCNC: 30.9 GM/DL — LOW (ref 32–36)
MCV RBC AUTO: 88.3 FL — SIGNIFICANT CHANGE UP (ref 80–100)
MCV RBC AUTO: 89.8 FL — SIGNIFICANT CHANGE UP (ref 80–100)
NRBC # BLD: 0 /100 WBCS — SIGNIFICANT CHANGE UP
NRBC # BLD: 0 /100 WBCS — SIGNIFICANT CHANGE UP (ref 0–0)
NRBC # FLD: 0 K/UL — SIGNIFICANT CHANGE UP
NRBC # FLD: 0 K/UL — SIGNIFICANT CHANGE UP (ref 0–0)
PHOSPHATE SERPL-MCNC: 4.4 MG/DL — SIGNIFICANT CHANGE UP (ref 2.5–4.5)
PLATELET # BLD AUTO: 230 K/UL — SIGNIFICANT CHANGE UP (ref 150–400)
PLATELET # BLD AUTO: 251 K/UL — SIGNIFICANT CHANGE UP (ref 150–400)
POTASSIUM SERPL-MCNC: 4.2 MMOL/L — SIGNIFICANT CHANGE UP (ref 3.5–5.3)
POTASSIUM SERPL-SCNC: 4.2 MMOL/L — SIGNIFICANT CHANGE UP (ref 3.5–5.3)
RBC # BLD: 4.11 M/UL — SIGNIFICANT CHANGE UP (ref 3.8–5.2)
RBC # BLD: 4.23 M/UL — SIGNIFICANT CHANGE UP (ref 3.8–5.2)
RBC # FLD: 13.9 % — SIGNIFICANT CHANGE UP (ref 10.3–14.5)
RBC # FLD: 13.9 % — SIGNIFICANT CHANGE UP (ref 10.3–14.5)
SODIUM SERPL-SCNC: 138 MMOL/L — SIGNIFICANT CHANGE UP (ref 135–145)
WBC # BLD: 7.27 K/UL — SIGNIFICANT CHANGE UP (ref 3.8–10.5)
WBC # BLD: 8.34 K/UL — SIGNIFICANT CHANGE UP (ref 3.8–10.5)
WBC # FLD AUTO: 7.27 K/UL — SIGNIFICANT CHANGE UP (ref 3.8–10.5)
WBC # FLD AUTO: 8.34 K/UL — SIGNIFICANT CHANGE UP (ref 3.8–10.5)

## 2022-08-16 RX ORDER — FUROSEMIDE 40 MG
40 TABLET ORAL DAILY
Refills: 0 | Status: DISCONTINUED | OUTPATIENT
Start: 2022-08-18 | End: 2022-08-19

## 2022-08-16 RX ORDER — HEPARIN SODIUM 5000 [USP'U]/ML
INJECTION INTRAVENOUS; SUBCUTANEOUS
Qty: 25000 | Refills: 0 | Status: DISCONTINUED | OUTPATIENT
Start: 2022-08-16 | End: 2022-08-18

## 2022-08-16 RX ADMIN — Medication 81 MILLIGRAM(S): at 11:35

## 2022-08-16 RX ADMIN — CARVEDILOL PHOSPHATE 3.12 MILLIGRAM(S): 80 CAPSULE, EXTENDED RELEASE ORAL at 17:17

## 2022-08-16 RX ADMIN — ATORVASTATIN CALCIUM 80 MILLIGRAM(S): 80 TABLET, FILM COATED ORAL at 21:09

## 2022-08-16 RX ADMIN — HEPARIN SODIUM 1500 UNIT(S)/HR: 5000 INJECTION INTRAVENOUS; SUBCUTANEOUS at 14:41

## 2022-08-16 RX ADMIN — LATANOPROST 1 DROP(S): 0.05 SOLUTION/ DROPS OPHTHALMIC; TOPICAL at 21:09

## 2022-08-16 RX ADMIN — ISOSORBIDE DINITRATE 10 MILLIGRAM(S): 5 TABLET ORAL at 05:27

## 2022-08-16 RX ADMIN — HEPARIN SODIUM 1500 UNIT(S)/HR: 5000 INJECTION INTRAVENOUS; SUBCUTANEOUS at 19:24

## 2022-08-16 RX ADMIN — Medication 40 MILLIGRAM(S): at 13:58

## 2022-08-16 RX ADMIN — Medication 40 MILLIGRAM(S): at 05:26

## 2022-08-16 RX ADMIN — CARVEDILOL PHOSPHATE 3.12 MILLIGRAM(S): 80 CAPSULE, EXTENDED RELEASE ORAL at 05:26

## 2022-08-16 RX ADMIN — ISOSORBIDE DINITRATE 10 MILLIGRAM(S): 5 TABLET ORAL at 17:16

## 2022-08-16 RX ADMIN — INSULIN GLARGINE 15 UNIT(S): 100 INJECTION, SOLUTION SUBCUTANEOUS at 21:54

## 2022-08-16 RX ADMIN — LISINOPRIL 2.5 MILLIGRAM(S): 2.5 TABLET ORAL at 05:26

## 2022-08-16 RX ADMIN — Medication 5 UNIT(S): at 17:53

## 2022-08-16 RX ADMIN — Medication 2: at 12:51

## 2022-08-16 RX ADMIN — Medication 5 UNIT(S): at 12:51

## 2022-08-16 RX ADMIN — HEPARIN SODIUM 1200 UNIT(S)/HR: 5000 INJECTION INTRAVENOUS; SUBCUTANEOUS at 22:58

## 2022-08-16 RX ADMIN — HEPARIN SODIUM 0 UNIT(S)/HR: 5000 INJECTION INTRAVENOUS; SUBCUTANEOUS at 21:52

## 2022-08-16 RX ADMIN — Medication 5 UNIT(S): at 08:56

## 2022-08-16 RX ADMIN — Medication 2: at 08:56

## 2022-08-16 RX ADMIN — APIXABAN 5 MILLIGRAM(S): 2.5 TABLET, FILM COATED ORAL at 05:27

## 2022-08-16 RX ADMIN — ISOSORBIDE DINITRATE 10 MILLIGRAM(S): 5 TABLET ORAL at 11:35

## 2022-08-16 NOTE — PROGRESS NOTE ADULT - PROBLEM SELECTOR PLAN 4
Per cards. TTE with mild LV systolic dysfunction, diastolic dysfunction.   -Plan for cardiac cath per cards  -Diuresis as tolerated.

## 2022-08-16 NOTE — PROGRESS NOTE ADULT - PROBLEM SELECTOR PLAN 1
Pt with hx of PE, diagnosed in 10/2021  -On Eliquis, to continue for now  -Plan to f/u with PCP, outpt pulm in [place]. Pt with hx of PE, diagnosed in 10/2021  -On Eliquis as an outpatient, heparin drip for now pending cath  -Plan to f/u with PCP, primary pulm in [place].

## 2022-08-16 NOTE — CONSULT NOTE ADULT - SUBJECTIVE AND OBJECTIVE BOX
NEPHROLOGY - NSN    Patient seen and examined.    HPI:   82yoF w/Hx of CAD w/2 stents on eliquis, DM, HTN, HLD, CHF on lasix 40mg PO, BIBEMS w/SOB. Pt woke up at 4am feeling SOB, upon EMS arrival was satting 77% on RA, improved to 100% on NRB however pt had inc. WOB so she was put on CPAP en route. On arrival to ED pt w/SOB and chest pain radiating into L shoulder and leg swelling, otherwise no complaints. She denies HA, palpitations, abd pain, n/v/d/c, fevers/chills, dysuria/hematuria, hematochezia/melena, numbness/tingling, focal weakness. (12 Aug 2022 10:13)    Echo : Mildly dilated left atrium.  LA volume index = 38 cc/m2.. Mild segmental LVD . mid and apical anteroseptal and apical inferior walls are hypokinetic. Reversal of the E-A  waves of the mitral inflow pattern is consistent with diastolic LV dysfunction. normal RV size and function  *** Compared with echocardiogram of 8/30/2019, there are new wall motion abnormalities.  Hx of PE as well p attack of COVID   Pt was getting IV lasix for appropriate diuresis and now creatinine is bumping and renal eval was called  There is no hematuria or bubbles in the urine.  No history of NSAIDS or nephrolithisis.  The patient urinates once or twice in the night and there is no incontinence.  No family hx or renal disease or back pain.    No recent abx use.  No alleviating or aggravating factors with respect to the kidneys.     PAST MEDICAL & SURGICAL HISTORY:  HTN (hypertension)      CAD (coronary artery disease)  S/P stent placemenet 2006, and reportedly 6/2019 in [place]      DM (diabetes mellitus)      GERD (gastroesophageal reflux disease)      CVA (cerebral vascular accident)  no residual deficits      HLD (hyperlipidemia)      CHF (congestive heart failure)      S/P primary angioplasty with coronary stent  x1 in 2006 at [facility]      S/P TKR (total knee replacement)  left          MEDICATIONS  (STANDING):  aspirin enteric coated 81 milliGRAM(s) Oral daily  atorvastatin 80 milliGRAM(s) Oral at bedtime  carvedilol 3.125 milliGRAM(s) Oral every 12 hours  dextrose 5%. 1000 milliLiter(s) (50 mL/Hr) IV Continuous <Continuous>  dextrose 5%. 1000 milliLiter(s) (100 mL/Hr) IV Continuous <Continuous>  dextrose 50% Injectable 25 Gram(s) IV Push once  dextrose 50% Injectable 12.5 Gram(s) IV Push once  dextrose 50% Injectable 25 Gram(s) IV Push once  furosemide   Injectable 40 milliGRAM(s) IV Push two times a day  glucagon  Injectable 1 milliGRAM(s) IntraMuscular once  heparin  Infusion.  Unit(s)/Hr (15 mL/Hr) IV Continuous <Continuous>  insulin glargine Injectable (LANTUS) 15 Unit(s) SubCutaneous at bedtime  insulin lispro (ADMELOG) corrective regimen sliding scale   SubCutaneous three times a day before meals  insulin lispro Injectable (ADMELOG) 5 Unit(s) SubCutaneous three times a day before meals  isosorbide   dinitrate Tablet (ISORDIL) 10 milliGRAM(s) Oral three times a day  latanoprost 0.005% Ophthalmic Solution 1 Drop(s) Both EYES at bedtime  lisinopril 2.5 milliGRAM(s) Oral daily      Allergies    No Known Allergies    Intolerances        SOCIAL HISTORY:  Denies alcohol abuse, drug abuse or tobacco usage.     FAMILY HISTORY:  FH: diabetes mellitus        VITALS:  T(C): 36.7 (08-16-22 @ 13:58), Max: 36.9 (08-16-22 @ 11:35)  HR: 71 (08-16-22 @ 13:58) (64 - 72)  BP: 116/69 (08-16-22 @ 13:58) (116/69 - 140/78)  RR: 18 (08-16-22 @ 13:58) (16 - 18)  SpO2: 99% (08-16-22 @ 13:58) (98% - 100%)    REVIEW OF SYSTEMS:  Denies any nausea, vomiting, diarrhea, fever or chills. Denies chest pain, SOB, focal weakness, hematuria or dysuria. Good oral intake and denies fatigue or weakness. All other pertinent systems are reviewed and are negative.    PHYSICAL EXAM:  Constitutional: NAD  HEENT: EOMI  Neck:  No JVD, supple   Respiratory: CTA B/L  Cardiovascular: S1 and S2, RRR  Gastrointestinal: + BS, soft, NT, ND  Extremities: No peripheral edema, + peripheral pulses  Neurological: A/O x 3, CN2-12 intact  Psychiatric: Normal mood, normal affect  : No Arleth  Skin: No rashes, C/D/I  Access: Not applicable    I and O's:    08-14 @ 07:01  -  08-15 @ 07:00  --------------------------------------------------------  IN: 300 mL / OUT: 1100 mL / NET: -800 mL    08-15 @ 07:01  -  08-16 @ 07:00  --------------------------------------------------------  IN: 1000 mL / OUT: 1000 mL / NET: 0 mL          LABS:                        11.2   7.27  )-----------( 230      ( 16 Aug 2022 07:31 )             36.3     08-16    138  |  101  |  40<H>  ----------------------------<  205<H>  4.2   |  23  |  1.35<H>    Ca    9.6      16 Aug 2022 07:31  Phos  4.4     08-16  Mg     2.30     08-16        URINE:      RADIOLOGY & ADDITIONAL STUDIES:     NEPHROLOGY - NSN    Patient seen and examined.    HPI:   82yoF w/Hx of CAD w/2 stents on eliquis, DM, HTN, HLD, CHF on lasix 40mg PO, BIBEMS w/SOB. Pt woke up at 4am feeling SOB, upon EMS arrival was satting 77% on RA, improved to 100% on NRB however pt had inc. WOB so she was put on CPAP en route. On arrival to ED pt w/SOB and chest pain radiating into L shoulder and leg swelling, otherwise no complaints. She denies HA, palpitations, abd pain, n/v/d/c, fevers/chills, dysuria/hematuria, hematochezia/melena, numbness/tingling, focal weakness. (12 Aug 2022 10:13)    Echo : Mildly dilated left atrium.  LA volume index = 38 cc/m2.. Mild segmental LVD . mid and apical anteroseptal and apical inferior walls are hypokinetic. Reversal of the E-A  waves of the mitral inflow pattern is consistent with diastolic LV dysfunction. normal RV size and function  *** Compared with echocardiogram of 8/30/2019, there are new wall motion abnormalities.  Hx of PE as well p attack of COVID   Pt was getting IV lasix for appropriate diuresis and now creatinine is bumping and renal eval was called  There is no hematuria or bubbles in the urine.  No history of NSAIDS or nephrolithisis.  The patient urinates once or twice in the night and there is no incontinence.  No family hx or renal disease or back pain.    No recent abx use.  No alleviating or aggravating factors with respect to the kidneys.     PAST MEDICAL & SURGICAL HISTORY:  HTN (hypertension)      CAD (coronary artery disease)  S/P stent placemenet 2006, and reportedly 6/2019 in [place]      DM (diabetes mellitus)      GERD (gastroesophageal reflux disease)      CVA (cerebral vascular accident)  no residual deficits      HLD (hyperlipidemia)      CHF (congestive heart failure)      S/P primary angioplasty with coronary stent  x1 in 2006 at [facility]      S/P TKR (total knee replacement)  left          MEDICATIONS  (STANDING):  aspirin enteric coated 81 milliGRAM(s) Oral daily  atorvastatin 80 milliGRAM(s) Oral at bedtime  carvedilol 3.125 milliGRAM(s) Oral every 12 hours  dextrose 5%. 1000 milliLiter(s) (50 mL/Hr) IV Continuous <Continuous>  dextrose 5%. 1000 milliLiter(s) (100 mL/Hr) IV Continuous <Continuous>  dextrose 50% Injectable 25 Gram(s) IV Push once  dextrose 50% Injectable 12.5 Gram(s) IV Push once  dextrose 50% Injectable 25 Gram(s) IV Push once  furosemide   Injectable 40 milliGRAM(s) IV Push two times a day  glucagon  Injectable 1 milliGRAM(s) IntraMuscular once  heparin  Infusion.  Unit(s)/Hr (15 mL/Hr) IV Continuous <Continuous>  insulin glargine Injectable (LANTUS) 15 Unit(s) SubCutaneous at bedtime  insulin lispro (ADMELOG) corrective regimen sliding scale   SubCutaneous three times a day before meals  insulin lispro Injectable (ADMELOG) 5 Unit(s) SubCutaneous three times a day before meals  isosorbide   dinitrate Tablet (ISORDIL) 10 milliGRAM(s) Oral three times a day  latanoprost 0.005% Ophthalmic Solution 1 Drop(s) Both EYES at bedtime  lisinopril 2.5 milliGRAM(s) Oral daily      Allergies    No Known Allergies    Intolerances        SOCIAL HISTORY:  Denies alcohol abuse, drug abuse or tobacco usage.     FAMILY HISTORY:  FH: diabetes mellitus        VITALS:  T(C): 36.7 (08-16-22 @ 13:58), Max: 36.9 (08-16-22 @ 11:35)  HR: 71 (08-16-22 @ 13:58) (64 - 72)  BP: 116/69 (08-16-22 @ 13:58) (116/69 - 140/78)  RR: 18 (08-16-22 @ 13:58) (16 - 18)  SpO2: 99% (08-16-22 @ 13:58) (98% - 100%)    REVIEW OF SYSTEMS:  Denies any nausea, vomiting, diarrhea, fever or chills. D  All other pertinent systems are reviewed and are negative.    PHYSICAL EXAM:  Constitutional: NAD  HEENT: EOMI  Neck:  No JVD, supple   Respiratory: CTA B/L  Cardiovascular: S1 and S2, RRR  Gastrointestinal: + BS, soft, NT, ND  Extremities: No peripheral edema, + peripheral pulses  Neurological: A/O x 3, CN2-12 intact  Psychiatric: Normal mood, normal affect  : No Reinoso  Skin: No rashes, C/D/I  Access: Not applicable    I and O's:    08-14 @ 07:01  -  08-15 @ 07:00  --------------------------------------------------------  IN: 300 mL / OUT: 1100 mL / NET: -800 mL    08-15 @ 07:01  -  08-16 @ 07:00  --------------------------------------------------------  IN: 1000 mL / OUT: 1000 mL / NET: 0 mL          LABS:                        11.2   7.27  )-----------( 230      ( 16 Aug 2022 07:31 )             36.3     08-16    138  |  101  |  40<H>  ----------------------------<  205<H>  4.2   |  23  |  1.35<H>    Ca    9.6      16 Aug 2022 07:31  Phos  4.4     08-16  Mg     2.30     08-16        URINE:      RADIOLOGY & ADDITIONAL STUDIES:    < from: Xray Chest 1 View- PORTABLE-Routine (Xray Chest 1 View- PORTABLE-Routine .) (08.14.22 @ 16:11) >    ACC: [id number] EXAM:  XR CHEST PORTABLE ROUTINE 1V                          PROCEDURE DATE:  08/14/2022          INTERPRETATION:  Chest one view    HISTORY: Pulmonary edema    COMPARISON STUDY: 8/12/2022    Frontal expiratory view of the chest showsthe heart to be similar in   size. Coronary artery stents are again noted.    The lungs show less pulmonary congestion and there is no evidence of   pneumothorax nor pleural effusion.    IMPRESSION:  Decreased congestive changes.          Thank you for the courtesy of this referral.    --- End of Report ---            JUAN DANIEL DORAN MD; Attending Interventional Radiologist  This document has been electronically signed. Aug 15 2022  1:06PM    < end of copied text >

## 2022-08-16 NOTE — PROGRESS NOTE ADULT - SUBJECTIVE AND OBJECTIVE BOX
Patient is a 82y old  Female who presents with a chief complaint of SOB (17 Aug 2022 14:19)    Date of servie : 08-16-22 @ 14:21    reviewed vitals       LABS:             reviewed             MEDICATIONS  (STANDING):  aspirin enteric coated 81 milliGRAM(s) Oral daily  atorvastatin 80 milliGRAM(s) Oral at bedtime  carvedilol 3.125 milliGRAM(s) Oral every 12 hours  dextrose 5%. 1000 milliLiter(s) (50 mL/Hr) IV Continuous <Continuous>  dextrose 5%. 1000 milliLiter(s) (100 mL/Hr) IV Continuous <Continuous>  dextrose 50% Injectable 25 Gram(s) IV Push once  dextrose 50% Injectable 12.5 Gram(s) IV Push once  dextrose 50% Injectable 25 Gram(s) IV Push once  glucagon  Injectable 1 milliGRAM(s) IntraMuscular once  heparin  Infusion.  Unit(s)/Hr (15 mL/Hr) IV Continuous <Continuous>  insulin glargine Injectable (LANTUS) 15 Unit(s) SubCutaneous at bedtime  insulin lispro (ADMELOG) corrective regimen sliding scale   SubCutaneous three times a day before meals  insulin lispro Injectable (ADMELOG) 5 Unit(s) SubCutaneous three times a day before meals  isosorbide   dinitrate Tablet (ISORDIL) 10 milliGRAM(s) Oral three times a day  latanoprost 0.005% Ophthalmic Solution 1 Drop(s) Both EYES at bedtime  lisinopril 2.5 milliGRAM(s) Oral daily    MEDICATIONS  (PRN):  dextrose Oral Gel 15 Gram(s) Oral once PRN Blood Glucose LESS THAN 70 milliGRAM(s)/deciliter          PHYSICAL EXAM:  GENERAL: frail  CHEST/LUNG: Clear to percussion bilaterally; No rales, rhonchi, wheezing, or rubs  HEART: Regular rate and rhythm; No murmurs, rubs, or gallops  ABDOMEN: Soft, Nontender, Nondistended; Bowel sounds present  EXTREMITIES:  2+ Peripheral Pulses, No clubbing, cyanosis, or edema  LYMPH: No lymphadenopathy noted  SKIN: No rashes or lesions    Care Discussed with Consultants/Other Providers [ ] YES  [ ] NO

## 2022-08-16 NOTE — CONSULT NOTE ADULT - ASSESSMENT
82yoF w/Hx of CAD w/2 stents on eliquis, DM, HTN, HLD, CHF on lasix 40mg PO, BIBEMS w/SOB. Pt woke up at 4am feeling SOB  ALLA in the setting of active diuresis.  This is alla based on AKIN criteria of a jump of creatinine of 0.3mg    82yoF w/Hx of CAD w/2 stents on eliquis, DM, HTN, HLD, CHF on lasix 40mg PO, BIBEMS w/SOB. Pt woke up at 4am feeling SOB  ALLA in the setting of active diuresis.  This is alla based on AKIN criteria of a jump of creatinine of 0.3mg    1 Renal - She got 2 doses of Lasix IV and will hold the dose and restart at PO dose on Thurs  No need for renal sono  2 CVS-No renal objection to proceed with cardiac cath to elucidate the anatomy given the drop in EF  3 Pulm- Off oxygen at present and sat are 99%    Sayed [facility]   [phone]

## 2022-08-16 NOTE — PROGRESS NOTE ADULT - SUBJECTIVE AND OBJECTIVE BOX
Date of Service: 08-16-22 @ 14:44    Patient is a 82y old  Female who presents with a chief complaint of SOB (16 Aug 2022 12:31)    Any change in ROS:   Dyspnea resolved  O2 sats 99% on RA  Denies CP    MEDICATIONS  (STANDING):  aspirin enteric coated 81 milliGRAM(s) Oral daily  atorvastatin 80 milliGRAM(s) Oral at bedtime  carvedilol 3.125 milliGRAM(s) Oral every 12 hours  dextrose 5%. 1000 milliLiter(s) (50 mL/Hr) IV Continuous <Continuous>  dextrose 5%. 1000 milliLiter(s) (100 mL/Hr) IV Continuous <Continuous>  dextrose 50% Injectable 25 Gram(s) IV Push once  dextrose 50% Injectable 12.5 Gram(s) IV Push once  dextrose 50% Injectable 25 Gram(s) IV Push once  furosemide   Injectable 40 milliGRAM(s) IV Push two times a day  glucagon  Injectable 1 milliGRAM(s) IntraMuscular once  heparin  Infusion.  Unit(s)/Hr (15 mL/Hr) IV Continuous <Continuous>  insulin glargine Injectable (LANTUS) 15 Unit(s) SubCutaneous at bedtime  insulin lispro (ADMELOG) corrective regimen sliding scale   SubCutaneous three times a day before meals  insulin lispro Injectable (ADMELOG) 5 Unit(s) SubCutaneous three times a day before meals  isosorbide   dinitrate Tablet (ISORDIL) 10 milliGRAM(s) Oral three times a day  latanoprost 0.005% Ophthalmic Solution 1 Drop(s) Both EYES at bedtime  lisinopril 2.5 milliGRAM(s) Oral daily    MEDICATIONS  (PRN):  dextrose Oral Gel 15 Gram(s) Oral once PRN Blood Glucose LESS THAN 70 milliGRAM(s)/deciliter    Vital Signs Last 24 Hrs  T(C): 36.7 (16 Aug 2022 13:58), Max: 36.9 (16 Aug 2022 11:35)  T(F): 98.1 (16 Aug 2022 13:58), Max: 98.4 (16 Aug 2022 11:35)  HR: 71 (16 Aug 2022 13:58) (64 - 72)  BP: 116/69 (16 Aug 2022 13:58) (116/69 - 140/78)  BP(mean): --  RR: 18 (16 Aug 2022 13:58) (16 - 18)  SpO2: 99% (16 Aug 2022 13:58) (98% - 100%)    Parameters below as of 16 Aug 2022 13:58  Patient On (Oxygen Delivery Method): room air        I&O's Summary    15 Aug 2022 07:01  -  16 Aug 2022 07:00  --------------------------------------------------------  IN: 1000 mL / OUT: 1000 mL / NET: 0 mL    Physical Exam:   GENERAL: NAD  HEENT: SELVIN  ENMT: No tonsillar erythema, exudates, or enlargement  NECK: Supple, No JVD  CHEST/LUNG: Clear to auscultation b/l   CVS: Regular rate and rhythm  GI: : Soft, Nontender, Nondistended  NERVOUS SYSTEM:  Alert & Oriented X3  EXTREMITIES:  2+ Peripheral Pulses, No clubbing, cyanosis, or edema  SKIN: No rashes or lesions  PSYCH: Appropriate    Labs:  31, 27                            11.2   7.27  )-----------( 230      ( 16 Aug 2022 07:31 )             36.3                         11.4   6.88  )-----------( 244      ( 15 Aug 2022 07:54 )             35.3                         11.6   7.33  )-----------( 242      ( 14 Aug 2022 05:40 )             37.6                         12.1   6.29  )-----------( 233      ( 13 Aug 2022 05:40 )             40.0     08-16    138  |  101  |  40<H>  ----------------------------<  205<H>  4.2   |  23  |  1.35<H>  08-15    135  |  98  |  35<H>  ----------------------------<  175<H>  3.4<L>   |  23  |  1.29  08-14    139  |  99  |  29<H>  ----------------------------<  134<H>  3.6   |  25  |  1.25  08-13    139  |  100  |  21  ----------------------------<  159<H>  3.9   |  24  |  1.09    Ca    9.6      16 Aug 2022 07:31  Ca    9.4      15 Aug 2022 07:54  Phos  4.4     08-16  Phos  4.4     08-15  Mg     2.30     08-16  Mg     2.10     08-15    TPro  7.9  /  Alb  4.3  /  TBili  0.8  /  DBili  x   /  AST  13  /  ALT  9   /  AlkPhos  67  08-13    CAPILLARY BLOOD GLUCOSE  POCT Blood Glucose.: 235 mg/dL (16 Aug 2022 12:22)  POCT Blood Glucose.: 221 mg/dL (16 Aug 2022 08:22)  POCT Blood Glucose.: 201 mg/dL (15 Aug 2022 22:35)  POCT Blood Glucose.: 143 mg/dL (15 Aug 2022 17:35)    PTT - ( 16 Aug 2022 13:26 )  PTT:43.4 sec    Studies  Chest X-RAY< from: Xray Chest 1 View- PORTABLE-Routine (Xray Chest 1 View- PORTABLE-Routine .) (08.14.22 @ 16:11) >    INTERPRETATION:  Chest one view    HISTORY: Pulmonary edema    COMPARISON STUDY: 8/12/2022    Frontal expiratory view of the chest showsthe heart to be similar in   size. Coronary artery stents are again noted.    The lungs show less pulmonary congestion and there is no evidence of   pneumothorax nor pleural effusion.    IMPRESSION:  Decreased congestive changes.    < end of copied text >    < from: TTE with Doppler (w/Cont) (08.15.22 @ 08:22) >  ------------------------------------------------------------------------  PROCEDURE: Transthoracic echocardiogram with 2-D, M-Mode  and complete spectral and color flow Doppler.  Intravenous ultrasound enhancing agent was administered for  improved left ventricular endocardial border definition.  Following the intravenous injection of ultrasound enhancing  agent, harmonic imaging was performed.  INDICATION: Heart failure, unspecified (I50.9)  ------------------------------------------------------------------------  OBSERVATIONS:  Mitral Valve: Normal mitral valve.  Aortic Root: Normal aortic root.  Aortic Valve: Normal trileaflet aortic valve.  Left Atrium: Mildly dilated left atrium. LA volume index =  38 cc/m2.  Left Ventricle: Mild segmental left ventricular systolic  dysfunction. The mid and apical anteroseptal and apical  inferior walls are hypokinetic.  Normal left ventricular  internal dimensions and wall thicknesses. Reversal of the  E-A  waves of the mitral inflow pattern is consistent with  diastolic LV dysfunction.  Right Heart: Normal right atrium. Normal right ventricular  size and function. Normal tricuspid valve. Normal pulmonic  valve.  Pericardium/PleuraNormal pericardium with no pericardial  effusion.  ------------------------------------------------------------------------  CONCLUSIONS:  1. Mildly dilated left atrium.  LA volume index = 38 cc/m2.  2. Mild segmental left ventricular systolic dysfunction.  The mid and apical anteroseptal and apical inferior walls  are hypokinetic.  3. Reversal of the E-A  waves of the mitral inflow pattern  is consistent with diastolic LV dysfunction.  4. Normal right ventricular size and function.  *** Compared with echocardiogram of 8/30/2019, there are  new wall motion abnormalities.  ------------------------------------------------------------------------  Confirmed on  8/15/2022 - 09:50:41 by Camden Olmos M.D.  ------------------------------------------------------------------------    < end of copied text >               Date of Service: 08-16-22 @ 14:44    Patient is a 82y old  Female who presents with a chief complaint of SOB (16 Aug 2022 12:31)    Any change in ROS:   Dyspnea resolved  O2 sats 99% on RA  Denies CP    MEDICATIONS  (STANDING):  aspirin enteric coated 81 milliGRAM(s) Oral daily  atorvastatin 80 milliGRAM(s) Oral at bedtime  carvedilol 3.125 milliGRAM(s) Oral every 12 hours  dextrose 5%. 1000 milliLiter(s) (50 mL/Hr) IV Continuous <Continuous>  dextrose 5%. 1000 milliLiter(s) (100 mL/Hr) IV Continuous <Continuous>  dextrose 50% Injectable 25 Gram(s) IV Push once  dextrose 50% Injectable 12.5 Gram(s) IV Push once  dextrose 50% Injectable 25 Gram(s) IV Push once  furosemide   Injectable 40 milliGRAM(s) IV Push two times a day  glucagon  Injectable 1 milliGRAM(s) IntraMuscular once  heparin  Infusion.  Unit(s)/Hr (15 mL/Hr) IV Continuous <Continuous>  insulin glargine Injectable (LANTUS) 15 Unit(s) SubCutaneous at bedtime  insulin lispro (ADMELOG) corrective regimen sliding scale   SubCutaneous three times a day before meals  insulin lispro Injectable (ADMELOG) 5 Unit(s) SubCutaneous three times a day before meals  isosorbide   dinitrate Tablet (ISORDIL) 10 milliGRAM(s) Oral three times a day  latanoprost 0.005% Ophthalmic Solution 1 Drop(s) Both EYES at bedtime  lisinopril 2.5 milliGRAM(s) Oral daily    MEDICATIONS  (PRN):  dextrose Oral Gel 15 Gram(s) Oral once PRN Blood Glucose LESS THAN 70 milliGRAM(s)/deciliter    Vital Signs Last 24 Hrs  T(C): 36.7 (16 Aug 2022 13:58), Max: 36.9 (16 Aug 2022 11:35)  T(F): 98.1 (16 Aug 2022 13:58), Max: 98.4 (16 Aug 2022 11:35)  HR: 71 (16 Aug 2022 13:58) (64 - 72)  BP: 116/69 (16 Aug 2022 13:58) (116/69 - 140/78)  BP(mean): --  RR: 18 (16 Aug 2022 13:58) (16 - 18)  SpO2: 99% (16 Aug 2022 13:58) (98% - 100%)    Parameters below as of 16 Aug 2022 13:58  Patient On (Oxygen Delivery Method): room air        I&O's Summary    15 Aug 2022 07:01  -  16 Aug 2022 07:00  --------------------------------------------------------  IN: 1000 mL / OUT: 1000 mL / NET: 0 mL    Physical Exam:   GENERAL: NAD  HEENT: SELVIN  ENMT: No tonsillar erythema, exudates, or enlargement  NECK: Supple, No JVD  CHEST/LUNG: Clear to auscultation b/l   CVS: Regular rate and rhythm  GI: : Soft, Nontender, Nondistended  NERVOUS SYSTEM:  Alert & Oriented X3  EXTREMITIES:  2+ Peripheral Pulses, No clubbing, cyanosis, or edema  SKIN: No rashes or lesions  PSYCH: Appropriate    Labs:  31, 27                            11.2   7.27  )-----------( 230      ( 16 Aug 2022 07:31 )             36.3                         11.4   6.88  )-----------( 244      ( 15 Aug 2022 07:54 )             35.3                         11.6   7.33  )-----------( 242      ( 14 Aug 2022 05:40 )             37.6                         12.1   6.29  )-----------( 233      ( 13 Aug 2022 05:40 )             40.0     08-16    138  |  101  |  40<H>  ----------------------------<  205<H>  4.2   |  23  |  1.35<H>  08-15    135  |  98  |  35<H>  ----------------------------<  175<H>  3.4<L>   |  23  |  1.29  08-14    139  |  99  |  29<H>  ----------------------------<  134<H>  3.6   |  25  |  1.25  08-13    139  |  100  |  21  ----------------------------<  159<H>  3.9   |  24  |  1.09    Ca    9.6      16 Aug 2022 07:31  Ca    9.4      15 Aug 2022 07:54  Phos  4.4     08-16  Phos  4.4     08-15  Mg     2.30     08-16  Mg     2.10     08-15    TPro  7.9  /  Alb  4.3  /  TBili  0.8  /  DBili  x   /  AST  13  /  ALT  9   /  AlkPhos  67  08-13    CAPILLARY BLOOD GLUCOSE  POCT Blood Glucose.: 235 mg/dL (16 Aug 2022 12:22)  POCT Blood Glucose.: 221 mg/dL (16 Aug 2022 08:22)  POCT Blood Glucose.: 201 mg/dL (15 Aug 2022 22:35)  POCT Blood Glucose.: 143 mg/dL (15 Aug 2022 17:35)    PTT - ( 16 Aug 2022 13:26 )  PTT:43.4 sec    Studies  Chest X-RAY< from: Xray Chest 1 View- PORTABLE-Routine (Xray Chest 1 View- PORTABLE-Routine .) (08.14.22 @ 16:11) >    INTERPRETATION:  Chest one view    HISTORY: Pulmonary edema    COMPARISON STUDY: 8/12/2022    Frontal expiratory view of the chest showsthe heart to be similar in   size. Coronary artery stents are again noted.    The lungs show less pulmonary congestion and there is no evidence of   pneumothorax nor pleural effusion.    IMPRESSION:  Decreased congestive changes.    < end of copied text >    < from: TTE with Doppler (w/Cont) (08.15.22 @ 08:22) >  ------------------------------------------------------------------------  PROCEDURE: Transthoracic echocardiogram with 2-D, M-Mode  and complete spectral and color flow Doppler.  Intravenous ultrasound enhancing agent was administered for  improved left ventricular endocardial border definition.  Following the intravenous injection of ultrasound enhancing  agent, harmonic imaging was performed.  INDICATION: Heart failure, unspecified (I50.9)  ------------------------------------------------------------------------  OBSERVATIONS:  Mitral Valve: Normal mitral valve.  Aortic Root: Normal aortic root.  Aortic Valve: Normal trileaflet aortic valve.  Left Atrium: Mildly dilated left atrium. LA volume index =  38 cc/m2.  Left Ventricle: Mild segmental left ventricular systolic  dysfunction. The mid and apical anteroseptal and apical  inferior walls are hypokinetic.  Normal left ventricular  internal dimensions and wall thicknesses. Reversal of the  E-A  waves of the mitral inflow pattern is consistent with  diastolic LV dysfunction.  Right Heart: Normal right atrium. Normal right ventricular  size and function. Normal tricuspid valve. Normal pulmonic  valve.  Pericardium/PleuraNormal pericardium with no pericardial  effusion.  ------------------------------------------------------------------------  CONCLUSIONS:  1. Mildly dilated left atrium.  LA volume index = 38 cc/m2.  2. Mild segmental left ventricular systolic dysfunction.  The mid and apical anteroseptal and apical inferior walls  are hypokinetic.  3. Reversal of the E-A  waves of the mitral inflow pattern  is consistent with diastolic LV dysfunction.  4. Normal right ventricular size and function.  *** Compared with echocardiogram of 8/30/2019, there are  new wall motion abnormalities.  ------------------------------------------------------------------------  Confirmed on  8/15/2022 - 09:50:41 by Camden Olmos M.D.  ------------------------------------------------------------------------    < end of copied text >

## 2022-08-16 NOTE — PROGRESS NOTE ADULT - SUBJECTIVE AND OBJECTIVE BOX
Date of service 8/16/22    S: no chest pain or sob; ros otherwise negative.     Review of Systems:   Constitutional: [ ] fevers, [ ] chills.   Skin: [ ] dry skin. [ ] rashes.  Psychiatric: [ ] depression, [ ] anxiety.   Gastrointestinal: [ ] BRBPR, [ ] melena.   Neurological: [ ] confusion. [ ] seizures. [ ] shuffling gait.   Ears,Nose,Mouth and Throat: [ ] ear pain [ ] sore throat.   Eyes: [ ] diplopia.   Respiratory: [ ] hemoptysis. [ ] shortness of breath  Cardiovascular: See HPI above  Hematologic/Lymphatic: [ ] anemia. [ ] painful nodes. [ ] prolonged bleeding.   Genitourinary: [ ] hematuria. [ ] flank pain.   Endocrine: [ ] significant change in weight. [ ] intolerance to heat and cold.     Review of systems [ x] otherwise negative, [ ] otherwise unable to obtain    FH: no family history of sudden cardiac death in first degree relatives    SH: [ ] tobacco, [ ] alcohol, [ ] drugs        apixaban 5 milliGRAM(s) Oral every 12 hours  aspirin enteric coated 81 milliGRAM(s) Oral daily  atorvastatin 80 milliGRAM(s) Oral at bedtime  carvedilol 3.125 milliGRAM(s) Oral every 12 hours  dextrose 5%. 1000 milliLiter(s) IV Continuous <Continuous>  dextrose 5%. 1000 milliLiter(s) IV Continuous <Continuous>  dextrose 50% Injectable 25 Gram(s) IV Push once  dextrose 50% Injectable 12.5 Gram(s) IV Push once  dextrose 50% Injectable 25 Gram(s) IV Push once  dextrose Oral Gel 15 Gram(s) Oral once PRN  furosemide   Injectable 40 milliGRAM(s) IV Push two times a day  glucagon  Injectable 1 milliGRAM(s) IntraMuscular once  insulin glargine Injectable (LANTUS) 15 Unit(s) SubCutaneous at bedtime  insulin lispro (ADMELOG) corrective regimen sliding scale   SubCutaneous three times a day before meals  insulin lispro Injectable (ADMELOG) 5 Unit(s) SubCutaneous three times a day before meals  isosorbide   dinitrate Tablet (ISORDIL) 10 milliGRAM(s) Oral three times a day  latanoprost 0.005% Ophthalmic Solution 1 Drop(s) Both EYES at bedtime  lisinopril 2.5 milliGRAM(s) Oral daily                            11.2   7.27  )-----------( 230      ( 16 Aug 2022 07:31 )             36.3       08-16    138  |  101  |  40<H>  ----------------------------<  205<H>  4.2   |  23  |  1.35<H>    Ca    9.6      16 Aug 2022 07:31  Phos  4.4     08-16  Mg     2.30     08-16              T(C): 36.9 (08-16-22 @ 11:35), Max: 36.9 (08-15-22 @ 13:05)  HR: 64 (08-16-22 @ 11:35) (64 - 72)  BP: 135/77 (08-16-22 @ 11:35) (118/71 - 140/78)  RR: 18 (08-16-22 @ 11:35) (16 - 18)  SpO2: 98% (08-16-22 @ 11:35) (98% - 100%)  Wt(kg): --    I&O's Summary    15 Aug 2022 07:01  -  16 Aug 2022 07:00  --------------------------------------------------------  IN: 1000 mL / OUT: 1000 mL / NET: 0 mL        General: Well nourished in no acute distress. Alert and Oriented * 3.   Head: Normocephalic and atraumatic.   Neck: No JVD. No bruits. Supple. Does not appear to be enlarged.   Cardiovascular: + S1,S2 ; RRR Soft systolic murmur at the left lower sternal border. No rubs noted.    Lungs: CTA b/l. No rhonchi, rales or wheezes.   Abdomen: + BS, soft. Non tender. Non distended. No rebound. No guarding.   Extremities: No clubbing/cyanosis/edema.   Neurologic: Moves all four extremities. Full range of motion.   Skin: Warm and moist. The patient's skin has normal elasticity and good skin turgor.   Psychiatric: Appropriate mood and affect.  Musculoskeletal: Normal range of motion, normal strength    DATA    TELEMETRY: NSR	    < from: Transthoracic Echocardiogram (08.30.19 @ 16:50) >  OBSERVATIONS:  Mitral Valve: Mitral annular calcification, otherwise  normal mitralvalve. Mild mitral regurgitation.  Aortic Root: Normal aortic root.  Aortic Valve: Aortic valve leaflet morphology not well  visualized.  Left Atrium: Mildly dilated left atrium.  LA volume index =  37 cc/m2.  Left Ventricle: Normal left ventricular systolic function.  No segmental wall motion abnormalities. Mild diastolic  dysfunction (Stage I).  Right Heart: Normal right atrium. Normal right ventricular  size and function. Normal tricuspid valve. Minimal  tricuspid regurgitation. Normal pulmonic valve.  Pericardium/PleuraNormal pericardium with no pericardial  effusion.    < from: TTE with Doppler (w/Cont) (08.15.22 @ 08:22) >  ------------------------------------------------------------------------  CONCLUSIONS:  1. Mildly dilated left atrium.  LA volume index = 38 cc/m2.  2. Mild segmental left ventricular systolic dysfunction.  The mid and apical anteroseptal and apical inferior walls  are hypokinetic.  3. Reversal of the E-A  waves of the mitral inflow pattern  is consistent with diastolic LV dysfunction.  4. Normal right ventricular size and function.  *** Compared with echocardiogram of 8/30/2019, there are  new wall motion abnormalities.  ------------------------------------------------------------------------  Confirmed on  8/15/2022 - 09:50:41 by Camden Olmos M.D.  ------------------------------------------------------------------------    < end of copied text >      < from: Cardiac Cath Lab - Adult (09.03.19 @ 14:03) >  VENTRICLES: No left ventriculogram was performed.  CORONARY VESSELS: The coronary circulation is right dominant.  LM:   --  LM: Angiography showed mild atherosclerosis with no flow limiting  lesions.  LAD:   --  Proximal LAD: There was a diffuse 10 % stenosis at the site of a  prior stent. The lesion was eccentric. There was NGUYEN grade 3 flow through  the vessel (brisk flow).  --  Mid LAD: There was a diffuse 20 % stenosis at the site of a prior  stent. The lesion was eccentric. There was NGUYEN grade 3 flow through the  vessel (brisk flow).  --  D1: There was a tubular 30 % stenosis at the site of a prior stent. The  lesion was smoothly contoured. There was NGUYEN grade 3 flow through the  vessel (brisk flow).  CX:   --  Circumflex: Angiography showed mild atherosclerosis with no flow  limiting lesions.  RI:   --  Ramus intermedius: There was a diffuse 30 % stenosis at the site  of a prior stent. The lesion was eccentric. There was NGUYEN grade 3 flow  through the vessel (brisk flow).  RCA:   --  Mid RCA: There was a tubular 20 % stenosis at a site with no  prior intervention. The lesion was eccentric. There was NGUYEN grade 3 flow  through the vessel (brisk flow).  --  RPDA: Angiography showed mild atherosclerosis with no flow limiting  lesions.  --  RPLS: Angiography showed mild atherosclerosis with no flow limiting  lesions.  COMPLICATIONS: There were no complications.  DIAGNOSTIC IMPRESSIONS: Patent stents LAD, D-1 and Ramus  No significant obstructive CAD  DIAGNOSTIC RECOMMENDATIONS: Medical therapy  INTERVENTIONAL IMPRESSIONS: Patent stents LAD, D-1 and Ramus  No significant obstructive CAD  INTERVENTIONAL RECOMMENDATIONS: Medical therapy  Prepared and signed by  Nia Izaguirre M.D.  Signed 09/03/2019 16:11:54    < end of copied text >        ASSESSMENT/PLAN: 	Ms Olivo is an 82y Female known to our group thru sporadic care when she visits from [place], with PMH CAD, remote PCI 2006, last cath here in 2019 as above with patent stents, last coronary angio ~2020 with patent stents ([place]) admitted with acute onset shortness of breath c/w acute CHF and chest pain.    -pt. volume status improved  -may need to hold lasix given rise in Cr  -renal eval with Dr. Morris called  -tte with new segmental LV dysfunction   -cath when medically optimized  -change eliquis to hep gtt in anticipation of cath     Oma Dumont MD

## 2022-08-16 NOTE — PROGRESS NOTE ADULT - ASSESSMENT
81 y/o F (resides in [place], visiting family in [place] since early July) with PMH of CAD (s/p stent placement), diastolic CHF, DM, GERD, CVA (with no residual deficits), HLD. Presents to the ED with c/o SOB with associated LE edema, orthopnea/PND. Also with associated chest pressure with radiation to L arm x1 day. Reportedly with O2 sats 77% on RA on EMS arrival, placed on CPAP en route. CXR with pulmonary edema, small b/l pl effusions. Pulmonary called to consult for SOB.

## 2022-08-17 DIAGNOSIS — N17.9 ACUTE KIDNEY FAILURE, UNSPECIFIED: ICD-10-CM

## 2022-08-17 LAB
ANION GAP SERPL CALC-SCNC: 13 MMOL/L — SIGNIFICANT CHANGE UP (ref 7–14)
APTT BLD: 115.2 SEC — HIGH (ref 27–36.3)
APTT BLD: 157.3 SEC — CRITICAL HIGH (ref 27–36.3)
APTT BLD: 72.2 SEC — HIGH (ref 27–36.3)
BASOPHILS # BLD AUTO: 0.02 K/UL — SIGNIFICANT CHANGE UP (ref 0–0.2)
BASOPHILS NFR BLD AUTO: 0.3 % — SIGNIFICANT CHANGE UP (ref 0–2)
BUN SERPL-MCNC: 43 MG/DL — HIGH (ref 7–23)
CALCIUM SERPL-MCNC: 9.7 MG/DL — SIGNIFICANT CHANGE UP (ref 8.4–10.5)
CHLORIDE SERPL-SCNC: 98 MMOL/L — SIGNIFICANT CHANGE UP (ref 98–107)
CO2 SERPL-SCNC: 26 MMOL/L — SIGNIFICANT CHANGE UP (ref 22–31)
CREAT SERPL-MCNC: 1.38 MG/DL — HIGH (ref 0.5–1.3)
EGFR: 38 ML/MIN/1.73M2 — LOW
EOSINOPHIL # BLD AUTO: 0.16 K/UL — SIGNIFICANT CHANGE UP (ref 0–0.5)
EOSINOPHIL NFR BLD AUTO: 2 % — SIGNIFICANT CHANGE UP (ref 0–6)
GLUCOSE BLDC GLUCOMTR-MCNC: 161 MG/DL — HIGH (ref 70–99)
GLUCOSE BLDC GLUCOMTR-MCNC: 161 MG/DL — HIGH (ref 70–99)
GLUCOSE BLDC GLUCOMTR-MCNC: 184 MG/DL — HIGH (ref 70–99)
GLUCOSE BLDC GLUCOMTR-MCNC: 201 MG/DL — HIGH (ref 70–99)
GLUCOSE BLDC GLUCOMTR-MCNC: 236 MG/DL — HIGH (ref 70–99)
GLUCOSE SERPL-MCNC: 186 MG/DL — HIGH (ref 70–99)
HCT VFR BLD CALC: 36.6 % — SIGNIFICANT CHANGE UP (ref 34.5–45)
HGB BLD-MCNC: 11.5 G/DL — SIGNIFICANT CHANGE UP (ref 11.5–15.5)
IANC: 4.34 K/UL — SIGNIFICANT CHANGE UP (ref 1.8–7.4)
IMM GRANULOCYTES NFR BLD AUTO: 0.3 % — SIGNIFICANT CHANGE UP (ref 0–1.5)
LYMPHOCYTES # BLD AUTO: 2.52 K/UL — SIGNIFICANT CHANGE UP (ref 1–3.3)
LYMPHOCYTES # BLD AUTO: 32 % — SIGNIFICANT CHANGE UP (ref 13–44)
MAGNESIUM SERPL-MCNC: 2.4 MG/DL — SIGNIFICANT CHANGE UP (ref 1.6–2.6)
MCHC RBC-ENTMCNC: 27.7 PG — SIGNIFICANT CHANGE UP (ref 27–34)
MCHC RBC-ENTMCNC: 31.4 GM/DL — LOW (ref 32–36)
MCV RBC AUTO: 88.2 FL — SIGNIFICANT CHANGE UP (ref 80–100)
MONOCYTES # BLD AUTO: 0.81 K/UL — SIGNIFICANT CHANGE UP (ref 0–0.9)
MONOCYTES NFR BLD AUTO: 10.3 % — SIGNIFICANT CHANGE UP (ref 2–14)
NEUTROPHILS # BLD AUTO: 4.34 K/UL — SIGNIFICANT CHANGE UP (ref 1.8–7.4)
NEUTROPHILS NFR BLD AUTO: 55.1 % — SIGNIFICANT CHANGE UP (ref 43–77)
NRBC # BLD: 0 /100 WBCS — SIGNIFICANT CHANGE UP (ref 0–0)
NRBC # FLD: 0 K/UL — SIGNIFICANT CHANGE UP (ref 0–0)
PHOSPHATE SERPL-MCNC: 5 MG/DL — HIGH (ref 2.5–4.5)
PLATELET # BLD AUTO: 230 K/UL — SIGNIFICANT CHANGE UP (ref 150–400)
POTASSIUM SERPL-MCNC: 4.2 MMOL/L — SIGNIFICANT CHANGE UP (ref 3.5–5.3)
POTASSIUM SERPL-SCNC: 4.2 MMOL/L — SIGNIFICANT CHANGE UP (ref 3.5–5.3)
RBC # BLD: 4.15 M/UL — SIGNIFICANT CHANGE UP (ref 3.8–5.2)
RBC # FLD: 13.9 % — SIGNIFICANT CHANGE UP (ref 10.3–14.5)
SARS-COV-2 RNA SPEC QL NAA+PROBE: SIGNIFICANT CHANGE UP
SODIUM SERPL-SCNC: 137 MMOL/L — SIGNIFICANT CHANGE UP (ref 135–145)
WBC # BLD: 7.87 K/UL — SIGNIFICANT CHANGE UP (ref 3.8–10.5)
WBC # FLD AUTO: 7.87 K/UL — SIGNIFICANT CHANGE UP (ref 3.8–10.5)

## 2022-08-17 RX ADMIN — Medication 81 MILLIGRAM(S): at 11:26

## 2022-08-17 RX ADMIN — HEPARIN SODIUM UNIT(S)/HR: 5000 INJECTION INTRAVENOUS; SUBCUTANEOUS at 20:38

## 2022-08-17 RX ADMIN — LISINOPRIL 2.5 MILLIGRAM(S): 2.5 TABLET ORAL at 05:03

## 2022-08-17 RX ADMIN — HEPARIN SODIUM 900 UNIT(S)/HR: 5000 INJECTION INTRAVENOUS; SUBCUTANEOUS at 07:17

## 2022-08-17 RX ADMIN — Medication 1: at 13:06

## 2022-08-17 RX ADMIN — HEPARIN SODIUM 700 UNIT(S)/HR: 5000 INJECTION INTRAVENOUS; SUBCUTANEOUS at 15:20

## 2022-08-17 RX ADMIN — Medication 2: at 17:56

## 2022-08-17 RX ADMIN — INSULIN GLARGINE 15 UNIT(S): 100 INJECTION, SOLUTION SUBCUTANEOUS at 22:30

## 2022-08-17 RX ADMIN — HEPARIN SODIUM 0 UNIT(S)/HR: 5000 INJECTION INTRAVENOUS; SUBCUTANEOUS at 05:36

## 2022-08-17 RX ADMIN — HEPARIN SODIUM 700 UNIT(S)/HR: 5000 INJECTION INTRAVENOUS; SUBCUTANEOUS at 13:52

## 2022-08-17 RX ADMIN — ISOSORBIDE DINITRATE 10 MILLIGRAM(S): 5 TABLET ORAL at 13:51

## 2022-08-17 RX ADMIN — Medication 2: at 09:14

## 2022-08-17 RX ADMIN — ISOSORBIDE DINITRATE 10 MILLIGRAM(S): 5 TABLET ORAL at 22:29

## 2022-08-17 RX ADMIN — CARVEDILOL PHOSPHATE 3.12 MILLIGRAM(S): 80 CAPSULE, EXTENDED RELEASE ORAL at 05:03

## 2022-08-17 RX ADMIN — ATORVASTATIN CALCIUM 80 MILLIGRAM(S): 80 TABLET, FILM COATED ORAL at 22:30

## 2022-08-17 RX ADMIN — Medication 5 UNIT(S): at 17:56

## 2022-08-17 RX ADMIN — CARVEDILOL PHOSPHATE 3.12 MILLIGRAM(S): 80 CAPSULE, EXTENDED RELEASE ORAL at 17:56

## 2022-08-17 RX ADMIN — HEPARIN SODIUM 900 UNIT(S)/HR: 5000 INJECTION INTRAVENOUS; SUBCUTANEOUS at 06:39

## 2022-08-17 RX ADMIN — HEPARIN SODIUM UNIT(S)/HR: 5000 INJECTION INTRAVENOUS; SUBCUTANEOUS at 20:36

## 2022-08-17 RX ADMIN — LATANOPROST 1 DROP(S): 0.05 SOLUTION/ DROPS OPHTHALMIC; TOPICAL at 22:30

## 2022-08-17 RX ADMIN — ISOSORBIDE DINITRATE 10 MILLIGRAM(S): 5 TABLET ORAL at 05:03

## 2022-08-17 NOTE — PROGRESS NOTE ADULT - ASSESSMENT
ASSESSMENT/PLAN:   82yoF w/Hx of CAD w/2 stents on eliquis, DM, HTN, HLD, CHF on lasix 40mg PO, BIBEMS w/SOB. Pt woke up at 4am feeling SOB  ALLA in the setting of active diuresis.  This is alla based on AKIN criteria of a jump of creatinine of 0.3mg    1 Renal - She got 2 doses of Lasix IV, will restart Lasix 40 mg po tomorrow  No need for renal sono  2 CVS - No renal objection to proceed with cardiac cath to elucidate the anatomy given the drop in EF  3 Pulm - Off oxygen at present and sat is 99%    Jennie Silva NP-C  [facility]  [phone]  ASSESSMENT/PLAN:   82yoF w/Hx of CAD w/2 stents on eliquis, DM, HTN, HLD, CHF on lasix 40mg PO, BIBEMS w/SOB. Pt woke up at 4am feeling SOB  ALLA in the setting of active diuresis.  This is alla based on AKIN criteria of a jump of creatinine of 0.3mg    1 Renal - She got 2 doses of Lasix IV, will restart Lasix 40 mg po tomorrow.  No need for renal sono  2 CVS - No renal objection to proceed with cardiac cath to elucidate the anatomy given the drop in EF  3 Pulm - Off oxygen at present and sat is 99%    Jennie Silva NP-C  [facility]  [phone]

## 2022-08-17 NOTE — PROGRESS NOTE ADULT - PROBLEM SELECTOR PLAN 1
Pt with hx of PE, diagnosed in 10/2021  -On Eliquis as an outpatient, heparin drip for now pending cath  -Plan to f/u with PCP, primary pulm in [place].

## 2022-08-17 NOTE — PROGRESS NOTE ADULT - ASSESSMENT
81 y/o F (resides in [place], visiting family in [place] since early July) with PMH of CAD (s/p stent placement), diastolic CHF, DM, GERD, CVA (with no residual deficits), HLD. Presents to the ED with c/o SOB with associated LE edema, orthopnea/PND. Also with associated chest pressure with radiation to L arm x1 day. Reportedly with O2 sats 77% on RA on EMS arrival, placed on CPAP en route. CXR with pulmonary edema, small b/l pl effusions. Pulmonary called to consult for SOB.

## 2022-08-17 NOTE — PROGRESS NOTE ADULT - SUBJECTIVE AND OBJECTIVE BOX
NEPHROLOGY     Patient seen and examined, pt reports feeling better, no complaints, denies pain, no sob, comfortable on room air, currently in no acute distress.     MEDICATIONS  (STANDING):  aspirin enteric coated 81 milliGRAM(s) Oral daily  atorvastatin 80 milliGRAM(s) Oral at bedtime  carvedilol 3.125 milliGRAM(s) Oral every 12 hours  dextrose 5%. 1000 milliLiter(s) (100 mL/Hr) IV Continuous <Continuous>  dextrose 5%. 1000 milliLiter(s) (50 mL/Hr) IV Continuous <Continuous>  dextrose 50% Injectable 25 Gram(s) IV Push once  dextrose 50% Injectable 12.5 Gram(s) IV Push once  dextrose 50% Injectable 25 Gram(s) IV Push once  glucagon  Injectable 1 milliGRAM(s) IntraMuscular once  heparin  Infusion.  Unit(s)/Hr (15 mL/Hr) IV Continuous <Continuous>  insulin glargine Injectable (LANTUS) 15 Unit(s) SubCutaneous at bedtime  insulin lispro (ADMELOG) corrective regimen sliding scale   SubCutaneous three times a day before meals  insulin lispro Injectable (ADMELOG) 5 Unit(s) SubCutaneous three times a day before meals  isosorbide   dinitrate Tablet (ISORDIL) 10 milliGRAM(s) Oral three times a day  latanoprost 0.005% Ophthalmic Solution 1 Drop(s) Both EYES at bedtime  lisinopril 2.5 milliGRAM(s) Oral daily    VITALS:  T(C): , Max: 36.9 (08-16-22 @ 11:35)  T(F): , Max: 98.5 (08-16-22 @ 21:05)  HR: 67 (08-17-22 @ 05:00)  BP: 124/67 (08-17-22 @ 05:00)  RR: 17 (08-17-22 @ 05:00)  SpO2: 99% (08-17-22 @ 05:00)    I and O's:    08-16 @ 07:01  -  08-17 @ 07:00  --------------------------------------------------------  IN: 900 mL / OUT: 2100 mL / NET: -1200 mL    PHYSICAL EXAM:  Constitutional: NAD  HEENT: EOMI  Neck:  No JVD, supple   Respiratory: CTA B/L  Cardiovascular: S1 and S2, RRR  Gastrointestinal: + BS, soft, NT, ND  Extremities: No peripheral edema, + peripheral pulses  Neurological: A/O x 3, CN2-12 intact  Psychiatric: Normal mood, normal affect  : No Reinoso    LABS:                        11.5   7.87  )-----------( 230      ( 17 Aug 2022 04:44 )             36.6     08-17    137  |  98  |  43<H>  ----------------------------<  186<H>  4.2   |  26  |  1.38<H>    Ca    9.7      17 Aug 2022 04:44  Phos  5.0     08-17  Mg     2.40     08-17     NEPHROLOGY     Patient seen and examined, pt reports feeling better, no complaints, denies pain, no sob, comfortable on room air, currently in no acute distress.     MEDICATIONS  (STANDING):  aspirin enteric coated 81 milliGRAM(s) Oral daily.  atorvastatin 80 milliGRAM(s) Oral at bedtime  carvedilol 3.125 milliGRAM(s) Oral every 12 hours  dextrose 5%. 1000 milliLiter(s) (100 mL/Hr) IV Continuous <Continuous>  dextrose 5%. 1000 milliLiter(s) (50 mL/Hr) IV Continuous <Continuous>  dextrose 50% Injectable 25 Gram(s) IV Push once  dextrose 50% Injectable 12.5 Gram(s) IV Push once.  dextrose 50% Injectable 25 Gram(s) IV Push once  glucagon  Injectable 1 milliGRAM(s) IntraMuscular once  heparin  Infusion.  Unit(s)/Hr (15 mL/Hr) IV Continuous <Continuous>  insulin glargine Injectable (LANTUS) 15 Unit(s) SubCutaneous at bedtime  insulin lispro (ADMELOG) corrective regimen sliding scale   SubCutaneous three times a day before meals  insulin lispro Injectable (ADMELOG) 5 Unit(s) SubCutaneous three times a day before meals  isosorbide   dinitrate Tablet (ISORDIL) 10 milliGRAM(s) Oral three times a day  latanoprost 0.005% Ophthalmic Solution 1 Drop(s) Both EYES at bedtime  lisinopril 2.5 milliGRAM(s) Oral daily    VITALS:  T(C): , Max: 36.9 (08-16-22 @ 11:35)  T(F): , Max: 98.5 (08-16-22 @ 21:05)  HR: 67 (08-17-22 @ 05:00)  BP: 124/67 (08-17-22 @ 05:00)  RR: 17 (08-17-22 @ 05:00)  SpO2: 99% (08-17-22 @ 05:00)    I and O's:    08-16 @ 07:01  -  08-17 @ 07:00  --------------------------------------------------------  IN: 900 mL / OUT: 2100 mL / NET: -1200 mL    PHYSICAL EXAM:  Constitutional: NAD  HEENT: EOMI  Neck:  No JVD, supple .  Respiratory: CTA B/L  Cardiovascular: S1 and S2, RRR  Gastrointestinal: + BS, soft, NT, ND  Extremities: No peripheral edema, + peripheral pulses  Neurological: A/O x 3, CN2-12 intact  Psychiatric: Normal mood, normal affect  : No Reinoso    LABS:                        11.5   7.87  )-----------( 230      ( 17 Aug 2022 04:44 )             36.6     08-17    137  |  98  |  43<H>  ----------------------------<  186<H>  4.2   |  26  |  1.38<H>    Ca    9.7      17 Aug 2022 04:44  Phos  5.0     08-17  Mg     2.40     08-17

## 2022-08-17 NOTE — PROGRESS NOTE ADULT - ASSESSMENT
82yoF w/Hx of CAD w/2 stents on eliquis, DM, HTN, HLD, CHF on lasix 40mg PO, BIBEMS w/SOB. Pt woke up at 4am feeling SOB, upon EMS arrival was satting 77% on RA, improved to 100% on NRB however pt had inc. WOB so she was put on CPAP en route. On arrival to ED pt w/SOB and chest pain radiating into L shoulder and leg swelling, otherwise no complaints. She denies HA, palpitations, abd pain, n/v/d/c, fevers/chills, dysuria/hematuria, hematochezia/melena, numbness/tingling, focal weakness.     Acute respiratory failure with hypoxia and hypercapnia.   - sec to acute on chronic CHF exacerbation  - telemonitor  - sp BIPAP  - diuresis as per cards   - I and O      CAD  - sp PCI  - cw asa, brillinta  - cards fu   - cath today     DM  - monitor FS  - basal, bolus     HTN  - cw home meds

## 2022-08-17 NOTE — PROGRESS NOTE ADULT - SUBJECTIVE AND OBJECTIVE BOX
Patient is a 82y old  Female who presents with a chief complaint of SOB (17 Aug 2022 12:59)    Date of servie : 08-17-22 @ 14:19  INTERVAL HPI/OVERNIGHT EVENTS:  T(C): 36.6 (08-17-22 @ 14:02), Max: 36.9 (08-16-22 @ 21:05)  HR: 68 (08-17-22 @ 14:02) (66 - 69)  BP: 107/63 (08-17-22 @ 14:02) (107/63 - 133/75)  RR: 18 (08-17-22 @ 14:02) (17 - 18)  SpO2: 98% (08-17-22 @ 14:02) (98% - 99%)  Wt(kg): --  I&O's Summary    16 Aug 2022 07:01  -  17 Aug 2022 07:00  --------------------------------------------------------  IN: 900 mL / OUT: 2100 mL / NET: -1200 mL    17 Aug 2022 07:01  -  17 Aug 2022 14:19  --------------------------------------------------------  IN: 0 mL / OUT: 0 mL / NET: 0 mL        LABS:                        11.5   7.87  )-----------( 230      ( 17 Aug 2022 04:44 )             36.6     08-17    137  |  98  |  43<H>  ----------------------------<  186<H>  4.2   |  26  |  1.38<H>    Ca    9.7      17 Aug 2022 04:44  Phos  5.0     08-17  Mg     2.40     08-17      PTT - ( 17 Aug 2022 12:42 )  PTT:115.2 sec    CAPILLARY BLOOD GLUCOSE      POCT Blood Glucose.: 161 mg/dL (17 Aug 2022 13:06)  POCT Blood Glucose.: 201 mg/dL (17 Aug 2022 08:47)  POCT Blood Glucose.: 184 mg/dL (17 Aug 2022 04:43)  POCT Blood Glucose.: 164 mg/dL (16 Aug 2022 21:52)  POCT Blood Glucose.: 146 mg/dL (16 Aug 2022 17:38)            MEDICATIONS  (STANDING):  aspirin enteric coated 81 milliGRAM(s) Oral daily  atorvastatin 80 milliGRAM(s) Oral at bedtime  carvedilol 3.125 milliGRAM(s) Oral every 12 hours  dextrose 5%. 1000 milliLiter(s) (50 mL/Hr) IV Continuous <Continuous>  dextrose 5%. 1000 milliLiter(s) (100 mL/Hr) IV Continuous <Continuous>  dextrose 50% Injectable 25 Gram(s) IV Push once  dextrose 50% Injectable 12.5 Gram(s) IV Push once  dextrose 50% Injectable 25 Gram(s) IV Push once  glucagon  Injectable 1 milliGRAM(s) IntraMuscular once  heparin  Infusion.  Unit(s)/Hr (15 mL/Hr) IV Continuous <Continuous>  insulin glargine Injectable (LANTUS) 15 Unit(s) SubCutaneous at bedtime  insulin lispro (ADMELOG) corrective regimen sliding scale   SubCutaneous three times a day before meals  insulin lispro Injectable (ADMELOG) 5 Unit(s) SubCutaneous three times a day before meals  isosorbide   dinitrate Tablet (ISORDIL) 10 milliGRAM(s) Oral three times a day  latanoprost 0.005% Ophthalmic Solution 1 Drop(s) Both EYES at bedtime  lisinopril 2.5 milliGRAM(s) Oral daily    MEDICATIONS  (PRN):  dextrose Oral Gel 15 Gram(s) Oral once PRN Blood Glucose LESS THAN 70 milliGRAM(s)/deciliter          PHYSICAL EXAM:  GENERAL: NAD, well-groomed, well-developed  HEAD:  Atraumatic, Normocephalic  CHEST/LUNG: Clear to percussion bilaterally; No rales, rhonchi, wheezing, or rubs  HEART: Regular rate and rhythm; No murmurs, rubs, or gallops  ABDOMEN: Soft, Nontender, Nondistended; Bowel sounds present  EXTREMITIES:  2+ Peripheral Pulses, No clubbing, cyanosis, or edema  LYMPH: No lymphadenopathy noted  SKIN: No rashes or lesions    Care Discussed with Consultants/Other Providers [ ] YES  [ ] NO

## 2022-08-17 NOTE — PROGRESS NOTE ADULT - SUBJECTIVE AND OBJECTIVE BOX
Date of Service: 08-17-22 @ 11:49    Patient is a 82y old  Female who presents with a chief complaint of SOB. (17 Aug 2022 09:46)    Any change in ROS:   Denies CP, SOB  O2 sats 98% on RA    MEDICATIONS  (STANDING):  aspirin enteric coated 81 milliGRAM(s) Oral daily  atorvastatin 80 milliGRAM(s) Oral at bedtime  carvedilol 3.125 milliGRAM(s) Oral every 12 hours  dextrose 5%. 1000 milliLiter(s) (100 mL/Hr) IV Continuous <Continuous>  dextrose 5%. 1000 milliLiter(s) (50 mL/Hr) IV Continuous <Continuous>  dextrose 50% Injectable 25 Gram(s) IV Push once  dextrose 50% Injectable 12.5 Gram(s) IV Push once  dextrose 50% Injectable 25 Gram(s) IV Push once  glucagon  Injectable 1 milliGRAM(s) IntraMuscular once  heparin  Infusion.  Unit(s)/Hr (15 mL/Hr) IV Continuous <Continuous>  insulin glargine Injectable (LANTUS) 15 Unit(s) SubCutaneous at bedtime  insulin lispro (ADMELOG) corrective regimen sliding scale   SubCutaneous three times a day before meals  insulin lispro Injectable (ADMELOG) 5 Unit(s) SubCutaneous three times a day before meals  isosorbide   dinitrate Tablet (ISORDIL) 10 milliGRAM(s) Oral three times a day  latanoprost 0.005% Ophthalmic Solution 1 Drop(s) Both EYES at bedtime  lisinopril 2.5 milliGRAM(s) Oral daily    MEDICATIONS  (PRN):  dextrose Oral Gel 15 Gram(s) Oral once PRN Blood Glucose LESS THAN 70 milliGRAM(s)/deciliter    Vital Signs Last 24 Hrs  T(C): 36.6 (17 Aug 2022 11:28), Max: 36.9 (16 Aug 2022 21:05)  T(F): 97.8 (17 Aug 2022 11:28), Max: 98.5 (16 Aug 2022 21:05)  HR: 66 (17 Aug 2022 11:28) (66 - 71)  BP: 117/70 (17 Aug 2022 11:28) (116/69 - 133/75)  BP(mean): --  RR: 18 (17 Aug 2022 11:28) (17 - 18)  SpO2: 98% (17 Aug 2022 11:28) (98% - 99%)    Parameters below as of 17 Aug 2022 11:28  Patient On (Oxygen Delivery Method): room air    I&O's Summary    16 Aug 2022 07:01  -  17 Aug 2022 07:00  --------------------------------------------------------  IN: 900 mL / OUT: 2100 mL / NET: -1200 mL    Physical Exam:   GENERAL: NAD  HEENT: SELVIN  ENMT: No tonsillar erythema, exudates, or enlargement  NECK: Supple, No JVD  CHEST/LUNG: Clear to auscultation b/l   CVS: Regular rate and rhythm  GI: : Soft, Nontender, Nondistended  NERVOUS SYSTEM:  Alert & Oriented X3  EXTREMITIES:  2+ Peripheral Pulses, No clubbing, cyanosis, or edema  SKIN: No rashes or lesions  PSYCH: Appropriate    Labs:  31, 27                            11.5   7.87  )-----------( 230      ( 17 Aug 2022 04:44 )             36.6                         11.5   8.34  )-----------( 251      ( 16 Aug 2022 20:30 )             38.0                         11.2   7.27  )-----------( 230      ( 16 Aug 2022 07:31 )             36.3                         11.4   6.88  )-----------( 244      ( 15 Aug 2022 07:54 )             35.3                         11.6   7.33  )-----------( 242      ( 14 Aug 2022 05:40 )             37.6     08-17    137  |  98  |  43<H>  ----------------------------<  186<H>  4.2   |  26  |  1.38<H>  08-16    138  |  101  |  40<H>  ----------------------------<  205<H>  4.2   |  23  |  1.35<H>  08-15    135  |  98  |  35<H>  ----------------------------<  175<H>  3.4<L>   |  23  |  1.29  08-14    139  |  99  |  29<H>  ----------------------------<  134<H>  3.6   |  25  |  1.25    Ca    9.7      17 Aug 2022 04:44  Ca    9.6      16 Aug 2022 07:31  Phos  5.0     08-17  Phos  4.4     08-16  Mg     2.40     08-17  Mg     2.30     08-16      CAPILLARY BLOOD GLUCOSE  POCT Blood Glucose.: 201 mg/dL (17 Aug 2022 08:47)  POCT Blood Glucose.: 184 mg/dL (17 Aug 2022 04:43)  POCT Blood Glucose.: 164 mg/dL (16 Aug 2022 21:52)  POCT Blood Glucose.: 146 mg/dL (16 Aug 2022 17:38)  POCT Blood Glucose.: 235 mg/dL (16 Aug 2022 12:22)    PTT - ( 17 Aug 2022 04:44 )  PTT:157.3 sec    Studies  Chest X-RAY< from: Xray Chest 1 View- PORTABLE-Routine (Xray Chest 1 View- PORTABLE-Routine .) (08.14.22 @ 16:11) >  INTERPRETATION:  Chest one view    HISTORY: Pulmonary edema    COMPARISON STUDY: 8/12/2022    Frontal expiratory view of the chest showsthe heart to be similar in   size. Coronary artery stents are again noted.    The lungs show less pulmonary congestion and there is no evidence of   pneumothorax nor pleural effusion.    IMPRESSION:  Decreased congestive changes.    < end of copied text >    < from: TTE with Doppler (w/Cont) (08.15.22 @ 08:22) >  OBSERVATIONS:  Mitral Valve: Normal mitral valve.  Aortic Root: Normal aortic root.  Aortic Valve: Normal trileaflet aortic valve.  Left Atrium: Mildly dilated left atrium. LA volume index =  38 cc/m2.  Left Ventricle: Mild segmental left ventricular systolic  dysfunction. The mid and apical anteroseptal and apical  inferior walls are hypokinetic.  Normal left ventricular  internal dimensions and wall thicknesses. Reversal of the  E-A  waves of the mitral inflow pattern is consistent with  diastolic LV dysfunction.  Right Heart: Normal right atrium. Normal right ventricular  size and function. Normal tricuspid valve. Normal pulmonic  valve.  Pericardium/PleuraNormal pericardium with no pericardial  effusion.  ------------------------------------------------------------------------  CONCLUSIONS:  1. Mildly dilated left atrium.  LA volume index = 38 cc/m2.  2. Mild segmental left ventricular systolic dysfunction.  The mid and apical anteroseptal and apical inferior walls  are hypokinetic.  3. Reversal of the E-A  waves of the mitral inflow pattern  is consistent with diastolic LV dysfunction.  4. Normal right ventricular size and function.  *** Compared with echocardiogram of 8/30/2019, there are  new wall motion abnormalities.  -------------------------------------------    < end of copied text >

## 2022-08-17 NOTE — PROVIDER CONTACT NOTE (CRITICAL VALUE NOTIFICATION) - BACKGROUND
pt admitted with hypervolemia. PMH of DM, CAD, CHF, HLD, HTN, and GERD.
pt admitted with hypervolemia. PMH of DM, CAD, CHF, HLD, HTN, and GERD.

## 2022-08-17 NOTE — PROGRESS NOTE ADULT - SUBJECTIVE AND OBJECTIVE BOX
Date of service 8/17/22    S: no chest pain or sob; ros otherwise negative.     Review of Systems:   Constitutional: [ ] fevers, [ ] chills.   Skin: [ ] dry skin. [ ] rashes.  Psychiatric: [ ] depression, [ ] anxiety.   Gastrointestinal: [ ] BRBPR, [ ] melena.   Neurological: [ ] confusion. [ ] seizures. [ ] shuffling gait.   Ears,Nose,Mouth and Throat: [ ] ear pain [ ] sore throat.   Eyes: [ ] diplopia.   Respiratory: [ ] hemoptysis. [ ] shortness of breath  Cardiovascular: See HPI above  Hematologic/Lymphatic: [ ] anemia. [ ] painful nodes. [ ] prolonged bleeding.   Genitourinary: [ ] hematuria. [ ] flank pain.   Endocrine: [ ] significant change in weight. [ ] intolerance to heat and cold.     Review of systems [x ] otherwise negative, [ ] otherwise unable to obtain    FH: no family history of sudden cardiac death in first degree relatives    SH: [ ] tobacco, [ ] alcohol, [ ] drugs    aspirin enteric coated 81 milliGRAM(s) Oral daily  atorvastatin 80 milliGRAM(s) Oral at bedtime  carvedilol 3.125 milliGRAM(s) Oral every 12 hours  dextrose 5%. 1000 milliLiter(s) IV Continuous <Continuous>  dextrose 5%. 1000 milliLiter(s) IV Continuous <Continuous>  dextrose 50% Injectable 25 Gram(s) IV Push once  dextrose 50% Injectable 12.5 Gram(s) IV Push once  dextrose 50% Injectable 25 Gram(s) IV Push once  dextrose Oral Gel 15 Gram(s) Oral once PRN  glucagon  Injectable 1 milliGRAM(s) IntraMuscular once  heparin  Infusion.  Unit(s)/Hr IV Continuous <Continuous>  insulin glargine Injectable (LANTUS) 15 Unit(s) SubCutaneous at bedtime  insulin lispro (ADMELOG) corrective regimen sliding scale   SubCutaneous three times a day before meals  insulin lispro Injectable (ADMELOG) 5 Unit(s) SubCutaneous three times a day before meals  isosorbide   dinitrate Tablet (ISORDIL) 10 milliGRAM(s) Oral three times a day  latanoprost 0.005% Ophthalmic Solution 1 Drop(s) Both EYES at bedtime  lisinopril 2.5 milliGRAM(s) Oral daily                            11.5   7.87  )-----------( 230      ( 17 Aug 2022 04:44 )             36.6       08-17    137  |  98  |  43<H>  ----------------------------<  186<H>  4.2   |  26  |  1.38<H>    Ca    9.7      17 Aug 2022 04:44  Phos  5.0     08-17  Mg     2.40     08-17      T(C): 36.6 (08-17-22 @ 11:28), Max: 36.9 (08-16-22 @ 21:05)  HR: 66 (08-17-22 @ 11:28) (66 - 71)  BP: 117/70 (08-17-22 @ 11:28) (116/69 - 133/75)  RR: 18 (08-17-22 @ 11:28) (17 - 18)  SpO2: 98% (08-17-22 @ 11:28) (98% - 99%)  Wt(kg): --    General: Well nourished in no acute distress. Alert and Oriented * 3.   Head: Normocephalic and atraumatic.   Neck: No JVD. No bruits. Supple. Does not appear to be enlarged.   Cardiovascular: + S1,S2 ; RRR Soft systolic murmur at the left lower sternal border. No rubs noted.    Lungs: CTA b/l. No rhonchi, rales or wheezes.   Abdomen: + BS, soft. Non tender. Non distended. No rebound. No guarding.   Extremities: No clubbing/cyanosis/edema.   Neurologic: Moves all four extremities. Full range of motion.   Skin: Warm and moist. The patient's skin has normal elasticity and good skin turgor.   Psychiatric: Appropriate mood and affect.  Musculoskeletal: Normal range of motion, normal strength    DATA    TELEMETRY: NSR	    < from: Transthoracic Echocardiogram (08.30.19 @ 16:50) >  OBSERVATIONS:  Mitral Valve: Mitral annular calcification, otherwise  normal mitralvalve. Mild mitral regurgitation.  Aortic Root: Normal aortic root.  Aortic Valve: Aortic valve leaflet morphology not well  visualized.  Left Atrium: Mildly dilated left atrium.  LA volume index =  37 cc/m2.  Left Ventricle: Normal left ventricular systolic function.  No segmental wall motion abnormalities. Mild diastolic  dysfunction (Stage I).  Right Heart: Normal right atrium. Normal right ventricular  size and function. Normal tricuspid valve. Minimal  tricuspid regurgitation. Normal pulmonic valve.  Pericardium/PleuraNormal pericardium with no pericardial  effusion.    < from: TTE with Doppler (w/Cont) (08.15.22 @ 08:22) >  ------------------------------------------------------------------------  CONCLUSIONS:  1. Mildly dilated left atrium.  LA volume index = 38 cc/m2.  2. Mild segmental left ventricular systolic dysfunction.  The mid and apical anteroseptal and apical inferior walls  are hypokinetic.  3. Reversal of the E-A  waves of the mitral inflow pattern  is consistent with diastolic LV dysfunction.  4. Normal right ventricular size and function.  *** Compared with echocardiogram of 8/30/2019, there are  new wall motion abnormalities.  ------------------------------------------------------------------------  Confirmed on  8/15/2022 - 09:50:41 by Camden Olmos M.D.  ------------------------------------------------------------------------    < end of copied text >      < from: Cardiac Cath Lab - Adult (09.03.19 @ 14:03) >  VENTRICLES: No left ventriculogram was performed.  CORONARY VESSELS: The coronary circulation is right dominant.  LM:   --  LM: Angiography showed mild atherosclerosis with no flow limiting  lesions.  LAD:   --  Proximal LAD: There was a diffuse 10 % stenosis at the site of a  prior stent. The lesion was eccentric. There was NGUYEN grade 3 flow through  the vessel (brisk flow).  --  Mid LAD: There was a diffuse 20 % stenosis at the site of a prior  stent. The lesion was eccentric. There was NGUYEN grade 3 flow through the  vessel (brisk flow).  --  D1: There was a tubular 30 % stenosis at the site of a prior stent. The  lesion was smoothly contoured. There was NGUYEN grade 3 flow through the  vessel (brisk flow).  CX:   --  Circumflex: Angiography showed mild atherosclerosis with no flow  limiting lesions.  RI:   --  Ramus intermedius: There was a diffuse 30 % stenosis at the site  of a prior stent. The lesion was eccentric. There was NGUYEN grade 3 flow  through the vessel (brisk flow).  RCA:   --  Mid RCA: There was a tubular 20 % stenosis at a site with no  prior intervention. The lesion was eccentric. There was NGUYEN grade 3 flow  through the vessel (brisk flow).  --  RPDA: Angiography showed mild atherosclerosis with no flow limiting  lesions.  --  RPLS: Angiography showed mild atherosclerosis with no flow limiting  lesions.  COMPLICATIONS: There were no complications.  DIAGNOSTIC IMPRESSIONS: Patent stents LAD, D-1 and Ramus  No significant obstructive CAD  DIAGNOSTIC RECOMMENDATIONS: Medical therapy  INTERVENTIONAL IMPRESSIONS: Patent stents LAD, D-1 and Ramus  No significant obstructive CAD  INTERVENTIONAL RECOMMENDATIONS: Medical therapy  Prepared and signed by  Nia Izaguirre M.D.  Signed 09/03/2019 16:11:54    < end of copied text >        ASSESSMENT/PLAN: 	Ms Olivo is an 82y Female known to our group thru sporadic care when she visits from [place], with PMH CAD, remote PCI 2006, last cath here in 2019 as above with patent stents, last coronary angio ~2020 with patent stents ([place]) admitted with acute onset shortness of breath c/w acute CHF and chest pain.    -pt. volume status improved  -may need to hold lasix given rise in Cr  -renal eval with Dr. Morris called  -tte with new segmental LV dysfunction   -cath when medically optimized  -change eliquis to hep gtt in anticipation of cath     Hannah CALVILLO  [phone]

## 2022-08-17 NOTE — PROGRESS NOTE ADULT - PROBLEM SELECTOR PLAN 4
TTE with mild LV systolic dysfunction, diastolic dysfunction. New wall motion abnormalities.   -Plan for cardiac cath per cards  -Diuresis as tolerated.

## 2022-08-17 NOTE — PROVIDER CONTACT NOTE (CRITICAL VALUE NOTIFICATION) - ACTION/TREATMENT ORDERED:
As per PA, follow nomogram for heparin drip- pause for 60min and restart at new rate at 12mL/hr.
PA made aware, will follow nomogram- pause heparin for 60min and restart at new rate of 9mL/hr.

## 2022-08-18 LAB
ANION GAP SERPL CALC-SCNC: 13 MMOL/L — SIGNIFICANT CHANGE UP (ref 7–14)
APTT BLD: 65.1 SEC — HIGH (ref 27–36.3)
BASOPHILS # BLD AUTO: 0.02 K/UL — SIGNIFICANT CHANGE UP (ref 0–0.2)
BASOPHILS NFR BLD AUTO: 0.3 % — SIGNIFICANT CHANGE UP (ref 0–2)
BUN SERPL-MCNC: 38 MG/DL — HIGH (ref 7–23)
CALCIUM SERPL-MCNC: 9.4 MG/DL — SIGNIFICANT CHANGE UP (ref 8.4–10.5)
CHLORIDE SERPL-SCNC: 100 MMOL/L — SIGNIFICANT CHANGE UP (ref 98–107)
CO2 SERPL-SCNC: 23 MMOL/L — SIGNIFICANT CHANGE UP (ref 22–31)
CREAT SERPL-MCNC: 1.19 MG/DL — SIGNIFICANT CHANGE UP (ref 0.5–1.3)
EGFR: 45 ML/MIN/1.73M2 — LOW
EOSINOPHIL # BLD AUTO: 0.12 K/UL — SIGNIFICANT CHANGE UP (ref 0–0.5)
EOSINOPHIL NFR BLD AUTO: 1.6 % — SIGNIFICANT CHANGE UP (ref 0–6)
GLUCOSE BLDC GLUCOMTR-MCNC: 127 MG/DL — HIGH (ref 70–99)
GLUCOSE BLDC GLUCOMTR-MCNC: 179 MG/DL — HIGH (ref 70–99)
GLUCOSE BLDC GLUCOMTR-MCNC: 184 MG/DL — HIGH (ref 70–99)
GLUCOSE SERPL-MCNC: 133 MG/DL — HIGH (ref 70–99)
HCT VFR BLD CALC: 36.9 % — SIGNIFICANT CHANGE UP (ref 34.5–45)
HGB BLD-MCNC: 11.3 G/DL — LOW (ref 11.5–15.5)
IANC: 4.28 K/UL — SIGNIFICANT CHANGE UP (ref 1.8–7.4)
IMM GRANULOCYTES NFR BLD AUTO: 0.3 % — SIGNIFICANT CHANGE UP (ref 0–1.5)
LYMPHOCYTES # BLD AUTO: 2.37 K/UL — SIGNIFICANT CHANGE UP (ref 1–3.3)
LYMPHOCYTES # BLD AUTO: 31.1 % — SIGNIFICANT CHANGE UP (ref 13–44)
MAGNESIUM SERPL-MCNC: 2.3 MG/DL — SIGNIFICANT CHANGE UP (ref 1.6–2.6)
MCHC RBC-ENTMCNC: 27.5 PG — SIGNIFICANT CHANGE UP (ref 27–34)
MCHC RBC-ENTMCNC: 30.6 GM/DL — LOW (ref 32–36)
MCV RBC AUTO: 89.8 FL — SIGNIFICANT CHANGE UP (ref 80–100)
MONOCYTES # BLD AUTO: 0.81 K/UL — SIGNIFICANT CHANGE UP (ref 0–0.9)
MONOCYTES NFR BLD AUTO: 10.6 % — SIGNIFICANT CHANGE UP (ref 2–14)
NEUTROPHILS # BLD AUTO: 4.28 K/UL — SIGNIFICANT CHANGE UP (ref 1.8–7.4)
NEUTROPHILS NFR BLD AUTO: 56.1 % — SIGNIFICANT CHANGE UP (ref 43–77)
NRBC # BLD: 0 /100 WBCS — SIGNIFICANT CHANGE UP (ref 0–0)
NRBC # FLD: 0 K/UL — SIGNIFICANT CHANGE UP (ref 0–0)
PHOSPHATE SERPL-MCNC: 4.2 MG/DL — SIGNIFICANT CHANGE UP (ref 2.5–4.5)
PLATELET # BLD AUTO: 220 K/UL — SIGNIFICANT CHANGE UP (ref 150–400)
POTASSIUM SERPL-MCNC: 4.4 MMOL/L — SIGNIFICANT CHANGE UP (ref 3.5–5.3)
POTASSIUM SERPL-SCNC: 4.4 MMOL/L — SIGNIFICANT CHANGE UP (ref 3.5–5.3)
RBC # BLD: 4.11 M/UL — SIGNIFICANT CHANGE UP (ref 3.8–5.2)
RBC # FLD: 13.6 % — SIGNIFICANT CHANGE UP (ref 10.3–14.5)
SODIUM SERPL-SCNC: 136 MMOL/L — SIGNIFICANT CHANGE UP (ref 135–145)
WBC # BLD: 7.62 K/UL — SIGNIFICANT CHANGE UP (ref 3.8–10.5)
WBC # FLD AUTO: 7.62 K/UL — SIGNIFICANT CHANGE UP (ref 3.8–10.5)

## 2022-08-18 PROCEDURE — 93458 L HRT ARTERY/VENTRICLE ANGIO: CPT | Mod: 26

## 2022-08-18 RX ADMIN — Medication 40 MILLIGRAM(S): at 06:26

## 2022-08-18 RX ADMIN — HEPARIN SODIUM UNIT(S)/HR: 5000 INJECTION INTRAVENOUS; SUBCUTANEOUS at 07:44

## 2022-08-18 RX ADMIN — LATANOPROST 1 DROP(S): 0.05 SOLUTION/ DROPS OPHTHALMIC; TOPICAL at 21:19

## 2022-08-18 RX ADMIN — Medication 1: at 09:11

## 2022-08-18 RX ADMIN — INSULIN GLARGINE 15 UNIT(S): 100 INJECTION, SOLUTION SUBCUTANEOUS at 21:21

## 2022-08-18 RX ADMIN — Medication 81 MILLIGRAM(S): at 12:27

## 2022-08-18 RX ADMIN — LISINOPRIL 2.5 MILLIGRAM(S): 2.5 TABLET ORAL at 06:26

## 2022-08-18 RX ADMIN — HEPARIN SODIUM UNIT(S)/HR: 5000 INJECTION INTRAVENOUS; SUBCUTANEOUS at 03:54

## 2022-08-18 RX ADMIN — ISOSORBIDE DINITRATE 10 MILLIGRAM(S): 5 TABLET ORAL at 06:26

## 2022-08-18 RX ADMIN — ATORVASTATIN CALCIUM 80 MILLIGRAM(S): 80 TABLET, FILM COATED ORAL at 21:20

## 2022-08-18 RX ADMIN — CARVEDILOL PHOSPHATE 3.12 MILLIGRAM(S): 80 CAPSULE, EXTENDED RELEASE ORAL at 06:26

## 2022-08-18 RX ADMIN — ISOSORBIDE DINITRATE 10 MILLIGRAM(S): 5 TABLET ORAL at 12:28

## 2022-08-18 RX ADMIN — Medication 1: at 12:53

## 2022-08-18 RX ADMIN — Medication 5 UNIT(S): at 09:11

## 2022-08-18 NOTE — PROGRESS NOTE ADULT - ASSESSMENT
81 y/o F (resides in [place], visiting family in [place] since early July) with PMH of CAD (s/p stent placement), diastolic CHF, DM, GERD, CVA (with no residual deficits), HLD. Presents to the ED with c/o SOB with associated LE edema, orthopnea/PND. Also with associated chest pressure with radiation to L arm x1 day. Reportedly with O2 sats 77% on RA on EMS arrival, placed on CPAP en route. CXR with pulmonary edema, small b/l pl effusions. Pulmonary called to consult for SOB.

## 2022-08-18 NOTE — PROGRESS NOTE ADULT - SUBJECTIVE AND OBJECTIVE BOX
Date of service 8/18/22    S: no chest pain or sob; ros otherwise negative.     Review of Systems:   Constitutional: [ ] fevers, [ ] chills.   Skin: [ ] dry skin. [ ] rashes.  Psychiatric: [ ] depression, [ ] anxiety.   Gastrointestinal: [ ] BRBPR, [ ] melena.   Neurological: [ ] confusion. [ ] seizures. [ ] shuffling gait.   Ears,Nose,Mouth and Throat: [ ] ear pain [ ] sore throat.   Eyes: [ ] diplopia.   Respiratory: [ ] hemoptysis. [ ] shortness of breath  Cardiovascular: See HPI above  Hematologic/Lymphatic: [ ] anemia. [ ] painful nodes. [ ] prolonged bleeding.   Genitourinary: [ ] hematuria. [ ] flank pain.   Endocrine: [ ] significant change in weight. [ ] intolerance to heat and cold.     Review of systems [x ] otherwise negative, [ ] otherwise unable to obtain    FH: no family history of sudden cardiac death in first degree relatives    SH: [ ] tobacco, [ ] alcohol, [ ] drugs    aspirin enteric coated 81 milliGRAM(s) Oral daily  atorvastatin 80 milliGRAM(s) Oral at bedtime  carvedilol 3.125 milliGRAM(s) Oral every 12 hours  dextrose 5%. 1000 milliLiter(s) IV Continuous <Continuous>  dextrose 5%. 1000 milliLiter(s) IV Continuous <Continuous>  dextrose 50% Injectable 25 Gram(s) IV Push once  dextrose 50% Injectable 12.5 Gram(s) IV Push once  dextrose 50% Injectable 25 Gram(s) IV Push once  dextrose Oral Gel 15 Gram(s) Oral once PRN  furosemide    Tablet 40 milliGRAM(s) Oral daily  glucagon  Injectable 1 milliGRAM(s) IntraMuscular once  heparin  Infusion.  Unit(s)/Hr IV Continuous <Continuous>  insulin glargine Injectable (LANTUS) 15 Unit(s) SubCutaneous at bedtime  insulin lispro (ADMELOG) corrective regimen sliding scale   SubCutaneous three times a day before meals  insulin lispro Injectable (ADMELOG) 5 Unit(s) SubCutaneous three times a day before meals  isosorbide   dinitrate Tablet (ISORDIL) 10 milliGRAM(s) Oral three times a day  latanoprost 0.005% Ophthalmic Solution 1 Drop(s) Both EYES at bedtime  lisinopril 2.5 milliGRAM(s) Oral daily                            11.3   7.62  )-----------( 220      ( 18 Aug 2022 03:13 )             36.9       08-18    136  |  100  |  38<H>  ----------------------------<  133<H>  4.4   |  23  |  1.19    Ca    9.4      18 Aug 2022 03:13  Phos  4.2     08-18  Mg     2.30     08-18      T(C): 36.3 (08-18-22 @ 12:25), Max: 36.6 (08-17-22 @ 14:02)  HR: 68 (08-18-22 @ 12:25) (68 - 71)  BP: 129/77 (08-18-22 @ 12:25) (107/63 - 129/77)  RR: 17 (08-18-22 @ 12:25) (17 - 18)  SpO2: 99% (08-18-22 @ 12:25) (98% - 99%)  Wt(kg): -    General: Well nourished in no acute distress. Alert and Oriented * 3.   Head: Normocephalic and atraumatic.   Neck: No JVD. No bruits. Supple. Does not appear to be enlarged.   Cardiovascular: + S1,S2 ; RRR Soft systolic murmur at the left lower sternal border. No rubs noted.    Lungs: CTA b/l. No rhonchi, rales or wheezes.   Abdomen: + BS, soft. Non tender. Non distended. No rebound. No guarding.   Extremities: No clubbing/cyanosis/edema.   Neurologic: Moves all four extremities. Full range of motion.   Skin: Warm and moist. The patient's skin has normal elasticity and good skin turgor.   Psychiatric: Appropriate mood and affect.  Musculoskeletal: Normal range of motion, normal strength    DATA    TELEMETRY: NSR	    < from: Transthoracic Echocardiogram (08.30.19 @ 16:50) >  OBSERVATIONS:  Mitral Valve: Mitral annular calcification, otherwise  normal mitralvalve. Mild mitral regurgitation.  Aortic Root: Normal aortic root.  Aortic Valve: Aortic valve leaflet morphology not well  visualized.  Left Atrium: Mildly dilated left atrium.  LA volume index =  37 cc/m2.  Left Ventricle: Normal left ventricular systolic function.  No segmental wall motion abnormalities. Mild diastolic  dysfunction (Stage I).  Right Heart: Normal right atrium. Normal right ventricular  size and function. Normal tricuspid valve. Minimal  tricuspid regurgitation. Normal pulmonic valve.  Pericardium/PleuraNormal pericardium with no pericardial  effusion.    < from: TTE with Doppler (w/Cont) (08.15.22 @ 08:22) >  ------------------------------------------------------------------------  CONCLUSIONS:  1. Mildly dilated left atrium.  LA volume index = 38 cc/m2.  2. Mild segmental left ventricular systolic dysfunction.  The mid and apical anteroseptal and apical inferior walls  are hypokinetic.  3. Reversal of the E-A  waves of the mitral inflow pattern  is consistent with diastolic LV dysfunction.  4. Normal right ventricular size and function.  *** Compared with echocardiogram of 8/30/2019, there are  new wall motion abnormalities.  ------------------------------------------------------------------------  Confirmed on  8/15/2022 - 09:50:41 by Camden Olmos M.D.  ------------------------------------------------------------------------    < end of copied text >      < from: Cardiac Cath Lab - Adult (09.03.19 @ 14:03) >  VENTRICLES: No left ventriculogram was performed.  CORONARY VESSELS: The coronary circulation is right dominant.  LM:   --  LM: Angiography showed mild atherosclerosis with no flow limiting  lesions.  LAD:   --  Proximal LAD: There was a diffuse 10 % stenosis at the site of a  prior stent. The lesion was eccentric. There was NGUYEN grade 3 flow through  the vessel (brisk flow).  --  Mid LAD: There was a diffuse 20 % stenosis at the site of a prior  stent. The lesion was eccentric. There was NGUYEN grade 3 flow through the  vessel (brisk flow).  --  D1: There was a tubular 30 % stenosis at the site of a prior stent. The  lesion was smoothly contoured. There was NGUYEN grade 3 flow through the  vessel (brisk flow).  CX:   --  Circumflex: Angiography showed mild atherosclerosis with no flow  limiting lesions.  RI:   --  Ramus intermedius: There was a diffuse 30 % stenosis at the site  of a prior stent. The lesion was eccentric. There was NGUYEN grade 3 flow  through the vessel (brisk flow).  RCA:   --  Mid RCA: There was a tubular 20 % stenosis at a site with no  prior intervention. The lesion was eccentric. There was NGUYEN grade 3 flow  through the vessel (brisk flow).  --  RPDA: Angiography showed mild atherosclerosis with no flow limiting  lesions.  --  RPLS: Angiography showed mild atherosclerosis with no flow limiting  lesions.  COMPLICATIONS: There were no complications.  DIAGNOSTIC IMPRESSIONS: Patent stents LAD, D-1 and Ramus  No significant obstructive CAD  DIAGNOSTIC RECOMMENDATIONS: Medical therapy  INTERVENTIONAL IMPRESSIONS: Patent stents LAD, D-1 and Ramus  No significant obstructive CAD  INTERVENTIONAL RECOMMENDATIONS: Medical therapy  Prepared and signed by  Nia Izaguirre M.D.  Signed 09/03/2019 16:11:54    < end of copied text >        ASSESSMENT/PLAN: 	Ms Olivo is an 82y Female known to our group thru sporadic care when she visits from [place], with PMH CAD, remote PCI 2006, last cath here in 2019 as above with patent stents, last coronary angio ~2020 with patent stents ([place]) admitted with acute onset shortness of breath c/w acute CHF and chest pain.    -pt. volume status improved s/p IV lasix, transitioned to PO  -renal eval with Dr. Morris appreciated  -tte with new segmental LV dysfunction   -cath when medically optimized--anticipate today  -held eliquis and started hep gtt in anticipation of cath     Hannah CALVILLO  [phone]

## 2022-08-18 NOTE — PROGRESS NOTE ADULT - ASSESSMENT
ASSESSMENT/PLAN:   82yoF w/Hx of CAD w/2 stents on eliquis, DM, HTN, HLD, CHF on lasix 40mg PO, BIBEMS w/SOB. Pt woke up at 4am feeling SOB  ALLA in the setting of active diuresis.  This is alla based on AKIN criteria of a jump of creatinine of 0.3mg    1 Renal - She got 2 doses of Lasix IV, will restart Lasix 40 mg po today   No need for renal sono  2 CVS - No renal objection to proceed with cardiac cath to elucidate the anatomy given the drop in EF  3 Pulm - Off oxygen at present and sat is 99%    Jennie Silva NP-C  [facility]  [phone]      ASSESSMENT/PLAN:   82yoF w/Hx of CAD w/2 stents on eliquis, DM, HTN, HLD, CHF on lasix 40mg PO, BIBEMS w/SOB. Pt woke up at 4am feeling SOB  ALLA in the setting of active diuresis.  This is alla based on AKIN criteria of a jump of creatinine of 0.3mg    1 Renal - S/p 2 doses of Lasix IV, will restart Lasix 40 mg po today   No need for renal sono  2 CVS - No renal objection to proceed with cardiac cath to elucidate the anatomy given the drop in EF  3 Pulm - Off oxygen at present and sat is 98%    Jennie Silva NP-C  [facility]  [phone]      ASSESSMENT/PLAN:   82yoF w/Hx of CAD w/2 stents on eliquis, DM, HTN, HLD, CHF on lasix 40mg PO, BIBEMS w/SOB. Pt woke up at 4am feeling SOB  ALLA in the setting of active diuresis.  This is alla based on AKIN criteria of a jump of creatinine of 0.3mg    1 Renal - Lasix 40 mg po today;  Creatinine improved   No need for renal sono  2 CVS - No renal objection to proceed with cardiac cath to elucidate the anatomy given the drop in EF  3 Pulm - Off oxygen at present and sat is 98%    Jennie Silva NP-C  [facility]  [phone]

## 2022-08-18 NOTE — PROGRESS NOTE ADULT - SUBJECTIVE AND OBJECTIVE BOX
Date of Service: 08-18-22 @ 12:22    Patient is a 82y old  Female who presents with a chief complaint of SOB (18 Aug 2022 12:11)    Any change in ROS:   No new respiratory events overnight. Denies SOB/CP.    MEDICATIONS  (STANDING):  aspirin enteric coated 81 milliGRAM(s) Oral daily  atorvastatin 80 milliGRAM(s) Oral at bedtime  carvedilol 3.125 milliGRAM(s) Oral every 12 hours  dextrose 5%. 1000 milliLiter(s) (50 mL/Hr) IV Continuous <Continuous>  dextrose 5%. 1000 milliLiter(s) (100 mL/Hr) IV Continuous <Continuous>  dextrose 50% Injectable 25 Gram(s) IV Push once  dextrose 50% Injectable 12.5 Gram(s) IV Push once  dextrose 50% Injectable 25 Gram(s) IV Push once  furosemide    Tablet 40 milliGRAM(s) Oral daily  glucagon  Injectable 1 milliGRAM(s) IntraMuscular once  heparin  Infusion.  Unit(s)/Hr (15 mL/Hr) IV Continuous <Continuous>  insulin glargine Injectable (LANTUS) 15 Unit(s) SubCutaneous at bedtime  insulin lispro (ADMELOG) corrective regimen sliding scale   SubCutaneous three times a day before meals  insulin lispro Injectable (ADMELOG) 5 Unit(s) SubCutaneous three times a day before meals  isosorbide   dinitrate Tablet (ISORDIL) 10 milliGRAM(s) Oral three times a day  latanoprost 0.005% Ophthalmic Solution 1 Drop(s) Both EYES at bedtime  lisinopril 2.5 milliGRAM(s) Oral daily    MEDICATIONS  (PRN):  dextrose Oral Gel 15 Gram(s) Oral once PRN Blood Glucose LESS THAN 70 milliGRAM(s)/deciliter    Vital Signs Last 24 Hrs  T(C): 36.4 (18 Aug 2022 06:00), Max: 36.6 (17 Aug 2022 14:02)  T(F): 97.5 (18 Aug 2022 06:00), Max: 97.9 (17 Aug 2022 17:49)  HR: 69 (18 Aug 2022 06:00) (68 - 71)  BP: 128/65 (18 Aug 2022 06:00) (107/63 - 128/65)  BP(mean): --  RR: 18 (18 Aug 2022 06:00) (18 - 18)  SpO2: 98% (18 Aug 2022 06:00) (98% - 98%)    Parameters below as of 18 Aug 2022 06:00  Patient On (Oxygen Delivery Method): room air    I&O's Summary    17 Aug 2022 07:01  -  18 Aug 2022 07:00  --------------------------------------------------------  IN: 549 mL / OUT: 1000 mL / NET: -451 mL    Physical Exam:   GENERAL: NAD  HEENT: SELVIN  ENMT: No tonsillar erythema, exudates, or enlargement  NECK: Supple, No JVD  CHEST/LUNG: Few scattered crackles  CVS: Regular rate and rhythm  GI: : Soft, Nontender, Nondistended  NERVOUS SYSTEM:  Alert & Oriented X3  EXTREMITIES:  2+ Peripheral Pulses, No clubbing, cyanosis, or edema  SKIN: No rashes or lesions  PSYCH: Appropriate    Labs:  31, 27                            11.3   7.62  )-----------( 220      ( 18 Aug 2022 03:13 )             36.9                         11.5   7.87  )-----------( 230      ( 17 Aug 2022 04:44 )             36.6                         11.5   8.34  )-----------( 251      ( 16 Aug 2022 20:30 )             38.0                         11.2   7.27  )-----------( 230      ( 16 Aug 2022 07:31 )             36.3                         11.4   6.88  )-----------( 244      ( 15 Aug 2022 07:54 )             35.3     08-18    136  |  100  |  38<H>  ----------------------------<  133<H>  4.4   |  23  |  1.19  08-17    137  |  98  |  43<H>  ----------------------------<  186<H>  4.2   |  26  |  1.38<H>  08-16    138  |  101  |  40<H>  ----------------------------<  205<H>  4.2   |  23  |  1.35<H>  08-15    135  |  98  |  35<H>  ----------------------------<  175<H>  3.4<L>   |  23  |  1.29    Ca    9.4      18 Aug 2022 03:13  Ca    9.7      17 Aug 2022 04:44  Phos  4.2     08-18  Phos  5.0     08-17  Mg     2.30     08-18  Mg     2.40     08-17    CAPILLARY BLOOD GLUCOSE  POCT Blood Glucose.: 184 mg/dL (18 Aug 2022 08:51)  POCT Blood Glucose.: 161 mg/dL (17 Aug 2022 21:53)  POCT Blood Glucose.: 236 mg/dL (17 Aug 2022 17:41)  POCT Blood Glucose.: 161 mg/dL (17 Aug 2022 13:06)    PTT - ( 18 Aug 2022 03:13 )  PTT:65.1 sec    Studies  CXR cx< from: Xray Chest 1 View- PORTABLE-Routine (Xray Chest 1 View- PORTABLE-Routine .) (08.14.22 @ 16:11) >  INTERPRETATION:  Chest one view    HISTORY: Pulmonary edema    COMPARISON STUDY: 8/12/2022    Frontal expiratory view of the chest showsthe heart to be similar in   size. Coronary artery stents are again noted.    The lungs show less pulmonary congestion and there is no evidence of   pneumothorax nor pleural effusion.    IMPRESSION:  Decreased congestive changes.    < end of copied text >    < from: TTE with Doppler (w/Cont) (08.15.22 @ 08:22) >  OBSERVATIONS:  Mitral Valve: Normal mitral valve.  Aortic Root: Normal aortic root.  Aortic Valve: Normal trileaflet aortic valve.  Left Atrium: Mildly dilated left atrium. LA volume index =  38 cc/m2.  Left Ventricle: Mild segmental left ventricular systolic  dysfunction. The mid and apical anteroseptal and apical  inferior walls are hypokinetic.  Normal left ventricular  internal dimensions and wall thicknesses. Reversal of the  E-A  waves of the mitral inflow pattern is consistent with  diastolic LV dysfunction.  Right Heart: Normal right atrium. Normal right ventricular  size and function. Normal tricuspid valve. Normal pulmonic  valve.  Pericardium/PleuraNormal pericardium with no pericardial  effusion.  ------------------------------------------------------------------------  CONCLUSIONS:  1. Mildly dilated left atrium.  LA volume index = 38 cc/m2.  2. Mild segmental left ventricular systolic dysfunction.  The mid and apical anteroseptal and apical inferior walls  are hypokinetic.  3. Reversal of the E-A  waves of the mitral inflow pattern  is consistent with diastolic LV dysfunction.  4. Normal right ventricular size and function.  *** Compared with echocardiogram of 8/30/2019, there are  new wall motion abnormalities.    < end of copied text >

## 2022-08-18 NOTE — CHART NOTE - NSCHARTNOTEFT_GEN_A_CORE
Pt s/p diagnostic LHC via right femoral artery access. If site stable, Heparin gtt can start at 1:30 AM (which is 6 hours post sheath removal) or Eliquis can resume in AM on 8/19.

## 2022-08-18 NOTE — PROGRESS NOTE ADULT - SUBJECTIVE AND OBJECTIVE BOX
NEPHROLOGY     Patient seen and examined.    MEDICATIONS  (STANDING):  aspirin enteric coated 81 milliGRAM(s) Oral daily  atorvastatin 80 milliGRAM(s) Oral at bedtime  carvedilol 3.125 milliGRAM(s) Oral every 12 hours  dextrose 5%. 1000 milliLiter(s) (50 mL/Hr) IV Continuous <Continuous>  dextrose 5%. 1000 milliLiter(s) (100 mL/Hr) IV Continuous <Continuous>  dextrose 50% Injectable 25 Gram(s) IV Push once  dextrose 50% Injectable 12.5 Gram(s) IV Push once  dextrose 50% Injectable 25 Gram(s) IV Push once  furosemide    Tablet 40 milliGRAM(s) Oral daily  glucagon  Injectable 1 milliGRAM(s) IntraMuscular once  heparin  Infusion.  Unit(s)/Hr (15 mL/Hr) IV Continuous <Continuous>  insulin glargine Injectable (LANTUS) 15 Unit(s) SubCutaneous at bedtime  insulin lispro (ADMELOG) corrective regimen sliding scale   SubCutaneous three times a day before meals  insulin lispro Injectable (ADMELOG) 5 Unit(s) SubCutaneous three times a day before meals  isosorbide   dinitrate Tablet (ISORDIL) 10 milliGRAM(s) Oral three times a day  latanoprost 0.005% Ophthalmic Solution 1 Drop(s) Both EYES at bedtime  lisinopril 2.5 milliGRAM(s) Oral daily    VITALS:  T(C): , Max: 36.6 (08-17-22 @ 14:02)  T(F): , Max: 97.9 (08-17-22 @ 17:49)  HR: 69 (08-18-22 @ 06:00)  BP: 128/65 (08-18-22 @ 06:00)  RR: 18 (08-18-22 @ 06:00)  SpO2: 98% (08-18-22 @ 06:00)    I and O's:    08-17 @ 07:01  -  08-18 @ 07:00  --------------------------------------------------------  IN: 549 mL / OUT: 1000 mL / NET: -451 mL    PHYSICAL EXAM:  Constitutional: NAD  HEENT: EOMI  Neck:  No JVD, supple .  Respiratory: CTA B/L  Cardiovascular: S1 and S2, RRR  Gastrointestinal: + BS, soft, NT, ND  Extremities: No peripheral edema, + peripheral pulses  Neurological: A/O x 3, CN2-12 intact  Psychiatric: Normal mood, normal affect  : No Reinoso    LABS:                        11.3   7.62  )-----------( 220      ( 18 Aug 2022 03:13 )             36.9     08-18    136  |  100  |  38<H>  ----------------------------<  133<H>  4.4   |  23  |  1.19    Ca    9.4      18 Aug 2022 03:13  Phos  4.2     08-18  Mg     2.30     08-18   NEPHROLOGY     Patient seen and examined, reports feeling well, denies pain, no sob, comfortable on room air, currently in no acute distress.     MEDICATIONS  (STANDING):  aspirin enteric coated 81 milliGRAM(s) Oral daily  atorvastatin 80 milliGRAM(s) Oral at bedtime  carvedilol 3.125 milliGRAM(s) Oral every 12 hours  dextrose 5%. 1000 milliLiter(s) (50 mL/Hr) IV Continuous <Continuous>  dextrose 5%. 1000 milliLiter(s) (100 mL/Hr) IV Continuous <Continuous>  dextrose 50% Injectable 25 Gram(s) IV Push once  dextrose 50% Injectable 12.5 Gram(s) IV Push once  dextrose 50% Injectable 25 Gram(s) IV Push once  furosemide    Tablet 40 milliGRAM(s) Oral daily  glucagon  Injectable 1 milliGRAM(s) IntraMuscular once  heparin  Infusion.  Unit(s)/Hr (15 mL/Hr) IV Continuous <Continuous>  insulin glargine Injectable (LANTUS) 15 Unit(s) SubCutaneous at bedtime  insulin lispro (ADMELOG) corrective regimen sliding scale   SubCutaneous three times a day before meals  insulin lispro Injectable (ADMELOG) 5 Unit(s) SubCutaneous three times a day before meals  isosorbide   dinitrate Tablet (ISORDIL) 10 milliGRAM(s) Oral three times a day  latanoprost 0.005% Ophthalmic Solution 1 Drop(s) Both EYES at bedtime  lisinopril 2.5 milliGRAM(s) Oral daily    VITALS:  T(C): , Max: 36.6 (08-17-22 @ 14:02)  T(F): , Max: 97.9 (08-17-22 @ 17:49)  HR: 69 (08-18-22 @ 06:00)  BP: 128/65 (08-18-22 @ 06:00)  RR: 18 (08-18-22 @ 06:00)  SpO2: 98% (08-18-22 @ 06:00)    I and O's:    08-17 @ 07:01  -  08-18 @ 07:00  --------------------------------------------------------  IN: 549 mL / OUT: 1000 mL / NET: -451 mL    PHYSICAL EXAM:  Constitutional: NAD  HEENT: EOMI  Neck:  No JVD, supple .  Respiratory: CTA B/L  Cardiovascular: S1 and S2, RRR  Gastrointestinal: + BS, soft, NT, ND  Extremities: No peripheral edema, + peripheral pulses  Neurological: A/O x 3, CN2-12 intact  Psychiatric: Normal mood, normal affect  : + [employer]    LABS:                        11.3   7.62  )-----------( 220      ( 18 Aug 2022 03:13 )             36.9     08-18    136  |  100  |  38<H>  ----------------------------<  133<H>  4.4   |  23  |  1.19    Ca    9.4      18 Aug 2022 03:13  Phos  4.2     08-18  Mg     2.30     08-18   NEPHROLOGY     Patient seen and examined, reports feeling well, denies pain, no sob, comfortable on room air, currently in no acute distress.     MEDICATIONS  (STANDING):  aspirin enteric coated 81 milliGRAM(s) Oral daily  atorvastatin 80 milliGRAM(s) Oral at bedtime.  carvedilol 3.125 milliGRAM(s) Oral every 12 hours  dextrose 5%. 1000 milliLiter(s) (50 mL/Hr) IV Continuous <Continuous>  dextrose 5%. 1000 milliLiter(s) (100 mL/Hr) IV Continuous <Continuous>  dextrose 50% Injectable 25 Gram(s) IV Push once  dextrose 50% Injectable 12.5 Gram(s) IV Push once  dextrose 50% Injectable 25 Gram(s) IV Push once  furosemide    Tablet 40 milliGRAM(s) Oral daily  glucagon  Injectable 1 milliGRAM(s) IntraMuscular once  heparin  Infusion.  Unit(s)/Hr (15 mL/Hr) IV Continuous <Continuous>  insulin glargine Injectable (LANTUS) 15 Unit(s) SubCutaneous at bedtime  insulin lispro (ADMELOG) corrective regimen sliding scale   SubCutaneous three times a day before meals  insulin lispro Injectable (ADMELOG) 5 Unit(s) SubCutaneous three times a day before meals  isosorbide   dinitrate Tablet (ISORDIL) 10 milliGRAM(s) Oral three times a day  latanoprost 0.005% Ophthalmic Solution 1 Drop(s) Both EYES at bedtime  lisinopril 2.5 milliGRAM(s) Oral daily    VITALS:  T(C): , Max: 36.6 (08-17-22 @ 14:02)  T(F): , Max: 97.9 (08-17-22 @ 17:49)  HR: 69 (08-18-22 @ 06:00)  BP: 128/65 (08-18-22 @ 06:00)  RR: 18 (08-18-22 @ 06:00)  SpO2: 98% (08-18-22 @ 06:00)    I and O's:    08-17 @ 07:01  -  08-18 @ 07:00  --------------------------------------------------------  IN: 549 mL / OUT: 1000 mL / NET: -451 mL    PHYSICAL EXAM:  Constitutional: NAD  HEENT: EOMI  Neck:  No JVD, supple .  Respiratory: CTA B/L  Cardiovascular: S1 and S2, RRR  Gastrointestinal: + BS, soft, NT, ND  Extremities: No peripheral edema, + peripheral pulses  Neurological: A/O x 3, CN2-12 intact  Psychiatric: Normal mood, normal affect  : + [employer]    LABS:                        11.3   7.62  )-----------( 220      ( 18 Aug 2022 03:13 )             36.9     08-18    136  |  100  |  38<H>  ----------------------------<  133<H>  4.4   |  23  |  1.19    Ca    9.4      18 Aug 2022 03:13  Phos  4.2     08-18  Mg     2.30     08-18

## 2022-08-18 NOTE — PROGRESS NOTE ADULT - SUBJECTIVE AND OBJECTIVE BOX
Patient is a 82y old  Female who presents with a chief complaint of SOB (18 Aug 2022 11:34)    Date of servie : 08-18-22 @ 12:11  INTERVAL HPI/OVERNIGHT EVENTS:  T(C): 36.4 (08-18-22 @ 06:00), Max: 36.6 (08-17-22 @ 14:02)  HR: 69 (08-18-22 @ 06:00) (68 - 71)  BP: 128/65 (08-18-22 @ 06:00) (107/63 - 128/65)  RR: 18 (08-18-22 @ 06:00) (18 - 18)  SpO2: 98% (08-18-22 @ 06:00) (98% - 98%)  Wt(kg): --  I&O's Summary    17 Aug 2022 07:01  -  18 Aug 2022 07:00  --------------------------------------------------------  IN: 549 mL / OUT: 1000 mL / NET: -451 mL        LABS:                        11.3   7.62  )-----------( 220      ( 18 Aug 2022 03:13 )             36.9     08-18    136  |  100  |  38<H>  ----------------------------<  133<H>  4.4   |  23  |  1.19    Ca    9.4      18 Aug 2022 03:13  Phos  4.2     08-18  Mg     2.30     08-18      PTT - ( 18 Aug 2022 03:13 )  PTT:65.1 sec    CAPILLARY BLOOD GLUCOSE      POCT Blood Glucose.: 184 mg/dL (18 Aug 2022 08:51)  POCT Blood Glucose.: 161 mg/dL (17 Aug 2022 21:53)  POCT Blood Glucose.: 236 mg/dL (17 Aug 2022 17:41)  POCT Blood Glucose.: 161 mg/dL (17 Aug 2022 13:06)            MEDICATIONS  (STANDING):  aspirin enteric coated 81 milliGRAM(s) Oral daily  atorvastatin 80 milliGRAM(s) Oral at bedtime  carvedilol 3.125 milliGRAM(s) Oral every 12 hours  dextrose 5%. 1000 milliLiter(s) (50 mL/Hr) IV Continuous <Continuous>  dextrose 5%. 1000 milliLiter(s) (100 mL/Hr) IV Continuous <Continuous>  dextrose 50% Injectable 25 Gram(s) IV Push once  dextrose 50% Injectable 12.5 Gram(s) IV Push once  dextrose 50% Injectable 25 Gram(s) IV Push once  furosemide    Tablet 40 milliGRAM(s) Oral daily  glucagon  Injectable 1 milliGRAM(s) IntraMuscular once  heparin  Infusion.  Unit(s)/Hr (15 mL/Hr) IV Continuous <Continuous>  insulin glargine Injectable (LANTUS) 15 Unit(s) SubCutaneous at bedtime  insulin lispro (ADMELOG) corrective regimen sliding scale   SubCutaneous three times a day before meals  insulin lispro Injectable (ADMELOG) 5 Unit(s) SubCutaneous three times a day before meals  isosorbide   dinitrate Tablet (ISORDIL) 10 milliGRAM(s) Oral three times a day  latanoprost 0.005% Ophthalmic Solution 1 Drop(s) Both EYES at bedtime  lisinopril 2.5 milliGRAM(s) Oral daily    MEDICATIONS  (PRN):  dextrose Oral Gel 15 Gram(s) Oral once PRN Blood Glucose LESS THAN 70 milliGRAM(s)/deciliter          PHYSICAL EXAM:  GENERAL: NAD, well-groomed, well-developed  HEAD:  Atraumatic, Normocephalic  CHEST/LUNG: Clear to percussion bilaterally; No rales, rhonchi, wheezing, or rubs  HEART: Regular rate and rhythm; No murmurs, rubs, or gallops  ABDOMEN: Soft, Nontender, Nondistended; Bowel sounds present  EXTREMITIES:  2+ Peripheral Pulses, No clubbing, cyanosis, or edema  LYMPH: No lymphadenopathy noted  SKIN: No rashes or lesions    Care Discussed with Consultants/Other Providers [ ] YES  [ ] NO

## 2022-08-18 NOTE — PROGRESS NOTE ADULT - PROBLEM SELECTOR PLAN 4
TTE with mild LV systolic dysfunction, diastolic dysfunction. New wall motion abnormalities.   -Plan for cardiac cath per cards when creatinine allows  -Diuresis as tolerated.

## 2022-08-18 NOTE — PROGRESS NOTE ADULT - NS ATTEND OPT1 GEN_ALL_CORE
I attest my time as attending is greater than 50% of the total combined time spent on qualifying patient care activities by the PA/NP and attending.
I independently performed the documented:

## 2022-08-19 ENCOUNTER — TRANSCRIPTION ENCOUNTER (OUTPATIENT)
Age: 82
End: 2022-08-19

## 2022-08-19 VITALS — WEIGHT: 187.39 LBS

## 2022-08-19 LAB
ANION GAP SERPL CALC-SCNC: 13 MMOL/L — SIGNIFICANT CHANGE UP (ref 7–14)
APTT BLD: 35.6 SEC — SIGNIFICANT CHANGE UP (ref 27–36.3)
BUN SERPL-MCNC: 33 MG/DL — HIGH (ref 7–23)
CALCIUM SERPL-MCNC: 9.6 MG/DL — SIGNIFICANT CHANGE UP (ref 8.4–10.5)
CHLORIDE SERPL-SCNC: 102 MMOL/L — SIGNIFICANT CHANGE UP (ref 98–107)
CO2 SERPL-SCNC: 23 MMOL/L — SIGNIFICANT CHANGE UP (ref 22–31)
CREAT SERPL-MCNC: 1.21 MG/DL — SIGNIFICANT CHANGE UP (ref 0.5–1.3)
EGFR: 44 ML/MIN/1.73M2 — LOW
GLUCOSE BLDC GLUCOMTR-MCNC: 176 MG/DL — HIGH (ref 70–99)
GLUCOSE BLDC GLUCOMTR-MCNC: 187 MG/DL — HIGH (ref 70–99)
GLUCOSE SERPL-MCNC: 134 MG/DL — HIGH (ref 70–99)
HCT VFR BLD CALC: 37.5 % — SIGNIFICANT CHANGE UP (ref 34.5–45)
HGB BLD-MCNC: 11.6 G/DL — SIGNIFICANT CHANGE UP (ref 11.5–15.5)
MAGNESIUM SERPL-MCNC: 2.3 MG/DL — SIGNIFICANT CHANGE UP (ref 1.6–2.6)
MCHC RBC-ENTMCNC: 27.6 PG — SIGNIFICANT CHANGE UP (ref 27–34)
MCHC RBC-ENTMCNC: 30.9 GM/DL — LOW (ref 32–36)
MCV RBC AUTO: 89.3 FL — SIGNIFICANT CHANGE UP (ref 80–100)
NRBC # BLD: 0 /100 WBCS — SIGNIFICANT CHANGE UP (ref 0–0)
NRBC # FLD: 0 K/UL — SIGNIFICANT CHANGE UP (ref 0–0)
PHOSPHATE SERPL-MCNC: 4.2 MG/DL — SIGNIFICANT CHANGE UP (ref 2.5–4.5)
PLATELET # BLD AUTO: 234 K/UL — SIGNIFICANT CHANGE UP (ref 150–400)
POTASSIUM SERPL-MCNC: 4.6 MMOL/L — SIGNIFICANT CHANGE UP (ref 3.5–5.3)
POTASSIUM SERPL-SCNC: 4.6 MMOL/L — SIGNIFICANT CHANGE UP (ref 3.5–5.3)
RBC # BLD: 4.2 M/UL — SIGNIFICANT CHANGE UP (ref 3.8–5.2)
RBC # FLD: 13.3 % — SIGNIFICANT CHANGE UP (ref 10.3–14.5)
SODIUM SERPL-SCNC: 138 MMOL/L — SIGNIFICANT CHANGE UP (ref 135–145)
WBC # BLD: 6.42 K/UL — SIGNIFICANT CHANGE UP (ref 3.8–10.5)
WBC # FLD AUTO: 6.42 K/UL — SIGNIFICANT CHANGE UP (ref 3.8–10.5)

## 2022-08-19 RX ORDER — FUROSEMIDE 40 MG
1 TABLET ORAL
Qty: 0 | Refills: 0 | DISCHARGE

## 2022-08-19 RX ORDER — APIXABAN 2.5 MG/1
5 TABLET, FILM COATED ORAL EVERY 12 HOURS
Refills: 0 | Status: DISCONTINUED | OUTPATIENT
Start: 2022-08-19 | End: 2022-08-19

## 2022-08-19 RX ORDER — ISOSORBIDE DINITRATE 5 MG/1
1 TABLET ORAL
Qty: 90 | Refills: 0
Start: 2022-08-19 | End: 2022-09-17

## 2022-08-19 RX ORDER — LISINOPRIL 2.5 MG/1
1 TABLET ORAL
Qty: 30 | Refills: 0
Start: 2022-08-19 | End: 2022-09-17

## 2022-08-19 RX ORDER — APIXABAN 2.5 MG/1
1 TABLET, FILM COATED ORAL
Qty: 60 | Refills: 0
Start: 2022-08-19 | End: 2022-09-17

## 2022-08-19 RX ORDER — HEPARIN SODIUM 5000 [USP'U]/ML
6500 INJECTION INTRAVENOUS; SUBCUTANEOUS EVERY 6 HOURS
Refills: 0 | Status: DISCONTINUED | OUTPATIENT
Start: 2022-08-19 | End: 2022-08-19

## 2022-08-19 RX ORDER — HEPARIN SODIUM 5000 [USP'U]/ML
3000 INJECTION INTRAVENOUS; SUBCUTANEOUS EVERY 6 HOURS
Refills: 0 | Status: DISCONTINUED | OUTPATIENT
Start: 2022-08-19 | End: 2022-08-19

## 2022-08-19 RX ORDER — TICAGRELOR 90 MG/1
1 TABLET ORAL
Qty: 0 | Refills: 0 | DISCHARGE

## 2022-08-19 RX ORDER — HEPARIN SODIUM 5000 [USP'U]/ML
6500 INJECTION INTRAVENOUS; SUBCUTANEOUS ONCE
Refills: 0 | Status: DISCONTINUED | OUTPATIENT
Start: 2022-08-19 | End: 2022-08-19

## 2022-08-19 RX ORDER — FUROSEMIDE 40 MG
1 TABLET ORAL
Qty: 30 | Refills: 0
Start: 2022-08-19 | End: 2022-09-17

## 2022-08-19 RX ORDER — HEPARIN SODIUM 5000 [USP'U]/ML
INJECTION INTRAVENOUS; SUBCUTANEOUS
Qty: 25000 | Refills: 0 | Status: DISCONTINUED | OUTPATIENT
Start: 2022-08-19 | End: 2022-08-19

## 2022-08-19 RX ADMIN — Medication 81 MILLIGRAM(S): at 14:44

## 2022-08-19 RX ADMIN — Medication 1: at 13:19

## 2022-08-19 RX ADMIN — CARVEDILOL PHOSPHATE 3.12 MILLIGRAM(S): 80 CAPSULE, EXTENDED RELEASE ORAL at 05:21

## 2022-08-19 RX ADMIN — ISOSORBIDE DINITRATE 10 MILLIGRAM(S): 5 TABLET ORAL at 14:45

## 2022-08-19 RX ADMIN — Medication 100 MILLIGRAM(S): at 14:45

## 2022-08-19 RX ADMIN — Medication 5 UNIT(S): at 13:19

## 2022-08-19 RX ADMIN — Medication 100 MILLIGRAM(S): at 02:10

## 2022-08-19 RX ADMIN — ISOSORBIDE DINITRATE 10 MILLIGRAM(S): 5 TABLET ORAL at 05:22

## 2022-08-19 RX ADMIN — Medication 40 MILLIGRAM(S): at 05:21

## 2022-08-19 RX ADMIN — LISINOPRIL 2.5 MILLIGRAM(S): 2.5 TABLET ORAL at 05:21

## 2022-08-19 RX ADMIN — Medication 5 UNIT(S): at 09:18

## 2022-08-19 RX ADMIN — APIXABAN 5 MILLIGRAM(S): 2.5 TABLET, FILM COATED ORAL at 05:21

## 2022-08-19 RX ADMIN — Medication 1: at 09:18

## 2022-08-19 NOTE — DISCHARGE NOTE PROVIDER - NSDCMRMEDTOKEN_GEN_ALL_CORE_FT
aspirin 81 mg oral delayed release tablet: 1 tab(s) orally once a day  atorvastatin 80 mg oral tablet: 1 tab(s) orally once a day (at bedtime)  Coreg 3.125 mg oral tablet: 1 tab(s) orally 2 times a day  latanoprost 0.005% ophthalmic solution: 1 drop(s) to each affected eye once a day (at bedtime)  Levemir 100 units/mL subcutaneous solution: 30 unit(s) subcutaneous once a day (at bedtime)  NovoLOG 100 units/mL subcutaneous solution: 10 unit(s) subcutaneous 3 times a day (before meals)  nystatin 100,000 units/g topical powder: 1 application topically 2 times a day  ticagrelor 90 mg oral tablet: 1 tab(s) orally 2 times a day   apixaban 5 mg oral tablet: 1 tab(s) orally every 12 hours  aspirin 81 mg oral delayed release tablet: 1 tab(s) orally once a day  atorvastatin 80 mg oral tablet: 1 tab(s) orally once a day (at bedtime)  Coreg 3.125 mg oral tablet: 1 tab(s) orally 2 times a day  furosemide 40 mg oral tablet: 1 tab(s) orally once a day  isosorbide dinitrate 10 mg oral tablet: 1 tab(s) orally 3 times a day  latanoprost 0.005% ophthalmic solution: 1 drop(s) to each affected eye once a day (at bedtime)  Levemir 100 units/mL subcutaneous solution: 30 unit(s) subcutaneous once a day (at bedtime)  lisinopril 2.5 mg oral tablet: 1 tab(s) orally once a day   NovoLOG 100 units/mL subcutaneous solution: 10 unit(s) subcutaneous 3 times a day (before meals)  nystatin 100,000 units/g topical powder: 1 application topically 2 times a day

## 2022-08-19 NOTE — DIETITIAN INITIAL EVALUATION ADULT - ORAL INTAKE PTA/DIET HISTORY
Pt currently lives with her daughter. Daughter does the cooking and grocery shopping. Daughter cooks "fairly healthy meals". No specific diet recall provided. No specific diet followed PTA. No supplements/Vitamins taken PTA.

## 2022-08-19 NOTE — PROGRESS NOTE ADULT - SUBJECTIVE AND OBJECTIVE BOX
Date of Service: 08-19-22 @ 12:23    Patient is a 82y old  Female who presents with a chief complaint of SOB. (19 Aug 2022 10:10)      Any change in ROS: Pt has some cough:  no sob:  no cough:     MEDICATIONS  (STANDING):  apixaban 5 milliGRAM(s) Oral every 12 hours  aspirin enteric coated 81 milliGRAM(s) Oral daily  atorvastatin 80 milliGRAM(s) Oral at bedtime  carvedilol 3.125 milliGRAM(s) Oral every 12 hours  dextrose 5%. 1000 milliLiter(s) (100 mL/Hr) IV Continuous <Continuous>  dextrose 5%. 1000 milliLiter(s) (50 mL/Hr) IV Continuous <Continuous>  dextrose 50% Injectable 25 Gram(s) IV Push once  dextrose 50% Injectable 12.5 Gram(s) IV Push once  dextrose 50% Injectable 25 Gram(s) IV Push once  furosemide    Tablet 40 milliGRAM(s) Oral daily  glucagon  Injectable 1 milliGRAM(s) IntraMuscular once  insulin glargine Injectable (LANTUS) 15 Unit(s) SubCutaneous at bedtime  insulin lispro (ADMELOG) corrective regimen sliding scale   SubCutaneous three times a day before meals  insulin lispro Injectable (ADMELOG) 5 Unit(s) SubCutaneous three times a day before meals  isosorbide   dinitrate Tablet (ISORDIL) 10 milliGRAM(s) Oral three times a day  latanoprost 0.005% Ophthalmic Solution 1 Drop(s) Both EYES at bedtime  lisinopril 2.5 milliGRAM(s) Oral daily    MEDICATIONS  (PRN):  dextrose Oral Gel 15 Gram(s) Oral once PRN Blood Glucose LESS THAN 70 milliGRAM(s)/deciliter    Vital Signs Last 24 Hrs  T(C): 36.7 (19 Aug 2022 05:20), Max: 36.7 (19 Aug 2022 05:20)  T(F): 98.1 (19 Aug 2022 05:20), Max: 98.1 (19 Aug 2022 05:20)  HR: 80 (19 Aug 2022 05:20) (68 - 81)  BP: 132/81 (19 Aug 2022 05:20) (129/77 - 132/81)  BP(mean): --  RR: 17 (19 Aug 2022 05:20) (17 - 17)  SpO2: 98% (19 Aug 2022 05:20) (98% - 99%)    Parameters below as of 19 Aug 2022 05:20  Patient On (Oxygen Delivery Method): room air        I&O's Summary    18 Aug 2022 07:01  -  19 Aug 2022 07:00  --------------------------------------------------------  IN: 510 mL / OUT: 1550 mL / NET: -1040 mL          Physical Exam:   GENERAL: NAD, well-groomed, well-developed  HEENT: SELVIN/   Atraumatic, Normocephalic  ENMT: No tonsillar erythema, exudates, or enlargement; Moist mucous membranes, Good dentition, No lesions  NECK: Supple, No JVD, Normal thyroid  CHEST/LUNG: Clear to auscultaion  CVS: Regular rate and rhythm; No murmurs, rubs, or gallops  GI: : Soft, Nontender, Nondistended; Bowel sounds present  NERVOUS SYSTEM:  Alert & Oriented X3  EXTREMITIES: - edema  LYMPH: No lymphadenopathy noted  SKIN: No rashes or lesions  ENDOCRINOLOGY: No Thyromegaly  PSYCH: Appropriate    Labs:  31                            11.6   6.42  )-----------( 234      ( 19 Aug 2022 05:32 )             37.5                         11.3   7.62  )-----------( 220      ( 18 Aug 2022 03:13 )             36.9                         11.5   7.87  )-----------( 230      ( 17 Aug 2022 04:44 )             36.6                         11.5   8.34  )-----------( 251      ( 16 Aug 2022 20:30 )             38.0                         11.2   7.27  )-----------( 230      ( 16 Aug 2022 07:31 )             36.3     08-19    138  |  102  |  33<H>  ----------------------------<  134<H>  4.6   |  23  |  1.21  08-18    136  |  100  |  38<H>  ----------------------------<  133<H>  4.4   |  23  |  1.19  08-17    137  |  98  |  43<H>  ----------------------------<  186<H>  4.2   |  26  |  1.38<H>  08-16    138  |  101  |  40<H>  ----------------------------<  205<H>  4.2   |  23  |  1.35<H>    Ca    9.6      19 Aug 2022 05:32  Ca    9.4      18 Aug 2022 03:13  Phos  4.2     08-19  Phos  4.2     08-18  Mg     2.30     08-19  Mg     2.30     08-18      CAPILLARY BLOOD GLUCOSE      POCT Blood Glucose.: 187 mg/dL (19 Aug 2022 09:01)  POCT Blood Glucose.: 127 mg/dL (18 Aug 2022 21:13)  POCT Blood Glucose.: 179 mg/dL (18 Aug 2022 12:24)        PTT - ( 19 Aug 2022 05:32 )  PTT:35.6 sec    rad< from: Xray Chest 1 View- PORTABLE-Routine (Xray Chest 1 View- PORTABLE-Routine .) (08.14.22 @ 16:11) >    HISTORY: Pulmonary edema    COMPARISON STUDY: 8/12/2022    Frontal expiratory view of the chest showsthe heart to be similar in   size. Coronary artery stents are again noted.    The lungs show less pulmonary congestion and there is no evidence of   pneumothorax nor pleural effusion.    IMPRESSION:  Decreased congestive changes.          Thank you for the courtesy of this referral.    --- End of Report ---            JUAN DANIEL DORAN MD; Attending Interventional Radiologist  This document has been electronically signed. Aug 15 2022  1:06PM    < end of copied text >        RECENT CULTURES:        RESPIRATORY CULTURES:          Studies  Chest X-RAY  CT SCAN Chest   Venous Dopplers: LE:   CT Abdomen  Others

## 2022-08-19 NOTE — PROGRESS NOTE ADULT - PROBLEM SELECTOR PLAN 4
TTE with mild LV systolic dysfunction, diastolic dysfunction. New wall motion abnormalities.   -Plan for cardiac cath per cards when creatinine allows  -Diuresis as tolerated.    8/19: per cards

## 2022-08-19 NOTE — PROGRESS NOTE ADULT - PROBLEM SELECTOR PROBLEM 1
Pulmonary embolism
Acute respiratory failure with hypoxia and hypercapnia
Pulmonary embolism

## 2022-08-19 NOTE — DIETITIAN INITIAL EVALUATION ADULT - NS FNS DIET ORDER
Diet, Consistent Carbohydrate/No Snacks:   1200mL Fluid Restriction ([id number])  Low Sodium (08-15-22 @ 17:12) [Active]

## 2022-08-19 NOTE — DIETITIAN INITIAL EVALUATION ADULT - ADD RECOMMEND
1) Continue current diet order: consistent carbohydrate (no snacks), low sodium  2) Monitor weights, labs, BM's, skin integrity, p.o. intake.

## 2022-08-19 NOTE — PROGRESS NOTE ADULT - PROBLEM SELECTOR PLAN 3
echo noted:   defer to cards~!
CXR with pulmonary edema, small b/l pl effusions  -Diuresis per cards  -Repeat CXR with decreased congestive changes.
repeat chest xray: breathing wise she seems pretty good:    8/15: resolved
CXR with pulmonary edema, small b/l pl effusions  -Diuresis per cards  -Repeat CXR with decreased congestive changes.
CXR with pulmonary edema, small b/l pl effusions  -Diuresis per cards  -Repeat CXR with decreased congestive changes.
CXR with pulmonary edema, small b/l pl effusions  -Diuresis per cards  -Repeat CXR with decreased congestive changes.    8/19: resolved

## 2022-08-19 NOTE — PROGRESS NOTE ADULT - PROBLEM SELECTOR PROBLEM 3
Pulmonary edema
Pulmonary edema
Acute on chronic diastolic congestive heart failure
Pulmonary edema

## 2022-08-19 NOTE — PROGRESS NOTE ADULT - PROVIDER SPECIALTY LIST ADULT
Cardiology
Cardiology
Hospitalist
Nephrology
Nephrology
Cardiology
Cardiology
Hospitalist
Hospitalist
Nephrology
Pulmonology
Cardiology
Hospitalist
Pulmonology

## 2022-08-19 NOTE — PROGRESS NOTE ADULT - SUBJECTIVE AND OBJECTIVE BOX
NEPHROLOGY     Patient seen and examined.    MEDICATIONS  (STANDING):  apixaban 5 milliGRAM(s) Oral every 12 hours  aspirin enteric coated 81 milliGRAM(s) Oral daily  atorvastatin 80 milliGRAM(s) Oral at bedtime  carvedilol 3.125 milliGRAM(s) Oral every 12 hours  dextrose 5%. 1000 milliLiter(s) (100 mL/Hr) IV Continuous <Continuous>  dextrose 5%. 1000 milliLiter(s) (50 mL/Hr) IV Continuous <Continuous>  dextrose 50% Injectable 25 Gram(s) IV Push once  dextrose 50% Injectable 12.5 Gram(s) IV Push once  dextrose 50% Injectable 25 Gram(s) IV Push once  furosemide    Tablet 40 milliGRAM(s) Oral daily  glucagon  Injectable 1 milliGRAM(s) IntraMuscular once  insulin glargine Injectable (LANTUS) 15 Unit(s) SubCutaneous at bedtime  insulin lispro (ADMELOG) corrective regimen sliding scale   SubCutaneous three times a day before meals  insulin lispro Injectable (ADMELOG) 5 Unit(s) SubCutaneous three times a day before meals  isosorbide   dinitrate Tablet (ISORDIL) 10 milliGRAM(s) Oral three times a day  latanoprost 0.005% Ophthalmic Solution 1 Drop(s) Both EYES at bedtime  lisinopril 2.5 milliGRAM(s) Oral daily    VITALS:  T(C): , Max: 36.7 (08-19-22 @ 05:20)  T(F): , Max: 98.1 (08-19-22 @ 05:20)  HR: 80 (08-19-22 @ 05:20)  BP: 132/81 (08-19-22 @ 05:20)  RR: 17 (08-19-22 @ 05:20)  SpO2: 98% (08-19-22 @ 05:20)    I and O's:    08-18 @ 07:01  -  08-19 @ 07:00  --------------------------------------------------------  IN: 510 mL / OUT: 1550 mL / NET: -1040 mL    PHYSICAL EXAM:  Constitutional: NAD  HEENT: EOMI  Neck:  No JVD, supple .  Respiratory: CTA B/L  Cardiovascular: S1 and S2, RRR  Gastrointestinal: + BS, soft, NT, ND  Extremities: No peripheral edema, + peripheral pulses  Neurological: A/O x 3, CN2-12 intact  Psychiatric: Normal mood, normal affect  : + [facility]    LABS:                        11.6   6.42  )-----------( 234      ( 19 Aug 2022 05:32 )             37.5     08-19    138  |  102  |  33<H>  ----------------------------<  134<H>  4.6   |  23  |  1.21    Ca    9.6      19 Aug 2022 05:32  Phos  4.2     08-19  Mg     2.30     08-19 NEPHROLOGY     Patient seen and examined.    MEDICATIONS  (STANDING):  apixaban 5 milliGRAM(s) Oral every 12 hours.  aspirin enteric coated 81 milliGRAM(s) Oral daily  atorvastatin 80 milliGRAM(s) Oral at bedtime  carvedilol 3.125 milliGRAM(s) Oral every 12 hours  dextrose 5%. 1000 milliLiter(s) (100 mL/Hr) IV Continuous <Continuous>  dextrose 5%. 1000 milliLiter(s) (50 mL/Hr) IV Continuous <Continuous>  dextrose 50% Injectable 25 Gram(s) IV Push once  dextrose 50% Injectable 12.5 Gram(s) IV Push once  dextrose 50% Injectable 25 Gram(s) IV Push once  furosemide    Tablet 40 milliGRAM(s) Oral daily  glucagon  Injectable 1 milliGRAM(s) IntraMuscular once  insulin glargine Injectable (LANTUS) 15 Unit(s) SubCutaneous at bedtime  insulin lispro (ADMELOG) corrective regimen sliding scale   SubCutaneous three times a day before meals  insulin lispro Injectable (ADMELOG) 5 Unit(s) SubCutaneous three times a day before meals  isosorbide   dinitrate Tablet (ISORDIL) 10 milliGRAM(s) Oral three times a day  latanoprost 0.005% Ophthalmic Solution 1 Drop(s) Both EYES at bedtime  lisinopril 2.5 milliGRAM(s) Oral daily    VITALS:  T(C): , Max: 36.7 (08-19-22 @ 05:20)  T(F): , Max: 98.1 (08-19-22 @ 05:20)  HR: 80 (08-19-22 @ 05:20)  BP: 132/81 (08-19-22 @ 05:20)  RR: 17 (08-19-22 @ 05:20)  SpO2: 98% (08-19-22 @ 05:20)    I and O's:    08-18 @ 07:01  -  08-19 @ 07:00  --------------------------------------------------------  IN: 510 mL / OUT: 1550 mL / NET: -1040 mL    PHYSICAL EXAM:  Constitutional: NAD.  HEENT: EOMI  Neck:  No JVD, supple .  Respiratory: CTA B/L  Cardiovascular: S1 and S2, RRR  Gastrointestinal: + BS, soft, NT, ND  Extremities: No peripheral edema, + peripheral pulses  Neurological: A/O x 3, CN2-12 intact  Psychiatric: Normal mood, normal affect  : + [facility]    LABS:                        11.6   6.42  )-----------( 234      ( 19 Aug 2022 05:32 )             37.5     08-19    138  |  102  |  33<H>  ----------------------------<  134<H>  4.6   |  23  |  1.21    Ca    9.6      19 Aug 2022 05:32  Phos  4.2     08-19  Mg     2.30     08-19 NEPHROLOGY     Patient seen and examined, resting comfortably, denies pain, no sob, comfortable on room air, in no acute distress.     MEDICATIONS  (STANDING):  apixaban 5 milliGRAM(s) Oral every 12 hours.  aspirin enteric coated 81 milliGRAM(s) Oral daily  atorvastatin 80 milliGRAM(s) Oral at bedtime  carvedilol 3.125 milliGRAM(s) Oral every 12 hours  dextrose 5%. 1000 milliLiter(s) (100 mL/Hr) IV Continuous <Continuous>  dextrose 5%. 1000 milliLiter(s) (50 mL/Hr) IV Continuous <Continuous>  dextrose 50% Injectable 25 Gram(s) IV Push once  dextrose 50% Injectable 12.5 Gram(s) IV Push once  dextrose 50% Injectable 25 Gram(s) IV Push once  furosemide    Tablet 40 milliGRAM(s) Oral daily  glucagon  Injectable 1 milliGRAM(s) IntraMuscular once  insulin glargine Injectable (LANTUS) 15 Unit(s) SubCutaneous at bedtime  insulin lispro (ADMELOG) corrective regimen sliding scale   SubCutaneous three times a day before meals  insulin lispro Injectable (ADMELOG) 5 Unit(s) SubCutaneous three times a day before meals  isosorbide   dinitrate Tablet (ISORDIL) 10 milliGRAM(s) Oral three times a day  latanoprost 0.005% Ophthalmic Solution 1 Drop(s) Both EYES at bedtime  lisinopril 2.5 milliGRAM(s) Oral daily    VITALS:  T(C): , Max: 36.7 (08-19-22 @ 05:20)  T(F): , Max: 98.1 (08-19-22 @ 05:20)  HR: 80 (08-19-22 @ 05:20)  BP: 132/81 (08-19-22 @ 05:20)  RR: 17 (08-19-22 @ 05:20)  SpO2: 98% (08-19-22 @ 05:20)    I and O's:    08-18 @ 07:01  -  08-19 @ 07:00  --------------------------------------------------------  IN: 510 mL / OUT: 1550 mL / NET: -1040 mL    PHYSICAL EXAM:  Constitutional: NAD.  HEENT: EOMI  Neck:  No JVD, supple .  Respiratory: CTA B/L  Cardiovascular: S1 and S2, RRR  Gastrointestinal: + BS, soft, NT, ND  Extremities: No peripheral edema, + peripheral pulses  Neurological: A/O x 3, CN2-12 intact  Psychiatric: Normal mood, normal affect  [facility]    LABS:                        11.6   6.42  )-----------( 234      ( 19 Aug 2022 05:32 )             37.5     08-19    138  |  102  |  33<H>  ----------------------------<  134<H>  4.6   |  23  |  1.21    Ca    9.6      19 Aug 2022 05:32  Phos  4.2     08-19  Mg     2.30     08-19

## 2022-08-19 NOTE — PROGRESS NOTE ADULT - NSPROGADDITIONALINFOA_GEN_ALL_CORE
she seems OK: resp wise now: ph has improved: repeat chest x-ray: :  sumit morales    8/15: sumit morales
she seems OK: resp wise now: ph has improved: repeat chest x-ray: :  sumit acp
dw pt and team

## 2022-08-19 NOTE — DISCHARGE NOTE NURSING/CASE MANAGEMENT/SOCIAL WORK - PATIENT PORTAL LINK FT
You can access the FollowMyHealth Patient Portal offered by [facility] by registering at the following website: [email or web address]. By joining [facility]’s FollowMyHealth portal, you will also be able to view your health information using other applications (apps) compatible with our system.

## 2022-08-19 NOTE — DISCHARGE NOTE PROVIDER - NSDCCPCAREPLAN_GEN_ALL_CORE_FT
PRINCIPAL DISCHARGE DIAGNOSIS  Diagnosis: Acute on chronic diastolic congestive heart failure  Assessment and Plan of Treatment: You came to the hospital with shortness of breath and were found to be in an acute heart failure exacerbation. You were treated with lasix throught the IV and given oxygen. You have improved. Please take all medications as prescribed to prevent further exacerbations of your heart failure. Follow up with your cardiologist within 1-2 weeks of discharge.      SECONDARY DISCHARGE DIAGNOSES  Diagnosis: CAD (coronary artery disease)  Assessment and Plan of Treatment: Your echocardiogram (ultrasound of the heart) showed that certain areas of the heart were not pumping as well as they should. You underwent a coronary angiogram (heart catherization) with showed you previous stent is open however you have blockages in other arteries. At this time, cardiology is not recommending further stents and has adjusted your medication regimen. Please follow up with your cardiologist.

## 2022-08-19 NOTE — DISCHARGE NOTE NURSING/CASE MANAGEMENT/SOCIAL WORK - NSDCPEFALRISK_GEN_ALL_CORE
For information on Fall & Injury Prevention, visit: [email or web address] OR  [email or web address] OR  https://www.cdc.gov/steadi/patient.html

## 2022-08-19 NOTE — DIETITIAN INITIAL EVALUATION ADULT - OTHER INFO
medical course: Per chart 82 year old female w/Hx of CAD w/2 stents on eliquis, DM, HTN, HLD, CHF on lasix 40mg PO, BIBEMS w/SOB. Pt woke up at 4am feeling SOB, upon EMS arrival was satting 77% on RA, improved to 100% on NRB however pt had inc. WOB so she was put on CPAP en route. On arrival to ED pt w/SOB and chest pain radiating into L shoulder and leg swelling, otherwise no complaints. She denies HA, palpitations, abd pain, n/v/d/c, fevers/chills, dysuria/hematuria, hematochezia/melena, numbness/tingling, focal weakness.    Nutrition interview: No recent episodes of nausea, vomiting, diarrhea or constipation, last BM noted on 8/19 per RN flowsheets. Denies any chewing/swallowing difficulties. No food allergies. Stated UBW: 180 lbs. Daily weights are stable (8/16: 86Kg, 8/17: 86.2Kg, 8/19: 86Kg). Denies any changes in weight or appetite. Food preferences explored and noted. Intake is % per RN flowsheets and per pt. Feeding skills: independent. Patient with type 2 DM, usual finger stick between  mg/dL) (A1c 6.9%).     Patient familiar with DM education but requested to review. Provided pt with handout Carbohydrate Counting for People with Diabetes. Discussed carbohydrate sources, carbohydrate portions, protein sources, mixed meals, and nutrition label reading. Stressed importance of balanced meals with adequate protein and fiber. Pt was informed of current A1c, goal A1c, and goal fingerstick range. Heart failure nutrition therapy provided. Discussed daily weights, nutrition label reading, no added salt to foods, limiting fluid intake.

## 2022-08-19 NOTE — DISCHARGE NOTE PROVIDER - HOSPITAL COURSE
81 y/o F with PMHx of CAD w/2 stents on eliquis, DM, HTN, HLD and CHF who presents with shortness of breath. Found to be in acute decompensated heart failure. Required supplemental O2 by CPAP and nasal cannula hjowever respiratory status remained stable and patient now saturating well on room air. Was diuresed with IV Lasix until euvolemic. TTE with new WMA in the apical anteroseptal and apical inferior walls with diastolic dysfuncton. Cardiology followed. Underwent LHC: LAD stent patent. mRCA 40-50%. prox ramus stent 100%, medical management. RFA access.    Patient seen and evaluated. Reviewed discharge medications with patient and attending. All new medications requiring new prescriptions were sent to the pharmacy of patient's choice. Reviewed need for prescription for previous home medications and new prescriptions sent if requested. Medically cleared/stable for discharge as per Dr. Reaves on 8/19/22 with appropriate follow up. Patient understands and agrees with plan of care.

## 2022-08-19 NOTE — PROGRESS NOTE ADULT - SUBJECTIVE AND OBJECTIVE BOX
Date of service 8/19/22    S: no chest pain or sob; ros otherwise negative.     Review of Systems:   Constitutional: [ ] fevers, [ ] chills.   Skin: [ ] dry skin. [ ] rashes.  Psychiatric: [ ] depression, [ ] anxiety.   Gastrointestinal: [ ] BRBPR, [ ] melena.   Neurological: [ ] confusion. [ ] seizures. [ ] shuffling gait.   Ears,Nose,Mouth and Throat: [ ] ear pain [ ] sore throat.   Eyes: [ ] diplopia.   Respiratory: [ ] hemoptysis. [ ] shortness of breath  Cardiovascular: See HPI above  Hematologic/Lymphatic: [ ] anemia. [ ] painful nodes. [ ] prolonged bleeding.   Genitourinary: [ ] hematuria. [ ] flank pain.   Endocrine: [ ] significant change in weight. [ ] intolerance to heat and cold.     Review of systems [x ] otherwise negative, [ ] otherwise unable to obtain    FH: no family history of sudden cardiac death in first degree relatives    SH: [ ] tobacco, [ ] alcohol, [ ] drugs    apixaban 5 milliGRAM(s) Oral every 12 hours  aspirin enteric coated 81 milliGRAM(s) Oral daily  atorvastatin 80 milliGRAM(s) Oral at bedtime  benzonatate 100 milliGRAM(s) Oral every 8 hours  carvedilol 3.125 milliGRAM(s) Oral every 12 hours  dextrose 5%. 1000 milliLiter(s) IV Continuous <Continuous>  dextrose 5%. 1000 milliLiter(s) IV Continuous <Continuous>  dextrose 50% Injectable 25 Gram(s) IV Push once  dextrose 50% Injectable 12.5 Gram(s) IV Push once  dextrose 50% Injectable 25 Gram(s) IV Push once  dextrose Oral Gel 15 Gram(s) Oral once PRN  furosemide    Tablet 40 milliGRAM(s) Oral daily  glucagon  Injectable 1 milliGRAM(s) IntraMuscular once  insulin glargine Injectable (LANTUS) 15 Unit(s) SubCutaneous at bedtime  insulin lispro (ADMELOG) corrective regimen sliding scale   SubCutaneous three times a day before meals  insulin lispro Injectable (ADMELOG) 5 Unit(s) SubCutaneous three times a day before meals  isosorbide   dinitrate Tablet (ISORDIL) 10 milliGRAM(s) Oral three times a day  latanoprost 0.005% Ophthalmic Solution 1 Drop(s) Both EYES at bedtime  lisinopril 2.5 milliGRAM(s) Oral daily                            11.6   6.42  )-----------( 234      ( 19 Aug 2022 05:32 )             37.5       08-19    138  |  102  |  33<H>  ----------------------------<  134<H>  4.6   |  23  |  1.21    Ca    9.6      19 Aug 2022 05:32  Phos  4.2     08-19  Mg     2.30     08-19              T(C): 36.8 (08-19-22 @ 12:20), Max: 36.8 (08-19-22 @ 12:20)  HR: 86 (08-19-22 @ 12:20) (80 - 86)  BP: 125/65 (08-19-22 @ 12:20) (125/65 - 132/81)  RR: 17 (08-19-22 @ 12:20) (17 - 17)  SpO2: 98% (08-19-22 @ 12:20) (98% - 98%)  Wt(kg): --    I&O's Summary    18 Aug 2022 07:01  -  19 Aug 2022 07:00  --------------------------------------------------------  IN: 510 mL / OUT: 1550 mL / NET: -1040 mL        General: Well nourished in no acute distress. Alert and Oriented * 3.   Head: Normocephalic and atraumatic.   Neck: No JVD. No bruits. Supple. Does not appear to be enlarged.   Cardiovascular: + S1,S2 ; RRR Soft systolic murmur at the left lower sternal border. No rubs noted.    Lungs: CTA b/l. No rhonchi, rales or wheezes.   Abdomen: + BS, soft. Non tender. Non distended. No rebound. No guarding.   Extremities: No clubbing/cyanosis/edema.   Neurologic: Moves all four extremities. Full range of motion.   Skin: Warm and moist. The patient's skin has normal elasticity and good skin turgor.   Psychiatric: Appropriate mood and affect.  Musculoskeletal: Normal range of motion, normal strength    DATA    TELEMETRY: NSR	    < from: Transthoracic Echocardiogram (08.30.19 @ 16:50) >  OBSERVATIONS:  Mitral Valve: Mitral annular calcification, otherwise  normal mitralvalve. Mild mitral regurgitation.  Aortic Root: Normal aortic root.  Aortic Valve: Aortic valve leaflet morphology not well  visualized.  Left Atrium: Mildly dilated left atrium.  LA volume index =  37 cc/m2.  Left Ventricle: Normal left ventricular systolic function.  No segmental wall motion abnormalities. Mild diastolic  dysfunction (Stage I).  Right Heart: Normal right atrium. Normal right ventricular  size and function. Normal tricuspid valve. Minimal  tricuspid regurgitation. Normal pulmonic valve.  Pericardium/PleuraNormal pericardium with no pericardial  effusion.    < from: TTE with Doppler (w/Cont) (08.15.22 @ 08:22) >  ------------------------------------------------------------------------  CONCLUSIONS:  1. Mildly dilated left atrium.  LA volume index = 38 cc/m2.  2. Mild segmental left ventricular systolic dysfunction.  The mid and apical anteroseptal and apical inferior walls  are hypokinetic.  3. Reversal of the E-A  waves of the mitral inflow pattern  is consistent with diastolic LV dysfunction.  4. Normal right ventricular size and function.  *** Compared with echocardiogram of 8/30/2019, there are  new wall motion abnormalities.  ------------------------------------------------------------------------  Confirmed on  8/15/2022 - 09:50:41 by Camden Olmos M.D.  ------------------------------------------------------------------------    < end of copied text >      < from: Cardiac Cath Lab - Adult (09.03.19 @ 14:03) >  VENTRICLES: No left ventriculogram was performed.  CORONARY VESSELS: The coronary circulation is right dominant.  LM:   --  LM: Angiography showed mild atherosclerosis with no flow limiting  lesions.  LAD:   --  Proximal LAD: There was a diffuse 10 % stenosis at the site of a  prior stent. The lesion was eccentric. There was NGUYEN grade 3 flow through  the vessel (brisk flow).  --  Mid LAD: There was a diffuse 20 % stenosis at the site of a prior  stent. The lesion was eccentric. There was NGUYEN grade 3 flow through the  vessel (brisk flow).  --  D1: There was a tubular 30 % stenosis at the site of a prior stent. The  lesion was smoothly contoured. There was NGUYEN grade 3 flow through the  vessel (brisk flow).  CX:   --  Circumflex: Angiography showed mild atherosclerosis with no flow  limiting lesions.  RI:   --  Ramus intermedius: There was a diffuse 30 % stenosis at the site  of a prior stent. The lesion was eccentric. There was NGUYEN grade 3 flow  through the vessel (brisk flow).  RCA:   --  Mid RCA: There was a tubular 20 % stenosis at a site with no  prior intervention. The lesion was eccentric. There was NGUYEN grade 3 flow  through the vessel (brisk flow).  --  RPDA: Angiography showed mild atherosclerosis with no flow limiting  lesions.  --  RPLS: Angiography showed mild atherosclerosis with no flow limiting  lesions.  COMPLICATIONS: There were no complications.  DIAGNOSTIC IMPRESSIONS: Patent stents LAD, D-1 and Ramus  No significant obstructive CAD  DIAGNOSTIC RECOMMENDATIONS: Medical therapy  INTERVENTIONAL IMPRESSIONS: Patent stents LAD, D-1 and Ramus  No significant obstructive CAD  INTERVENTIONAL RECOMMENDATIONS: Medical therapy  Prepared and signed by  Nia Izaguirre M.D.  Signed 09/03/2019 16:11:54    < end of copied text >         ASSESSMENT/PLAN: 	Ms Olivo is an 82y Female known to our group thru sporadic care when she visits from [place], with PMH CAD, remote PCI 2006, last cath here in 2019 as above with patent stents, last coronary angio ~2020 with patent stents ([place]) admitted with acute onset shortness of breath c/w acute CHF and chest pain.    -pt. volume status improved s/p IV lasix, transitioned to PO  -renal eval with Dr. Arturo mcknight  -tte with new segmental LV dysfunction   -cath with patent stents - medical management of CAD recommended at this time  -transition back to noac  -no further inpatient cardiac workup needed at this time.   -[facility] after discharge    Oma Dumont MD

## 2022-08-19 NOTE — DIETITIAN INITIAL EVALUATION ADULT - PERTINENT LABORATORY DATA
08-19 Na 138 mmol/L Glu 134 mg/dL<H> K+ 4.6 mmol/L Cr 1.21 mg/dL BUN 33 mg/dL<H> Phos 4.2 mg/dL  08-19 @ 13:17 POCT 176 mg/dL    POCT Blood Glucose.: 176 mg/dL (08-19-22 @ 13:17)  A1C with Estimated Average Glucose Result: 6.9 % (08-13-22 @ 05:40)

## 2022-08-19 NOTE — PROGRESS NOTE ADULT - ASSESSMENT
ASSESSMENT/PLAN:   82yoF w/Hx of CAD w/2 stents on eliquis, DM, HTN, HLD, CHF on lasix 40mg PO, BIBEMS w/SOB. Pt woke up at 4am feeling SOB  ALLA in the setting of active diuresis.  This is alla based on AKIN criteria of a jump of creatinine of 0.3mg ASSESSMENT/PLAN:   82yoF w/Hx of CAD w/2 stents on eliquis, DM, HTN, HLD, CHF on lasix 40mg PO, BIBEMS w/SOB. Pt woke up at 4am feeling SOB  ALLA in the setting of active diuresis and now back at baseline  ASSESSMENT/PLAN:   82yoF w/Hx of CAD w/2 stents on eliquis, DM, HTN, HLD, CHF on lasix 40mg PO, BIBEMS w/SOB. Pt woke up at 4am feeling SOB  ALLA in the setting of active diuresis and now back at baseline     1 Renal - continue Lasix 40 mg po, Creatinine improved,stable   No need for renal sono  2 CVS - S/p LHC yesterday   3 Pulm - Off oxygen at present and sat is 98%    Jennie Silva NP-C  [facility]  [phone]      ASSESSMENT/PLAN:   82yoF w/Hx of CAD w/2 stents on eliquis, DM, HTN, HLD, CHF on lasix 40mg PO, BIBEMS w/SOB. Pt woke up at 4am feeling SOB  ALLA in the setting of active diuresis and now back at baseline     1 Renal - continue Lasix 40 mg po daily, Creatinine improved, stable   No need for renal sono  2 CVS - S/p LHC yesterday   3 Pulm - Off oxygen at present and sat is 98%  4 Discharge planning per primary team     Jennie Silva NP-C  [facility]  [phone]

## 2022-08-19 NOTE — CHART NOTE - NSCHARTNOTEFT_GEN_A_CORE
Pt is s/p cardiac cath via right femoral access.  Patient denies pain, numbness, tingling, CP, SOB.     T(C): 36.5 (08-18-22 @ 21:25), Max: 36.5 (08-18-22 @ 21:25)  HR: 81 (08-18-22 @ 21:25) (68 - 81)  BP: 130/64 (08-18-22 @ 21:25) (128/65 - 130/64)  RR: 17 (08-18-22 @ 21:25) (17 - 18)  SpO2: 99% (08-18-22 @ 12:25) (98% - 99%)  VSS.     Site is stable with no hematoma, active bleed or swelling.   Dressing is clean/dry/intact.   DP pulse is palpable.   no femoral bruit    Will continue to monitor.     Adolfo Osman PA-C  Department of Internal Medicine  In-House beeper number [phone]

## 2022-08-19 NOTE — PROGRESS NOTE ADULT - PROBLEM SELECTOR PLAN 1
Pt with hx of PE, diagnosed in 10/2021  -On Eliquis as an outpatient, heparin drip for now pending cath  -Plan to f/u with PCP, primary pulm in [place].    8/19: doing ok: no sob: no cough  tolerating eliquis

## 2022-08-19 NOTE — PROGRESS NOTE ADULT - PROBLEM SELECTOR PLAN 2
repeat chest xray: breathing wise she seems pretty good:
she is on room air sating at 100%: ph has Improved:  chest xray noted: :  repeat chest xray    8/15: repeat chest xray yesterday to me looks normal: official report pending:   she is on room air: no sob: :  on roomair: hypoxia resolved
2nd to pulmonary edema  -Presents to ED with SOB x few days, placed on CPAP per EMS (hypoxic to 77% on EMS arrival with increased WOB)  -CXR with pulmonary edema, small b/l pl effusions  -Keep O>I as tolerated  -CPAP/BIPAP now d/c, pt awake & alert with improvement in symptoms, can continue to monitor off NIV at this time  -VBG noted, pH improved  -Keep sats >90% with supplemental O2 (currently RA).
2nd to pulmonary edema  -Presents to ED with SOB x few days, placed on CPAP per EMS (hypoxic to 77% on EMS arrival with increased WOB)  -CXR with pulmonary edema, small b/l pl effusions  -Keep O>I as tolerated  -CPAP/BIPAP now d/c, pt awake & alert with improvement in symptoms, can continue to monitor off NIV at this time  -VBG noted, pH improved  -Keep sats >90% with supplemental O2 (currently RA).
2nd to pulmonary edema  -Presents to ED with SOB x few days, placed on CPAP per EMS (hypoxic to 77% on EMS arrival with increased WOB)  -CXR with pulmonary edema, small b/l pl effusions  -Keep O>I as tolerated  -CPAP/BIPAP now d/c, pt awake & alert with improvement in symptoms, can continue to monitor off NIV at this time  -VBG noted, pH improved  -Keep sats >90% with supplemental O2 (currently RA).    8/19: resolved: on room air: has some cough: tessalon Perles for a few days
2nd to pulmonary edema  -Presents to ED with SOB x few days, placed on CPAP per EMS (hypoxic to 77% on EMS arrival with increased WOB)  -CXR with pulmonary edema, small b/l pl effusions  -Keep O>I as tolerated  -CPAP/BIPAP now d/c, pt awake & alert with improvement in symptoms, can continue to monitor off NIV at this time  -VBG noted, pH improved  -Keep sats >90% with supplemental O2 (currently RA).

## 2022-08-19 NOTE — PROGRESS NOTE ADULT - ASSESSMENT
81 y/o F (resides in [place], visiting family in [place] since early July) with PMH of CAD (s/p stent placement), diastolic CHF, DM, GERD, CVA (with no residual deficits), HLD. Presents to the ED with c/o SOB with associated LE edema, orthopnea/PND. Also with associated chest pressure with radiation to L arm x1 day. Reportedly with O2 sats 77% on RA on EMS arrival, placed on CPAP en route. CXR with pulmonary edema, small b/l pl effusions. Pulmonary called to consult for SOB.

## 2022-08-19 NOTE — PROGRESS NOTE ADULT - PROBLEM SELECTOR PROBLEM 2
Acute respiratory failure with hypoxia and hypercapnia
Pulmonary edema
Acute respiratory failure with hypoxia and hypercapnia

## 2022-08-19 NOTE — DIETITIAN INITIAL EVALUATION ADULT - PERTINENT MEDS FT
MEDICATIONS  (STANDING):  apixaban 5 milliGRAM(s) Oral every 12 hours  aspirin enteric coated 81 milliGRAM(s) Oral daily  atorvastatin 80 milliGRAM(s) Oral at bedtime  benzonatate 100 milliGRAM(s) Oral every 8 hours  carvedilol 3.125 milliGRAM(s) Oral every 12 hours  dextrose 5%. 1000 milliLiter(s) (100 mL/Hr) IV Continuous <Continuous>  dextrose 5%. 1000 milliLiter(s) (50 mL/Hr) IV Continuous <Continuous>  dextrose 50% Injectable 25 Gram(s) IV Push once  dextrose 50% Injectable 12.5 Gram(s) IV Push once  dextrose 50% Injectable 25 Gram(s) IV Push once  furosemide    Tablet 40 milliGRAM(s) Oral daily  glucagon  Injectable 1 milliGRAM(s) IntraMuscular once  insulin glargine Injectable (LANTUS) 15 Unit(s) SubCutaneous at bedtime  insulin lispro (ADMELOG) corrective regimen sliding scale   SubCutaneous three times a day before meals  insulin lispro Injectable (ADMELOG) 5 Unit(s) SubCutaneous three times a day before meals  isosorbide   dinitrate Tablet (ISORDIL) 10 milliGRAM(s) Oral three times a day  latanoprost 0.005% Ophthalmic Solution 1 Drop(s) Both EYES at bedtime  lisinopril 2.5 milliGRAM(s) Oral daily    MEDICATIONS  (PRN):  dextrose Oral Gel 15 Gram(s) Oral once PRN Blood Glucose LESS THAN 70 milliGRAM(s)/deciliter

## 2022-08-19 NOTE — PROGRESS NOTE ADULT - PROBLEM SELECTOR PROBLEM 4
Acute on chronic diastolic congestive heart failure
Acute on chronic diastolic congestive heart failure
CAD (coronary artery disease)
Acute on chronic diastolic congestive heart failure

## 2022-08-23 ENCOUNTER — EMERGENCY (EMERGENCY)
Facility: HOSPITAL | Age: 82
LOS: 1 days | Discharge: HOME | End: 2022-08-23
Attending: EMERGENCY MEDICINE | Admitting: EMERGENCY MEDICINE

## 2022-08-23 VITALS
HEIGHT: 66 IN | HEART RATE: 66 BPM | SYSTOLIC BLOOD PRESSURE: 109 MMHG | DIASTOLIC BLOOD PRESSURE: 68 MMHG | RESPIRATION RATE: 18 BRPM | TEMPERATURE: 98 F | OXYGEN SATURATION: 97 %

## 2022-08-23 VITALS
OXYGEN SATURATION: 99 % | SYSTOLIC BLOOD PRESSURE: 118 MMHG | DIASTOLIC BLOOD PRESSURE: 62 MMHG | HEART RATE: 72 BPM | TEMPERATURE: 98 F | RESPIRATION RATE: 16 BRPM

## 2022-08-23 LAB
ALBUMIN SERPL ELPH-MCNC: 4.1 G/DL — SIGNIFICANT CHANGE UP (ref 3.3–5)
ALP SERPL-CCNC: 64 U/L — SIGNIFICANT CHANGE UP (ref 40–120)
ALT FLD-CCNC: 11 U/L — SIGNIFICANT CHANGE UP (ref 4–33)
ANION GAP SERPL CALC-SCNC: 13 MMOL/L — SIGNIFICANT CHANGE UP (ref 7–14)
APPEARANCE UR: CLEAR — SIGNIFICANT CHANGE UP
AST SERPL-CCNC: 18 U/L — SIGNIFICANT CHANGE UP (ref 4–32)
BASE EXCESS BLDV CALC-SCNC: -0.3 MMOL/L — SIGNIFICANT CHANGE UP (ref -2–3)
BASOPHILS # BLD AUTO: 0.02 K/UL — SIGNIFICANT CHANGE UP (ref 0–0.2)
BASOPHILS NFR BLD AUTO: 0.3 % — SIGNIFICANT CHANGE UP (ref 0–2)
BILIRUB SERPL-MCNC: 0.3 MG/DL — SIGNIFICANT CHANGE UP (ref 0.2–1.2)
BILIRUB UR-MCNC: NEGATIVE — SIGNIFICANT CHANGE UP
BLOOD GAS VENOUS COMPREHENSIVE RESULT: SIGNIFICANT CHANGE UP
BUN SERPL-MCNC: 28 MG/DL — HIGH (ref 7–23)
CALCIUM SERPL-MCNC: 8.8 MG/DL — SIGNIFICANT CHANGE UP (ref 8.4–10.5)
CHLORIDE BLDV-SCNC: 106 MMOL/L — SIGNIFICANT CHANGE UP (ref 96–108)
CHLORIDE SERPL-SCNC: 105 MMOL/L — SIGNIFICANT CHANGE UP (ref 98–107)
CO2 BLDV-SCNC: 26.8 MMOL/L — HIGH (ref 22–26)
CO2 SERPL-SCNC: 21 MMOL/L — LOW (ref 22–31)
COLOR SPEC: SIGNIFICANT CHANGE UP
CREAT SERPL-MCNC: 1.27 MG/DL — SIGNIFICANT CHANGE UP (ref 0.5–1.3)
DIFF PNL FLD: NEGATIVE — SIGNIFICANT CHANGE UP
EGFR: 42 ML/MIN/1.73M2 — LOW
EOSINOPHIL # BLD AUTO: 0.14 K/UL — SIGNIFICANT CHANGE UP (ref 0–0.5)
EOSINOPHIL NFR BLD AUTO: 1.9 % — SIGNIFICANT CHANGE UP (ref 0–6)
GAS PNL BLDV: 136 MMOL/L — SIGNIFICANT CHANGE UP (ref 136–145)
GLUCOSE BLDV-MCNC: 86 MG/DL — SIGNIFICANT CHANGE UP (ref 70–99)
GLUCOSE SERPL-MCNC: 86 MG/DL — SIGNIFICANT CHANGE UP (ref 70–99)
GLUCOSE UR QL: NEGATIVE — SIGNIFICANT CHANGE UP
HCO3 BLDV-SCNC: 25 MMOL/L — SIGNIFICANT CHANGE UP (ref 22–29)
HCT VFR BLD CALC: 33.6 % — LOW (ref 34.5–45)
HCT VFR BLDA CALC: 49 % — HIGH (ref 34.5–46.5)
HGB BLD CALC-MCNC: 16.4 G/DL — HIGH (ref 11.5–15.5)
HGB BLD-MCNC: 10.1 G/DL — LOW (ref 11.5–15.5)
IANC: 3.93 K/UL — SIGNIFICANT CHANGE UP (ref 1.8–7.4)
IMM GRANULOCYTES NFR BLD AUTO: 0.3 % — SIGNIFICANT CHANGE UP (ref 0–1.5)
KETONES UR-MCNC: NEGATIVE — SIGNIFICANT CHANGE UP
LACTATE BLDV-MCNC: 1.1 MMOL/L — SIGNIFICANT CHANGE UP (ref 0.5–2)
LEUKOCYTE ESTERASE UR-ACNC: NEGATIVE — SIGNIFICANT CHANGE UP
LIDOCAIN IGE QN: 42 U/L — SIGNIFICANT CHANGE UP (ref 7–60)
LYMPHOCYTES # BLD AUTO: 2.51 K/UL — SIGNIFICANT CHANGE UP (ref 1–3.3)
LYMPHOCYTES # BLD AUTO: 34.6 % — SIGNIFICANT CHANGE UP (ref 13–44)
MCHC RBC-ENTMCNC: 26.9 PG — LOW (ref 27–34)
MCHC RBC-ENTMCNC: 30.1 GM/DL — LOW (ref 32–36)
MCV RBC AUTO: 89.6 FL — SIGNIFICANT CHANGE UP (ref 80–100)
MONOCYTES # BLD AUTO: 0.64 K/UL — SIGNIFICANT CHANGE UP (ref 0–0.9)
MONOCYTES NFR BLD AUTO: 8.8 % — SIGNIFICANT CHANGE UP (ref 2–14)
NEUTROPHILS # BLD AUTO: 3.93 K/UL — SIGNIFICANT CHANGE UP (ref 1.8–7.4)
NEUTROPHILS NFR BLD AUTO: 54.1 % — SIGNIFICANT CHANGE UP (ref 43–77)
NITRITE UR-MCNC: NEGATIVE — SIGNIFICANT CHANGE UP
NRBC # BLD: 0 /100 WBCS — SIGNIFICANT CHANGE UP (ref 0–0)
NRBC # FLD: 0 K/UL — SIGNIFICANT CHANGE UP (ref 0–0)
NT-PROBNP SERPL-SCNC: 434 PG/ML — HIGH
PCO2 BLDV: 44 MMHG — HIGH (ref 39–42)
PH BLDV: 7.37 — SIGNIFICANT CHANGE UP (ref 7.32–7.43)
PH UR: 5.5 — SIGNIFICANT CHANGE UP (ref 5–8)
PLATELET # BLD AUTO: 244 K/UL — SIGNIFICANT CHANGE UP (ref 150–400)
PO2 BLDV: 45 MMHG — SIGNIFICANT CHANGE UP
POTASSIUM BLDV-SCNC: 4.1 MMOL/L — SIGNIFICANT CHANGE UP (ref 3.5–5.1)
POTASSIUM SERPL-MCNC: 4.1 MMOL/L — SIGNIFICANT CHANGE UP (ref 3.5–5.3)
POTASSIUM SERPL-SCNC: 4.1 MMOL/L — SIGNIFICANT CHANGE UP (ref 3.5–5.3)
PROT SERPL-MCNC: 7.7 G/DL — SIGNIFICANT CHANGE UP (ref 6–8.3)
PROT UR-MCNC: NEGATIVE — SIGNIFICANT CHANGE UP
RBC # BLD: 3.75 M/UL — LOW (ref 3.8–5.2)
RBC # FLD: 13.7 % — SIGNIFICANT CHANGE UP (ref 10.3–14.5)
RBC CASTS # UR COMP ASSIST: 0 /HPF — SIGNIFICANT CHANGE UP (ref 0–4)
SAO2 % BLDV: 76.1 % — SIGNIFICANT CHANGE UP
SODIUM SERPL-SCNC: 139 MMOL/L — SIGNIFICANT CHANGE UP (ref 135–145)
SP GR SPEC: 1.02 — SIGNIFICANT CHANGE UP (ref 1.01–1.05)
TROPONIN T, HIGH SENSITIVITY RESULT: 24 NG/L — SIGNIFICANT CHANGE UP
UROBILINOGEN FLD QL: SIGNIFICANT CHANGE UP
WBC # BLD: 7.26 K/UL — SIGNIFICANT CHANGE UP (ref 3.8–10.5)
WBC # FLD AUTO: 7.26 K/UL — SIGNIFICANT CHANGE UP (ref 3.8–10.5)
WBC UR QL: 1 /HPF — SIGNIFICANT CHANGE UP (ref 0–5)

## 2022-08-23 PROCEDURE — 99284 EMERGENCY DEPT VISIT MOD MDM: CPT | Mod: 25

## 2022-08-23 PROCEDURE — 93010 ELECTROCARDIOGRAM REPORT: CPT

## 2022-08-23 PROCEDURE — 71045 X-RAY EXAM CHEST 1 VIEW: CPT | Mod: 26

## 2022-08-23 NOTE — ED PROVIDER NOTE - OBJECTIVE STATEMENT
82F PMH CAD w/2 stents on eliquis, DM, HTN, HLD, CHF p/w midabd pain, profuse diarrhea, dry cough, and dysuria. Was recently admitted to hospital for chf exacerbation; cath showing proximal ramus 100% stenosis but no HÉCTOR placed. Also had torre placed during admission. No f/c, cp, leg swelling

## 2022-08-23 NOTE — ED PROVIDER NOTE - NSICDXPASTSURGICALHX_GEN_ALL_CORE_FT
PAST SURGICAL HISTORY:  S/P primary angioplasty with coronary stent x1 in 2006 at [facility]    S/P TKR (total knee replacement) left

## 2022-08-23 NOTE — ED ADULT NURSE NOTE - NSICDXPASTSURGICALHX_GEN_ALL_CORE_FT
PAST SURGICAL HISTORY:  S/P primary angioplasty with coronary stent x1 in 2006 at [facility]    S/P TKR (total knee replacement) left

## 2022-08-23 NOTE — ED PROVIDER NOTE - ATTENDING CONTRIBUTION TO CARE
mary: pt with recent admission for chf exacerbation presents today with abd pain  diarrhea and dysuria.  pt is poor historuian, says she was given abx iv when in the hosptial, but on chart review non found.  will ct and check urine,  pt awake and alert abd soft mild diffuse thenderness    I performed a history and physical exam of the patient and discussed their management with the resident and /or advanced care provider. I reviewed the resident and /or ACP's note and agree with the documented findings and plan of care. My medical decison making and observations are found above.

## 2022-08-23 NOTE — ED PROVIDER NOTE - NSFOLLOWUPINSTRUCTIONS_ED_ALL_ED_FT
you were seen in the ED for abdominal pain in the upper stomach region.     You had labs and imaging done in the ED which did not show any acute findings that were concerning enough to admit you to the hospital.     You did on CT have a "prominent uterine endometrium" which should be assessed by your primary care doctor with a TRANSVAGINAL ULTRASOUND.     In the meantime, you were prescribed PEPCID COMPLETE as we think you have some acid reflux vs gastritis which is irritation of your stomach lining due to too much acid production.     Please take all of your results which are attached to these discharge instructions and follow up with your primary care doctor for your condition. If you start to have worsening symptoms such as not able to eat or drink without throwing up or you cannot drink water due to pain then you should come back to the ED for further evaluation and treatment.

## 2022-08-23 NOTE — ED PROVIDER NOTE - PROGRESS NOTE DETAILS
ALFREDO: I was signed out this pt pending CT imaging which was read as no acute findings but has thickened endometrium which was relayed to pt and daughter. Otherwise, labs are unremarkable including UA. Pain improved but was initially in epigastrium. likely gastritis. Will discharge with pepcid and f/u with her PMD. Pt is visiting from [place] but has a PMD here in [facility] that she can see.

## 2022-08-23 NOTE — ED PROVIDER NOTE - PHYSICAL EXAMINATION
Physical Exam:  Gen: awake alert   HEENT: normal conjunctiva, oral mucosa moist  Lung: CTAB, no respiratory distress, no wheezes/rhonchi/rales B/L, speaking in full sentences  CV: RRR  Abd: soft, midabd TTP, ND, no guarding, no rigidity, no rebound tenderness  MSK: no visible deformities  Skin: Warm, well perfused, no rash, no leg swelling  ~Miles Méndez MD (PGY-3)

## 2022-08-23 NOTE — ED PROVIDER NOTE - NS ED ROS FT
CONST: no fevers, no chills  ENT: no sore throat  CV: no chest pain, no leg swelling  RESP: +shortness of breath, +cough  ABD: +abdominal pain, no nausea, no vomiting, +diarrhea  : +dysuria, no flank pain, no hematuria  MSK: no back pain, no extremity pain  SKIN:  no rash

## 2022-08-23 NOTE — ED ADULT NURSE NOTE - OBJECTIVE STATEMENT
pt received trauma A, alert & awake, A&OX4. Pt complaint of abd pain, diarrhea, cough. pt states sx started 3 days ago. Pt states that diarrhea uncontrolled, and worsened w/ cough. Pt PMH of CHF, HTN, HLD, DM. Pt abd soft & nontender, denies recent antibiotic use, pt appears comfortable. IV 20G, L AC, labs collected & sent. Pt straight cath for urine, procedure tolerated well. Pt ADLs performed, no stool noted @ this time, skin intact. Pt family remains @ bedside, callbell @ bedside, will continue to monitor

## 2022-08-23 NOTE — ED PROVIDER NOTE - CLINICAL SUMMARY MEDICAL DECISION MAKING FREE TEXT BOX
mary: pt with recent admission for chf exacerbation presents today with abd pain  diarrhea and dysuria.  pt is poor historuian, says she was given abx iv when in the hosptial, but on chart review non found.  will ct and check urine,  pt awake and alert abd soft mild diffuse thenderness

## 2022-08-23 NOTE — ED ADULT NURSE NOTE - NSIMPLEMENTINTERV_GEN_ALL_ED
Implemented All Fall Risk Interventions:  [place] to call system. Call bell, personal items and telephone within reach. Instruct patient to call for assistance. Room bathroom lighting operational. Non-slip footwear when patient is off stretcher. Physically safe environment: no spills, clutter or unnecessary equipment. Stretcher in lowest position, wheels locked, appropriate side rails in place. Provide visual cue, wrist band, yellow gown, etc. Monitor gait and stability. Monitor for mental status changes and reorient to person, place, and time. Review medications for side effects contributing to fall risk. Reinforce activity limits and safety measures with patient and family.

## 2022-08-23 NOTE — ED PROVIDER NOTE - PATIENT PORTAL LINK FT
You can access the FollowMyHealth Patient Portal offered by [facility] by registering at the following website: [email or web address]. By joining [employer]’s FollowMyHealth portal, you will also be able to view your health information using other applications (apps) compatible with our system.

## 2022-08-23 NOTE — ED ADULT TRIAGE NOTE - CHIEF COMPLAINT QUOTE
Pt from home for lower abdominal pain and diarrhea x4 days. + dysuria. Pt does not think there has been blood in stool, Recently started on Eliquis, does not know why. Pt poor historian. PMH CHF HTN DM2

## 2022-08-24 PROCEDURE — 74177 CT ABD & PELVIS W/CONTRAST: CPT | Mod: 26,MA

## 2022-08-24 RX ORDER — FAMOTIDINE 10 MG/ML
20 INJECTION INTRAVENOUS ONCE
Refills: 0 | Status: COMPLETED | OUTPATIENT
Start: 2022-08-24 | End: 2022-08-24

## 2022-08-24 RX ADMIN — FAMOTIDINE 20 MILLIGRAM(S): 10 INJECTION INTRAVENOUS at 04:55

## 2022-08-24 RX ADMIN — Medication 30 MILLILITER(S): at 04:55

## 2022-08-25 ENCOUNTER — EMERGENCY (EMERGENCY)
Facility: HOSPITAL | Age: 82
LOS: 1 days | Discharge: HOME | End: 2022-08-25
Attending: STUDENT IN AN ORGANIZED HEALTH CARE EDUCATION/TRAINING PROGRAM | Admitting: STUDENT IN AN ORGANIZED HEALTH CARE EDUCATION/TRAINING PROGRAM

## 2022-08-25 VITALS
SYSTOLIC BLOOD PRESSURE: 143 MMHG | TEMPERATURE: 98 F | DIASTOLIC BLOOD PRESSURE: 86 MMHG | RESPIRATION RATE: 18 BRPM | HEART RATE: 64 BPM | OXYGEN SATURATION: 100 %

## 2022-08-25 VITALS
RESPIRATION RATE: 18 BRPM | HEART RATE: 66 BPM | DIASTOLIC BLOOD PRESSURE: 76 MMHG | SYSTOLIC BLOOD PRESSURE: 125 MMHG | TEMPERATURE: 97 F | OXYGEN SATURATION: 99 % | HEIGHT: 66 IN

## 2022-08-25 LAB
ALBUMIN SERPL ELPH-MCNC: 4.8 G/DL — SIGNIFICANT CHANGE UP (ref 3.3–5)
ALP SERPL-CCNC: 70 U/L — SIGNIFICANT CHANGE UP (ref 40–120)
ALT FLD-CCNC: 14 U/L — SIGNIFICANT CHANGE UP (ref 4–33)
ANION GAP SERPL CALC-SCNC: 14 MMOL/L — SIGNIFICANT CHANGE UP (ref 7–14)
APTT BLD: 41.3 SEC — HIGH (ref 27–36.3)
AST SERPL-CCNC: 26 U/L — SIGNIFICANT CHANGE UP (ref 4–32)
BASOPHILS # BLD AUTO: 0.02 K/UL — SIGNIFICANT CHANGE UP (ref 0–0.2)
BASOPHILS NFR BLD AUTO: 0.3 % — SIGNIFICANT CHANGE UP (ref 0–2)
BILIRUB SERPL-MCNC: 0.7 MG/DL — SIGNIFICANT CHANGE UP (ref 0.2–1.2)
BLD GP AB SCN SERPL QL: NEGATIVE — SIGNIFICANT CHANGE UP
BUN SERPL-MCNC: 20 MG/DL — SIGNIFICANT CHANGE UP (ref 7–23)
CALCIUM SERPL-MCNC: 9.9 MG/DL — SIGNIFICANT CHANGE UP (ref 8.4–10.5)
CHLORIDE SERPL-SCNC: 103 MMOL/L — SIGNIFICANT CHANGE UP (ref 98–107)
CO2 SERPL-SCNC: 24 MMOL/L — SIGNIFICANT CHANGE UP (ref 22–31)
CREAT SERPL-MCNC: 1.17 MG/DL — SIGNIFICANT CHANGE UP (ref 0.5–1.3)
CULTURE RESULTS: SIGNIFICANT CHANGE UP
EGFR: 46 ML/MIN/1.73M2 — LOW
EOSINOPHIL # BLD AUTO: 0.08 K/UL — SIGNIFICANT CHANGE UP (ref 0–0.5)
EOSINOPHIL NFR BLD AUTO: 1.1 % — SIGNIFICANT CHANGE UP (ref 0–6)
GLUCOSE SERPL-MCNC: 127 MG/DL — HIGH (ref 70–99)
HCT VFR BLD CALC: 38.4 % — SIGNIFICANT CHANGE UP (ref 34.5–45)
HGB BLD-MCNC: 11.7 G/DL — SIGNIFICANT CHANGE UP (ref 11.5–15.5)
IANC: 4.53 K/UL — SIGNIFICANT CHANGE UP (ref 1.8–7.4)
IMM GRANULOCYTES NFR BLD AUTO: 0.3 % — SIGNIFICANT CHANGE UP (ref 0–1.5)
INR BLD: 1.38 RATIO — HIGH (ref 0.88–1.16)
LIDOCAIN IGE QN: 22 U/L — SIGNIFICANT CHANGE UP (ref 7–60)
LYMPHOCYTES # BLD AUTO: 2.1 K/UL — SIGNIFICANT CHANGE UP (ref 1–3.3)
LYMPHOCYTES # BLD AUTO: 28.5 % — SIGNIFICANT CHANGE UP (ref 13–44)
MCHC RBC-ENTMCNC: 27.2 PG — SIGNIFICANT CHANGE UP (ref 27–34)
MCHC RBC-ENTMCNC: 30.5 GM/DL — LOW (ref 32–36)
MCV RBC AUTO: 89.3 FL — SIGNIFICANT CHANGE UP (ref 80–100)
MONOCYTES # BLD AUTO: 0.63 K/UL — SIGNIFICANT CHANGE UP (ref 0–0.9)
MONOCYTES NFR BLD AUTO: 8.5 % — SIGNIFICANT CHANGE UP (ref 2–14)
NEUTROPHILS # BLD AUTO: 4.53 K/UL — SIGNIFICANT CHANGE UP (ref 1.8–7.4)
NEUTROPHILS NFR BLD AUTO: 61.3 % — SIGNIFICANT CHANGE UP (ref 43–77)
NRBC # BLD: 0 /100 WBCS — SIGNIFICANT CHANGE UP (ref 0–0)
NRBC # FLD: 0 K/UL — SIGNIFICANT CHANGE UP (ref 0–0)
OB PNL STL: NEGATIVE — SIGNIFICANT CHANGE UP
PLATELET # BLD AUTO: 256 K/UL — SIGNIFICANT CHANGE UP (ref 150–400)
POTASSIUM SERPL-MCNC: 4.8 MMOL/L — SIGNIFICANT CHANGE UP (ref 3.5–5.3)
POTASSIUM SERPL-SCNC: 4.8 MMOL/L — SIGNIFICANT CHANGE UP (ref 3.5–5.3)
PROT SERPL-MCNC: 8.9 G/DL — HIGH (ref 6–8.3)
PROTHROM AB SERPL-ACNC: 16.1 SEC — HIGH (ref 10.5–13.4)
RBC # BLD: 4.3 M/UL — SIGNIFICANT CHANGE UP (ref 3.8–5.2)
RBC # FLD: 13.5 % — SIGNIFICANT CHANGE UP (ref 10.3–14.5)
RH IG SCN BLD-IMP: POSITIVE — SIGNIFICANT CHANGE UP
SODIUM SERPL-SCNC: 141 MMOL/L — SIGNIFICANT CHANGE UP (ref 135–145)
SPECIMEN SOURCE: SIGNIFICANT CHANGE UP
WBC # BLD: 7.38 K/UL — SIGNIFICANT CHANGE UP (ref 3.8–10.5)
WBC # FLD AUTO: 7.38 K/UL — SIGNIFICANT CHANGE UP (ref 3.8–10.5)

## 2022-08-25 PROCEDURE — 71045 X-RAY EXAM CHEST 1 VIEW: CPT | Mod: 26

## 2022-08-25 PROCEDURE — 99285 EMERGENCY DEPT VISIT HI MDM: CPT

## 2022-08-25 NOTE — ED PROVIDER NOTE - PROGRESS NOTE DETAILS
Frederick Power MD (PGY2): Occult negative. Hgb stable, lab work non-actionable. CXR w/o acute findings. Pending covid pcr and UA. Likely d/c with GI f/u. Frederick Power MD (PGY2): UA c/w UTI. Ceftriaxone given in ED and cefdinir rx sent. strict return precuations. GI f/u

## 2022-08-25 NOTE — ED PROVIDER NOTE - ATTENDING CONTRIBUTION TO CARE
83 yo f past medical history CAD w/2 stents on eliquis, DM, HTN, HLD, cva w/o residual deficits,  diverticulitis s/p colon resection in 2020 who  and CHF who presents to the ED with dark stools, 1 week of diarrhea, intermittent lower abd pain. recently seen in ED and had lab ct. no fever chills. cp, palpitations, sob, lightheadedness. exam as above. plan: labs, symptom relief prn, reassess.

## 2022-08-25 NOTE — ED ADULT NURSE NOTE - CHIEF COMPLAINT QUOTE
Pt c/o diarrhea X 2 weeks. Was discharged yesterday, taking Pepcid. Today had X 1 episode of dark stool. Endorsing some lower abdominal pain. Denies N/V. Phx: CAD s/p stents, Afib (on eliquis), HTN, HLD, CHF, DM. .

## 2022-08-25 NOTE — ED PROVIDER NOTE - NSICDXPASTSURGICALHX_GEN_ALL_CORE_FT
PAST SURGICAL HISTORY:  S/P primary angioplasty with coronary stent x1 in 2006 at [facility]    S/P TKR (total knee replacement) left

## 2022-08-25 NOTE — ED PROVIDER NOTE - NSFOLLOWUPINSTRUCTIONS_ED_ALL_ED_FT
You were seen in the Emergency Department for dark stool. Lab and imaging results, if performed, were discussed with you along with your discharge diagnosis.    You will receive a call to make an appointment with a Gastroenterologist. List of providers and their information has been given. Follow up with your doctor in 1 week - bring copies of your results if you were given. If you do not have a primary doctor, please call [phone] to find one convenient for you.    Continue all prescribed medications.     To control your pain at home, you should take Tylenol 650mg-1000mg every 6 to 8 hours. Limit your maximum daily Tylenol from all sources to 4000mg. Be aware that many other medications contain acetaminophen which is also known as Tylenol. Taking Tylenol and Ibuprofen together has been shown to be more effective at relieving pain than taking them separately. These are both over the counter medications that you can  at your local pharmacy without a prescription. You need to respect all of the warnings on the bottles. You shouldn’t take these medications for more than a week without following up with your doctor. Both medications come with certain risks and side effects that you need to discuss with your doctor, especially if you are taking them for a prolonged period.    Return to ED for any new or worsening symptoms including but not limited to: development of chest pain, shortness of breath, fever, vomiting, focal numbness, weakness or tingling, any severe CP, headache, abdominal pain, back pain.      Rest and keep yourself hydrated with fluids. You were seen in the Emergency Department for dark stool. Lab and imaging results, if performed, were discussed with you along with your discharge diagnosis. Urinalaysis showed a urinary tract infection. You were given antibiotics for this.     You will receive a call to make an appointment with a Gastroenterologist. List of providers and their information has been given. Follow up with your doctor in 1 week - bring copies of your results if you were given. If you do not have a primary doctor, please call [phone] to find one convenient for you.    A prescription for Cefdinir 300ng one capsule every 12 hours for 7 days was sent to your pharmacy. Take as prescribed. Continue all prescribed medications.     To control your pain at home, you should take Tylenol 650mg-1000mg every 6 to 8 hours. Limit your maximum daily Tylenol from all sources to 4000mg. Be aware that many other medications contain acetaminophen which is also known as Tylenol. Taking Tylenol and Ibuprofen together has been shown to be more effective at relieving pain than taking them separately. These are both over the counter medications that you can  at your local pharmacy without a prescription. You need to respect all of the warnings on the bottles. You shouldn’t take these medications for more than a week without following up with your doctor. Both medications come with certain risks and side effects that you need to discuss with your doctor, especially if you are taking them for a prolonged period.    Return to ED for any new or worsening symptoms including but not limited to: development of chest pain, shortness of breath, fever, vomiting, focal numbness, weakness or tingling, any severe CP, headache, abdominal pain, back pain.      Rest and keep yourself hydrated with fluids.

## 2022-08-25 NOTE — ED PROVIDER NOTE - CLINICAL SUMMARY MEDICAL DECISION MAKING FREE TEXT BOX
83 y/o f p/w diarrhea, melena x1, abd pain x 1 week. No travel, abx use. On AC. Vitals stable. Exam as above. Given AC use c/f GI bleed. Pt also has hx of diverticulitis. CT A/P 2 days ago wnl. Will get labs, guiac, cxr and ekg. Will reassess need for repeat imaging. dispo- pending labs and reassess.

## 2022-08-25 NOTE — ED PROVIDER NOTE - PATIENT PORTAL LINK FT
You can access the FollowMyHealth Patient Portal offered by [facility] by registering at the following website: [email or web address]. By joining [employer]’s FollowMyHealth portal, you will also be able to view your health information using other applications (apps) compatible with our system.

## 2022-08-25 NOTE — ED ADULT NURSE NOTE - OBJECTIVE STATEMENT
Patient presented to the ER aox4 Ambulatory complains of diarrhea for the past two weeks. As per patient, I was admitted in the hospital for diarrhea then was discharged on Friday 08/19/2022.  Then came back to the ED on Tuesday then got discharged yesterday. This morning I went to my primary care doctor and  my stool was loose then black. My PCP advised me to go to the ER. Now I am having abdominal pain of 6 out of 10, HA, left shoulder pain and hand pain. Denied NV and chest pain. PMHx DM, htn. Call bell within reach. Will continue to monitor.

## 2022-08-25 NOTE — ED ADULT TRIAGE NOTE - AS TEMP SITE
Carlos Squires  to Me    9:13 AM  i am not sure where I CAN go for the ultrasound. [facility] is the hospital in [place], [place] My lab clinic is at [place]. The only times I have used [facility] is when I have had an appointment there with Dr. Bunch.  I get my next INR on 5/13 so could do all my labs again then. I do try and stay hydrated. I drink a lot of water during the course of the day MOST days. As far as the Bactrim will that show up with my labs?      We have the [facility], [facility] and [place] [facility] here in [place] or would I have to go to [facility] or down to [place]?      OUTCOME:  Lab orders sent to [place].  Called [facility] in [place] to confirm Kid Txp US can be completed at their Facility.  Per [facility] they are able to complete the Ultrasound. Requesting Orders faxed to: [phone]. --- orders sent   oral

## 2022-08-25 NOTE — ED PROVIDER NOTE - NS ED ROS FT
CONSTITUTIONAL: No fevers, + chills, no lightheadedness, no dizziness  EYES: no visual changes, no eye pain  EARS: no ear drainage, no ear pain, no change in hearing  NOSE: no nasal congestion  MOUTH/THROAT: no sore throat  CV: No chest pain, no palpitations  RESP: No SOB, + dry cough  GI: see hpi   : no dysuria, no hematuria, no flank pain  MSK: no back pain, no extremity pain  SKIN: no rashes  NEURO: no headache, no focal weakness, no decreased sensation/parasthesias   PSYCHIATRIC: no known mental health issues

## 2022-08-25 NOTE — ED PROVIDER NOTE - OBJECTIVE STATEMENT
Patient is an 81 y/o F with PMHx sig for CAD w/2 stents on eliquis, DM, HTN, HLD, cva w/o residual deficits,  diverticulitis s/p colon resection in 2020 who  and CHF who presents to the ED with melena. Daughter noticed dark stool today at PMDs office, no other episodes. On AC. Also c/o 1 wk of diarrhea (goes 3-4x daily, very watery). Was seen in this ED for this 2 days ago, ct w/o acute findings, rx'd pepcid for gastritis. No travel, sick contacts or abx use. Diarrhea associated with periumbilical abd pain, intermittent, has not changed in severity. No cp, syncope, palpitations, dizziness or lightheadedness. Last colonoscopy 3 mos ago, polyp removed.

## 2022-08-25 NOTE — ED PROVIDER NOTE - NSFOLLOWUPCLINICS_GEN_ALL_ED_FT
Gastroenterology at [facility]  Gastroenterology  [address] [place] [address]  Phone: [phone]  Fax:     [facility] [place]  Gastroenterology  [address] [place] [address]  Phone: [phone]  Fax:     [facility]  Gastroenterology  [address]  [place] [address]  Phone: [phone]  Fax:

## 2022-08-25 NOTE — ED PROVIDER NOTE - PHYSICAL EXAMINATION
General: Alert and Orientated x 3. No apparent distress.  Head: Normocephalic and atraumatic.  Eyes: PERRLA with EOMI.  Neck: Supple. Trachea midline.   Cardiac: Normal S1 and S2 w/ RRR. No murmurs appreciated.   Pulmonary: mild wheezing in RLL.   Abdominal: Soft, mild periumbilical ttp, no peritoneal signs. (+) bowel sounds appreciated in all 4 quadrants. No hepatosplenomegaly.   Neurologic: No focal sensory or motor deficits.  Musculoskeletal: Strength appropriate in all 4 extremities for age with no limited ROM.  Skin: Color appropriate for race. Intact, warm, and well-perfused.  Psychiatric: Appropriate mood and affect. No apparent risk to self or others.   Rectal: Chaperone Martin Kenney MD. General: Alert and Orientated x 3. No apparent distress.  Head: Normocephalic and atraumatic.  Eyes: PERRLA with EOMI.  Neck: Supple. Trachea midline.   Cardiac: Normal S1 and S2 w/ RRR. No murmurs appreciated.   Pulmonary: mild wheezing in RLL.   Abdominal: Soft, mild periumbilical ttp, no peritoneal signs. (+) bowel sounds appreciated in all 4 quadrants. No hepatosplenomegaly.   Neurologic: No focal sensory or motor deficits.  Musculoskeletal: Strength appropriate in all 4 extremities for age with no limited ROM.  Skin: Color appropriate for race. Intact, warm, and well-perfused.  Psychiatric: Appropriate mood and affect. No apparent risk to self or others.   Rectal: Chaperone Martin Kenney MD. No hemorrhoids or anal fissures. No obvious blood or melena in rectum. No palpable masses.

## 2022-08-26 ENCOUNTER — FORM ENCOUNTER (OUTPATIENT)
Age: 82
End: 2022-08-26

## 2022-08-26 LAB
APPEARANCE UR: CLEAR — SIGNIFICANT CHANGE UP
BACTERIA # UR AUTO: NEGATIVE — SIGNIFICANT CHANGE UP
BILIRUB UR-MCNC: NEGATIVE — SIGNIFICANT CHANGE UP
COLOR SPEC: SIGNIFICANT CHANGE UP
DIFF PNL FLD: NEGATIVE — SIGNIFICANT CHANGE UP
EPI CELLS # UR: 2 /HPF — SIGNIFICANT CHANGE UP (ref 0–5)
GLUCOSE UR QL: NEGATIVE — SIGNIFICANT CHANGE UP
HYALINE CASTS # UR AUTO: 0 /LPF — SIGNIFICANT CHANGE UP (ref 0–7)
KETONES UR-MCNC: NEGATIVE — SIGNIFICANT CHANGE UP
LEUKOCYTE ESTERASE UR-ACNC: ABNORMAL
NITRITE UR-MCNC: NEGATIVE — SIGNIFICANT CHANGE UP
PH UR: 6 — SIGNIFICANT CHANGE UP (ref 5–8)
PROT UR-MCNC: ABNORMAL
RBC CASTS # UR COMP ASSIST: 2 /HPF — SIGNIFICANT CHANGE UP (ref 0–4)
SARS-COV-2 RNA SPEC QL NAA+PROBE: DETECTED
SP GR SPEC: 1.01 — SIGNIFICANT CHANGE UP (ref 1.01–1.05)
UROBILINOGEN FLD QL: SIGNIFICANT CHANGE UP
WBC UR QL: 45 /HPF — HIGH (ref 0–5)

## 2022-08-26 RX ORDER — CEFTRIAXONE 500 MG/1
1000 INJECTION, POWDER, FOR SOLUTION INTRAMUSCULAR; INTRAVENOUS ONCE
Refills: 0 | Status: COMPLETED | OUTPATIENT
Start: 2022-08-26 | End: 2022-08-26

## 2022-08-26 RX ORDER — CEFDINIR 250 MG/5ML
1 POWDER, FOR SUSPENSION ORAL
Qty: 14 | Refills: 0
Start: 2022-08-26 | End: 2022-09-01

## 2022-08-26 RX ADMIN — CEFTRIAXONE 100 MILLIGRAM(S): 500 INJECTION, POWDER, FOR SOLUTION INTRAMUSCULAR; INTRAVENOUS at 00:55

## 2022-08-26 NOTE — ED POST DISCHARGE NOTE - REASON FOR FOLLOW-UP
Other Pt son contacted ED to follow up lab results.  COVID-19 PCR Detected.  Discussed positive covid result with patient son and recommended to follow up with PMD.  Also discussed that patient was discharged on cefdinir for UTI and UCX from 8/25 visit is prelim.

## 2022-08-27 LAB
CULTURE RESULTS: SIGNIFICANT CHANGE UP
SPECIMEN SOURCE: SIGNIFICANT CHANGE UP

## 2022-09-13 ENCOUNTER — NON-APPOINTMENT (OUTPATIENT)
Age: 82
End: 2022-09-13

## 2023-01-01 ENCOUNTER — TRANSCRIPTION ENCOUNTER (OUTPATIENT)
Age: 83
End: 2023-01-01

## 2023-01-01 NOTE — H&P ADULT - PROBLEM SELECTOR PLAN 5
- Pt appears euvolemic  - Continue coreg, valsartan not available- will need alternative. Continue lasix N/A

## 2023-01-22 ENCOUNTER — EMERGENCY (EMERGENCY)
Facility: HOSPITAL | Age: 83
LOS: 1 days | Discharge: HOME | End: 2023-01-22
Attending: STUDENT IN AN ORGANIZED HEALTH CARE EDUCATION/TRAINING PROGRAM
Payer: MEDICARE

## 2023-01-22 VITALS
WEIGHT: 186.95 LBS | HEART RATE: 78 BPM | RESPIRATION RATE: 18 BRPM | SYSTOLIC BLOOD PRESSURE: 162 MMHG | TEMPERATURE: 98 F | OXYGEN SATURATION: 98 % | DIASTOLIC BLOOD PRESSURE: 83 MMHG | HEIGHT: 66 IN

## 2023-01-22 LAB
ALBUMIN SERPL ELPH-MCNC: 3.7 G/DL — SIGNIFICANT CHANGE UP (ref 3.5–5)
ALP SERPL-CCNC: 79 U/L — SIGNIFICANT CHANGE UP (ref 40–120)
ALT FLD-CCNC: 18 U/L DA — SIGNIFICANT CHANGE UP (ref 10–60)
ANION GAP SERPL CALC-SCNC: 9 MMOL/L — SIGNIFICANT CHANGE UP (ref 5–17)
APTT BLD: 34.4 SEC — SIGNIFICANT CHANGE UP (ref 27.5–35.5)
AST SERPL-CCNC: 15 U/L — SIGNIFICANT CHANGE UP (ref 10–40)
BASOPHILS # BLD AUTO: 0.01 K/UL — SIGNIFICANT CHANGE UP (ref 0–0.2)
BASOPHILS NFR BLD AUTO: 0.2 % — SIGNIFICANT CHANGE UP (ref 0–2)
BILIRUB SERPL-MCNC: 0.5 MG/DL — SIGNIFICANT CHANGE UP (ref 0.2–1.2)
BUN SERPL-MCNC: 17 MG/DL — SIGNIFICANT CHANGE UP (ref 7–18)
CALCIUM SERPL-MCNC: 9.4 MG/DL — SIGNIFICANT CHANGE UP (ref 8.4–10.5)
CHLORIDE SERPL-SCNC: 105 MMOL/L — SIGNIFICANT CHANGE UP (ref 96–108)
CO2 SERPL-SCNC: 26 MMOL/L — SIGNIFICANT CHANGE UP (ref 22–31)
CREAT SERPL-MCNC: 1.23 MG/DL — SIGNIFICANT CHANGE UP (ref 0.5–1.3)
EGFR: 43 ML/MIN/1.73M2 — LOW
EOSINOPHIL # BLD AUTO: 0.07 K/UL — SIGNIFICANT CHANGE UP (ref 0–0.5)
EOSINOPHIL NFR BLD AUTO: 1.1 % — SIGNIFICANT CHANGE UP (ref 0–6)
FLUAV AG NPH QL: SIGNIFICANT CHANGE UP
FLUBV AG NPH QL: SIGNIFICANT CHANGE UP
GLUCOSE SERPL-MCNC: 197 MG/DL — HIGH (ref 70–99)
HCT VFR BLD CALC: 39.2 % — SIGNIFICANT CHANGE UP (ref 34.5–45)
HGB BLD-MCNC: 12.1 G/DL — SIGNIFICANT CHANGE UP (ref 11.5–15.5)
IMM GRANULOCYTES NFR BLD AUTO: 0.2 % — SIGNIFICANT CHANGE UP (ref 0–0.9)
INR BLD: 1.05 RATIO — SIGNIFICANT CHANGE UP (ref 0.88–1.16)
LIDOCAIN IGE QN: 94 U/L — SIGNIFICANT CHANGE UP (ref 73–393)
LYMPHOCYTES # BLD AUTO: 2.14 K/UL — SIGNIFICANT CHANGE UP (ref 1–3.3)
LYMPHOCYTES # BLD AUTO: 32.2 % — SIGNIFICANT CHANGE UP (ref 13–44)
MAGNESIUM SERPL-MCNC: 2.2 MG/DL — SIGNIFICANT CHANGE UP (ref 1.6–2.6)
MCHC RBC-ENTMCNC: 26.4 PG — LOW (ref 27–34)
MCHC RBC-ENTMCNC: 30.9 GM/DL — LOW (ref 32–36)
MCV RBC AUTO: 85.4 FL — SIGNIFICANT CHANGE UP (ref 80–100)
MONOCYTES # BLD AUTO: 0.61 K/UL — SIGNIFICANT CHANGE UP (ref 0–0.9)
MONOCYTES NFR BLD AUTO: 9.2 % — SIGNIFICANT CHANGE UP (ref 2–14)
NEUTROPHILS # BLD AUTO: 3.8 K/UL — SIGNIFICANT CHANGE UP (ref 1.8–7.4)
NEUTROPHILS NFR BLD AUTO: 57.1 % — SIGNIFICANT CHANGE UP (ref 43–77)
NRBC # BLD: 0 /100 WBCS — SIGNIFICANT CHANGE UP (ref 0–0)
NT-PROBNP SERPL-SCNC: 237 PG/ML — SIGNIFICANT CHANGE UP (ref 0–450)
PLATELET # BLD AUTO: 224 K/UL — SIGNIFICANT CHANGE UP (ref 150–400)
POTASSIUM SERPL-MCNC: 4.1 MMOL/L — SIGNIFICANT CHANGE UP (ref 3.5–5.3)
POTASSIUM SERPL-SCNC: 4.1 MMOL/L — SIGNIFICANT CHANGE UP (ref 3.5–5.3)
PROT SERPL-MCNC: 8.4 G/DL — HIGH (ref 6–8.3)
PROTHROM AB SERPL-ACNC: 12.5 SEC — SIGNIFICANT CHANGE UP (ref 10.5–13.4)
RBC # BLD: 4.59 M/UL — SIGNIFICANT CHANGE UP (ref 3.8–5.2)
RBC # FLD: 14.6 % — HIGH (ref 10.3–14.5)
SARS-COV-2 RNA SPEC QL NAA+PROBE: SIGNIFICANT CHANGE UP
SODIUM SERPL-SCNC: 140 MMOL/L — SIGNIFICANT CHANGE UP (ref 135–145)
TROPONIN I, HIGH SENSITIVITY RESULT: 17 NG/L — SIGNIFICANT CHANGE UP
TROPONIN I, HIGH SENSITIVITY RESULT: 18.8 NG/L — SIGNIFICANT CHANGE UP
WBC # BLD: 6.64 K/UL — SIGNIFICANT CHANGE UP (ref 3.8–10.5)
WBC # FLD AUTO: 6.64 K/UL — SIGNIFICANT CHANGE UP (ref 3.8–10.5)

## 2023-01-22 PROCEDURE — 80053 COMPREHEN METABOLIC PANEL: CPT

## 2023-01-22 PROCEDURE — 84484 ASSAY OF TROPONIN QUANT: CPT

## 2023-01-22 PROCEDURE — 85730 THROMBOPLASTIN TIME PARTIAL: CPT

## 2023-01-22 PROCEDURE — 85025 COMPLETE CBC W/AUTO DIFF WBC: CPT

## 2023-01-22 PROCEDURE — 99285 EMERGENCY DEPT VISIT HI MDM: CPT

## 2023-01-22 PROCEDURE — 87637 SARSCOV2&INF A&B&RSV AMP PRB: CPT

## 2023-01-22 PROCEDURE — 83690 ASSAY OF LIPASE: CPT

## 2023-01-22 PROCEDURE — 36415 COLL VENOUS BLD VENIPUNCTURE: CPT

## 2023-01-22 PROCEDURE — 71045 X-RAY EXAM CHEST 1 VIEW: CPT | Mod: 26

## 2023-01-22 PROCEDURE — 83880 ASSAY OF NATRIURETIC PEPTIDE: CPT

## 2023-01-22 PROCEDURE — 71045 X-RAY EXAM CHEST 1 VIEW: CPT

## 2023-01-22 PROCEDURE — 83735 ASSAY OF MAGNESIUM: CPT

## 2023-01-22 PROCEDURE — 93005 ELECTROCARDIOGRAM TRACING: CPT

## 2023-01-22 PROCEDURE — 99285 EMERGENCY DEPT VISIT HI MDM: CPT | Mod: 25

## 2023-01-22 PROCEDURE — 93010 ELECTROCARDIOGRAM REPORT: CPT

## 2023-01-22 PROCEDURE — 74177 CT ABD & PELVIS W/CONTRAST: CPT | Mod: MA

## 2023-01-22 PROCEDURE — 85610 PROTHROMBIN TIME: CPT

## 2023-01-22 RX ORDER — ACETAMINOPHEN 500 MG
650 TABLET ORAL ONCE
Refills: 0 | Status: COMPLETED | OUTPATIENT
Start: 2023-01-22 | End: 2023-01-22

## 2023-01-22 RX ORDER — CYCLOBENZAPRINE HYDROCHLORIDE 10 MG/1
10 TABLET, FILM COATED ORAL ONCE
Refills: 0 | Status: COMPLETED | OUTPATIENT
Start: 2023-01-22 | End: 2023-01-22

## 2023-01-22 RX ORDER — LIDOCAINE 4 G/100G
1 CREAM TOPICAL ONCE
Refills: 0 | Status: COMPLETED | OUTPATIENT
Start: 2023-01-22 | End: 2023-01-22

## 2023-01-22 RX ADMIN — LIDOCAINE 1 PATCH: 4 CREAM TOPICAL at 23:31

## 2023-01-22 RX ADMIN — Medication 650 MILLIGRAM(S): at 22:17

## 2023-01-22 RX ADMIN — CYCLOBENZAPRINE HYDROCHLORIDE 10 MILLIGRAM(S): 10 TABLET, FILM COATED ORAL at 23:31

## 2023-01-22 RX ADMIN — Medication 650 MILLIGRAM(S): at 20:57

## 2023-01-22 NOTE — ED PROVIDER NOTE - NSFOLLOWUPINSTRUCTIONS_ED_ALL_ED_FT
Thank you for choosing [facility] for your health care.    You were seen for pains in your shoulder and back.  You had extensive blood testing which showed no signs of an internal organ problem and a CT scan of your belly which again showed no emergent findings.  The radiologist did notice some thickening of the uterine lining on the CT scan and they are recommending you follow-up with an OB/GYN to get a follow-up ultrasound to make sure that there is nothing concerning about this.  We are including the phone number for our affiliated OB/GYN's in case you do not have one yourself.  We prescribed you pain medication and a muscle relaxer identical to what you were given in the emergency department which you can take as prescribed.  You can also use lidocaine topical patches as directed on the packaging which can be purchased without a prescription in any pharmacy.  Please follow-up with your primary care doctor in the next few days if the pain persists.

## 2023-01-22 NOTE — ED ADULT NURSE NOTE - OBJECTIVE STATEMENT
Pt stated she has right side chest pain going to her right shoulder and right arm, neck and stomach. pt placed on cardiac monitor, normal sinus rhythm noted.

## 2023-01-22 NOTE — ED PROVIDER NOTE - CLINICAL SUMMARY MEDICAL DECISION MAKING FREE TEXT BOX
82-year-old female with history of diabetes, hypertension, hyperlipidemia, CAD, CVA coming in with chest pain.  On Eliquis, not on aspirin or Plavix.  EKG is nonischemic.  Concern for ACS, electrolyte abnormality, anemia, pneumonia. Also diffuse abd pain more in LLQ - ct to eval for intraadbominal pathology. 82-year-old female with history of diabetes, hypertension, hyperlipidemia, CAD, CVA coming in with chest pain.  On Eliquis, not on aspirin or Plavix.  EKG is nonischemic.  Concern for ACS, electrolyte abnormality, anemia, pneumonia. Also diffuse abd pain more in LLQ - ct to eval for intraadbominal pathology. Concern for MSK pain of back. FROM in shoulder.

## 2023-01-22 NOTE — ED PROVIDER NOTE - PATIENT PORTAL LINK FT
You can access the FollowMyHealth Patient Portal offered by [facility] by registering at the following website: [email or web address]. By joining [employer]’s FollowMyHealth portal, you will also be able to view your health information using other applications (apps) compatible with our system.

## 2023-01-22 NOTE — ED PROVIDER NOTE - PHYSICAL EXAMINATION
Well appearing. CTAB. RRR. Abd soft diffuse tenderness - more in LLQ. no pedal edema. No calf tenderness or swelling. Well appearing. CTAB. RRR. Abd soft diffuse tenderness - more in LLQ. no pedal edema. No calf tenderness or swelling. ttp in rt thoracic region, no spinal ttp. from in shoulders b/l.

## 2023-01-22 NOTE — ED PROVIDER NOTE - PROGRESS NOTE DETAILS
Received signout on patient.  Workup without emergent findings and pain controlled with treatment in Emergency Department.  Discussed results with daughter will recommend continuing tylenol and muscle relaxer as needed at home and follow up with PMD.

## 2023-01-22 NOTE — ED PROVIDER NOTE - OBJECTIVE STATEMENT
82-year-old female with past medical history of diabetes, hypertension, hyperlipidemia, CAD status post stent, CVA no neurodeficits presents with chest pain started at rest before coming to the emergency department today.  Daughter at bedside states patient had similar pain about a week ago and was taken to [facility] where she was admitted for 1 week.  Does not have the results with her however had an echocardiogram and all other tests that were negative.  Patient is unsure if she had a stress test.  Pain is nonradiating.  No shortness of breath, fever, chills, nausea, vomiting  Also reports she has started having abdominal pain today and having loose stools, no blood in stools. denies black/dark stools. 82-year-old female with past medical history of diabetes, hypertension, hyperlipidemia, CAD status post stent, CVA no neurodeficits presents with chest pain started at rest before coming to the emergency department today.  Daughter at bedside states patient had similar pain about a week ago and was taken to [facility] where she was admitted for 1 week.  Does not have the results with her however had an echocardiogram and all other tests that were negative.  Patient is unsure if she had a stress test.  Pain is nonradiating.  No shortness of breath, fever, chills, nausea, vomiting  Reports she has started having abdominal pain today and having loose stools, no blood in stools. denies black/dark stools.  Also reports RT upper back pain ongoing inermittently for over a week - able to range his shoulder, no trauma.

## 2023-01-23 VITALS
DIASTOLIC BLOOD PRESSURE: 92 MMHG | RESPIRATION RATE: 17 BRPM | HEART RATE: 73 BPM | SYSTOLIC BLOOD PRESSURE: 166 MMHG | TEMPERATURE: 98 F | OXYGEN SATURATION: 98 %

## 2023-01-23 PROCEDURE — 74177 CT ABD & PELVIS W/CONTRAST: CPT | Mod: 26,MA

## 2023-01-23 RX ORDER — ACETAMINOPHEN 500 MG
2 TABLET ORAL
Qty: 56 | Refills: 0
Start: 2023-01-23 | End: 2023-01-29

## 2023-01-23 RX ORDER — CYCLOBENZAPRINE HYDROCHLORIDE 10 MG/1
1 TABLET, FILM COATED ORAL
Qty: 15 | Refills: 0
Start: 2023-01-23 | End: 2023-01-27

## 2023-01-30 NOTE — PATIENT PROFILE ADULT - HAVE YOU EXPERIENCED VIOLENCE OR A TRAUMATIC EVENT?
Quality 110: Preventive Care And Screening: Influenza Immunization: Influenza Immunization not Administered because Patient Refused. Detail Level: Detailed Quality 130: Documentation Of Current Medications In The Medical Record: Current Medications Documented no

## 2023-02-21 ENCOUNTER — INPATIENT (INPATIENT)
Facility: HOSPITAL | Age: 83
LOS: 2 days | Discharge: HOME | End: 2023-02-24
Attending: INTERNAL MEDICINE | Admitting: INTERNAL MEDICINE
Payer: MEDICARE

## 2023-02-21 VITALS
HEART RATE: 78 BPM | TEMPERATURE: 99 F | HEIGHT: 66 IN | OXYGEN SATURATION: 100 % | DIASTOLIC BLOOD PRESSURE: 87 MMHG | RESPIRATION RATE: 16 BRPM | SYSTOLIC BLOOD PRESSURE: 159 MMHG

## 2023-02-21 LAB
ALBUMIN SERPL ELPH-MCNC: 4.5 G/DL — SIGNIFICANT CHANGE UP (ref 3.3–5)
ALP SERPL-CCNC: 82 U/L — SIGNIFICANT CHANGE UP (ref 40–120)
ALT FLD-CCNC: 8 U/L — SIGNIFICANT CHANGE UP (ref 4–33)
ANION GAP SERPL CALC-SCNC: 11 MMOL/L — SIGNIFICANT CHANGE UP (ref 7–14)
APPEARANCE UR: CLEAR — SIGNIFICANT CHANGE UP
AST SERPL-CCNC: 14 U/L — SIGNIFICANT CHANGE UP (ref 4–32)
BACTERIA # UR AUTO: ABNORMAL
BASOPHILS # BLD AUTO: 0.02 K/UL — SIGNIFICANT CHANGE UP (ref 0–0.2)
BASOPHILS NFR BLD AUTO: 0.3 % — SIGNIFICANT CHANGE UP (ref 0–2)
BILIRUB SERPL-MCNC: 0.8 MG/DL — SIGNIFICANT CHANGE UP (ref 0.2–1.2)
BILIRUB UR-MCNC: NEGATIVE — SIGNIFICANT CHANGE UP
BUN SERPL-MCNC: 14 MG/DL — SIGNIFICANT CHANGE UP (ref 7–23)
CALCIUM SERPL-MCNC: 9.6 MG/DL — SIGNIFICANT CHANGE UP (ref 8.4–10.5)
CHLORIDE SERPL-SCNC: 103 MMOL/L — SIGNIFICANT CHANGE UP (ref 98–107)
CO2 SERPL-SCNC: 25 MMOL/L — SIGNIFICANT CHANGE UP (ref 22–31)
COLOR SPEC: YELLOW — SIGNIFICANT CHANGE UP
CREAT SERPL-MCNC: 0.98 MG/DL — SIGNIFICANT CHANGE UP (ref 0.5–1.3)
DIFF PNL FLD: NEGATIVE — SIGNIFICANT CHANGE UP
EGFR: 57 ML/MIN/1.73M2 — LOW
EOSINOPHIL # BLD AUTO: 0.09 K/UL — SIGNIFICANT CHANGE UP (ref 0–0.5)
EOSINOPHIL NFR BLD AUTO: 1.2 % — SIGNIFICANT CHANGE UP (ref 0–6)
EPI CELLS # UR: 3 /HPF — SIGNIFICANT CHANGE UP (ref 0–5)
GLUCOSE SERPL-MCNC: 166 MG/DL — HIGH (ref 70–99)
GLUCOSE UR QL: NEGATIVE — SIGNIFICANT CHANGE UP
HCT VFR BLD CALC: 40.2 % — SIGNIFICANT CHANGE UP (ref 34.5–45)
HGB BLD-MCNC: 11.9 G/DL — SIGNIFICANT CHANGE UP (ref 11.5–15.5)
HYALINE CASTS # UR AUTO: 0 /LPF — SIGNIFICANT CHANGE UP (ref 0–7)
IANC: 4.2 K/UL — SIGNIFICANT CHANGE UP (ref 1.8–7.4)
IMM GRANULOCYTES NFR BLD AUTO: 0.3 % — SIGNIFICANT CHANGE UP (ref 0–0.9)
KETONES UR-MCNC: ABNORMAL
LEUKOCYTE ESTERASE UR-ACNC: ABNORMAL
LYMPHOCYTES # BLD AUTO: 2.31 K/UL — SIGNIFICANT CHANGE UP (ref 1–3.3)
LYMPHOCYTES # BLD AUTO: 31.6 % — SIGNIFICANT CHANGE UP (ref 13–44)
MAGNESIUM SERPL-MCNC: 1.9 MG/DL — SIGNIFICANT CHANGE UP (ref 1.6–2.6)
MCHC RBC-ENTMCNC: 25.7 PG — LOW (ref 27–34)
MCHC RBC-ENTMCNC: 29.6 GM/DL — LOW (ref 32–36)
MCV RBC AUTO: 86.8 FL — SIGNIFICANT CHANGE UP (ref 80–100)
MONOCYTES # BLD AUTO: 0.67 K/UL — SIGNIFICANT CHANGE UP (ref 0–0.9)
MONOCYTES NFR BLD AUTO: 9.2 % — SIGNIFICANT CHANGE UP (ref 2–14)
NEUTROPHILS # BLD AUTO: 4.2 K/UL — SIGNIFICANT CHANGE UP (ref 1.8–7.4)
NEUTROPHILS NFR BLD AUTO: 57.4 % — SIGNIFICANT CHANGE UP (ref 43–77)
NITRITE UR-MCNC: NEGATIVE — SIGNIFICANT CHANGE UP
NRBC # BLD: 0 /100 WBCS — SIGNIFICANT CHANGE UP (ref 0–0)
NRBC # FLD: 0 K/UL — SIGNIFICANT CHANGE UP (ref 0–0)
NT-PROBNP SERPL-SCNC: 198 PG/ML — SIGNIFICANT CHANGE UP
PH UR: 5.5 — SIGNIFICANT CHANGE UP (ref 5–8)
PHOSPHATE SERPL-MCNC: 3.3 MG/DL — SIGNIFICANT CHANGE UP (ref 2.5–4.5)
PLATELET # BLD AUTO: 233 K/UL — SIGNIFICANT CHANGE UP (ref 150–400)
POTASSIUM SERPL-MCNC: 4.1 MMOL/L — SIGNIFICANT CHANGE UP (ref 3.5–5.3)
POTASSIUM SERPL-SCNC: 4.1 MMOL/L — SIGNIFICANT CHANGE UP (ref 3.5–5.3)
PROT SERPL-MCNC: 8.4 G/DL — HIGH (ref 6–8.3)
PROT UR-MCNC: ABNORMAL
RBC # BLD: 4.63 M/UL — SIGNIFICANT CHANGE UP (ref 3.8–5.2)
RBC # FLD: 14.2 % — SIGNIFICANT CHANGE UP (ref 10.3–14.5)
RBC CASTS # UR COMP ASSIST: 1 /HPF — SIGNIFICANT CHANGE UP (ref 0–4)
SODIUM SERPL-SCNC: 139 MMOL/L — SIGNIFICANT CHANGE UP (ref 135–145)
SP GR SPEC: 1.03 — SIGNIFICANT CHANGE UP (ref 1.01–1.05)
TROPONIN T, HIGH SENSITIVITY RESULT: 19 NG/L — SIGNIFICANT CHANGE UP
UROBILINOGEN FLD QL: SIGNIFICANT CHANGE UP
WBC # BLD: 7.31 K/UL — SIGNIFICANT CHANGE UP (ref 3.8–10.5)
WBC # FLD AUTO: 7.31 K/UL — SIGNIFICANT CHANGE UP (ref 3.8–10.5)
WBC UR QL: 21 /HPF — HIGH (ref 0–5)

## 2023-02-21 PROCEDURE — 99285 EMERGENCY DEPT VISIT HI MDM: CPT

## 2023-02-21 PROCEDURE — 71045 X-RAY EXAM CHEST 1 VIEW: CPT | Mod: 26

## 2023-02-21 PROCEDURE — 72100 X-RAY EXAM L-S SPINE 2/3 VWS: CPT | Mod: 26

## 2023-02-21 RX ORDER — KETOROLAC TROMETHAMINE 30 MG/ML
30 SYRINGE (ML) INJECTION ONCE
Refills: 0 | Status: DISCONTINUED | OUTPATIENT
Start: 2023-02-21 | End: 2023-02-21

## 2023-02-21 RX ADMIN — Medication 30 MILLIGRAM(S): at 20:49

## 2023-02-21 RX ADMIN — Medication 30 MILLIGRAM(S): at 21:19

## 2023-02-21 NOTE — ED PROVIDER NOTE - NS ED ROS FT
CONSTITUTIONAL: No fevers or chills  EYES/ENT: No visual changes  NECK: No stiffness  RESPIRATORY: No cough, wheezing, + shortness of breath with exertion  CARDIOVASCULAR: + chest pain, no palpitations  GASTROINTESTINAL: + abdominal pain, no nausea, vomiting, diarrhea or constipation  GENITOURINARY: No dysuria, frequency or hematuria  NEUROLOGICAL: No numbness, headache, or dizziness, + weakness  MUSCULOSKELETAL: No swelling, + back pain, + knee and finger pain  SKIN: No itching, rashes

## 2023-02-21 NOTE — ED ADULT TRIAGE NOTE - CHIEF COMPLAINT QUOTE
Pt c/o worsening body pain "all over" worse in her lower back x 1 week. Pt had routine physical today with PCP. Denies urinary symptoms, recent falls.

## 2023-02-21 NOTE — ED PROVIDER NOTE - PHYSICAL EXAMINATION
CONSTITUTIONAL: NAD  HEAD: normocephalic, atraumatic  EYES: PERRLA, conjunctiva and sclera clear  ENMT: Moist oral mucosa  RESPIRATORY: Normal respiratory effort; lungs are clear to auscultation bilaterally  CARDIOVASCULAR: Regular rate and rhythm, normal S1 and S2, no lower extremity edema  ABDOMEN: + diffusely tender to palpation, greatest in LLQ, positive bowel sounds, no rebound/guarding  MUSCULOSKELETAL: + spinal ttp and upper back ttp, no cyanosis, LE non-pitting edema  NEUROLOGY: Alert, oriented, CN 2-12 intact and symmetric  SKIN: No rashes, warm, dry

## 2023-02-21 NOTE — ED PROVIDER NOTE - CLINICAL SUMMARY MEDICAL DECISION MAKING FREE TEXT BOX
83F PMH DM, CAD s/p stents, HTN, CVA p/w worsening generalized body pain greatest in abdomen and back. Patient admitted last month for similar pain and given muscle relaxant which helped with her sx. Also c/o CP, SOB with exertion, and weakness, but denies urinary symptoms, recent falls, HA, dizziness, trauma, N/V/D, constipation, recent travel and sick contacts. Will order labs, trop, BNP, CXR, and give pain medication.

## 2023-02-21 NOTE — ED PROVIDER NOTE - PROGRESS NOTE DETAILS
KARLI Pedroza- received sign out. pt denies any active abd pain, abd soft and nontender. states she has had intermittent body pain since July. Has not seen rheum. per daughter, pt ambulates with walker at home however more unsteady and generally weak due to body pain. pt unable to ambulate in ED with cane. plan to admit to medicine.

## 2023-02-21 NOTE — ED PROVIDER NOTE - ATTENDING CONTRIBUTION TO CARE
I (Emilia) agree with above, I performed a history and physical. Counseled florentino medical staff, physician assistant, and/or medical student on medical decision making as documented. Medical decisions and treatment interventions were made in real time during the patient encounter. Additionally and/or with the following exceptions: Patient is an 83-year-old female past medical history of diabetes, coronary artery disease on Eliquis CHF on Lasix, hypertension, CVA who is presenting to the emergency department with generalized body pain for 2 weeks.  Patient was given a muscle relaxer without resolution of symptoms.  Patient was uncomfortable appearing.  Patient ambulates with a walker at home.  However seemed unsteady on her feet.  CBC was within normal limits, coagulation studies.,  CMP within normal limits SARS Cov negative patient was signed out to oncoming attending pending chest x-ray and lumbar spine x-ray and probable admission for inability to ambulate.  The patient's condition was not amenable to outpatient treatment due either the lack of feasibility of outpatient care coordination, possibility for further decompensation with adverse outcome if discharge, or treatments and diagnostic  modalities only available during an inpatient hospitalization.

## 2023-02-21 NOTE — ED PROVIDER NOTE - OBJECTIVE STATEMENT
83F with PMH DM, CAD s/p stents, HTN, CVA presenting to the ED for generalized body pain x 2 weeks. Patient reports her body pain has been worsening over the past 3-4 days with her pain greatest in her abdomen and back. Patient saw her PCP for a routine physical today and was admitted last month for similar pain and given muscle relaxant. She also reports knee and finger pain, CP, SOB with exertion, and weakness. Notes that she has been feeling unsteady on her feet but is able to ambulate with a walker daily. Denies urinary symptoms, recent falls, HA, dizziness, trauma, N/V/D, constipation, recent travel and sick contacts.

## 2023-02-21 NOTE — ED PROVIDER NOTE - NSICDXPASTSURGICALHX_GEN_ALL_CORE_FT
PAST SURGICAL HISTORY:  S/P primary angioplasty with coronary stent x1 in 2006 at [facility]    S/P TKR (total knee replacement) left

## 2023-02-22 DIAGNOSIS — Z29.9 ENCOUNTER FOR PROPHYLACTIC MEASURES, UNSPECIFIED: ICD-10-CM

## 2023-02-22 DIAGNOSIS — R52 PAIN, UNSPECIFIED: ICD-10-CM

## 2023-02-22 DIAGNOSIS — I10 ESSENTIAL (PRIMARY) HYPERTENSION: ICD-10-CM

## 2023-02-22 DIAGNOSIS — I50.9 HEART FAILURE, UNSPECIFIED: ICD-10-CM

## 2023-02-22 DIAGNOSIS — R82.90 UNSPECIFIED ABNORMAL FINDINGS IN URINE: ICD-10-CM

## 2023-02-22 DIAGNOSIS — E11.9 TYPE 2 DIABETES MELLITUS WITHOUT COMPLICATIONS: ICD-10-CM

## 2023-02-22 DIAGNOSIS — I25.10 ATHEROSCLEROTIC HEART DISEASE OF NATIVE CORONARY ARTERY WITHOUT ANGINA PECTORIS: ICD-10-CM

## 2023-02-22 LAB
GLUCOSE BLDC GLUCOMTR-MCNC: 132 MG/DL — HIGH (ref 70–99)
GLUCOSE BLDC GLUCOMTR-MCNC: 135 MG/DL — HIGH (ref 70–99)
GLUCOSE BLDC GLUCOMTR-MCNC: 268 MG/DL — HIGH (ref 70–99)
TROPONIN T, HIGH SENSITIVITY RESULT: 19 NG/L — SIGNIFICANT CHANGE UP

## 2023-02-22 PROCEDURE — 73140 X-RAY EXAM OF FINGER(S): CPT | Mod: 26,50

## 2023-02-22 PROCEDURE — 74177 CT ABD & PELVIS W/CONTRAST: CPT | Mod: 26

## 2023-02-22 PROCEDURE — 73562 X-RAY EXAM OF KNEE 3: CPT | Mod: 26,RT

## 2023-02-22 PROCEDURE — 99223 1ST HOSP IP/OBS HIGH 75: CPT

## 2023-02-22 RX ORDER — DEXTROSE 50 % IN WATER 50 %
12.5 SYRINGE (ML) INTRAVENOUS ONCE
Refills: 0 | Status: DISCONTINUED | OUTPATIENT
Start: 2023-02-22 | End: 2023-02-24

## 2023-02-22 RX ORDER — INSULIN LISPRO 100/ML
5 VIAL (ML) SUBCUTANEOUS
Refills: 0 | Status: DISCONTINUED | OUTPATIENT
Start: 2023-02-22 | End: 2023-02-24

## 2023-02-22 RX ORDER — INSULIN LISPRO 100/ML
VIAL (ML) SUBCUTANEOUS
Refills: 0 | Status: DISCONTINUED | OUTPATIENT
Start: 2023-02-22 | End: 2023-02-24

## 2023-02-22 RX ORDER — DEXTROSE 50 % IN WATER 50 %
25 SYRINGE (ML) INTRAVENOUS ONCE
Refills: 0 | Status: DISCONTINUED | OUTPATIENT
Start: 2023-02-22 | End: 2023-02-24

## 2023-02-22 RX ORDER — INSULIN GLARGINE 100 [IU]/ML
15 INJECTION, SOLUTION SUBCUTANEOUS AT BEDTIME
Refills: 0 | Status: DISCONTINUED | OUTPATIENT
Start: 2023-02-22 | End: 2023-02-24

## 2023-02-22 RX ORDER — ATORVASTATIN CALCIUM 80 MG/1
80 TABLET, FILM COATED ORAL AT BEDTIME
Refills: 0 | Status: DISCONTINUED | OUTPATIENT
Start: 2023-02-22 | End: 2023-02-24

## 2023-02-22 RX ORDER — LISINOPRIL 2.5 MG/1
2.5 TABLET ORAL DAILY
Refills: 0 | Status: DISCONTINUED | OUTPATIENT
Start: 2023-02-22 | End: 2023-02-24

## 2023-02-22 RX ORDER — PANTOPRAZOLE SODIUM 20 MG/1
40 TABLET, DELAYED RELEASE ORAL
Refills: 0 | Status: DISCONTINUED | OUTPATIENT
Start: 2023-02-22 | End: 2023-02-24

## 2023-02-22 RX ORDER — CARVEDILOL PHOSPHATE 80 MG/1
3.12 CAPSULE, EXTENDED RELEASE ORAL EVERY 12 HOURS
Refills: 0 | Status: DISCONTINUED | OUTPATIENT
Start: 2023-02-22 | End: 2023-02-24

## 2023-02-22 RX ORDER — CEFTRIAXONE 500 MG/1
1000 INJECTION, POWDER, FOR SOLUTION INTRAMUSCULAR; INTRAVENOUS EVERY 24 HOURS
Refills: 0 | Status: DISCONTINUED | OUTPATIENT
Start: 2023-02-22 | End: 2023-02-24

## 2023-02-22 RX ORDER — FUROSEMIDE 40 MG
40 TABLET ORAL DAILY
Refills: 0 | Status: DISCONTINUED | OUTPATIENT
Start: 2023-02-22 | End: 2023-02-24

## 2023-02-22 RX ORDER — ASPIRIN/CALCIUM CARB/MAGNESIUM 324 MG
1 TABLET ORAL
Qty: 0 | Refills: 0 | DISCHARGE

## 2023-02-22 RX ORDER — LIDOCAINE 4 G/100G
1 CREAM TOPICAL EVERY 24 HOURS
Refills: 0 | Status: DISCONTINUED | OUTPATIENT
Start: 2023-02-22 | End: 2023-02-24

## 2023-02-22 RX ORDER — GLUCAGON INJECTION, SOLUTION 0.5 MG/.1ML
1 INJECTION, SOLUTION SUBCUTANEOUS ONCE
Refills: 0 | Status: DISCONTINUED | OUTPATIENT
Start: 2023-02-22 | End: 2023-02-24

## 2023-02-22 RX ORDER — ACETAMINOPHEN 500 MG
650 TABLET ORAL ONCE
Refills: 0 | Status: COMPLETED | OUTPATIENT
Start: 2023-02-22 | End: 2023-02-22

## 2023-02-22 RX ORDER — POLYETHYLENE GLYCOL 3350 17 G/17G
17 POWDER, FOR SOLUTION ORAL DAILY
Refills: 0 | Status: DISCONTINUED | OUTPATIENT
Start: 2023-02-22 | End: 2023-02-24

## 2023-02-22 RX ORDER — ACETAMINOPHEN 500 MG
650 TABLET ORAL EVERY 6 HOURS
Refills: 0 | Status: DISCONTINUED | OUTPATIENT
Start: 2023-02-22 | End: 2023-02-24

## 2023-02-22 RX ORDER — INSULIN LISPRO 100/ML
VIAL (ML) SUBCUTANEOUS AT BEDTIME
Refills: 0 | Status: DISCONTINUED | OUTPATIENT
Start: 2023-02-22 | End: 2023-02-24

## 2023-02-22 RX ORDER — SENNA PLUS 8.6 MG/1
2 TABLET ORAL AT BEDTIME
Refills: 0 | Status: DISCONTINUED | OUTPATIENT
Start: 2023-02-22 | End: 2023-02-24

## 2023-02-22 RX ORDER — APIXABAN 2.5 MG/1
5 TABLET, FILM COATED ORAL
Refills: 0 | Status: DISCONTINUED | OUTPATIENT
Start: 2023-02-22 | End: 2023-02-24

## 2023-02-22 RX ORDER — KETOROLAC TROMETHAMINE 30 MG/ML
15 SYRINGE (ML) INJECTION EVERY 8 HOURS
Refills: 0 | Status: DISCONTINUED | OUTPATIENT
Start: 2023-02-22 | End: 2023-02-24

## 2023-02-22 RX ORDER — DEXTROSE 50 % IN WATER 50 %
15 SYRINGE (ML) INTRAVENOUS ONCE
Refills: 0 | Status: DISCONTINUED | OUTPATIENT
Start: 2023-02-22 | End: 2023-02-24

## 2023-02-22 RX ADMIN — APIXABAN 5 MILLIGRAM(S): 2.5 TABLET, FILM COATED ORAL at 17:46

## 2023-02-22 RX ADMIN — Medication 5 UNIT(S): at 13:17

## 2023-02-22 RX ADMIN — Medication 650 MILLIGRAM(S): at 05:40

## 2023-02-22 RX ADMIN — CEFTRIAXONE 100 MILLIGRAM(S): 500 INJECTION, POWDER, FOR SOLUTION INTRAMUSCULAR; INTRAVENOUS at 15:59

## 2023-02-22 RX ADMIN — ATORVASTATIN CALCIUM 80 MILLIGRAM(S): 80 TABLET, FILM COATED ORAL at 21:15

## 2023-02-22 RX ADMIN — CARVEDILOL PHOSPHATE 3.12 MILLIGRAM(S): 80 CAPSULE, EXTENDED RELEASE ORAL at 17:47

## 2023-02-22 RX ADMIN — Medication 3: at 13:17

## 2023-02-22 RX ADMIN — LIDOCAINE 1 PATCH: 4 CREAM TOPICAL at 17:47

## 2023-02-22 RX ADMIN — SENNA PLUS 2 TABLET(S): 8.6 TABLET ORAL at 21:15

## 2023-02-22 RX ADMIN — Medication 15 MILLIGRAM(S): at 16:30

## 2023-02-22 RX ADMIN — Medication 650 MILLIGRAM(S): at 05:18

## 2023-02-22 RX ADMIN — Medication 5 UNIT(S): at 17:47

## 2023-02-22 RX ADMIN — Medication 15 MILLIGRAM(S): at 15:57

## 2023-02-22 RX ADMIN — INSULIN GLARGINE 15 UNIT(S): 100 INJECTION, SOLUTION SUBCUTANEOUS at 21:15

## 2023-02-22 NOTE — H&P ADULT - HISTORY OF PRESENT ILLNESS
83F with PMH DM, CAD s/p stents on eliquis, CHF on lasix, HTN, CVA presenting to the ED for generalized body pain x 2 weeks. Pt states sx have been ongoing for years and typically last a few weeks. Reports frequent hospitalizations for diffuse body pains and states work up is "always negative" though pt states she is unsure what work up has been done. States current episode began 2 weeks ago, describes pain as "everywhere", "feels like my body is going to fall out." States pain is predominantly in lower back, B/L fingers and R knee. States lower back pain is worse with ambulation/exertion otherwise has not noticed further exacerbating factors. Has not noticed worsening of pain at night. States she takes tylenol for pain and sometimes uses hot compresses which does not significantly relief the pain. Has noticed some intermittent joint swelling particularly in B/L finger joints and right knee. ROS otherwise positive for difficulty with ambulation/ gait instability which patient attributes to pain. Also w/ chronic CEDEÑO. ROS otherwise negative. Denies fevers, chills, chest pain, SOB at rest, abdominal pain, constipation, diarrhea, dysuria, hematuria. Of note, pt states she was evaluated by outpatient provider months ago who told her she had athritis which pt states was not treated. Also saw PCP yesterday. States PCP provided no work up or tx prompting pt to present to the hospital.    In the E.D, vitals stable. CXR lumbar w/ No evidence of acute compression deformity or fracture of the lumbar spine. Admitted to medicine for further mgt.   83F with PMH DM, CAD s/p stents on eliquis, CHF on lasix, HTN, CVA presenting to the ED for generalized body pain x 2 weeks. Pt states sx have been ongoing for years and typically last a few weeks. Reports frequent hospitalizations for diffuse body pains and states work up is "always negative" though pt states she is unsure what work up has been done. States current episode began 2 weeks ago, describes pain as "everywhere", "feels like my body is going to fall out." States pain is predominantly in lower back, B/L fingers and R knee. States lower back pain is worse with ambulation/exertion otherwise has not noticed further exacerbating factors. Has not noticed worsening of pain at night. States she takes tylenol for pain and sometimes uses hot compresses which does not significantly relief the pain. Has noticed some intermittent joint swelling particularly in B/L finger joints and right knee. ROS otherwise positive for difficulty with ambulation/ gait instability which patient attributes to pain. Also w/ lower abdominal pain x 1 week, no association w/ eating, sometimes associated w/ bowel movements/passing urine. Also w/ chronic CEDEÑO. ROS otherwise negative. Denies fevers, chills, chest pain, SOB at rest,  constipation, diarrhea, dysuria, hematuria. Of note, pt states she was evaluated by outpatient provider months ago who told her she had athritis which pt states was not treated. Also saw PCP yesterday. States PCP provided no work up or tx prompting pt to present to the hospital.    In the E.D, vitals stable. CXR lumbar w/ No evidence of acute compression deformity or fracture of the lumbar spine. Admitted to medicine for further mgt.

## 2023-02-22 NOTE — H&P ADULT - PROBLEM SELECTOR PLAN 1
Unclear etiology. Diff incl OA vs. RA vs. fibromyalgia vs. other rheumatologic etiology  -Lower c/f infection at this time as pt afebrile , no leukocytosis  -PRN tylenol and toradol for pain  -Lidocaine patch QD  -Check ESR, CRP, RON  -X-ray B/L fingers given joint tenderness  -X-ray R knee  -PT eval Unclear etiology. Diff incl OA vs. RA vs. fibromyalgia vs. other rheumatologic etiology  -At this time as pt afebrile , no leukocytosis  -PRN tylenol and toradol for pain  -Lidocaine patch QD  -Check ESR, CRP, RON  -X-ray B/L fingers given joint tenderness  -X-ray R knee  -PT eval

## 2023-02-22 NOTE — H&P ADULT - NSHPSOCIALHISTORY_GEN_ALL_CORE
Lives at home with daughter. Originally lives with son in [place] but has been visiting daughter for apx 1 year. Denies ETOH or drug use

## 2023-02-22 NOTE — H&P ADULT - NSHPLABSRESULTS_GEN_ALL_CORE
11.9   7.31  )-----------( 233      ( 2023 20:40 )             40.2       -    139  |  103  |  14  ----------------------------<  166<H>  4.1   |  25  |  0.98    Ca    9.6      2023 20:40  Phos  3.3       Mg     1.90         TPro  8.4<H>  /  Alb  4.5  /  TBili  0.8  /  DBili  x   /  AST  14  /  ALT  8   /  AlkPhos  82                Urinalysis Basic - ( 2023 21:32 )    Color: Yellow / Appearance: Clear / S.026 / pH: x  Gluc: x / Ketone: Trace  / Bili: Negative / Urobili: <2 mg/dL   Blood: x / Protein: 30 mg/dL / Nitrite: Negative   Leuk Esterase: Large / RBC: 1 /HPF / WBC 21 /HPF   Sq Epi: x / Non Sq Epi: 3 /HPF / Bacteria: Few                  CAPILLARY BLOOD GLUCOSE      POCT Blood Glucose.: 147 mg/dL (2023 00:29)              RADIOLOGY & ADDITIONAL TESTS:    Imaging personally reviewed.    Consultant(s) notes reviewed.    Care discussed with consultants and other providers.

## 2023-02-22 NOTE — H&P ADULT - NSHPREVIEWOFSYSTEMS_GEN_ALL_CORE
CONSTITUTIONAL:  No weight loss, fever, chills, weakness or fatigue.  HEENT:    No visual loss, blurred vision, No hearing loss, sneezing, congestion, runny nose or sore throat.  SKIN:  No rash or itching.  CARDIOVASCULAR:  No chest pain, chest pressure or chest discomfort.   RESPIRATORY:  No shortness of breath, cough or sputum.  GASTROINTESTINAL:  No nausea, vomiting or diarrhea. No abdominal pain or blood.  GENITOURINARY:  Denies hematuria, dysuria.   NEUROLOGICAL:  No headache, dizziness, syncope,  numbness or tingling in the extremities. No change in bowel or bladder control.  MUSCULOSKELETAL:  No muscle, back pain, joint pain or stiffness.  HEMATOLOGIC:  No anemia, bleeding or bruising.  ENDOCRINOLOGIC:  No reports of sweating, cold or heat intolerance. No polyuria or polydipsia. CONSTITUTIONAL:  No weight loss, fever, chills, weakness or fatigue.  HEENT:    No visual loss, blurred vision, No hearing loss, sneezing, congestion, runny nose or sore throat.  SKIN:  No rash or itching.  CARDIOVASCULAR:  No chest pain, chest pressure or chest discomfort.   RESPIRATORY:  No shortness of breath, cough or sputum.  GASTROINTESTINAL:  No nausea, vomiting or diarrhea. No  blood.  GENITOURINARY:  Denies hematuria, dysuria.   NEUROLOGICAL:  No headache, dizziness, syncope,  numbness or tingling in the extremities. No change in bowel or bladder control.  MUSCULOSKELETAL:  No muscle, back pain, joint pain or stiffness.  HEMATOLOGIC:  No anemia, bleeding or bruising.  ENDOCRINOLOGIC:  No reports of sweating, cold or heat intolerance. No polyuria or polydipsia.

## 2023-02-22 NOTE — PATIENT PROFILE ADULT - FALL HARM RISK - HARM RISK INTERVENTIONS

## 2023-02-22 NOTE — H&P ADULT - ASSESSMENT
83F with PMH DM, CAD s/p stents on eliquis, CHF on lasix, HTN, CVA presenting to the ED for acute on chronic generalized body pain x 2 weeks.

## 2023-02-22 NOTE — H&P ADULT - NSHPPHYSICALEXAM_GEN_ALL_CORE
GENERAL: NAD  HEAD:  Atraumatic, Normocephalic  EYES: EOMI, conjunctiva and sclera clear  NECK: Supple  CHEST/LUNG: Clear to auscultation bilaterally; No wheeze, rhonchi, crackles or rales  HEART: Regular rate and rhythm; No murmurs, rubs, or gallops  ABDOMEN: Soft, Nontender, Nondistended; Bowel sounds present  EXTREMITIES:  2+ Peripheral Pulses, No edema. +TTP DIP/PIP B/L. +Mild knee swelling R > L, No erythma, discharge or drainage. No TTP  PSYCH: Awake and alert   NEUROLOGY: non-focal  SKIN: Warm and dry GENERAL: NAD  HEAD:  Atraumatic, Normocephalic  EYES: EOMI, conjunctiva and sclera clear  NECK: Supple  CHEST/LUNG: Clear to auscultation bilaterally; No wheeze, rhonchi, crackles or rales  HEART: Regular rate and rhythm; No murmurs, rubs, or gallops  ABDOMEN: Soft, Nontender, Nondistended; Bowel sounds present  EXTREMITIES:  2+ Peripheral Pulses, No edema. +TTP DIP/PIP B/L. +Mild knee swelling R > L, No erythma, discharge or drainage. No TTP  PSYCH: Awake and alert , AAO x 3  NEUROLOGY: non-focal  SKIN: Warm and dry

## 2023-02-22 NOTE — ED ADULT NURSE REASSESSMENT NOTE - NS ED NURSE REASSESS COMMENT FT1
Pt is A&Ox4, ambulatory with a cane and 1 assist at baseline. Pt is resting in stretcher with complaints of generalized body pain, rating 9/10. Respirations even and unlabored, chest rise equal b/l. VS as noted in flow sheets. Pt medicated as ordered, see MAR. Safety maintained throughout, will continue to monitor.
Received pt at change of shift, PT is resting in stretcher, easily arousable to verbal stimuli, A+Ox4. no apparent distress noted. pt arrives for weakness and whole body pains, able to ambulate with a cane and assistance. Respirations even and unlabored, normal work of breathing, no accessory muscle use, speaking in full clear uninterrupted sentences. ABD is soft, non tender, non distended. Pt denies any chest pain, SOB, headache, dizziness, N/V/D, fever, chills. will continue to monitor.

## 2023-02-22 NOTE — H&P ADULT - NSICDXPASTSURGICALHX_GEN_ALL_CORE_FT
PAST SURGICAL HISTORY:  S/P primary angioplasty with coronary stent x1 in 2006 at [facility]    S/P TKR (total knee replacement) left

## 2023-02-22 NOTE — CONSULT NOTE ADULT - SUBJECTIVE AND OBJECTIVE BOX
C A R D I O L O G Y  *********************    DATE OF SERVICE: 02-22-23    HISTORY OF PRESENT ILLNESS: HPI:  Pt is an 83 year old Female with PMH DM, CAD s/p PCI to prox LAD/mid LAD/Diag/Ramus Grade I DD on last echo, HTN, CVA presenting to the ED for generalized body pain x 2 weeks.  Pt states sx have been ongoing for years and typically last a few weeks. Reports frequent hospitalizations for diffuse body pains and states work up is "always negative" though pt states she is unsure what work up has been done. States current episode began 2 weeks ago, describes pain as "everywhere", "feels like my body is going to fall out." States pain is predominantly in lower back, B/L fingers and R knee. States lower back pain is worse with ambulation/exertion otherwise has not noticed further exacerbating factors. Has not noticed worsening of pain at night. States she takes tylenol for pain and sometimes uses hot compresses which does not significantly relief the pain. Has noticed some intermittent joint swelling particularly in B/L finger joints and right knee.    She reports dyspnea with exertion which is chronci for her. Denies orthopnea, bendopnea, chest pain or palpitations.     In the E.D, vitals stable. CXR lumbar w/ No evidence of acute compression deformity or fracture of the lumbar spine.       PAST MEDICAL & SURGICAL HISTORY:  HTN   CAD s/p PCI to prox LAD/mid LAD/Diag/Ramus  DM   GERD  CVA no residual deficits  HLD (hyperlipidemia)  S/P TKR (total knee replacement) left    MEDICATIONS:  MEDICATIONS  (STANDING):  apixaban 5 milliGRAM(s) Oral two times a day  atorvastatin 80 milliGRAM(s) Oral at bedtime  carvedilol 3.125 milliGRAM(s) Oral every 12 hours  cefTRIAXone   IVPB 1000 milliGRAM(s) IV Intermittent every 24 hours  dextrose 50% Injectable 25 Gram(s) IV Push once  dextrose 50% Injectable 12.5 Gram(s) IV Push once  dextrose 50% Injectable 25 Gram(s) IV Push once  dextrose Oral Gel 15 Gram(s) Oral once  furosemide    Tablet 40 milliGRAM(s) Oral daily  glucagon  Injectable 1 milliGRAM(s) IntraMuscular once  insulin glargine Injectable (LANTUS) 15 Unit(s) SubCutaneous at bedtime  insulin lispro (ADMELOG) corrective regimen sliding scale   SubCutaneous three times a day before meals  insulin lispro (ADMELOG) corrective regimen sliding scale   SubCutaneous at bedtime  insulin lispro Injectable (ADMELOG) 5 Unit(s) SubCutaneous three times a day before meals  lidocaine   4% Patch 1 Patch Transdermal every 24 hours  lisinopril 2.5 milliGRAM(s) Oral daily  pantoprazole    Tablet 40 milliGRAM(s) Oral before breakfast  polyethylene glycol 3350 17 Gram(s) Oral daily  senna 2 Tablet(s) Oral at bedtime    Allergies: No Known Allergies    FAMILY HISTORY:  FH: diabetes mellitus    SOCIAL HISTORY:    [X ] Non-smoker  [ ] Smoker  [ ] Alcohol    FLU VACCINE THIS YEAR STARTS IN AUGUST:  [ ] Yes    [ ] No    IF OVER 65 HAVE YOU EVER HAD A PNA VACCINE:  [ ] Yes    [ ] No       [ ] N/A      REVIEW OF SYSTEMS:  [ ]chest pain  [  ]shortness of breath  [  ]palpitations  [  ]syncope  [ ]near syncope [ ]upper extremity weakness   [ ] lower extremity weakness  [  ]diplopia  [  ]altered mental status   [  ]fevers  [ ]chills [ ]nausea  [ ]vomiting  [  ]dysphagia    [ ]abdominal pain  [ ]melena  [ ]BRBPR    [  ]epistaxis  [  ]rash    [ ]lower extremity edema        [X] All others negative	  [ ] Unable to obtain      LABS:	 	    CARDIAC MARKERS:                        11.9   7.31  )-----------( 233      ( 21 Feb 2023 20:40 )             40.2     Hb Trend: 11.9<--    02-21    139  |  103  |  14  ----------------------------<  166<H>  4.1   |  25  |  0.98    Ca    9.6      21 Feb 2023 20:40  Phos  3.3     02-21  Mg     1.90     02-21    TPro  8.4<H>  /  Alb  4.5  /  TBili  0.8  /  DBili  x   /  AST  14  /  ALT  8   /  AlkPhos  82  02-21    Creatinine Trend: 0.98<--  proBNP: Serum Pro-Brain Natriuretic Peptide: 198 pg/mL (02-21 @ 20:40)    PHYSICAL EXAM:  T(C): 36.7 (02-22-23 @ 11:00), Max: 36.9 (02-21-23 @ 16:47)  HR: 77 (02-22-23 @ 11:00) (71 - 77)  BP: 130/75 (02-22-23 @ 11:00) (130/75 - 164/91)  RR: 16 (02-22-23 @ 11:00) (16 - 18)  SpO2: 100% (02-22-23 @ 11:00) (100% - 100%)  Wt(kg): --     I&O's Summary      General: Well nourished in no acute distress. Alert and Oriented * 3.   Head: Normocephalic and atraumatic.   Neck: No JVD. No bruits. Supple. Does not appear to be enlarged.   Cardiovascular: + S1,S2 ; RRR Soft systolic murmur at the left lower sternal border. No rubs noted.    Lungs: CTA b/l. No rhonchi, rales or wheezes.   Abdomen: + BS, soft. Non tender. Non distended. No rebound. No guarding.   Extremities: No clubbing/cyanosis/edema.   Neurologic: Moves all four extremities. Full range of motion.   Skin: Warm and moist. The patient's skin has normal elasticity and good skin turgor.   Psychiatric: Appropriate mood and affect.  Musculoskeletal: Normal range of motion, normal strength           ECG:  	NSR, LVH, LAD,LAE Non specific ST changes    RADIOLOGY:  CXR:   The heart is mildly enlarged. Coronary artery stents.  The lungs are clear.  There is no pneumothorax or pleural effusion.  No acute bony abnormalities.    IMPRESSION:        from: Transthoracic Echocardiogram (08.30.19 @ 16:50) >  OBSERVATIONS:  Mitral Valve: Mitral annular calcification, otherwisenormal mitral valve. Mild mitral regurgitation.  Aortic Root: Normal aortic root.  Aortic Valve: Aortic valve leaflet morphology not well visualized.  Left Atrium: Mildly dilated left atrium.  LA volume index =37 cc/m2.  Left Ventricle: Normal left ventricular systolic function.No segmental wall motion abnormalities. Mild diastolic dysfunction (Stage I).  Right Heart: Normal right atrium. Normal right ventricular size and function. Normal tricuspid valve. Minimaltricuspid regurgitation. Normal pulmonic valve.  Pericardium/PleuraNormal pericardium with no pericardialeffusion.    < from: TTE with Doppler (w/Cont) (08.15.22 @ 08:22) >  ------------------------------------------------------------------------  CONCLUSIONS:  1. Mildly dilated left atrium.  LA volume index = 38 cc/m2.  2. Mild segmental left ventricular systolic dysfunction.  The mid and apical anteroseptal and apical inferior walls  are hypokinetic.  3. Reversal of the E-A  waves of the mitral inflow pattern  is consistent with diastolic LV dysfunction.  4. Normal right ventricular size and function.  *** Compared with echocardiogram of 8/30/2019, there are  new wall motion abnormalities.  ------------------------------------------------------------------------  Confirmed on  8/15/2022 - 09:50:41 by Camden Olmos M.D.  ------------------------------------------------------------------------    < end of copied text >      < from: Cardiac Cath Lab - Adult (09.03.19 @ 14:03) >  VENTRICLES: No left ventriculogram was performed.  CORONARY VESSELS: The coronary circulation is right dominant.  LM:   --  LM: Angiography showed mild atherosclerosis with no flow limiting  lesions.  LAD:   --  Proximal LAD: There was a diffuse 10 % stenosis at the site of a  prior stent. The lesion was eccentric. There was NGUYEN grade 3 flow through  the vessel (brisk flow).  --  Mid LAD: There was a diffuse 20 % stenosis at the site of a prior  stent. The lesion was eccentric. There was NGUYEN grade 3 flow through the  vessel (brisk flow).  --  D1: There was a tubular 30 % stenosis at the site of a prior stent. The  lesion was smoothly contoured. There was NGUYEN grade 3 flow through the  vessel (brisk flow).  CX:   --  Circumflex: Angiography showed mild atherosclerosis with no flow  limiting lesions.  RI:   --  Ramus intermedius: There was a diffuse 30 % stenosis at the site  of a prior stent. The lesion was eccentric. There was NGUYEN grade 3 flow  through the vessel (brisk flow).  RCA:   --  Mid RCA: There was a tubular 20 % stenosis at a site with no  prior intervention. The lesion was eccentric. There was NGUYEN grade 3 flow  through the vessel (brisk flow).  --  RPDA: Angiography showed mild atherosclerosis with no flow limiting  lesions.  --  RPLS: Angiography showed mild atherosclerosis with no flow limiting  lesions.  COMPLICATIONS: There were no complications.  DIAGNOSTIC IMPRESSIONS: Patent stents LAD, D-1 and Ramus  No significant obstructive CAD  DIAGNOSTIC RECOMMENDATIONS: Medical therapy  INTERVENTIONAL IMPRESSIONS: Patent stents LAD, D-1 and Ramus  No significant obstructive CAD      < end of copied text >      ASSESSMENT/PLAN: 	83y Female     C A R D I O L O G Y  *********************    DATE OF SERVICE: 02-22-23    HISTORY OF PRESENT ILLNESS: HPI:  Pt is an 83 year old Female with PMH DM, CAD s/p PCI to prox LAD/mid LAD/Diag/Ramus Grade I DD on last echo, HTN, CVA presenting to the ED for generalized body pain x 2 weeks.  Pt states sx have been ongoing for years and typically last a few weeks. Reports frequent hospitalizations for diffuse body pains and states work up is "always negative" though pt states she is unsure what work up has been done. States current episode began 2 weeks ago, describes pain as "everywhere", "feels like my body is going to fall out." States pain is predominantly in lower back, B/L fingers and R knee. States lower back pain is worse with ambulation/exertion otherwise has not noticed further exacerbating factors. Has not noticed worsening of pain at night. States she takes tylenol for pain and sometimes uses hot compresses which does not significantly relief the pain. Has noticed some intermittent joint swelling particularly in B/L finger joints and right knee.    She reports dyspnea with exertion which is chronci for her. Denies orthopnea, bendopnea, chest pain or palpitations.     In the E.D, vitals stable. CXR lumbar w/ No evidence of acute compression deformity or fracture of the lumbar spine.       PAST MEDICAL & SURGICAL HISTORY:  HTN   CAD s/p PCI to prox LAD/mid LAD/Diag/Ramus  DM   GERD  CVA no residual deficits  HLD (hyperlipidemia)  S/P TKR (total knee replacement) left    MEDICATIONS:  MEDICATIONS  (STANDING):  apixaban 5 milliGRAM(s) Oral two times a day  atorvastatin 80 milliGRAM(s) Oral at bedtime  carvedilol 3.125 milliGRAM(s) Oral every 12 hours  cefTRIAXone   IVPB 1000 milliGRAM(s) IV Intermittent every 24 hours  dextrose 50% Injectable 25 Gram(s) IV Push once  dextrose 50% Injectable 12.5 Gram(s) IV Push once  dextrose 50% Injectable 25 Gram(s) IV Push once  dextrose Oral Gel 15 Gram(s) Oral once  furosemide    Tablet 40 milliGRAM(s) Oral daily  glucagon  Injectable 1 milliGRAM(s) IntraMuscular once  insulin glargine Injectable (LANTUS) 15 Unit(s) SubCutaneous at bedtime  insulin lispro (ADMELOG) corrective regimen sliding scale   SubCutaneous three times a day before meals  insulin lispro (ADMELOG) corrective regimen sliding scale   SubCutaneous at bedtime  insulin lispro Injectable (ADMELOG) 5 Unit(s) SubCutaneous three times a day before meals  lidocaine   4% Patch 1 Patch Transdermal every 24 hours  lisinopril 2.5 milliGRAM(s) Oral daily  pantoprazole    Tablet 40 milliGRAM(s) Oral before breakfast  polyethylene glycol 3350 17 Gram(s) Oral daily  senna 2 Tablet(s) Oral at bedtime    Allergies: No Known Allergies    FAMILY HISTORY:  FH: diabetes mellitus    SOCIAL HISTORY:    [X ] Non-smoker  [ ] Smoker  [ ] Alcohol    FLU VACCINE THIS YEAR STARTS IN AUGUST:  [ ] Yes    [ ] No    IF OVER 65 HAVE YOU EVER HAD A PNA VACCINE:  [ ] Yes    [ ] No       [ ] N/A      REVIEW OF SYSTEMS:  [ ]chest pain  [  ]shortness of breath  [  ]palpitations  [  ]syncope  [ ]near syncope [ ]upper extremity weakness   [ ] lower extremity weakness  [  ]diplopia  [  ]altered mental status   [  ]fevers  [ ]chills [ ]nausea  [ ]vomiting  [  ]dysphagia    [ ]abdominal pain  [ ]melena  [ ]BRBPR    [  ]epistaxis  [  ]rash    [ ]lower extremity edema        [X] All others negative	  [ ] Unable to obtain      LABS:	 	    CARDIAC MARKERS:                        11.9   7.31  )-----------( 233      ( 21 Feb 2023 20:40 )             40.2     Hb Trend: 11.9<--    02-21    139  |  103  |  14  ----------------------------<  166<H>  4.1   |  25  |  0.98    Ca    9.6      21 Feb 2023 20:40  Phos  3.3     02-21  Mg     1.90     02-21    TPro  8.4<H>  /  Alb  4.5  /  TBili  0.8  /  DBili  x   /  AST  14  /  ALT  8   /  AlkPhos  82  02-21    Creatinine Trend: 0.98<--  proBNP: Serum Pro-Brain Natriuretic Peptide: 198 pg/mL (02-21 @ 20:40)    PHYSICAL EXAM:  T(C): 36.7 (02-22-23 @ 11:00), Max: 36.9 (02-21-23 @ 16:47)  HR: 77 (02-22-23 @ 11:00) (71 - 77)  BP: 130/75 (02-22-23 @ 11:00) (130/75 - 164/91)  RR: 16 (02-22-23 @ 11:00) (16 - 18)  SpO2: 100% (02-22-23 @ 11:00) (100% - 100%)  Wt(kg): --     I&O's Summary      General: Well nourished in no acute distress. Alert and Oriented * 3.   Head: Normocephalic and atraumatic.   Neck: No JVD. No bruits. Supple. Does not appear to be enlarged.   Cardiovascular: + S1,S2 ; RRR Soft systolic murmur at the left lower sternal border. No rubs noted.    Lungs: CTA b/l. No rhonchi, rales or wheezes.   Abdomen: + BS, soft. Non tender. Non distended. No rebound. No guarding.   Extremities: No clubbing/cyanosis/edema.   Neurologic: Moves all four extremities. Full range of motion.   Skin: Warm and moist. The patient's skin has normal elasticity and good skin turgor.   Psychiatric: Appropriate mood and affect.  Musculoskeletal: Normal range of motion, normal strength           ECG:  	NSR, LVH, LAD,LAE Non specific ST changes    RADIOLOGY:  CXR:   The heart is mildly enlarged. Coronary artery stents.  The lungs are clear.  There is no pneumothorax or pleural effusion.  No acute bony abnormalities.    IMPRESSION:        from: Transthoracic Echocardiogram (08.30.19 @ 16:50) >  OBSERVATIONS:  Mitral Valve: Mitral annular calcification, otherwisenormal mitral valve. Mild mitral regurgitation.  Aortic Root: Normal aortic root.  Aortic Valve: Aortic valve leaflet morphology not well visualized.  Left Atrium: Mildly dilated left atrium.  LA volume index =37 cc/m2.  Left Ventricle: Normal left ventricular systolic function.No segmental wall motion abnormalities. Mild diastolic dysfunction (Stage I).  Right Heart: Normal right atrium. Normal right ventricular size and function. Normal tricuspid valve. Minimaltricuspid regurgitation. Normal pulmonic valve.  Pericardium/PleuraNormal pericardium with no pericardialeffusion.    < from: TTE with Doppler (w/Cont) (08.15.22 @ 08:22) >  ------------------------------------------------------------------------  CONCLUSIONS:  1. Mildly dilated left atrium.  LA volume index = 38 cc/m2.  2. Mild segmental left ventricular systolic dysfunction.  The mid and apical anteroseptal and apical inferior walls  are hypokinetic.  3. Reversal of the E-A  waves of the mitral inflow pattern  is consistent with diastolic LV dysfunction.  4. Normal right ventricular size and function.  *** Compared with echocardiogram of 8/30/2019, there are  new wall motion abnormalities.  ------------------------------------------------------------------------  Confirmed on  8/15/2022 - 09:50:41 by Camden Olmos M.D.  ------------------------------------------------------------------------    < end of copied text >      < from: Cardiac Cath Lab - Adult (09.03.19 @ 14:03) >  VENTRICLES: No left ventriculogram was performed.  CORONARY VESSELS: The coronary circulation is right dominant.  LM:   --  LM: Angiography showed mild atherosclerosis with no flow limiting  lesions.  LAD:   --  Proximal LAD: There was a diffuse 10 % stenosis at the site of a  prior stent. The lesion was eccentric. There was NGUYEN grade 3 flow through  the vessel (brisk flow).  --  Mid LAD: There was a diffuse 20 % stenosis at the site of a prior  stent. The lesion was eccentric. There was NGUYEN grade 3 flow through the  vessel (brisk flow).  --  D1: There was a tubular 30 % stenosis at the site of a prior stent. The  lesion was smoothly contoured. There was NGUYEN grade 3 flow through the  vessel (brisk flow).  CX:   --  Circumflex: Angiography showed mild atherosclerosis with no flow  limiting lesions.  RI:   --  Ramus intermedius: There was a diffuse 30 % stenosis at the site  of a prior stent. The lesion was eccentric. There was NGUYEN grade 3 flow  through the vessel (brisk flow).  RCA:   --  Mid RCA: There was a tubular 20 % stenosis at a site with no  prior intervention. The lesion was eccentric. There was NGUYEN grade 3 flow  through the vessel (brisk flow).  --  RPDA: Angiography showed mild atherosclerosis with no flow limiting  lesions.  --  RPLS: Angiography showed mild atherosclerosis with no flow limiting  lesions.  COMPLICATIONS: There were no complications.  DIAGNOSTIC IMPRESSIONS: Patent stents LAD, D-1 and Ramus  No significant obstructive CAD  DIAGNOSTIC RECOMMENDATIONS: Medical therapy  INTERVENTIONAL IMPRESSIONS: Patent stents LAD, D-1 and Ramus  No significant obstructive CAD    Cath 08/2022:  LM   Left main artery: Angiography shows minor irregularities.      LAD   Proximal left anterior descending: Patent stent.    First diagonal: Patent ostial stent.      CX   Proximal circumflex: Thereis a 30 % stenosis in the middle third  portion of the segment.  Mid circumflex: There is a 30 % stenosis in the middle third portion  of the segment.    RCA   Mid right coronary artery: There is a 30 % stenosis in the proximal  third portion of the segment.  Mid right coronary artery: There is a 40 % stenosis in the distal  third portion of the segment.    Ramus   Proximal ramus intermedius: There is a 100 % stenosis within the  stented segment. NGUYEN Flow 0.        < end of copied text >      ASSESSMENT/PLAN: 	83y Female     C A R D I O L O G Y  *********************    DATE OF SERVICE: 02-22-23    HISTORY OF PRESENT ILLNESS: HPI:  Pt is an 83 year old Female with PMH DM, CAD s/p PCI to prox LAD/mid LAD/Diag/Ramus Grade I DD on last echo, HTN, CVA presenting to the ED for generalized body pain x 2 weeks.  Pt states sx have been ongoing for years and typically last a few weeks. Reports frequent hospitalizations for diffuse body pains and states work up is "always negative" though pt states she is unsure what work up has been done. States current episode began 2 weeks ago, describes pain as "everywhere", "feels like my body is going to fall out." States pain is predominantly in lower back, B/L fingers and R knee. States lower back pain is worse with ambulation/exertion otherwise has not noticed further exacerbating factors. Has not noticed worsening of pain at night. States she takes tylenol for pain and sometimes uses hot compresses which does not significantly relief the pain. Has noticed some intermittent joint swelling particularly in B/L finger joints and right knee.    She reports dyspnea with exertion which is chronci for her. Denies orthopnea, bendopnea, chest pain or palpitations.     In the E.D, vitals stable. CXR lumbar w/ No evidence of acute compression deformity or fracture of the lumbar spine.       PAST MEDICAL & SURGICAL HISTORY:  HTN   CAD s/p PCI to prox LAD/mid LAD/Diag/Ramus  DM   GERD  CVA no residual deficits  HLD (hyperlipidemia)  S/P TKR (total knee replacement) left    MEDICATIONS:  MEDICATIONS  (STANDING):  apixaban 5 milliGRAM(s) Oral two times a day  atorvastatin 80 milliGRAM(s) Oral at bedtime  carvedilol 3.125 milliGRAM(s) Oral every 12 hours  cefTRIAXone   IVPB 1000 milliGRAM(s) IV Intermittent every 24 hours  dextrose 50% Injectable 25 Gram(s) IV Push once  dextrose 50% Injectable 12.5 Gram(s) IV Push once  dextrose 50% Injectable 25 Gram(s) IV Push once  dextrose Oral Gel 15 Gram(s) Oral once  furosemide    Tablet 40 milliGRAM(s) Oral daily  glucagon  Injectable 1 milliGRAM(s) IntraMuscular once  insulin glargine Injectable (LANTUS) 15 Unit(s) SubCutaneous at bedtime  insulin lispro (ADMELOG) corrective regimen sliding scale   SubCutaneous three times a day before meals  insulin lispro (ADMELOG) corrective regimen sliding scale   SubCutaneous at bedtime  insulin lispro Injectable (ADMELOG) 5 Unit(s) SubCutaneous three times a day before meals  lidocaine   4% Patch 1 Patch Transdermal every 24 hours  lisinopril 2.5 milliGRAM(s) Oral daily  pantoprazole    Tablet 40 milliGRAM(s) Oral before breakfast  polyethylene glycol 3350 17 Gram(s) Oral daily  senna 2 Tablet(s) Oral at bedtime    Allergies: No Known Allergies    FAMILY HISTORY:  FH: diabetes mellitus    SOCIAL HISTORY:    [X ] Non-smoker  [ ] Smoker  [ ] Alcohol    FLU VACCINE THIS YEAR STARTS IN AUGUST:  [ ] Yes    [ ] No    IF OVER 65 HAVE YOU EVER HAD A PNA VACCINE:  [ ] Yes    [ ] No       [ ] N/A      REVIEW OF SYSTEMS:  [ ]chest pain  [  ]shortness of breath  [  ]palpitations  [  ]syncope  [ ]near syncope [ ]upper extremity weakness   [ ] lower extremity weakness  [  ]diplopia  [  ]altered mental status   [  ]fevers  [ ]chills [ ]nausea  [ ]vomiting  [  ]dysphagia    [ ]abdominal pain  [ ]melena  [ ]BRBPR    [  ]epistaxis  [  ]rash    [ ]lower extremity edema        [X] All others negative	  [ ] Unable to obtain      LABS:	 	    CARDIAC MARKERS:                        11.9   7.31  )-----------( 233      ( 21 Feb 2023 20:40 )             40.2     Hb Trend: 11.9<--    02-21    139  |  103  |  14  ----------------------------<  166<H>  4.1   |  25  |  0.98    Ca    9.6      21 Feb 2023 20:40  Phos  3.3     02-21  Mg     1.90     02-21    TPro  8.4<H>  /  Alb  4.5  /  TBili  0.8  /  DBili  x   /  AST  14  /  ALT  8   /  AlkPhos  82  02-21    Creatinine Trend: 0.98<--  proBNP: Serum Pro-Brain Natriuretic Peptide: 198 pg/mL (02-21 @ 20:40)    PHYSICAL EXAM:  T(C): 36.7 (02-22-23 @ 11:00), Max: 36.9 (02-21-23 @ 16:47)  HR: 77 (02-22-23 @ 11:00) (71 - 77)  BP: 130/75 (02-22-23 @ 11:00) (130/75 - 164/91)  RR: 16 (02-22-23 @ 11:00) (16 - 18)  SpO2: 100% (02-22-23 @ 11:00) (100% - 100%)  Wt(kg): --     I&O's Summary      General: Well nourished in no acute distress. Alert and Oriented * 3.   Head: Normocephalic and atraumatic.   Neck: No JVD. No bruits. Supple. Does not appear to be enlarged.   Cardiovascular: + S1,S2 ; RRR Soft systolic murmur at the left lower sternal border. No rubs noted.    Lungs: CTA b/l. No rhonchi, rales or wheezes.   Abdomen: + BS, soft. Non tender. Non distended. No rebound. No guarding.   Extremities: No clubbing/cyanosis/edema.   Neurologic: Moves all four extremities. Full range of motion.   Skin: Warm and moist. The patient's skin has normal elasticity and good skin turgor.   Psychiatric: Appropriate mood and affect.  Musculoskeletal: Normal range of motion, normal strength           ECG:  	NSR, LVH, LAD,LAE Non specific ST changes    RADIOLOGY:  CXR:   The heart is mildly enlarged. Coronary artery stents.  The lungs are clear.  There is no pneumothorax or pleural effusion.  No acute bony abnormalities.    IMPRESSION:        from: Transthoracic Echocardiogram (08.30.19 @ 16:50) >  OBSERVATIONS:  Mitral Valve: Mitral annular calcification, otherwisenormal mitral valve. Mild mitral regurgitation.  Aortic Root: Normal aortic root.  Aortic Valve: Aortic valve leaflet morphology not well visualized.  Left Atrium: Mildly dilated left atrium.  LA volume index =37 cc/m2.  Left Ventricle: Normal left ventricular systolic function.No segmental wall motion abnormalities. Mild diastolic dysfunction (Stage I).  Right Heart: Normal right atrium. Normal right ventricular size and function. Normal tricuspid valve. Minimaltricuspid regurgitation. Normal pulmonic valve.  Pericardium/PleuraNormal pericardium with no pericardialeffusion.    < from: TTE with Doppler (w/Cont) (08.15.22 @ 08:22) >  ------------------------------------------------------------------------  CONCLUSIONS:  1. Mildly dilated left atrium.  LA volume index = 38 cc/m2.  2. Mild segmental left ventricular systolic dysfunction.  The mid and apical anteroseptal and apical inferior walls  are hypokinetic.  3. Reversal of the E-A  waves of the mitral inflow pattern  is consistent with diastolic LV dysfunction.  4. Normal right ventricular size and function.  *** Compared with echocardiogram of 8/30/2019, there are  new wall motion abnormalities.  ------------------------------------------------------------------------  Confirmed on  8/15/2022 - 09:50:41 by Camden Olmos M.D.  ------------------------------------------------------------------------    < end of copied text >      < from: Cardiac Cath Lab - Adult (09.03.19 @ 14:03) >  VENTRICLES: No left ventriculogram was performed.  CORONARY VESSELS: The coronary circulation is right dominant.  LM:   --  LM: Angiography showed mild atherosclerosis with no flow limiting  lesions.  LAD:   --  Proximal LAD: There was a diffuse 10 % stenosis at the site of a  prior stent. The lesion was eccentric. There was NGUYEN grade 3 flow through  the vessel (brisk flow).  --  Mid LAD: There was a diffuse 20 % stenosis at the site of a prior  stent. The lesion was eccentric. There was NGUYEN grade 3 flow through the  vessel (brisk flow).  --  D1: There was a tubular 30 % stenosis at the site of a prior stent. The  lesion was smoothly contoured. There was NGUYEN grade 3 flow through the  vessel (brisk flow).  CX:   --  Circumflex: Angiography showed mild atherosclerosis with no flow  limiting lesions.  RI:   --  Ramus intermedius: There was a diffuse 30 % stenosis at the site  of a prior stent. The lesion was eccentric. There was NGUYEN grade 3 flow  through the vessel (brisk flow).  RCA:   --  Mid RCA: There was a tubular 20 % stenosis at a site with no  prior intervention. The lesion was eccentric. There was NGUYEN grade 3 flow  through the vessel (brisk flow).  --  RPDA: Angiography showed mild atherosclerosis with no flow limiting  lesions.  --  RPLS: Angiography showed mild atherosclerosis with no flow limiting  lesions.  COMPLICATIONS: There were no complications.  DIAGNOSTIC IMPRESSIONS: Patent stents LAD, D-1 and Ramus  No significant obstructive CAD  DIAGNOSTIC RECOMMENDATIONS: Medical therapy  INTERVENTIONAL IMPRESSIONS: Patent stents LAD, D-1 and Ramus  No significant obstructive CAD    Cath 08/2022:  LM   Left main artery: Angiography shows minor irregularities.      LAD   Proximal left anterior descending: Patent stent.    First diagonal: Patent ostial stent.      CX   Proximal circumflex: Thereis a 30 % stenosis in the middle third  portion of the segment.  Mid circumflex: There is a 30 % stenosis in the middle third portion  of the segment.    RCA   Mid right coronary artery: There is a 30 % stenosis in the proximal  third portion of the segment.  Mid right coronary artery: There is a 40 % stenosis in the distal  third portion of the segment.    Ramus   Proximal ramus intermedius: There is a 100 % stenosis within the  stented segment. NGUYEN Flow 0.      Diagnostic Conclusions:   Occluded proximal ramus stent. Patent LAD and diagonal stents.  Mild-moderate atherosclerosis in proximal Cx and mid RCA.    TTE 09/2022:  Findings:  1. Left ventricle cavity is normal in size. Mild decrease in global wall motion.Doppler evidence of grade I (impaired) diastolicdysfunction.  Left ventricle regional wall motion findings: No wall motionabnormalities.  2. Left atrial cavity is mildly dilated.  3. Right atrial cavity is normal in size.  4. Right ventricle cavity is normal in size.Normal right ventricular function.  5. Structurally normal trileaflet aortic valve with noregurgitation.  6. Structurally normal mitral valve with no regurgitation.  7. Structurally normal tricuspid valve with no regurgitation.  8. Structurally normal pulmonic valve with no regurgitation.  9. No evidence of significant pericardial effusion.  10. The aortic root is normal.  11. Normal pulmonary artery.  12. IVC is normal with respiratory variation.          ASSESSMENT/PLAN: Pt is an 83 year old Female with PMH DM, CAD s/p PCI to prox LAD/mid LAD/Diag/Ramus Grade I DD on last echo, HTN, CVA presenting to the ED for generalized body pain x 2 weeks.      1. CEDEÑO  - chronic for her per review  - euvolemic on exam, BNP of 180s  - Last echo as above  - c/w Lasix, Coreg and Lisinopril    2. CAD  - c/w Statin  - last cath showed occluded ramus stent  - was previously on ASA ideally should restart, unsure why she is on Eliquis    3. HTN  - controlled  - c/w Coreg and Lisinopril    Isidro Reaves MD  Pager: [phone]    C A R D I O L O G Y  *********************    DATE OF SERVICE: 02-22-23    HISTORY OF PRESENT ILLNESS: HPI:  Pt is an 83 year old Female with PMH DM, CAD s/p PCI to prox LAD/mid LAD/Diag/Ramus, HFpEF, HTN, CVA presenting to the ED for generalized body pain x 2 weeks.  Pt states sx have been ongoing for years and typically last a few weeks. Reports frequent hospitalizations for diffuse body pains and states work up is "always negative" though pt states she is unsure what work up has been done. States current episode began 2 weeks ago, describes pain as "everywhere", "feels like my body is going to fall out." States pain is predominantly in lower back, B/L fingers and R knee. States lower back pain is worse with ambulation/exertion otherwise has not noticed further exacerbating factors. Has not noticed worsening of pain at night. States she takes tylenol for pain and sometimes uses hot compresses which does not significantly relief the pain. Has noticed some intermittent joint swelling particularly in B/L finger joints and right knee.    She reports dyspnea with exertion which is chronci for her. Denies orthopnea, bendopnea, chest pain or palpitations.     In the E.D, vitals stable. CXR lumbar w/ No evidence of acute compression deformity or fracture of the lumbar spine.       PAST MEDICAL & SURGICAL HISTORY:  HTN   CAD s/p PCI to prox LAD/mid LAD/Diag/Ramus  DM   GERD  CVA no residual deficits  HLD (hyperlipidemia)  S/P TKR (total knee replacement) left    MEDICATIONS:  MEDICATIONS  (STANDING):  apixaban 5 milliGRAM(s) Oral two times a day  atorvastatin 80 milliGRAM(s) Oral at bedtime  carvedilol 3.125 milliGRAM(s) Oral every 12 hours  cefTRIAXone   IVPB 1000 milliGRAM(s) IV Intermittent every 24 hours  dextrose 50% Injectable 25 Gram(s) IV Push once  dextrose 50% Injectable 12.5 Gram(s) IV Push once  dextrose 50% Injectable 25 Gram(s) IV Push once  dextrose Oral Gel 15 Gram(s) Oral once  furosemide    Tablet 40 milliGRAM(s) Oral daily  glucagon  Injectable 1 milliGRAM(s) IntraMuscular once  insulin glargine Injectable (LANTUS) 15 Unit(s) SubCutaneous at bedtime  insulin lispro (ADMELOG) corrective regimen sliding scale   SubCutaneous three times a day before meals  insulin lispro (ADMELOG) corrective regimen sliding scale   SubCutaneous at bedtime  insulin lispro Injectable (ADMELOG) 5 Unit(s) SubCutaneous three times a day before meals  lidocaine   4% Patch 1 Patch Transdermal every 24 hours  lisinopril 2.5 milliGRAM(s) Oral daily  pantoprazole    Tablet 40 milliGRAM(s) Oral before breakfast  polyethylene glycol 3350 17 Gram(s) Oral daily  senna 2 Tablet(s) Oral at bedtime    Allergies: No Known Allergies    FAMILY HISTORY:  FH: diabetes mellitus    SOCIAL HISTORY:    [X ] Non-smoker  [ ] Smoker  [ ] Alcohol    FLU VACCINE THIS YEAR STARTS IN AUGUST:  [ ] Yes    [ ] No    IF OVER 65 HAVE YOU EVER HAD A PNA VACCINE:  [ ] Yes    [ ] No       [ ] N/A      REVIEW OF SYSTEMS:  [ ]chest pain  [  ]shortness of breath  [  ]palpitations  [  ]syncope  [ ]near syncope [ ]upper extremity weakness   [ ] lower extremity weakness  [  ]diplopia  [  ]altered mental status   [  ]fevers  [ ]chills [ ]nausea  [ ]vomiting  [  ]dysphagia    [ ]abdominal pain  [ ]melena  [ ]BRBPR    [  ]epistaxis  [  ]rash    [ ]lower extremity edema        [X] All others negative	  [ ] Unable to obtain      LABS:	 	    CARDIAC MARKERS:                        11.9   7.31  )-----------( 233      ( 21 Feb 2023 20:40 )             40.2     Hb Trend: 11.9<--    02-21    139  |  103  |  14  ----------------------------<  166<H>  4.1   |  25  |  0.98    Ca    9.6      21 Feb 2023 20:40  Phos  3.3     02-21  Mg     1.90     02-21    TPro  8.4<H>  /  Alb  4.5  /  TBili  0.8  /  DBili  x   /  AST  14  /  ALT  8   /  AlkPhos  82  02-21    Creatinine Trend: 0.98<--  proBNP: Serum Pro-Brain Natriuretic Peptide: 198 pg/mL (02-21 @ 20:40)    PHYSICAL EXAM:  T(C): 36.7 (02-22-23 @ 11:00), Max: 36.9 (02-21-23 @ 16:47)  HR: 77 (02-22-23 @ 11:00) (71 - 77)  BP: 130/75 (02-22-23 @ 11:00) (130/75 - 164/91)  RR: 16 (02-22-23 @ 11:00) (16 - 18)  SpO2: 100% (02-22-23 @ 11:00) (100% - 100%)  Wt(kg): --     I&O's Summary      General: Well nourished in no acute distress. Alert and Oriented * 3.   Head: Normocephalic and atraumatic.   Neck: No JVD. No bruits. Supple. Does not appear to be enlarged.   Cardiovascular: + S1,S2 ; RRR Soft systolic murmur at the left lower sternal border. No rubs noted.    Lungs: CTA b/l. No rhonchi, rales or wheezes.   Abdomen: + BS, soft. Non tender. Non distended. No rebound. No guarding.   Extremities: No clubbing/cyanosis/edema.   Neurologic: Moves all four extremities. Full range of motion.   Skin: Warm and moist. The patient's skin has normal elasticity and good skin turgor.   Psychiatric: Appropriate mood and affect.  Musculoskeletal: Normal range of motion, normal strength           ECG:  	NSR, LVH, LAD,LAE Non specific ST changes    RADIOLOGY:  CXR:   The heart is mildly enlarged. Coronary artery stents.  The lungs are clear.  There is no pneumothorax or pleural effusion.  No acute bony abnormalities.    IMPRESSION:        from: Transthoracic Echocardiogram (08.30.19 @ 16:50) >  OBSERVATIONS:  Mitral Valve: Mitral annular calcification, otherwisenormal mitral valve. Mild mitral regurgitation.  Aortic Root: Normal aortic root.  Aortic Valve: Aortic valve leaflet morphology not well visualized.  Left Atrium: Mildly dilated left atrium.  LA volume index =37 cc/m2.  Left Ventricle: Normal left ventricular systolic function.No segmental wall motion abnormalities. Mild diastolic dysfunction (Stage I).  Right Heart: Normal right atrium. Normal right ventricular size and function. Normal tricuspid valve. Minimaltricuspid regurgitation. Normal pulmonic valve.  Pericardium/PleuraNormal pericardium with no pericardialeffusion.    < from: TTE with Doppler (w/Cont) (08.15.22 @ 08:22) >  ------------------------------------------------------------------------  CONCLUSIONS:  1. Mildly dilated left atrium.  LA volume index = 38 cc/m2.  2. Mild segmental left ventricular systolic dysfunction.  The mid and apical anteroseptal and apical inferior walls  are hypokinetic.  3. Reversal of the E-A  waves of the mitral inflow pattern  is consistent with diastolic LV dysfunction.  4. Normal right ventricular size and function.  *** Compared with echocardiogram of 8/30/2019, there are  new wall motion abnormalities.  ------------------------------------------------------------------------  Confirmed on  8/15/2022 - 09:50:41 by Camden Olmos M.D.  ------------------------------------------------------------------------    < end of copied text >      < from: Cardiac Cath Lab - Adult (09.03.19 @ 14:03) >  VENTRICLES: No left ventriculogram was performed.  CORONARY VESSELS: The coronary circulation is right dominant.  LM:   --  LM: Angiography showed mild atherosclerosis with no flow limiting  lesions.  LAD:   --  Proximal LAD: There was a diffuse 10 % stenosis at the site of a  prior stent. The lesion was eccentric. There was NGUYEN grade 3 flow through  the vessel (brisk flow).  --  Mid LAD: There was a diffuse 20 % stenosis at the site of a prior  stent. The lesion was eccentric. There was NGUYEN grade 3 flow through the  vessel (brisk flow).  --  D1: There was a tubular 30 % stenosis at the site of a prior stent. The  lesion was smoothly contoured. There was NGUYEN grade 3 flow through the  vessel (brisk flow).  CX:   --  Circumflex: Angiography showed mild atherosclerosis with no flow  limiting lesions.  RI:   --  Ramus intermedius: There was a diffuse 30 % stenosis at the site  of a prior stent. The lesion was eccentric. There was NGUYEN grade 3 flow  through the vessel (brisk flow).  RCA:   --  Mid RCA: There was a tubular 20 % stenosis at a site with no  prior intervention. The lesion was eccentric. There was NGUYEN grade 3 flow  through the vessel (brisk flow).  --  RPDA: Angiography showed mild atherosclerosis with no flow limiting  lesions.  --  RPLS: Angiography showed mild atherosclerosis with no flow limiting  lesions.  COMPLICATIONS: There were no complications.  DIAGNOSTIC IMPRESSIONS: Patent stents LAD, D-1 and Ramus  No significant obstructive CAD  DIAGNOSTIC RECOMMENDATIONS: Medical therapy  INTERVENTIONAL IMPRESSIONS: Patent stents LAD, D-1 and Ramus  No significant obstructive CAD    Cath 08/2022:  LM   Left main artery: Angiography shows minor irregularities.      LAD   Proximal left anterior descending: Patent stent.    First diagonal: Patent ostial stent.      CX   Proximal circumflex: Thereis a 30 % stenosis in the middle third  portion of the segment.  Mid circumflex: There is a 30 % stenosis in the middle third portion  of the segment.    RCA   Mid right coronary artery: There is a 30 % stenosis in the proximal  third portion of the segment.  Mid right coronary artery: There is a 40 % stenosis in the distal  third portion of the segment.    Ramus   Proximal ramus intermedius: There is a 100 % stenosis within the  stented segment. NGUYEN Flow 0.      Diagnostic Conclusions:   Occluded proximal ramus stent. Patent LAD and diagonal stents.  Mild-moderate atherosclerosis in proximal Cx and mid RCA.      TTE 08/2022:  OBSERVATIONS:  Mitral Valve: Normal mitral valve.  Aortic Root: Normal aortic root.  Aortic Valve: Normal trileaflet aortic valve.  Left Atrium: Mildly dilated left atrium. LA volume index =38 cc/m2.  Left Ventricle: Mild segmental left ventricular systolicdysfunction. The mid and apical anteroseptal and apicalinferior walls are hypokinetic.  Normal left ventricularinternal dimensions and wall thicknesses. Reversal of theE-A  waves of the mitral inflow pattern is consistent withdiastolic LV dysfunction.  Right Heart: Normal right atrium. Normal right ventricular  size and function. Normal tricuspid valve. Normal pulmonicvalve.  Pericardium/PleuraNormal pericardium with no pericardial  effusion.  ------------------    TTE 09/2022:  Findings:  1. Left ventricle cavity is normal in size. Mild decrease in global wall motion.Doppler evidence of grade I (impaired) diastolicdysfunction.  Left ventricle regional wall motion findings: No wall motionabnormalities.  2. Left atrial cavity is mildly dilated.  3. Right atrial cavity is normal in size.  4. Right ventricle cavity is normal in size.Normal right ventricular function.  5. Structurally normal trileaflet aortic valve with noregurgitation.  6. Structurally normal mitral valve with no regurgitation.  7. Structurally normal tricuspid valve with no regurgitation.  8. Structurally normal pulmonic valve with no regurgitation.  9. No evidence of significant pericardial effusion.  10. The aortic root is normal.  11. Normal pulmonary artery.  12. IVC is normal with respiratory variation.          ASSESSMENT/PLAN: Pt is an 83 year old Female with PMH DM, CAD s/p PCI to prox LAD/mid LAD/Diag/Ramus Grade I DD on last echo, HTN, CVA presenting to the ED for generalized body pain x 2 weeks.      1. CEDEÑO  - chronic for her per review  - euvolemic on exam, BNP of 180s  - Last echo as above  - c/w Lasix, Coreg and Lisinopril    2. CAD  - c/w Statin  - last cath showed occluded ramus stent  - was previously on ASA ideally should restart, unsure why she is on Eliquis    3. HTN  - controlled  - c/w Coreg and Lisinopril    Isidro Reaves MD  Pager: [phone]

## 2023-02-22 NOTE — ED ADULT NURSE NOTE - OBJECTIVE STATEMENT
report received from mid shift RN. no initial nurse note done. pt A&Ox4 respirations even and unlabored completing full sentences. pt coming in for generalized body pain x2 weeks. pt states her body pain has been worsening over the past 3-4 days with most the pain in abdomen and back areas. abd soft nondistended nontender upon assessment. pt also endorses increased weakness with difficulty ambulating. pt pmhx DM, CAD  with stents, HTN, CVA. pt denies h/a, dizziness/lightheadedness, n/v/d, fevers/chills, gu sx, cp, sob. pt attempted to ambulate for possible DC home, however pt unsteady on feet. stretcher in lowest position siderails up. awaiting further orders.

## 2023-02-22 NOTE — H&P ADULT - PROBLEM SELECTOR PLAN 2
-Pt UA w/ bacteria and large LEs  -However asymptomatic at this time  -Monitor off abx  -F/u urine Cx -Pt UA w/ bacteria and large LEs  -W/o dysuria and inc urinary freq/ hematuria . However w/ lower abd pain c/f cystitis  -Resume CTX  -Obtain CT A/P  -F/u urine Cx

## 2023-02-23 ENCOUNTER — TRANSCRIPTION ENCOUNTER (OUTPATIENT)
Age: 83
End: 2023-02-23

## 2023-02-23 DIAGNOSIS — Z86.711 PERSONAL HISTORY OF PULMONARY EMBOLISM: ICD-10-CM

## 2023-02-23 DIAGNOSIS — N39.0 URINARY TRACT INFECTION, SITE NOT SPECIFIED: ICD-10-CM

## 2023-02-23 DIAGNOSIS — K55.1 CHRONIC VASCULAR DISORDERS OF INTESTINE: ICD-10-CM

## 2023-02-23 LAB
ANION GAP SERPL CALC-SCNC: 9 MMOL/L — SIGNIFICANT CHANGE UP (ref 7–14)
BUN SERPL-MCNC: 18 MG/DL — SIGNIFICANT CHANGE UP (ref 7–23)
CALCIUM SERPL-MCNC: 9.5 MG/DL — SIGNIFICANT CHANGE UP (ref 8.4–10.5)
CHLORIDE SERPL-SCNC: 101 MMOL/L — SIGNIFICANT CHANGE UP (ref 98–107)
CO2 SERPL-SCNC: 24 MMOL/L — SIGNIFICANT CHANGE UP (ref 22–31)
CREAT SERPL-MCNC: 1.08 MG/DL — SIGNIFICANT CHANGE UP (ref 0.5–1.3)
CRP SERPL-MCNC: 4.2 MG/L — SIGNIFICANT CHANGE UP
CULTURE RESULTS: SIGNIFICANT CHANGE UP
EGFR: 50 ML/MIN/1.73M2 — LOW
ERYTHROCYTE [SEDIMENTATION RATE] IN BLOOD: 26 MM/HR — HIGH (ref 4–25)
FLUAV AG NPH QL: SIGNIFICANT CHANGE UP
FLUBV AG NPH QL: SIGNIFICANT CHANGE UP
GLUCOSE BLDC GLUCOMTR-MCNC: 132 MG/DL — HIGH (ref 70–99)
GLUCOSE BLDC GLUCOMTR-MCNC: 140 MG/DL — HIGH (ref 70–99)
GLUCOSE BLDC GLUCOMTR-MCNC: 165 MG/DL — HIGH (ref 70–99)
GLUCOSE BLDC GLUCOMTR-MCNC: 170 MG/DL — HIGH (ref 70–99)
GLUCOSE BLDC GLUCOMTR-MCNC: 221 MG/DL — HIGH (ref 70–99)
GLUCOSE BLDC GLUCOMTR-MCNC: 253 MG/DL — HIGH (ref 70–99)
GLUCOSE SERPL-MCNC: 227 MG/DL — HIGH (ref 70–99)
HCT VFR BLD CALC: 37 % — SIGNIFICANT CHANGE UP (ref 34.5–45)
HGB BLD-MCNC: 11.5 G/DL — SIGNIFICANT CHANGE UP (ref 11.5–15.5)
MAGNESIUM SERPL-MCNC: 2 MG/DL — SIGNIFICANT CHANGE UP (ref 1.6–2.6)
MCHC RBC-ENTMCNC: 26.1 PG — LOW (ref 27–34)
MCHC RBC-ENTMCNC: 31.1 GM/DL — LOW (ref 32–36)
MCV RBC AUTO: 84.1 FL — SIGNIFICANT CHANGE UP (ref 80–100)
NRBC # BLD: 0 /100 WBCS — SIGNIFICANT CHANGE UP (ref 0–0)
NRBC # FLD: 0 K/UL — SIGNIFICANT CHANGE UP (ref 0–0)
PHOSPHATE SERPL-MCNC: 4.6 MG/DL — HIGH (ref 2.5–4.5)
PLATELET # BLD AUTO: 204 K/UL — SIGNIFICANT CHANGE UP (ref 150–400)
POTASSIUM SERPL-MCNC: 4.3 MMOL/L — SIGNIFICANT CHANGE UP (ref 3.5–5.3)
POTASSIUM SERPL-SCNC: 4.3 MMOL/L — SIGNIFICANT CHANGE UP (ref 3.5–5.3)
RBC # BLD: 4.4 M/UL — SIGNIFICANT CHANGE UP (ref 3.8–5.2)
RBC # FLD: 14.1 % — SIGNIFICANT CHANGE UP (ref 10.3–14.5)
RSV RNA NPH QL NAA+NON-PROBE: SIGNIFICANT CHANGE UP
SARS-COV-2 RNA SPEC QL NAA+PROBE: SIGNIFICANT CHANGE UP
SODIUM SERPL-SCNC: 134 MMOL/L — LOW (ref 135–145)
SPECIMEN SOURCE: SIGNIFICANT CHANGE UP
WBC # BLD: 5.7 K/UL — SIGNIFICANT CHANGE UP (ref 3.8–10.5)
WBC # FLD AUTO: 5.7 K/UL — SIGNIFICANT CHANGE UP (ref 3.8–10.5)

## 2023-02-23 PROCEDURE — 99232 SBSQ HOSP IP/OBS MODERATE 35: CPT

## 2023-02-23 RX ORDER — SODIUM CHLORIDE 9 MG/ML
1000 INJECTION, SOLUTION INTRAVENOUS
Refills: 0 | Status: DISCONTINUED | OUTPATIENT
Start: 2023-02-23 | End: 2023-02-24

## 2023-02-23 RX ORDER — DEXTROSE 50 % IN WATER 50 %
12.5 SYRINGE (ML) INTRAVENOUS ONCE
Refills: 0 | Status: DISCONTINUED | OUTPATIENT
Start: 2023-02-23 | End: 2023-02-24

## 2023-02-23 RX ORDER — DEXTROSE 50 % IN WATER 50 %
25 SYRINGE (ML) INTRAVENOUS ONCE
Refills: 0 | Status: DISCONTINUED | OUTPATIENT
Start: 2023-02-23 | End: 2023-02-24

## 2023-02-23 RX ORDER — GLUCAGON INJECTION, SOLUTION 0.5 MG/.1ML
1 INJECTION, SOLUTION SUBCUTANEOUS ONCE
Refills: 0 | Status: DISCONTINUED | OUTPATIENT
Start: 2023-02-23 | End: 2023-02-24

## 2023-02-23 RX ORDER — DEXTROSE 50 % IN WATER 50 %
15 SYRINGE (ML) INTRAVENOUS ONCE
Refills: 0 | Status: DISCONTINUED | OUTPATIENT
Start: 2023-02-23 | End: 2023-02-24

## 2023-02-23 RX ADMIN — PANTOPRAZOLE SODIUM 40 MILLIGRAM(S): 20 TABLET, DELAYED RELEASE ORAL at 05:02

## 2023-02-23 RX ADMIN — LIDOCAINE 1 PATCH: 4 CREAM TOPICAL at 18:19

## 2023-02-23 RX ADMIN — APIXABAN 5 MILLIGRAM(S): 2.5 TABLET, FILM COATED ORAL at 18:17

## 2023-02-23 RX ADMIN — APIXABAN 5 MILLIGRAM(S): 2.5 TABLET, FILM COATED ORAL at 05:02

## 2023-02-23 RX ADMIN — Medication 3: at 09:38

## 2023-02-23 RX ADMIN — Medication 1: at 12:34

## 2023-02-23 RX ADMIN — Medication 40 MILLIGRAM(S): at 05:02

## 2023-02-23 RX ADMIN — CARVEDILOL PHOSPHATE 3.12 MILLIGRAM(S): 80 CAPSULE, EXTENDED RELEASE ORAL at 18:18

## 2023-02-23 RX ADMIN — Medication 650 MILLIGRAM(S): at 18:17

## 2023-02-23 RX ADMIN — CEFTRIAXONE 100 MILLIGRAM(S): 500 INJECTION, POWDER, FOR SOLUTION INTRAMUSCULAR; INTRAVENOUS at 16:15

## 2023-02-23 RX ADMIN — INSULIN GLARGINE 15 UNIT(S): 100 INJECTION, SOLUTION SUBCUTANEOUS at 23:21

## 2023-02-23 RX ADMIN — CARVEDILOL PHOSPHATE 3.12 MILLIGRAM(S): 80 CAPSULE, EXTENDED RELEASE ORAL at 05:02

## 2023-02-23 RX ADMIN — Medication 650 MILLIGRAM(S): at 19:15

## 2023-02-23 RX ADMIN — ATORVASTATIN CALCIUM 80 MILLIGRAM(S): 80 TABLET, FILM COATED ORAL at 23:09

## 2023-02-23 RX ADMIN — Medication 5 UNIT(S): at 12:34

## 2023-02-23 RX ADMIN — Medication 5 UNIT(S): at 17:08

## 2023-02-23 RX ADMIN — SENNA PLUS 2 TABLET(S): 8.6 TABLET ORAL at 23:08

## 2023-02-23 RX ADMIN — LISINOPRIL 2.5 MILLIGRAM(S): 2.5 TABLET ORAL at 16:16

## 2023-02-23 RX ADMIN — Medication 15 MILLIGRAM(S): at 05:33

## 2023-02-23 RX ADMIN — Medication 15 MILLIGRAM(S): at 05:03

## 2023-02-23 RX ADMIN — Medication 5 UNIT(S): at 09:38

## 2023-02-23 NOTE — DISCHARGE NOTE PROVIDER - CARE PROVIDER_API CALL
Maricel Joseph  [facility]  [address] [place] [address]  Phone: [phone]  Fax: [phone]  Follow Up Time: 1 week

## 2023-02-23 NOTE — PHYSICAL THERAPY INITIAL EVALUATION ADULT - PERTINENT HX OF CURRENT PROBLEM, REHAB EVAL
Patient is 83 year old Female with PMH DM, CAD s/p PCI to prox LAD/mid LAD/Diag/Ramus, HFpEF, HTN, CVA presenting to the ED for generalized body pain x 2 weeks.

## 2023-02-23 NOTE — DISCHARGE NOTE PROVIDER - HOSPITAL COURSE
HPI    83F with PMH DM, CAD s/p stents on eliquis, CHF on lasix, HTN, CVA presenting to the ED for generalized body pain x 2 weeks. Pt states sx have been ongoing for years and typically last a few weeks. Reports frequent hospitalizations for diffuse body pains and states work up is "always negative" though pt states she is unsure what work up has been done. States current episode began 2 weeks ago, describes pain as "everywhere", "feels like my body is going to fall out." States pain is predominantly in lower back, B/L fingers and R knee. States lower back pain is worse with ambulation/exertion otherwise has not noticed further exacerbating factors. Has not noticed worsening of pain at night. States she takes tylenol for pain and sometimes uses hot compresses which does not significantly relief the pain. Has noticed some intermittent joint swelling particularly in B/L finger joints and right knee. ROS otherwise positive for difficulty with ambulation/ gait instability which patient attributes to pain. Also w/ lower abdominal pain x 1 week, no association w/ eating, sometimes associated w/ bowel movements/passing urine. Also w/ chronic CEDEÑO. ROS otherwise negative. Denies fevers, chills, chest pain, SOB at rest,  constipation, diarrhea, dysuria, hematuria. Of note, pt states she was evaluated by outpatient provider months ago who told her she had athritis which pt states was not treated. Also saw PCP yesterday. States PCP provided no work up or tx prompting pt to present to the hospital.    Hospital course    Pt had XR hand and knee c/w OA. XR spine  showed severe osteopenia and degenerative disease. no fractures. ESR/CRP neg. Pt's pain likely d/t OA and degenerative joint disease, controlled on toradol. physical therapy evaluated pt and recommended home PT. Pt was also treated with ceftriaxone for UTI. CT a/p incidentally showed SMA stenosis. Pt currently does not meet criteria for revascularization. Findings discussed with family- agree to continue medical management with asa, statin, apixaban and f/u PCP closely outpt. HPI    83F with PMH DM, CAD s/p stents on eliquis, CHF on lasix, HTN, CVA presenting to the ED for generalized body pain x 2 weeks. Pt states sx have been ongoing for years and typically last a few weeks. Reports frequent hospitalizations for diffuse body pains and states work up is "always negative" though pt states she is unsure what work up has been done. States current episode began 2 weeks ago, describes pain as "everywhere", "feels like my body is going to fall out." States pain is predominantly in lower back, B/L fingers and R knee. States lower back pain is worse with ambulation/exertion otherwise has not noticed further exacerbating factors. Has not noticed worsening of pain at night. States she takes tylenol for pain and sometimes uses hot compresses which does not significantly relief the pain. Has noticed some intermittent joint swelling particularly in B/L finger joints and right knee. ROS otherwise positive for difficulty with ambulation/ gait instability which patient attributes to pain. Also w/ lower abdominal pain x 1 week, no association w/ eating, sometimes associated w/ bowel movements/passing urine. Also w/ chronic CEDEÑO. ROS otherwise negative. Denies fevers, chills, chest pain, SOB at rest,  constipation, diarrhea, dysuria, hematuria. Of note, pt states she was evaluated by outpatient provider months ago who told her she had athritis which pt states was not treated. Also saw PCP yesterday. States PCP provided no work up or tx prompting pt to present to the hospital.    Hospital course    Pt had XR hand and knee c/w OA. XR spine  showed severe osteopenia and degenerative disease. no fractures. ESR/CRP neg. Pt's pain likely d/t OA and degenerative joint disease, controlled on toradol. physical therapy evaluated pt and recommended home PT. Pt was also treated with ceftriaxone for UTI. CT a/p incidentally showed SMA stenosis. Pt currently does not meet criteria for revascularization. Findings discussed with family- agree to continue medical management with asa, statin, apixaban and follow up with PCP closely outpatient.

## 2023-02-23 NOTE — DISCHARGE NOTE PROVIDER - NSDCMRMEDTOKEN_GEN_ALL_CORE_FT
acetaminophen 325 mg oral tablet: 2 tab(s) orally every 6 hours, As Needed -for mild pain MDD:8 tabs  apixaban 5 mg oral tablet: 1 tab(s) orally every 12 hours  atorvastatin 80 mg oral tablet: 1 tab(s) orally once a day (at bedtime)  Coreg 3.125 mg oral tablet: 1 tab(s) orally 2 times a day  furosemide 40 mg oral tablet: 1 tab(s) orally once a day  Levemir 100 units/mL subcutaneous solution: 30 unit(s) subcutaneous once a day (at bedtime)  lisinopril 2.5 mg oral tablet: 1 tab(s) orally once a day   NovoLOG 100 units/mL subcutaneous solution: 10 unit(s) subcutaneous 3 times a day (before meals)  pantoprazole 40 mg oral delayed release tablet: 1 tab(s) orally once a day   acetaminophen 325 mg oral tablet: 2 tab(s) orally every 6 hours, As Needed -for mild pain MDD:8 tabs  apixaban 5 mg oral tablet: 1 tab(s) orally every 12 hours  atorvastatin 80 mg oral tablet: 1 tab(s) orally once a day (at bedtime)  Coreg 3.125 mg oral tablet: 1 tab(s) orally 2 times a day  furosemide 40 mg oral tablet: 1 tab(s) orally once a day  ketorolac 10 mg oral tablet: 1 tab(s) orally every 8 hours, As Needed pain  Levemir 100 units/mL subcutaneous solution: 30 unit(s) subcutaneous once a day (at bedtime)  lidocaine 4% topical film: Apply topically to affected area once a day, As Needed pain  lisinopril 2.5 mg oral tablet: 1 tab(s) orally once a day   NovoLOG 100 units/mL subcutaneous solution: 10 unit(s) subcutaneous 3 times a day (before meals)  pantoprazole 40 mg oral delayed release tablet: 1 tab(s) orally once a day  polyethylene glycol 3350 oral powder for reconstitution: 17 gram(s) orally once a day  senna leaf extract oral tablet: 2 tab(s) orally once a day (at bedtime)   acetaminophen 325 mg oral tablet: 2 tab(s) orally every 6 hours, As Needed -for mild pain MDD:8 tabs  apixaban 5 mg oral tablet: 1 tab(s) orally every 12 hours  atorvastatin 80 mg oral tablet: 1 tab(s) orally once a day (at bedtime)  Coreg 3.125 mg oral tablet: 1 tab(s) orally 2 times a day  furosemide 40 mg oral tablet: 1 tab(s) orally once a day  ketorolac 10 mg oral tablet: 1 tab(s) orally every 8 hours, As Needed pain  Levemir 100 units/mL subcutaneous solution: 30 unit(s) subcutaneous once a day (at bedtime)  lidocaine 4% topical film: Apply topically to affected area once a day, As Needed pain  lisinopril 2.5 mg oral tablet: 1 tab(s) orally once a day   NovoLOG 100 units/mL subcutaneous solution: 10 unit(s) subcutaneous 3 times a day (before meals)  pantoprazole 40 mg oral delayed release tablet: 1 tab(s) orally once a day  physical therapy: 3 x week for 4 weeks  polyethylene glycol 3350 oral powder for reconstitution: 17 gram(s) orally once a day  senna leaf extract oral tablet: 2 tab(s) orally once a day (at bedtime)

## 2023-02-23 NOTE — CHART NOTE - NSCHARTNOTEFT_GEN_A_CORE
Called by radiology stating 2/23- CT A/P of abdomen had Increasing proximal SMA narrowing secondary to increasing thrombus or plaque.  Cased discussed with attending, patient fully anticoagulated. ASA added.

## 2023-02-23 NOTE — PHYSICAL THERAPY INITIAL EVALUATION ADULT - ADDITIONAL COMMENTS
Patient report lives with daughter in house, 5 steps to enter, ambulates with a cane, owns a walker.

## 2023-02-24 ENCOUNTER — TRANSCRIPTION ENCOUNTER (OUTPATIENT)
Age: 83
End: 2023-02-24

## 2023-02-24 VITALS
SYSTOLIC BLOOD PRESSURE: 150 MMHG | DIASTOLIC BLOOD PRESSURE: 79 MMHG | RESPIRATION RATE: 16 BRPM | TEMPERATURE: 98 F | OXYGEN SATURATION: 98 % | HEART RATE: 73 BPM

## 2023-02-24 LAB
A1C WITH ESTIMATED AVERAGE GLUCOSE RESULT: 8.5 % — HIGH (ref 4–5.6)
ANION GAP SERPL CALC-SCNC: 12 MMOL/L — SIGNIFICANT CHANGE UP (ref 7–14)
BUN SERPL-MCNC: 21 MG/DL — SIGNIFICANT CHANGE UP (ref 7–23)
CALCIUM SERPL-MCNC: 9.6 MG/DL — SIGNIFICANT CHANGE UP (ref 8.4–10.5)
CHLORIDE SERPL-SCNC: 101 MMOL/L — SIGNIFICANT CHANGE UP (ref 98–107)
CO2 SERPL-SCNC: 24 MMOL/L — SIGNIFICANT CHANGE UP (ref 22–31)
CREAT SERPL-MCNC: 1.2 MG/DL — SIGNIFICANT CHANGE UP (ref 0.5–1.3)
EGFR: 44 ML/MIN/1.73M2 — LOW
ESTIMATED AVERAGE GLUCOSE: 197 — SIGNIFICANT CHANGE UP
GLUCOSE BLDC GLUCOMTR-MCNC: 157 MG/DL — HIGH (ref 70–99)
GLUCOSE BLDC GLUCOMTR-MCNC: 181 MG/DL — HIGH (ref 70–99)
GLUCOSE SERPL-MCNC: 187 MG/DL — HIGH (ref 70–99)
HCT VFR BLD CALC: 36.9 % — SIGNIFICANT CHANGE UP (ref 34.5–45)
HGB BLD-MCNC: 11.5 G/DL — SIGNIFICANT CHANGE UP (ref 11.5–15.5)
MAGNESIUM SERPL-MCNC: 2.2 MG/DL — SIGNIFICANT CHANGE UP (ref 1.6–2.6)
MCHC RBC-ENTMCNC: 26.1 PG — LOW (ref 27–34)
MCHC RBC-ENTMCNC: 31.2 GM/DL — LOW (ref 32–36)
MCV RBC AUTO: 83.7 FL — SIGNIFICANT CHANGE UP (ref 80–100)
NRBC # BLD: 0 /100 WBCS — SIGNIFICANT CHANGE UP (ref 0–0)
NRBC # FLD: 0 K/UL — SIGNIFICANT CHANGE UP (ref 0–0)
PHOSPHATE SERPL-MCNC: 4.8 MG/DL — HIGH (ref 2.5–4.5)
PLATELET # BLD AUTO: 210 K/UL — SIGNIFICANT CHANGE UP (ref 150–400)
POTASSIUM SERPL-MCNC: 4.3 MMOL/L — SIGNIFICANT CHANGE UP (ref 3.5–5.3)
POTASSIUM SERPL-SCNC: 4.3 MMOL/L — SIGNIFICANT CHANGE UP (ref 3.5–5.3)
RBC # BLD: 4.41 M/UL — SIGNIFICANT CHANGE UP (ref 3.8–5.2)
RBC # FLD: 13.9 % — SIGNIFICANT CHANGE UP (ref 10.3–14.5)
SODIUM SERPL-SCNC: 137 MMOL/L — SIGNIFICANT CHANGE UP (ref 135–145)
WBC # BLD: 6.44 K/UL — SIGNIFICANT CHANGE UP (ref 3.8–10.5)
WBC # FLD AUTO: 6.44 K/UL — SIGNIFICANT CHANGE UP (ref 3.8–10.5)

## 2023-02-24 PROCEDURE — 99239 HOSP IP/OBS DSCHRG MGMT >30: CPT

## 2023-02-24 RX ORDER — KETOROLAC TROMETHAMINE 30 MG/ML
10 SYRINGE (ML) INJECTION EVERY 6 HOURS
Refills: 0 | Status: DISCONTINUED | OUTPATIENT
Start: 2023-02-24 | End: 2023-02-24

## 2023-02-24 RX ORDER — SENNA PLUS 8.6 MG/1
2 TABLET ORAL
Qty: 0 | Refills: 0 | DISCHARGE
Start: 2023-02-24

## 2023-02-24 RX ORDER — POLYETHYLENE GLYCOL 3350 17 G/17G
17 POWDER, FOR SOLUTION ORAL
Qty: 0 | Refills: 0 | DISCHARGE
Start: 2023-02-24

## 2023-02-24 RX ORDER — KETOROLAC TROMETHAMINE 30 MG/ML
1 SYRINGE (ML) INJECTION
Qty: 15 | Refills: 0
Start: 2023-02-24 | End: 2023-02-28

## 2023-02-24 RX ORDER — LIDOCAINE 4 G/100G
1 CREAM TOPICAL
Qty: 0 | Refills: 0 | DISCHARGE
Start: 2023-02-24

## 2023-02-24 RX ADMIN — Medication 5 UNIT(S): at 09:02

## 2023-02-24 RX ADMIN — Medication 5 UNIT(S): at 12:44

## 2023-02-24 RX ADMIN — Medication 40 MILLIGRAM(S): at 05:09

## 2023-02-24 RX ADMIN — PANTOPRAZOLE SODIUM 40 MILLIGRAM(S): 20 TABLET, DELAYED RELEASE ORAL at 06:49

## 2023-02-24 RX ADMIN — LISINOPRIL 2.5 MILLIGRAM(S): 2.5 TABLET ORAL at 05:09

## 2023-02-24 RX ADMIN — Medication 10 MILLIGRAM(S): at 13:02

## 2023-02-24 RX ADMIN — Medication 1: at 09:02

## 2023-02-24 RX ADMIN — APIXABAN 5 MILLIGRAM(S): 2.5 TABLET, FILM COATED ORAL at 05:09

## 2023-02-24 RX ADMIN — LIDOCAINE 1 PATCH: 4 CREAM TOPICAL at 06:26

## 2023-02-24 RX ADMIN — POLYETHYLENE GLYCOL 3350 17 GRAM(S): 17 POWDER, FOR SOLUTION ORAL at 12:04

## 2023-02-24 RX ADMIN — Medication 10 MILLIGRAM(S): at 12:02

## 2023-02-24 RX ADMIN — Medication 1: at 12:43

## 2023-02-24 RX ADMIN — CARVEDILOL PHOSPHATE 3.12 MILLIGRAM(S): 80 CAPSULE, EXTENDED RELEASE ORAL at 05:09

## 2023-02-24 NOTE — DISCHARGE NOTE NURSING/CASE MANAGEMENT/SOCIAL WORK - PATIENT PORTAL LINK FT
You can access the FollowMyHealth Patient Portal offered by [facility] by registering at the following website: [email or web address]. By joining [employer]’s FollowMyHealth portal, you will also be able to view your health information using other applications (apps) compatible with our system.

## 2023-02-24 NOTE — PROGRESS NOTE ADULT - ASSESSMENT
83F with PMH DM, CAD s/p stents on eliquis, CHF on lasix, HTN, CVA presenting to the ED for acute on chronic generalized body pain x 2 weeks. 
83F with PMH DM, CAD s/p stents on eliquis, CHF on lasix, HTN, CVA presenting to the ED for acute on chronic generalized body pain x 2 weeks.

## 2023-02-24 NOTE — PROGRESS NOTE ADULT - SUBJECTIVE AND OBJECTIVE BOX
Date of service 02/24/2023    chief complaint: Body Pains    extended hpi: Pt is an 83 year old Female with PMH DM, CAD s/p PCI to prox LAD/mid LAD/Diag/Ramus, HFpEF, HTN, CVA presenting to the ED for generalized body pain x 2 weeks.  Pt states sx have been ongoing for years and typically last a few weeks. Reports frequent hospitalizations for diffuse body pains and states work up is "always negative" though pt states she is unsure what work up has been done. States current episode began 2 weeks ago, describes pain as "everywhere", "feels like my body is going to fall out." States pain is predominantly in lower back, B/L fingers and R knee. States lower back pain is worse with ambulation/exertion otherwise has not noticed further exacerbating factors. Has not noticed worsening of pain at night. States she takes tylenol for pain and sometimes uses hot compresses which does not significantly relief the pain. Has noticed some intermittent joint swelling particularly in B/L finger joints and right knee.    She reports dyspnea with exertion which is chronci for her. Denies orthopnea, bendopnea, chest pain or palpitations.     In the E.D, vitals stable. CXR lumbar w/ No evidence of acute compression deformity or fracture of the lumbar spine.       Patient denies chest pain or shortness of breath.   Review of symptoms otherwise negative.    MEDICATIONS:  acetaminophen     Tablet .. 650 milliGRAM(s) Oral every 6 hours PRN  apixaban 5 milliGRAM(s) Oral two times a day  atorvastatin 80 milliGRAM(s) Oral at bedtime  carvedilol 3.125 milliGRAM(s) Oral every 12 hours  cefTRIAXone   IVPB 1000 milliGRAM(s) IV Intermittent every 24 hours  dextrose 5%. 1000 milliLiter(s) IV Continuous <Continuous>  dextrose 5%. 1000 milliLiter(s) IV Continuous <Continuous>  dextrose 50% Injectable 25 Gram(s) IV Push once  dextrose 50% Injectable 12.5 Gram(s) IV Push once  dextrose 50% Injectable 25 Gram(s) IV Push once  dextrose 50% Injectable 25 Gram(s) IV Push once  dextrose 50% Injectable 12.5 Gram(s) IV Push once  dextrose 50% Injectable 25 Gram(s) IV Push once  dextrose Oral Gel 15 Gram(s) Oral once  dextrose Oral Gel 15 Gram(s) Oral once PRN  furosemide    Tablet 40 milliGRAM(s) Oral daily  glucagon  Injectable 1 milliGRAM(s) IntraMuscular once  glucagon  Injectable 1 milliGRAM(s) IntraMuscular once  insulin glargine Injectable (LANTUS) 15 Unit(s) SubCutaneous at bedtime  insulin lispro (ADMELOG) corrective regimen sliding scale   SubCutaneous three times a day before meals  insulin lispro (ADMELOG) corrective regimen sliding scale   SubCutaneous at bedtime  insulin lispro Injectable (ADMELOG) 5 Unit(s) SubCutaneous three times a day before meals  ketorolac 10 milliGRAM(s) Oral every 6 hours PRN  lidocaine   4% Patch 1 Patch Transdermal every 24 hours  lisinopril 2.5 milliGRAM(s) Oral daily  pantoprazole    Tablet 40 milliGRAM(s) Oral before breakfast  polyethylene glycol 3350 17 Gram(s) Oral daily  senna 2 Tablet(s) Oral at bedtime      LABS:                        11.5   6.44  )-----------( 210      ( 24 Feb 2023 06:36 )             36.9       Hemoglobin: 11.5 g/dL (02-24 @ 06:36)  Hemoglobin: 11.5 g/dL (02-23 @ 06:37)  Hemoglobin: 11.9 g/dL (02-21 @ 20:40)      02-24    137  |  101  |  21  ----------------------------<  187<H>  4.3   |  24  |  1.20    Ca    9.6      24 Feb 2023 06:36  Phos  4.8     02-24  Mg     2.20     02-24      Creatinine Trend: 1.20<--, 1.08<--, 0.98<--    COAGS:           PHYSICAL EXAM:  T(C): 36.4 (02-24-23 @ 05:09), Max: 36.7 (02-23-23 @ 16:00)  HR: 73 (02-24-23 @ 05:09) (72 - 78)  BP: 144/80 (02-24-23 @ 05:09) (144/80 - 167/99)  RR: 18 (02-24-23 @ 05:09) (17 - 18)  SpO2: 98% (02-24-23 @ 05:09) (98% - 100%)  Wt(kg): --    I&O's Summary    23 Feb 2023 07:01  -  24 Feb 2023 07:00  --------------------------------------------------------  IN: 50 mL / OUT: 0 mL / NET: 50 mL      General: Well nourished in no acute distress. Alert and Oriented * 3.   Head: Normocephalic and atraumatic.   Neck: No JVD. No bruits. Supple. Does not appear to be enlarged.   Cardiovascular: + S1,S2 ; RRR Soft systolic murmur at the left lower sternal border. No rubs noted.    Lungs: CTA b/l. No rhonchi, rales or wheezes.   Abdomen: + BS, soft. Non tender. Non distended. No rebound. No guarding.   Extremities: No clubbing/cyanosis/edema.   Neurologic: Moves all four extremities. Full range of motion.   Skin: Warm and moist. The patient's skin has normal elasticity and good skin turgor.   Psychiatric: Appropriate mood and affect.  Musculoskeletal: Normal range of motion, normal strength        ECG:  	NSR, LVH, LAD,LAE Non specific ST changes    RADIOLOGY:  CXR:   The heart is mildly enlarged. Coronary artery stents.  The lungs are clear.  There is no pneumothorax or pleural effusion.  No acute bony abnormalities.    IMPRESSION:        from: Transthoracic Echocardiogram (08.30.19 @ 16:50) >  OBSERVATIONS:  Mitral Valve: Mitral annular calcification, otherwisenormal mitral valve. Mild mitral regurgitation.  Aortic Root: Normal aortic root.  Aortic Valve: Aortic valve leaflet morphology not well visualized.  Left Atrium: Mildly dilated left atrium.  LA volume index =37 cc/m2.  Left Ventricle: Normal left ventricular systolic function.No segmental wall motion abnormalities. Mild diastolic dysfunction (Stage I).  Right Heart: Normal right atrium. Normal right ventricular size and function. Normal tricuspid valve. Minimaltricuspid regurgitation. Normal pulmonic valve.  Pericardium/PleuraNormal pericardium with no pericardialeffusion.    < from: TTE with Doppler (w/Cont) (08.15.22 @ 08:22) >  ------------------------------------------------------------------------  CONCLUSIONS:  1. Mildly dilated left atrium.  LA volume index = 38 cc/m2.  2. Mild segmental left ventricular systolic dysfunction.  The mid and apical anteroseptal and apical inferior walls  are hypokinetic.  3. Reversal of the E-A  waves of the mitral inflow pattern  is consistent with diastolic LV dysfunction.  4. Normal right ventricular size and function.  *** Compared with echocardiogram of 8/30/2019, there are  new wall motion abnormalities.  ------------------------------------------------------------------------  Confirmed on  8/15/2022 - 09:50:41 by Camden Olmos M.D.  ------------------------------------------------------------------------    < end of copied text >      < from: Cardiac Cath Lab - Adult (09.03.19 @ 14:03) >  VENTRICLES: No left ventriculogram was performed.  CORONARY VESSELS: The coronary circulation is right dominant.  LM:   --  LM: Angiography showed mild atherosclerosis with no flow limiting  lesions.  LAD:   --  Proximal LAD: There was a diffuse 10 % stenosis at the site of a  prior stent. The lesion was eccentric. There was NGUYEN grade 3 flow through  the vessel (brisk flow).  --  Mid LAD: There was a diffuse 20 % stenosis at the site of a prior  stent. The lesion was eccentric. There was NGUYEN grade 3 flow through the  vessel (brisk flow).  --  D1: There was a tubular 30 % stenosis at the site of a prior stent. The  lesion was smoothly contoured. There was NGUYEN grade 3 flow through the  vessel (brisk flow).  CX:   --  Circumflex: Angiography showed mild atherosclerosis with no flow  limiting lesions.  RI:   --  Ramus intermedius: There was a diffuse 30 % stenosis at the site  of a prior stent. The lesion was eccentric. There was NGUYEN grade 3 flow  through the vessel (brisk flow).  RCA:   --  Mid RCA: There was a tubular 20 % stenosis at a site with no  prior intervention. The lesion was eccentric. There was NGUYEN grade 3 flow  through the vessel (brisk flow).  --  RPDA: Angiography showed mild atherosclerosis with no flow limiting  lesions.  --  RPLS: Angiography showed mild atherosclerosis with no flow limiting  lesions.  COMPLICATIONS: There were no complications.  DIAGNOSTIC IMPRESSIONS: Patent stents LAD, D-1 and Ramus  No significant obstructive CAD  DIAGNOSTIC RECOMMENDATIONS: Medical therapy  INTERVENTIONAL IMPRESSIONS: Patent stents LAD, D-1 and Ramus  No significant obstructive CAD    Cath 08/2022:  LM   Left main artery: Angiography shows minor irregularities.      LAD   Proximal left anterior descending: Patent stent.    First diagonal: Patent ostial stent.      CX   Proximal circumflex: Thereis a 30 % stenosis in the middle third  portion of the segment.  Mid circumflex: There is a 30 % stenosis in the middle third portion  of the segment.    RCA   Mid right coronary artery: There is a 30 % stenosis in the proximal  third portion of the segment.  Mid right coronary artery: There is a 40 % stenosis in the distal  third portion of the segment.    Ramus   Proximal ramus intermedius: There is a 100 % stenosis within the  stented segment. NGUYEN Flow 0.      Diagnostic Conclusions:   Occluded proximal ramus stent. Patent LAD and diagonal stents.  Mild-moderate atherosclerosis in proximal Cx and mid RCA.      TTE 08/2022:  OBSERVATIONS:  Mitral Valve: Normal mitral valve.  Aortic Root: Normal aortic root.  Aortic Valve: Normal trileaflet aortic valve.  Left Atrium: Mildly dilated left atrium. LA volume index =38 cc/m2.  Left Ventricle: Mild segmental left ventricular systolicdysfunction. The mid and apical anteroseptal and apicalinferior walls are hypokinetic.  Normal left ventricularinternal dimensions and wall thicknesses. Reversal of theE-A  waves of the mitral inflow pattern is consistent withdiastolic LV dysfunction.  Right Heart: Normal right atrium. Normal right ventricular  size and function. Normal tricuspid valve. Normal pulmonicvalve.  Pericardium/PleuraNormal pericardium with no pericardial  effusion.  ------------------    TTE 09/2022:  Findings:  1. Left ventricle cavity is normal in size. Mild decrease in global wall motion.Doppler evidence of grade I (impaired) diastolicdysfunction.  Left ventricle regional wall motion findings: No wall motionabnormalities.  2. Left atrial cavity is mildly dilated.  3. Right atrial cavity is normal in size.  4. Right ventricle cavity is normal in size.Normal right ventricular function.  5. Structurally normal trileaflet aortic valve with noregurgitation.  6. Structurally normal mitral valve with no regurgitation.  7. Structurally normal tricuspid valve with no regurgitation.  8. Structurally normal pulmonic valve with no regurgitation.  9. No evidence of significant pericardial effusion.  10. The aortic root is normal.  11. Normal pulmonary artery.  12. IVC is normal with respiratory variation.          ASSESSMENT/PLAN: Pt is an 83 year old Female with PMH DM, CAD s/p PCI to prox LAD/mid LAD/Diag/Ramus Grade I DD on last echo, HTN, CVA presenting to the ED for generalized body pain x 2 weeks.      1. CEDEÑO  - chronic for her per review  - euvolemic on exam, BNP of 180s  - Last echo as above  - c/w Lasix, Coreg and Lisinopril    2. CAD  - c/w Statin   - last cath showed occluded ramus stent  - was previously on ASA ideally should restart    3. HTN  - controlled  - c/w Coreg and Lisinopril    4. DVT  - diagnosed in 2021 in setting of COVID  - c/w Sarah Reaves MD  Pager: [phone]     
Date of service 02/23/2023    chief complaint: Body Pains    extended hpi: Pt is an 83 year old Female with PMH DM, CAD s/p PCI to prox LAD/mid LAD/Diag/Ramus, HFpEF, HTN, CVA presenting to the ED for generalized body pain x 2 weeks.  Pt states sx have been ongoing for years and typically last a few weeks. Reports frequent hospitalizations for diffuse body pains and states work up is "always negative" though pt states she is unsure what work up has been done. States current episode began 2 weeks ago, describes pain as "everywhere", "feels like my body is going to fall out." States pain is predominantly in lower back, B/L fingers and R knee. States lower back pain is worse with ambulation/exertion otherwise has not noticed further exacerbating factors. Has not noticed worsening of pain at night. States she takes tylenol for pain and sometimes uses hot compresses which does not significantly relief the pain. Has noticed some intermittent joint swelling particularly in B/L finger joints and right knee.    She reports dyspnea with exertion which is chronci for her. Denies orthopnea, bendopnea, chest pain or palpitations.     In the E.D, vitals stable. CXR lumbar w/ No evidence of acute compression deformity or fracture of the lumbar spine.         DATE OF SERVICE: 02-23-23    Patient denies chest pain or shortness of breath.   Review of symptoms otherwise negative.    MEDICATIONS:  acetaminophen     Tablet .. 650 milliGRAM(s) Oral every 6 hours PRN  apixaban 5 milliGRAM(s) Oral two times a day  atorvastatin 80 milliGRAM(s) Oral at bedtime  carvedilol 3.125 milliGRAM(s) Oral every 12 hours  cefTRIAXone   IVPB 1000 milliGRAM(s) IV Intermittent every 24 hours  dextrose 50% Injectable 25 Gram(s) IV Push once  dextrose 50% Injectable 12.5 Gram(s) IV Push once  dextrose 50% Injectable 25 Gram(s) IV Push once  dextrose Oral Gel 15 Gram(s) Oral once  furosemide    Tablet 40 milliGRAM(s) Oral daily  glucagon  Injectable 1 milliGRAM(s) IntraMuscular once  insulin glargine Injectable (LANTUS) 15 Unit(s) SubCutaneous at bedtime  insulin lispro (ADMELOG) corrective regimen sliding scale   SubCutaneous three times a day before meals  insulin lispro (ADMELOG) corrective regimen sliding scale   SubCutaneous at bedtime  insulin lispro Injectable (ADMELOG) 5 Unit(s) SubCutaneous three times a day before meals  ketorolac   Injectable 15 milliGRAM(s) IV Push every 8 hours PRN  lidocaine   4% Patch 1 Patch Transdermal every 24 hours  lisinopril 2.5 milliGRAM(s) Oral daily  pantoprazole    Tablet 40 milliGRAM(s) Oral before breakfast  polyethylene glycol 3350 17 Gram(s) Oral daily  senna 2 Tablet(s) Oral at bedtime      LABS:                        11.5   5.70  )-----------( 204      ( 23 Feb 2023 06:37 )             37.0       Hemoglobin: 11.5 g/dL (02-23 @ 06:37)  Hemoglobin: 11.9 g/dL (02-21 @ 20:40)      02-23    134<L>  |  101  |  18  ----------------------------<  227<H>  4.3   |  24  |  1.08    Ca    9.5      23 Feb 2023 06:37  Phos  4.6     02-23  Mg     2.00     02-23    TPro  8.4<H>  /  Alb  4.5  /  TBili  0.8  /  DBili  x   /  AST  14  /  ALT  8   /  AlkPhos  82  02-21    Creatinine Trend: 1.08<--, 0.98<--    COAGS:     PHYSICAL EXAM:  T(C): 36.4 (02-23-23 @ 09:00), Max: 37.2 (02-23-23 @ 07:05)  HR: 71 (02-23-23 @ 09:00) (68 - 77)  BP: 134/77 (02-23-23 @ 09:00) (134/77 - 169/91)  RR: 18 (02-23-23 @ 09:00) (16 - 18)  SpO2: 98% (02-23-23 @ 09:00) (97% - 100%)  Wt(kg): --    I&O's Summary    General: Well nourished in no acute distress. Alert and Oriented * 3.   Head: Normocephalic and atraumatic.   Neck: No JVD. No bruits. Supple. Does not appear to be enlarged.   Cardiovascular: + S1,S2 ; RRR Soft systolic murmur at the left lower sternal border. No rubs noted.    Lungs: CTA b/l. No rhonchi, rales or wheezes.   Abdomen: + BS, soft. Non tender. Non distended. No rebound. No guarding.   Extremities: No clubbing/cyanosis/edema.   Neurologic: Moves all four extremities. Full range of motion.   Skin: Warm and moist. The patient's skin has normal elasticity and good skin turgor.   Psychiatric: Appropriate mood and affect.  Musculoskeletal: Normal range of motion, normal strength        ECG:  	NSR, LVH, LAD,LAE Non specific ST changes    RADIOLOGY:  CXR:   The heart is mildly enlarged. Coronary artery stents.  The lungs are clear.  There is no pneumothorax or pleural effusion.  No acute bony abnormalities.    IMPRESSION:        from: Transthoracic Echocardiogram (08.30.19 @ 16:50) >  OBSERVATIONS:  Mitral Valve: Mitral annular calcification, otherwisenormal mitral valve. Mild mitral regurgitation.  Aortic Root: Normal aortic root.  Aortic Valve: Aortic valve leaflet morphology not well visualized.  Left Atrium: Mildly dilated left atrium.  LA volume index =37 cc/m2.  Left Ventricle: Normal left ventricular systolic function.No segmental wall motion abnormalities. Mild diastolic dysfunction (Stage I).  Right Heart: Normal right atrium. Normal right ventricular size and function. Normal tricuspid valve. Minimaltricuspid regurgitation. Normal pulmonic valve.  Pericardium/PleuraNormal pericardium with no pericardialeffusion.    < from: TTE with Doppler (w/Cont) (08.15.22 @ 08:22) >  ------------------------------------------------------------------------  CONCLUSIONS:  1. Mildly dilated left atrium.  LA volume index = 38 cc/m2.  2. Mild segmental left ventricular systolic dysfunction.  The mid and apical anteroseptal and apical inferior walls  are hypokinetic.  3. Reversal of the E-A  waves of the mitral inflow pattern  is consistent with diastolic LV dysfunction.  4. Normal right ventricular size and function.  *** Compared with echocardiogram of 8/30/2019, there are  new wall motion abnormalities.  ------------------------------------------------------------------------  Confirmed on  8/15/2022 - 09:50:41 by Camden Olmos M.D.  ------------------------------------------------------------------------    < end of copied text >      < from: Cardiac Cath Lab - Adult (09.03.19 @ 14:03) >  VENTRICLES: No left ventriculogram was performed.  CORONARY VESSELS: The coronary circulation is right dominant.  LM:   --  LM: Angiography showed mild atherosclerosis with no flow limiting  lesions.  LAD:   --  Proximal LAD: There was a diffuse 10 % stenosis at the site of a  prior stent. The lesion was eccentric. There was NGUYEN grade 3 flow through  the vessel (brisk flow).  --  Mid LAD: There was a diffuse 20 % stenosis at the site of a prior  stent. The lesion was eccentric. There was NGUYEN grade 3 flow through the  vessel (brisk flow).  --  D1: There was a tubular 30 % stenosis at the site of a prior stent. The  lesion was smoothly contoured. There was NGUYEN grade 3 flow through the  vessel (brisk flow).  CX:   --  Circumflex: Angiography showed mild atherosclerosis with no flow  limiting lesions.  RI:   --  Ramus intermedius: There was a diffuse 30 % stenosis at the site  of a prior stent. The lesion was eccentric. There was NGUYEN grade 3 flow  through the vessel (brisk flow).  RCA:   --  Mid RCA: There was a tubular 20 % stenosis at a site with no  prior intervention. The lesion was eccentric. There was NGUYEN grade 3 flow  through the vessel (brisk flow).  --  RPDA: Angiography showed mild atherosclerosis with no flow limiting  lesions.  --  RPLS: Angiography showed mild atherosclerosis with no flow limiting  lesions.  COMPLICATIONS: There were no complications.  DIAGNOSTIC IMPRESSIONS: Patent stents LAD, D-1 and Ramus  No significant obstructive CAD  DIAGNOSTIC RECOMMENDATIONS: Medical therapy  INTERVENTIONAL IMPRESSIONS: Patent stents LAD, D-1 and Ramus  No significant obstructive CAD    Cath 08/2022:  LM   Left main artery: Angiography shows minor irregularities.      LAD   Proximal left anterior descending: Patent stent.    First diagonal: Patent ostial stent.      CX   Proximal circumflex: Thereis a 30 % stenosis in the middle third  portion of the segment.  Mid circumflex: There is a 30 % stenosis in the middle third portion  of the segment.    RCA   Mid right coronary artery: There is a 30 % stenosis in the proximal  third portion of the segment.  Mid right coronary artery: There is a 40 % stenosis in the distal  third portion of the segment.    Ramus   Proximal ramus intermedius: There is a 100 % stenosis within the  stented segment. NGUYEN Flow 0.      Diagnostic Conclusions:   Occluded proximal ramus stent. Patent LAD and diagonal stents.  Mild-moderate atherosclerosis in proximal Cx and mid RCA.      TTE 08/2022:  OBSERVATIONS:  Mitral Valve: Normal mitral valve.  Aortic Root: Normal aortic root.  Aortic Valve: Normal trileaflet aortic valve.  Left Atrium: Mildly dilated left atrium. LA volume index =38 cc/m2.  Left Ventricle: Mild segmental left ventricular systolicdysfunction. The mid and apical anteroseptal and apicalinferior walls are hypokinetic.  Normal left ventricularinternal dimensions and wall thicknesses. Reversal of theE-A  waves of the mitral inflow pattern is consistent withdiastolic LV dysfunction.  Right Heart: Normal right atrium. Normal right ventricular  size and function. Normal tricuspid valve. Normal pulmonicvalve.  Pericardium/PleuraNormal pericardium with no pericardial  effusion.  ------------------    TTE 09/2022:  Findings:  1. Left ventricle cavity is normal in size. Mild decrease in global wall motion.Doppler evidence of grade I (impaired) diastolicdysfunction.  Left ventricle regional wall motion findings: No wall motionabnormalities.  2. Left atrial cavity is mildly dilated.  3. Right atrial cavity is normal in size.  4. Right ventricle cavity is normal in size.Normal right ventricular function.  5. Structurally normal trileaflet aortic valve with noregurgitation.  6. Structurally normal mitral valve with no regurgitation.  7. Structurally normal tricuspid valve with no regurgitation.  8. Structurally normal pulmonic valve with no regurgitation.  9. No evidence of significant pericardial effusion.  10. The aortic root is normal.  11. Normal pulmonary artery.  12. IVC is normal with respiratory variation.          ASSESSMENT/PLAN: Pt is an 83 year old Female with PMH DM, CAD s/p PCI to prox LAD/mid LAD/Diag/Ramus Grade I DD on last echo, HTN, CVA presenting to the ED for generalized body pain x 2 weeks.      1. CEDEÑO  - chronic for her per review  - euvolemic on exam, BNP of 180s  - Last echo as above  - c/w Lasix, Coreg and Lisinopril    2. CAD  - c/w Statin  - last cath showed occluded ramus stent  - was previously on ASA ideally should restart, unsure why she is on Eliquis    3. HTN  - controlled  - c/w Coreg and Lisinopril    4. DVT  - diagnosed in 2021 in setting of COVID  - c/w Eliquis    Isidro Reaves MD  Pager: [phone]     
[facility] Division of Hospital Medicine  Zulay Sánchez MD  Pager [phone]      Patient is a 83y old  Female who presents with a chief complaint of Generalized body pains (24 Feb 2023 12:08)      SUBJECTIVE / OVERNIGHT EVENTS:    no acute event o/n. pt reports back pain improved. no new complaint     ADDITIONAL REVIEW OF SYSTEMS:    RESPIRATORY: No cough, wheezing, chills or hemoptysis; No shortness of breath  CARDIOVASCULAR: No chest pain, palpitations, dizziness, or leg swelling  GASTROINTESTINAL: No abdominal or epigastric pain. No nausea, vomiting, or hematemesis; No diarrhea or constipation. No melena or hematochezia.      MEDICATIONS  (STANDING):  apixaban 5 milliGRAM(s) Oral two times a day  atorvastatin 80 milliGRAM(s) Oral at bedtime  carvedilol 3.125 milliGRAM(s) Oral every 12 hours  cefTRIAXone   IVPB 1000 milliGRAM(s) IV Intermittent every 24 hours  dextrose 5%. 1000 milliLiter(s) (100 mL/Hr) IV Continuous <Continuous>  dextrose 5%. 1000 milliLiter(s) (50 mL/Hr) IV Continuous <Continuous>  dextrose 50% Injectable 25 Gram(s) IV Push once  dextrose 50% Injectable 12.5 Gram(s) IV Push once  dextrose 50% Injectable 25 Gram(s) IV Push once  dextrose 50% Injectable 25 Gram(s) IV Push once  dextrose 50% Injectable 12.5 Gram(s) IV Push once  dextrose 50% Injectable 25 Gram(s) IV Push once  dextrose Oral Gel 15 Gram(s) Oral once  furosemide    Tablet 40 milliGRAM(s) Oral daily  glucagon  Injectable 1 milliGRAM(s) IntraMuscular once  glucagon  Injectable 1 milliGRAM(s) IntraMuscular once  insulin glargine Injectable (LANTUS) 15 Unit(s) SubCutaneous at bedtime  insulin lispro (ADMELOG) corrective regimen sliding scale   SubCutaneous three times a day before meals  insulin lispro (ADMELOG) corrective regimen sliding scale   SubCutaneous at bedtime  insulin lispro Injectable (ADMELOG) 5 Unit(s) SubCutaneous three times a day before meals  lidocaine   4% Patch 1 Patch Transdermal every 24 hours  lisinopril 2.5 milliGRAM(s) Oral daily  pantoprazole    Tablet 40 milliGRAM(s) Oral before breakfast  polyethylene glycol 3350 17 Gram(s) Oral daily  senna 2 Tablet(s) Oral at bedtime    MEDICATIONS  (PRN):  acetaminophen     Tablet .. 650 milliGRAM(s) Oral every 6 hours PRN Mild Pain (1 - 3), Moderate Pain (4 - 6)  dextrose Oral Gel 15 Gram(s) Oral once PRN Blood Glucose LESS THAN 70 milliGRAM(s)/deciliter  ketorolac 10 milliGRAM(s) Oral every 6 hours PRN Severe Pain (7 - 10)      CAPILLARY BLOOD GLUCOSE      POCT Blood Glucose.: 157 mg/dL (24 Feb 2023 12:38)  POCT Blood Glucose.: 181 mg/dL (24 Feb 2023 08:46)  POCT Blood Glucose.: 170 mg/dL (23 Feb 2023 23:06)  POCT Blood Glucose.: 140 mg/dL (23 Feb 2023 21:37)  POCT Blood Glucose.: 132 mg/dL (23 Feb 2023 16:41)    I&O's Summary    23 Feb 2023 07:01  -  24 Feb 2023 07:00  --------------------------------------------------------  IN: 50 mL / OUT: 0 mL / NET: 50 mL        PHYSICAL EXAM:  Vital Signs Last 24 Hrs  T(C): 36.7 (24 Feb 2023 12:07), Max: 36.7 (23 Feb 2023 16:00)  T(F): 98 (24 Feb 2023 12:07), Max: 98.1 (23 Feb 2023 16:00)  HR: 73 (24 Feb 2023 12:07) (72 - 78)  BP: 150/79 (24 Feb 2023 12:07) (144/80 - 167/99)  BP(mean): --  RR: 16 (24 Feb 2023 12:07) (16 - 18)  SpO2: 98% (24 Feb 2023 12:07) (98% - 100%)    Parameters below as of 24 Feb 2023 12:07  Patient On (Oxygen Delivery Method): room air        CONSTITUTIONAL: NAD,  EYES: PERRLA; conjunctiva and sclera clear  ENMT: Moist oral mucosa, no pharyngeal injection or exudates;   NECK: Supple, no palpable masses;  RESPIRATORY: Normal respiratory effort; lungs are clear to auscultation bilaterally  CARDIOVASCULAR: Regular rate and rhythm, normal S1 and S2, no murmur/rub/gallop; No lower extremity edema; Peripheral pulses are 2+ bilaterally  ABDOMEN: Nontender to palpation, normoactive bowel sounds, no rebound/guarding;   MUSCLOSKELETAL:   no clubbing or cyanosis of digits; no joint swelling or tenderness to palpation  PSYCH: A+O to person, place, and time; affect appropriate  NEUROLOGY: CN 2-12 are intact and symmetric; no gross sensory deficits;   SKIN: No rashes;         LABS:                        11.5   6.44  )-----------( 210      ( 24 Feb 2023 06:36 )             36.9     02-24    137  |  101  |  21  ----------------------------<  187<H>  4.3   |  24  |  1.20    Ca    9.6      24 Feb 2023 06:36  Phos  4.8     02-24  Mg     2.20     02-24                Culture - Urine (collected 22 Feb 2023 08:55)  Source: Clean Catch Clean Catch (Midstream)  Final Report (23 Feb 2023 21:10):    <10,000 CFU/mL Normal Urogenital Nathaly        RADIOLOGY & ADDITIONAL TESTS:  Results Reviewed:   Imaging Personally Reviewed:  Electrocardiogram Personally Reviewed:    COORDINATION OF CARE:  Care Discussed with Consultants/Other Providers [Y/N]:  Prior or Outpatient Records Reviewed [Y/N]:  
[facility] Division of Hospital Medicine  Zulay Sánchez MD  Pager [phone]      Patient is a 83y old  Female who presents with a chief complaint of Generalized body pains (2023 12:11)      SUBJECTIVE / OVERNIGHT EVENTS:    no acute event o/n. reports back pain and leg pain controlled on toradol. no new complaint     ADDITIONAL REVIEW OF SYSTEMS:    RESPIRATORY: No cough, wheezing, chills or hemoptysis; No shortness of breath  CARDIOVASCULAR: No chest pain, palpitations, dizziness, or leg swelling  GASTROINTESTINAL: No abdominal or epigastric pain. No nausea, vomiting, or hematemesis; No diarrhea or constipation. No melena or hematochezia.    MEDICATIONS  (STANDING):  apixaban 5 milliGRAM(s) Oral two times a day  atorvastatin 80 milliGRAM(s) Oral at bedtime  carvedilol 3.125 milliGRAM(s) Oral every 12 hours  cefTRIAXone   IVPB 1000 milliGRAM(s) IV Intermittent every 24 hours  dextrose 50% Injectable 25 Gram(s) IV Push once  dextrose 50% Injectable 12.5 Gram(s) IV Push once  dextrose 50% Injectable 25 Gram(s) IV Push once  dextrose Oral Gel 15 Gram(s) Oral once  furosemide    Tablet 40 milliGRAM(s) Oral daily  glucagon  Injectable 1 milliGRAM(s) IntraMuscular once  insulin glargine Injectable (LANTUS) 15 Unit(s) SubCutaneous at bedtime  insulin lispro (ADMELOG) corrective regimen sliding scale   SubCutaneous three times a day before meals  insulin lispro (ADMELOG) corrective regimen sliding scale   SubCutaneous at bedtime  insulin lispro Injectable (ADMELOG) 5 Unit(s) SubCutaneous three times a day before meals  lidocaine   4% Patch 1 Patch Transdermal every 24 hours  lisinopril 2.5 milliGRAM(s) Oral daily  pantoprazole    Tablet 40 milliGRAM(s) Oral before breakfast  polyethylene glycol 3350 17 Gram(s) Oral daily  senna 2 Tablet(s) Oral at bedtime    MEDICATIONS  (PRN):  acetaminophen     Tablet .. 650 milliGRAM(s) Oral every 6 hours PRN Mild Pain (1 - 3), Moderate Pain (4 - 6)  ketorolac   Injectable 15 milliGRAM(s) IV Push every 8 hours PRN Severe Pain (7 - 10)      CAPILLARY BLOOD GLUCOSE      POCT Blood Glucose.: 165 mg/dL (2023 12:07)  POCT Blood Glucose.: 253 mg/dL (2023 09:36)  POCT Blood Glucose.: 221 mg/dL (2023 08:17)  POCT Blood Glucose.: 132 mg/dL (2023 20:48)  POCT Blood Glucose.: 135 mg/dL (2023 17:38)    I&O's Summary      PHYSICAL EXAM:  Vital Signs Last 24 Hrs  T(C): 36.4 (2023 09:00), Max: 37.2 (2023 07:05)  T(F): 97.6 (2023 09:00), Max: 98.9 (2023 07:05)  HR: 71 (2023 09:00) (68 - 77)  BP: 134/77 (2023 09:00) (134/77 - 169/91)  BP(mean): --  RR: 18 (2023 09:00) (16 - 18)  SpO2: 98% (2023 09:00) (97% - 100%)    Parameters below as of 2023 09:00  Patient On (Oxygen Delivery Method): room air        CONSTITUTIONAL: NAD,  EYES: PERRLA; conjunctiva and sclera clear  ENMT: Moist oral mucosa, no pharyngeal injection or exudates;   NECK: Supple, no palpable masses;  RESPIRATORY: Normal respiratory effort; lungs are clear to auscultation bilaterally  CARDIOVASCULAR: Regular rate and rhythm, normal S1 and S2, no murmur/rub/gallop; No lower extremity edema; Peripheral pulses are 2+ bilaterally  ABDOMEN: Nontender to palpation, normoactive bowel sounds, no rebound/guarding;   MUSCLOSKELETAL:   no clubbing or cyanosis of digits; no joint swelling or tenderness to palpation  PSYCH: A+O to person, place, and time; affect appropriate  NEUROLOGY: CN 2-12 are intact and symmetric; no gross sensory deficits;   SKIN: No rashes;     LABS:                        11.5   5.70  )-----------( 204      ( 2023 06:37 )             37.0     02-23    134<L>  |  101  |  18  ----------------------------<  227<H>  4.3   |  24  |  1.08    Ca    9.5      2023 06:37  Phos  4.6       Mg     2.00         TPro  8.4<H>  /  Alb  4.5  /  TBili  0.8  /  DBili  x   /  AST  14  /  ALT  8   /  AlkPhos  82            Urinalysis Basic - ( 2023 21:32 )    Color: Yellow / Appearance: Clear / S.026 / pH: x  Gluc: x / Ketone: Trace  / Bili: Negative / Urobili: <2 mg/dL   Blood: x / Protein: 30 mg/dL / Nitrite: Negative   Leuk Esterase: Large / RBC: 1 /HPF / WBC 21 /HPF   Sq Epi: x / Non Sq Epi: 3 /HPF / Bacteria: Few          RADIOLOGY & ADDITIONAL TESTS:  Results Reviewed:   Imaging Personally Reviewed:  Electrocardiogram Personally Reviewed:    COORDINATION OF CARE:  Care Discussed with Consultants/Other Providers [Y/N]:  Prior or Outpatient Records Reviewed [Y/N]:

## 2023-02-24 NOTE — PROGRESS NOTE ADULT - PROBLEM SELECTOR PLAN 8
-Resume lantus 15units at bedtime  -Resume 5units admelog before meals  -ISS  -Monitor fingersticks
-Resume lantus 15units at bedtime  -Resume 5units admelog before meals  -ISS  -Monitor fingersticks

## 2023-02-24 NOTE — PROGRESS NOTE ADULT - PROBLEM SELECTOR PLAN 1
likely d/t OA and degenerative joint disease   -XR hand and knee c/w OA. XR spine severe osteopenia, degenerative disease. no fx  -PRN tylenol and toradol for pain  -Lidocaine patch QD  -ESR, CRP unremarkable. , RON pending   -PT eval
likely d/t OA and degenerative joint disease   -XR hand and knee c/w OA. XR spine severe osteopenia, degenerative disease. no fx  -PRN tylenol and toradol for pain  -Lidocaine patch QD  -ESR, CRP unremarkable. , RON pending   -PT eval- home PT

## 2023-02-24 NOTE — PROGRESS NOTE ADULT - PROBLEM SELECTOR PLAN 7
-Appears euvolemic at this time  -c/w BB  -c/w lasix
-Appears euvolemic at this time  -c/w BB  -c/w lasix

## 2023-02-24 NOTE — PROGRESS NOTE ADULT - PROBLEM SELECTOR PLAN 3
CT a/p showed Increasing proximal SMA narrowing   -pt/family denies significant post-prandial abd pain and weight loss   -findings discussed with pt, daughter Nancy and Son José- discussed that pt may require revascularization if she develops severe abd pain or weight loss. family verbalized understanding- wants to cont conservative management and f/u pcp outpt  -already on apixaban and statin. restart asa on dc
CT a/p showed Increasing proximal SMA narrowing   -pt/family denies significant post-prandial abd pain and weight loss   -findings discussed with pt, daughter Nancy and Son José- discussed that pt may require revascularization if she develops severe abd pain or weight loss. family verbalized understanding- wants to cont conservative management and f/u pcp outpt  -already on apixaban and statin. will restart asa once off iv Toradol

## 2023-02-24 NOTE — PROGRESS NOTE ADULT - PROBLEM SELECTOR PLAN 9
Eliquis  DASH/TLC CC diet  DISPO: PT consulted- home PT
Eliquis  DASH/TLC CC diet  DISPO: PT consulted

## 2023-02-24 NOTE — PROGRESS NOTE ADULT - PROBLEM SELECTOR PLAN 4
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 PMD plan for lifelong AC   -c/w apixaban 5 bid
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 PMD plan for lifelong AC   -c/w apixaban 5 bid

## 2023-02-25 LAB — ANA TITR SER: NEGATIVE — SIGNIFICANT CHANGE UP

## 2023-03-30 ENCOUNTER — INPATIENT (INPATIENT)
Facility: HOSPITAL | Age: 83
LOS: 5 days | Discharge: HOME | End: 2023-04-05
Attending: HOSPITALIST | Admitting: HOSPITALIST
Payer: MEDICARE

## 2023-03-30 VITALS
HEIGHT: 66 IN | HEART RATE: 69 BPM | SYSTOLIC BLOOD PRESSURE: 157 MMHG | RESPIRATION RATE: 18 BRPM | TEMPERATURE: 99 F | DIASTOLIC BLOOD PRESSURE: 79 MMHG | OXYGEN SATURATION: 96 %

## 2023-03-30 DIAGNOSIS — R07.9 CHEST PAIN, UNSPECIFIED: ICD-10-CM

## 2023-03-30 DIAGNOSIS — I50.30 UNSPECIFIED DIASTOLIC (CONGESTIVE) HEART FAILURE: ICD-10-CM

## 2023-03-30 DIAGNOSIS — E11.9 TYPE 2 DIABETES MELLITUS WITHOUT COMPLICATIONS: ICD-10-CM

## 2023-03-30 DIAGNOSIS — I25.10 ATHEROSCLEROTIC HEART DISEASE OF NATIVE CORONARY ARTERY WITHOUT ANGINA PECTORIS: ICD-10-CM

## 2023-03-30 DIAGNOSIS — Z29.9 ENCOUNTER FOR PROPHYLACTIC MEASURES, UNSPECIFIED: ICD-10-CM

## 2023-03-30 LAB
ALBUMIN SERPL ELPH-MCNC: 4.3 G/DL — SIGNIFICANT CHANGE UP (ref 3.3–5)
ALP SERPL-CCNC: 73 U/L — SIGNIFICANT CHANGE UP (ref 40–120)
ALT FLD-CCNC: 8 U/L — SIGNIFICANT CHANGE UP (ref 4–33)
ANION GAP SERPL CALC-SCNC: 12 MMOL/L — SIGNIFICANT CHANGE UP (ref 7–14)
APTT BLD: 35.5 SEC — SIGNIFICANT CHANGE UP (ref 27–36.3)
AST SERPL-CCNC: 22 U/L — SIGNIFICANT CHANGE UP (ref 4–32)
BASOPHILS # BLD AUTO: 0.02 K/UL — SIGNIFICANT CHANGE UP (ref 0–0.2)
BASOPHILS NFR BLD AUTO: 0.3 % — SIGNIFICANT CHANGE UP (ref 0–2)
BILIRUB SERPL-MCNC: 0.6 MG/DL — SIGNIFICANT CHANGE UP (ref 0.2–1.2)
BUN SERPL-MCNC: 13 MG/DL — SIGNIFICANT CHANGE UP (ref 7–23)
CALCIUM SERPL-MCNC: 9.6 MG/DL — SIGNIFICANT CHANGE UP (ref 8.4–10.5)
CHLORIDE SERPL-SCNC: 103 MMOL/L — SIGNIFICANT CHANGE UP (ref 98–107)
CK MB BLD-MCNC: 1.7 % — SIGNIFICANT CHANGE UP (ref 0–2.5)
CK MB CFR SERPL CALC: 2.1 NG/ML — SIGNIFICANT CHANGE UP
CK SERPL-CCNC: 127 U/L — SIGNIFICANT CHANGE UP (ref 25–170)
CO2 SERPL-SCNC: 23 MMOL/L — SIGNIFICANT CHANGE UP (ref 22–31)
CREAT SERPL-MCNC: 1.03 MG/DL — SIGNIFICANT CHANGE UP (ref 0.5–1.3)
EGFR: 53 ML/MIN/1.73M2 — LOW
EOSINOPHIL # BLD AUTO: 0.04 K/UL — SIGNIFICANT CHANGE UP (ref 0–0.5)
EOSINOPHIL NFR BLD AUTO: 0.6 % — SIGNIFICANT CHANGE UP (ref 0–6)
GLUCOSE BLDC GLUCOMTR-MCNC: 106 MG/DL — HIGH (ref 70–99)
GLUCOSE BLDC GLUCOMTR-MCNC: 117 MG/DL — HIGH (ref 70–99)
GLUCOSE BLDC GLUCOMTR-MCNC: 129 MG/DL — HIGH (ref 70–99)
GLUCOSE SERPL-MCNC: 173 MG/DL — HIGH (ref 70–99)
HCT VFR BLD CALC: 36.7 % — SIGNIFICANT CHANGE UP (ref 34.5–45)
HGB BLD-MCNC: 10.9 G/DL — LOW (ref 11.5–15.5)
IANC: 4.13 K/UL — SIGNIFICANT CHANGE UP (ref 1.8–7.4)
IMM GRANULOCYTES NFR BLD AUTO: 0.1 % — SIGNIFICANT CHANGE UP (ref 0–0.9)
INR BLD: 1.4 RATIO — HIGH (ref 0.88–1.16)
LYMPHOCYTES # BLD AUTO: 2.11 K/UL — SIGNIFICANT CHANGE UP (ref 1–3.3)
LYMPHOCYTES # BLD AUTO: 30.5 % — SIGNIFICANT CHANGE UP (ref 13–44)
MCHC RBC-ENTMCNC: 25.9 PG — LOW (ref 27–34)
MCHC RBC-ENTMCNC: 29.7 GM/DL — LOW (ref 32–36)
MCV RBC AUTO: 87.2 FL — SIGNIFICANT CHANGE UP (ref 80–100)
MONOCYTES # BLD AUTO: 0.61 K/UL — SIGNIFICANT CHANGE UP (ref 0–0.9)
MONOCYTES NFR BLD AUTO: 8.8 % — SIGNIFICANT CHANGE UP (ref 2–14)
NEUTROPHILS # BLD AUTO: 4.13 K/UL — SIGNIFICANT CHANGE UP (ref 1.8–7.4)
NEUTROPHILS NFR BLD AUTO: 59.7 % — SIGNIFICANT CHANGE UP (ref 43–77)
NRBC # BLD: 0 /100 WBCS — SIGNIFICANT CHANGE UP (ref 0–0)
NRBC # FLD: 0 K/UL — SIGNIFICANT CHANGE UP (ref 0–0)
NT-PROBNP SERPL-SCNC: 415 PG/ML — HIGH
PLATELET # BLD AUTO: 219 K/UL — SIGNIFICANT CHANGE UP (ref 150–400)
POTASSIUM SERPL-MCNC: 5.2 MMOL/L — SIGNIFICANT CHANGE UP (ref 3.5–5.3)
POTASSIUM SERPL-SCNC: 5.2 MMOL/L — SIGNIFICANT CHANGE UP (ref 3.5–5.3)
PROT SERPL-MCNC: 7.5 G/DL — SIGNIFICANT CHANGE UP (ref 6–8.3)
PROTHROM AB SERPL-ACNC: 16.3 SEC — HIGH (ref 10.5–13.4)
RBC # BLD: 4.21 M/UL — SIGNIFICANT CHANGE UP (ref 3.8–5.2)
RBC # FLD: 13.7 % — SIGNIFICANT CHANGE UP (ref 10.3–14.5)
SODIUM SERPL-SCNC: 138 MMOL/L — SIGNIFICANT CHANGE UP (ref 135–145)
TROPONIN T, HIGH SENSITIVITY RESULT: 19 NG/L — SIGNIFICANT CHANGE UP
WBC # BLD: 6.92 K/UL — SIGNIFICANT CHANGE UP (ref 3.8–10.5)
WBC # FLD AUTO: 6.92 K/UL — SIGNIFICANT CHANGE UP (ref 3.8–10.5)

## 2023-03-30 PROCEDURE — 99223 1ST HOSP IP/OBS HIGH 75: CPT

## 2023-03-30 PROCEDURE — 72100 X-RAY EXAM L-S SPINE 2/3 VWS: CPT | Mod: 26

## 2023-03-30 PROCEDURE — 99285 EMERGENCY DEPT VISIT HI MDM: CPT

## 2023-03-30 PROCEDURE — 72020 X-RAY EXAM OF SPINE 1 VIEW: CPT | Mod: 26

## 2023-03-30 PROCEDURE — 71045 X-RAY EXAM CHEST 1 VIEW: CPT | Mod: 26

## 2023-03-30 PROCEDURE — 93306 TTE W/DOPPLER COMPLETE: CPT | Mod: 26

## 2023-03-30 RX ORDER — SODIUM CHLORIDE 9 MG/ML
1000 INJECTION, SOLUTION INTRAVENOUS
Refills: 0 | Status: DISCONTINUED | OUTPATIENT
Start: 2023-03-30 | End: 2023-04-05

## 2023-03-30 RX ORDER — HEPARIN SODIUM 5000 [USP'U]/ML
5000 INJECTION INTRAVENOUS; SUBCUTANEOUS EVERY 8 HOURS
Refills: 0 | Status: DISCONTINUED | OUTPATIENT
Start: 2023-03-30 | End: 2023-03-30

## 2023-03-30 RX ORDER — INSULIN GLARGINE 100 [IU]/ML
15 INJECTION, SOLUTION SUBCUTANEOUS AT BEDTIME
Refills: 0 | Status: DISCONTINUED | OUTPATIENT
Start: 2023-03-30 | End: 2023-04-02

## 2023-03-30 RX ORDER — PANTOPRAZOLE SODIUM 20 MG/1
1 TABLET, DELAYED RELEASE ORAL
Qty: 0 | Refills: 0 | DISCHARGE

## 2023-03-30 RX ORDER — LISINOPRIL 2.5 MG/1
2.5 TABLET ORAL DAILY
Refills: 0 | Status: DISCONTINUED | OUTPATIENT
Start: 2023-03-30 | End: 2023-04-05

## 2023-03-30 RX ORDER — DEXTROSE 50 % IN WATER 50 %
15 SYRINGE (ML) INTRAVENOUS ONCE
Refills: 0 | Status: DISCONTINUED | OUTPATIENT
Start: 2023-03-30 | End: 2023-04-05

## 2023-03-30 RX ORDER — ATORVASTATIN CALCIUM 80 MG/1
80 TABLET, FILM COATED ORAL AT BEDTIME
Refills: 0 | Status: DISCONTINUED | OUTPATIENT
Start: 2023-03-30 | End: 2023-04-05

## 2023-03-30 RX ORDER — PANTOPRAZOLE SODIUM 20 MG/1
40 TABLET, DELAYED RELEASE ORAL
Refills: 0 | Status: DISCONTINUED | OUTPATIENT
Start: 2023-03-30 | End: 2023-04-05

## 2023-03-30 RX ORDER — ONDANSETRON 8 MG/1
4 TABLET, FILM COATED ORAL EVERY 8 HOURS
Refills: 0 | Status: DISCONTINUED | OUTPATIENT
Start: 2023-03-30 | End: 2023-04-05

## 2023-03-30 RX ORDER — DEXTROSE 50 % IN WATER 50 %
25 SYRINGE (ML) INTRAVENOUS ONCE
Refills: 0 | Status: DISCONTINUED | OUTPATIENT
Start: 2023-03-30 | End: 2023-04-05

## 2023-03-30 RX ORDER — INSULIN LISPRO 100/ML
VIAL (ML) SUBCUTANEOUS AT BEDTIME
Refills: 0 | Status: DISCONTINUED | OUTPATIENT
Start: 2023-03-30 | End: 2023-04-05

## 2023-03-30 RX ORDER — CARVEDILOL PHOSPHATE 80 MG/1
3.12 CAPSULE, EXTENDED RELEASE ORAL EVERY 12 HOURS
Refills: 0 | Status: DISCONTINUED | OUTPATIENT
Start: 2023-03-30 | End: 2023-04-05

## 2023-03-30 RX ORDER — INSULIN LISPRO 100/ML
VIAL (ML) SUBCUTANEOUS
Refills: 0 | Status: DISCONTINUED | OUTPATIENT
Start: 2023-03-30 | End: 2023-04-05

## 2023-03-30 RX ORDER — FUROSEMIDE 40 MG
40 TABLET ORAL DAILY
Refills: 0 | Status: DISCONTINUED | OUTPATIENT
Start: 2023-03-30 | End: 2023-04-05

## 2023-03-30 RX ORDER — CARVEDILOL PHOSPHATE 80 MG/1
1 CAPSULE, EXTENDED RELEASE ORAL
Qty: 0 | Refills: 0 | DISCHARGE

## 2023-03-30 RX ORDER — INSULIN ASPART 100 [IU]/ML
10 INJECTION, SOLUTION SUBCUTANEOUS
Qty: 0 | Refills: 0 | DISCHARGE

## 2023-03-30 RX ORDER — INSULIN LISPRO 100/ML
5 VIAL (ML) SUBCUTANEOUS
Refills: 0 | Status: DISCONTINUED | OUTPATIENT
Start: 2023-03-30 | End: 2023-04-05

## 2023-03-30 RX ORDER — ACETAMINOPHEN 500 MG
650 TABLET ORAL EVERY 6 HOURS
Refills: 0 | Status: DISCONTINUED | OUTPATIENT
Start: 2023-03-30 | End: 2023-04-05

## 2023-03-30 RX ORDER — DEXTROSE 50 % IN WATER 50 %
12.5 SYRINGE (ML) INTRAVENOUS ONCE
Refills: 0 | Status: DISCONTINUED | OUTPATIENT
Start: 2023-03-30 | End: 2023-04-05

## 2023-03-30 RX ORDER — GLUCAGON INJECTION, SOLUTION 0.5 MG/.1ML
1 INJECTION, SOLUTION SUBCUTANEOUS ONCE
Refills: 0 | Status: DISCONTINUED | OUTPATIENT
Start: 2023-03-30 | End: 2023-04-05

## 2023-03-30 RX ORDER — APIXABAN 2.5 MG/1
5 TABLET, FILM COATED ORAL EVERY 12 HOURS
Refills: 0 | Status: DISCONTINUED | OUTPATIENT
Start: 2023-03-30 | End: 2023-04-02

## 2023-03-30 RX ORDER — INSULIN DETEMIR 100/ML (3)
30 INSULIN PEN (ML) SUBCUTANEOUS
Qty: 0 | Refills: 0 | DISCHARGE

## 2023-03-30 RX ORDER — LANOLIN ALCOHOL/MO/W.PET/CERES
3 CREAM (GRAM) TOPICAL AT BEDTIME
Refills: 0 | Status: DISCONTINUED | OUTPATIENT
Start: 2023-03-30 | End: 2023-04-05

## 2023-03-30 RX ADMIN — CARVEDILOL PHOSPHATE 3.12 MILLIGRAM(S): 80 CAPSULE, EXTENDED RELEASE ORAL at 18:42

## 2023-03-30 RX ADMIN — ATORVASTATIN CALCIUM 80 MILLIGRAM(S): 80 TABLET, FILM COATED ORAL at 22:25

## 2023-03-30 RX ADMIN — Medication 5 UNIT(S): at 18:48

## 2023-03-30 RX ADMIN — INSULIN GLARGINE 15 UNIT(S): 100 INJECTION, SOLUTION SUBCUTANEOUS at 22:25

## 2023-03-30 RX ADMIN — APIXABAN 5 MILLIGRAM(S): 2.5 TABLET, FILM COATED ORAL at 18:43

## 2023-03-30 RX ADMIN — Medication 650 MILLIGRAM(S): at 22:25

## 2023-03-30 NOTE — H&P ADULT - ASSESSMENT
83 y.o. F with PMH of HTN, HFpEF, CVA, CAD with stents who presents with intermittent CP x 1 month. Chest pain has both typical and atypical features.

## 2023-03-30 NOTE — H&P ADULT - NSHPPHYSICALEXAM_GEN_ALL_CORE
Vital Signs Last 24 Hrs  T(C): 37 (30 Mar 2023 12:30), Max: 37 (30 Mar 2023 12:30)  T(F): 98.6 (30 Mar 2023 12:30), Max: 98.6 (30 Mar 2023 12:30)  HR: 69 (30 Mar 2023 12:30) (69 - 69)  BP: 157/79 (30 Mar 2023 12:30) (157/79 - 157/79)  BP(mean): --  RR: 18 (30 Mar 2023 12:30) (18 - 18)  SpO2: 96% (30 Mar 2023 12:30) (96% - 96%)    Parameters below as of 30 Mar 2023 12:30  Patient On (Oxygen Delivery Method): room air      General: WN/WD NAD  Neurology: A&Ox3, nonfocal, SOTELO x 4  Eyes: PERRLA/ EOMI, Gross vision intact  ENT/Neck: Neck supple, trachea midline, No JVD, Gross hearing intact  Respiratory: Normal respiratory rate. CTA B/L, No wheezing, rales, rhonchi.  CV: RRR, S1S2, no murmurs, rubs or gallops.  LE 2 2+ edema. No JVD  Abdominal: Soft, NT, ND +BS,   Extremities:  + peripheral pulses  Psych: A&O x 4  Skin: No Rashes, Hematoma, Ecchymosis Vital Signs Last 24 Hrs  T(C): 37 (30 Mar 2023 12:30), Max: 37 (30 Mar 2023 12:30)  T(F): 98.6 (30 Mar 2023 12:30), Max: 98.6 (30 Mar 2023 12:30)  HR: 69 (30 Mar 2023 12:30) (69 - 69)  BP: 157/79 (30 Mar 2023 12:30) (157/79 - 157/79)  BP(mean): --  RR: 18 (30 Mar 2023 12:30) (18 - 18)  SpO2: 96% (30 Mar 2023 12:30) (96% - 96%)    Parameters below as of 30 Mar 2023 12:30  Patient On (Oxygen Delivery Method): room air      General: WN/WD NAD  Neurology: A&Ox3, nonfocal, SOTELO x 4  Eyes: PERRLA/ EOMI, Gross vision intact  ENT/Neck: Neck supple, trachea midline, No JVD, Gross hearing intact  Respiratory: Normal respiratory rate. CTA B/L, No wheezing, rales, rhonchi.  CV: RRR, S1S2, no murmurs, rubs or gallops.  LE 2 2+ edema. No JVD  Abdominal: Soft, NT, ND +BS,   Extremities:  + peripheral pulses  MSK: Chest wall tenderness appreciated on exam   Psych: A&O x 4  Skin: No Rashes, Hematoma, Ecchymosis

## 2023-03-30 NOTE — ED PROVIDER NOTE - OBJECTIVE STATEMENT
83 year old Female with PMH DM, CAD s/p PCI to prox LAD/mid LAD/Diag/Ramus, last [facility] 8/2022 with occluded prox Ramus stent, patent LAD and Diag stents, mild to mod atherosclerosis in pLcx and mRCA, managed medically, HFpEF, HTN, and CVA, recently admitted 2/2023 with weakness/ imaging c/w OA and UTI managed with Abx, and was discharged home with home PT.  patient complains of shortness of breath and weakness

## 2023-03-30 NOTE — CONSULT NOTE ADULT - NS ATTEND AMEND GEN_ALL_CORE FT
Patient seen and examined. Agree with plan as detailed in PA/NP Note.       Check lower back xrays, check NST    Isidro Reaves MD  Pager: [phone]

## 2023-03-30 NOTE — H&P ADULT - PROBLEM SELECTOR PLAN 1
-pt with intermittent chest pain. Has typical features such as substernal location, accompanied with SOB, worsens with exertion and resolves with rest. However, pt does also have chest wall tenderness on exam  -pt has previous hx of CAD; last [facility] in 2022 demonstrated occluded prox Ramus stent, patent LAD and Diag stents, mild to mod atherosclerosis in pLcx and mRCA,   -will order TTE and pharmacologic NST  -on Eliquis

## 2023-03-30 NOTE — H&P ADULT - NSICDXPASTSURGICALHX_GEN_ALL_CORE_FT
PAST SURGICAL HISTORY:  S/P primary angioplasty with coronary stent x1 in 2006 at [facility]    S/P TKR (total knee replacement) left

## 2023-03-30 NOTE — H&P ADULT - NSHPSOCIALHISTORY_GEN_ALL_CORE
Patient lives in [place] with her son but has been in [place] for many months as she is visiting her daughter. No smoking, EtOH consumption, marijuana, or illicit drug use,

## 2023-03-30 NOTE — ED ADULT TRIAGE NOTE - CHIEF COMPLAINT QUOTE
downtime triage- pt A&Ox4 c/o chest pain, intermittent. send from PMD office for eval . increasing dyspnea on exertion. PMHx- DM, HTN, CHF, CVA, CAD with stents.

## 2023-03-30 NOTE — CONSULT NOTE ADULT - SUBJECTIVE AND OBJECTIVE BOX
HISTORY OF PRESENT ILLNESS: HPI:    83 year old Female with PMH DM, CAD s/p PCI to prox LAD/mid LAD/Diag/Ramus, last [facility] 8/2022 with occluded prox Ramus stent, patent LAD and Diag stents, mild to mod atherosclerosis in pLcx and mRCA, managed medically, HFpEF, HTN, and CVA, recently admitted 2/2023 with weakness/ imaging c/w OA and UTI managed with Abx, and was discharged home with home PT.  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  She presents today with complaints of ongoing weakness, lightheadedness, chest pain when walking and back pain limiting her walking. She denies palpitations or syncope.    PAST MEDICAL & SURGICAL HISTORY:  HTN (hypertension)      CAD (coronary artery disease)  S/P stent placemenet 2006, and reportedly 6/2019 in [place]      DM (diabetes mellitus)      GERD (gastroesophageal reflux disease)      CVA (cerebral vascular accident)  no residual deficits      HLD (hyperlipidemia)      CHF (congestive heart failure)      S/P primary angioplasty with coronary stent  x1 in 2006 at [facility]      S/P TKR (total knee replacement)  left    MEDICATIONS  (STANDING):  acetaminophen 325 mg oral tablet: 2 tab(s) orally every 6 hours, As Needed -for mild pain MDD:8 tabs  apixaban 5 mg oral tablet: 1 tab(s) orally every 12 hours  atorvastatin 80 mg oral tablet: 1 tab(s) orally once a day (at bedtime)  Coreg 3.125 mg oral tablet: 1 tab(s) orally 2 times a day  furosemide 40 mg oral tablet: 1 tab(s) orally once a day  ketorolac 10 mg oral tablet: 1 tab(s) orally every 8 hours, As Needed pain  Levemir 100 units/mL subcutaneous solution: 30 unit(s) subcutaneous once a day (at bedtime)  lidocaine 4% topical film: Apply topically to affected area once a day, As Needed pain  lisinopril 2.5 mg oral tablet: 1 tab(s) orally once a day   NovoLOG 100 units/mL subcutaneous solution: 10 unit(s) subcutaneous 3 times a day (before meals)  pantoprazole 40 mg oral delayed release tablet: 1 tab(s) orally once a day  physical therapy: 3 x week for 4 weeks  polyethylene glycol 3350 oral powder for reconstitution: 17 gram(s) orally once a day  senna leaf extract oral tablet: 2 tab(s) orally once a day (at bedtime)        Allergies  No Known Allergies        FAMILY HISTORY:  FH: diabetes mellitus  Noncontributory for premature coronary disease or sudden cardiac death    SOCIAL HISTORY:    [ x] Non-smoker  [ ] Smoker  [ ] Alcohol    FLU VACCINE THIS YEAR STARTS IN AUGUST:  [ ] Yes    [ ] No    IF OVER 65 HAVE YOU EVER HAD A PNA VACCINE:  [ ] Yes    [ ] No       [ ] N/A      REVIEW OF SYSTEMS:  [x ]chest pain  [  ]shortness of breath  [  ]palpitations  [  ]syncope  [ ]near syncope [ ]upper extremity weakness   [ ] lower extremity weakness  [  ]diplopia  [  ]altered mental status   [  ]fevers  [ ]chills [ ]nausea  [ ]vomiting  [  ]dysphagia    [ ]abdominal pain  [ ]melena  [ ]BRBPR    [  ]epistaxis  [  ]rash    [ ]lower extremity edema        [x ] All others negative	  [ ] Unable to obtain      LABS:	 PENDING	    PHYSICAL EXAM:  T(C): --  HR: --  BP: --  RR: --  SpO2: --  Wt(kg): --     I&O's Summary      Gen: Appears well in NAD  HEENT:  (-)icterus (-)pallor  CV: N S1 S2 1/6 SHANIQUA (+)2 Pulses B/l  Resp:  Clear to ausculatation B/L, normal effort  GI: (+) BS Soft, NT, ND  Lymph:  (-)Edema, (-)obvious lymphadenopathy  Skin: Warm to touch, Normal turgor  Psych: Appropriate mood and affect      ECG:  	from office today SR LAFB    < from: CT Abdomen and Pelvis w/ IV Cont (02.22.23 @ 17:34) >  IMPRESSION:    Increasing proximal SMA narrowing secondary to increasing thrombus or   plaque.    Thickened endometrium for patient age. Recommend pelvic ultrasound and   GYN evaluation.    Trace fat-containing umbilical hernia.    < end of copied text >    < from: TTE with Doppler (w/Cont) (08.15.22 @ 08:22) >  ------------------------------------------------------------------------  CONCLUSIONS:  1. Mildly dilated left atrium.  LA volume index = 38 cc/m2.  2. Mild segmental left ventricular systolic dysfunction.  The mid and apical anteroseptal and apical inferior walls  are hypokinetic.  3. Reversal of the E-A  waves of the mitral inflow pattern  is consistent with diastolic LV dysfunction.  4. Normal right ventricular size and function.  *** Compared with echocardiogram of 8/30/2019, there are  new wall motion abnormalities.  ------------------------------------------------------------------------  Confirmed on  8/15/2022 - 09:50:41 by Camden Olmos M.D.  ------------------------------------------------------------------------    < end of copied text >    < from: Cardiac Catheterization (08.18.22 @ 17:50) >  Procedures Performed   Procedures:               1.    Ultrasound Guided Access     2.    Arterial Access - Right Femoral   3.    Diagnostic Coronary Angiography   4.    Left Heart Cath     Indications:                Cardiomyopathy     Diagnostic Conclusions:   Occluded proximal ramus stent. Patent LAD and diagonal stents.  Mild-moderate atherosclerosis in proximal Cx and mid RCA.    Recommendations:   Aggressive risk factor modification.     < end of copied text >      ASSESSMENT/PLAN: 	83 year old Female with PMH DVT dx in setting of COVID in 2021, remains on Eliquis, DM, CAD s/p PCI to prox LAD/mid LAD/Diag/Ramus, last [facility] 8/2022 with occluded prox Ramus stent, patent LAD and Diag stents, mild to mod atherosclerosis in pLcx and mRCA, managed medically, HFpEF, HTN, and CVA, recently admitted 2/2023 with weakness/ imaging c/w OA and UTI managed with Abx, and was discharged home with home PT.  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  She presents today with complaints of ongoing weakness, lightheadedness, chest pain when walking and back pain limiting her walking.    #chest pain  --admit to tele, Dr Isidro Reaves  --f/u labs  --Check TTE and NST, r/o ischemia  --given hx CAD, cont Lipitor, Coreg and Lisinopril  --not on ASA, on ELIquis    #weakness  --check UA  --PT eval    further reccs pending hospital course

## 2023-03-30 NOTE — ED PROVIDER NOTE - CLINICAL SUMMARY MEDICAL DECISION MAKING FREE TEXT BOX
concern for acs will get trop and ekg,  cards consult.  cxr for pna or ptx or pleural effusion. cbc for anemia, cmp for electrolyte abn or renal failure. bnp for chf exacerbation. ua for uti.

## 2023-03-30 NOTE — H&P ADULT - PROBLEM SELECTOR PLAN 3
-will resume insulin regimen used during prior hospitalization: 15 u Lantus and 5 u tidac admelog  -low dose ISS

## 2023-03-30 NOTE — ED ADULT NURSE NOTE - NSIMPLEMENTINTERV_GEN_ALL_ED
Implemented All Fall with Harm Risk Interventions:  [place] to call system. Call bell, personal items and telephone within reach. Instruct patient to call for assistance. Room bathroom lighting operational. Non-slip footwear when patient is off stretcher. Physically safe environment: no spills, clutter or unnecessary equipment. Stretcher in lowest position, wheels locked, appropriate side rails in place. Provide visual cue, wrist band, yellow gown, etc. Monitor gait and stability. Monitor for mental status changes and reorient to person, place, and time. Review medications for side effects contributing to fall risk. Reinforce activity limits and safety measures with patient and family. Provide visual clues: red socks.

## 2023-03-30 NOTE — H&P ADULT - TIME BILLING
review of laboratory data, radiology results, consultants' recommendations, documentation in [place], discussion with patient/house staff and interdisciplinary staff (such as , social workers, etc). Interventions were performed as documented above.

## 2023-03-30 NOTE — H&P ADULT - HISTORY OF PRESENT ILLNESS
83 y.o. F with PMH of HTN, HFpEF, CVA, CAD with stents who presents with intermittent CP x 1 month. CP worsens with exertion and resolves with rest. Pain radiates to the R shoulder. Symptoms are accompanied by SOB and lightheadedness. Endorses chronic orthopnea requiring 4 pillows. Also complains that she has pain throughout her body. No fevers, chills, N/V/D, cough,  dysuria, hematuria, or rashes. No longer taking ASA since prior admission.     In the ED, troponin was found to be normal at 19.  83 y.o. F with PMH of HTN, HFpEF, CVA, CAD with stents who presents with intermittent CP x 1 month. CP worsens with exertion and resolves with rest. Pain radiates to the R shoulder. Symptoms are accompanied by SOB and lightheadedness. Endorses chronic orthopnea requiring 4 pillows. Also complains that she has pain throughout her body. No fevers, chills, N/V/D, cough,  dysuria, hematuria, or rashes. No longer taking ASA since prior admission. Of note, recently admitted 2/2023 with weakness/ imaging c/w OA and UTI managed with Abx, and was discharged home with home PT.  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     In the ED, troponin was found to be normal at 19.

## 2023-03-30 NOTE — H&P ADULT - NSHPLABSRESULTS_GEN_ALL_CORE
.                        10.9   6.92  )-----------( 219      ( 30 Mar 2023 13:00 )             36.7     Hgb Trend: 10.9<--  03-30    138  |  103  |  13  ----------------------------<  173<H>  5.2   |  23  |  1.03    Ca    9.6      30 Mar 2023 13:00    TPro  7.5  /  Alb  4.3  /  TBili  0.6  /  DBili  x   /  AST  22  /  ALT  8   /  AlkPhos  73  03-30    Creatinine Trend: 1.03<--  PT/INR - ( 30 Mar 2023 13:00 )   PT: 16.3 sec;   INR: 1.40 ratio         PTT - ( 30 Mar 2023 13:00 )  PTT:35.5 sec      Imaging reviewed personally.

## 2023-03-30 NOTE — ED ADULT NURSE NOTE - OBJECTIVE STATEMENT
Patient arrives with ems from her MD office for c/o " feeling weak " in her heart.  Patient denies chest pain at this time, NSR on cardiac monitor. Patient arrives with ems from her MD office for c/o " feeling weak " in her heart.  Patient denies chest pain at this time, NSR on cardiac monitor.  IV placed, labs sent . Presently the patient is resting comfortably.

## 2023-03-31 LAB
ANION GAP SERPL CALC-SCNC: 10 MMOL/L — SIGNIFICANT CHANGE UP (ref 7–14)
BUN SERPL-MCNC: 17 MG/DL — SIGNIFICANT CHANGE UP (ref 7–23)
CALCIUM SERPL-MCNC: 8.9 MG/DL — SIGNIFICANT CHANGE UP (ref 8.4–10.5)
CHLORIDE SERPL-SCNC: 104 MMOL/L — SIGNIFICANT CHANGE UP (ref 98–107)
CO2 SERPL-SCNC: 24 MMOL/L — SIGNIFICANT CHANGE UP (ref 22–31)
CREAT SERPL-MCNC: 1.09 MG/DL — SIGNIFICANT CHANGE UP (ref 0.5–1.3)
EGFR: 50 ML/MIN/1.73M2 — LOW
GLUCOSE BLDC GLUCOMTR-MCNC: 120 MG/DL — HIGH (ref 70–99)
GLUCOSE BLDC GLUCOMTR-MCNC: 134 MG/DL — HIGH (ref 70–99)
GLUCOSE BLDC GLUCOMTR-MCNC: 167 MG/DL — HIGH (ref 70–99)
GLUCOSE BLDC GLUCOMTR-MCNC: 181 MG/DL — HIGH (ref 70–99)
GLUCOSE SERPL-MCNC: 200 MG/DL — HIGH (ref 70–99)
HCT VFR BLD CALC: 34.4 % — LOW (ref 34.5–45)
HGB BLD-MCNC: 10.3 G/DL — LOW (ref 11.5–15.5)
MAGNESIUM SERPL-MCNC: 2.1 MG/DL — SIGNIFICANT CHANGE UP (ref 1.6–2.6)
MCHC RBC-ENTMCNC: 26.1 PG — LOW (ref 27–34)
MCHC RBC-ENTMCNC: 29.9 GM/DL — LOW (ref 32–36)
MCV RBC AUTO: 87.3 FL — SIGNIFICANT CHANGE UP (ref 80–100)
NRBC # BLD: 0 /100 WBCS — SIGNIFICANT CHANGE UP (ref 0–0)
NRBC # FLD: 0 K/UL — SIGNIFICANT CHANGE UP (ref 0–0)
PHOSPHATE SERPL-MCNC: 3.7 MG/DL — SIGNIFICANT CHANGE UP (ref 2.5–4.5)
PLATELET # BLD AUTO: 191 K/UL — SIGNIFICANT CHANGE UP (ref 150–400)
POTASSIUM SERPL-MCNC: 4 MMOL/L — SIGNIFICANT CHANGE UP (ref 3.5–5.3)
POTASSIUM SERPL-SCNC: 4 MMOL/L — SIGNIFICANT CHANGE UP (ref 3.5–5.3)
RBC # BLD: 3.94 M/UL — SIGNIFICANT CHANGE UP (ref 3.8–5.2)
RBC # FLD: 13.9 % — SIGNIFICANT CHANGE UP (ref 10.3–14.5)
SODIUM SERPL-SCNC: 138 MMOL/L — SIGNIFICANT CHANGE UP (ref 135–145)
TSH SERPL-MCNC: 2.11 UIU/ML — SIGNIFICANT CHANGE UP (ref 0.27–4.2)
WBC # BLD: 5.67 K/UL — SIGNIFICANT CHANGE UP (ref 3.8–10.5)
WBC # FLD AUTO: 5.67 K/UL — SIGNIFICANT CHANGE UP (ref 3.8–10.5)

## 2023-03-31 RX ORDER — SIMETHICONE 80 MG/1
80 TABLET, CHEWABLE ORAL
Refills: 0 | Status: DISCONTINUED | OUTPATIENT
Start: 2023-03-31 | End: 2023-04-05

## 2023-03-31 RX ADMIN — PANTOPRAZOLE SODIUM 40 MILLIGRAM(S): 20 TABLET, DELAYED RELEASE ORAL at 05:50

## 2023-03-31 RX ADMIN — Medication 5 UNIT(S): at 13:59

## 2023-03-31 RX ADMIN — ATORVASTATIN CALCIUM 80 MILLIGRAM(S): 80 TABLET, FILM COATED ORAL at 22:32

## 2023-03-31 RX ADMIN — Medication 3 MILLIGRAM(S): at 22:34

## 2023-03-31 RX ADMIN — SIMETHICONE 80 MILLIGRAM(S): 80 TABLET, CHEWABLE ORAL at 15:33

## 2023-03-31 RX ADMIN — Medication 1: at 13:58

## 2023-03-31 RX ADMIN — APIXABAN 5 MILLIGRAM(S): 2.5 TABLET, FILM COATED ORAL at 17:53

## 2023-03-31 RX ADMIN — Medication 1: at 08:40

## 2023-03-31 RX ADMIN — Medication 40 MILLIGRAM(S): at 05:50

## 2023-03-31 RX ADMIN — Medication 5 UNIT(S): at 08:40

## 2023-03-31 RX ADMIN — LISINOPRIL 2.5 MILLIGRAM(S): 2.5 TABLET ORAL at 05:50

## 2023-03-31 RX ADMIN — CARVEDILOL PHOSPHATE 3.12 MILLIGRAM(S): 80 CAPSULE, EXTENDED RELEASE ORAL at 05:50

## 2023-03-31 RX ADMIN — INSULIN GLARGINE 15 UNIT(S): 100 INJECTION, SOLUTION SUBCUTANEOUS at 22:31

## 2023-03-31 RX ADMIN — CARVEDILOL PHOSPHATE 3.12 MILLIGRAM(S): 80 CAPSULE, EXTENDED RELEASE ORAL at 17:53

## 2023-03-31 RX ADMIN — APIXABAN 5 MILLIGRAM(S): 2.5 TABLET, FILM COATED ORAL at 05:50

## 2023-03-31 RX ADMIN — Medication 5 UNIT(S): at 17:54

## 2023-03-31 NOTE — PROGRESS NOTE ADULT - SUBJECTIVE AND OBJECTIVE BOX
Date of service 03/31/2023    chief complaint: Chets Pain/Back PAin    extended hpi: 83 year old Female with PMH DM, CAD s/p PCI to prox LAD/mid LAD/Diag/Ramus, last [facility] 8/2022 with occluded prox Ramus stent, patent LAD and Diag stents, mild to mod atherosclerosis in pLcx and mRCA, managed medically, HFpEF, HTN, and CVA, recently admitted 2/2023 with weakness/ imaging c/w OA and UTI managed with Abx, and was discharged home with home PT.  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  She presents today with complaints of ongoing weakness, lightheadedness, chest pain when walking and back pain limiting her walking. She denies palpitations or syncope.          DATE OF SERVICE: 03-31-23    Patient denies chest pain or shortness of breath.   Review of symptoms otherwise negative.    MEDICATIONS:  acetaminophen     Tablet .. 650 milliGRAM(s) Oral every 6 hours PRN  aluminum hydroxide/magnesium hydroxide/simethicone Suspension 30 milliLiter(s) Oral every 4 hours PRN  apixaban 5 milliGRAM(s) Oral every 12 hours  atorvastatin 80 milliGRAM(s) Oral at bedtime  carvedilol 3.125 milliGRAM(s) Oral every 12 hours  dextrose 5%. 1000 milliLiter(s) IV Continuous <Continuous>  dextrose 5%. 1000 milliLiter(s) IV Continuous <Continuous>  dextrose 50% Injectable 25 Gram(s) IV Push once  dextrose 50% Injectable 12.5 Gram(s) IV Push once  dextrose 50% Injectable 25 Gram(s) IV Push once  dextrose Oral Gel 15 Gram(s) Oral once PRN  furosemide    Tablet 40 milliGRAM(s) Oral daily  glucagon  Injectable 1 milliGRAM(s) IntraMuscular once  insulin glargine Injectable (LANTUS) 15 Unit(s) SubCutaneous at bedtime  insulin lispro (ADMELOG) corrective regimen sliding scale   SubCutaneous three times a day before meals  insulin lispro (ADMELOG) corrective regimen sliding scale   SubCutaneous at bedtime  insulin lispro Injectable (ADMELOG) 5 Unit(s) SubCutaneous three times a day before meals  lisinopril 2.5 milliGRAM(s) Oral daily  melatonin 3 milliGRAM(s) Oral at bedtime PRN  ondansetron Injectable 4 milliGRAM(s) IV Push every 8 hours PRN  pantoprazole    Tablet 40 milliGRAM(s) Oral before breakfast  simethicone 80 milliGRAM(s) Chew two times a day PRN      LABS:                        10.3   5.67  )-----------( 191      ( 31 Mar 2023 06:00 )             34.4       Hemoglobin: 10.3 g/dL (03-31 @ 06:00)  Hemoglobin: 10.9 g/dL (03-30 @ 13:00)      03-31    138  |  104  |  17  ----------------------------<  200<H>  4.0   |  24  |  1.09    Ca    8.9      31 Mar 2023 06:00  Phos  3.7     03-31  Mg     2.10     03-31    TPro  7.5  /  Alb  4.3  /  TBili  0.6  /  DBili  x   /  AST  22  /  ALT  8   /  AlkPhos  73  03-30    Creatinine Trend: 1.09<--, 1.03<--    COAGS:     CARDIAC MARKERS ( 30 Mar 2023 13:00 )  x     / x     / 127 U/L / x     / 2.1 ng/mL        PHYSICAL EXAM:  T(C): 36.4 (03-31-23 @ 05:48), Max: 36.8 (03-30-23 @ 22:25)  HR: 67 (03-31-23 @ 05:48) (67 - 72)  BP: 152/94 (03-31-23 @ 05:48) (151/87 - 159/80)  RR: 18 (03-31-23 @ 05:48) (15 - 18)  SpO2: 100% (03-31-23 @ 05:48) (95% - 100%)  Wt(kg): --    I&O's Summary    30 Mar 2023 07:01  -  31 Mar 2023 07:00  --------------------------------------------------------  IN: 0 mL / OUT: 400 mL / NET: -400 mL    31 Mar 2023 07:01  -  31 Mar 2023 12:58  --------------------------------------------------------  IN: 486 mL / OUT: 1300 mL / NET: -814 mL          General: Well nourished in no acute distress. Alert and Oriented * 3.   Head: Normocephalic and atraumatic.   Neck: No JVD. No bruits. Supple. Does not appear to be enlarged.   Cardiovascular: + S1,S2 ; RRR Soft systolic murmur at the left lower sternal border. No rubs noted.    Lungs: CTA b/l. No rhonchi, rales or wheezes.   Abdomen: + BS, soft. Non tender. Non distended. No rebound. No guarding.   Extremities: No clubbing/cyanosis/edema.   Neurologic: Moves all four extremities. Full range of motion.   Skin: Warm and moist. The patient's skin has normal elasticity and good skin turgor.   Psychiatric: Appropriate mood and affect.  Musculoskeletal: Normal range of motion, normal strength      CG:  	from office today SR LAFB    < from: CT Abdomen and Pelvis w/ IV Cont (02.22.23 @ 17:34) >  IMPRESSION:    Increasing proximal SMA narrowing secondary to increasing thrombus or   plaque.    Thickened endometrium for patient age. Recommend pelvic ultrasound and   GYN evaluation.    Trace fat-containing umbilical hernia.    < end of copied text >    < from: TTE with Doppler (w/Cont) (08.15.22 @ 08:22) >  ------------------------------------------------------------------------  CONCLUSIONS:  1. Mildly dilated left atrium.  LA volume index = 38 cc/m2.  2. Mild segmental left ventricular systolic dysfunction.  The mid and apical anteroseptal and apical inferior walls  are hypokinetic.  3. Reversal of the E-A  waves of the mitral inflow pattern  is consistent with diastolic LV dysfunction.  4. Normal right ventricular size and function.  *** Compared with echocardiogram of 8/30/2019, there are  new wall motion abnormalities.  ------------------------------------------------------------------------  Confirmed on  8/15/2022 - 09:50:41 by Camden Olmos M.D.  ------------------------------------------------------------------------    < end of copied text >    < from: Cardiac Catheterization (08.18.22 @ 17:50) >  Procedures Performed   Procedures:               1.    Ultrasound Guided Access     2.    Arterial Access - Right Femoral   3.    Diagnostic Coronary Angiography   4.    Left Heart Cath     Indications:                Cardiomyopathy     Diagnostic Conclusions:   Occluded proximal ramus stent. Patent LAD and diagonal stents.  Mild-moderate atherosclerosis in proximal Cx and mid RCA.    Recommendations:   Aggressive risk factor modification.     < end of copied text >      ASSESSMENT/PLAN: 	83 year old Female with PMH DVT dx in setting of COVID in 2021, remains on Eliquis, DM, CAD s/p PCI to prox LAD/mid LAD/Diag/Ramus, last [facility] 8/2022 with occluded prox Ramus stent, patent LAD and Diag stents, mild to mod atherosclerosis in pLcx and mRCA, managed medically, HFpEF, HTN, and CVA, recently admitted 2/2023 with weakness/ imaging c/w OA and UTI managed with Abx, and was discharged home with home PT.  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  She presents today with complaints of ongoing weakness, lightheadedness, chest pain when walking and back pain limiting her walking.    #chest pain  --Check TTE and NST, r/o ischemia  --given hx CAD, cont Lipitor, Coreg and Lisinopril  --not on ASA, on ELIquis        Isidro Reaves MD  Pager: [phone]

## 2023-03-31 NOTE — PATIENT PROFILE ADULT - FALL HARM RISK - HARM RISK INTERVENTIONS

## 2023-03-31 NOTE — PROGRESS NOTE ADULT - SUBJECTIVE AND OBJECTIVE BOX
Patient is a 83y old  Female who presents with a chief complaint of Chest pain (31 Mar 2023 12:57)    Date of servie : 03-31-23 @ 19:11  INTERVAL HPI/OVERNIGHT EVENTS:  T(C): 36.8 (03-31-23 @ 17:50), Max: 36.8 (03-30-23 @ 22:25)  HR: 67 (03-31-23 @ 17:50) (67 - 72)  BP: 159/98 (03-31-23 @ 17:50) (151/87 - 159/98)  RR: 18 (03-31-23 @ 17:50) (15 - 18)  SpO2: 97% (03-31-23 @ 17:50) (95% - 100%)  Wt(kg): --  I&O's Summary    30 Mar 2023 07:01  -  31 Mar 2023 07:00  --------------------------------------------------------  IN: 0 mL / OUT: 400 mL / NET: -400 mL    31 Mar 2023 07:01  -  31 Mar 2023 19:11  --------------------------------------------------------  IN: 486 mL / OUT: 1300 mL / NET: -814 mL        LABS:                        10.3   5.67  )-----------( 191      ( 31 Mar 2023 06:00 )             34.4     03-31    138  |  104  |  17  ----------------------------<  200<H>  4.0   |  24  |  1.09    Ca    8.9      31 Mar 2023 06:00  Phos  3.7     03-31  Mg     2.10     03-31    TPro  7.5  /  Alb  4.3  /  TBili  0.6  /  DBili  x   /  AST  22  /  ALT  8   /  AlkPhos  73  03-30    PT/INR - ( 30 Mar 2023 13:00 )   PT: 16.3 sec;   INR: 1.40 ratio         PTT - ( 30 Mar 2023 13:00 )  PTT:35.5 sec    CAPILLARY BLOOD GLUCOSE      POCT Blood Glucose.: 120 mg/dL (31 Mar 2023 17:51)  POCT Blood Glucose.: 167 mg/dL (31 Mar 2023 13:55)  POCT Blood Glucose.: 181 mg/dL (31 Mar 2023 08:38)  POCT Blood Glucose.: 117 mg/dL (30 Mar 2023 22:10)            MEDICATIONS  (STANDING):  apixaban 5 milliGRAM(s) Oral every 12 hours  atorvastatin 80 milliGRAM(s) Oral at bedtime  carvedilol 3.125 milliGRAM(s) Oral every 12 hours  dextrose 5%. 1000 milliLiter(s) (100 mL/Hr) IV Continuous <Continuous>  dextrose 5%. 1000 milliLiter(s) (50 mL/Hr) IV Continuous <Continuous>  dextrose 50% Injectable 25 Gram(s) IV Push once  dextrose 50% Injectable 12.5 Gram(s) IV Push once  dextrose 50% Injectable 25 Gram(s) IV Push once  furosemide    Tablet 40 milliGRAM(s) Oral daily  glucagon  Injectable 1 milliGRAM(s) IntraMuscular once  insulin glargine Injectable (LANTUS) 15 Unit(s) SubCutaneous at bedtime  insulin lispro (ADMELOG) corrective regimen sliding scale   SubCutaneous three times a day before meals  insulin lispro (ADMELOG) corrective regimen sliding scale   SubCutaneous at bedtime  insulin lispro Injectable (ADMELOG) 5 Unit(s) SubCutaneous three times a day before meals  lisinopril 2.5 milliGRAM(s) Oral daily  pantoprazole    Tablet 40 milliGRAM(s) Oral before breakfast    MEDICATIONS  (PRN):  acetaminophen     Tablet .. 650 milliGRAM(s) Oral every 6 hours PRN Temp greater or equal to 38C (100.4F), Mild Pain (1 - 3)  aluminum hydroxide/magnesium hydroxide/simethicone Suspension 30 milliLiter(s) Oral every 4 hours PRN Dyspepsia  dextrose Oral Gel 15 Gram(s) Oral once PRN Blood Glucose LESS THAN 70 milliGRAM(s)/deciliter  melatonin 3 milliGRAM(s) Oral at bedtime PRN Insomnia  ondansetron Injectable 4 milliGRAM(s) IV Push every 8 hours PRN Nausea and/or Vomiting  simethicone 80 milliGRAM(s) Chew two times a day PRN Gas          PHYSICAL EXAM:  GENERAL: NAD, well-groomed, well-developed  HEAD:  Atraumatic, Normocephalic  CHEST/LUNG: Clear to percussion bilaterally; No rales, rhonchi, wheezing, or rubs  HEART: Regular rate and rhythm; No murmurs, rubs, or gallops  ABDOMEN: Soft, Nontender, Nondistended; Bowel sounds present  EXTREMITIES:  2+ Peripheral Pulses, No clubbing, cyanosis, or edema  LYMPH: No lymphadenopathy noted  SKIN: No rashes or lesions    Care Discussed with Consultants/Other Providers [ ] YES  [ ] NO

## 2023-04-01 LAB
ANION GAP SERPL CALC-SCNC: 13 MMOL/L — SIGNIFICANT CHANGE UP (ref 7–14)
BUN SERPL-MCNC: 17 MG/DL — SIGNIFICANT CHANGE UP (ref 7–23)
CALCIUM SERPL-MCNC: 9.4 MG/DL — SIGNIFICANT CHANGE UP (ref 8.4–10.5)
CHLORIDE SERPL-SCNC: 101 MMOL/L — SIGNIFICANT CHANGE UP (ref 98–107)
CO2 SERPL-SCNC: 22 MMOL/L — SIGNIFICANT CHANGE UP (ref 22–31)
CREAT SERPL-MCNC: 1.13 MG/DL — SIGNIFICANT CHANGE UP (ref 0.5–1.3)
EGFR: 48 ML/MIN/1.73M2 — LOW
GLUCOSE BLDC GLUCOMTR-MCNC: 120 MG/DL — HIGH (ref 70–99)
GLUCOSE BLDC GLUCOMTR-MCNC: 140 MG/DL — HIGH (ref 70–99)
GLUCOSE BLDC GLUCOMTR-MCNC: 165 MG/DL — HIGH (ref 70–99)
GLUCOSE BLDC GLUCOMTR-MCNC: 203 MG/DL — HIGH (ref 70–99)
GLUCOSE SERPL-MCNC: 147 MG/DL — HIGH (ref 70–99)
HCT VFR BLD CALC: 37.7 % — SIGNIFICANT CHANGE UP (ref 34.5–45)
HGB BLD-MCNC: 11.5 G/DL — SIGNIFICANT CHANGE UP (ref 11.5–15.5)
MAGNESIUM SERPL-MCNC: 2.1 MG/DL — SIGNIFICANT CHANGE UP (ref 1.6–2.6)
MCHC RBC-ENTMCNC: 26.1 PG — LOW (ref 27–34)
MCHC RBC-ENTMCNC: 30.5 GM/DL — LOW (ref 32–36)
MCV RBC AUTO: 85.7 FL — SIGNIFICANT CHANGE UP (ref 80–100)
NRBC # BLD: 0 /100 WBCS — SIGNIFICANT CHANGE UP (ref 0–0)
NRBC # FLD: 0 K/UL — SIGNIFICANT CHANGE UP (ref 0–0)
PHOSPHATE SERPL-MCNC: 4.1 MG/DL — SIGNIFICANT CHANGE UP (ref 2.5–4.5)
PLATELET # BLD AUTO: 219 K/UL — SIGNIFICANT CHANGE UP (ref 150–400)
POTASSIUM SERPL-MCNC: 4.2 MMOL/L — SIGNIFICANT CHANGE UP (ref 3.5–5.3)
POTASSIUM SERPL-SCNC: 4.2 MMOL/L — SIGNIFICANT CHANGE UP (ref 3.5–5.3)
RBC # BLD: 4.4 M/UL — SIGNIFICANT CHANGE UP (ref 3.8–5.2)
RBC # FLD: 13.8 % — SIGNIFICANT CHANGE UP (ref 10.3–14.5)
SODIUM SERPL-SCNC: 136 MMOL/L — SIGNIFICANT CHANGE UP (ref 135–145)
WBC # BLD: 6.76 K/UL — SIGNIFICANT CHANGE UP (ref 3.8–10.5)
WBC # FLD AUTO: 6.76 K/UL — SIGNIFICANT CHANGE UP (ref 3.8–10.5)

## 2023-04-01 RX ADMIN — INSULIN GLARGINE 15 UNIT(S): 100 INJECTION, SOLUTION SUBCUTANEOUS at 22:16

## 2023-04-01 RX ADMIN — CARVEDILOL PHOSPHATE 3.12 MILLIGRAM(S): 80 CAPSULE, EXTENDED RELEASE ORAL at 06:09

## 2023-04-01 RX ADMIN — CARVEDILOL PHOSPHATE 3.12 MILLIGRAM(S): 80 CAPSULE, EXTENDED RELEASE ORAL at 17:56

## 2023-04-01 RX ADMIN — Medication 5 UNIT(S): at 17:59

## 2023-04-01 RX ADMIN — Medication 40 MILLIGRAM(S): at 06:09

## 2023-04-01 RX ADMIN — Medication 5 UNIT(S): at 12:57

## 2023-04-01 RX ADMIN — LISINOPRIL 2.5 MILLIGRAM(S): 2.5 TABLET ORAL at 06:09

## 2023-04-01 RX ADMIN — APIXABAN 5 MILLIGRAM(S): 2.5 TABLET, FILM COATED ORAL at 06:09

## 2023-04-01 RX ADMIN — ATORVASTATIN CALCIUM 80 MILLIGRAM(S): 80 TABLET, FILM COATED ORAL at 22:16

## 2023-04-01 RX ADMIN — Medication 1: at 12:56

## 2023-04-01 RX ADMIN — APIXABAN 5 MILLIGRAM(S): 2.5 TABLET, FILM COATED ORAL at 17:55

## 2023-04-01 RX ADMIN — PANTOPRAZOLE SODIUM 40 MILLIGRAM(S): 20 TABLET, DELAYED RELEASE ORAL at 06:12

## 2023-04-01 RX ADMIN — Medication 5 UNIT(S): at 08:38

## 2023-04-01 RX ADMIN — Medication 2: at 08:39

## 2023-04-01 NOTE — PROGRESS NOTE ADULT - SUBJECTIVE AND OBJECTIVE BOX
Date of service 04/1/2023    chief complaint: Chets Pain/Back PAin    extended hpi: 83 year old Female with PMH DM, CAD s/p PCI to prox LAD/mid LAD/Diag/Ramus, last [facility] 8/2022 with occluded prox Ramus stent, patent LAD and Diag stents, mild to mod atherosclerosis in pLcx and mRCA, managed medically, HFpEF, HTN, and CVA, recently admitted 2/2023 with weakness/ imaging c/w OA and UTI managed with Abx, and was discharged home with home PT.  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  She presents today with complaints of ongoing weakness, lightheadedness, chest pain when walking and back pain limiting her walking. She denies palpitations or syncope.          acetaminophen     Tablet .. 650 milliGRAM(s) Oral every 6 hours PRN  aluminum hydroxide/magnesium hydroxide/simethicone Suspension 30 milliLiter(s) Oral every 4 hours PRN  apixaban 5 milliGRAM(s) Oral every 12 hours  atorvastatin 80 milliGRAM(s) Oral at bedtime  carvedilol 3.125 milliGRAM(s) Oral every 12 hours  dextrose 5%. 1000 milliLiter(s) IV Continuous <Continuous>  dextrose 5%. 1000 milliLiter(s) IV Continuous <Continuous>  dextrose 50% Injectable 25 Gram(s) IV Push once  dextrose 50% Injectable 12.5 Gram(s) IV Push once  dextrose 50% Injectable 25 Gram(s) IV Push once  dextrose Oral Gel 15 Gram(s) Oral once PRN  furosemide    Tablet 40 milliGRAM(s) Oral daily  glucagon  Injectable 1 milliGRAM(s) IntraMuscular once  insulin glargine Injectable (LANTUS) 15 Unit(s) SubCutaneous at bedtime  insulin lispro (ADMELOG) corrective regimen sliding scale   SubCutaneous three times a day before meals  insulin lispro (ADMELOG) corrective regimen sliding scale   SubCutaneous at bedtime  insulin lispro Injectable (ADMELOG) 5 Unit(s) SubCutaneous three times a day before meals  lisinopril 2.5 milliGRAM(s) Oral daily  melatonin 3 milliGRAM(s) Oral at bedtime PRN  ondansetron Injectable 4 milliGRAM(s) IV Push every 8 hours PRN  pantoprazole    Tablet 40 milliGRAM(s) Oral before breakfast  simethicone 80 milliGRAM(s) Chew two times a day PRN                            11.5   6.76  )-----------( 219      ( 01 Apr 2023 04:27 )             37.7       Hemoglobin: 11.5 g/dL (04-01 @ 04:27)  Hemoglobin: 10.3 g/dL (03-31 @ 06:00)  Hemoglobin: 10.9 g/dL (03-30 @ 13:00)      04-01    136  |  101  |  17  ----------------------------<  147<H>  4.2   |  22  |  1.13    Ca    9.4      01 Apr 2023 04:27  Phos  4.1     04-01  Mg     2.10     04-01    TPro  7.5  /  Alb  4.3  /  TBili  0.6  /  DBili  x   /  AST  22  /  ALT  8   /  AlkPhos  73  03-30    Creatinine Trend: 1.13<--, 1.09<--, 1.03<--    COAGS:     CARDIAC MARKERS ( 30 Mar 2023 13:00 )  x     / x     / 127 U/L / x     / 2.1 ng/mL        T(C): 37.1 (04-01-23 @ 06:02), Max: 37.1 (04-01-23 @ 06:02)  HR: 65 (04-01-23 @ 07:30) (62 - 70)  BP: 136/83 (04-01-23 @ 06:02) (136/83 - 159/98)  RR: 18 (04-01-23 @ 06:02) (16 - 18)  SpO2: 100% (04-01-23 @ 06:02) (97% - 100%)  Wt(kg): --    I&O's Summary    31 Mar 2023 07:01  -  01 Apr 2023 07:00  --------------------------------------------------------  IN: 606 mL / OUT: 1300 mL / NET: -694 mL      General: Well nourished in no acute distress. Alert and Oriented * 3.   Head: Normocephalic and atraumatic.   Neck: No JVD. No bruits. Supple. Does not appear to be enlarged.   Cardiovascular: + S1,S2 ; RRR Soft systolic murmur at the left lower sternal border. No rubs noted.    Lungs: CTA b/l. No rhonchi, rales or wheezes.   Abdomen: + BS, soft. Non tender. Non distended. No rebound. No guarding.   Extremities: No clubbing/cyanosis/edema.   Neurologic: Moves all four extremities. Full range of motion.   Skin: Warm and moist. The patient's skin has normal elasticity and good skin turgor.   Psychiatric: Appropriate mood and affect.  Musculoskeletal: Normal range of motion, normal strength      CG:  	from office today SR LAFREYNA    < from: CT Abdomen and Pelvis w/ IV Cont (02.22.23 @ 17:34) >  IMPRESSION:    Increasing proximal SMA narrowing secondary to increasing thrombus or   plaque.    Thickened endometrium for patient age. Recommend pelvic ultrasound and   GYN evaluation.    Trace fat-containing umbilical hernia.    < end of copied text >    < from: TTE with Doppler (w/Cont) (08.15.22 @ 08:22) >  ------------------------------------------------------------------------  CONCLUSIONS:  1. Mildly dilated left atrium.  LA volume index = 38 cc/m2.  2. Mild segmental left ventricular systolic dysfunction.  The mid and apical anteroseptal and apical inferior walls  are hypokinetic.  3. Reversal of the E-A  waves of the mitral inflow pattern  is consistent with diastolic LV dysfunction.  4. Normal right ventricular size and function.  *** Compared with echocardiogram of 8/30/2019, there are  new wall motion abnormalities.  ------------------------------------------------------------------------  Confirmed on  8/15/2022 - 09:50:41 by Camden Olmos M.D.  ------------------------------------------------------------------------    < end of copied text >    < from: Cardiac Catheterization (08.18.22 @ 17:50) >  Procedures Performed   Procedures:               1.    Ultrasound Guided Access     2.    Arterial Access - Right Femoral   3.    Diagnostic Coronary Angiography   4.    Left Heart Cath     Indications:                Cardiomyopathy     Diagnostic Conclusions:   Occluded proximal ramus stent. Patent LAD and diagonal stents.  Mild-moderate atherosclerosis in proximal Cx and mid RCA.    Recommendations:   Aggressive risk factor modification.     < end of copied text >    ECHO: ec< from: Transthoracic Echocardiogram (03.30.23 @ 16:25) >  CONCLUSIONS:  1. Normal trileaflet aortic valve.  2. Normal left ventricular internal dimensions and wall  thicknesses.  3. Moderate segmental left ventricular systolic  dysfunction. Lateral and inferolateral wall hypokinesis.  4. Normal right ventricular size and function.  ------------------------------------------------------------------------  Confirmed on  3/31/2023 - 15:49:07 by Raciel Bucio M.D. [facility]  ------------------------------------------------------------------------    < end of copied text >    ASSESSMENT/PLAN: 	83 year old Female with PMH DVT dx in setting of COVID in 2021, remains on Eliquis, DM, CAD s/p PCI to prox LAD/mid LAD/Diag/Ramus, last [facility] 8/2022 with occluded prox Ramus stent, patent LAD and Diag stents, mild to mod atherosclerosis in pLcx and mRCA, managed medically, HFpEF, HTN, and CVA, recently admitted 2/2023 with weakness/ imaging c/w OA and UTI managed with Abx, and was discharged home with home PT.  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  She presents today with complaints of ongoing weakness, lightheadedness, chest pain when walking and back pain limiting her walking.    #chest pain  -- NST, r/o ischemia  -- ECHO noted   --given hx CAD, cont Lipitor, Coreg and Lisinopril  --not on ASA, on ELIquis

## 2023-04-02 LAB
ANION GAP SERPL CALC-SCNC: 11 MMOL/L — SIGNIFICANT CHANGE UP (ref 7–14)
BUN SERPL-MCNC: 18 MG/DL — SIGNIFICANT CHANGE UP (ref 7–23)
CALCIUM SERPL-MCNC: 9.8 MG/DL — SIGNIFICANT CHANGE UP (ref 8.4–10.5)
CHLORIDE SERPL-SCNC: 98 MMOL/L — SIGNIFICANT CHANGE UP (ref 98–107)
CO2 SERPL-SCNC: 26 MMOL/L — SIGNIFICANT CHANGE UP (ref 22–31)
CREAT SERPL-MCNC: 1.24 MG/DL — SIGNIFICANT CHANGE UP (ref 0.5–1.3)
EGFR: 43 ML/MIN/1.73M2 — LOW
GLUCOSE BLDC GLUCOMTR-MCNC: 115 MG/DL — HIGH (ref 70–99)
GLUCOSE BLDC GLUCOMTR-MCNC: 124 MG/DL — HIGH (ref 70–99)
GLUCOSE BLDC GLUCOMTR-MCNC: 126 MG/DL — HIGH (ref 70–99)
GLUCOSE BLDC GLUCOMTR-MCNC: 141 MG/DL — HIGH (ref 70–99)
GLUCOSE BLDC GLUCOMTR-MCNC: 167 MG/DL — HIGH (ref 70–99)
GLUCOSE SERPL-MCNC: 126 MG/DL — HIGH (ref 70–99)
MAGNESIUM SERPL-MCNC: 2.3 MG/DL — SIGNIFICANT CHANGE UP (ref 1.6–2.6)
PHOSPHATE SERPL-MCNC: 4 MG/DL — SIGNIFICANT CHANGE UP (ref 2.5–4.5)
POTASSIUM SERPL-MCNC: 4.2 MMOL/L — SIGNIFICANT CHANGE UP (ref 3.5–5.3)
POTASSIUM SERPL-SCNC: 4.2 MMOL/L — SIGNIFICANT CHANGE UP (ref 3.5–5.3)
SODIUM SERPL-SCNC: 135 MMOL/L — SIGNIFICANT CHANGE UP (ref 135–145)

## 2023-04-02 RX ORDER — INSULIN GLARGINE 100 [IU]/ML
14 INJECTION, SOLUTION SUBCUTANEOUS AT BEDTIME
Refills: 0 | Status: DISCONTINUED | OUTPATIENT
Start: 2023-04-02 | End: 2023-04-05

## 2023-04-02 RX ORDER — INSULIN GLARGINE 100 [IU]/ML
11 INJECTION, SOLUTION SUBCUTANEOUS AT BEDTIME
Refills: 0 | Status: DISCONTINUED | OUTPATIENT
Start: 2023-04-02 | End: 2023-04-02

## 2023-04-02 RX ADMIN — Medication 40 MILLIGRAM(S): at 05:45

## 2023-04-02 RX ADMIN — LISINOPRIL 2.5 MILLIGRAM(S): 2.5 TABLET ORAL at 05:45

## 2023-04-02 RX ADMIN — APIXABAN 5 MILLIGRAM(S): 2.5 TABLET, FILM COATED ORAL at 17:53

## 2023-04-02 RX ADMIN — Medication 5 UNIT(S): at 12:50

## 2023-04-02 RX ADMIN — CARVEDILOL PHOSPHATE 3.12 MILLIGRAM(S): 80 CAPSULE, EXTENDED RELEASE ORAL at 17:53

## 2023-04-02 RX ADMIN — ATORVASTATIN CALCIUM 80 MILLIGRAM(S): 80 TABLET, FILM COATED ORAL at 21:44

## 2023-04-02 RX ADMIN — Medication 5 UNIT(S): at 08:23

## 2023-04-02 RX ADMIN — INSULIN GLARGINE 14 UNIT(S): 100 INJECTION, SOLUTION SUBCUTANEOUS at 21:43

## 2023-04-02 RX ADMIN — CARVEDILOL PHOSPHATE 3.12 MILLIGRAM(S): 80 CAPSULE, EXTENDED RELEASE ORAL at 05:46

## 2023-04-02 RX ADMIN — PANTOPRAZOLE SODIUM 40 MILLIGRAM(S): 20 TABLET, DELAYED RELEASE ORAL at 05:45

## 2023-04-02 RX ADMIN — APIXABAN 5 MILLIGRAM(S): 2.5 TABLET, FILM COATED ORAL at 05:45

## 2023-04-02 RX ADMIN — Medication 5 UNIT(S): at 18:33

## 2023-04-02 NOTE — PROGRESS NOTE ADULT - SUBJECTIVE AND OBJECTIVE BOX
Patient is a 83y old  Female who presents with a chief complaint of Chest pain (02 Apr 2023 09:57)    Date of servie : 04-02-23 @ 16:33  INTERVAL HPI/OVERNIGHT EVENTS:  T(C): 36.7 (04-02-23 @ 11:45), Max: 37.2 (04-01-23 @ 22:14)  HR: 64 (04-02-23 @ 11:45) (64 - 67)  BP: 135/71 (04-02-23 @ 11:45) (129/70 - 159/90)  RR: 18 (04-02-23 @ 11:45) (18 - 18)  SpO2: 100% (04-02-23 @ 11:45) (98% - 100%)  Wt(kg): --  I&O's Summary    01 Apr 2023 07:01  -  02 Apr 2023 07:00  --------------------------------------------------------  IN: 400 mL / OUT: 0 mL / NET: 400 mL    02 Apr 2023 07:01  -  02 Apr 2023 16:33  --------------------------------------------------------  IN: 207 mL / OUT: 0 mL / NET: 207 mL        LABS:                        11.5   6.76  )-----------( 219      ( 01 Apr 2023 04:27 )             37.7     04-02    135  |  98  |  18  ----------------------------<  126<H>  4.2   |  26  |  1.24    Ca    9.8      02 Apr 2023 12:45  Phos  4.0     04-02  Mg     2.30     04-02          CAPILLARY BLOOD GLUCOSE      POCT Blood Glucose.: 124 mg/dL (02 Apr 2023 12:49)  POCT Blood Glucose.: 167 mg/dL (02 Apr 2023 11:46)  POCT Blood Glucose.: 141 mg/dL (02 Apr 2023 07:30)  POCT Blood Glucose.: 140 mg/dL (01 Apr 2023 21:44)  POCT Blood Glucose.: 120 mg/dL (01 Apr 2023 17:52)            MEDICATIONS  (STANDING):  apixaban 5 milliGRAM(s) Oral every 12 hours  atorvastatin 80 milliGRAM(s) Oral at bedtime  carvedilol 3.125 milliGRAM(s) Oral every 12 hours  dextrose 5%. 1000 milliLiter(s) (50 mL/Hr) IV Continuous <Continuous>  dextrose 5%. 1000 milliLiter(s) (100 mL/Hr) IV Continuous <Continuous>  dextrose 50% Injectable 25 Gram(s) IV Push once  dextrose 50% Injectable 12.5 Gram(s) IV Push once  dextrose 50% Injectable 25 Gram(s) IV Push once  furosemide    Tablet 40 milliGRAM(s) Oral daily  glucagon  Injectable 1 milliGRAM(s) IntraMuscular once  insulin glargine Injectable (LANTUS) 15 Unit(s) SubCutaneous at bedtime  insulin lispro (ADMELOG) corrective regimen sliding scale   SubCutaneous three times a day before meals  insulin lispro (ADMELOG) corrective regimen sliding scale   SubCutaneous at bedtime  insulin lispro Injectable (ADMELOG) 5 Unit(s) SubCutaneous three times a day before meals  lisinopril 2.5 milliGRAM(s) Oral daily  pantoprazole    Tablet 40 milliGRAM(s) Oral before breakfast    MEDICATIONS  (PRN):  acetaminophen     Tablet .. 650 milliGRAM(s) Oral every 6 hours PRN Temp greater or equal to 38C (100.4F), Mild Pain (1 - 3)  aluminum hydroxide/magnesium hydroxide/simethicone Suspension 30 milliLiter(s) Oral every 4 hours PRN Dyspepsia  dextrose Oral Gel 15 Gram(s) Oral once PRN Blood Glucose LESS THAN 70 milliGRAM(s)/deciliter  melatonin 3 milliGRAM(s) Oral at bedtime PRN Insomnia  ondansetron Injectable 4 milliGRAM(s) IV Push every 8 hours PRN Nausea and/or Vomiting  simethicone 80 milliGRAM(s) Chew two times a day PRN Gas          PHYSICAL EXAM:  GENERAL: NAD, well-groomed, well-developed  HEAD:  Atraumatic, Normocephalic  CHEST/LUNG: Clear to percussion bilaterally; No rales, rhonchi, wheezing, or rubs  HEART: Regular rate and rhythm; No murmurs, rubs, or gallops  ABDOMEN: Soft, Nontender, Nondistended; Bowel sounds present  EXTREMITIES:  2+ Peripheral Pulses, No clubbing, cyanosis, or edema  LYMPH: No lymphadenopathy noted  SKIN: No rashes or lesions    Care Discussed with Consultants/Other Providers [ ] YES  [ ] NO

## 2023-04-02 NOTE — PHYSICAL THERAPY INITIAL EVALUATION ADULT - PERTINENT HX OF CURRENT PROBLEM, REHAB EVAL
Patient is 83 year old Female with PMH DM, CAD,, HFpEF, HTN, and CVA, recently admitted 2/2023 with weakness, and was discharged home with home PT.  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  She presents today with complaints of ongoing weakness, lightheadedness, chest pain when walking and back pain limiting her walking.

## 2023-04-02 NOTE — PROGRESS NOTE ADULT - SUBJECTIVE AND OBJECTIVE BOX
Date of service 04/2/2023    chief complaint: Chest Pain/Back PAin    extended hpi: 83 year old Female with PMH DM, CAD s/p PCI to prox LAD/mid LAD/Diag/Ramus, last [facility] 8/2022 with occluded prox Ramus stent, patent LAD and Diag stents, mild to mod atherosclerosis in pLcx and mRCA, managed medically, HFpEF, HTN, and CVA, recently admitted 2/2023 with weakness/ imaging c/w OA and UTI managed with Abx, and was discharged home with home PT.  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  She presents today with complaints of ongoing weakness, lightheadedness, chest pain when walking and back pain limiting her walking. She denies palpitations or syncope.           acetaminophen     Tablet .. 650 milliGRAM(s) Oral every 6 hours PRN  aluminum hydroxide/magnesium hydroxide/simethicone Suspension 30 milliLiter(s) Oral every 4 hours PRN  apixaban 5 milliGRAM(s) Oral every 12 hours  atorvastatin 80 milliGRAM(s) Oral at bedtime  carvedilol 3.125 milliGRAM(s) Oral every 12 hours  dextrose 5%. 1000 milliLiter(s) IV Continuous <Continuous>  dextrose 5%. 1000 milliLiter(s) IV Continuous <Continuous>  dextrose 50% Injectable 25 Gram(s) IV Push once  dextrose 50% Injectable 12.5 Gram(s) IV Push once  dextrose 50% Injectable 25 Gram(s) IV Push once  dextrose Oral Gel 15 Gram(s) Oral once PRN  furosemide    Tablet 40 milliGRAM(s) Oral daily  glucagon  Injectable 1 milliGRAM(s) IntraMuscular once  insulin glargine Injectable (LANTUS) 15 Unit(s) SubCutaneous at bedtime  insulin lispro (ADMELOG) corrective regimen sliding scale   SubCutaneous three times a day before meals  insulin lispro (ADMELOG) corrective regimen sliding scale   SubCutaneous at bedtime  insulin lispro Injectable (ADMELOG) 5 Unit(s) SubCutaneous three times a day before meals  lisinopril 2.5 milliGRAM(s) Oral daily  melatonin 3 milliGRAM(s) Oral at bedtime PRN  ondansetron Injectable 4 milliGRAM(s) IV Push every 8 hours PRN  pantoprazole    Tablet 40 milliGRAM(s) Oral before breakfast  simethicone 80 milliGRAM(s) Chew two times a day PRN                            11.5   6.76  )-----------( 219      ( 01 Apr 2023 04:27 )             37.7       Hemoglobin: 11.5 g/dL (04-01 @ 04:27)  Hemoglobin: 10.3 g/dL (03-31 @ 06:00)  Hemoglobin: 10.9 g/dL (03-30 @ 13:00)      04-01    136  |  101  |  17  ----------------------------<  147<H>  4.2   |  22  |  1.13    Ca    9.4      01 Apr 2023 04:27  Phos  4.1     04-01  Mg     2.10     04-01      Creatinine Trend: 1.13<--, 1.09<--, 1.03<--    COAGS:     CARDIAC MARKERS ( 30 Mar 2023 13:00 )  x     / x     / 127 U/L / x     / 2.1 ng/mL        T(C): 36.5 (04-02-23 @ 05:36), Max: 37.2 (04-01-23 @ 22:14)  HR: 67 (04-02-23 @ 07:30) (64 - 68)  BP: 129/70 (04-02-23 @ 05:36) (129/70 - 159/90)  RR: 18 (04-02-23 @ 05:36) (18 - 18)  SpO2: 100% (04-02-23 @ 05:36) (98% - 100%)  Wt(kg): --    I&O's Summary    01 Apr 2023 07:01  -  02 Apr 2023 07:00  --------------------------------------------------------  IN: 400 mL / OUT: 0 mL / NET: 400 mL        General: Well nourished in no acute distress. Alert and Oriented * 3.   Head: Normocephalic and atraumatic.   Neck: No JVD. No bruits. Supple. Does not appear to be enlarged.   Cardiovascular: + S1,S2 ; RRR Soft systolic murmur at the left lower sternal border. No rubs noted.    Lungs: CTA b/l. No rhonchi, rales or wheezes.   Abdomen: + BS, soft. Non tender. Non distended. No rebound. No guarding.   Extremities: No clubbing/cyanosis/edema.   Neurologic: Moves all four extremities. Full range of motion.   Skin: Warm and moist. The patient's skin has normal elasticity and good skin turgor.   Psychiatric: Appropriate mood and affect.  Musculoskeletal: Normal range of motion, normal strength      CG:  	from office today SR LAFB    < from: CT Abdomen and Pelvis w/ IV Cont (02.22.23 @ 17:34) >  IMPRESSION:    Increasing proximal SMA narrowing secondary to increasing thrombus or   plaque.    Thickened endometrium for patient age. Recommend pelvic ultrasound and   GYN evaluation.    Trace fat-containing umbilical hernia.    < end of copied text >    < from: TTE with Doppler (w/Cont) (08.15.22 @ 08:22) >  ------------------------------------------------------------------------  CONCLUSIONS:  1. Mildly dilated left atrium.  LA volume index = 38 cc/m2.  2. Mild segmental left ventricular systolic dysfunction.  The mid and apical anteroseptal and apical inferior walls  are hypokinetic.  3. Reversal of the E-A  waves of the mitral inflow pattern  is consistent with diastolic LV dysfunction.  4. Normal right ventricular size and function.  *** Compared with echocardiogram of 8/30/2019, there are  new wall motion abnormalities.  ------------------------------------------------------------------------  Confirmed on  8/15/2022 - 09:50:41 by Camden Olmos M.D.  ------------------------------------------------------------------------    < end of copied text >    < from: Cardiac Catheterization (08.18.22 @ 17:50) >  Procedures Performed   Procedures:               1.    Ultrasound Guided Access     2.    Arterial Access - Right Femoral   3.    Diagnostic Coronary Angiography   4.    Left Heart Cath     Indications:                Cardiomyopathy     Diagnostic Conclusions:   Occluded proximal ramus stent. Patent LAD and diagonal stents.  Mild-moderate atherosclerosis in proximal Cx and mid RCA.    Recommendations:   Aggressive risk factor modification.     < end of copied text >    ECHO: ec< from: Transthoracic Echocardiogram (03.30.23 @ 16:25) >  CONCLUSIONS:  1. Normal trileaflet aortic valve.  2. Normal left ventricular internal dimensions and wall  thicknesses.  3. Moderate segmental left ventricular systolic  dysfunction. Lateral and inferolateral wall hypokinesis.  4. Normal right ventricular size and function.  ------------------------------------------------------------------------  Confirmed on  3/31/2023 - 15:49:07 by Raciel Bucio M.D. [facility]PV[facility]  ------------------------------------------------------------------------    < end of copied text >    ASSESSMENT/PLAN: 	83 year old Female with PMH DVT dx in setting of COVID in 2021, remains on Eliquis, DM, CAD s/p PCI to prox LAD/mid LAD/Diag/Ramus, last [facility] 8/2022 with occluded prox Ramus stent, patent LAD and Diag stents, mild to mod atherosclerosis in pLcx and mRCA, managed medically, HFpEF, HTN, and CVA, recently admitted 2/2023 with weakness/ imaging c/w OA and UTI managed with Abx, and was discharged home with home PT.  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  She presents today with complaints of ongoing weakness, lightheadedness, chest pain when walking and back pain limiting her walking.    #chest pain  --  follow up on NST   -- ECHO noted   -- tele stable   --given hx CAD, cont Lipitor, Coreg and Lisinopril  --not on ASA, on ELIquis        Date of service 04/2/2023    chief complaint: Chest Pain/Back PAin    extended hpi: 83 year old Female with PMH DM, CAD s/p PCI to prox LAD/mid LAD/Diag/Ramus, last [facility] 8/2022 with occluded prox Ramus stent, patent LAD and Diag stents, mild to mod atherosclerosis in pLcx and mRCA, managed medically, HFpEF, HTN, and CVA, recently admitted 2/2023 with weakness/ imaging c/w OA and UTI managed with Abx, and was discharged home with home PT.  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  She presents today with complaints of ongoing weakness, lightheadedness, chest pain when walking and back pain limiting her walking. She denies palpitations or syncope.           acetaminophen     Tablet .. 650 milliGRAM(s) Oral every 6 hours PRN  aluminum hydroxide/magnesium hydroxide/simethicone Suspension 30 milliLiter(s) Oral every 4 hours PRN  apixaban 5 milliGRAM(s) Oral every 12 hours  atorvastatin 80 milliGRAM(s) Oral at bedtime  carvedilol 3.125 milliGRAM(s) Oral every 12 hours  dextrose 5%. 1000 milliLiter(s) IV Continuous <Continuous>  dextrose 5%. 1000 milliLiter(s) IV Continuous <Continuous>  dextrose 50% Injectable 25 Gram(s) IV Push once  dextrose 50% Injectable 12.5 Gram(s) IV Push once  dextrose 50% Injectable 25 Gram(s) IV Push once  dextrose Oral Gel 15 Gram(s) Oral once PRN  furosemide    Tablet 40 milliGRAM(s) Oral daily  glucagon  Injectable 1 milliGRAM(s) IntraMuscular once  insulin glargine Injectable (LANTUS) 15 Unit(s) SubCutaneous at bedtime  insulin lispro (ADMELOG) corrective regimen sliding scale   SubCutaneous three times a day before meals  insulin lispro (ADMELOG) corrective regimen sliding scale   SubCutaneous at bedtime  insulin lispro Injectable (ADMELOG) 5 Unit(s) SubCutaneous three times a day before meals  lisinopril 2.5 milliGRAM(s) Oral daily  melatonin 3 milliGRAM(s) Oral at bedtime PRN  ondansetron Injectable 4 milliGRAM(s) IV Push every 8 hours PRN  pantoprazole    Tablet 40 milliGRAM(s) Oral before breakfast  simethicone 80 milliGRAM(s) Chew two times a day PRN                            11.5   6.76  )-----------( 219      ( 01 Apr 2023 04:27 )             37.7       Hemoglobin: 11.5 g/dL (04-01 @ 04:27)  Hemoglobin: 10.3 g/dL (03-31 @ 06:00)  Hemoglobin: 10.9 g/dL (03-30 @ 13:00)      04-01    136  |  101  |  17  ----------------------------<  147<H>  4.2   |  22  |  1.13    Ca    9.4      01 Apr 2023 04:27  Phos  4.1     04-01  Mg     2.10     04-01      Creatinine Trend: 1.13<--, 1.09<--, 1.03<--    COAGS:     CARDIAC MARKERS ( 30 Mar 2023 13:00 )  x     / x     / 127 U/L / x     / 2.1 ng/mL        T(C): 36.5 (04-02-23 @ 05:36), Max: 37.2 (04-01-23 @ 22:14)  HR: 67 (04-02-23 @ 07:30) (64 - 68)  BP: 129/70 (04-02-23 @ 05:36) (129/70 - 159/90)  RR: 18 (04-02-23 @ 05:36) (18 - 18)  SpO2: 100% (04-02-23 @ 05:36) (98% - 100%)  Wt(kg): --    I&O's Summary    01 Apr 2023 07:01  -  02 Apr 2023 07:00  --------------------------------------------------------  IN: 400 mL / OUT: 0 mL / NET: 400 mL        General: Well nourished in no acute distress. Alert and Oriented * 3.   Head: Normocephalic and atraumatic.   Neck: No JVD. No bruits. Supple. Does not appear to be enlarged.   Cardiovascular: + S1,S2 ; RRR Soft systolic murmur at the left lower sternal border. No rubs noted.    Lungs: CTA b/l. No rhonchi, rales or wheezes.   Abdomen: + BS, soft. Non tender. Non distended. No rebound. No guarding.   Extremities: No clubbing/cyanosis/edema.   Neurologic: Moves all four extremities. Full range of motion.   Skin: Warm and moist. The patient's skin has normal elasticity and good skin turgor.   Psychiatric: Appropriate mood and affect.  Musculoskeletal: Normal range of motion, normal strength      CG:  	from office today SR LAFB    < from: CT Abdomen and Pelvis w/ IV Cont (02.22.23 @ 17:34) >  IMPRESSION:    Increasing proximal SMA narrowing secondary to increasing thrombus or   plaque.    Thickened endometrium for patient age. Recommend pelvic ultrasound and   GYN evaluation.    Trace fat-containing umbilical hernia.    < end of copied text >    < from: TTE with Doppler (w/Cont) (08.15.22 @ 08:22) >  ------------------------------------------------------------------------  CONCLUSIONS:  1. Mildly dilated left atrium.  LA volume index = 38 cc/m2.  2. Mild segmental left ventricular systolic dysfunction.  The mid and apical anteroseptal and apical inferior walls  are hypokinetic.  3. Reversal of the E-A  waves of the mitral inflow pattern  is consistent with diastolic LV dysfunction.  4. Normal right ventricular size and function.  *** Compared with echocardiogram of 8/30/2019, there are  new wall motion abnormalities.  ------------------------------------------------------------------------  Confirmed on  8/15/2022 - 09:50:41 by Camden Olmos M.D.  ------------------------------------------------------------------------    < end of copied text >    < from: Cardiac Catheterization (08.18.22 @ 17:50) >  Procedures Performed   Procedures:               1.    Ultrasound Guided Access     2.    Arterial Access - Right Femoral   3.    Diagnostic Coronary Angiography   4.    Left Heart Cath     Indications:                Cardiomyopathy     Diagnostic Conclusions:   Occluded proximal ramus stent. Patent LAD and diagonal stents.  Mild-moderate atherosclerosis in proximal Cx and mid RCA.    Recommendations:   Aggressive risk factor modification.     < end of copied text >    ECHO: ec< from: Transthoracic Echocardiogram (03.30.23 @ 16:25) >  CONCLUSIONS:  1. Normal trileaflet aortic valve.  2. Normal left ventricular internal dimensions and wall  thicknesses.  3. Moderate segmental left ventricular systolic  dysfunction. Lateral and inferolateral wall hypokinesis.  4. Normal right ventricular size and function.  ------------------------------------------------------------------------  Confirmed on  3/31/2023 - 15:49:07 by Raciel Bucio M.D. [facility]PVI  ------------------------------------------------------------------------    < end of copied text >    ASSESSMENT/PLAN: 	83 year old Female with PMH DVT dx in setting of COVID in 2021, remains on Eliquis, DM, CAD s/p PCI to prox LAD/mid LAD/Diag/Ramus, last [facility] 8/2022 with occluded prox Ramus stent, patent LAD and Diag stents, mild to mod atherosclerosis in pLcx and mRCA, managed medically, HFpEF, HTN, and CVA, recently admitted 2/2023 with weakness/ imaging c/w OA and UTI managed with Abx, and was discharged home with home PT.  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  She presents today with complaints of ongoing weakness, lightheadedness, chest pain when walking and back pain limiting her walking.    #chest pain  --  follow up on NST   -- ECHO noted   -- tele stable   -- given hx CAD, cont Lipitor, Coreg and Lisinopril  -- not on ASA, on ELIquis

## 2023-04-03 LAB
ANION GAP SERPL CALC-SCNC: 11 MMOL/L — SIGNIFICANT CHANGE UP (ref 7–14)
APTT BLD: 156.9 SEC — SIGNIFICANT CHANGE UP (ref 27–36.3)
APTT BLD: 41.2 SEC — HIGH (ref 27–36.3)
APTT BLD: >200 SEC — CRITICAL HIGH (ref 27–36.3)
BUN SERPL-MCNC: 21 MG/DL — SIGNIFICANT CHANGE UP (ref 7–23)
CALCIUM SERPL-MCNC: 9.7 MG/DL — SIGNIFICANT CHANGE UP (ref 8.4–10.5)
CHLORIDE SERPL-SCNC: 102 MMOL/L — SIGNIFICANT CHANGE UP (ref 98–107)
CO2 SERPL-SCNC: 26 MMOL/L — SIGNIFICANT CHANGE UP (ref 22–31)
CREAT SERPL-MCNC: 1.19 MG/DL — SIGNIFICANT CHANGE UP (ref 0.5–1.3)
EGFR: 45 ML/MIN/1.73M2 — LOW
GLUCOSE BLDC GLUCOMTR-MCNC: 110 MG/DL — HIGH (ref 70–99)
GLUCOSE BLDC GLUCOMTR-MCNC: 155 MG/DL — HIGH (ref 70–99)
GLUCOSE BLDC GLUCOMTR-MCNC: 171 MG/DL — HIGH (ref 70–99)
GLUCOSE BLDC GLUCOMTR-MCNC: 183 MG/DL — HIGH (ref 70–99)
GLUCOSE SERPL-MCNC: 149 MG/DL — HIGH (ref 70–99)
HCT VFR BLD CALC: 38.1 % — SIGNIFICANT CHANGE UP (ref 34.5–45)
HCT VFR BLD CALC: 40.1 % — SIGNIFICANT CHANGE UP (ref 34.5–45)
HGB BLD-MCNC: 11.5 G/DL — SIGNIFICANT CHANGE UP (ref 11.5–15.5)
HGB BLD-MCNC: 12.4 G/DL — SIGNIFICANT CHANGE UP (ref 11.5–15.5)
MAGNESIUM SERPL-MCNC: 2.2 MG/DL — SIGNIFICANT CHANGE UP (ref 1.6–2.6)
MCHC RBC-ENTMCNC: 25.7 PG — LOW (ref 27–34)
MCHC RBC-ENTMCNC: 26.1 PG — LOW (ref 27–34)
MCHC RBC-ENTMCNC: 30.2 GM/DL — LOW (ref 32–36)
MCHC RBC-ENTMCNC: 30.9 GM/DL — LOW (ref 32–36)
MCV RBC AUTO: 84.2 FL — SIGNIFICANT CHANGE UP (ref 80–100)
MCV RBC AUTO: 85 FL — SIGNIFICANT CHANGE UP (ref 80–100)
NRBC # BLD: 0 /100 WBCS — SIGNIFICANT CHANGE UP (ref 0–0)
NRBC # BLD: 0 /100 WBCS — SIGNIFICANT CHANGE UP (ref 0–0)
NRBC # FLD: 0 K/UL — SIGNIFICANT CHANGE UP (ref 0–0)
NRBC # FLD: 0 K/UL — SIGNIFICANT CHANGE UP (ref 0–0)
PHOSPHATE SERPL-MCNC: 4.5 MG/DL — SIGNIFICANT CHANGE UP (ref 2.5–4.5)
PLATELET # BLD AUTO: 224 K/UL — SIGNIFICANT CHANGE UP (ref 150–400)
PLATELET # BLD AUTO: 240 K/UL — SIGNIFICANT CHANGE UP (ref 150–400)
POTASSIUM SERPL-MCNC: 4.5 MMOL/L — SIGNIFICANT CHANGE UP (ref 3.5–5.3)
POTASSIUM SERPL-SCNC: 4.5 MMOL/L — SIGNIFICANT CHANGE UP (ref 3.5–5.3)
RBC # BLD: 4.48 M/UL — SIGNIFICANT CHANGE UP (ref 3.8–5.2)
RBC # BLD: 4.76 M/UL — SIGNIFICANT CHANGE UP (ref 3.8–5.2)
RBC # FLD: 13.6 % — SIGNIFICANT CHANGE UP (ref 10.3–14.5)
RBC # FLD: 13.6 % — SIGNIFICANT CHANGE UP (ref 10.3–14.5)
SODIUM SERPL-SCNC: 139 MMOL/L — SIGNIFICANT CHANGE UP (ref 135–145)
WBC # BLD: 6.36 K/UL — SIGNIFICANT CHANGE UP (ref 3.8–10.5)
WBC # BLD: 6.48 K/UL — SIGNIFICANT CHANGE UP (ref 3.8–10.5)
WBC # FLD AUTO: 6.36 K/UL — SIGNIFICANT CHANGE UP (ref 3.8–10.5)
WBC # FLD AUTO: 6.48 K/UL — SIGNIFICANT CHANGE UP (ref 3.8–10.5)

## 2023-04-03 RX ORDER — HEPARIN SODIUM 5000 [USP'U]/ML
1500 INJECTION INTRAVENOUS; SUBCUTANEOUS
Qty: 25000 | Refills: 0 | Status: DISCONTINUED | OUTPATIENT
Start: 2023-04-03 | End: 2023-04-03

## 2023-04-03 RX ORDER — HEPARIN SODIUM 5000 [USP'U]/ML
900 INJECTION INTRAVENOUS; SUBCUTANEOUS
Qty: 25000 | Refills: 0 | Status: DISCONTINUED | OUTPATIENT
Start: 2023-04-03 | End: 2023-04-04

## 2023-04-03 RX ORDER — HEPARIN SODIUM 5000 [USP'U]/ML
1200 INJECTION INTRAVENOUS; SUBCUTANEOUS
Qty: 25000 | Refills: 0 | Status: DISCONTINUED | OUTPATIENT
Start: 2023-04-03 | End: 2023-04-03

## 2023-04-03 RX ORDER — HEPARIN SODIUM 5000 [USP'U]/ML
6500 INJECTION INTRAVENOUS; SUBCUTANEOUS ONCE
Refills: 0 | Status: COMPLETED | OUTPATIENT
Start: 2023-04-03 | End: 2023-04-03

## 2023-04-03 RX ADMIN — CARVEDILOL PHOSPHATE 3.12 MILLIGRAM(S): 80 CAPSULE, EXTENDED RELEASE ORAL at 05:48

## 2023-04-03 RX ADMIN — HEPARIN SODIUM 12 UNIT(S)/HR: 5000 INJECTION INTRAVENOUS; SUBCUTANEOUS at 19:22

## 2023-04-03 RX ADMIN — LISINOPRIL 2.5 MILLIGRAM(S): 2.5 TABLET ORAL at 05:53

## 2023-04-03 RX ADMIN — HEPARIN SODIUM 12 UNIT(S)/HR: 5000 INJECTION INTRAVENOUS; SUBCUTANEOUS at 17:09

## 2023-04-03 RX ADMIN — PANTOPRAZOLE SODIUM 40 MILLIGRAM(S): 20 TABLET, DELAYED RELEASE ORAL at 05:48

## 2023-04-03 RX ADMIN — INSULIN GLARGINE 14 UNIT(S): 100 INJECTION, SOLUTION SUBCUTANEOUS at 22:07

## 2023-04-03 RX ADMIN — HEPARIN SODIUM 9 UNIT(S)/HR: 5000 INJECTION INTRAVENOUS; SUBCUTANEOUS at 23:00

## 2023-04-03 RX ADMIN — Medication 5 UNIT(S): at 18:10

## 2023-04-03 RX ADMIN — HEPARIN SODIUM 15 UNIT(S)/HR: 5000 INJECTION INTRAVENOUS; SUBCUTANEOUS at 07:58

## 2023-04-03 RX ADMIN — Medication 1: at 12:50

## 2023-04-03 RX ADMIN — Medication 5 UNIT(S): at 09:12

## 2023-04-03 RX ADMIN — HEPARIN SODIUM 6500 UNIT(S): 5000 INJECTION INTRAVENOUS; SUBCUTANEOUS at 07:59

## 2023-04-03 RX ADMIN — Medication 5 UNIT(S): at 12:51

## 2023-04-03 RX ADMIN — CARVEDILOL PHOSPHATE 3.12 MILLIGRAM(S): 80 CAPSULE, EXTENDED RELEASE ORAL at 18:12

## 2023-04-03 RX ADMIN — Medication 1: at 09:11

## 2023-04-03 RX ADMIN — Medication 40 MILLIGRAM(S): at 05:48

## 2023-04-03 RX ADMIN — ATORVASTATIN CALCIUM 80 MILLIGRAM(S): 80 TABLET, FILM COATED ORAL at 22:02

## 2023-04-03 NOTE — PROGRESS NOTE ADULT - SUBJECTIVE AND OBJECTIVE BOX
Patient is a 83y old  Female who presents with a chief complaint of Chest pain (02 Apr 2023 16:33)    Date of servie : 04-03-23 @ 12:02  INTERVAL HPI/OVERNIGHT EVENTS:  T(C): 36.7 (04-03-23 @ 05:50), Max: 36.8 (04-02-23 @ 21:40)  HR: 65 (04-03-23 @ 05:50) (65 - 71)  BP: 155/91 (04-03-23 @ 05:50) (140/80 - 155/91)  RR: 17 (04-03-23 @ 05:50) (17 - 18)  SpO2: 100% (04-03-23 @ 05:50) (98% - 100%)  Wt(kg): --  I&O's Summary    02 Apr 2023 07:01  -  03 Apr 2023 07:00  --------------------------------------------------------  IN: 803 mL / OUT: 0 mL / NET: 803 mL        LABS:                        11.5   6.48  )-----------( 224      ( 03 Apr 2023 05:30 )             38.1     04-03    139  |  102  |  21  ----------------------------<  149<H>  4.5   |  26  |  1.19    Ca    9.7      03 Apr 2023 05:30  Phos  4.5     04-03  Mg     2.20     04-03      PTT - ( 03 Apr 2023 05:30 )  PTT:41.2 sec    CAPILLARY BLOOD GLUCOSE      POCT Blood Glucose.: 155 mg/dL (03 Apr 2023 08:50)  POCT Blood Glucose.: 126 mg/dL (02 Apr 2023 21:06)  POCT Blood Glucose.: 115 mg/dL (02 Apr 2023 17:39)  POCT Blood Glucose.: 124 mg/dL (02 Apr 2023 12:49)            MEDICATIONS  (STANDING):  atorvastatin 80 milliGRAM(s) Oral at bedtime  carvedilol 3.125 milliGRAM(s) Oral every 12 hours  dextrose 5%. 1000 milliLiter(s) (100 mL/Hr) IV Continuous <Continuous>  dextrose 5%. 1000 milliLiter(s) (50 mL/Hr) IV Continuous <Continuous>  dextrose 50% Injectable 25 Gram(s) IV Push once  dextrose 50% Injectable 12.5 Gram(s) IV Push once  dextrose 50% Injectable 25 Gram(s) IV Push once  furosemide    Tablet 40 milliGRAM(s) Oral daily  glucagon  Injectable 1 milliGRAM(s) IntraMuscular once  heparin  Infusion 1500 Unit(s)/Hr (15 mL/Hr) IV Continuous <Continuous>  insulin glargine Injectable (LANTUS) 14 Unit(s) SubCutaneous at bedtime  insulin lispro (ADMELOG) corrective regimen sliding scale   SubCutaneous three times a day before meals  insulin lispro (ADMELOG) corrective regimen sliding scale   SubCutaneous at bedtime  insulin lispro Injectable (ADMELOG) 5 Unit(s) SubCutaneous three times a day before meals  lisinopril 2.5 milliGRAM(s) Oral daily  pantoprazole    Tablet 40 milliGRAM(s) Oral before breakfast    MEDICATIONS  (PRN):  acetaminophen     Tablet .. 650 milliGRAM(s) Oral every 6 hours PRN Temp greater or equal to 38C (100.4F), Mild Pain (1 - 3)  aluminum hydroxide/magnesium hydroxide/simethicone Suspension 30 milliLiter(s) Oral every 4 hours PRN Dyspepsia  dextrose Oral Gel 15 Gram(s) Oral once PRN Blood Glucose LESS THAN 70 milliGRAM(s)/deciliter  melatonin 3 milliGRAM(s) Oral at bedtime PRN Insomnia  ondansetron Injectable 4 milliGRAM(s) IV Push every 8 hours PRN Nausea and/or Vomiting  simethicone 80 milliGRAM(s) Chew two times a day PRN Gas          PHYSICAL EXAM:  GENERAL: NAD, well-groomed, well-developed  HEAD:  Atraumatic, Normocephalic  CHEST/LUNG: Clear to percussion bilaterally; No rales, rhonchi, wheezing, or rubs  HEART: Regular rate and rhythm; No murmurs, rubs, or gallops  ABDOMEN: Soft, Nontender, Nondistended; Bowel sounds present  EXTREMITIES:  2+ Peripheral Pulses, No clubbing, cyanosis, or edema  LYMPH: No lymphadenopathy noted  SKIN: No rashes or lesions    Care Discussed with Consultants/Other Providers [ ] YES  [ ] NO

## 2023-04-03 NOTE — PROVIDER CONTACT NOTE (CRITICAL VALUE NOTIFICATION) - BACKGROUND
83 y.o F with pmhx of HTN, CVA, CAD. Admitted on 3/30/23 for chest pain
Pmhx of HTN, CAD with stents presenting with intermittent chest pain x1. NST was abnormal, cath planned for wednesday

## 2023-04-03 NOTE — PROVIDER CONTACT NOTE (CRITICAL VALUE NOTIFICATION) - ACTION/TREATMENT ORDERED:
Stop heparin infusion for 1 hour and increase dose by 3ml/hr Pause heparin infusion for 1 hour and decrease dose from 12 ml/hr to 9 ml/hr

## 2023-04-03 NOTE — PROVIDER CONTACT NOTE (CRITICAL VALUE NOTIFICATION) - ACTION/TREATMENT ORDERED:
Pause heparin gtt for 1 hour, and restart rate at 12 ml/hr. Monitor for s/s of bleeding. Next APTT to be drawn 6 hours from the last

## 2023-04-03 NOTE — PROGRESS NOTE ADULT - SUBJECTIVE AND OBJECTIVE BOX
Date of service 04/3/2023    chief complaint: Chest Pain/Back PAin    extended hpi: 83 year old Female with PMH DM, CAD s/p PCI to prox LAD/mid LAD/Diag/Ramus, last [facility] 8/2022 with occluded prox Ramus stent, patent LAD and Diag stents, mild to mod atherosclerosis in pLcx and mRCA, managed medically, HFpEF, HTN, and CVA, recently admitted 2/2023 with weakness/ imaging c/w OA and UTI managed with Abx, and was discharged home with home PT.  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  She presents today with complaints of ongoing weakness, lightheadedness, chest pain when walking and back pain limiting her walking. She denies palpitations or syncope.           Patient denies chest pain or shortness of breath.   Review of symptoms otherwise negative.    MEDICATIONS:  acetaminophen     Tablet .. 650 milliGRAM(s) Oral every 6 hours PRN  aluminum hydroxide/magnesium hydroxide/simethicone Suspension 30 milliLiter(s) Oral every 4 hours PRN  atorvastatin 80 milliGRAM(s) Oral at bedtime  carvedilol 3.125 milliGRAM(s) Oral every 12 hours  dextrose 5%. 1000 milliLiter(s) IV Continuous <Continuous>  dextrose 5%. 1000 milliLiter(s) IV Continuous <Continuous>  dextrose 50% Injectable 25 Gram(s) IV Push once  dextrose 50% Injectable 12.5 Gram(s) IV Push once  dextrose 50% Injectable 25 Gram(s) IV Push once  dextrose Oral Gel 15 Gram(s) Oral once PRN  furosemide    Tablet 40 milliGRAM(s) Oral daily  glucagon  Injectable 1 milliGRAM(s) IntraMuscular once  heparin  Infusion 1500 Unit(s)/Hr IV Continuous <Continuous>  insulin glargine Injectable (LANTUS) 14 Unit(s) SubCutaneous at bedtime  insulin lispro (ADMELOG) corrective regimen sliding scale   SubCutaneous three times a day before meals  insulin lispro (ADMELOG) corrective regimen sliding scale   SubCutaneous at bedtime  insulin lispro Injectable (ADMELOG) 5 Unit(s) SubCutaneous three times a day before meals  lisinopril 2.5 milliGRAM(s) Oral daily  melatonin 3 milliGRAM(s) Oral at bedtime PRN  ondansetron Injectable 4 milliGRAM(s) IV Push every 8 hours PRN  pantoprazole    Tablet 40 milliGRAM(s) Oral before breakfast  simethicone 80 milliGRAM(s) Chew two times a day PRN      LABS:                        11.5   6.48  )-----------( 224      ( 03 Apr 2023 05:30 )             38.1       Hemoglobin: 11.5 g/dL (04-03 @ 05:30)  Hemoglobin: 11.5 g/dL (04-01 @ 04:27)  Hemoglobin: 10.3 g/dL (03-31 @ 06:00)  Hemoglobin: 10.9 g/dL (03-30 @ 13:00)      04-03    139  |  102  |  21  ----------------------------<  149<H>  4.5   |  26  |  1.19    Ca    9.7      03 Apr 2023 05:30  Phos  4.5     04-03  Mg     2.20     04-03      Creatinine Trend: 1.19<--, 1.24<--, 1.13<--, 1.09<--, 1.03<--    COAGS: PTT - ( 03 Apr 2023 05:30 )  PTT:41.2 sec          PHYSICAL EXAM:  T(C): 36.6 (04-03-23 @ 12:16), Max: 36.8 (04-02-23 @ 21:40)  HR: 59 (04-03-23 @ 12:16) (59 - 71)  BP: 122/80 (04-03-23 @ 12:16) (122/80 - 155/91)  RR: 18 (04-03-23 @ 12:16) (17 - 18)  SpO2: 98% (04-03-23 @ 12:16) (98% - 100%)  Wt(kg): --    I&O's Summary    02 Apr 2023 07:01  -  03 Apr 2023 07:00  --------------------------------------------------------  IN: 803 mL / OUT: 0 mL / NET: 803 mL        General: Well nourished in no acute distress. Alert and Oriented * 3.   Head: Normocephalic and atraumatic.   Neck: No JVD. No bruits. Supple. Does not appear to be enlarged.   Cardiovascular: + S1,S2 ; RRR Soft systolic murmur at the left lower sternal border. No rubs noted.    Lungs: CTA b/l. No rhonchi, rales or wheezes.   Abdomen: + BS, soft. Non tender. Non distended. No rebound. No guarding.   Extremities: No clubbing/cyanosis/edema.   Neurologic: Moves all four extremities. Full range of motion.   Skin: Warm and moist. The patient's skin has normal elasticity and good skin turgor.   Psychiatric: Appropriate mood and affect.  Musculoskeletal: Normal range of motion, normal strength      CG:  	from office today SR LISSET    < from: CT Abdomen and Pelvis w/ IV Cont (02.22.23 @ 17:34) >  IMPRESSION:    Increasing proximal SMA narrowing secondary to increasing thrombus or   plaque.    Thickened endometrium for patient age. Recommend pelvic ultrasound and   GYN evaluation.    Trace fat-containing umbilical hernia.    < end of copied text >    < from: TTE with Doppler (w/Cont) (08.15.22 @ 08:22) >  ------------------------------------------------------------------------  CONCLUSIONS:  1. Mildly dilated left atrium.  LA volume index = 38 cc/m2.  2. Mild segmental left ventricular systolic dysfunction.  The mid and apical anteroseptal and apical inferior walls  are hypokinetic.  3. Reversal of the E-A  waves of the mitral inflow pattern  is consistent with diastolic LV dysfunction.  4. Normal right ventricular size and function.  *** Compared with echocardiogram of 8/30/2019, there are  new wall motion abnormalities.  ------------------------------------------------------------------------  Confirmed on  8/15/2022 - 09:50:41 by Camden Olmos M.D.  ------------------------------------------------------------------------    < end of copied text >    < from: Cardiac Catheterization (08.18.22 @ 17:50) >  Procedures Performed   Procedures:               1.    Ultrasound Guided Access     2.    Arterial Access - Right Femoral   3.    Diagnostic Coronary Angiography   4.    Left Heart Cath     Indications:                Cardiomyopathy     Diagnostic Conclusions:   Occluded proximal ramus stent. Patent LAD and diagonal stents.  Mild-moderate atherosclerosis in proximal Cx and mid RCA.    Recommendations:   Aggressive risk factor modification.     < end of copied text >    ECHO: ec< from: Transthoracic Echocardiogram (03.30.23 @ 16:25) >  CONCLUSIONS:  1. Normal trileaflet aortic valve.  2. Normal left ventricular internal dimensions and wall  thicknesses.  3. Moderate segmental left ventricular systolic  dysfunction. Lateral and inferolateral wall hypokinesis.  4. Normal right ventricular size and function.  ------------------------------------------------------------------------  Confirmed on  3/31/2023 - 15:49:07 by Raciel Bucio M.D. RPVI  ------------------------------------------------------------------------    < end of copied text >    ASSESSMENT/PLAN: 	83 year old Female with PMH DVT dx in setting of COVID in 2021, remains on Eliquis, DM, CAD s/p PCI to prox LAD/mid LAD/Diag/Ramus, last [facility] 8/2022 with occluded prox Ramus stent, patent LAD and Diag stents, mild to mod atherosclerosis in pLcx and mRCA, managed medically, HFpEF, HTN, and CVA, recently admitted 2/2023 with weakness/ imaging c/w OA and UTI managed with Abx, and was discharged home with home PT.  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  She presents today with complaints of ongoing weakness, lightheadedness, chest pain when walking and back pain limiting her walking.    #chest pain  --  concern for ischemia  --  stress test abnormal mostly fixed defects with  seamus-infarct ischemia but she continues to have occasional pain, hold eliquis, sc/w heparin gtt, plan for [facility] Wednesday after eliquis washout  -- ECHO noted   -- given hx CAD, cont Lipitor, Coreg and Lisinopril, based on cath will either start ASA or Plavix        Isidro Reaves MD  Pager: [phone]         Date of service 04/3/2023    chief complaint: Chest Pain/Back PAin    extended hpi: 83 year old Female with PMH DM, CAD s/p PCI to prox LAD/mid LAD/Diag/Ramus, last [facility] 8/2022 with occluded prox Ramus stent, patent LAD and Diag stents, mild to mod atherosclerosis in pLcx and mRCA, managed medically, HFpEF, HTN, and CVA, recently admitted 2/2023 with weakness/ imaging c/w OA and UTI managed with Abx, and was discharged home with home PT.  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  She presents today with complaints of ongoing weakness, lightheadedness, chest pain when walking and back pain limiting her walking. She denies palpitations or syncope.           Patient denies chest pain or shortness of breath.   Review of symptoms otherwise negative.    MEDICATIONS:  acetaminophen     Tablet .. 650 milliGRAM(s) Oral every 6 hours PRN  aluminum hydroxide/magnesium hydroxide/simethicone Suspension 30 milliLiter(s) Oral every 4 hours PRN  atorvastatin 80 milliGRAM(s) Oral at bedtime  carvedilol 3.125 milliGRAM(s) Oral every 12 hours  dextrose 5%. 1000 milliLiter(s) IV Continuous <Continuous>  dextrose 5%. 1000 milliLiter(s) IV Continuous <Continuous>  dextrose 50% Injectable 25 Gram(s) IV Push once  dextrose 50% Injectable 12.5 Gram(s) IV Push once  dextrose 50% Injectable 25 Gram(s) IV Push once  dextrose Oral Gel 15 Gram(s) Oral once PRN  furosemide    Tablet 40 milliGRAM(s) Oral daily  glucagon  Injectable 1 milliGRAM(s) IntraMuscular once  heparin  Infusion 1500 Unit(s)/Hr IV Continuous <Continuous>  insulin glargine Injectable (LANTUS) 14 Unit(s) SubCutaneous at bedtime  insulin lispro (ADMELOG) corrective regimen sliding scale   SubCutaneous three times a day before meals  insulin lispro (ADMELOG) corrective regimen sliding scale   SubCutaneous at bedtime  insulin lispro Injectable (ADMELOG) 5 Unit(s) SubCutaneous three times a day before meals  lisinopril 2.5 milliGRAM(s) Oral daily  melatonin 3 milliGRAM(s) Oral at bedtime PRN  ondansetron Injectable 4 milliGRAM(s) IV Push every 8 hours PRN  pantoprazole    Tablet 40 milliGRAM(s) Oral before breakfast  simethicone 80 milliGRAM(s) Chew two times a day PRN      LABS:                        11.5   6.48  )-----------( 224      ( 03 Apr 2023 05:30 )             38.1       Hemoglobin: 11.5 g/dL (04-03 @ 05:30)  Hemoglobin: 11.5 g/dL (04-01 @ 04:27)  Hemoglobin: 10.3 g/dL (03-31 @ 06:00)  Hemoglobin: 10.9 g/dL (03-30 @ 13:00)      04-03    139  |  102  |  21  ----------------------------<  149<H>  4.5   |  26  |  1.19    Ca    9.7      03 Apr 2023 05:30  Phos  4.5     04-03  Mg     2.20     04-03      Creatinine Trend: 1.19<--, 1.24<--, 1.13<--, 1.09<--, 1.03<--    COAGS: PTT - ( 03 Apr 2023 05:30 )  PTT:41.2 sec          PHYSICAL EXAM:  T(C): 36.6 (04-03-23 @ 12:16), Max: 36.8 (04-02-23 @ 21:40)  HR: 59 (04-03-23 @ 12:16) (59 - 71)  BP: 122/80 (04-03-23 @ 12:16) (122/80 - 155/91)  RR: 18 (04-03-23 @ 12:16) (17 - 18)  SpO2: 98% (04-03-23 @ 12:16) (98% - 100%)  Wt(kg): --    I&O's Summary    02 Apr 2023 07:01  -  03 Apr 2023 07:00  --------------------------------------------------------  IN: 803 mL / OUT: 0 mL / NET: 803 mL        General: Well nourished in no acute distress. Alert and Oriented * 3.   Head: Normocephalic and atraumatic.   Neck: No JVD. No bruits. Supple. Does not appear to be enlarged.   Cardiovascular: + S1,S2 ; RRR Soft systolic murmur at the left lower sternal border. No rubs noted.    Lungs: CTA b/l. No rhonchi, rales or wheezes.   Abdomen: + BS, soft. Non tender. Non distended. No rebound. No guarding.   Extremities: No clubbing/cyanosis/edema.   Neurologic: Moves all four extremities. Full range of motion.   Skin: Warm and moist. The patient's skin has normal elasticity and good skin turgor.   Psychiatric: Appropriate mood and affect.  Musculoskeletal: Normal range of motion, normal strength      CG:  	from office today SR LISSET    < from: CT Abdomen and Pelvis w/ IV Cont (02.22.23 @ 17:34) >  IMPRESSION:    Increasing proximal SMA narrowing secondary to increasing thrombus or   plaque.    Thickened endometrium for patient age. Recommend pelvic ultrasound and   GYN evaluation.    Trace fat-containing umbilical hernia.    < end of copied text >    < from: TTE with Doppler (w/Cont) (08.15.22 @ 08:22) >  ------------------------------------------------------------------------  CONCLUSIONS:  1. Mildly dilated left atrium.  LA volume index = 38 cc/m2.  2. Mild segmental left ventricular systolic dysfunction.  The mid and apical anteroseptal and apical inferior walls  are hypokinetic.  3. Reversal of the E-A  waves of the mitral inflow pattern  is consistent with diastolic LV dysfunction.  4. Normal right ventricular size and function.  *** Compared with echocardiogram of 8/30/2019, there are  new wall motion abnormalities.  ------------------------------------------------------------------------  Confirmed on  8/15/2022 - 09:50:41 by Camden Olmos M.D.  ------------------------------------------------------------------------    < end of copied text >    < from: Cardiac Catheterization (08.18.22 @ 17:50) >  Procedures Performed   Procedures:               1.    Ultrasound Guided Access     2.    Arterial Access - Right Femoral   3.    Diagnostic Coronary Angiography   4.    Left Heart Cath     Indications:                Cardiomyopathy     Diagnostic Conclusions:   Occluded proximal ramus stent. Patent LAD and diagonal stents.  Mild-moderate atherosclerosis in proximal Cx and mid RCA.    Recommendations:   Aggressive risk factor modification.     < end of copied text >    ECHO: ec< from: Transthoracic Echocardiogram (03.30.23 @ 16:25) >  CONCLUSIONS:  1. Normal trileaflet aortic valve.  2. Normal left ventricular internal dimensions and wall  thicknesses.  3. Moderate segmental left ventricular systolic  dysfunction. Lateral and inferolateral wall hypokinesis.  4. Normal right ventricular size and function.  ------------------------------------------------------------------------  Confirmed on  3/31/2023 - 15:49:07 by Raciel Bucio M.D. [facility]PVI  ------------------------------------------------------------------------      LHC:  Coronary Angiography   The coronary circulation is right dominant. Cardiac catheterization was performed electively.    LM   Left main artery: Angiography shows minor irregularities.      LAD   Proximal left anterior descending: Patent stent.  First diagonal: Patent ostial stent.      CX   Proximal circumflex: Thereis a 30 % stenosis in the middle thirdportion of the segment.  Mid circumflex: There is a 30 % stenosis in the middle third portionof the segment.    RCA   Mid right coronary artery: There is a 30 % stenosis in the proximalthird portion of the segment.  Mid right coronary artery: There is a 40 % stenosis in the distalthird portion of the segment.    Ramus   Proximal ramus intermedius: There is a 100 % stenosis within thestented segment. NGUYEN Flow 0.      ASSESSMENT/PLAN: 	83 year old Female with PMH DVT dx in setting of COVID in 2021, remains on Eliquis, DM, CAD s/p PCI to prox LAD/mid LAD/Diag/Ramus, last [facility] 8/2022 with occluded prox Ramus stent, patent LAD and Diag stents, mild to mod atherosclerosis in pLcx and mRCA, managed medically, HFpEF, HTN, and CVA, recently admitted 2/2023 with weakness/ imaging c/w OA and UTI managed with Abx, and was discharged home with home PT.  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  She presents today with complaints of ongoing weakness, lightheadedness, chest pain when walking and back pain limiting her walking.    #chest pain  -- stress test abnormal mostly fixed defects with  seamus-infarct ischemia last cath showed 100% ramus ISR however  she has had worsening chest pain, will plan for a diagnostic LHC with plan to uptitrate anti-anginals if there is no change when compared to her angiogram from 08/2022  -- hold eliquis, c/w heparin gtt, plan for LHC Wednesday after eliquis washout  -- ECHO noted   -- given hx CAD, cont Lipitor, Coreg and Lisinopril, based on cath will either start ASA or Plavix, will consider addition of Imdur based on angiogram results    Spoke with son who will d/w his sister        Isidro Reaves MD  Pager: [phone]         Date of service 04/3/2023    chief complaint: Chest Pain/Back PAin    extended hpi: 83 year old Female with PMH DM, CAD s/p PCI to prox LAD/mid LAD/Diag/Ramus, last [facility] 8/2022 with occluded prox Ramus stent, patent LAD and Diag stents, mild to mod atherosclerosis in pLcx and mRCA, managed medically, HFpEF, HTN, and CVA, recently admitted 2/2023 with weakness/ imaging c/w OA and UTI managed with Abx, and was discharged home with home PT.  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  She presents today with complaints of ongoing weakness, lightheadedness, chest pain when walking and back pain limiting her walking. She denies palpitations or syncope.           Patient denies chest pain or shortness of breath.   Review of symptoms otherwise negative.    MEDICATIONS:  acetaminophen     Tablet .. 650 milliGRAM(s) Oral every 6 hours PRN  aluminum hydroxide/magnesium hydroxide/simethicone Suspension 30 milliLiter(s) Oral every 4 hours PRN  atorvastatin 80 milliGRAM(s) Oral at bedtime  carvedilol 3.125 milliGRAM(s) Oral every 12 hours  dextrose 5%. 1000 milliLiter(s) IV Continuous <Continuous>  dextrose 5%. 1000 milliLiter(s) IV Continuous <Continuous>  dextrose 50% Injectable 25 Gram(s) IV Push once  dextrose 50% Injectable 12.5 Gram(s) IV Push once  dextrose 50% Injectable 25 Gram(s) IV Push once  dextrose Oral Gel 15 Gram(s) Oral once PRN  furosemide    Tablet 40 milliGRAM(s) Oral daily  glucagon  Injectable 1 milliGRAM(s) IntraMuscular once  heparin  Infusion 1500 Unit(s)/Hr IV Continuous <Continuous>  insulin glargine Injectable (LANTUS) 14 Unit(s) SubCutaneous at bedtime  insulin lispro (ADMELOG) corrective regimen sliding scale   SubCutaneous three times a day before meals  insulin lispro (ADMELOG) corrective regimen sliding scale   SubCutaneous at bedtime  insulin lispro Injectable (ADMELOG) 5 Unit(s) SubCutaneous three times a day before meals  lisinopril 2.5 milliGRAM(s) Oral daily  melatonin 3 milliGRAM(s) Oral at bedtime PRN  ondansetron Injectable 4 milliGRAM(s) IV Push every 8 hours PRN  pantoprazole    Tablet 40 milliGRAM(s) Oral before breakfast  simethicone 80 milliGRAM(s) Chew two times a day PRN      LABS:                        11.5   6.48  )-----------( 224      ( 03 Apr 2023 05:30 )             38.1       Hemoglobin: 11.5 g/dL (04-03 @ 05:30)  Hemoglobin: 11.5 g/dL (04-01 @ 04:27)  Hemoglobin: 10.3 g/dL (03-31 @ 06:00)  Hemoglobin: 10.9 g/dL (03-30 @ 13:00)      04-03    139  |  102  |  21  ----------------------------<  149<H>  4.5   |  26  |  1.19    Ca    9.7      03 Apr 2023 05:30  Phos  4.5     04-03  Mg     2.20     04-03      Creatinine Trend: 1.19<--, 1.24<--, 1.13<--, 1.09<--, 1.03<--    COAGS: PTT - ( 03 Apr 2023 05:30 )  PTT:41.2 sec      PHYSICAL EXAM:  T(C): 36.6 (04-03-23 @ 12:16), Max: 36.8 (04-02-23 @ 21:40)  HR: 59 (04-03-23 @ 12:16) (59 - 71)  BP: 122/80 (04-03-23 @ 12:16) (122/80 - 155/91)  RR: 18 (04-03-23 @ 12:16) (17 - 18)  SpO2: 98% (04-03-23 @ 12:16) (98% - 100%)  Wt(kg): --    I&O's Summary    02 Apr 2023 07:01  -  03 Apr 2023 07:00  --------------------------------------------------------  IN: 803 mL / OUT: 0 mL / NET: 803 mL        General: Well nourished in no acute distress. Alert and Oriented * 3.   Head: Normocephalic and atraumatic.   Neck: No JVD. No bruits. Supple. Does not appear to be enlarged.   Cardiovascular: + S1,S2 ; RRR Soft systolic murmur at the left lower sternal border. No rubs noted.    Lungs: CTA b/l. No rhonchi, rales or wheezes.   Abdomen: + BS, soft. Non tender. Non distended. No rebound. No guarding.   Extremities: No clubbing/cyanosis/edema.   Neurologic: Moves all four extremities. Full range of motion.   Skin: Warm and moist. The patient's skin has normal elasticity and good skin turgor.   Psychiatric: Appropriate mood and affect.  Musculoskeletal: Normal range of motion, normal strength          [facility]: 08/2022  Coronary Angiography   The coronary circulation is right dominant. Cardiac catheterization was performed electively.    LM   Left main artery: Angiography shows minor irregularities.      LAD   Proximal left anterior descending: Patent stent.  First diagonal: Patent ostial stent.      CX   Proximal circumflex: Thereis a 30 % stenosis in the middle thirdportion of the segment.  Mid circumflex: There is a 30 % stenosis in the middle third portionof the segment.    RCA   Mid right coronary artery: There is a 30 % stenosis in the proximalthird portion of the segment.  Mid right coronary artery: There is a 40 % stenosis in the distalthird portion of the segment.    Ramus   Proximal ramus intermedius: There is a 100 % stenosis within thestented segment. NGUYEN Flow 0.    TTE DIMENSIONS:  Dimensions:     Normal Values:  LA:     4.2 cm    2.0 - 4.0 cm  Ao:     3.7 cm    2.0 - 3.8 cm  SEPTUM: 1.1 cm    0.6 - 1.2cm  PWT:    1.1 cm    0.6 - 1.1 cm  LVIDd:  4.5 cm    3.0 - 5.6 cm  LVIDs:    ---     1.8 - 4.0 cm  Derived Variables:  LVMI: 92 g/m2  RWT: 0.48  Ejection Fraction (Visual Estimate): 40-45 %  ------------------------------------------------------------------------  OBSERVATIONS:  Mitral Valve: Normal mitral valve. Minimal mitral regurgitation.  Aortic Root: Normal aortic root.  Aortic Valve: Normal trileaflet aortic valve. No aortic valve regurgitation seen.  Left Atrium: Mildly dilated left atrium.  LA volume index = 41 cc/m2.  Left Ventricle: Moderate segmental left ventricular systolic dysfunction. Lateral and inferolateral wall hypokinesis. Normal left ventricular internal dimensions and wall thicknesses. Reversal of the E-A  waves of the mitral inflow pattern is consistent with diastolic LV dysfunction.  Right Heart: Normal right atrium. Normal right ventricular size and function. Normal tricuspid valve.  Minimal tricuspid regurgitation. Normal pulmonic valve.  Pericardium/PleuraNormal pericardium with no pericardial effusion.  Hemodynamic: Estimated right ventricular systolic pressure equals 42 mm Hg, assuming right atrial pressure equals 10 mm Hg, consistent with mild pulmonary hypertension.      STRESS:  IMPRESSIONS:Abnormal Study  * Chest Pain: No chest pain with administration ofRegadenoson.  * Symptom: Dyspnea.  * HR Response: Appropriate.  * BP Response: Appropriate.  * Heart Rhythm: Normal Sinus Rhythm - 65 BPM.  * Baseline ECG: Nonspecific ST-T wave abnormality.  * ECG Abnormalities: None.  * Arrhythmia: Occasional VPDs occurred during rest, stressand recovery.  * There are large, moderate to severe defects in theapical, anterolateral, and lateral walls that are predominantly fixed, consistent with infarction with mildperi-infarct ischemia.  * Post-stress gated wall motion analysis was performed(LVEF = 34 %;LVEDV = 120 ml.), revealing overall severe hypokinesis.  Conclusion:  There are large, moderate to severe defects in the apical,  anterolateral, and lateral walls that are predominantly  fixed, consistent with infarction with mild seamus-infarct  ischemia.        ASSESSMENT/PLAN: 	83 year old Female with PMH DVT dx in setting of COVID in 2021, remains on Eliquis, DM, CAD s/p PCI to prox LAD/mid LAD/Diag/Ramus, last [facility] 8/2022 with occluded prox Ramus stent, patent LAD and Diag stents, mild to mod atherosclerosis in pLcx and mRCA, managed medically, HFpEF, HTN, and CVA, recently admitted 2/2023 with weakness/ imaging c/w OA and UTI managed with Abx, and was discharged home with home PT.  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  She presents today with complaints of ongoing weakness, lightheadedness, chest pain when walking and back pain limiting her walking.    #chest pain  -- ECHO noted   -- stress test abnormal mostly fixed defects with  seamus-infarct ischemia last cath showed 100% ramus ISR however  she has had worsening chest pain, will plan for a diagnostic LHC with plan to uptitrate anti-anginals if there is no change when compared to her angiogram from 08/2022  -- hold eliquis, c/w heparin gtt, plan for LHC Wednesday after eliquis washout, last dose Eliquis 04/02 @ 1700  -- ECHO noted   -- given hx CAD, cont Lipitor, Coreg and Lisinopril, based on cath will either start ASA or Plavix, will consider addition of Imdur based on angiogram results    Spoke with son ([phone]) who will d/w his sister    Isidro Reaves MD  Pager: [phone]

## 2023-04-04 ENCOUNTER — TRANSCRIPTION ENCOUNTER (OUTPATIENT)
Age: 83
End: 2023-04-04

## 2023-04-04 LAB
ANION GAP SERPL CALC-SCNC: 13 MMOL/L — SIGNIFICANT CHANGE UP (ref 7–14)
APTT BLD: 76.7 SEC — HIGH (ref 27–36.3)
APTT BLD: 91 SEC — HIGH (ref 27–36.3)
BUN SERPL-MCNC: 20 MG/DL — SIGNIFICANT CHANGE UP (ref 7–23)
CALCIUM SERPL-MCNC: 9.5 MG/DL — SIGNIFICANT CHANGE UP (ref 8.4–10.5)
CHLORIDE SERPL-SCNC: 102 MMOL/L — SIGNIFICANT CHANGE UP (ref 98–107)
CO2 SERPL-SCNC: 24 MMOL/L — SIGNIFICANT CHANGE UP (ref 22–31)
CREAT SERPL-MCNC: 1.16 MG/DL — SIGNIFICANT CHANGE UP (ref 0.5–1.3)
EGFR: 46 ML/MIN/1.73M2 — LOW
GLUCOSE BLDC GLUCOMTR-MCNC: 101 MG/DL — HIGH (ref 70–99)
GLUCOSE BLDC GLUCOMTR-MCNC: 145 MG/DL — HIGH (ref 70–99)
GLUCOSE BLDC GLUCOMTR-MCNC: 155 MG/DL — HIGH (ref 70–99)
GLUCOSE BLDC GLUCOMTR-MCNC: 170 MG/DL — HIGH (ref 70–99)
GLUCOSE SERPL-MCNC: 148 MG/DL — HIGH (ref 70–99)
HCT VFR BLD CALC: 39.1 % — SIGNIFICANT CHANGE UP (ref 34.5–45)
HGB BLD-MCNC: 11.9 G/DL — SIGNIFICANT CHANGE UP (ref 11.5–15.5)
MAGNESIUM SERPL-MCNC: 2.1 MG/DL — SIGNIFICANT CHANGE UP (ref 1.6–2.6)
MCHC RBC-ENTMCNC: 26.4 PG — LOW (ref 27–34)
MCHC RBC-ENTMCNC: 30.4 GM/DL — LOW (ref 32–36)
MCV RBC AUTO: 86.7 FL — SIGNIFICANT CHANGE UP (ref 80–100)
NRBC # BLD: 0 /100 WBCS — SIGNIFICANT CHANGE UP (ref 0–0)
NRBC # FLD: 0 K/UL — SIGNIFICANT CHANGE UP (ref 0–0)
PHOSPHATE SERPL-MCNC: 4 MG/DL — SIGNIFICANT CHANGE UP (ref 2.5–4.5)
PLATELET # BLD AUTO: 223 K/UL — SIGNIFICANT CHANGE UP (ref 150–400)
POTASSIUM SERPL-MCNC: 3.9 MMOL/L — SIGNIFICANT CHANGE UP (ref 3.5–5.3)
POTASSIUM SERPL-SCNC: 3.9 MMOL/L — SIGNIFICANT CHANGE UP (ref 3.5–5.3)
RBC # BLD: 4.51 M/UL — SIGNIFICANT CHANGE UP (ref 3.8–5.2)
RBC # FLD: 13.7 % — SIGNIFICANT CHANGE UP (ref 10.3–14.5)
SARS-COV-2 RNA SPEC QL NAA+PROBE: SIGNIFICANT CHANGE UP
SODIUM SERPL-SCNC: 139 MMOL/L — SIGNIFICANT CHANGE UP (ref 135–145)
WBC # BLD: 6.01 K/UL — SIGNIFICANT CHANGE UP (ref 3.8–10.5)
WBC # FLD AUTO: 6.01 K/UL — SIGNIFICANT CHANGE UP (ref 3.8–10.5)

## 2023-04-04 PROCEDURE — 93454 CORONARY ARTERY ANGIO S&I: CPT | Mod: 26

## 2023-04-04 PROCEDURE — 93010 ELECTROCARDIOGRAM REPORT: CPT

## 2023-04-04 RX ADMIN — Medication 40 MILLIGRAM(S): at 05:49

## 2023-04-04 RX ADMIN — CARVEDILOL PHOSPHATE 3.12 MILLIGRAM(S): 80 CAPSULE, EXTENDED RELEASE ORAL at 20:04

## 2023-04-04 RX ADMIN — INSULIN GLARGINE 14 UNIT(S): 100 INJECTION, SOLUTION SUBCUTANEOUS at 22:19

## 2023-04-04 RX ADMIN — LISINOPRIL 2.5 MILLIGRAM(S): 2.5 TABLET ORAL at 05:52

## 2023-04-04 RX ADMIN — Medication 1: at 12:57

## 2023-04-04 RX ADMIN — Medication 3 MILLIGRAM(S): at 22:18

## 2023-04-04 RX ADMIN — PANTOPRAZOLE SODIUM 40 MILLIGRAM(S): 20 TABLET, DELAYED RELEASE ORAL at 05:49

## 2023-04-04 RX ADMIN — HEPARIN SODIUM 9 UNIT(S)/HR: 5000 INJECTION INTRAVENOUS; SUBCUTANEOUS at 07:14

## 2023-04-04 RX ADMIN — Medication 5 UNIT(S): at 12:57

## 2023-04-04 RX ADMIN — Medication 1: at 08:50

## 2023-04-04 RX ADMIN — Medication 5 UNIT(S): at 08:49

## 2023-04-04 RX ADMIN — CARVEDILOL PHOSPHATE 3.12 MILLIGRAM(S): 80 CAPSULE, EXTENDED RELEASE ORAL at 05:49

## 2023-04-04 RX ADMIN — ATORVASTATIN CALCIUM 80 MILLIGRAM(S): 80 TABLET, FILM COATED ORAL at 22:18

## 2023-04-04 NOTE — DISCHARGE NOTE PROVIDER - DISCHARGE DIET
LPC INPATIENT PROGRESS NOTE         [facility] MED SURG    1/4/2017      PATIENT IDENTIFICATION:   Name:  aMrio Nicholson      MRN:  [id number]     61 y.o.  male             LOS 1    Reason for visit: Follow-up pneumonia, copd,  pulmonary nodule and pleuritic chest pain    Subjective   SUBJECTIVE:    Sleeping currently.  No distress.  No new issues.  Objective   OBJECTIVE:  Vitals:    01/03/17 2036 01/04/17 0007 01/04/17 0355 01/04/17 0634   BP:  103/63 98/56 91/55   BP Location:  Left arm Left arm Left arm   Patient Position:  Lying Lying Lying   Pulse: 67 79 72 83   Resp: 18 20 20 20   Temp:  97 °F (36.1 °C) 97.6 °F (36.4 °C) 97.1 °F (36.2 °C)   TempSrc:  Oral Oral Oral   SpO2: 99% 97% 98% 97%   Weight:       Height:                             Body mass index is 24.25 kg/(m^2).    Intake/Output Summary (Last 24 hours) at 01/04/17 1006  Last data filed at 01/04/17 0900   Gross per 24 hour   Intake 2799.17 ml   Output   1300 ml   Net 1499.17 ml         Exam:  GEN:  No distress, appears stated age  EYES:   PERRL, anicteric sclera  ENT:    External ears/nose normal, OP clear  NECK:  No adenopathy, midline trachea  LUNGS: Normal chest on inspection, palpation and diminished bilaterally on auscultation  CV:  Normal S1S2, without murmur  ABD:  Non tender, non distended, no hepatosplenomegaly, +BS  EXT:  No edema, cyanosis or clubbing    Scheduled meds:    aspirin 81 mg Oral Daily   atorvastatin 20 mg Oral Nightly   azithromycin 500 mg Intravenous Q24H   ceftriaxone 1 g Intravenous Q24H   clopidogrel 75 mg Oral Daily   docusate sodium 100 mg Oral BID   enoxaparin 40 mg Subcutaneous Nightly   ipratropium-albuterol 3 mL Nebulization 4x Daily - RT     IV meds:                        sodium chloride 125 mL/hr Last Rate: Stopped (01/04/17 0606)     Data Review:    Results from last 7 days  Lab Units 01/04/17  0552 01/03/17  0029   SODIUM mmol/L 138 136   POTASSIUM mmol/L 4.2 4.0   CHLORIDE mmol/L 106 98   TOTAL  CO2 mmol/L 18.6* 21.4*   BUN mg/dL 15 17   CREATININE mg/dL 1.01 1.19   GLUCOSE mg/dL 101* 99   CALCIUM mg/dL 8.4* 9.2         Estimated Creatinine Clearance: 74.9 mL/min (by C-G formula based on Cr of 1.01).    Results from last 7 days  Lab Units 01/04/17  0552 01/03/17  0029   WBC 10*3/mm3 6.54 10.31   HEMOGLOBIN g/dL 12.2* 13.2*   PLATELETS 10*3/mm3 184 234       Results from last 7 days  Lab Units 01/03/17  0029   INR  1.12*       Results from last 7 days  Lab Units 01/04/17  0552 01/03/17  0029   ALT (SGPT) U/L 11 16   AST (SGOT) U/L 12 15       Microbiology reviewed: No growth to date.  RVP negative        Chest x-ray and CT chest reviewed    Assessment   ASSESSMENT:   Bacterial bilateral pneumonia  5 mm noncalcified pulmonary nodule left lower lobe  History of tobacco use  Pleuritic chest pain  COPD  Coronary artery disease      PLAN:  Continue antibiotics for pneumonia.  Will require repeat CT with contrast for follow-up of lymphadenopathy in approximate 4-6 months.  Likely reactive.  5 mm pulmonary nodule also noted on CT scan.  Patient seems to be improving.  Could likely switch to oral antibiotics soon.    Niall Foster MD  Pulmonary and Critical Care Medicine  [facility], [facility]  1/4/2017    10:06 AM      DASH Diet/Soft and Bite-Sized Diet

## 2023-04-04 NOTE — DISCHARGE NOTE PROVIDER - NSDCMRMEDTOKEN_GEN_ALL_CORE_FT
acetaminophen 325 mg oral tablet: 2 tab(s) orally every 6 hours, As Needed -for mild pain MDD:8 tabs  apixaban 5 mg oral tablet: 1 tab(s) orally every 12 hours  atorvastatin 80 mg oral tablet: 1 tab(s) orally once a day (at bedtime)  Coreg 3.125 mg oral tablet: 1 tab(s) orally 2 times a day  furosemide 40 mg oral tablet: 1 tab(s) orally once a day  Levemir 100 units/mL subcutaneous solution: 30 unit(s) subcutaneous once a day (at bedtime)  lidocaine 4% topical film: Apply topically to affected area once a day, As Needed pain  lisinopril 2.5 mg oral tablet: 1 tab(s) orally once a day   NovoLOG 100 units/mL subcutaneous solution: 10 unit(s) subcutaneous 3 times a day (before meals)  pantoprazole 40 mg oral delayed release tablet: 1 tab(s) orally once a day   acetaminophen 325 mg oral tablet: 2 tab(s) orally every 6 hours, As Needed -for mild pain MDD:8 tabs  apixaban 5 mg oral tablet: 1 tab(s) orally every 12 hours  atorvastatin 80 mg oral tablet: 1 tab(s) orally once a day (at bedtime)  clopidogrel 75 mg oral tablet: 1 tab(s) orally once a day  Coreg 3.125 mg oral tablet: 1 tab(s) orally 2 times a day  furosemide 40 mg oral tablet: 1 tab(s) orally once a day  isosorbide mononitrate 30 mg oral tablet, extended release: 1 tab(s) orally once a day  Levemir 100 units/mL subcutaneous solution: 30 unit(s) subcutaneous once a day (at bedtime)  lidocaine 4% topical film: Apply topically to affected area once a day, As Needed pain  lisinopril 2.5 mg oral tablet: 1 tab(s) orally once a day   NovoLOG 100 units/mL subcutaneous solution: 10 unit(s) subcutaneous 3 times a day (before meals)  pantoprazole 40 mg oral delayed release tablet: 1 tab(s) orally once a day

## 2023-04-04 NOTE — CHART NOTE - NSCHARTNOTEFT_GEN_A_CORE
Attempted to reach daughter Nicole at [phone]. No answer. Left voicemail    Attempted to reach son  @ [phone] with no answer      Isidro Reaves MD  Pager: [phone]
[facility] NIGHT COVERAGE    Patient seen at bedside status post cath via R femoral artery. Site clean, dry and intact. No bleeding, underlying hematoma, or erythema. No bruit auscultated. Patient denies chest pain, SOB, numbness/weakness/tingling. Pulses present, capillary refill appropriate. Patient educated and understands if any of the above symptoms were to arise, to notify RN. Will continue to monitor closely.    T(C): 37 (04-04-23 @ 22:15), Max: 37 (04-04-23 @ 20:00)  HR: 60 (04-04-23 @ 22:15) (60 - 70)  BP: 135/77 (04-04-23 @ 22:15) (130/76 - 144/90)  RR: 18 (04-04-23 @ 22:15) (17 - 18)  SpO2: 100% (04-04-23 @ 22:15) (99% - 100%)    Jerry Alex PA-C  [facility]   [id number]
Vital Signs Last 24 Hrs  T(C): 36.7 (02 Apr 2023 17:51), Max: 37.2 (01 Apr 2023 22:14)  T(F): 98.1 (02 Apr 2023 17:51), Max: 98.9 (01 Apr 2023 22:14)  HR: 71 (02 Apr 2023 17:51) (64 - 71)  BP: 147/85 (02 Apr 2023 17:51) (129/70 - 159/85)  BP(mean): --  RR: 17 (02 Apr 2023 17:51) (17 - 18)  SpO2: 98% (02 Apr 2023 17:51) (98% - 100%)    Parameters below as of 02 Apr 2023 17:51  Patient On (Oxygen Delivery Method): room air                          11.5   6.76  )-----------( 219      ( 01 Apr 2023 04:27 )             37.7   04-02    135  |  98  |  18  ----------------------------<  126<H>  4.2   |  26  |  1.24    Ca    9.8      02 Apr 2023 12:45  Phos  4.0     04-02  Mg     2.30     04-02    NST:   NUCLEAR FINDINGS:  There are large, moderate to severe defects in the apical,  anterolateral, and lateral walls that are predominantly  fixed, consistent with infarction with mild seamus-infarct  ischemia.  ------------------------------------------------------------------------  GATED ANALYSIS:  Post-stress gated wall motion analysis was performed (LVEF  = 34 %;LVEDV = 120 ml.), revealing overall severe  hypokinesis.  ------------------------------------------------------------------------  IMPRESSIONS:Abnormal Study  * Chest Pain: No chest pain with administration of  Regadenoson.  * Symptom: Dyspnea.  * HR Response: Appropriate.  * BP Response: Appropriate.  * Heart Rhythm: Normal Sinus Rhythm - 65 BPM.  * Baseline ECG: Nonspecific ST-T wave abnormality.  * ECG Abnormalities: None.  * Arrhythmia: Occasional VPDs occurred during rest, stress  and recovery.  * There are large, moderate to severe defects in the  apical, anterolateral, and lateral walls that are  predominantly fixed, consistent with infarction with mild  seamus-infarct ischemia.  * Post-stress gated wall motion analysis was performed  (LVEF = 34 %;LVEDV = 120 ml.), revealing overall severe  hypokinesis.  Conclusion:  There are large, moderate to severe defects in the apical,  anterolateral, and lateral walls that are predominantly  fixed, consistent with infarction with mild seamus-infarct  ischemia.    Above results and NST results reviewed with Cardiologist Dr. Reaves> recommend to d/c eliquis, to start heparin gtt in AM, likely plan for Cath on wednesday per Cards, night PA made aware.

## 2023-04-04 NOTE — DISCHARGE NOTE PROVIDER - HOSPITAL COURSE
83 year old Female with PMH DVT dx in setting of COVID in 2021, remains on Eliquis, DM, CAD s/p PCI to prox LAD/mid LAD/Diag/Ramus, last LHC 8/2022 with occluded prox Ramus stent, patent LAD and Diag stents, mild to mod atherosclerosis in pLcx and mRCA, managed medically, HFpEF, HTN, and CVA, recently admitted 2/2023 with weakness/ imaging c/w OA and UTI managed with Abx, and was discharged home with home PT.  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  She presents with complaints of ongoing weakness, lightheadedness, chest pain when walking and back pain limiting her walking. TTE with EF 40-45% with lateral and inferolateral wall hypokinesis. Had abnormal NST. Underwent LHC on 4/4/23 _________________    83 year old Female with PMH DVT dx in setting of COVID in 2021, remains on Eliquis, DM, CAD s/p PCI to prox LAD/mid LAD/Diag/Ramus, last LHC 8/2022 with occluded prox Ramus stent, patent LAD and Diag stents, mild to mod atherosclerosis in pLcx and mRCA, managed medically, HFpEF, HTN, and CVA, recently admitted 2/2023 with weakness/ imaging c/w OA and UTI managed with Abx, and was discharged home with home PT.  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  She presents with complaints of ongoing weakness, lightheadedness, chest pain when walking and back pain limiting her walking. TTE with EF 40-45% with lateral and inferolateral wall hypokinesis. Had abnormal NST. Underwent LHC on 4/4/23 and there were no interventions and medical management was recommended.  Stable for DC on 4/5/23.  D/W attending.

## 2023-04-04 NOTE — DISCHARGE NOTE PROVIDER - CARE PROVIDER_API CALL
Alan Rojas  CARDIOLOGY  [address], [address]  [place] [address]  Phone: [phone]  Fax: [phone]  Scheduled Appointment: 04/13/2023 01:00 PM

## 2023-04-04 NOTE — DISCHARGE NOTE PROVIDER - NSDCCPCAREPLAN_GEN_ALL_CORE_FT
PRINCIPAL DISCHARGE DIAGNOSIS  Diagnosis: Chest pain  Assessment and Plan of Treatment: You came to the hospital with chest pain and your ultrasound of the heart and nuclear stress test were abnormal. You underwent a coronary angiogram ____________     PRINCIPAL DISCHARGE DIAGNOSIS  Diagnosis: Chest pain  Assessment and Plan of Treatment: You came to the hospital with chest pain and your ultrasound of the heart and nuclear stress test were abnormal. You underwent a coronary angiogram which did not show any new blockages.  You were started on Plavix and Imdur to optimize medial therapy.  An appointment was scheduled for you to see DeMitch nileshredd on April 13th at 1pm      SECONDARY DISCHARGE DIAGNOSES  Diagnosis: (HFpEF) heart failure with preserved ejection fraction  Assessment and Plan of Treatment: Continue current medications and follow up with your cardilogist as scheduled.    Diagnosis: Type 2 diabetes mellitus  Assessment and Plan of Treatment: Monitor finger sticks pre-meal and bedtime, low salt, fat and carbohydrate diet, minimize glucose intake.  Exercise daily for at least 30 minutes and weight loss.  Follow up with primary care physician and endocrinologist for routine Hemoglobin A1C checks and management.  Follow up with your ophthalmologist for routine yearly vision exams.    Diagnosis: CAD (coronary artery disease)  Assessment and Plan of Treatment: Continue current medications and do not stop unless instructed by your physician.  Continue low salt, fat, cholesterol and carbohydrate diet. Follow up with cardiologist and primary care physician's routine appointment.

## 2023-04-04 NOTE — PROGRESS NOTE ADULT - SUBJECTIVE AND OBJECTIVE BOX
Patient is a 83y old  Female who presents with a chief complaint of Chest pain (04 Apr 2023 14:43)    Date of servie : 04-04-23 @ 15:59  INTERVAL HPI/OVERNIGHT EVENTS:  T(C): 36.7 (04-04-23 @ 12:45), Max: 36.7 (04-03-23 @ 17:45)  HR: 68 (04-04-23 @ 12:45) (64 - 71)  BP: 142/90 (04-04-23 @ 12:45) (142/90 - 146/82)  RR: 18 (04-04-23 @ 12:45) (17 - 18)  SpO2: 99% (04-04-23 @ 12:45) (99% - 100%)  Wt(kg): --  I&O's Summary    03 Apr 2023 07:01  -  04 Apr 2023 07:00  --------------------------------------------------------  IN: 400 mL / OUT: 500 mL / NET: -100 mL        LABS:                        11.9   6.01  )-----------( 223      ( 04 Apr 2023 06:00 )             39.1     04-04    139  |  102  |  20  ----------------------------<  148<H>  3.9   |  24  |  1.16    Ca    9.5      04 Apr 2023 06:00  Phos  4.0     04-04  Mg     2.10     04-04      PTT - ( 04 Apr 2023 06:00 )  PTT:76.7 sec    CAPILLARY BLOOD GLUCOSE      POCT Blood Glucose.: 155 mg/dL (04 Apr 2023 12:40)  POCT Blood Glucose.: 170 mg/dL (04 Apr 2023 08:42)  POCT Blood Glucose.: 183 mg/dL (03 Apr 2023 22:06)  POCT Blood Glucose.: 110 mg/dL (03 Apr 2023 17:59)            MEDICATIONS  (STANDING):  atorvastatin 80 milliGRAM(s) Oral at bedtime  carvedilol 3.125 milliGRAM(s) Oral every 12 hours  dextrose 5%. 1000 milliLiter(s) (100 mL/Hr) IV Continuous <Continuous>  dextrose 5%. 1000 milliLiter(s) (50 mL/Hr) IV Continuous <Continuous>  dextrose 50% Injectable 25 Gram(s) IV Push once  dextrose 50% Injectable 12.5 Gram(s) IV Push once  dextrose 50% Injectable 25 Gram(s) IV Push once  furosemide    Tablet 40 milliGRAM(s) Oral daily  glucagon  Injectable 1 milliGRAM(s) IntraMuscular once  heparin  Infusion 900 Unit(s)/Hr (9 mL/Hr) IV Continuous <Continuous>  insulin glargine Injectable (LANTUS) 14 Unit(s) SubCutaneous at bedtime  insulin lispro (ADMELOG) corrective regimen sliding scale   SubCutaneous three times a day before meals  insulin lispro (ADMELOG) corrective regimen sliding scale   SubCutaneous at bedtime  insulin lispro Injectable (ADMELOG) 5 Unit(s) SubCutaneous three times a day before meals  lisinopril 2.5 milliGRAM(s) Oral daily  pantoprazole    Tablet 40 milliGRAM(s) Oral before breakfast    MEDICATIONS  (PRN):  acetaminophen     Tablet .. 650 milliGRAM(s) Oral every 6 hours PRN Temp greater or equal to 38C (100.4F), Mild Pain (1 - 3)  aluminum hydroxide/magnesium hydroxide/simethicone Suspension 30 milliLiter(s) Oral every 4 hours PRN Dyspepsia  dextrose Oral Gel 15 Gram(s) Oral once PRN Blood Glucose LESS THAN 70 milliGRAM(s)/deciliter  melatonin 3 milliGRAM(s) Oral at bedtime PRN Insomnia  ondansetron Injectable 4 milliGRAM(s) IV Push every 8 hours PRN Nausea and/or Vomiting  simethicone 80 milliGRAM(s) Chew two times a day PRN Gas          PHYSICAL EXAM:  GENERAL: NAD, well-groomed, well-developed  HEAD:  Atraumatic, Normocephalic  CHEST/LUNG: Clear to percussion bilaterally; No rales, rhonchi, wheezing, or rubs  HEART: Regular rate and rhythm; No murmurs, rubs, or gallops  ABDOMEN: Soft, Nontender, Nondistended; Bowel sounds present  EXTREMITIES:  2+ Peripheral Pulses, No clubbing, cyanosis, or edema  LYMPH: No lymphadenopathy noted  SKIN: No rashes or lesions    Care Discussed with Consultants/Other Providers [ ] YES  [ ] NO

## 2023-04-04 NOTE — PROGRESS NOTE ADULT - SUBJECTIVE AND OBJECTIVE BOX
Date of service 04/4/2023    chief complaint: Chest Pain/Back Pain    extended hpi: 83 year old Female with PMH DM, CAD s/p PCI to prox LAD/mid LAD/Diag/Ramus, last [facility] 8/2022 with occluded prox Ramus stent, patent LAD and Diag stents, mild to mod atherosclerosis in pLcx and mRCA, managed medically, HFpEF, HTN, and CVA, recently admitted 2/2023 with weakness/ imaging c/w OA and UTI managed with Abx, and was discharged home with home PT.  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  She presents today with complaints of ongoing weakness, lightheadedness, chest pain when walking and back pain limiting her walking. She denies palpitations or syncope.      Patient denies chest pain or shortness of breath.   Review of symptoms otherwise negative.    Review of Systems:   Constitutional: [ ] fevers, [ ] chills.   Skin: [ ] dry skin. [ ] rashes.  Psychiatric: [ ] depression, [ ] anxiety.   Gastrointestinal: [ ] BRBPR, [ ] melena.   Neurological: [ ] confusion. [ ] seizures. [ ] shuffling gait.   Ears,Nose,Mouth and Throat: [ ] ear pain [ ] sore throat.   Eyes: [ ] diplopia.   Respiratory: [ ] hemoptysis. [ ] shortness of breath  Cardiovascular: See HPI above  Hematologic/Lymphatic: [ ] anemia. [ ] painful nodes. [ ] prolonged bleeding.   Genitourinary: [ ] hematuria. [ ] flank pain.   Endocrine: [ ] significant change in weight. [ ] intolerance to heat and cold.     Review of systems [x ] otherwise negative, [ ] otherwise unable to obtain    FH: no family history of sudden cardiac death in first degree relatives    SH: [ ] tobacco, [ ] alcohol, [ ] drugs    acetaminophen     Tablet .. 650 milliGRAM(s) Oral every 6 hours PRN  aluminum hydroxide/magnesium hydroxide/simethicone Suspension 30 milliLiter(s) Oral every 4 hours PRN  atorvastatin 80 milliGRAM(s) Oral at bedtime  carvedilol 3.125 milliGRAM(s) Oral every 12 hours  furosemide    Tablet 40 milliGRAM(s) Oral daily  heparin  Infusion 900 Unit(s)/Hr IV Continuous <Continuous>  insulin glargine Injectable (LANTUS) 14 Unit(s) SubCutaneous at bedtime  insulin lispro (ADMELOG) corrective regimen sliding scale   SubCutaneous three times a day before meals  insulin lispro (ADMELOG) corrective regimen sliding scale   SubCutaneous at bedtime  insulin lispro Injectable (ADMELOG) 5 Unit(s) SubCutaneous three times a day before meals  lisinopril 2.5 milliGRAM(s) Oral daily  melatonin 3 milliGRAM(s) Oral at bedtime PRN  ondansetron Injectable 4 milliGRAM(s) IV Push every 8 hours PRN  pantoprazole    Tablet 40 milliGRAM(s) Oral before breakfast  simethicone 80 milliGRAM(s) Chew two times a day PRN                          11.9   6.01  )-----------( 223      ( 04 Apr 2023 06:00 )             39.1     139  |  102  |  20  ----------------------------<  148<H>  3.9   |  24  |  1.16    Ca    9.5      04 Apr 2023 06:00  Phos  4.0     04-04  Mg     2.10     04-04    T(C): 36.6 (04-04-23 @ 05:45), Max: 36.7 (04-03-23 @ 17:45)  HR: 64 (04-04-23 @ 05:45) (64 - 71)  BP: 144/90 (04-04-23 @ 05:45) (144/90 - 146/82)  RR: 17 (04-04-23 @ 05:45) (17 - 18)  SpO2: 100% (04-04-23 @ 05:45) (99% - 100%)  Wt(kg): --    I&O's Summary    03 Apr 2023 07:01  -  04 Apr 2023 07:00  --------------------------------------------------------  IN: 400 mL / OUT: 500 mL / NET: -100 mL      General: Well nourished in no acute distress. Alert and Oriented * 3.   Head: Normocephalic and atraumatic.   Neck: No JVD. No bruits. Supple. Does not appear to be enlarged.   Cardiovascular: + S1,S2 ; RRR Soft systolic murmur at the left lower sternal border. No rubs noted.    Lungs: CTA b/l. No rhonchi, rales or wheezes.   Abdomen: + BS, soft. Non tender. Non distended. No rebound. No guarding.   Extremities: No clubbing/cyanosis/edema.   Neurologic: Moves all four extremities. Full range of motion.   Skin: Warm and moist. The patient's skin has normal elasticity and good skin turgor.   Psychiatric: Appropriate mood and affect.  Musculoskeletal: Normal range of motion, normal strength    < from: Cardiac Catheterization (08.18.22 @ 17:50) >  Diagnostic Conclusions:   Occluded proximal ramus stent. Patent LAD and diagonal stents.  Mild-moderate atherosclerosis in proximal Cx and mid RCA.    < end of copied text >        < from: Transthoracic Echocardiogram (03.30.23 @ 16:25) >  CONCLUSIONS:  1. Normal trileaflet aortic valve.  2. Normal left ventricular internal dimensions and wall  thicknesses.  3. Moderate segmental left ventricular systolic  dysfunction. Lateral and inferolateral wall hypokinesis.  4. Normal right ventricular size and function.  ------------------------------------------------------------------------  Confirmed on  3/31/2023 - 15:49:07 by BRIAN Soares    < end of copied text >      < from: Nuclear Stress Test-Exercise (04.02.23 @ 08:07) >  GATED ANALYSIS:  Post-stress gated wall motion analysis was performed (LVEF  = 34 %;LVEDV = 120 ml.), revealing overall severe  hypokinesis.  ------------------------------------------------------------------------  IMPRESSIONS:Abnormal Study  * Chest Pain: No chest pain with administration of  Regadenoson.  * Symptom: Dyspnea.  * HR Response: Appropriate.  * BP Response: Appropriate.  * Heart Rhythm: Normal Sinus Rhythm - 65 BPM.  * Baseline ECG: Nonspecific ST-T wave abnormality.  * ECG Abnormalities: None.  * Arrhythmia: Occasional VPDs occurred during rest, stress  and recovery.  * There are large, moderate to severe defects in the  apical, anterolateral, and lateral walls that are  predominantly fixed, consistent with infarction with mild  seamus-infarct ischemia.  * Post-stress gated wall motion analysis was performed  (LVEF = 34 %;LVEDV = 120 ml.), revealing overall severe  hypokinesis.  Conclusion:  There are large, moderate to severe defects in the apical,  anterolateral, and lateral walls that are predominantly  fixed, consistent with infarction with mild seamus-infarct  ischemia.  ------------------------------------------------------------------------  Confirmed on  4/2/2023 - 15:25:27 by Wilfredo Garibay MD, [facility],  [facility], [facility]  --------------------------------------------------------------------  < end of copied text >      ASSESSMENT/PLAN: 	83 year old Female with PMH DVT dx in setting of COVID in 2021, remains on Eliquis, DM, CAD s/p PCI to prox LAD/mid LAD/Diag/Ramus, last [facility] 8/2022 with occluded prox Ramus stent, patent LAD and Diag stents, mild to mod atherosclerosis in pLcx and mRCA, managed medically, HFpEF, HTN, and CVA, recently admitted 2/2023 with weakness/ imaging c/w OA and UTI managed with Abx, and was discharged home with home PT.  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  She presents today with complaints of ongoing weakness, lightheadedness, chest pain when walking and back pain limiting her walking.    #chest pain  -- ECHO noted   -- stress test abnormal mostly fixed defects with  seamus-infarct ischemia last cath showed 100% ramus ISR however  she has had worsening chest pain, will plan for a diagnostic LHC with plan to uptitrate anti-anginals if there is no change when compared to her angiogram from 08/2022  -- hold eliquis, c/w heparin gtt, plan for LHC Wednesday after eliquis washout, last dose Eliquis 04/02 @ 1700  -- ECHO noted   -- given hx CAD, cont Lipitor, Coreg and Lisinopril, based on cath will either start ASA or Plavix, will consider addition of Imdur based on angiogram results    Spoke with son ([phone]) who will d/w his sister

## 2023-04-05 ENCOUNTER — TRANSCRIPTION ENCOUNTER (OUTPATIENT)
Age: 83
End: 2023-04-05

## 2023-04-05 VITALS
DIASTOLIC BLOOD PRESSURE: 70 MMHG | HEART RATE: 59 BPM | RESPIRATION RATE: 18 BRPM | TEMPERATURE: 98 F | OXYGEN SATURATION: 100 % | SYSTOLIC BLOOD PRESSURE: 123 MMHG

## 2023-04-05 LAB
ANION GAP SERPL CALC-SCNC: 12 MMOL/L — SIGNIFICANT CHANGE UP (ref 7–14)
APTT BLD: 34.9 SEC — SIGNIFICANT CHANGE UP (ref 27–36.3)
BUN SERPL-MCNC: 22 MG/DL — SIGNIFICANT CHANGE UP (ref 7–23)
CALCIUM SERPL-MCNC: 9.3 MG/DL — SIGNIFICANT CHANGE UP (ref 8.4–10.5)
CHLORIDE SERPL-SCNC: 101 MMOL/L — SIGNIFICANT CHANGE UP (ref 98–107)
CO2 SERPL-SCNC: 24 MMOL/L — SIGNIFICANT CHANGE UP (ref 22–31)
CREAT SERPL-MCNC: 1.18 MG/DL — SIGNIFICANT CHANGE UP (ref 0.5–1.3)
EGFR: 45 ML/MIN/1.73M2 — LOW
GLUCOSE BLDC GLUCOMTR-MCNC: 125 MG/DL — HIGH (ref 70–99)
GLUCOSE BLDC GLUCOMTR-MCNC: 144 MG/DL — HIGH (ref 70–99)
GLUCOSE SERPL-MCNC: 144 MG/DL — HIGH (ref 70–99)
HCT VFR BLD CALC: 38.2 % — SIGNIFICANT CHANGE UP (ref 34.5–45)
HGB BLD-MCNC: 11.5 G/DL — SIGNIFICANT CHANGE UP (ref 11.5–15.5)
MAGNESIUM SERPL-MCNC: 2.1 MG/DL — SIGNIFICANT CHANGE UP (ref 1.6–2.6)
MCHC RBC-ENTMCNC: 25.7 PG — LOW (ref 27–34)
MCHC RBC-ENTMCNC: 30.1 GM/DL — LOW (ref 32–36)
MCV RBC AUTO: 85.5 FL — SIGNIFICANT CHANGE UP (ref 80–100)
NRBC # BLD: 0 /100 WBCS — SIGNIFICANT CHANGE UP (ref 0–0)
NRBC # FLD: 0 K/UL — SIGNIFICANT CHANGE UP (ref 0–0)
PHOSPHATE SERPL-MCNC: 4 MG/DL — SIGNIFICANT CHANGE UP (ref 2.5–4.5)
PLATELET # BLD AUTO: 219 K/UL — SIGNIFICANT CHANGE UP (ref 150–400)
POTASSIUM SERPL-MCNC: 4.2 MMOL/L — SIGNIFICANT CHANGE UP (ref 3.5–5.3)
POTASSIUM SERPL-SCNC: 4.2 MMOL/L — SIGNIFICANT CHANGE UP (ref 3.5–5.3)
RBC # BLD: 4.47 M/UL — SIGNIFICANT CHANGE UP (ref 3.8–5.2)
RBC # FLD: 13.6 % — SIGNIFICANT CHANGE UP (ref 10.3–14.5)
SODIUM SERPL-SCNC: 137 MMOL/L — SIGNIFICANT CHANGE UP (ref 135–145)
WBC # BLD: 5.95 K/UL — SIGNIFICANT CHANGE UP (ref 3.8–10.5)
WBC # FLD AUTO: 5.95 K/UL — SIGNIFICANT CHANGE UP (ref 3.8–10.5)

## 2023-04-05 RX ORDER — APIXABAN 2.5 MG/1
5 TABLET, FILM COATED ORAL
Refills: 0 | Status: DISCONTINUED | OUTPATIENT
Start: 2023-04-05 | End: 2023-04-05

## 2023-04-05 RX ORDER — CLOPIDOGREL BISULFATE 75 MG/1
75 TABLET, FILM COATED ORAL DAILY
Refills: 0 | Status: DISCONTINUED | OUTPATIENT
Start: 2023-04-05 | End: 2023-04-05

## 2023-04-05 RX ORDER — ISOSORBIDE MONONITRATE 60 MG/1
1 TABLET, EXTENDED RELEASE ORAL
Qty: 30 | Refills: 0
Start: 2023-04-05 | End: 2023-05-04

## 2023-04-05 RX ORDER — CLOPIDOGREL BISULFATE 75 MG/1
1 TABLET, FILM COATED ORAL
Qty: 30 | Refills: 0
Start: 2023-04-05 | End: 2023-05-04

## 2023-04-05 RX ORDER — HEPARIN SODIUM 5000 [USP'U]/ML
900 INJECTION INTRAVENOUS; SUBCUTANEOUS
Qty: 25000 | Refills: 0 | Status: DISCONTINUED | OUTPATIENT
Start: 2023-04-05 | End: 2023-04-05

## 2023-04-05 RX ORDER — ISOSORBIDE MONONITRATE 60 MG/1
30 TABLET, EXTENDED RELEASE ORAL DAILY
Refills: 0 | Status: DISCONTINUED | OUTPATIENT
Start: 2023-04-05 | End: 2023-04-05

## 2023-04-05 RX ADMIN — APIXABAN 5 MILLIGRAM(S): 2.5 TABLET, FILM COATED ORAL at 11:46

## 2023-04-05 RX ADMIN — Medication 5 UNIT(S): at 13:12

## 2023-04-05 RX ADMIN — CARVEDILOL PHOSPHATE 3.12 MILLIGRAM(S): 80 CAPSULE, EXTENDED RELEASE ORAL at 09:00

## 2023-04-05 RX ADMIN — HEPARIN SODIUM 9 UNIT(S)/HR: 5000 INJECTION INTRAVENOUS; SUBCUTANEOUS at 07:34

## 2023-04-05 RX ADMIN — Medication 5 UNIT(S): at 09:01

## 2023-04-05 RX ADMIN — PANTOPRAZOLE SODIUM 40 MILLIGRAM(S): 20 TABLET, DELAYED RELEASE ORAL at 05:13

## 2023-04-05 RX ADMIN — Medication 40 MILLIGRAM(S): at 05:12

## 2023-04-05 RX ADMIN — HEPARIN SODIUM 9 UNIT(S)/HR: 5000 INJECTION INTRAVENOUS; SUBCUTANEOUS at 06:55

## 2023-04-05 RX ADMIN — LISINOPRIL 2.5 MILLIGRAM(S): 2.5 TABLET ORAL at 05:12

## 2023-04-05 NOTE — DISCHARGE NOTE NURSING/CASE MANAGEMENT/SOCIAL WORK - PATIENT PORTAL LINK FT
You can access the FollowMyHealth Patient Portal offered by [facility] by registering at the following website: [email or web address]. By joining [employer]’s FollowMyHealth portal, you will also be able to view your health information using other applications (apps) compatible with our system.

## 2023-04-05 NOTE — PROGRESS NOTE ADULT - ASSESSMENT
83 y.o. F with PMH of HTN, HFpEF, CVA, CAD with stents who presents with intermittent CP x 1 month. Chest pain has both typical and atypical features.         Problem/Plan - 1:  ·  Problem: Chest pain.   ·  Plan: - telemonitor   - cards fu   - ischemia caba as per cards   -on Eliquis.    Problem/Plan - 2:  ·  Problem: CAD (coronary artery disease).   ·  Plan: -as above  -c/w coreg, lisinopril, and atorvastatin.    Problem/Plan - 3:  ·  Problem: Type 2 diabetes mellitus.   ·  Plan: -basal ,bolus   -low dose ISS.    Problem/Plan - 4:  ·  Problem: (HFpEF) heart failure with preserved ejection fraction.   ·  Plan: -No JVD on exam, has 1-2+ LE edema  -c/w lasix  -BP control.  Problem/Plan - 5:  ·  Problem: Need for prophylactic measure.   ·  Plan: -Diet: consistent carb, DASH  -DVT ppx: therapeutic on Eliqus  
83 y.o. F with PMH of HTN, HFpEF, CVA, CAD with stents who presents with intermittent CP x 1 month. Chest pain has both typical and atypical features.         Problem/Plan - 1:  ·  Problem: Chest pain.   ·  Plan: -pt with intermittent chest pain. Has typical features such as substernal location, accompanied with SOB, worsens with exertion and resolves with rest. However, pt does also have chest wall tenderness on exam  -pt has previous hx of CAD; last C in 2022 demonstrated occluded prox Ramus stent, patent LAD and Diag stents, mild to mod atherosclerosis in pLcx and mRCA,   -will order TTE and pharmacologic NST  -on Eliquis.    Problem/Plan - 2:  ·  Problem: CAD (coronary artery disease).   ·  Plan: -as above  -c/w coreg, lisinopril, and atorvastatin.    Problem/Plan - 3:  ·  Problem: Type 2 diabetes mellitus.   ·  Plan: -will resume insulin regimen used during prior hospitalization: 15 u Lantus and 5 u tidac admelog  -low dose ISS.    Problem/Plan - 4:  ·  Problem: (HFpEF) heart failure with preserved ejection fraction.   ·  Plan: -No JVD on exam, has 1-2+ LE edema  -c/w lasix  -BP control.    Problem/Plan - 5:  ·  Problem: Need for prophylactic measure.   ·  Plan: -Diet: consistent carb, DASH  -DVT ppx: therapeutic on Eliqus  -Dispo: pending NST and TTE.
q83 y.o. F with PMH of HTN, HFpEF, CVA, CAD with stents who presents with intermittent CP x 1 month. Chest pain has both typical and atypical features.         Problem/Plan - 1:  ·  Problem: Chest pain.   ·  Plan: - telemonitor   - cards fu   - ischemia caba as per cards   -on Eliquis.    Problem/Plan - 2:  ·  Problem: CAD (coronary artery disease).   ·  Plan: -as above  -c/w coreg, lisinopril, and atorvastatin.    Problem/Plan - 3:  ·  Problem: Type 2 diabetes mellitus.   ·  Plan: -basal ,bolus   -low dose ISS.    Problem/Plan - 4:  ·  Problem: (HFpEF) heart failure with preserved ejection fraction.   ·  Plan: -No JVD on exam, has 1-2+ LE edema  -c/w lasix  -BP control.    Problem/Plan - 5:  ·  Problem: Need for prophylactic measure.   ·  Plan: -Diet: consistent carb, DASH  -DVT ppx: therapeutic on Eliqus
83 y.o. F with PMH of HTN, HFpEF, CVA, CAD with stents who presents with intermittent CP x 1 month. Chest pain has both typical and atypical features.         Problem/Plan - 1:  ·  Problem: Chest pain.   ·  Plan: - telemonitor   - cards fu   - ischemia caba as per cards   -on Eliquis.    Problem/Plan - 2:  ·  Problem: CAD (coronary artery disease).   ·  Plan: -as above  -c/w coreg, lisinopril, and atorvastatin.    Problem/Plan - 3:  ·  Problem: Type 2 diabetes mellitus.   ·  Plan: -basal ,bolus   -low dose ISS.    Problem/Plan - 4:  ·  Problem: (HFpEF) heart failure with preserved ejection fraction.   ·  Plan: -No JVD on exam, has 1-2+ LE edema  -c/w lasix  -BP control.  Problem/Plan - 5:  ·  Problem: Need for prophylactic measure.   ·  Plan: -Diet: consistent carb, DASH  -DVT ppx: therapeutic on Eliqus
83 y.o. F with PMH of HTN, HFpEF, CVA, CAD with stents who presents with intermittent CP x 1 month. Chest pain has both typical and atypical features.         Problem/Plan - 1:  ·  Problem: Chest pain.   ·  Plan: - telemonitor   - cards fu   -on Eliquis.    Problem/Plan - 2:  ·  Problem: CAD (coronary artery disease).   ·  Plan: -as above  -c/w coreg, lisinopril, and atorvastatin.    Problem/Plan - 3:  ·  Problem: Type 2 diabetes mellitus.   ·  Plan: -basal ,bolus   -low dose ISS.  Problem/Plan - 4:  ·  Problem: (HFpEF) heart failure with preserved ejection fraction.   ·  Plan: -No JVD on exam, has 1-2+ LE edema  -c/w lasix  -BP control.  Problem/Plan - 5:  ·  Problem: Need for prophylactic measure.   ·  Plan: -Diet: consistent carb, DASH  -DVT ppx: therapeutic on Eliqus

## 2023-04-05 NOTE — PROGRESS NOTE ADULT - PROVIDER SPECIALTY LIST ADULT
Cardiology
Hospitalist
Hospitalist
Cardiology
Hospitalist

## 2023-04-05 NOTE — PROGRESS NOTE ADULT - SUBJECTIVE AND OBJECTIVE BOX
Date of service 04/5/2023    chief complaint: Chest Pain/Back Pain    extended hpi: 83 year old Female with PMH DM, CAD s/p PCI to prox LAD/mid LAD/Diag/Ramus, last [facility] 8/2022 with occluded prox Ramus stent, patent LAD and Diag stents, mild to mod atherosclerosis in pLcx and mRCA, managed medically, HFpEF, HTN, and CVA, recently admitted 2/2023 with weakness/ imaging c/w OA and UTI managed with Abx, and was discharged home with home PT.  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  She presents today with complaints of ongoing weakness, lightheadedness, chest pain when walking and back pain limiting her walking. She denies palpitations or syncope.      Patient denies chest pain or shortness of breath.   Review of symptoms otherwise negative.    Review of Systems:   Constitutional: [ ] fevers, [ ] chills.   Skin: [ ] dry skin. [ ] rashes.  Psychiatric: [ ] depression, [ ] anxiety.   Gastrointestinal: [ ] BRBPR, [ ] melena.   Neurological: [ ] confusion. [ ] seizures. [ ] shuffling gait.   Ears,Nose,Mouth and Throat: [ ] ear pain [ ] sore throat.   Eyes: [ ] diplopia.   Respiratory: [ ] hemoptysis. [ ] shortness of breath  Cardiovascular: See HPI above  Hematologic/Lymphatic: [ ] anemia. [ ] painful nodes. [ ] prolonged bleeding.   Genitourinary: [ ] hematuria. [ ] flank pain.   Endocrine: [ ] significant change in weight. [ ] intolerance to heat and cold.     Review of systems [ x] otherwise negative, [ ] otherwise unable to obtain    FH: no family history of sudden cardiac death in first degree relatives    SH: [ ] tobacco, [ ] alcohol, [ ] drugs    acetaminophen     Tablet .. 650 milliGRAM(s) Oral every 6 hours PRN  aluminum hydroxide/magnesium hydroxide/simethicone Suspension 30 milliLiter(s) Oral every 4 hours PRN  apixaban 5 milliGRAM(s) Oral two times a day  atorvastatin 80 milliGRAM(s) Oral at bedtime  carvedilol 3.125 milliGRAM(s) Oral every 12 hours  clopidogrel Tablet 75 milliGRAM(s) Oral daily  furosemide    Tablet 40 milliGRAM(s) Oral daily  glucagon  Injectable 1 milliGRAM(s) IntraMuscular once  insulin glargine Injectable (LANTUS) 14 Unit(s) SubCutaneous at bedtime  insulin lispro (ADMELOG) corrective regimen sliding scale   SubCutaneous three times a day before meals  insulin lispro (ADMELOG) corrective regimen sliding scale   SubCutaneous at bedtime  insulin lispro Injectable (ADMELOG) 5 Unit(s) SubCutaneous three times a day before meals  isosorbide   mononitrate ER Tablet (IMDUR) 30 milliGRAM(s) Oral daily  lisinopril 2.5 milliGRAM(s) Oral daily  melatonin 3 milliGRAM(s) Oral at bedtime PRN  ondansetron Injectable 4 milliGRAM(s) IV Push every 8 hours PRN  pantoprazole    Tablet 40 milliGRAM(s) Oral before breakfast  simethicone 80 milliGRAM(s) Chew two times a day PRN                            11.5   5.95  )-----------( 219      ( 05 Apr 2023 05:00 )             38.2       04-05    137  |  101  |  22  ----------------------------<  144<H>  4.2   |  24  |  1.18    Ca    9.3      05 Apr 2023 05:00  Phos  4.0     04-05  Mg     2.10     04-05      T(C): 36.4 (04-05-23 @ 08:56), Max: 37 (04-04-23 @ 20:00)  HR: 59 (04-05-23 @ 08:56) (59 - 70)  BP: 123/70 (04-05-23 @ 08:56) (123/70 - 135/77)  RR: 18 (04-05-23 @ 08:56) (18 - 18)  SpO2: 100% (04-05-23 @ 08:56) (100% - 100%)    General: Well nourished in no acute distress. Alert and Oriented * 3.   Head: Normocephalic and atraumatic.   Neck: No JVD. No bruits. Supple. Does not appear to be enlarged.   Cardiovascular: + S1,S2 ; RRR Soft systolic murmur at the left lower sternal border. No rubs noted.    Lungs: CTA b/l. No rhonchi, rales or wheezes.   Abdomen: + BS, soft. Non tender. Non distended. No rebound. No guarding.   Extremities: No clubbing/cyanosis/edema.   Neurologic: Moves all four extremities. Full range of motion.   Skin: Warm and moist. The patient's skin has normal elasticity and good skin turgor.   Psychiatric: Appropriate mood and affect.  Musculoskeletal: Normal range of motion, normal strength    < from: Cardiac Catheterization (08.18.22 @ 17:50) >  Diagnostic Conclusions:   Occluded proximal ramus stent. Patent LAD and diagonal stents.  Mild-moderate atherosclerosis in proximal Cx and mid RCA.    < end of copied text >        < from: Transthoracic Echocardiogram (03.30.23 @ 16:25) >  CONCLUSIONS:  1. Normal trileaflet aortic valve.  2. Normal left ventricular internal dimensions and wall  thicknesses.  3. Moderate segmental left ventricular systolic  dysfunction. Lateral and inferolateral wall hypokinesis.  4. Normal right ventricular size and function.  ------------------------------------------------------------------------  Confirmed on  3/31/2023 - 15:49:07 by BRIAN Soares    < end of copied text >      < from: Nuclear Stress Test-Exercise (04.02.23 @ 08:07) >  GATED ANALYSIS:  Post-stress gated wall motion analysis was performed (LVEF  = 34 %;LVEDV = 120 ml.), revealing overall severe  hypokinesis.  ------------------------------------------------------------------------  IMPRESSIONS:Abnormal Study  * Chest Pain: No chest pain with administration of  Regadenoson.  * Symptom: Dyspnea.  * HR Response: Appropriate.  * BP Response: Appropriate.  * Heart Rhythm: Normal Sinus Rhythm - 65 BPM.  * Baseline ECG: Nonspecific ST-T wave abnormality.  * ECG Abnormalities: None.  * Arrhythmia: Occasional VPDs occurred during rest, stress  and recovery.  * There are large, moderate to severe defects in the  apical, anterolateral, and lateral walls that are  predominantly fixed, consistent with infarction with mild  seamus-infarct ischemia.  * Post-stress gated wall motion analysis was performed  (LVEF = 34 %;LVEDV = 120 ml.), revealing overall severe  hypokinesis.  Conclusion:  There are large, moderate to severe defects in the apical,  anterolateral, and lateral walls that are predominantly  fixed, consistent with infarction with mild seamus-infarct  ischemia.  ------------------------------------------------------------------------  Confirmed on  4/2/2023 - 15:25:27 by Wilfredo Garibay MD, [facility],  [facility], [facility]  --------------------------------------------------------------------  < end of copied text >    < from: Cardiac Catheterization (04.04.23 @ 16:41) >  Diagnostic Conclusions:   Chronically occluded ramus artery stent. Mild atherosclerosis in  proximal LAD. Patent mid LAD and ostial/proximal diagonal  stents. Moderate atherosclerosis in proximal Cx. Mild atherosclerosis  in mid and distal RCA.    Recommendations:   Optimize medical therapy for ischemic heart disease. Aggressive risk  factor modification.    < end of copied text >        ASSESSMENT/PLAN: 	83 year old Female with PMH DVT dx in setting of COVID in 2021, remains on Eliquis, DM, CAD s/p PCI to prox LAD/mid LAD/Diag/Ramus, last [facility] 8/2022 with occluded prox Ramus stent, patent LAD and Diag stents, mild to mod atherosclerosis in pLcx and mRCA, managed medically, HFpEF, HTN, and CVA, recently admitted 2/2023 with weakness/ imaging c/w OA and UTI managed with Abx, and was discharged home with home PT.  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  She presents today with complaints of ongoing weakness, lightheadedness, chest pain when walking and back pain limiting her walking.    #chest pain  -- ECHO noted with moderate segmental LV dysfxn  -- stress test abnormal mostly fixed defects with  seamus-infarct ischemia last cath showed 100% ramus  in stent restenosis  --given chest pain, repeat LHC pursued, revealing unchanged CAD- chronic occl ramus stent, patent mLAD and ostial/prox DIag stents and moderate atherosclerosis of pLcx  --cont med management  --add Imdur 30mg   --add Plavix 75 mg daily  --DC home with family, refuses ARABELLA    Follow up with Dr Rojas 4/13/23 at 1 PM in [facility], [phone]    Hannah CALVILLO  [phone]

## 2023-04-05 NOTE — DISCHARGE NOTE NURSING/CASE MANAGEMENT/SOCIAL WORK - NSDCPEFALRISK_GEN_ALL_CORE
For information on Fall & Injury Prevention, visit: [email or web address] OR  [email or web address] OR  https://www.cdc.gov/steadi/patient.html

## 2023-04-05 NOTE — PROGRESS NOTE ADULT - NS ATTEND AMEND GEN_ALL_CORE FT
Patient seen and examined. Agree with plan as detailed in PA/NP Note.     If cath today shows no change will add imdur and uptitrate and tx for SIHD    Isidro Reaves MD  Pager: [phone]
Patient seen and examined. Agree with plan as detailed in PA/NP Note.     F/u stress, she has had segmental wall motion abnormaltiies in the past    Silvanoet Jean Paul HAMMER  Pager: [phone]
ok for discharge into care of her family.  plavix for hx of coronary artery disease /stents, alongside apixaban for AFib.

## 2023-04-05 NOTE — PROGRESS NOTE ADULT - SUBJECTIVE AND OBJECTIVE BOX
Patient is a 83y old  Female who presents with a chief complaint of Chest pain    Date of servie : 04-05-23 @ 15:54  INTERVAL HPI/OVERNIGHT EVENTS:  T(C): 36.7 (04-04-23 @ 12:45), Max: 36.7 (04-03-23 @ 17:45)  HR: 68 (04-04-23 @ 12:45) (64 - 71)  BP: 142/90 (04-04-23 @ 12:45) (142/90 - 146/82)  RR: 18 (04-04-23 @ 12:45) (17 - 18)  SpO2: 99% (04-04-23 @ 12:45) (99% - 100%)  Wt(kg): --  I&O's Summary    03 Apr 2023 07:01  -  04 Apr 2023 07:00  --------------------------------------------------------  IN: 400 mL / OUT: 500 mL / NET: -100 mL        LABS:                        11.9   6.01  )-----------( 223      ( 04 Apr 2023 06:00 )             39.1     04-04    139  |  102  |  20  ----------------------------<  148<H>  3.9   |  24  |  1.16    Ca    9.5      04 Apr 2023 06:00  Phos  4.0     04-04  Mg     2.10     04-04      PTT - ( 04 Apr 2023 06:00 )  PTT:76.7 sec    CAPILLARY BLOOD GLUCOSE      POCT Blood Glucose.: 155 mg/dL (04 Apr 2023 12:40)  POCT Blood Glucose.: 170 mg/dL (04 Apr 2023 08:42)  POCT Blood Glucose.: 183 mg/dL (03 Apr 2023 22:06)  POCT Blood Glucose.: 110 mg/dL (03 Apr 2023 17:59)            MEDICATIONS  (STANDING):  atorvastatin 80 milliGRAM(s) Oral at bedtime  carvedilol 3.125 milliGRAM(s) Oral every 12 hours  dextrose 5%. 1000 milliLiter(s) (100 mL/Hr) IV Continuous <Continuous>  dextrose 5%. 1000 milliLiter(s) (50 mL/Hr) IV Continuous <Continuous>  dextrose 50% Injectable 25 Gram(s) IV Push once  dextrose 50% Injectable 12.5 Gram(s) IV Push once  dextrose 50% Injectable 25 Gram(s) IV Push once  furosemide    Tablet 40 milliGRAM(s) Oral daily  glucagon  Injectable 1 milliGRAM(s) IntraMuscular once  heparin  Infusion 900 Unit(s)/Hr (9 mL/Hr) IV Continuous <Continuous>  insulin glargine Injectable (LANTUS) 14 Unit(s) SubCutaneous at bedtime  insulin lispro (ADMELOG) corrective regimen sliding scale   SubCutaneous at bedtime  insulin lispro (ADMELOG) corrective regimen sliding scale   SubCutaneous three times a day before meals  insulin lispro Injectable (ADMELOG) 5 Unit(s) SubCutaneous three times a day before meals  lisinopril 2.5 milliGRAM(s) Oral daily  pantoprazole    Tablet 40 milliGRAM(s) Oral before breakfast    MEDICATIONS  (PRN):  acetaminophen     Tablet .. 650 milliGRAM(s) Oral every 6 hours PRN Temp greater or equal to 38C (100.4F), Mild Pain (1 - 3)  aluminum hydroxide/magnesium hydroxide/simethicone Suspension 30 milliLiter(s) Oral every 4 hours PRN Dyspepsia  dextrose Oral Gel 15 Gram(s) Oral once PRN Blood Glucose LESS THAN 70 milliGRAM(s)/deciliter  melatonin 3 milliGRAM(s) Oral at bedtime PRN Insomnia  ondansetron Injectable 4 milliGRAM(s) IV Push every 8 hours PRN Nausea and/or Vomiting  simethicone 80 milliGRAM(s) Chew two times a day PRN Gas          PHYSICAL EXAM:  GENERAL: NAD, well-groomed, well-developed  HEAD:  Atraumatic, Normocephalic  CHEST/LUNG: Clear to percussion bilaterally; No rales, rhonchi, wheezing, or rubs  HEART: Regular rate and rhythm; No murmurs, rubs, or gallops  ABDOMEN: Soft, Nontender, Nondistended; Bowel sounds present  EXTREMITIES:  2+ Peripheral Pulses, No clubbing, cyanosis, or edema  LYMPH: No lymphadenopathy noted  SKIN: No rashes or lesions    Care Discussed with Consultants/Other Providers [ ] YES  [ ] NO

## 2023-06-12 NOTE — DISCHARGE NOTE PROVIDER - NSDCCPCAREPLAN_GEN_ALL_CORE_FT
PRINCIPAL DISCHARGE DIAGNOSIS  Diagnosis: Whole body pain  Assessment and Plan of Treatment: due to osteoarthritis. continue toradol as needed. continue physical therapy      SECONDARY DISCHARGE DIAGNOSES  Diagnosis: Acute UTI  Assessment and Plan of Treatment: complete 3 day course antibiotics    Diagnosis: Mesenteric artery stenosis  Assessment and Plan of Treatment: shown on CT abdomen. continue medical management with asprin, statin, apixaban. if abdominal pain worsens or if develops  unintentional weight loss, discuss with your PCP about  vascular surgery surgery referal for revascularization of mesenteric artery    Diagnosis: HTN (hypertension)  Assessment and Plan of Treatment: continue home medication    Diagnosis: History of pulmonary embolism  Assessment and Plan of Treatment: continue apixaban    Diagnosis: Diabetes mellitus  Assessment and Plan of Treatment: continue home medication    Diagnosis: Chronic CHF  Assessment and Plan of Treatment: continue home medication    Diagnosis: CAD (coronary artery disease)  Assessment and Plan of Treatment: continue home medication    
68-year-old female past medical history of diabetes presents the emergency department for evaluation of nasal congestion, cough, sore throat and myalgias for the past week.  Patient has son with similar symptoms.  Denies fevers, chest pain, shortness of breath, nausea, vomiting, abdominal pain.    CONSTITUTIONAL: NAD.   SKIN: warm, dry  HEAD: Normocephalic; atraumatic.  EYES: no conjunctival injection.    ENT: MMM. mild pharyngeal erythema, no exudates. TMS pearly gray bilaterally.   NECK: Supple.  CARD: RRR.   RESP: No wheezes, rales or rhonchi.  ABD: soft ntnd. no CVAT.   EXT: Normal ROM.  No lower extremity edema.   LYMPH: No acute cervical adenopathy.  NEURO: Alert, oriented, grossly unremarkable.  PSYCH: Cooperative, appropriate.

## 2023-08-27 NOTE — PATIENT PROFILE ADULT - DIABETES EDUCATION
2309.Patient arrived from OR in stable condition. Received report from OR nurse and anesthesiologist. VSS.     2328 Mother, Roxanne, updated on patient condition. All questions and concerns were addressed.     0030 Patient able to void with no difficulty. Minimal spotting noted on pad. Minimal amount of blood noted in toilet. Patient ambulated steadily back to [facility]. VSS. Discharge instructions were reviewed with both the patient and the mother. All questions and concerns were addressed. Tolerating liquids.     0100 Patient dressed with help from mom. IV removed prior to patient leaving. All further questions and concerns were addressed. Patient left in stable condition.   
diabetes education

## 2023-12-22 NOTE — PROGRESS NOTE ADULT - REASON FOR ADMISSION
Family Medicine Office Visit    CHIEF COMPLAINT: ED f/u    HISTORY OF PRESENT ILLNESS:  Funmilayo Painter is a 26 year old female presenting in ED f/u. Her medical hx is notable for type II DM. Her diabetes is currently managed with metformin 1000 mg bid and Jardiance 25 mg daily.  She has hx of gastroparesis and continues on Pronotix.      She states she was seen in the ED due to drinking alcohol and taking an excess of metformin 12/16/23. She was seen in [place]. We do not have records available for review at the time of her OV today. She states she was instructed to stop taking her metformin until she was seen in primary care.     Funmilayo shares she broke up with her girlfriend after a significant argument last week and went home and drank excessively. She shares she drank until she \"blacked out\". She does not recall taking her metform in excess, and states she had no suicidal intentions prior to drinking. She called a friend who contacted 911 and transferred Funmilayo to the ED. Funmilayo state she was evaluated by telepsych who deemed her safe to return home and coordinated therapy for her next week with the county. She denies any active or passive SI or HI today. She was told she was anemic in the ED. She does have a hx of ANDREW. She does endorse fatigue, but declines sob, chest pain, dizziness/syncope. Denies heavy menses, blood stool or urine. She had a full anemia panel last year with FOBT and UA which was negative aside from iron deficiency.     She endorses symptoms of depression, sadness, anxiety, panic. Feels she cannot return to work at this time. Very tearful throughout today's OV. She does not want to pursue medication options for mood management today. Would like to start with therapy first.     PAST MEDICAL HISTORY:    Type 2 diabetes mellitus without complication,*               Gastroesophageal reflux disease without esopha*               Anemia                                                      ALLERGIES:  No  Known Allergies  Social History     Socioeconomic History    Marital status: Single     Spouse name: Not on file    Number of children: Not on file    Years of education: Not on file    Highest education level: Not on file   Occupational History    Not on file   Tobacco Use    Smoking status: Never    Smokeless tobacco: Never   Vaping Use    Vaping Use: never used   Substance and Sexual Activity    Alcohol use: Not Currently    Drug use: Not Currently    Sexual activity: Yes     Partners: Female     Birth control/protection: None   Other Topics Concern    Not on file   Social History Narrative    Not on file     Social Determinants of Health     Financial Resource Strain: Not on file   Food Insecurity: Not on file   Transportation Needs: Not on file   Physical Activity: Not on file   Stress: Not on file   Social Connections: Not on file   Interpersonal Safety: Not on file (7/14/2022)       Meds/Allergies/PMH/SH/FH reviewed in chart     PHYSICAL EXAMINATION:   Visit Vitals  /78   Pulse 93   Wt 57.2 kg (126 lb)   LMP 12/21/2023 (Exact Date)   SpO2 99%   BMI 24.61 kg/m²     General:  Well-developed, well-nourished, no acute distress. Tearful throughout today's visit.   Skin: Warm, no rashes or lesions.   Eyes: Normal lids, conjunctivae clear bilaterally.  Neuro: Alert and oriented x 3. Normal gait without ataxia.     ASSESSMENT:   Overdose of metformin   Iron deficiency  Type 2 DM  Depression and anxiety  Fatigue     PLAN:  She does not want to pursue medication options for mood management today. Would like to start with therapy first and is scheduled to begin next week. We did provide crisis center information, suicide hotline, and other mental health resources. She understands to return to the ED with any active SI or HI.     Will recheck CBC, Iron studies, ferritin, CMP, A1c and TSH. We will call with results and proceed accordingly. If significant anemia remains despite lack of heavy menses, we discussed  SOB repeating her anemia w/u vs. Hematoogy consult. She expressed interest in meeting with hematology pending results.     Patient indicated understanding and agreement with this plan of care. All patient questions were answered.    The patient was seen and examined by me, Deloris Longoria PA-C, under the supervision of Dr. Zahra Childress.   Deloris Longoria PA-C

## 2024-01-01 NOTE — PATIENT PROFILE ADULT. - NS PRO LACT YNNA
Problem:  Care  Goal: [place] assessment and vital signs within acceptable range  Outcome: Monitoring/Evaluating progress  Goal:  has at least two successful feeding interactions  Outcome: Monitoring/Evaluating progress  Goal: Infant does not have pain / discomfort per NIPS Scale  Outcome: Monitoring/Evaluating progress     
  Problem:  Care  Goal: [place] assessment and vital signs within acceptable range  Outcome: Met, complete goal  Goal: Infant does not have pain / discomfort per NIPS Scale  Outcome: Met, complete goal  Goal: Parent / caregiver demonstrates or verbalizes understanding of infant cares, warning signs, and when to call provider  Description: Document on Patient Education Activity  Outcome: Met, complete goal     Problem: Breastfeeding  Goal: Successful breastfeeding, as evidenced by proper suck/swallow, <10% weight loss, adequate void/stool.  Outcome: Met, complete goal  Goal: Parent / caregiver verbalizes understanding of benefits of exclusive breastfeeding and breastfeeding techniques  Description: Document on Patient Education Activity  Outcome: Met, complete goal     Problem: Formula Feeding  Goal: Parent / caregiver verbalizes understanding of formula feeding  Description: Document on Patient Education Activity  Outcome: Met, complete goal     
SW Ongoing Infant Plan of Care Note       Progress Note    Received MDR trigger due to history of maternal marijuana use. Charts Reviewed;  Infant's urine drug screen is positive for fentanyl (presumed to be from mother's epidural), methadone screen is negative, meconium and buprenorphine results remain PENDING at this time. Mother was NOT tested during the prenatal period nor upon admission to L&D.       Per RN and OFTs, No other concerns regarding mother/infant bonding or care of infant.      Will watch for infant's pending results post discharge. No need for SW involvement at this time.        
no

## 2024-01-23 NOTE — PATIENT PROFILE ADULT - FUNCTIONAL SCREEN CURRENT LEVEL: EATING, MLM
Monitor BP  Contact office if SBP < 100 or > 160  Contact office if HR < 50  Please schedule echo in June  Follow up in July  
0 = independent

## 2024-01-24 NOTE — PROGRESS NOTE ADULT - PROBLEM/PLAN-8
Pt called and needs new orders for oxygen, with demo's and notes sent to [facility] @ fax# [phone].  PH # [phone]  pt stated her pld ID# is [id number]   DISPLAY PLAN FREE TEXT

## 2024-02-13 NOTE — H&P ADULT - PROBLEM SELECTOR PLAN 2
n/a Pt is euvolemic at this time.   Continue with HCTZ. chronic diastolic  Pt is euvolemic at this time.

## 2024-02-26 NOTE — DISCHARGE NOTE PROVIDER - NS AS DC PROVIDER CONTACT Y/N MULTI
"PRIMARY DIAGNOSIS: \"GENERIC\" NURSING  OUTPATIENT/OBSERVATION GOALS TO BE MET BEFORE DISCHARGE:  ADLs back to baseline:  Unknown at this time    Activity and level of assistance: Up with standby assistance.    Pain status: Pain free.    Return to near baseline physical activity:  progressing     Discharge Planner Nurse   Safe discharge environment identified: Yes  Barriers to discharge: Yes Tele Stroke visit at 1200 and possible MRI       Entered by: Roshni Marshall RN 02/26/2024 10:00 AM     Please review provider order for any additional goals.   Nurse to notify provider when observation goals have been met and patient is ready for discharge.                        " Yes

## 2024-03-05 ENCOUNTER — APPOINTMENT (OUTPATIENT)
Dept: URBAN - NONMETROPOLITAN AREA CLINIC 48 | Age: 84
Setting detail: DERMATOLOGY
End: 2024-03-07

## 2024-03-05 DIAGNOSIS — L29.8 OTHER PRURITUS: ICD-10-CM

## 2024-03-05 PROCEDURE — OTHER MIPS QUALITY: OTHER

## 2024-03-05 PROCEDURE — 99202 OFFICE O/P NEW SF 15 MIN: CPT

## 2024-03-05 PROCEDURE — OTHER PRESCRIPTION: OTHER

## 2024-03-05 PROCEDURE — OTHER PRESCRIPTION MEDICATION MANAGEMENT: OTHER

## 2024-03-05 RX ORDER — MOMETASONE FUROATE 1 MG/ML
SOLUTION TOPICAL
Qty: 30 | Refills: 1 | Status: ERX | COMMUNITY
Start: 2024-03-05

## 2024-03-05 ASSESSMENT — LOCATION ZONE DERM: LOCATION ZONE: SCALP

## 2024-03-05 ASSESSMENT — LOCATION SIMPLE DESCRIPTION DERM: LOCATION SIMPLE: SCALP

## 2024-03-05 ASSESSMENT — LOCATION DETAILED DESCRIPTION DERM: LOCATION DETAILED: LEFT SUPERIOR PARIETAL SCALP

## 2024-03-05 NOTE — PROCEDURE: PRESCRIPTION MEDICATION MANAGEMENT
Discontinue Regimen: Ketoconazole shampoo\\nScalpicin shampoo
Detail Level: Simple
Initiate Treatment: Mometasone solution once daily
Render In Strict Bullet Format?: No
Plan: Use Suave shampoo

## 2024-04-09 ENCOUNTER — APPOINTMENT (OUTPATIENT)
Dept: URBAN - NONMETROPOLITAN AREA CLINIC 48 | Age: 84
Setting detail: DERMATOLOGY
End: 2024-04-10

## 2024-04-09 DIAGNOSIS — L29.8 OTHER PRURITUS: ICD-10-CM

## 2024-04-09 PROCEDURE — OTHER MIPS QUALITY: OTHER

## 2024-04-09 PROCEDURE — 99213 OFFICE O/P EST LOW 20 MIN: CPT

## 2024-04-09 PROCEDURE — OTHER PRESCRIPTION MEDICATION MANAGEMENT: OTHER

## 2024-04-09 ASSESSMENT — LOCATION DETAILED DESCRIPTION DERM: LOCATION DETAILED: LEFT SUPERIOR PARIETAL SCALP

## 2024-04-09 ASSESSMENT — LOCATION ZONE DERM: LOCATION ZONE: SCALP

## 2024-04-09 ASSESSMENT — LOCATION SIMPLE DESCRIPTION DERM: LOCATION SIMPLE: SCALP

## 2024-04-09 NOTE — PROCEDURE: PRESCRIPTION MEDICATION MANAGEMENT
Continue Regimen: Mometasone solution once daily. Stop when it calms down. Restart if needed.
Detail Level: Simple
Render In Strict Bullet Format?: No
Plan: Warmer weather should help clear up the itching. Will call for refills for both if needed.
Modify Regimen: Ketoconazole shampoo use 1-2xs a week. Apply to scalp for 3-5min before rinsing out.

## 2024-06-12 NOTE — H&P ADULT - PROBLEM SELECTOR PROBLEM 3
Do we have enough information to see this new patient.  I did see she has a lump in neck but not the thyroid.  Says hypothyroid but looks like normal numbers.  Vit D little low (Maybe!)  Please advise   HLD (hyperlipidemia)

## 2024-07-10 NOTE — ED ADULT NURSE NOTE - NS ED NURSE RECORD ANOTHER HT AND WT
Your knee X  ray does show osteoarthritis in your right knee, keep your appointment with the orthopedic specialist No

## 2024-07-18 NOTE — H&P ADULT - PROBLEM SELECTOR PLAN 3
Subjective     Patient ID: Lana Chou is a 25 y.o. female.    Chief Complaint:  Confirmation (UPT- Positive LMP: 2024 ALEJANDRA: 2025 EGA: 8w 2d)      History of Present Illness  HPI  Patient has a positive home urine pregnancy test on 24. She believes she could be pregnant. Pregnancy is desired. Sexual Activity: single partner, contraception: none. Current symptoms also include: breast tenderness, fatigue, frequent urination, no nausea and positive home pregnancy test. Last period was normal.     Patient's last menstrual period was 24 (exact date).     By date, she should be about 8w2 with EDC on 25    Patient with sickle cell disease.  Frequent crisis  Partner with sickle cell trait.      GYN & OB History  Patient's last menstrual period was 2024 (exact date).   Date of Last Pap: 2023    OB History    Para Term  AB Living   1 0 0 0 0 0   SAB IAB Ectopic Multiple Live Births   0 0 0 0 0      # Outcome Date GA Lbr Ehsan/2nd Weight Sex Type Anes PTL Lv   1 Current              Past Medical History:   Diagnosis Date    Chronic kidney disease (CKD) 2018    Emesis 2022    Encounter for surveillance of vaginal ring hormonal contraceptive device 11/15/2023    LGSIL on Pap smear of cervix 2017    LLQ abdominal pain 2023    Sickle cell anemia     Sickle cell disease     UTI (urinary tract infection) 2023       Past Surgical History:   Procedure Laterality Date    BREAST LUMPECTOMY      CARDIAC SURGERY      NEPHRECTOMY         Family History   Problem Relation Name Age of Onset    Sickle cell trait Father      Sickle cell trait Mother      Sickle cell trait Sister      Sickle cell trait Sister      Breast cancer Neg Hx      Colon cancer Neg Hx      Ovarian cancer Neg Hx         Social History     Socioeconomic History    Marital status: Single   Tobacco Use    Smoking status: Former     Types: Vaping with nicotine     Start date: 2022     Quit  date: 2024     Years since quittin.0    Smokeless tobacco: Never   Substance and Sexual Activity    Alcohol use: Not Currently    Drug use: No    Sexual activity: Yes     Partners: Male     Birth control/protection: Condom   Social History Narrative    Together since 2021    He is  a     She worked at [employer].  PCT    Graduated from [employer].  Now working on our unit.     Social Determinants of Health     Financial Resource Strain: Low Risk  (2023)    Overall Financial Resource Strain (CARDIA)     Difficulty of Paying Living Expenses: Not hard at all   Food Insecurity: No Food Insecurity (2023)    Hunger Vital Sign     Worried About Running Out of Food in the Last Year: Never true     Ran Out of Food in the Last Year: Never true   Transportation Needs: No Transportation Needs (2023)    PRAPARE - Transportation     Lack of Transportation (Medical): No     Lack of Transportation (Non-Medical): No   Physical Activity: Inactive (2023)    Exercise Vital Sign     Days of Exercise per Week: 0 days     Minutes of Exercise per Session: 0 min   Stress: No Stress Concern Present (2023)    [employer] [place] [employer] - Occupational Stress Questionnaire     Feeling of Stress : Not at all   Housing Stability: Low Risk  (2023)    Housing Stability Vital Sign     Unable to Pay for Housing in the Last Year: No     Number of Places Lived in the Last Year: 1     Unstable Housing in the Last Year: No       Current Outpatient Medications   Medication Sig Dispense Refill    cholecalciferol, vitamin D3, 1,250 mcg (50,000 unit) capsule Take 1 capsule (50,000 Units total) by mouth every 7 days. 12 capsule 3    folic acid (FOLVITE) 1 MG tablet Take 1 tablet (1 mg total) by mouth once daily. 90 tablet 3    HYDROcodone-acetaminophen (NORCO)  mg per tablet Take 1 tablet by mouth every 6 (six) hours as needed for Pain. 60 tablet 0    hydroxyurea (HYDREA) 500 mg  Cap Take 3 capsules (1,500 mg total) by mouth once daily. 90 capsule 6    ondansetron (ZOFRAN) 4 MG tablet Take 1 tablet (4 mg total) by mouth every 6 (six) hours. 12 tablet 0    CITRANATAL HARMONY, IRON FUM, 27 mg iron-1 mg -50 mg-260 mg Cap Take 1 capsule by mouth once daily. 30 capsule 8    iron,carbon,gluc-FA-B12-C-dss (FERRALET 90 DUAL-IRON DELIVERY) 90-1-12-50 mg-mg-mcg-mg Tab Take 1 tablet by mouth once daily. 30 tablet 6     No current facility-administered medications for this visit.       Review of patient's allergies indicates:   Allergen Reactions    Rocephin [ceftriaxone] Shortness Of Breath, Itching and Anxiety     Nausea, vomiting. No hives, swelling, or anaphylaxis. Can tolerate if necessary, but would avoid if possible.       Review of Systems  Review of Systems   Constitutional:  Positive for fatigue. Negative for activity change, appetite change, fever and unexpected weight change.   Respiratory:  Negative for cough, shortness of breath and wheezing.    Cardiovascular:  Negative for chest pain and palpitations.   Gastrointestinal:  Positive for abdominal pain. Negative for nausea and vomiting.   Endocrine: Negative for hot flashes.   Genitourinary:  Positive for frequency, pelvic pain and vaginal discharge. Negative for dysmenorrhea, dyspareunia, urgency, vaginal bleeding and postcoital bleeding.   Musculoskeletal:  Positive for back pain. Negative for myalgias.   Integumentary:  Negative for rash, breast mass and nipple discharge.   Neurological:  Positive for headaches. Negative for seizures.   Psychiatric/Behavioral:  Negative for depression and sleep disturbance. The patient is not nervous/anxious.    Breast: Negative for mass, mastodynia and nipple discharge         Objective   Physical Exam:   Constitutional: She appears well-developed and well-nourished. No distress.   BMI of 20.16    HENT:   Head: Normocephalic and atraumatic.    Eyes: EOM are normal.      Pulmonary/Chest: Effort normal.  No respiratory distress.   Breasts: Non-tender, no engorgement, no masses, no retraction, no discharge. Negative for lymphadenopathy.         Abdominal: Soft. She exhibits no distension. There is no abdominal tenderness. There is no rebound and no guarding.     Genitourinary:    Vagina normal.   No vaginal discharge in the vagina.    Genitourinary Comments: Vulva without any obvious lesions.  Urethral meatus normal size and location without any lesion.  Urethra is non-tender without stricture or discharge.  Bladder is non-tender.  Vaginal vault with good support.  Minimal white discharge noted.  No obvious lesion.  Normal rugation.  Cervix is without any cervical motion tenderness.  No obvious lesion.  Uterus is 8 weeks, non-tender, normal contour.  Adnexa is without any masses or tenderness.  Perineum without obvious lesion.               Musculoskeletal: Normal range of motion.       Neurological: She is alert.    Skin: Skin is warm and dry.    Psychiatric: She has a normal mood and affect.            Assessment and Plan     1. 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   Low salt, low cholesterol diet

## 2024-08-31 NOTE — PHYSICAL THERAPY INITIAL EVALUATION ADULT - LIVES WITH, PROFILE
Patient presented with generalized weakness, frequent falls.  She has had ongoing diarrhea, dysuria, poor oral intake and reported 70 pound weight loss over the last year.  Initially hypotensive in the ER, status post IV fluids. Labs with multiple metabolic derangements  CT imaging showing mild diffuse colonic wall thickening, mild diffuse wall thickening of stomach  C. difficile found to be positive--has a history of C. difficile in September 2022.    Patient started on Dificid 200 mg x 10 days per protocol on 8/18 as this is recurrent infection  Initially seemed BMs were improving and were becoming more formed, however again having frequent watery stools  GI consulted  Contact precautions  Appetite is good   avoiding lactose and artificial sweeteners  Continue banatrol  Multiple loose stools overnight and this morning  Continue vancomycin  GI to evaluate for pancreatic insufficiency as a contributing factor to her diarrhea - stool elastase is pending  Possible FMT this coming week   children

## 2024-09-04 ENCOUNTER — INPATIENT (INPATIENT)
Facility: HOSPITAL | Age: 84
LOS: 15 days | Discharge: NURSING, REHAB OR LONG-TERM CARE | End: 2024-09-20
Attending: HOSPITALIST | Admitting: HOSPITALIST
Payer: MEDICARE

## 2024-09-04 VITALS
RESPIRATION RATE: 18 BRPM | WEIGHT: 179.9 LBS | DIASTOLIC BLOOD PRESSURE: 75 MMHG | TEMPERATURE: 99 F | OXYGEN SATURATION: 94 % | HEART RATE: 77 BPM | SYSTOLIC BLOOD PRESSURE: 137 MMHG

## 2024-09-04 LAB
ALBUMIN SERPL ELPH-MCNC: 4 G/DL — SIGNIFICANT CHANGE UP (ref 3.3–5)
ALP SERPL-CCNC: 64 U/L — SIGNIFICANT CHANGE UP (ref 40–120)
ALT FLD-CCNC: 16 U/L — SIGNIFICANT CHANGE UP (ref 4–33)
ANION GAP SERPL CALC-SCNC: 14 MMOL/L — SIGNIFICANT CHANGE UP (ref 7–14)
AST SERPL-CCNC: 20 U/L — SIGNIFICANT CHANGE UP (ref 4–32)
BASOPHILS # BLD AUTO: 0.02 K/UL — SIGNIFICANT CHANGE UP (ref 0–0.2)
BASOPHILS NFR BLD AUTO: 0.2 % — SIGNIFICANT CHANGE UP (ref 0–2)
BILIRUB SERPL-MCNC: 1.3 MG/DL — HIGH (ref 0.2–1.2)
BUN SERPL-MCNC: 17 MG/DL — SIGNIFICANT CHANGE UP (ref 7–23)
CALCIUM SERPL-MCNC: 9.2 MG/DL — SIGNIFICANT CHANGE UP (ref 8.4–10.5)
CHLORIDE SERPL-SCNC: 99 MMOL/L — SIGNIFICANT CHANGE UP (ref 98–107)
CO2 SERPL-SCNC: 26 MMOL/L — SIGNIFICANT CHANGE UP (ref 22–31)
CREAT SERPL-MCNC: 1.31 MG/DL — HIGH (ref 0.5–1.3)
EGFR: 40 ML/MIN/1.73M2 — LOW
EOSINOPHIL # BLD AUTO: 0.03 K/UL — SIGNIFICANT CHANGE UP (ref 0–0.5)
EOSINOPHIL NFR BLD AUTO: 0.4 % — SIGNIFICANT CHANGE UP (ref 0–6)
FLUAV AG NPH QL: SIGNIFICANT CHANGE UP
FLUBV AG NPH QL: SIGNIFICANT CHANGE UP
GLUCOSE SERPL-MCNC: 132 MG/DL — HIGH (ref 70–99)
HCT VFR BLD CALC: 32.8 % — LOW (ref 34.5–45)
HGB BLD-MCNC: 10.4 G/DL — LOW (ref 11.5–15.5)
IANC: 6.13 K/UL — SIGNIFICANT CHANGE UP (ref 1.8–7.4)
IMM GRANULOCYTES NFR BLD AUTO: 0.4 % — SIGNIFICANT CHANGE UP (ref 0–0.9)
LIDOCAIN IGE QN: 12 U/L — SIGNIFICANT CHANGE UP (ref 7–60)
LYMPHOCYTES # BLD AUTO: 1.42 K/UL — SIGNIFICANT CHANGE UP (ref 1–3.3)
LYMPHOCYTES # BLD AUTO: 17.3 % — SIGNIFICANT CHANGE UP (ref 13–44)
MCHC RBC-ENTMCNC: 27.5 PG — SIGNIFICANT CHANGE UP (ref 27–34)
MCHC RBC-ENTMCNC: 31.7 GM/DL — LOW (ref 32–36)
MCV RBC AUTO: 86.8 FL — SIGNIFICANT CHANGE UP (ref 80–100)
MONOCYTES # BLD AUTO: 0.58 K/UL — SIGNIFICANT CHANGE UP (ref 0–0.9)
MONOCYTES NFR BLD AUTO: 7.1 % — SIGNIFICANT CHANGE UP (ref 2–14)
NEUTROPHILS # BLD AUTO: 6.13 K/UL — SIGNIFICANT CHANGE UP (ref 1.8–7.4)
NEUTROPHILS NFR BLD AUTO: 74.6 % — SIGNIFICANT CHANGE UP (ref 43–77)
NRBC # BLD: 0 /100 WBCS — SIGNIFICANT CHANGE UP (ref 0–0)
NRBC # FLD: 0 K/UL — SIGNIFICANT CHANGE UP (ref 0–0)
NT-PROBNP SERPL-SCNC: 1255 PG/ML — HIGH
PLATELET # BLD AUTO: 253 K/UL — SIGNIFICANT CHANGE UP (ref 150–400)
POTASSIUM SERPL-MCNC: 3.5 MMOL/L — SIGNIFICANT CHANGE UP (ref 3.5–5.3)
POTASSIUM SERPL-SCNC: 3.5 MMOL/L — SIGNIFICANT CHANGE UP (ref 3.5–5.3)
PROT SERPL-MCNC: 7.6 G/DL — SIGNIFICANT CHANGE UP (ref 6–8.3)
RBC # BLD: 3.78 M/UL — LOW (ref 3.8–5.2)
RBC # FLD: 14.2 % — SIGNIFICANT CHANGE UP (ref 10.3–14.5)
RSV RNA NPH QL NAA+NON-PROBE: SIGNIFICANT CHANGE UP
SARS-COV-2 RNA SPEC QL NAA+PROBE: SIGNIFICANT CHANGE UP
SODIUM SERPL-SCNC: 139 MMOL/L — SIGNIFICANT CHANGE UP (ref 135–145)
TROPONIN T, HIGH SENSITIVITY RESULT: 34 NG/L — SIGNIFICANT CHANGE UP
WBC # BLD: 8.21 K/UL — SIGNIFICANT CHANGE UP (ref 3.8–10.5)
WBC # FLD AUTO: 8.21 K/UL — SIGNIFICANT CHANGE UP (ref 3.8–10.5)

## 2024-09-04 PROCEDURE — 71045 X-RAY EXAM CHEST 1 VIEW: CPT | Mod: 26

## 2024-09-04 PROCEDURE — 36410 VNPNXR 3YR/> PHY/QHP DX/THER: CPT

## 2024-09-04 PROCEDURE — 99285 EMERGENCY DEPT VISIT HI MDM: CPT | Mod: 25

## 2024-09-04 RX ORDER — FUROSEMIDE 40 MG
80 TABLET ORAL ONCE
Refills: 0 | Status: COMPLETED | OUTPATIENT
Start: 2024-09-04 | End: 2024-09-04

## 2024-09-04 RX ORDER — ASPIRIN 81 MG
162 TABLET, DELAYED RELEASE (ENTERIC COATED) ORAL ONCE
Refills: 0 | Status: DISCONTINUED | OUTPATIENT
Start: 2024-09-04 | End: 2024-09-04

## 2024-09-04 RX ADMIN — Medication 80 MILLIGRAM(S): at 23:39

## 2024-09-04 NOTE — ED ADULT NURSE NOTE - NSFALLHARMRISKINTERV_ED_ALL_ED
Instructions (Optional): Patient will call to check in if no improvement, or any worsening
Detail Level: Simple
Scheduled For Follow Up In (Optional): 1-2 weeks
Communicate risk of Fall with Harm to all staff, patient, and family/Provide visual cue: red socks, yellow wristband, yellow gown, etc/Reinforce activity limits and safety measures with patient and family/Bed in lowest position, wheels locked, appropriate side rails in place/Call bell, personal items and telephone in reach/Instruct patient to call for assistance before getting out of bed/chair/stretcher/Non-slip footwear applied when patient is off stretcher/[place] to call system/Physically safe environment - no spills, clutter or unnecessary equipment/Purposeful Proactive Rounding/Room/bathroom lighting operational, light cord in reach

## 2024-09-04 NOTE — ED PROVIDER NOTE - CLINICAL SUMMARY MEDICAL DECISION MAKING FREE TEXT BOX
84 year old woman PMH DVT, DM, CAD with stent on eliquis, HFrEF 40-45% EF 3/2024, HTN, CVA, PPM presenting with shortness of breath and chest pain for 1 day with no ST changes on EKG concerning for ACS vs HF exacerbation vs PNA (viral vs bacterial) vs pancreatitis vs cholecystitis vs gastritis vs PE. CBC, CMP, trop, BNP, lipase, CXR,

## 2024-09-04 NOTE — ED PROVIDER NOTE - PROGRESS NOTE DETAILS
delta trop neg. No leukocytosis. B/l pleural effusions on CXR with elevated BNP. Patient on 3L NC now for hypoxia. concern for CHF exacerbation. 80mg IV lasix given. CT findings below. Plan to admit to tele.    CT findings: No acute finding in the abdomen or pelvis.  Severe atherosclerosis again demonstrated including high-grade narrowing   of the SMA and bilateral renal artery origins.  The SMA stenosis places the patient at risk of mesenteric claudication;   please correlate for post prandial pain etc. There is no evidence of   acute bowel ischemia.  Cardiomegaly and small pleural effusions partially visible.  Also similar prior is a mildly enlarged endometrial cavity for a   postmenopausal patient. Please correlate for postmenopausal bleeding etc. lactate sent ISO SMA stenosis and pain provoked by eating. vascular surgery paged. Plan to admit to medicine.

## 2024-09-04 NOTE — ED PROVIDER NOTE - OBJECTIVE STATEMENT
84 year old woman PMH DVT, DM, CAD with stent on eliquis, HFrEF 40-45% EF 3/2024, HTN, CVA, PPM presenting with shortness of breath and chest pain for 1 day. 3 days ago she began having palpitations, and today developed substernal chest pain that does not radiate and is worse with palpation associated with difficulty breathing. She now requires oxygen and is not on ozygen at home. reports full body aches and chills as well and diffuse abdominal pain. Denies fevers, nausea, vomiting, diarrhea, dysuria, increased swelling of her legs. Received aspirin in the ambulance.

## 2024-09-04 NOTE — ED ADULT TRIAGE NOTE - CHIEF COMPLAINT QUOTE
pt brought in by ems from home complaining of chest pain, sob x 2 days. pt denies n/v/d, fever or chills. pt has a hx of MI. pt has a hx of stents and pacemaker

## 2024-09-04 NOTE — ED PROVIDER NOTE - ATTENDING CONTRIBUTION TO CARE
85 yo F with h/o DVT, DM, CAD with stents, CHF, HTN, pacemaker who presents to the ED with multiple complaints including chest pain and shortness of breath for the past few days. Also c/o diffuse myalgias and abdominal pain. Was unable to eat anything today due to pain but denies nausea or vomiting. No known sick contacts. Pt currently on 2-3 L nasal cannula here in the ED, not on oxygen at home. Differential includes pneumonia, viral illness (covid, flu), ACS, possible intraabdominal pathology such as SBO, diverticulitis. Plan for labs, CT abd/pelvis, CXR. Will most likely need admission given new oxygen requirement

## 2024-09-04 NOTE — ED PROVIDER NOTE - NSICDXPASTSURGICALHX_GEN_ALL_CORE_FT
PAST SURGICAL HISTORY:  S/P primary angioplasty with coronary stent x1 in 2006 at [facility]    S/P TKR (total knee replacement) left

## 2024-09-04 NOTE — ED ADULT NURSE REASSESSMENT NOTE - NS ED NURSE REASSESS COMMENT FT1
report received from day shift ED RN. PA at bedside, USIV 20G obtained to left AC, blood drawn and sent to lab. pt repositioned in stretcher in NAD, RR even and unlabored. arm propped with sheets underneath to keep arm straight, pt tolerating well. pt maintained on cardiac monitor and continuous pulse ox. maintained on 2L NC, tolerating well at this time. safety measures maintained, side rails up x2. daughter at bedside report received from day shift ED RN. PA at bedside, USIV 20G obtained to left AC, blood drawn and sent to lab. pt repositioned in stretcher in NAD, RR even and unlabored. arm propped with sheets underneath to keep arm straight, pt tolerating well. pt maintained on cardiac monitor and continuous pulse ox. maintained on 3L NC, tolerating well at this time. safety measures maintained, side rails up x2. daughter at bedside

## 2024-09-04 NOTE — ED ADULT NURSE REASSESSMENT NOTE - NS ED NURSE REASSESS COMMENT FT1
Report received from break NAHUN Avery. Pt is A&Ox3, in no acute distress. Pt endorses chest pain and SOB. NSR on cardiac monitor. SpO2 maintaining above 95% on 2L nasal cannula. pending ultrasound IV placement by MD. Stretcher set in lowest position, call bell within reach, safety maintained.

## 2024-09-04 NOTE — ED PROVIDER NOTE - PHYSICAL EXAMINATION
Physical Exam:  Gen: mild distress, AOx3, nontoxic appearing  Head: NCAT  HEENT: EOMI, PEERLA, normal conjunctiva, tongue midline, oral mucosa moist  Lung: crackles at bilateral bases, some respiratory distress on oxygen, speaking in full sentences  CV: RRR, no murmurs, rubs or gallops?  Abd: soft, TTP worst in epigastric area and some in RUQ, ND, no guarding, no rigidity, no rebound tenderness, no CVA tenderness  MSK: (o visible deformities, ROM normal in UE/LE, no neck / back pain, calf tendernes  Neuro: no focal sensory or motor deficits  Skin: Warm, well perfused, no rash, mild nonpitting edema in bilateral LE

## 2024-09-05 DIAGNOSIS — K55.1 CHRONIC VASCULAR DISORDERS OF INTESTINE: ICD-10-CM

## 2024-09-05 DIAGNOSIS — R07.9 CHEST PAIN, UNSPECIFIED: ICD-10-CM

## 2024-09-05 DIAGNOSIS — Z29.9 ENCOUNTER FOR PROPHYLACTIC MEASURES, UNSPECIFIED: ICD-10-CM

## 2024-09-05 DIAGNOSIS — E11.9 TYPE 2 DIABETES MELLITUS WITHOUT COMPLICATIONS: ICD-10-CM

## 2024-09-05 DIAGNOSIS — I25.10 ATHEROSCLEROTIC HEART DISEASE OF NATIVE CORONARY ARTERY WITHOUT ANGINA PECTORIS: ICD-10-CM

## 2024-09-05 DIAGNOSIS — I50.9 HEART FAILURE, UNSPECIFIED: ICD-10-CM

## 2024-09-05 LAB
GAS PNL BLDV: SIGNIFICANT CHANGE UP
GLUCOSE BLDC GLUCOMTR-MCNC: 131 MG/DL — HIGH (ref 70–99)
GLUCOSE BLDC GLUCOMTR-MCNC: 156 MG/DL — HIGH (ref 70–99)
TROPONIN T, HIGH SENSITIVITY RESULT: 30 NG/L — SIGNIFICANT CHANGE UP

## 2024-09-05 PROCEDURE — 74177 CT ABD & PELVIS W/CONTRAST: CPT | Mod: 26,MC

## 2024-09-05 PROCEDURE — 99223 1ST HOSP IP/OBS HIGH 75: CPT

## 2024-09-05 RX ORDER — ONDANSETRON 2 MG/ML
4 INJECTION, SOLUTION INTRAMUSCULAR; INTRAVENOUS EVERY 8 HOURS
Refills: 0 | Status: DISCONTINUED | OUTPATIENT
Start: 2024-09-05 | End: 2024-09-13

## 2024-09-05 RX ORDER — SACUBITRIL AND VALSARTAN 49; 51 MG/1; MG/1
1 TABLET, FILM COATED ORAL
Refills: 0 | DISCHARGE

## 2024-09-05 RX ORDER — FUROSEMIDE 40 MG
40 TABLET ORAL
Refills: 0 | Status: DISCONTINUED | OUTPATIENT
Start: 2024-09-05 | End: 2024-09-06

## 2024-09-05 RX ORDER — SACUBITRIL AND VALSARTAN 49; 51 MG/1; MG/1
1 TABLET, FILM COATED ORAL
Refills: 0 | Status: DISCONTINUED | OUTPATIENT
Start: 2024-09-05 | End: 2024-09-11

## 2024-09-05 RX ORDER — DONEPEZIL HYDROCHLORIDE 5 MG/1
10 TABLET, FILM COATED ORAL AT BEDTIME
Refills: 0 | Status: DISCONTINUED | OUTPATIENT
Start: 2024-09-05 | End: 2024-09-11

## 2024-09-05 RX ORDER — CARVEDILOL 6.25 MG/1
3.12 TABLET ORAL EVERY 12 HOURS
Refills: 0 | Status: DISCONTINUED | OUTPATIENT
Start: 2024-09-05 | End: 2024-09-11

## 2024-09-05 RX ORDER — MAGNESIUM, ALUMINUM HYDROXIDE 200-225/5
30 SUSPENSION, ORAL (FINAL DOSE FORM) ORAL EVERY 4 HOURS
Refills: 0 | Status: DISCONTINUED | OUTPATIENT
Start: 2024-09-05 | End: 2024-09-13

## 2024-09-05 RX ORDER — DEXTROSE 15 G/33 G
15 GEL IN PACKET (GRAM) ORAL ONCE
Refills: 0 | Status: DISCONTINUED | OUTPATIENT
Start: 2024-09-05 | End: 2024-09-12

## 2024-09-05 RX ORDER — APIXABAN 5 MG/1
5 TABLET, FILM COATED ORAL EVERY 12 HOURS
Refills: 0 | Status: DISCONTINUED | OUTPATIENT
Start: 2024-09-05 | End: 2024-09-20

## 2024-09-05 RX ORDER — ACETAMINOPHEN 325 MG/1
1000 TABLET ORAL ONCE
Refills: 0 | Status: COMPLETED | OUTPATIENT
Start: 2024-09-05 | End: 2024-09-05

## 2024-09-05 RX ORDER — DONEPEZIL HYDROCHLORIDE 5 MG/1
1 TABLET, FILM COATED ORAL
Refills: 0 | DISCHARGE

## 2024-09-05 RX ORDER — INSULIN GLARGINE 100 [IU]/ML
8 INJECTION, SOLUTION SUBCUTANEOUS AT BEDTIME
Refills: 0 | Status: DISCONTINUED | OUTPATIENT
Start: 2024-09-05 | End: 2024-09-07

## 2024-09-05 RX ORDER — ACETAMINOPHEN 325 MG/1
650 TABLET ORAL EVERY 6 HOURS
Refills: 0 | Status: DISCONTINUED | OUTPATIENT
Start: 2024-09-05 | End: 2024-09-20

## 2024-09-05 RX ORDER — GABAPENTIN 100 MG
200 CAPSULE ORAL THREE TIMES A DAY
Refills: 0 | Status: DISCONTINUED | OUTPATIENT
Start: 2024-09-05 | End: 2024-09-07

## 2024-09-05 RX ORDER — PANTOPRAZOLE SODIUM 40 MG
40 TABLET, DELAYED RELEASE (ENTERIC COATED) ORAL
Refills: 0 | Status: DISCONTINUED | OUTPATIENT
Start: 2024-09-05 | End: 2024-09-20

## 2024-09-05 RX ORDER — POTASSIUM CHLORIDE 10 MEQ
40 TABLET, EXT RELEASE, PARTICLES/CRYSTALS ORAL ONCE
Refills: 0 | Status: COMPLETED | OUTPATIENT
Start: 2024-09-05 | End: 2024-09-05

## 2024-09-05 RX ADMIN — CARVEDILOL 3.12 MILLIGRAM(S): 6.25 TABLET ORAL at 18:17

## 2024-09-05 RX ADMIN — Medication 40 MILLIGRAM(S): at 15:51

## 2024-09-05 RX ADMIN — SACUBITRIL AND VALSARTAN 1 TABLET(S): 49; 51 TABLET, FILM COATED ORAL at 19:58

## 2024-09-05 RX ADMIN — ACETAMINOPHEN 400 MILLIGRAM(S): 325 TABLET ORAL at 03:44

## 2024-09-05 RX ADMIN — Medication 200 MILLIGRAM(S): at 13:47

## 2024-09-05 RX ADMIN — Medication 80 MILLIGRAM(S): at 23:00

## 2024-09-05 RX ADMIN — Medication 200 MILLIGRAM(S): at 23:00

## 2024-09-05 RX ADMIN — APIXABAN 5 MILLIGRAM(S): 5 TABLET, FILM COATED ORAL at 18:17

## 2024-09-05 RX ADMIN — DONEPEZIL HYDROCHLORIDE 10 MILLIGRAM(S): 5 TABLET, FILM COATED ORAL at 23:00

## 2024-09-05 RX ADMIN — Medication 40 MILLIEQUIVALENT(S): at 18:17

## 2024-09-05 RX ADMIN — Medication 40 MILLIGRAM(S): at 13:47

## 2024-09-05 RX ADMIN — Medication 1: at 18:16

## 2024-09-05 RX ADMIN — Medication 75 MILLIGRAM(S): at 15:51

## 2024-09-05 NOTE — H&P ADULT - TIME BILLING
preparing to see the patient (eg. review of tests), obtaining and/or reviewing separately obtained history, obtaining/reviewing vitals, performing a medically appropriate examination and/or evaluation, counseling and educating the patient/family/caregiver, reviewing previous notes and test results, and procedures, communicating with other health professionals (when not separately reported), and documenting clinical information in the electronic health record.

## 2024-09-05 NOTE — H&P ADULT - PROBLEM SELECTOR PLAN 3
p/w chest pain likely 2/2 CHF. low suspicion for ACS  -trop neg x 2. EKG A-sense, V-pace no new ischemic changes   -c/w statin. apixaban. reports not on asa or plavix at home   -f/u cardiology p/w chest pain likely 2/2 CHF. low suspicion for ACS  -trop neg x 2. EKG A-sense, V-pace no new ischemic changes   -c/w statin. apixaban. started plavix 75  -f/u cardiology

## 2024-09-05 NOTE — H&P ADULT - PROBLEM SELECTOR PLAN 1
new O2 requirement likely d/t pulm edema 2/2 CHF exacerbation. low suspicion for infectious cause given no fever, leukocytosis, consolidations on CXR.  low suspicion for PE as pt reports compliance with systemic AC   -c/w O2 via NC, wean as tolerated  -diuresis as below

## 2024-09-05 NOTE — ED ADULT NURSE REASSESSMENT NOTE - NS ED NURSE REASSESS COMMENT FT1
Break RN: Pt received in stretcher in room 19 Pt resting comfortably. pt offered no complains at present. respiration even and non-labored. in NAD. Paced rhythm on monitor. Pending CT result

## 2024-09-05 NOTE — H&P ADULT - PROBLEM SELECTOR PLAN 5
reports mid abd pain on and off, not related to food intake   -CT a/p SMA severe stenosis, stable since 2023. no e/o mesenteric ischemia   -cont to monitor- low threshold for vascular consult if abd worsens  -c/w statin, apixaban reports mid abd pain on and off, not related to food intake   -CT a/p SMA severe stenosis, stable since 2023. no e/o mesenteric ischemia   -cont to monitor- low threshold for vascular consult if abd worsens  -c/w statin, apixaban, plavix

## 2024-09-05 NOTE — H&P ADULT - PROBLEM SELECTOR PLAN 4
family reports pt on lantus 48 and lipro 10 tid, but pt states she only takes 5-8 units lantus at home   -check A1c, FS  -lantus 8 units and SS for now. titrate as needed   -c/w gabapentin for neuropathy pain

## 2024-09-05 NOTE — ED ADULT NURSE REASSESSMENT NOTE - NS ED NURSE REASSESS COMMENT FT1
Attempt to call report.  states that the nurse is in with a patient and is asking for a call back in 15 minutes.

## 2024-09-05 NOTE — ED ADULT NURSE REASSESSMENT NOTE - NS ED NURSE REASSESS COMMENT FT1
Pt is A+Ox4. After lasix pt urinating, breathing even and unlabored, swelling reduced in lower extremities. Awaiting bed assignment. Call bell in reach, pt remains on monitor.

## 2024-09-05 NOTE — ED ADULT NURSE REASSESSMENT NOTE - NS ED NURSE REASSESS COMMENT FT1
pt reporting 10/10 headache and requesting to eat, MD made aware. pt educated to keep NPO as per MD orders. pt educated on fluid restriction as well. repeat blood work drawn and sent to lab. pt medicated as per orders, iv flushing well without complications, iv site WNL. side rails up x2. call bell in place at bedside

## 2024-09-05 NOTE — H&P ADULT - ASSESSMENT
84y Female PMH with hx of CAD s/p stents, HFrEF ( LVEF 45% in 2023), bradycardia s/p PPM (Abbott) HTN, IDDM, SMA stenosis admitted for acute hypoxic respiratory failure 2/2 CHF exacerbation

## 2024-09-05 NOTE — ED ADULT NURSE REASSESSMENT NOTE - NS ED NURSE REASSESS COMMENT FT1
new USIV 20G obtained to right upper arm by MD, + blood return, iv flushing well without complications, iv site WNL. ct scan made aware. pt to ct scan via stretcher in NAD, RR even and unlabored, side rails up x2.

## 2024-09-05 NOTE — CONSULT NOTE ADULT - SUBJECTIVE AND OBJECTIVE BOX
C A R D I O L O G Y  *********************    DATE OF SERVICE: 09-05-24    HISTORY OF PRESENT ILLNESS: HPI: Pt is an  84 year old Female with PMH DM, CAD s/p PCI to prox LAD/mid LAD/Diag/Ramus, last [facility] 8/2022 with occluded prox Ramus stent, patent LAD and Diag stents, mild to mod atherosclerosis in pLcx and mRCA, managed medically, HFpEF, HTN, and CVA who presents with decompensated CHF and palpitations. DShe reports compliance with meds and diet. Is here visiting her grandson from [place].      PAST MEDICAL & SURGICAL HISTORY:  HTN (hypertension)  CAD (coronary artery disease) S/P stent placemenet 2006, and reportedly 6/2019 in [place]  DM (diabetes mellitus)  GERD (gastroesophageal reflux disease)  CVA (cerebral vascular accident) no residual deficits  HLD (hyperlipidemia)  CHF (congestive heart failure)  S/P primary angioplasty with coronary stent x1 in 2006 at [facility]  S/P TKR (total knee replacement) left    MEDICATIONS:  MEDICATIONS  (STANDING):  apixaban 5 milliGRAM(s) Oral every 12 hours  atorvastatin 80 milliGRAM(s) Oral at bedtime  dextrose 5%. 1000 milliLiter(s) (50 mL/Hr) IV Continuous <Continuous>  donepezil 10 milliGRAM(s) Oral at bedtime  gabapentin 200 milliGRAM(s) Oral three times a day  insulin glargine Injectable (LANTUS) 8 Unit(s) SubCutaneous at bedtime  insulin lispro (ADMELOG) corrective regimen sliding scale   SubCutaneous at bedtime  insulin lispro (ADMELOG) corrective regimen sliding scale   SubCutaneous three times a day before meals  pantoprazole    Tablet 40 milliGRAM(s) Oral before breakfast  sacubitril 24 mG/valsartan 26 mG 1 Tablet(s) Oral two times a day    Allergies: No Known Allergies    FAMILY HISTORY:  FH: diabetes mellitus    SOCIAL HISTORY:    [X] Non-smoker  [ ] Smoker  [ ] Alcohol    REVIEW OF SYSTEMS:  [ ]chest pain  [  ]shortness of breath  [  ]palpitations  [  ]syncope  [ ]near syncope [ ]upper extremity weakness   [ ] lower extremity weakness  [  ]diplopia  [  ]altered mental status   [  ]fevers  [ ]chills [ ]nausea  [ ]vomiting  [  ]dysphagia    [ ]abdominal pain  [ ]melena  [ ]BRBPR    [  ]epistaxis  [  ]rash    [ ]lower extremity edema    [X] All others negative	  [ ] Unable to obtain    LABS:	 	    CARDIAC MARKERS:                    10.4   8.21  )-----------( 253      ( 04 Sep 2024 20:25 )             32.8     Hb Trend: 10.4<--    09-04    139  |  99  |  17  ----------------------------<  132<H>  3.5   |  26  |  1.31<H>    Ca    9.2      04 Sep 2024 20:25    TPro  7.6  /  Alb  4.0  /  TBili  1.3<H>  /  DBili  x   /  AST  20  /  ALT  16  /  AlkPhos  64  09-04    Creatinine Trend: 1.31<--      PHYSICAL EXAM:  T(C): 37.3 (09-05-24 @ 12:01), Max: 37.3 (09-05-24 @ 12:01)  HR: 76 (09-05-24 @ 12:01) (72 - 78)  BP: 152/82 (09-05-24 @ 12:01) (130/85 - 152/82)  RR: 19 (09-05-24 @ 12:01) (16 - 22)  SpO2: 100% (09-05-24 @ 12:01) (94% - 100%)  Wt(kg): --     I&O's Summary      General:  Alert and Oriented * 3.   Head: Normocephalic and atraumatic.   Neck: No JVD. No bruits. Supple. Does not appear to be enlarged.   Cardiovascular: + S1,S2 ; RRR Soft systolic murmur at the left lower sternal border. No rubs noted.    Lungs: Crackle son left side  Abdomen: + BS, soft. Non tender. Non distended. No rebound. No guarding.   Extremities:+edema  Neurologic: Moves all four extremities. Full range of motion.   Skin: Warm and moist. The patient's skin has normal elasticity and good skin turgor.   Psychiatric: Appropriate mood and affect.  Musculoskeletal: Normal range of motion, normal strength       TELEMETRY: 	Paced        RADIOLOGY:         CXR:   < from: Xray Chest 1 View- PORTABLE-Urgent (09.04.24 @ 21:40) >    IMPRESSION:  Bilateral perihilar and lower lung field hazy opacities, suggestive of pulmonary edema.       from: Cardiac Catheterization (08.18.22 @ 17:50) >  Diagnostic Conclusions:   Occluded proximal ramus stent. Patent LAD and diagonal stents.  Mild-moderate atherosclerosis in proximal Cx and mid RCA.      < from: Transthoracic Echocardiogram (03.30.23 @ 16:25) >  CONCLUSIONS:  1. Normal trileaflet aortic valve.  2. Normal left ventricular internal dimensions and wall thicknesses.  3. Moderate segmental left ventricular systolic dysfunction. Lateral and inferolateral wall hypokinesis.  4. Normal right ventricular size and function.  ------------------------------------------------------------------------  Confirmed on  3/31/2023 - 15:49:07 by Raciel Bucio M.D. RPVI      < from: Nuclear Stress Test-Exercise (04.02.23 @ 08:07) >  GATED ANALYSIS:  Post-stress gated wall motion analysis was performed (LVEF  = 34 %;LVEDV = 120 ml.), revealing overall severe hypokinesis.  ------------------------------------------------------------------------  IMPRESSIONS:Abnormal Study  * Chest Pain: No chest pain with administration of  Regadenoson.  * Symptom: Dyspnea.  * HR Response: Appropriate.  * BP Response: Appropriate.  * Heart Rhythm: Normal Sinus Rhythm - 65 BPM.  * Baseline ECG: Nonspecific ST-T wave abnormality.  * ECG Abnormalities: None.  * Arrhythmia: Occasional VPDs occurred during rest, stress  and recovery.  * There are large, moderate to severe defects in the apical, anterolateral, and lateral walls that are predominantly fixed, consistent with infarction with mild  seamus-infarct ischemia.  * Post-stress gated wall motion analysis was performed (LVEF = 34 %;LVEDV = 120 ml.), revealing overall severe  hypokinesis.  Conclusion:  There are large, moderate to severe defects in the apical,  anterolateral, and lateral walls that are predominantly  fixed, consistent with infarction with mild seamus-infarct  ischemia.  ------------------------------------------------------------------------  Confirmed on  4/2/2023 - 15:25:27 by Wilfredo Garibay MD, [facility],  [facility], RPVI  --------------------------------------------------------------------  < end of copied text >    < from: Cardiac Catheterization (04.04.23 @ 16:41) >  Diagnostic Conclusions:   Chronically occluded ramus artery stent. Mild atherosclerosis in proximal LAD. Patent mid LAD and ostial/proximal diagonal stents. Moderate atherosclerosis in proximal Cx. Mild atherosclerosis  in mid and distal RCA.    Recommendations:   Optimize medical therapy for ischemic heart disease. Aggressive risk factor modification.        ASSESSMENT/PLAN: Pt is an  84 year old Female with PMH DM, CAD s/p PCI to prox LAD/mid LAD/Diag/Ramus, last [facility] 8/2022 with occluded prox Ramus stent, patent LAD and Diag stents, mild to mod atherosclerosis in pLcx and mRCA, managed medically, HTN, and CVA who presents with decompensated CHF and palpitations. DShe reports compliance with meds and diet. Is here visiting her grandson from [place].    Acute on Chronic HFreF/CAD/HTN  --  previous ECHO noted with moderate segmental LV dysfxn, previous stress test abnormal mostly fixed defects with  seamus-infarct ischemia last cath showed 100% ramus  in stent restenosis repeat C pursued, revealing unchanged CAD- chronic occl ramus stent, patent mLAD and ostial/prox DIag stents and moderate atherosclerosis of pLcx  -- c/w Lasix 40 mg IV BID has crackles on exam  -- check repeat TTE  -- Interrogate Abbot PPM for palpiations  -- C/w Plavix, Imdur, Eliquis, Entresto Coreg    Isidro Reaves MD  Pager: [phone]

## 2024-09-05 NOTE — H&P ADULT - HISTORY OF PRESENT ILLNESS
HPI:    84y Female PMH with hx of CAD s/p stents, HFrEF ( LVEF 45% in 2023), bradycardia s/p PPM (Abbott) HTN, IDDM, SMA stenosis admitted for chest pain and sob x 2 days. Pt reports she resides in [place]  and only came to [place] ~3 days ago to visit family. Since arrival at [place] she noted progressive sob worse with exertion. She also developed chest tightness/pressure since yesterday, also worse with exertion. She endorses feeling weak and cold, as well as occasional dry cough. No fever,  chills,  Nausea, diaphoresis, dizziness, palpitation. leg swelling. She reports taking lasix as needed for leg enema, which she has not needed in the past few weeks. Reports compliance with rest of her meds.     In ER. pt is afebrile. /80 SpO2 mid 80s on RA. high 90s on 2L O2. CXR b/l opacity c/w pulm edema. Labs notable for BNP 1200, Cr 1.3 Pt received iv lasix 80s, asa 162, admitted for acute hypoxic respiratory failure 2/2 CHF exacerbation     PAST MEDICAL & SURGICAL HISTORY:  HTN (hypertension)      CAD (coronary artery disease)  S/P stent placemenet 2006, and reportedly 6/2019 in [place]      DM (diabetes mellitus)      GERD (gastroesophageal reflux disease)      CVA (cerebral vascular accident)  no residual deficits      HLD (hyperlipidemia)      CHF (congestive heart failure)      S/P primary angioplasty with coronary stent  x1 in 2006 at [facility]      S/P TKR (total knee replacement)  left          Review of Systems:   CONSTITUTIONAL: No fever, weight loss, or fatigue  EYES: No eye pain, visual disturbances, or discharge  ENMT:  No difficulty hearing, tinnitus, vertigo; No sinus or throat pain  NECK: No pain or stiffness  RESPIRATORY: No cough, wheezing, chills or hemoptysis; + shortness of breath  CARDIOVASCULAR: + chest pain, no palpitations, dizziness, or leg swelling  GASTROINTESTINAL: No abdominal or epigastric pain. No nausea, vomiting, or hematemesis; No diarrhea or constipation. No melena or hematochezia.  GENITOURINARY: No dysuria, frequency, hematuria, or incontinence  NEUROLOGICAL: No headaches, memory loss, loss of strength, numbness, or tremors  SKIN: No itching, burning, rashes,   ENDOCRINE: cold intolerance;  MUSCULOSKELETAL: No joint pain or swelling; No muscle, back, or extremity pain  PSYCHIATRIC: No depression, anxiety, mood swings, or difficulty sleeping        Allergies    No Known Allergies    Intolerances        Social History:     FAMILY HISTORY:  FH: diabetes mellitus        MEDICATIONS  (STANDING):  apixaban 5 milliGRAM(s) Oral every 12 hours  atorvastatin 80 milliGRAM(s) Oral at bedtime  dextrose 5%. 1000 milliLiter(s) (50 mL/Hr) IV Continuous <Continuous>  donepezil 10 milliGRAM(s) Oral at bedtime  gabapentin 200 milliGRAM(s) Oral three times a day  insulin glargine Injectable (LANTUS) 8 Unit(s) SubCutaneous at bedtime  insulin lispro (ADMELOG) corrective regimen sliding scale   SubCutaneous at bedtime  insulin lispro (ADMELOG) corrective regimen sliding scale   SubCutaneous three times a day before meals  pantoprazole    Tablet 40 milliGRAM(s) Oral before breakfast  sacubitril 24 mG/valsartan 26 mG 1 Tablet(s) Oral two times a day    MEDICATIONS  (PRN):  acetaminophen     Tablet .. 650 milliGRAM(s) Oral every 6 hours PRN Temp greater or equal to 38C (100.4F), Mild Pain (1 - 3)  aluminum hydroxide/magnesium hydroxide/simethicone Suspension 30 milliLiter(s) Oral every 4 hours PRN Dyspepsia  dextrose Oral Gel 15 Gram(s) Oral once PRN Blood Glucose LESS THAN 70 milliGRAM(s)/deciliter  melatonin 3 milliGRAM(s) Oral at bedtime PRN Insomnia  ondansetron Injectable 4 milliGRAM(s) IV Push every 8 hours PRN Nausea and/or Vomiting      T(C): 37.3 (09-05-24 @ 12:01), Max: 37.3 (09-05-24 @ 12:01)  HR: 76 (09-05-24 @ 12:01) (72 - 78)  BP: 152/82 (09-05-24 @ 12:01) (130/85 - 152/82)  RR: 19 (09-05-24 @ 12:01) (16 - 22)  SpO2: 100% (09-05-24 @ 12:01) (94% - 100%)    Weight (kg): 81.6 (09-04-24 @ 16:00)  CAPILLARY BLOOD GLUCOSE        I&O's Summary      PHYSICAL EXAM:  GENERAL: NAD, well-developed  HEAD:  Atraumatic, Normocephalic  EYES: EOMI, PERRLA, conjunctiva and sclera clear  NECK: Supple, No elevated JVD  CHEST/LUNG: Clear to auscultation bilaterally; No wheeze  HEART: Regular rate and rhythm; No murmurs, rubs, or gallops  ABDOMEN: Soft, Nontender, Nondistended; Bowel sounds present  EXTREMITIES:  2+ Peripheral Pulses, No clubbing, cyanosis, or edema  PSYCH: AAOx3  NEUROLOGY: CN II-XII grossly intact, moving all extremities  SKIN: No rashes or lesions    LABS:                        10.4   8.21  )-----------( 253      ( 04 Sep 2024 20:25 )             32.8     09-04    139  |  99  |  17  ----------------------------<  132<H>  3.5   |  26  |  1.31<H>    Ca    9.2      04 Sep 2024 20:25    TPro  7.6  /  Alb  4.0  /  TBili  1.3<H>  /  DBili  x   /  AST  20  /  ALT  16  /  AlkPhos  64  09-04          Urinalysis Basic - ( 04 Sep 2024 20:25 )    Color: x / Appearance: x / SG: x / pH: x  Gluc: 132 mg/dL / Ketone: x  / Bili: x / Urobili: x   Blood: x / Protein: x / Nitrite: x   Leuk Esterase: x / RBC: x / WBC x   Sq Epi: x / Non Sq Epi: x / Bacteria: x          RADIOLOGY & ADDITIONAL TESTS:    ECG Personally Reviewed -     Imaging Personally Reviewed:    Consultant(s) Notes Reviewed:      Care Discussed with Consultants/Other Providers:

## 2024-09-05 NOTE — H&P ADULT - PROBLEM SELECTOR PLAN 2
-CXR pulm edema, BNP elevated   -LVEF 45% on echo 2023. repeat echo ordered   -s/p iv lasix 80 in ER, diuresing well   -c/w entresto. pt reports not on b-blocker  or standing diuretics at home   -EP consult for PPM interrogation r/p arrythmia as cause of CHF exacerbation   -cardiology consulted- f/u recs on GDMT  -daily weight, I&O -CXR pulm edema, BNP elevated   -LVEF 45% on echo 2023. repeat echo ordered   -s/p iv lasix 80 in ER, diuresing well. started on iv lasix 40 bid   -c/w entresto. started on coreg 3.125 bid   -EP consult for PPM interrogation r/p arrythmia as cause of CHF exacerbation   -cardiology consulted- f/u recs on GDMT  -daily weight, I&O

## 2024-09-05 NOTE — ED ADULT NURSE REASSESSMENT NOTE - NS ED NURSE REASSESS COMMENT FT1
additional blood work drawn via butterfly and sent to lab. attempted to flush iv but + resistance met. + infiltration noted, MD made aware and at bedside. attempted to trial pt on RA but pt o2 sat decreasing to 90%. placed back on 2L NC, RR even and unlabored. safety measures maintained

## 2024-09-06 ENCOUNTER — RESULT REVIEW (OUTPATIENT)
Age: 84
End: 2024-09-06

## 2024-09-06 LAB
A1C WITH ESTIMATED AVERAGE GLUCOSE RESULT: 7.1 % — HIGH (ref 4–5.6)
ANION GAP SERPL CALC-SCNC: 13 MMOL/L — SIGNIFICANT CHANGE UP (ref 7–14)
APPEARANCE UR: CLEAR — SIGNIFICANT CHANGE UP
BACTERIA # UR AUTO: NEGATIVE /HPF — SIGNIFICANT CHANGE UP
BILIRUB UR-MCNC: NEGATIVE — SIGNIFICANT CHANGE UP
BUN SERPL-MCNC: 18 MG/DL — SIGNIFICANT CHANGE UP (ref 7–23)
CALCIUM SERPL-MCNC: 9.4 MG/DL — SIGNIFICANT CHANGE UP (ref 8.4–10.5)
CAST: 1 /LPF — SIGNIFICANT CHANGE UP (ref 0–4)
CHLORIDE SERPL-SCNC: 101 MMOL/L — SIGNIFICANT CHANGE UP (ref 98–107)
CO2 SERPL-SCNC: 26 MMOL/L — SIGNIFICANT CHANGE UP (ref 22–31)
COLOR SPEC: YELLOW — SIGNIFICANT CHANGE UP
CREAT SERPL-MCNC: 1.34 MG/DL — HIGH (ref 0.5–1.3)
DIFF PNL FLD: NEGATIVE — SIGNIFICANT CHANGE UP
EGFR: 39 ML/MIN/1.73M2 — LOW
ESTIMATED AVERAGE GLUCOSE: 157 — SIGNIFICANT CHANGE UP
GLUCOSE BLDC GLUCOMTR-MCNC: 136 MG/DL — HIGH (ref 70–99)
GLUCOSE BLDC GLUCOMTR-MCNC: 150 MG/DL — HIGH (ref 70–99)
GLUCOSE BLDC GLUCOMTR-MCNC: 200 MG/DL — HIGH (ref 70–99)
GLUCOSE BLDC GLUCOMTR-MCNC: 214 MG/DL — HIGH (ref 70–99)
GLUCOSE SERPL-MCNC: 131 MG/DL — HIGH (ref 70–99)
GLUCOSE UR QL: NEGATIVE MG/DL — SIGNIFICANT CHANGE UP
HCT VFR BLD CALC: 34 % — LOW (ref 34.5–45)
HGB BLD-MCNC: 10.9 G/DL — LOW (ref 11.5–15.5)
KETONES UR-MCNC: NEGATIVE MG/DL — SIGNIFICANT CHANGE UP
LEUKOCYTE ESTERASE UR-ACNC: ABNORMAL
MAGNESIUM SERPL-MCNC: 2.3 MG/DL — SIGNIFICANT CHANGE UP (ref 1.6–2.6)
MCHC RBC-ENTMCNC: 27.9 PG — SIGNIFICANT CHANGE UP (ref 27–34)
MCHC RBC-ENTMCNC: 32.1 GM/DL — SIGNIFICANT CHANGE UP (ref 32–36)
MCV RBC AUTO: 87 FL — SIGNIFICANT CHANGE UP (ref 80–100)
NITRITE UR-MCNC: NEGATIVE — SIGNIFICANT CHANGE UP
NRBC # BLD: 0 /100 WBCS — SIGNIFICANT CHANGE UP (ref 0–0)
NRBC # FLD: 0 K/UL — SIGNIFICANT CHANGE UP (ref 0–0)
PH UR: 7.5 — SIGNIFICANT CHANGE UP (ref 5–8)
PLATELET # BLD AUTO: 285 K/UL — SIGNIFICANT CHANGE UP (ref 150–400)
POTASSIUM SERPL-MCNC: 4.1 MMOL/L — SIGNIFICANT CHANGE UP (ref 3.5–5.3)
POTASSIUM SERPL-SCNC: 4.1 MMOL/L — SIGNIFICANT CHANGE UP (ref 3.5–5.3)
PROT UR-MCNC: NEGATIVE MG/DL — SIGNIFICANT CHANGE UP
RBC # BLD: 3.91 M/UL — SIGNIFICANT CHANGE UP (ref 3.8–5.2)
RBC # FLD: 13.8 % — SIGNIFICANT CHANGE UP (ref 10.3–14.5)
RBC CASTS # UR COMP ASSIST: 0 /HPF — SIGNIFICANT CHANGE UP (ref 0–4)
SODIUM SERPL-SCNC: 140 MMOL/L — SIGNIFICANT CHANGE UP (ref 135–145)
SP GR SPEC: 1.01 — SIGNIFICANT CHANGE UP (ref 1–1.03)
SQUAMOUS # UR AUTO: 0 /HPF — SIGNIFICANT CHANGE UP (ref 0–5)
UROBILINOGEN FLD QL: 1 MG/DL — SIGNIFICANT CHANGE UP (ref 0.2–1)
WBC # BLD: 6.2 K/UL — SIGNIFICANT CHANGE UP (ref 3.8–10.5)
WBC # FLD AUTO: 6.2 K/UL — SIGNIFICANT CHANGE UP (ref 3.8–10.5)
WBC UR QL: 0 /HPF — SIGNIFICANT CHANGE UP (ref 0–5)

## 2024-09-06 PROCEDURE — 93306 TTE W/DOPPLER COMPLETE: CPT | Mod: 26

## 2024-09-06 PROCEDURE — 93280 PM DEVICE PROGR EVAL DUAL: CPT | Mod: 26

## 2024-09-06 PROCEDURE — 99233 SBSQ HOSP IP/OBS HIGH 50: CPT

## 2024-09-06 RX ORDER — FUROSEMIDE 40 MG
40 TABLET ORAL ONCE
Refills: 0 | Status: COMPLETED | OUTPATIENT
Start: 2024-09-06 | End: 2024-09-06

## 2024-09-06 RX ORDER — FUROSEMIDE 40 MG
80 TABLET ORAL
Refills: 0 | Status: DISCONTINUED | OUTPATIENT
Start: 2024-09-06 | End: 2024-09-11

## 2024-09-06 RX ADMIN — INSULIN GLARGINE 8 UNIT(S): 100 INJECTION, SOLUTION SUBCUTANEOUS at 00:53

## 2024-09-06 RX ADMIN — Medication 80 MILLIGRAM(S): at 22:26

## 2024-09-06 RX ADMIN — Medication 200 MILLIGRAM(S): at 22:26

## 2024-09-06 RX ADMIN — Medication 80 MILLIGRAM(S): at 14:14

## 2024-09-06 RX ADMIN — Medication 75 MILLIGRAM(S): at 12:02

## 2024-09-06 RX ADMIN — SACUBITRIL AND VALSARTAN 1 TABLET(S): 49; 51 TABLET, FILM COATED ORAL at 18:47

## 2024-09-06 RX ADMIN — Medication 200 MILLIGRAM(S): at 05:40

## 2024-09-06 RX ADMIN — Medication 200 MILLIGRAM(S): at 14:14

## 2024-09-06 RX ADMIN — SACUBITRIL AND VALSARTAN 1 TABLET(S): 49; 51 TABLET, FILM COATED ORAL at 05:40

## 2024-09-06 RX ADMIN — DONEPEZIL HYDROCHLORIDE 10 MILLIGRAM(S): 5 TABLET, FILM COATED ORAL at 22:26

## 2024-09-06 RX ADMIN — CARVEDILOL 3.12 MILLIGRAM(S): 6.25 TABLET ORAL at 05:41

## 2024-09-06 RX ADMIN — CARVEDILOL 3.12 MILLIGRAM(S): 6.25 TABLET ORAL at 18:47

## 2024-09-06 RX ADMIN — APIXABAN 5 MILLIGRAM(S): 5 TABLET, FILM COATED ORAL at 18:47

## 2024-09-06 RX ADMIN — APIXABAN 5 MILLIGRAM(S): 5 TABLET, FILM COATED ORAL at 05:41

## 2024-09-06 RX ADMIN — Medication 40 MILLIGRAM(S): at 12:14

## 2024-09-06 RX ADMIN — INSULIN GLARGINE 8 UNIT(S): 100 INJECTION, SOLUTION SUBCUTANEOUS at 22:27

## 2024-09-06 RX ADMIN — Medication 2: at 12:14

## 2024-09-06 RX ADMIN — Medication 40 MILLIGRAM(S): at 05:41

## 2024-09-06 RX ADMIN — Medication 40 MILLIGRAM(S): at 05:40

## 2024-09-06 NOTE — PATIENT PROFILE ADULT - FUNCTIONAL ASSESSMENT - BASIC MOBILITY 6.
2 = A lot of assistance 2-calculated by average/Not able to assess (calculate score using [facility] averaging method)

## 2024-09-06 NOTE — PROGRESS NOTE ADULT - PROBLEM SELECTOR PLAN 4
family reports pt on Lantus 48 and lipro 10 tid, but pt states she only takes 5-8 units lantus at home   - HbA1c 7.1%  - c/w Lantus 8 units and ISS, DM diet   - c/w gabapentin 200 mg TID for neuropathy pain

## 2024-09-06 NOTE — PROGRESS NOTE ADULT - PROBLEM SELECTOR PLAN 5
reports mid abd pain on and off   - CTAP SMA severe stenosis, stable since 2023. no e/o mesenteric ischemia   - family aware of this finding   - LA wnl   - c/w statin, apixaban, Plavix  - vascular f/u OP

## 2024-09-06 NOTE — PHYSICAL THERAPY INITIAL EVALUATION ADULT - NSPTDISCHREC_GEN_A_CORE
Pre-Procedure: The surgical site was antiseptically prepared. Total Time In Minutes: 1 minute Post-Care Instructions: I reviewed with the patient in detail post-care instructions. Patient is to keep the area dry for 48 hours, and not to engage in any heavy lifting, exercise, or swimming for the next 14 days. Should the patient develop any fevers, chills, bleeding, severe pain patient will contact the office immediately. Anesthesia Volume In Cc: 0 Detail Level: Detailed Anesthesia Type: 1% lidocaine with 1:100,000 epinephrine Additional Information: (Optional): The wound was cleaned, and a dressing was applied.  The patient received detailed post-op instructions. Number Of Freeze-Thaw Cycles: 2 freeze-thaw cycles Size Of Lesion In Cm: 0.6 Bill As A Line Item Or As Units: Line Item Consent was obtained from the patient. The risks and benefits to therapy were discussed in detail. Specifically, the risks of infection, scarring, bleeding, prolonged wound healing, incomplete removal, allergy to anesthesia, nerve injury and recurrence were addressed. Alternatives to liquid nitrogen, such as ED&C, surgical removal, XRT were also discussed.  Prior to the procedure, the treatment site was clearly identified and confirmed by the patient. All components of Universal Protocol/PAUSE Rule completed. Restorative rehab to improve strength, balance, and return to previous level of functional mobility.

## 2024-09-06 NOTE — PROGRESS NOTE ADULT - PROBLEM SELECTOR PLAN 3
Never smoker, known DM2  - trop 34-->30  - c/w atorvastatin 80 mg qhs  - c/w apixaban 5 mg BID  - started on Plavix 75 mg   - f/u cards re: Imdur dosing as not on it   - cardiology following

## 2024-09-06 NOTE — DISCHARGE NOTE PROVIDER - NSDCCPCAREPLAN_GEN_ALL_CORE_FT
PRINCIPAL DISCHARGE DIAGNOSIS  Diagnosis: Acute respiratory failure with hypoxia  Assessment and Plan of Treatment: Due to fluid in lungs treated with IV diuretics (furosemide) with improvement.  Maintain low sodium diet, avoid drinking too much water/liquids per day.   Closely follow with your cardiologist and if you have rapid weight gain call and get recommendations regarding managment.      SECONDARY DISCHARGE DIAGNOSES  Diagnosis: Acute exacerbation of CHF (congestive heart failure)  Assessment and Plan of Treatment: Caused fluid in lungs treated with IV diuretics (furosemide) with improvement.  Maintain low sodium diet, avoid drinking too much water/liquids per day.   Closely follow with your cardiologist and if you have rapid weight gain call and get recommendations regarding managment.    Diagnosis: CAD (coronary artery disease)  Assessment and Plan of Treatment: Continue your medications as prescribed.    Diagnosis: DM (diabetes mellitus)  Assessment and Plan of Treatment: Continue your medications as prescribed.    Diagnosis: Superior mesenteric artery stenosis  Assessment and Plan of Treatment: Continue your medications as prescribed.   You can follow up with a vascular surgeon as outpatient should this tight vessel in your adominal area starts to have increased symptoms with eating.     PRINCIPAL DISCHARGE DIAGNOSIS  Diagnosis: Acute respiratory failure with hypoxia  Assessment and Plan of Treatment: Due to fluid in lungs treated with IV diuretics (furosemide) with improvement.  Maintain low sodium diet, avoid drinking too much water/liquids per day.   Closely follow with your cardiologist and if you have rapid weight gain call and get recommendations regarding managment.      SECONDARY DISCHARGE DIAGNOSES  Diagnosis: Acute exacerbation of CHF (congestive heart failure)  Assessment and Plan of Treatment: Caused fluid in lungs treated with IV diuretics (furosemide) with improvement.  Maintain low sodium diet, avoid drinking too much water/liquids per day.   Closely follow with your cardiologist and if you have rapid weight gain call and get recommendations regarding managment.    Diagnosis: CAD (coronary artery disease)  Assessment and Plan of Treatment: Continue Plavix and statin.    Diagnosis: Superior mesenteric artery stenosis  Assessment and Plan of Treatment: Continue your medications as prescribed.   You can follow up with a vascular surgeon as outpatient should this tight vessel in your adominal area starts to have increased symptoms with eating.    Diagnosis: DM (diabetes mellitus)  Assessment and Plan of Treatment: Continue your medications as prescribed.

## 2024-09-06 NOTE — PROGRESS NOTE ADULT - SUBJECTIVE AND OBJECTIVE BOX
Date of service 9/6/24    c/o ongoing SOB, not improved from yesterday, no chest pain.  ROS otherwise negative      acetaminophen     Tablet .. 650 milliGRAM(s) Oral every 6 hours PRN  aluminum hydroxide/magnesium hydroxide/simethicone Suspension 30 milliLiter(s) Oral every 4 hours PRN  apixaban 5 milliGRAM(s) Oral every 12 hours  atorvastatin 80 milliGRAM(s) Oral at bedtime  carvedilol 3.125 milliGRAM(s) Oral every 12 hours  clopidogrel Tablet 75 milliGRAM(s) Oral daily  dextrose 5%. 1000 milliLiter(s) IV Continuous <Continuous>  dextrose Oral Gel 15 Gram(s) Oral once PRN  donepezil 10 milliGRAM(s) Oral at bedtime  furosemide   Injectable 80 milliGRAM(s) IV Push two times a day  furosemide   Injectable 40 milliGRAM(s) IV Push once  gabapentin 200 milliGRAM(s) Oral three times a day  insulin glargine Injectable (LANTUS) 8 Unit(s) SubCutaneous at bedtime  insulin lispro (ADMELOG) corrective regimen sliding scale   SubCutaneous at bedtime  insulin lispro (ADMELOG) corrective regimen sliding scale   SubCutaneous three times a day before meals  melatonin 3 milliGRAM(s) Oral at bedtime PRN  ondansetron Injectable 4 milliGRAM(s) IV Push every 8 hours PRN  pantoprazole    Tablet 40 milliGRAM(s) Oral before breakfast  sacubitril 24 mG/valsartan 26 mG 1 Tablet(s) Oral two times a day                            10.9   6.20  )-----------( 285      ( 06 Sep 2024 06:06 )             34.0     140  |  101  |  18  ----------------------------<  131<H>  4.1   |  26  |  1.34<H>    Ca    9.4      06 Sep 2024 06:06  Mg     2.30     09-06    TPro  7.6  /  Alb  4.0  /  TBili  1.3<H>  /  DBili  x   /  AST  20  /  ALT  16  /  AlkPhos  64  09-04      T(C): 36.5 (09-06-24 @ 01:27), Max: 37.3 (09-05-24 @ 12:01)  HR: 64 (09-06-24 @ 11:27) (63 - 80)  BP: 129/64 (09-06-24 @ 05:41) (129/64 - 152/82)  RR: 18 (09-06-24 @ 09:57) (18 - 23)  SpO2: 95% (09-06-24 @ 11:27) (95% - 100%)  Wt(kg): --    General: Well nourished in no acute distress. Alert and Oriented * 3.   Head: Normocephalic and atraumatic.   Neck: No JVD. No bruits. Supple. Does not appear to be enlarged.   Cardiovascular: + S1,S2 ; RRR Soft systolic murmur at the left lower sternal border. No rubs noted.    Lungs: B/L crackles, L>R  Abdomen: + BS, soft. Non tender. Non distended. No rebound. No guarding.   Extremities: No clubbing/cyanosis/+edema.   Neurologic: Moves all four extremities. Full range of motion.   Skin: Warm and moist. The patient's skin has normal elasticity and good skin turgor.   Psychiatric: Appropriate mood and affect.  Musculoskeletal: Normal range of motion, normal strength    DATA      < from: Xray Chest 1 View- PORTABLE-Urgent (09.04.24 @ 21:40) >    IMPRESSION:  Bilateral perihilar and lower lung field hazy opacities, suggestive of pulmonary edema.       from: Cardiac Catheterization (08.18.22 @ 17:50) >  Diagnostic Conclusions:   Occluded proximal ramus stent. Patent LAD and diagonal stents.  Mild-moderate atherosclerosis in proximal Cx and mid RCA.      < from: Transthoracic Echocardiogram (03.30.23 @ 16:25) >  CONCLUSIONS:  1. Normal trileaflet aortic valve.  2. Normal left ventricular internal dimensions and wall thicknesses.  3. Moderate segmental left ventricular systolic dysfunction. Lateral and inferolateral wall hypokinesis.  4. Normal right ventricular size and function.  ------------------------------------------------------------------------  Confirmed on  3/31/2023 - 15:49:07 by BRIAN Soares      < from: Nuclear Stress Test-Exercise (04.02.23 @ 08:07) >  GATED ANALYSIS:  Post-stress gated wall motion analysis was performed (LVEF  = 34 %;LVEDV = 120 ml.), revealing overall severe hypokinesis.  ------------------------------------------------------------------------  IMPRESSIONS:Abnormal Study  * Chest Pain: No chest pain with administration of  Regadenoson.  * Symptom: Dyspnea.  * HR Response: Appropriate.  * BP Response: Appropriate.  * Heart Rhythm: Normal Sinus Rhythm - 65 BPM.  * Baseline ECG: Nonspecific ST-T wave abnormality.  * ECG Abnormalities: None.  * Arrhythmia: Occasional VPDs occurred during rest, stress  and recovery.  * There are large, moderate to severe defects in the apical, anterolateral, and lateral walls that are predominantly fixed, consistent with infarction with mild  seamus-infarct ischemia.  * Post-stress gated wall motion analysis was performed (LVEF = 34 %;LVEDV = 120 ml.), revealing overall severe  hypokinesis.  Conclusion:  There are large, moderate to severe defects in the apical,  anterolateral, and lateral walls that are predominantly  fixed, consistent with infarction with mild seamus-infarct  ischemia.  ------------------------------------------------------------------------  Confirmed on  4/2/2023 - 15:25:27 by Wilfredo Garibay MD, [facility],  CLAUDIA ASKEW  --------------------------------------------------------------------  < end of copied text >    < from: Cardiac Catheterization (04.04.23 @ 16:41) >  Diagnostic Conclusions:   Chronically occluded ramus artery stent. Mild atherosclerosis in proximal LAD. Patent mid LAD and ostial/proximal diagonal stents. Moderate atherosclerosis in proximal Cx. Mild atherosclerosis  in mid and distal RCA.    Recommendations:   Optimize medical therapy for ischemic heart disease. Aggressive risk factor modification.        ASSESSMENT/PLAN: Pt is an  84 year old Female with PMH DM, CAD s/p PCI to prox LAD/mid LAD/Diag/Ramus, last [facility] 8/2022 with occluded prox Ramus stent, patent LAD and Diag stents, mild to mod atherosclerosis in pLcx and mRCA, managed medically, HTN, and CVA who presents with decompensated HFrEF and palpitations. She reports compliance with meds and diet. Is here visiting her grandson from [place].    Acute on Chronic HFreF/CAD/HTN  --  previous ECHO noted with moderate segmental LV dysfxn, previous stress test abnormal mostly fixed defects with  seamus-infarct ischemia last cath showed 100% ramus  in stent restenosis repeat C pursued, revealing unchanged CAD- chronic occl ramus stent, patent mLAD and ostial/prox DIag stents and moderate atherosclerosis of pLcx  --increase lasix to 80 IV BID, O>I  -- check repeat TTE  -- Interrogate Abbot PPM for palpiations  -- C/w Plavix, Imdur, Eliquis, Entresto Coreg    Hannah CALVILLO  [phone]

## 2024-09-06 NOTE — PROGRESS NOTE ADULT - ASSESSMENT
84F HTN, DM on insulin, CAD s/p stents, HFrEF (EF 45% 2023), bradycardia s/p PPM (Abbott), SMA stenosis admitted for acute hypoxic respiratory failure due to acute systolic heart failure exacerbation.

## 2024-09-06 NOTE — PROGRESS NOTE ADULT - SUBJECTIVE AND OBJECTIVE BOX
Patient is a 84y old  Female who presents with a chief complaint of chest pain, sob (06 Sep 2024 11:52)    SUBJECTIVE / OVERNIGHT EVENTS:    reports at home had CP pressure associated with SOB, reports also some intermittent abdominal pain  no recent weight loss, "up and down", reports used to get pain and nausea/vomiting with meals, no vomiting recently, still gets some pain with meals, not enough to come to ED  denies dietary indiscretion  uses walker sometimes  moved to [place] with son, visiting, has 2 daughters in [place]   last BM 9/4    MEDICATIONS  (STANDING):  apixaban 5 milliGRAM(s) Oral every 12 hours  atorvastatin 80 milliGRAM(s) Oral at bedtime  carvedilol 3.125 milliGRAM(s) Oral every 12 hours  clopidogrel Tablet 75 milliGRAM(s) Oral daily  dextrose 5%. 1000 milliLiter(s) (50 mL/Hr) IV Continuous <Continuous>  donepezil 10 milliGRAM(s) Oral at bedtime  furosemide   Injectable 80 milliGRAM(s) IV Push two times a day  gabapentin 200 milliGRAM(s) Oral three times a day  insulin glargine Injectable (LANTUS) 8 Unit(s) SubCutaneous at bedtime  insulin lispro (ADMELOG) corrective regimen sliding scale   SubCutaneous at bedtime  insulin lispro (ADMELOG) corrective regimen sliding scale   SubCutaneous three times a day before meals  pantoprazole    Tablet 40 milliGRAM(s) Oral before breakfast  sacubitril 24 mG/valsartan 26 mG 1 Tablet(s) Oral two times a day    MEDICATIONS  (PRN):  acetaminophen     Tablet .. 650 milliGRAM(s) Oral every 6 hours PRN Temp greater or equal to 38C (100.4F), Mild Pain (1 - 3)  aluminum hydroxide/magnesium hydroxide/simethicone Suspension 30 milliLiter(s) Oral every 4 hours PRN Dyspepsia  dextrose Oral Gel 15 Gram(s) Oral once PRN Blood Glucose LESS THAN 70 milliGRAM(s)/deciliter  melatonin 3 milliGRAM(s) Oral at bedtime PRN Insomnia  ondansetron Injectable 4 milliGRAM(s) IV Push every 8 hours PRN Nausea and/or Vomiting    T(C): 36.6 (09-06-24 @ 14:12), Max: 37.3 (09-06-24 @ 00:18)  HR: 64 (09-06-24 @ 14:12) (60 - 80)  BP: 125/76 (09-06-24 @ 14:12) (125/76 - 147/82)  RR: 21 (09-06-24 @ 14:12) (18 - 23)  SpO2: 96% (09-06-24 @ 14:12) (95% - 100%)    CAPILLARY BLOOD GLUCOSE  POCT Blood Glucose.: 214 mg/dL (06 Sep 2024 11:55)  POCT Blood Glucose.: 150 mg/dL (06 Sep 2024 07:40)  POCT Blood Glucose.: 131 mg/dL (05 Sep 2024 23:09)  POCT Blood Glucose.: 156 mg/dL (05 Sep 2024 17:46)    I&O's Summary    06 Sep 2024 07:01  -  06 Sep 2024 16:35  --------------------------------------------------------  IN: 0 mL / OUT: 600 mL / NET: -600 mL    PHYSICAL EXAM:  GENERAL: NAD, RA sat in 90s  CHEST/LUNG: basilar crackles   HEART: Regular rate and rhythm; No murmurs, rubs, or gallops  ABDOMEN: Soft, Nontender, Nondistended; Bowel sounds present  EXTREMITIES:   warm and well perfused, No clubbing, cyanosis, or edema  PSYCH: AAOx3   NEUROLOGY: non-focal    LABS:                        10.9   6.20  )-----------( 285      ( 06 Sep 2024 06:06 )             34.0     09-06    140  |  101  |  18  ----------------------------<  131<H>  4.1   |  26  |  1.34<H>    Ca    9.4      06 Sep 2024 06:06  Mg     2.30     09-06    TPro  7.6  /  Alb  4.0  /  TBili  1.3<H>  /  DBili  x   /  AST  20  /  ALT  16  /  AlkPhos  64  09-04    Care Discussed with Consultants/Other Providers: IDR cards

## 2024-09-06 NOTE — PHYSICAL THERAPY INITIAL EVALUATION ADULT - ADDITIONAL COMMENTS
Pt left semisupine in bed in NAD, all lines intact, call bell in reach, SPO2 98%, +NC at 2L, bed alarm on, and RN aware.

## 2024-09-06 NOTE — PHYSICAL THERAPY INITIAL EVALUATION ADULT - PERTINENT HX OF CURRENT PROBLEM, REHAB EVAL
Patient is a 84 year old Female, PMH stated below, presents with acute hypoxic respiratory failure secondary to CHF exacerbation.

## 2024-09-06 NOTE — PHYSICAL THERAPY INITIAL EVALUATION ADULT - ACTIVE RANGE OF MOTION EXAMINATION, REHAB EVAL
rufina. upper extremity Active ROM was WNL (within normal limits)/bilateral lower extremity Active ROM was WNL (within normal limits)

## 2024-09-06 NOTE — DISCHARGE NOTE PROVIDER - NSDCMRMEDTOKEN_GEN_ALL_CORE_FT
apixaban 5 mg oral tablet: 1 tab(s) orally every 12 hours  atorvastatin 80 mg oral tablet: 1 tab(s) orally once a day (at bedtime)  donepezil 10 mg oral tablet: 1 tab(s) orally once a day  Entresto 24 mg-26 mg oral tablet: 1 tab(s) orally 2 times a day  gabapentin 100 mg oral tablet: 200 orally 3 times a day  Insulin Glargine: 48 unit(s) injectable once a day (at bedtime)  Lasix 40 mg oral tablet: 1 tab(s) orally once a day as needed for leg swelling  Norvasc 10 mg oral tablet: 1 tab(s) orally once a day  NovoLOG 100 units/mL subcutaneous solution: 10 unit(s) subcutaneous 3 times a day (before meals)  pantoprazole 40 mg oral delayed release tablet: 1 tab(s) orally once a day   apixaban 5 mg oral tablet: 1 tab(s) orally every 12 hours  atorvastatin 80 mg oral tablet: 1 tab(s) orally once a day (at bedtime)  carvedilol 3.125 mg oral tablet: 1 tab(s) orally every 12 hours  clopidogrel 75 mg oral tablet: 1 tab(s) orally once a day  dicyclomine 10 mg oral capsule: 1 cap(s) orally 3 times a day (before meals)  donepezil 10 mg oral tablet: 1 tab(s) orally once a day  Entresto 24 mg-26 mg oral tablet: 1 tab(s) orally 2 times a day  furosemide 40 mg oral tablet: 1 tab(s) orally once a day  gabapentin 100 mg oral capsule: 1 cap(s) orally 2 times a day  insulin glargine 100 units/mL subcutaneous solution: 12 unit(s) subcutaneous once a day (at bedtime)  NovoLOG FlexPen 100 units/mL injectable solution: 2 unit(s) injectable 3 times a day (before meals)  pantoprazole 40 mg oral delayed release tablet: 1 tab(s) orally once a day  polyethylene glycol 3350 oral powder for reconstitution: 17 gram(s) orally once a day  senna leaf extract oral tablet: 2 tab(s) orally once a day (at bedtime)

## 2024-09-06 NOTE — PHYSICAL THERAPY INITIAL EVALUATION ADULT - GENERAL OBSERVATIONS, REHAB EVAL
Pt encountered in semi-supine position in NAD, all lines intact, a&ox4, SPO2 98%, +NC at 2L, and RN aware.

## 2024-09-06 NOTE — PROGRESS NOTE ADULT - PROBLEM SELECTOR PLAN 2
CXR pulm edema, BNP elevated;  LVEF 45% on echo 2023   - s/p IV Lasix 80 in ER,  c/w Lasix 80 BID   - c/w Entresto 24/26 mg (1 yr)  - c/w coreg 3.125 bid   - EP PPM interrogation 9/6:  Normal sensing pacing via iterative testing, excellent threshold capture, no events recorded, no reprogramming  - cardiology following  - strict I&Os, daily weights

## 2024-09-06 NOTE — DISCHARGE NOTE PROVIDER - NSDCFUADDAPPT_GEN_ALL_CORE_FT
Follow up with Dr. Lindquist for wound check on 9/24 at 1040AM, [address] [facility] [address], [facility], [phone]

## 2024-09-06 NOTE — PROGRESS NOTE ADULT - PROBLEM SELECTOR PLAN 6
Eliquis ppx  PT rec [facility], family states no to facility, would be open to OP PT  dc pending euvolemia  spoke to family DA (son) at  9/6 [phone]

## 2024-09-06 NOTE — DISCHARGE NOTE PROVIDER - HOSPITAL COURSE
84F HTN, DM on insulin, CAD s/p stents, HFrEF (EF 45% 2023), bradycardia s/p PPM (Abbott), SMA stenosis presented with chest pain and sob x 2 days. Pt reports she resides in [place]  and only came to [place] ~3 days ago to visit family. Since arrival at [place] she noted progressive sob worse with exertion. She also developed chest tightness/pressure since yesterday, also worse with exertion. She endorses feeling weak and cold, as well as occasional dry cough. No fever,  chills,  Nausea, diaphoresis, dizziness, palpitation. leg swelling. She reports taking lasix as needed for leg enema, which she has not needed in the past few weeks. Reports compliance with rest of her meds.     In ER. pt is afebrile. /80 SpO2 mid 80s on RA. high 90s on 2L O2. CXR b/l opacity c/w pulm edema. Labs notable for BNP 1200, Cr 1.3 Pt received iv lasix 80s, asa 162, admitted for acute hypoxic respiratory failure 2/2 CHF exacerbation     Admitted to medicine tele.  Treated with IV diuresis, cardiology following.  PT rec [facility] family declines   84F HTN, DM on insulin, CAD s/p stents, HFrEF (EF 45% 2023), bradycardia s/p PPM (Abbott), SMA stenosis presented with chest pain and sob x 2 days. Pt reports she resides in [place]  and only came to [place] ~3 days ago to visit family. Since arrival at [place] she noted progressive sob worse with exertion. She also developed chest tightness/pressure since yesterday, also worse with exertion. She endorses feeling weak and cold, as well as occasional dry cough. No fever,  chills,  Nausea, diaphoresis, dizziness, palpitation. leg swelling. She reports taking lasix as needed for leg enema, which she has not needed in the past few weeks. Reports compliance with rest of her meds.   In ER. pt is afebrile. /80 SpO2 mid 80s on RA. high 90s on 2L O2. CXR b/l opacity c/w pulm edema. Labs notable for BNP 1200, Cr 1.3 Pt received iv lasix 80s, asa 162, admitted for acute hypoxic respiratory failure 2/2 CHF exacerbation     Admitted to medicine tele.  Treated with IV diuresis.  Echo showed EF 25-30% . Patient had BiVPPM upgrade on 9/11 c/b hypotension , received IVF and started on phenyleprine drip. Patient was transfer to CCU. Phenyleprine drip wean off. Patient was restarted on Entresto and lopressor . pt stable for tele floor    PT rec [facility] family declines   84 y.o. 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  Patient went for BIV PPM upgrade and developed hypotension post procedure requiring pressors.  Was stabilized and transferred to medicine.      ·  Problem: Acute exacerbation of CHF (congestive heart failure).   ·  Plan: Acute on chronic HFrEF   Patient is now s/p BIV PPM   Required pressors for a short time, now off pressors  Continue Coreg and Entresto 24/26  Continue Lasix 40 mg PO daily  Cardiology f/up.    ·  Problem: Abdominal pain.   ·  Plan: -likely d/t IBS   -Pain present for weeks   -Resumed dicyclomine 10mg tid   -CT angio of abdomen/pelvis negative for mesenteric ischemia.    ·  Problem: Asymptomatic bacteriuria.   ·  Plan: -No urinary symptoms   -urine culture w/ Klebsiella treated with Bactrim (9/15-17)  -d/c antibiotics.    ·  Problem: ALLA (acute kidney injury).   ·  Plan: -likely d/t Bactrim, improving    -discontinued Bactrim   -avoid nephrotoxic agents   -monitor Cr.    ·  Problem: CAD (coronary artery disease).   ·  Plan: Continue Plavix and statin.    ·  Problem: DM (diabetes mellitus).   ·  Plan: DM2   Hgb A1c 7.1   Continue insulin corrective scale  Continue Lantus 12 units qhs   Continue Admelog 2 units TID w/ meals   Monitor fingersticks.    ·  Problem: History of CVA in adulthood.   ·  Plan: Continue eliquis, plavix, statin.    ·  Problem: Alzheimer's dementia.   ·  Plan: continue aricept.

## 2024-09-07 LAB
ANION GAP SERPL CALC-SCNC: 14 MMOL/L — SIGNIFICANT CHANGE UP (ref 7–14)
BUN SERPL-MCNC: 23 MG/DL — SIGNIFICANT CHANGE UP (ref 7–23)
CALCIUM SERPL-MCNC: 9 MG/DL — SIGNIFICANT CHANGE UP (ref 8.4–10.5)
CHLORIDE SERPL-SCNC: 95 MMOL/L — LOW (ref 98–107)
CO2 SERPL-SCNC: 26 MMOL/L — SIGNIFICANT CHANGE UP (ref 22–31)
CREAT SERPL-MCNC: 1.42 MG/DL — HIGH (ref 0.5–1.3)
EGFR: 36 ML/MIN/1.73M2 — LOW
GLUCOSE BLDC GLUCOMTR-MCNC: 165 MG/DL — HIGH (ref 70–99)
GLUCOSE BLDC GLUCOMTR-MCNC: 182 MG/DL — HIGH (ref 70–99)
GLUCOSE BLDC GLUCOMTR-MCNC: 194 MG/DL — HIGH (ref 70–99)
GLUCOSE BLDC GLUCOMTR-MCNC: 258 MG/DL — HIGH (ref 70–99)
GLUCOSE SERPL-MCNC: 204 MG/DL — HIGH (ref 70–99)
HCT VFR BLD CALC: 34.9 % — SIGNIFICANT CHANGE UP (ref 34.5–45)
HGB BLD-MCNC: 11.5 G/DL — SIGNIFICANT CHANGE UP (ref 11.5–15.5)
MAGNESIUM SERPL-MCNC: 2.3 MG/DL — SIGNIFICANT CHANGE UP (ref 1.6–2.6)
MCHC RBC-ENTMCNC: 28.6 PG — SIGNIFICANT CHANGE UP (ref 27–34)
MCHC RBC-ENTMCNC: 33 GM/DL — SIGNIFICANT CHANGE UP (ref 32–36)
MCV RBC AUTO: 86.8 FL — SIGNIFICANT CHANGE UP (ref 80–100)
NRBC # BLD: 0 /100 WBCS — SIGNIFICANT CHANGE UP (ref 0–0)
NRBC # FLD: 0 K/UL — SIGNIFICANT CHANGE UP (ref 0–0)
PHOSPHATE SERPL-MCNC: 5.2 MG/DL — HIGH (ref 2.5–4.5)
PLATELET # BLD AUTO: 301 K/UL — SIGNIFICANT CHANGE UP (ref 150–400)
POTASSIUM SERPL-MCNC: 3.6 MMOL/L — SIGNIFICANT CHANGE UP (ref 3.5–5.3)
POTASSIUM SERPL-SCNC: 3.6 MMOL/L — SIGNIFICANT CHANGE UP (ref 3.5–5.3)
RBC # BLD: 4.02 M/UL — SIGNIFICANT CHANGE UP (ref 3.8–5.2)
RBC # FLD: 13.6 % — SIGNIFICANT CHANGE UP (ref 10.3–14.5)
SODIUM SERPL-SCNC: 135 MMOL/L — SIGNIFICANT CHANGE UP (ref 135–145)
WBC # BLD: 6.48 K/UL — SIGNIFICANT CHANGE UP (ref 3.8–10.5)
WBC # FLD AUTO: 6.48 K/UL — SIGNIFICANT CHANGE UP (ref 3.8–10.5)

## 2024-09-07 PROCEDURE — 99233 SBSQ HOSP IP/OBS HIGH 50: CPT

## 2024-09-07 RX ORDER — POTASSIUM CHLORIDE 10 MEQ
40 TABLET, EXT RELEASE, PARTICLES/CRYSTALS ORAL ONCE
Refills: 0 | Status: COMPLETED | OUTPATIENT
Start: 2024-09-07 | End: 2024-09-07

## 2024-09-07 RX ORDER — INSULIN GLARGINE 100 [IU]/ML
12 INJECTION, SOLUTION SUBCUTANEOUS AT BEDTIME
Refills: 0 | Status: DISCONTINUED | OUTPATIENT
Start: 2024-09-07 | End: 2024-09-20

## 2024-09-07 RX ORDER — POLYETHYLENE GLYCOL 3350 17 G/17G
17 POWDER, FOR SOLUTION ORAL DAILY
Refills: 0 | Status: DISCONTINUED | OUTPATIENT
Start: 2024-09-07 | End: 2024-09-20

## 2024-09-07 RX ORDER — SENNA 187 MG
2 TABLET ORAL AT BEDTIME
Refills: 0 | Status: DISCONTINUED | OUTPATIENT
Start: 2024-09-07 | End: 2024-09-20

## 2024-09-07 RX ORDER — SODIUM PHOSPHATE, DIBASIC AND SODIUM PHOSPHATE, MONOBASIC 7; 19 G/230ML; G/230ML
1 ENEMA RECTAL ONCE
Refills: 0 | Status: COMPLETED | OUTPATIENT
Start: 2024-09-07 | End: 2024-09-07

## 2024-09-07 RX ADMIN — Medication 2 TABLET(S): at 22:13

## 2024-09-07 RX ADMIN — POLYETHYLENE GLYCOL 3350 17 GRAM(S): 17 POWDER, FOR SOLUTION ORAL at 12:04

## 2024-09-07 RX ADMIN — Medication 200 MILLIGRAM(S): at 05:21

## 2024-09-07 RX ADMIN — Medication 3: at 12:03

## 2024-09-07 RX ADMIN — Medication 80 MILLIGRAM(S): at 22:12

## 2024-09-07 RX ADMIN — APIXABAN 5 MILLIGRAM(S): 5 TABLET, FILM COATED ORAL at 05:21

## 2024-09-07 RX ADMIN — DONEPEZIL HYDROCHLORIDE 10 MILLIGRAM(S): 5 TABLET, FILM COATED ORAL at 22:13

## 2024-09-07 RX ADMIN — Medication 80 MILLIGRAM(S): at 15:23

## 2024-09-07 RX ADMIN — CARVEDILOL 3.12 MILLIGRAM(S): 6.25 TABLET ORAL at 17:37

## 2024-09-07 RX ADMIN — SACUBITRIL AND VALSARTAN 1 TABLET(S): 49; 51 TABLET, FILM COATED ORAL at 19:48

## 2024-09-07 RX ADMIN — CARVEDILOL 3.12 MILLIGRAM(S): 6.25 TABLET ORAL at 05:22

## 2024-09-07 RX ADMIN — SACUBITRIL AND VALSARTAN 1 TABLET(S): 49; 51 TABLET, FILM COATED ORAL at 05:22

## 2024-09-07 RX ADMIN — Medication 75 MILLIGRAM(S): at 12:03

## 2024-09-07 RX ADMIN — Medication 1: at 08:21

## 2024-09-07 RX ADMIN — APIXABAN 5 MILLIGRAM(S): 5 TABLET, FILM COATED ORAL at 17:37

## 2024-09-07 RX ADMIN — Medication 80 MILLIGRAM(S): at 05:22

## 2024-09-07 RX ADMIN — Medication 40 MILLIGRAM(S): at 05:22

## 2024-09-07 RX ADMIN — Medication 1: at 17:38

## 2024-09-07 RX ADMIN — Medication 40 MILLIEQUIVALENT(S): at 17:36

## 2024-09-07 RX ADMIN — INSULIN GLARGINE 12 UNIT(S): 100 INJECTION, SOLUTION SUBCUTANEOUS at 22:13

## 2024-09-07 NOTE — PROGRESS NOTE ADULT - PROBLEM SELECTOR PLAN 2
Due to acute systolic HF, at home on Lasix 40 mg PO daily prn (not taking recently, was told to make it prn)  - c/w Lasix 80 IV mg BID   - titrate off O2 as tolerated, sat on RA wnl today

## 2024-09-07 NOTE — PROGRESS NOTE ADULT - SUBJECTIVE AND OBJECTIVE BOX
Date of service 9/7/24     no events overnight       acetaminophen     Tablet .. 650 milliGRAM(s) Oral every 6 hours PRN  aluminum hydroxide/magnesium hydroxide/simethicone Suspension 30 milliLiter(s) Oral every 4 hours PRN  apixaban 5 milliGRAM(s) Oral every 12 hours  atorvastatin 80 milliGRAM(s) Oral at bedtime  bisacodyl Suppository 10 milliGRAM(s) Rectal daily PRN  carvedilol 3.125 milliGRAM(s) Oral every 12 hours  clopidogrel Tablet 75 milliGRAM(s) Oral daily  dextrose 5%. 1000 milliLiter(s) IV Continuous <Continuous>  dextrose Oral Gel 15 Gram(s) Oral once PRN  donepezil 10 milliGRAM(s) Oral at bedtime  furosemide   Injectable 80 milliGRAM(s) IV Push two times a day  insulin glargine Injectable (LANTUS) 8 Unit(s) SubCutaneous at bedtime  insulin lispro (ADMELOG) corrective regimen sliding scale   SubCutaneous at bedtime  insulin lispro (ADMELOG) corrective regimen sliding scale   SubCutaneous three times a day before meals  melatonin 3 milliGRAM(s) Oral at bedtime PRN  ondansetron Injectable 4 milliGRAM(s) IV Push every 8 hours PRN  pantoprazole    Tablet 40 milliGRAM(s) Oral before breakfast  polyethylene glycol 3350 17 Gram(s) Oral daily  sacubitril 24 mG/valsartan 26 mG 1 Tablet(s) Oral two times a day  saline laxative (FLEET) Rectal Enema 1 Enema Rectal once  senna 2 Tablet(s) Oral at bedtime                            11.5   6.48  )-----------( 301      ( 07 Sep 2024 05:02 )             34.9       Hemoglobin: 11.5 g/dL (09-07 @ 05:02)  Hemoglobin: 10.9 g/dL (09-06 @ 06:06)  Hemoglobin: 10.4 g/dL (09-04 @ 20:25)      09-07    135  |  95<L>  |  23  ----------------------------<  204<H>  3.6   |  26  |  1.42<H>    Ca    9.0      07 Sep 2024 05:02  Phos  5.2     09-07  Mg     2.30     09-07      Creatinine Trend: 1.42<--, 1.34<--, 1.31<--    COAGS:           T(C): 36.8 (09-07-24 @ 08:30), Max: 36.9 (09-07-24 @ 05:21)  HR: 61 (09-07-24 @ 08:45) (60 - 68)  BP: 115/69 (09-07-24 @ 08:45) (115/69 - 132/77)  RR: 16 (09-07-24 @ 08:45) (15 - 21)  SpO2: 100% (09-07-24 @ 08:45) (95% - 100%)  Wt(kg): --    I&O's Summary    06 Sep 2024 07:01  -  07 Sep 2024 07:00  --------------------------------------------------------  IN: 0 mL / OUT: 1700 mL / NET: -1700 mL    07 Sep 2024 07:01  -  07 Sep 2024 10:17  --------------------------------------------------------  IN: 0 mL / OUT: 601 mL / NET: -601 mL      General: Well nourished in no acute distress. Alert and Oriented * 3.   Head: Normocephalic and atraumatic.   Neck: No JVD. No bruits. Supple. Does not appear to be enlarged.   Cardiovascular: + S1,S2 ; RRR Soft systolic murmur at the left lower sternal border. No rubs noted.    Lungs: B/L crackles, L>R  Abdomen: + BS, soft. Non tender. Non distended. No rebound. No guarding.   Extremities: No clubbing/cyanosis/+edema.   Neurologic: Moves all four extremities. Full range of motion.   Skin: Warm and moist. The patient's skin has normal elasticity and good skin turgor.   Psychiatric: Appropriate mood and affect.  Musculoskeletal: Normal range of motion, normal strength    DATA      < from: Xray Chest 1 View- PORTABLE-Urgent (09.04.24 @ 21:40) >    IMPRESSION:  Bilateral perihilar and lower lung field hazy opacities, suggestive of pulmonary edema.       from: Cardiac Catheterization (08.18.22 @ 17:50) >  Diagnostic Conclusions:   Occluded proximal ramus stent. Patent LAD and diagonal stents.  Mild-moderate atherosclerosis in proximal Cx and mid RCA.      < from: Transthoracic Echocardiogram (03.30.23 @ 16:25) >  CONCLUSIONS:  1. Normal trileaflet aortic valve.  2. Normal left ventricular internal dimensions and wall thicknesses.  3. Moderate segmental left ventricular systolic dysfunction. Lateral and inferolateral wall hypokinesis.  4. Normal right ventricular size and function.  ------------------------------------------------------------------------  Confirmed on  3/31/2023 - 15:49:07 by Raciel Bucio M.D. [facility]      < from: Nuclear Stress Test-Exercise (04.02.23 @ 08:07) >  GATED ANALYSIS:  Post-stress gated wall motion analysis was performed (LVEF  = 34 %;LVEDV = 120 ml.), revealing overall severe hypokinesis.  ------------------------------------------------------------------------  IMPRESSIONS:Abnormal Study  * Chest Pain: No chest pain with administration of  Regadenoson.  * Symptom: Dyspnea.  * HR Response: Appropriate.  * BP Response: Appropriate.  * Heart Rhythm: Normal Sinus Rhythm - 65 BPM.  * Baseline ECG: Nonspecific ST-T wave abnormality.  * ECG Abnormalities: None.  * Arrhythmia: Occasional VPDs occurred during rest, stress  and recovery.  * There are large, moderate to severe defects in the apical, anterolateral, and lateral walls that are predominantly fixed, consistent with infarction with mild  seamus-infarct ischemia.  * Post-stress gated wall motion analysis was performed (LVEF = 34 %;LVEDV = 120 ml.), revealing overall severe  hypokinesis.  Conclusion:  There are large, moderate to severe defects in the apical,  anterolateral, and lateral walls that are predominantly  fixed, consistent with infarction with mild seamus-infarct  ischemia.  ------------------------------------------------------------------------  Confirmed on  4/2/2023 - 15:25:27 by Wilfredo Garibay MD, [facility],  [facility], [facility]  --------------------------------------------------------------------  < end of copied text >    < from: Cardiac Catheterization (04.04.23 @ 16:41) >  Diagnostic Conclusions:   Chronically occluded ramus artery stent. Mild atherosclerosis in proximal LAD. Patent mid LAD and ostial/proximal diagonal stents. Moderate atherosclerosis in proximal Cx. Mild atherosclerosis  in mid and distal RCA.    Recommendations:   Optimize medical therapy for ischemic heart disease. Aggressive risk factor modification.    ECHO: < from: TTE W or WO Ultrasound Enhancing Agent (09.06.24 @ 16:50) >  CONCLUSIONS:      1. The left ventricular cavity is normal in size. Left ventricular wall thickness is normal. Abnormal (paradoxical) septal motion consistent with rightventricular pacemaker. Left ventricular systolic function is severely decreased with an ejection fraction visually estimated at 25 to 30%. There is global left ventricular hypokinesis. There is mild (grade 1) left ventricular diastolic dysfunction.  2. The right ventricular cavity is normal in size and right ventricular systolic function is normal. Tricuspid annular plane systolic excursion (TAPSE) is 2.6 cm (normal >=1.7 cm). A device lead is visualized in the right heart.   3. Structurally normal mitral valve with normal leaflet excursion. There is calcification of the mitral valve annulus. There is mild to moderate mitral regurgitation.   4. The left atrium is severely dilated with an indexed volume of 55.42 ml/m².   5. No prior echocardiogram is available for comparison.    < end of copied text >      ASSESSMENT/PLAN: Pt is an  84 year old Female with PMH DM, CAD s/p PCI to prox LAD/mid LAD/Diag/Ramus, last C 8/2022 with occluded prox Ramus stent, patent LAD and Diag stents, mild to mod atherosclerosis in pLcx and mRCA, managed medically, HTN, and CVA who presents with decompensated HFrEF and palpitations. She reports compliance with meds and diet. Is here visiting her grandson from [place].    Acute on Chronic HFreF/CAD/HTN  --  previous ECHO noted with moderate segmental LV dysfxn, previous stress test abnormal mostly fixed defects with  seamus-infarct ischemia last cath showed 100% ramus  in stent restenosis repeat LHC pursued, revealing unchanged CAD- chronic occl ramus stent, patent mLAD and ostial/prox DIag stents and moderate atherosclerosis of pLcx  --increase lasix to 80 IV BID, O>I, diuresing well  -- ECHO noted - EF 25-30 %   -- Interrogate Abbot PPM for palpiations  -- C/w Plavix, Imdur, Eliquis, Entresto Coreg

## 2024-09-07 NOTE — PROGRESS NOTE ADULT - PROBLEM SELECTOR PLAN 4
family reports pt on Lantus 48 and lipro 10 tid, but pt states she only takes 5-8 units lantus at home   - HbA1c 7.1%  - c/w Lantus 8 units-->12 unit and ISS, DM diet   - gabapentin 200 mg TID for neuropathy pain--lowering to 100 TID for am lethargy

## 2024-09-07 NOTE — PROGRESS NOTE ADULT - ASSESSMENT
Patient seen and examined, agree with above assessment and plan as transcribed above.    - Diuresing well  - may need biv ICD upgrade    Loco Juarez MD, [facility]  BEEPER [phone]

## 2024-09-07 NOTE — PROGRESS NOTE ADULT - SUBJECTIVE AND OBJECTIVE BOX
Patient is a 84y old  Female who presents with a chief complaint of chest pain, sob (07 Sep 2024 10:16)    SUBJECTIVE / OVERNIGHT EVENTS:    no CP, SOB better, no f/c/n/v  well  noted to have urine retention on bladder scan s/p straight cath this am  BM this am    MEDICATIONS  (STANDING):  apixaban 5 milliGRAM(s) Oral every 12 hours  atorvastatin 80 milliGRAM(s) Oral at bedtime  carvedilol 3.125 milliGRAM(s) Oral every 12 hours  clopidogrel Tablet 75 milliGRAM(s) Oral daily  donepezil 10 milliGRAM(s) Oral at bedtime  furosemide   Injectable 80 milliGRAM(s) IV Push two times a day  insulin glargine Injectable (LANTUS) 12 Unit(s) SubCutaneous at bedtime  insulin lispro (ADMELOG) corrective regimen sliding scale   SubCutaneous at bedtime  insulin lispro (ADMELOG) corrective regimen sliding scale   SubCutaneous three times a day before meals  pantoprazole    Tablet 40 milliGRAM(s) Oral before breakfast  polyethylene glycol 3350 17 Gram(s) Oral daily  sacubitril 24 mG/valsartan 26 mG 1 Tablet(s) Oral two times a day  saline laxative (FLEET) Rectal Enema 1 Enema Rectal once  senna 2 Tablet(s) Oral at bedtime    MEDICATIONS  (PRN):  acetaminophen     Tablet .. 650 milliGRAM(s) Oral every 6 hours PRN Temp greater or equal to 38C (100.4F), Mild Pain (1 - 3)  aluminum hydroxide/magnesium hydroxide/simethicone Suspension 30 milliLiter(s) Oral every 4 hours PRN Dyspepsia  bisacodyl Suppository 10 milliGRAM(s) Rectal daily PRN Constipation  dextrose Oral Gel 15 Gram(s) Oral once PRN Blood Glucose LESS THAN 70 milliGRAM(s)/deciliter  melatonin 3 milliGRAM(s) Oral at bedtime PRN Insomnia  ondansetron Injectable 4 milliGRAM(s) IV Push every 8 hours PRN Nausea and/or Vomiting    T(C): 36.8 (09-07-24 @ 12:30), Max: 36.9 (09-07-24 @ 05:21)  HR: 64 (09-07-24 @ 15:27) (60 - 68)  BP: 127/66 (09-07-24 @ 15:27) (115/69 - 129/69)  RR: 17 (09-07-24 @ 15:27) (15 - 18)  SpO2: 98% (09-07-24 @ 15:27) (97% - 100%)    CAPILLARY BLOOD GLUCOSE  POCT Blood Glucose.: 258 mg/dL (07 Sep 2024 11:54)  POCT Blood Glucose.: 194 mg/dL (07 Sep 2024 08:01)  POCT Blood Glucose.: 200 mg/dL (06 Sep 2024 22:06)  POCT Blood Glucose.: 136 mg/dL (06 Sep 2024 17:41)    I&O's Summary    06 Sep 2024 07:01  -  07 Sep 2024 07:00  --------------------------------------------------------  IN: 0 mL / OUT: 1700 mL / NET: -1700 mL    07 Sep 2024 07:01  -  07 Sep 2024 16:07  --------------------------------------------------------  IN: 0 mL / OUT: 601 mL / NET: -601 mL    PHYSICAL EXAM:  GENERAL: NAD   CHEST/LUNG: clear   HEART: Regular rate and rhythm; No murmurs, rubs, or gallops  ABDOMEN: Soft, Nontender, Nondistended; Bowel sounds present  EXTREMITIES:   warm and well perfused, No clubbing, cyanosis, or edema  PSYCH: AAOx3   NEUROLOGY: non-focal    LABS:                        11.5   6.48  )-----------( 301      ( 07 Sep 2024 05:02 )             34.9     09-07    135  |  95<L>  |  23  ----------------------------<  204<H>  3.6   |  26  |  1.42<H>    Ca    9.0      07 Sep 2024 05:02  Phos  5.2     09-07  Mg     2.30     09-07    Care Discussed with Consultants/Other Providers:

## 2024-09-08 LAB
ANION GAP SERPL CALC-SCNC: 16 MMOL/L — HIGH (ref 7–14)
BUN SERPL-MCNC: 20 MG/DL — SIGNIFICANT CHANGE UP (ref 7–23)
CALCIUM SERPL-MCNC: 8.9 MG/DL — SIGNIFICANT CHANGE UP (ref 8.4–10.5)
CHLORIDE SERPL-SCNC: 96 MMOL/L — LOW (ref 98–107)
CO2 SERPL-SCNC: 23 MMOL/L — SIGNIFICANT CHANGE UP (ref 22–31)
CREAT SERPL-MCNC: 1.33 MG/DL — HIGH (ref 0.5–1.3)
EGFR: 39 ML/MIN/1.73M2 — LOW
GLUCOSE BLDC GLUCOMTR-MCNC: 148 MG/DL — HIGH (ref 70–99)
GLUCOSE BLDC GLUCOMTR-MCNC: 161 MG/DL — HIGH (ref 70–99)
GLUCOSE BLDC GLUCOMTR-MCNC: 170 MG/DL — HIGH (ref 70–99)
GLUCOSE BLDC GLUCOMTR-MCNC: 274 MG/DL — HIGH (ref 70–99)
GLUCOSE SERPL-MCNC: 146 MG/DL — HIGH (ref 70–99)
HCT VFR BLD CALC: 36.3 % — SIGNIFICANT CHANGE UP (ref 34.5–45)
HGB BLD-MCNC: 11.6 G/DL — SIGNIFICANT CHANGE UP (ref 11.5–15.5)
MAGNESIUM SERPL-MCNC: 2.2 MG/DL — SIGNIFICANT CHANGE UP (ref 1.6–2.6)
MCHC RBC-ENTMCNC: 27.6 PG — SIGNIFICANT CHANGE UP (ref 27–34)
MCHC RBC-ENTMCNC: 32 GM/DL — SIGNIFICANT CHANGE UP (ref 32–36)
MCV RBC AUTO: 86.4 FL — SIGNIFICANT CHANGE UP (ref 80–100)
NRBC # BLD: 0 /100 WBCS — SIGNIFICANT CHANGE UP (ref 0–0)
NRBC # FLD: 0 K/UL — SIGNIFICANT CHANGE UP (ref 0–0)
PHOSPHATE SERPL-MCNC: 3.7 MG/DL — SIGNIFICANT CHANGE UP (ref 2.5–4.5)
PLATELET # BLD AUTO: 313 K/UL — SIGNIFICANT CHANGE UP (ref 150–400)
POTASSIUM SERPL-MCNC: 3.9 MMOL/L — SIGNIFICANT CHANGE UP (ref 3.5–5.3)
POTASSIUM SERPL-SCNC: 3.9 MMOL/L — SIGNIFICANT CHANGE UP (ref 3.5–5.3)
RBC # BLD: 4.2 M/UL — SIGNIFICANT CHANGE UP (ref 3.8–5.2)
RBC # FLD: 13.6 % — SIGNIFICANT CHANGE UP (ref 10.3–14.5)
SODIUM SERPL-SCNC: 135 MMOL/L — SIGNIFICANT CHANGE UP (ref 135–145)
WBC # BLD: 7.52 K/UL — SIGNIFICANT CHANGE UP (ref 3.8–10.5)
WBC # FLD AUTO: 7.52 K/UL — SIGNIFICANT CHANGE UP (ref 3.8–10.5)

## 2024-09-08 PROCEDURE — 99233 SBSQ HOSP IP/OBS HIGH 50: CPT

## 2024-09-08 RX ORDER — GABAPENTIN 100 MG
100 CAPSULE ORAL
Refills: 0 | Status: DISCONTINUED | OUTPATIENT
Start: 2024-09-08 | End: 2024-09-20

## 2024-09-08 RX ADMIN — Medication 80 MILLIGRAM(S): at 14:14

## 2024-09-08 RX ADMIN — Medication 100 MILLIGRAM(S): at 17:45

## 2024-09-08 RX ADMIN — INSULIN GLARGINE 12 UNIT(S): 100 INJECTION, SOLUTION SUBCUTANEOUS at 21:50

## 2024-09-08 RX ADMIN — Medication 1: at 07:33

## 2024-09-08 RX ADMIN — Medication 40 MILLIGRAM(S): at 05:56

## 2024-09-08 RX ADMIN — Medication 80 MILLIGRAM(S): at 21:49

## 2024-09-08 RX ADMIN — DONEPEZIL HYDROCHLORIDE 10 MILLIGRAM(S): 5 TABLET, FILM COATED ORAL at 21:49

## 2024-09-08 RX ADMIN — APIXABAN 5 MILLIGRAM(S): 5 TABLET, FILM COATED ORAL at 17:46

## 2024-09-08 RX ADMIN — ACETAMINOPHEN 650 MILLIGRAM(S): 325 TABLET ORAL at 15:15

## 2024-09-08 RX ADMIN — CARVEDILOL 3.12 MILLIGRAM(S): 6.25 TABLET ORAL at 05:57

## 2024-09-08 RX ADMIN — CARVEDILOL 3.12 MILLIGRAM(S): 6.25 TABLET ORAL at 17:45

## 2024-09-08 RX ADMIN — Medication 3: at 12:00

## 2024-09-08 RX ADMIN — APIXABAN 5 MILLIGRAM(S): 5 TABLET, FILM COATED ORAL at 05:56

## 2024-09-08 RX ADMIN — Medication 75 MILLIGRAM(S): at 14:14

## 2024-09-08 RX ADMIN — Medication 2 UNIT(S): at 17:48

## 2024-09-08 RX ADMIN — Medication 80 MILLIGRAM(S): at 05:57

## 2024-09-08 RX ADMIN — SACUBITRIL AND VALSARTAN 1 TABLET(S): 49; 51 TABLET, FILM COATED ORAL at 17:45

## 2024-09-08 RX ADMIN — SACUBITRIL AND VALSARTAN 1 TABLET(S): 49; 51 TABLET, FILM COATED ORAL at 05:56

## 2024-09-08 RX ADMIN — ACETAMINOPHEN 650 MILLIGRAM(S): 325 TABLET ORAL at 14:14

## 2024-09-08 NOTE — PROGRESS NOTE ADULT - SUBJECTIVE AND OBJECTIVE BOX
Date of service 9/8/24      no events overnight , pt resting in bed       acetaminophen     Tablet .. 650 milliGRAM(s) Oral every 6 hours PRN  aluminum hydroxide/magnesium hydroxide/simethicone Suspension 30 milliLiter(s) Oral every 4 hours PRN  apixaban 5 milliGRAM(s) Oral every 12 hours  atorvastatin 80 milliGRAM(s) Oral at bedtime  bisacodyl Suppository 10 milliGRAM(s) Rectal daily PRN  carvedilol 3.125 milliGRAM(s) Oral every 12 hours  clopidogrel Tablet 75 milliGRAM(s) Oral daily  dextrose Oral Gel 15 Gram(s) Oral once PRN  donepezil 10 milliGRAM(s) Oral at bedtime  furosemide   Injectable 80 milliGRAM(s) IV Push two times a day  insulin glargine Injectable (LANTUS) 12 Unit(s) SubCutaneous at bedtime  insulin lispro (ADMELOG) corrective regimen sliding scale   SubCutaneous at bedtime  insulin lispro (ADMELOG) corrective regimen sliding scale   SubCutaneous three times a day before meals  melatonin 3 milliGRAM(s) Oral at bedtime PRN  ondansetron Injectable 4 milliGRAM(s) IV Push every 8 hours PRN  pantoprazole    Tablet 40 milliGRAM(s) Oral before breakfast  polyethylene glycol 3350 17 Gram(s) Oral daily  sacubitril 24 mG/valsartan 26 mG 1 Tablet(s) Oral two times a day  senna 2 Tablet(s) Oral at bedtime                            11.6   7.52  )-----------( 313      ( 08 Sep 2024 06:56 )             36.3       Hemoglobin: 11.6 g/dL (09-08 @ 06:56)  Hemoglobin: 11.5 g/dL (09-07 @ 05:02)  Hemoglobin: 10.9 g/dL (09-06 @ 06:06)  Hemoglobin: 10.4 g/dL (09-04 @ 20:25)      09-08    135  |  96<L>  |  20  ----------------------------<  146<H>  3.9   |  23  |  1.33<H>    Ca    8.9      08 Sep 2024 06:56  Phos  3.7     09-08  Mg     2.20     09-08      Creatinine Trend: 1.33<--, 1.42<--, 1.34<--, 1.31<--    COAGS:           T(C): 36.8 (09-08-24 @ 05:55), Max: 36.8 (09-07-24 @ 12:30)  HR: 64 (09-08-24 @ 05:55) (64 - 72)  BP: 156/59 (09-08-24 @ 05:55) (118/66 - 156/59)  RR: 16 (09-08-24 @ 05:55) (16 - 17)  SpO2: 96% (09-08-24 @ 05:55) (96% - 98%)  Wt(kg): --    I&O's Summary    07 Sep 2024 07:01  -  08 Sep 2024 07:00  --------------------------------------------------------  IN: 0 mL / OUT: 2177 mL / NET: -2177 mL          General: Well nourished in no acute distress. Alert and Oriented * 3.   Head: Normocephalic and atraumatic.   Neck: No JVD. No bruits. Supple. Does not appear to be enlarged.   Cardiovascular: + S1,S2 ; RRR Soft systolic murmur at the left lower sternal border. No rubs noted.    Lungs: B/L crackles, L>R  Abdomen: + BS, soft. Non tender. Non distended. No rebound. No guarding.   Extremities: No clubbing/cyanosis/+edema.   Neurologic: Moves all four extremities. Full range of motion.   Skin: Warm and moist. The patient's skin has normal elasticity and good skin turgor.   Psychiatric: Appropriate mood and affect.  Musculoskeletal: Normal range of motion, normal strength    DATA      < from: Xray Chest 1 View- PORTABLE-Urgent (09.04.24 @ 21:40) >    IMPRESSION:  Bilateral perihilar and lower lung field hazy opacities, suggestive of pulmonary edema.       from: Cardiac Catheterization (08.18.22 @ 17:50) >  Diagnostic Conclusions:   Occluded proximal ramus stent. Patent LAD and diagonal stents.  Mild-moderate atherosclerosis in proximal Cx and mid RCA.      < from: Transthoracic Echocardiogram (03.30.23 @ 16:25) >  CONCLUSIONS:  1. Normal trileaflet aortic valve.  2. Normal left ventricular internal dimensions and wall thicknesses.  3. Moderate segmental left ventricular systolic dysfunction. Lateral and inferolateral wall hypokinesis.  4. Normal right ventricular size and function.  ------------------------------------------------------------------------  Confirmed on  3/31/2023 - 15:49:07 by BRIAN Soares      < from: Nuclear Stress Test-Exercise (04.02.23 @ 08:07) >  GATED ANALYSIS:  Post-stress gated wall motion analysis was performed (LVEF  = 34 %;LVEDV = 120 ml.), revealing overall severe hypokinesis.  ------------------------------------------------------------------------  IMPRESSIONS:Abnormal Study  * Chest Pain: No chest pain with administration of  Regadenoson.  * Symptom: Dyspnea.  * HR Response: Appropriate.  * BP Response: Appropriate.  * Heart Rhythm: Normal Sinus Rhythm - 65 BPM.  * Baseline ECG: Nonspecific ST-T wave abnormality.  * ECG Abnormalities: None.  * Arrhythmia: Occasional VPDs occurred during rest, stress  and recovery.  * There are large, moderate to severe defects in the apical, anterolateral, and lateral walls that are predominantly fixed, consistent with infarction with mild  seamus-infarct ischemia.  * Post-stress gated wall motion analysis was performed (LVEF = 34 %;LVEDV = 120 ml.), revealing overall severe  hypokinesis.  Conclusion:  There are large, moderate to severe defects in the apical,  anterolateral, and lateral walls that are predominantly  fixed, consistent with infarction with mild seamus-infarct  ischemia.  ------------------------------------------------------------------------  Confirmed on  4/2/2023 - 15:25:27 by Wilfredo Garibay MD, [facility],  [facility], [facility]  --------------------------------------------------------------------  < end of copied text >    < from: Cardiac Catheterization (04.04.23 @ 16:41) >  Diagnostic Conclusions:   Chronically occluded ramus artery stent. Mild atherosclerosis in proximal LAD. Patent mid LAD and ostial/proximal diagonal stents. Moderate atherosclerosis in proximal Cx. Mild atherosclerosis  in mid and distal RCA.    Recommendations:   Optimize medical therapy for ischemic heart disease. Aggressive risk factor modification.    ECHO: < from: TTE W or WO Ultrasound Enhancing Agent (09.06.24 @ 16:50) >  CONCLUSIONS:      1. The left ventricular cavity is normal in size. Left ventricular wall thickness is normal. Abnormal (paradoxical) septal motion consistent with rightventricular pacemaker. Left ventricular systolic function is severely decreased with an ejection fraction visually estimated at 25 to 30%. There is global left ventricular hypokinesis. There is mild (grade 1) left ventricular diastolic dysfunction.  2. The right ventricular cavity is normal in size and right ventricular systolic function is normal. Tricuspid annular plane systolic excursion (TAPSE) is 2.6 cm (normal >=1.7 cm). A device lead is visualized in the right heart.   3. Structurally normal mitral valve with normal leaflet excursion. There is calcification of the mitral valve annulus. There is mild to moderate mitral regurgitation.   4. The left atrium is severely dilated with an indexed volume of 55.42 ml/m².   5. No prior echocardiogram is available for comparison.    < end of copied text >      ASSESSMENT/PLAN: Pt is an  84 year old Female with PMH DM, CAD s/p PCI to prox LAD/mid LAD/Diag/Ramus, last [facility] 8/2022 with occluded prox Ramus stent, patent LAD and Diag stents, mild to mod atherosclerosis in pLcx and mRCA, managed medically, HTN, and CVA who presents with decompensated HFrEF and palpitations. She reports compliance with meds and diet. Is here visiting her grandson from [place].    Acute on Chronic HFreF/CAD/HTN  --  previous ECHO noted with moderate segmental LV dysfxn, previous stress test abnormal mostly fixed defects with  seamus-infarct ischemia last cath showed 100% ramus  in stent restenosis repeat LHC pursued, revealing unchanged CAD- chronic occl ramus stent, patent mLAD and ostial/prox DIag stents and moderate atherosclerosis of pLcx  --increase lasix to 80 IV BID, O>I, diuresing well  -- ECHO noted - EF 25-30 %   -- Interrogate Abbot PPM for palpiations  -- C/w Plavix, Imdur, Eliquis, Entresto Coreg

## 2024-09-08 NOTE — PROGRESS NOTE ADULT - PROBLEM SELECTOR PLAN 6
Eliquis ppx  PT rec ARABELLA, family states no to facility, would be open to OP PT  dc pending euvolemia  spoke to family DA (son) at  9/6 458 and on 9/8 348pm

## 2024-09-08 NOTE — PROGRESS NOTE ADULT - SUBJECTIVE AND OBJECTIVE BOX
Patient is a 84y old  Female who presents with a chief complaint of chest pain, sob (08 Sep 2024 09:03)    SUBJECTIVE / OVERNIGHT EVENTS:    no CP, SOB, f/c/n/v  reports had few BM overnight into am    MEDICATIONS  (STANDING):  apixaban 5 milliGRAM(s) Oral every 12 hours  atorvastatin 80 milliGRAM(s) Oral at bedtime  carvedilol 3.125 milliGRAM(s) Oral every 12 hours  clopidogrel Tablet 75 milliGRAM(s) Oral daily  donepezil 10 milliGRAM(s) Oral at bedtime  furosemide   Injectable 80 milliGRAM(s) IV Push two times a day  gabapentin 100 milliGRAM(s) Oral two times a day  insulin glargine Injectable (LANTUS) 12 Unit(s) SubCutaneous at bedtime  insulin lispro (ADMELOG) corrective regimen sliding scale   SubCutaneous at bedtime  insulin lispro (ADMELOG) corrective regimen sliding scale   SubCutaneous three times a day before meals  pantoprazole    Tablet 40 milliGRAM(s) Oral before breakfast  polyethylene glycol 3350 17 Gram(s) Oral daily  sacubitril 24 mG/valsartan 26 mG 1 Tablet(s) Oral two times a day  senna 2 Tablet(s) Oral at bedtime    MEDICATIONS  (PRN):  acetaminophen     Tablet .. 650 milliGRAM(s) Oral every 6 hours PRN Temp greater or equal to 38C (100.4F), Mild Pain (1 - 3)  aluminum hydroxide/magnesium hydroxide/simethicone Suspension 30 milliLiter(s) Oral every 4 hours PRN Dyspepsia  bisacodyl Suppository 10 milliGRAM(s) Rectal daily PRN Constipation  dextrose Oral Gel 15 Gram(s) Oral once PRN Blood Glucose LESS THAN 70 milliGRAM(s)/deciliter  melatonin 3 milliGRAM(s) Oral at bedtime PRN Insomnia  ondansetron Injectable 4 milliGRAM(s) IV Push every 8 hours PRN Nausea and/or Vomiting    T(C): 36.6 (09-08-24 @ 10:30), Max: 36.8 (09-08-24 @ 05:55)  HR: 65 (09-08-24 @ 14:15) (61 - 72)  BP: 122/64 (09-08-24 @ 14:15) (118/66 - 156/59)  RR: 20 (09-08-24 @ 14:15) (16 - 20)  SpO2: 97% (09-08-24 @ 14:15) (94% - 97%)    CAPILLARY BLOOD GLUCOSE  POCT Blood Glucose.: 274 mg/dL (08 Sep 2024 11:57)  POCT Blood Glucose.: 170 mg/dL (08 Sep 2024 07:28)  POCT Blood Glucose.: 165 mg/dL (07 Sep 2024 22:07)  POCT Blood Glucose.: 182 mg/dL (07 Sep 2024 17:11)    I&O's Summary    07 Sep 2024 07:01  -  08 Sep 2024 07:00  --------------------------------------------------------  IN: 0 mL / OUT: 2177 mL / NET: -2177 mL    PHYSICAL EXAM:  GENERAL: NAD   HEAD:  Atraumatic, Normocephalic  EYES: EOMI, PERRLA, conjunctiva and sclera clear  NECK: Supple, No JVD  CHEST/LUNG: Clear to auscultation bilaterally; No wheeze  HEART: Regular rate and rhythm; No murmurs, rubs, or gallops  ABDOMEN: Soft, Nontender, Nondistended; Bowel sounds present  EXTREMITIES:   warm and well perfused, No clubbing, cyanosis, or edema  PSYCH: AAOx   NEUROLOGY: non-focal  SKIN: No rashes or lesions    LABS:                        11.6   7.52  )-----------( 313      ( 08 Sep 2024 06:56 )             36.3     09-08    135  |  96<L>  |  20  ----------------------------<  146<H>  3.9   |  23  |  1.33<H>    Ca    8.9      08 Sep 2024 06:56  Phos  3.7     09-08  Mg     2.20     09-08    Care Discussed with Consultants/Other Providers:

## 2024-09-08 NOTE — PROGRESS NOTE ADULT - ASSESSMENT
Patient seen and examined, agree with above assessment and plan as transcribed above.    - Cont IV diuretics  - Crt stable  - EP eval in AM for possible BIV ICD upgrade'    Loco Juarez MD, [facility]  BEEPER [phone]

## 2024-09-08 NOTE — PROGRESS NOTE ADULT - PROBLEM SELECTOR PLAN 4
family reports pt on Lantus 48 and lipro 10 tid, but pt states she only takes 5-8 units lantus at home   - HbA1c 7.1%  - c/w Lantus 12 unit and ISS, DM diet   - gabapentin 200 mg TID for neuropathy pain--lowering to 100 TID for am lethargy family reports pt on Lantus 48 and lipro 10 tid, but pt states she only takes 5-8 units lantus at home   - HbA1c 7.1%  - c/w Lantus 12 unit and ISS, DM diet, add 2 admelog w/ meals  - gabapentin 200 mg TID for neuropathy pain--lowering to 100 BID for am lethargy

## 2024-09-09 LAB
ANION GAP SERPL CALC-SCNC: 14 MMOL/L — SIGNIFICANT CHANGE UP (ref 7–14)
BLD GP AB SCN SERPL QL: NEGATIVE — SIGNIFICANT CHANGE UP
BUN SERPL-MCNC: 21 MG/DL — SIGNIFICANT CHANGE UP (ref 7–23)
CALCIUM SERPL-MCNC: 8.9 MG/DL — SIGNIFICANT CHANGE UP (ref 8.4–10.5)
CHLORIDE SERPL-SCNC: 96 MMOL/L — LOW (ref 98–107)
CO2 SERPL-SCNC: 25 MMOL/L — SIGNIFICANT CHANGE UP (ref 22–31)
CREAT SERPL-MCNC: 1.45 MG/DL — HIGH (ref 0.5–1.3)
EGFR: 35 ML/MIN/1.73M2 — LOW
GLUCOSE BLDC GLUCOMTR-MCNC: 135 MG/DL — HIGH (ref 70–99)
GLUCOSE BLDC GLUCOMTR-MCNC: 162 MG/DL — HIGH (ref 70–99)
GLUCOSE BLDC GLUCOMTR-MCNC: 219 MG/DL — HIGH (ref 70–99)
GLUCOSE BLDC GLUCOMTR-MCNC: 232 MG/DL — HIGH (ref 70–99)
GLUCOSE SERPL-MCNC: 156 MG/DL — HIGH (ref 70–99)
HCT VFR BLD CALC: 38.8 % — SIGNIFICANT CHANGE UP (ref 34.5–45)
HGB BLD-MCNC: 12.5 G/DL — SIGNIFICANT CHANGE UP (ref 11.5–15.5)
MAGNESIUM SERPL-MCNC: 2.2 MG/DL — SIGNIFICANT CHANGE UP (ref 1.6–2.6)
MCHC RBC-ENTMCNC: 27.9 PG — SIGNIFICANT CHANGE UP (ref 27–34)
MCHC RBC-ENTMCNC: 32.2 GM/DL — SIGNIFICANT CHANGE UP (ref 32–36)
MCV RBC AUTO: 86.6 FL — SIGNIFICANT CHANGE UP (ref 80–100)
NRBC # BLD: 0 /100 WBCS — SIGNIFICANT CHANGE UP (ref 0–0)
NRBC # FLD: 0 K/UL — SIGNIFICANT CHANGE UP (ref 0–0)
PHOSPHATE SERPL-MCNC: 3.8 MG/DL — SIGNIFICANT CHANGE UP (ref 2.5–4.5)
PLATELET # BLD AUTO: 322 K/UL — SIGNIFICANT CHANGE UP (ref 150–400)
POTASSIUM SERPL-MCNC: 3.6 MMOL/L — SIGNIFICANT CHANGE UP (ref 3.5–5.3)
POTASSIUM SERPL-SCNC: 3.6 MMOL/L — SIGNIFICANT CHANGE UP (ref 3.5–5.3)
RBC # BLD: 4.48 M/UL — SIGNIFICANT CHANGE UP (ref 3.8–5.2)
RBC # FLD: 13.3 % — SIGNIFICANT CHANGE UP (ref 10.3–14.5)
RH IG SCN BLD-IMP: POSITIVE — SIGNIFICANT CHANGE UP
SODIUM SERPL-SCNC: 135 MMOL/L — SIGNIFICANT CHANGE UP (ref 135–145)
WBC # BLD: 6.76 K/UL — SIGNIFICANT CHANGE UP (ref 3.8–10.5)
WBC # FLD AUTO: 6.76 K/UL — SIGNIFICANT CHANGE UP (ref 3.8–10.5)

## 2024-09-09 PROCEDURE — 71045 X-RAY EXAM CHEST 1 VIEW: CPT | Mod: 26

## 2024-09-09 PROCEDURE — 99232 SBSQ HOSP IP/OBS MODERATE 35: CPT

## 2024-09-09 RX ADMIN — INSULIN GLARGINE 12 UNIT(S): 100 INJECTION, SOLUTION SUBCUTANEOUS at 21:50

## 2024-09-09 RX ADMIN — APIXABAN 5 MILLIGRAM(S): 5 TABLET, FILM COATED ORAL at 17:37

## 2024-09-09 RX ADMIN — Medication 80 MILLIGRAM(S): at 14:20

## 2024-09-09 RX ADMIN — Medication 2: at 11:09

## 2024-09-09 RX ADMIN — SACUBITRIL AND VALSARTAN 1 TABLET(S): 49; 51 TABLET, FILM COATED ORAL at 05:40

## 2024-09-09 RX ADMIN — DONEPEZIL HYDROCHLORIDE 10 MILLIGRAM(S): 5 TABLET, FILM COATED ORAL at 21:49

## 2024-09-09 RX ADMIN — Medication 75 MILLIGRAM(S): at 12:14

## 2024-09-09 RX ADMIN — Medication 2 UNIT(S): at 11:11

## 2024-09-09 RX ADMIN — Medication 80 MILLIGRAM(S): at 05:40

## 2024-09-09 RX ADMIN — Medication 2 UNIT(S): at 17:38

## 2024-09-09 RX ADMIN — ACETAMINOPHEN 650 MILLIGRAM(S): 325 TABLET ORAL at 21:49

## 2024-09-09 RX ADMIN — Medication 40 MILLIGRAM(S): at 05:41

## 2024-09-09 RX ADMIN — Medication 2 UNIT(S): at 13:57

## 2024-09-09 RX ADMIN — Medication 100 MILLIGRAM(S): at 05:40

## 2024-09-09 RX ADMIN — APIXABAN 5 MILLIGRAM(S): 5 TABLET, FILM COATED ORAL at 05:41

## 2024-09-09 RX ADMIN — CARVEDILOL 3.12 MILLIGRAM(S): 6.25 TABLET ORAL at 05:40

## 2024-09-09 RX ADMIN — Medication 100 MILLIGRAM(S): at 17:39

## 2024-09-09 RX ADMIN — Medication 2: at 13:56

## 2024-09-09 RX ADMIN — Medication 80 MILLIGRAM(S): at 21:49

## 2024-09-09 RX ADMIN — ACETAMINOPHEN 650 MILLIGRAM(S): 325 TABLET ORAL at 22:49

## 2024-09-09 NOTE — CHART NOTE - NSCHARTNOTEFT_GEN_A_CORE
ACP Medicine Night Coverage     Seen on telemetry/, patient desaturating to 87% on room air. All other VSS.   Patient seen and assessed at bedside, in NAD sleeping comfortably. Denies CP or SOB. PE unremarkable.   [ ] Placed on 2LNC. Now saturating >93% Spo2.   [ ] Urgent CXR ordered.   [ ] Lasix 80mg to be given early, now.     Elsie Turner PA-C  Department of Medicine   [id number]

## 2024-09-09 NOTE — PROGRESS NOTE ADULT - PROBLEM SELECTOR PLAN 2
Due to acute systolic HF, at home on Lasix 40 mg PO daily prn (not taking recently, was told to make it prn)  - c/w Lasix 80 IV mg BID   - titrate off O2 as tolerated

## 2024-09-09 NOTE — PROGRESS NOTE ADULT - PROBLEM SELECTOR PLAN 4
family reports pt on Lantus 48 and lipro 10 tid, but pt states she only takes 5-8 units lantus at home   - HbA1c 7.1%  - c/w Lantus 12 unit and ISS, DM diet, add 2 admelog w/ meals  - gabapentin 200 mg TID for neuropathy pain lowered here to 100 BID as previously concern for somnolence- now awake without concern. Monitor closely

## 2024-09-09 NOTE — PROGRESS NOTE ADULT - SUBJECTIVE AND OBJECTIVE BOX
Patient is a 84y old  Female who presents with a chief complaint of chest pain, sob (08 Sep 2024 09:03)    SUBJECTIVE / OVERNIGHT EVENTS: Desat to 87% overnight on RA, improved with 2L NC. Currently comfortable, reports breathing is improving from prior. No chest pain or other complaints.     MEDICATIONS  (STANDING):  apixaban 5 milliGRAM(s) Oral every 12 hours  atorvastatin 80 milliGRAM(s) Oral at bedtime  carvedilol 3.125 milliGRAM(s) Oral every 12 hours  clopidogrel Tablet 75 milliGRAM(s) Oral daily  donepezil 10 milliGRAM(s) Oral at bedtime  furosemide   Injectable 80 milliGRAM(s) IV Push two times a day  gabapentin 100 milliGRAM(s) Oral two times a day  insulin glargine Injectable (LANTUS) 12 Unit(s) SubCutaneous at bedtime  insulin lispro (ADMELOG) corrective regimen sliding scale   SubCutaneous three times a day before meals  insulin lispro (ADMELOG) corrective regimen sliding scale   SubCutaneous at bedtime  insulin lispro Injectable (ADMELOG) 2 Unit(s) SubCutaneous three times a day before meals  pantoprazole    Tablet 40 milliGRAM(s) Oral before breakfast  polyethylene glycol 3350 17 Gram(s) Oral daily  sacubitril 24 mG/valsartan 26 mG 1 Tablet(s) Oral two times a day  senna 2 Tablet(s) Oral at bedtime    MEDICATIONS  (PRN):  acetaminophen     Tablet .. 650 milliGRAM(s) Oral every 6 hours PRN Temp greater or equal to 38C (100.4F), Mild Pain (1 - 3)  aluminum hydroxide/magnesium hydroxide/simethicone Suspension 30 milliLiter(s) Oral every 4 hours PRN Dyspepsia  bisacodyl Suppository 10 milliGRAM(s) Rectal daily PRN Constipation  dextrose Oral Gel 15 Gram(s) Oral once PRN Blood Glucose LESS THAN 70 milliGRAM(s)/deciliter  melatonin 3 milliGRAM(s) Oral at bedtime PRN Insomnia  ondansetron Injectable 4 milliGRAM(s) IV Push every 8 hours PRN Nausea and/or Vomiting      CAPILLARY BLOOD GLUCOSE      POCT Blood Glucose.: 219 mg/dL (09 Sep 2024 11:06)  POCT Blood Glucose.: 161 mg/dL (08 Sep 2024 21:34)  POCT Blood Glucose.: 148 mg/dL (08 Sep 2024 17:07)    I&O's Summary      PHYSICAL EXAM:  Vital Signs Last 24 Hrs  T(C): 36.4 (09 Sep 2024 10:04), Max: 36.7 (08 Sep 2024 17:15)  T(F): 97.6 (09 Sep 2024 10:04), Max: 98 (08 Sep 2024 17:15)  HR: 65 (09 Sep 2024 10:04) (65 - 72)  BP: 120/78 (09 Sep 2024 10:04) (120/78 - 135/65)  BP(mean): --  RR: 15 (09 Sep 2024 10:04) (15 - 20)  SpO2: 100% (09 Sep 2024 10:04) (87% - 100%)    Parameters below as of 09 Sep 2024 10:04  Patient On (Oxygen Delivery Method): room air    CONSTITUTIONAL: NAD, laying in bed  EYES: conjunctiva and sclera clear  ENMT: Moist oral mucosa  RESPIRATORY: Normal respiratory effort; reduced bibasilar breath sounds no wheezing  CARDIOVASCULAR: Regular rate and rhythm, normal S1 and S2, No lower extremity edema  ABDOMEN: Nontender to palpation, normoactive bowel sounds, no rebound/guarding  PSYCH: calm    LABS:                        12.5   6.76  )-----------( 322      ( 09 Sep 2024 06:43 )             38.8     09-09    135  |  96<L>  |  21  ----------------------------<  156<H>  3.6   |  25  |  1.45<H>    Ca    8.9      09 Sep 2024 06:43  Phos  3.8     09-09  Mg     2.20     09-09    Urinalysis Basic - ( 09 Sep 2024 06:43 )    Color: x / Appearance: x / SG: x / pH: x  Gluc: 156 mg/dL / Ketone: x  / Bili: x / Urobili: x   Blood: x / Protein: x / Nitrite: x   Leuk Esterase: x / RBC: x / WBC x   Sq Epi: x / Non Sq Epi: x / Bacteria: x    Urinalysis with Rflx Culture (collected 06 Sep 2024 17:27)    RADIOLOGY & ADDITIONAL TESTS: Reviewed    COORDINATION OF CARE:  Care Discussed with Consultants/Other Providers [Y- ACP, EP Cardiology]

## 2024-09-09 NOTE — PROGRESS NOTE ADULT - SUBJECTIVE AND OBJECTIVE BOX
Date of service 9/9/24      no events overnight , SOB improving, no chest pain      Review of Systems:   Constitutional: [ ] fevers, [ ] chills.   Skin: [ ] dry skin. [ ] rashes.  Psychiatric: [ ] depression, [ ] anxiety.   Gastrointestinal: [ ] BRBPR, [ ] melena.   Neurological: [ ] confusion. [ ] seizures. [ ] shuffling gait.   Ears,Nose,Mouth and Throat: [ ] ear pain [ ] sore throat.   Eyes: [ ] diplopia.   Respiratory: [ ] hemoptysis. [ ] shortness of breath  Cardiovascular: See HPI above  Hematologic/Lymphatic: [ ] anemia. [ ] painful nodes. [ ] prolonged bleeding.   Genitourinary: [ ] hematuria. [ ] flank pain.   Endocrine: [ ] significant change in weight. [ ] intolerance to heat and cold.     Review of systems [x ] otherwise negative, [ ] otherwise unable to obtain    FH: no family history of sudden cardiac death in first degree relatives    SH: [ ] tobacco, [ ] alcohol, [ ] drugs    acetaminophen     Tablet .. 650 milliGRAM(s) Oral every 6 hours PRN  aluminum hydroxide/magnesium hydroxide/simethicone Suspension 30 milliLiter(s) Oral every 4 hours PRN  apixaban 5 milliGRAM(s) Oral every 12 hours  atorvastatin 80 milliGRAM(s) Oral at bedtime  bisacodyl Suppository 10 milliGRAM(s) Rectal daily PRN  carvedilol 3.125 milliGRAM(s) Oral every 12 hours  clopidogrel Tablet 75 milliGRAM(s) Oral daily  dextrose Oral Gel 15 Gram(s) Oral once PRN  donepezil 10 milliGRAM(s) Oral at bedtime  furosemide   Injectable 80 milliGRAM(s) IV Push two times a day  gabapentin 100 milliGRAM(s) Oral two times a day  insulin glargine Injectable (LANTUS) 12 Unit(s) SubCutaneous at bedtime  insulin lispro (ADMELOG) corrective regimen sliding scale   SubCutaneous at bedtime  insulin lispro (ADMELOG) corrective regimen sliding scale   SubCutaneous three times a day before meals  insulin lispro Injectable (ADMELOG) 2 Unit(s) SubCutaneous three times a day before meals  melatonin 3 milliGRAM(s) Oral at bedtime PRN  ondansetron Injectable 4 milliGRAM(s) IV Push every 8 hours PRN  pantoprazole    Tablet 40 milliGRAM(s) Oral before breakfast  polyethylene glycol 3350 17 Gram(s) Oral daily  sacubitril 24 mG/valsartan 26 mG 1 Tablet(s) Oral two times a day  senna 2 Tablet(s) Oral at bedtime                            12.5   6.76  )-----------( 322      ( 09 Sep 2024 06:43 )             38.8     135  |  96<L>  |  21  ----------------------------<  156<H>  3.6   |  25  |  1.45<H>    Ca    8.9      09 Sep 2024 06:43  Phos  3.8     09-09  Mg     2.20     09-09      T(C): 36.4 (09-09-24 @ 10:04), Max: 36.7 (09-08-24 @ 17:15)  HR: 65 (09-09-24 @ 10:04) (65 - 72)  BP: 120/78 (09-09-24 @ 10:04) (120/78 - 135/65)  RR: 15 (09-09-24 @ 10:04) (15 - 20)  SpO2: 100% (09-09-24 @ 10:04) (87% - 100%)  Wt(kg): --    General: Well nourished in no acute distress. Alert and Oriented * 3.   Head: Normocephalic and atraumatic.   Neck: No JVD. No bruits. Supple. Does not appear to be enlarged.   Cardiovascular: + S1,S2 ; RRR Soft systolic murmur at the left lower sternal border. No rubs noted.    Lungs: B/L crackles, L>R  Abdomen: + BS, soft. Non tender. Non distended. No rebound. No guarding.   Extremities: No clubbing/cyanosis/+edema.   Neurologic: Moves all four extremities. Full range of motion.   Skin: Warm and moist. The patient's skin has normal elasticity and good skin turgor.   Psychiatric: Appropriate mood and affect.  Musculoskeletal: Normal range of motion, normal strength    DATA      < from: Xray Chest 1 View- PORTABLE-Urgent (09.04.24 @ 21:40) >    IMPRESSION:  Bilateral perihilar and lower lung field hazy opacities, suggestive of pulmonary edema.       from: Cardiac Catheterization (08.18.22 @ 17:50) >  Diagnostic Conclusions:   Occluded proximal ramus stent. Patent LAD and diagonal stents.  Mild-moderate atherosclerosis in proximal Cx and mid RCA.      < from: Transthoracic Echocardiogram (03.30.23 @ 16:25) >  CONCLUSIONS:  1. Normal trileaflet aortic valve.  2. Normal left ventricular internal dimensions and wall thicknesses.  3. Moderate segmental left ventricular systolic dysfunction. Lateral and inferolateral wall hypokinesis.  4. Normal right ventricular size and function.  ------------------------------------------------------------------------  Confirmed on  3/31/2023 - 15:49:07 by Raciel Bucio M.D. [facility]      < from: Nuclear Stress Test-Exercise (04.02.23 @ 08:07) >  GATED ANALYSIS:  Post-stress gated wall motion analysis was performed (LVEF  = 34 %;LVEDV = 120 ml.), revealing overall severe hypokinesis.  ------------------------------------------------------------------------  IMPRESSIONS:Abnormal Study  * Chest Pain: No chest pain with administration of  Regadenoson.  * Symptom: Dyspnea.  * HR Response: Appropriate.  * BP Response: Appropriate.  * Heart Rhythm: Normal Sinus Rhythm - 65 BPM.  * Baseline ECG: Nonspecific ST-T wave abnormality.  * ECG Abnormalities: None.  * Arrhythmia: Occasional VPDs occurred during rest, stress  and recovery.  * There are large, moderate to severe defects in the apical, anterolateral, and lateral walls that are predominantly fixed, consistent with infarction with mild  seamus-infarct ischemia.  * Post-stress gated wall motion analysis was performed (LVEF = 34 %;LVEDV = 120 ml.), revealing overall severe  hypokinesis.  Conclusion:  There are large, moderate to severe defects in the apical,  anterolateral, and lateral walls that are predominantly  fixed, consistent with infarction with mild seamus-infarct  ischemia.  ------------------------------------------------------------------------  Confirmed on  4/2/2023 - 15:25:27 by Wilfredo Garibay MD, [facility],  [facility], [facility]  --------------------------------------------------------------------  < end of copied text >    < from: Cardiac Catheterization (04.04.23 @ 16:41) >  Diagnostic Conclusions:   Chronically occluded ramus artery stent. Mild atherosclerosis in proximal LAD. Patent mid LAD and ostial/proximal diagonal stents. Moderate atherosclerosis in proximal Cx. Mild atherosclerosis  in mid and distal RCA.    Recommendations:   Optimize medical therapy for ischemic heart disease. Aggressive risk factor modification.    < from: TTE W or WO Ultrasound Enhancing Agent (09.06.24 @ 16:50) >  CONCLUSIONS:   1. The left ventricular cavity is normal in size. Left ventricular wall thickness is normal. Abnormal (paradoxical) septal motion consistent with rightventricular pacemaker. Left ventricular systolic function is severely decreased with an ejection fraction visually estimated at 25 to 30%. There is global left ventricular hypokinesis. There is mild (grade 1) left ventricular diastolic dysfunction.  2. The right ventricular cavity is normal in size and right ventricular systolic function is normal. Tricuspid annular plane systolic excursion (TAPSE) is 2.6 cm (normal >=1.7 cm). A device lead is visualized in the right heart.   3. Structurally normal mitral valve with normal leaflet excursion. There is calcification of the mitral valve annulus. There is mild to moderate mitral regurgitation.   4. The left atrium is severely dilated with an indexed volume of 55.42 ml/m².   5. No prior echocardiogram is available for comparison.    < end of copied text >      ASSESSMENT/PLAN: Pt is an  84 year old Female with PMH DM, CAD s/p PCI to prox LAD/mid LAD/Diag/Ramus, last LHC 8/2022 with occluded prox Ramus stent, patent LAD and Diag stents, mild to mod atherosclerosis in pLcx and mRCA, managed medically, HTN, and CVA who presents with decompensated HFrEF and palpitations. She reports compliance with meds and diet. Is here visiting her grandson from [place].    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

## 2024-09-09 NOTE — PROGRESS NOTE ADULT - PROBLEM SELECTOR PLAN 3
Never smoker, known DM2  - trop 34-->30  - c/w atorvastatin 80 mg qhs  - c/w apixaban 5 mg BID  - started on Plavix 75 mg   - follow up cardiology recs regarding imdur  - cardiology following

## 2024-09-09 NOTE — PROGRESS NOTE ADULT - PROBLEM SELECTOR PLAN 6
Eliquis ppx  PT rec [facility], family states no to facility, would be open to OP PT  dc pending euvolemia    spoke to family DA (son) at  9/9/2024, all questions answered, on board with plan of care

## 2024-09-10 LAB
ANION GAP SERPL CALC-SCNC: 14 MMOL/L — SIGNIFICANT CHANGE UP (ref 7–14)
BASOPHILS # BLD AUTO: 0.01 K/UL — SIGNIFICANT CHANGE UP (ref 0–0.2)
BASOPHILS NFR BLD AUTO: 0.2 % — SIGNIFICANT CHANGE UP (ref 0–2)
BUN SERPL-MCNC: 23 MG/DL — SIGNIFICANT CHANGE UP (ref 7–23)
CALCIUM SERPL-MCNC: 9.3 MG/DL — SIGNIFICANT CHANGE UP (ref 8.4–10.5)
CHLORIDE SERPL-SCNC: 97 MMOL/L — LOW (ref 98–107)
CO2 SERPL-SCNC: 27 MMOL/L — SIGNIFICANT CHANGE UP (ref 22–31)
CREAT SERPL-MCNC: 1.38 MG/DL — HIGH (ref 0.5–1.3)
EGFR: 37 ML/MIN/1.73M2 — LOW
EOSINOPHIL # BLD AUTO: 0.11 K/UL — SIGNIFICANT CHANGE UP (ref 0–0.5)
EOSINOPHIL NFR BLD AUTO: 1.9 % — SIGNIFICANT CHANGE UP (ref 0–6)
GLUCOSE BLDC GLUCOMTR-MCNC: 149 MG/DL — HIGH (ref 70–99)
GLUCOSE BLDC GLUCOMTR-MCNC: 167 MG/DL — HIGH (ref 70–99)
GLUCOSE BLDC GLUCOMTR-MCNC: 169 MG/DL — HIGH (ref 70–99)
GLUCOSE BLDC GLUCOMTR-MCNC: 223 MG/DL — HIGH (ref 70–99)
GLUCOSE SERPL-MCNC: 160 MG/DL — HIGH (ref 70–99)
HCT VFR BLD CALC: 37.6 % — SIGNIFICANT CHANGE UP (ref 34.5–45)
HGB BLD-MCNC: 12 G/DL — SIGNIFICANT CHANGE UP (ref 11.5–15.5)
IANC: 3.56 K/UL — SIGNIFICANT CHANGE UP (ref 1.8–7.4)
IMM GRANULOCYTES NFR BLD AUTO: 0.3 % — SIGNIFICANT CHANGE UP (ref 0–0.9)
LYMPHOCYTES # BLD AUTO: 1.57 K/UL — SIGNIFICANT CHANGE UP (ref 1–3.3)
LYMPHOCYTES # BLD AUTO: 26.9 % — SIGNIFICANT CHANGE UP (ref 13–44)
MAGNESIUM SERPL-MCNC: 2.3 MG/DL — SIGNIFICANT CHANGE UP (ref 1.6–2.6)
MCHC RBC-ENTMCNC: 27.7 PG — SIGNIFICANT CHANGE UP (ref 27–34)
MCHC RBC-ENTMCNC: 31.9 GM/DL — LOW (ref 32–36)
MCV RBC AUTO: 86.8 FL — SIGNIFICANT CHANGE UP (ref 80–100)
MONOCYTES # BLD AUTO: 0.57 K/UL — SIGNIFICANT CHANGE UP (ref 0–0.9)
MONOCYTES NFR BLD AUTO: 9.8 % — SIGNIFICANT CHANGE UP (ref 2–14)
NEUTROPHILS # BLD AUTO: 3.56 K/UL — SIGNIFICANT CHANGE UP (ref 1.8–7.4)
NEUTROPHILS NFR BLD AUTO: 60.9 % — SIGNIFICANT CHANGE UP (ref 43–77)
NRBC # BLD: 0 /100 WBCS — SIGNIFICANT CHANGE UP (ref 0–0)
NRBC # FLD: 0 K/UL — SIGNIFICANT CHANGE UP (ref 0–0)
PHOSPHATE SERPL-MCNC: 3.9 MG/DL — SIGNIFICANT CHANGE UP (ref 2.5–4.5)
PLATELET # BLD AUTO: 309 K/UL — SIGNIFICANT CHANGE UP (ref 150–400)
POTASSIUM SERPL-MCNC: 4 MMOL/L — SIGNIFICANT CHANGE UP (ref 3.5–5.3)
POTASSIUM SERPL-SCNC: 4 MMOL/L — SIGNIFICANT CHANGE UP (ref 3.5–5.3)
RBC # BLD: 4.33 M/UL — SIGNIFICANT CHANGE UP (ref 3.8–5.2)
RBC # FLD: 13.4 % — SIGNIFICANT CHANGE UP (ref 10.3–14.5)
SODIUM SERPL-SCNC: 138 MMOL/L — SIGNIFICANT CHANGE UP (ref 135–145)
WBC # BLD: 5.84 K/UL — SIGNIFICANT CHANGE UP (ref 3.8–10.5)
WBC # FLD AUTO: 5.84 K/UL — SIGNIFICANT CHANGE UP (ref 3.8–10.5)

## 2024-09-10 PROCEDURE — 99232 SBSQ HOSP IP/OBS MODERATE 35: CPT

## 2024-09-10 RX ADMIN — Medication 2: at 12:06

## 2024-09-10 RX ADMIN — Medication 2 UNIT(S): at 17:30

## 2024-09-10 RX ADMIN — Medication 75 MILLIGRAM(S): at 13:31

## 2024-09-10 RX ADMIN — Medication 100 MILLIGRAM(S): at 17:31

## 2024-09-10 RX ADMIN — Medication 80 MILLIGRAM(S): at 22:06

## 2024-09-10 RX ADMIN — Medication 2 UNIT(S): at 12:06

## 2024-09-10 RX ADMIN — DONEPEZIL HYDROCHLORIDE 10 MILLIGRAM(S): 5 TABLET, FILM COATED ORAL at 22:06

## 2024-09-10 RX ADMIN — INSULIN GLARGINE 12 UNIT(S): 100 INJECTION, SOLUTION SUBCUTANEOUS at 22:06

## 2024-09-10 RX ADMIN — Medication 80 MILLIGRAM(S): at 13:31

## 2024-09-10 RX ADMIN — SACUBITRIL AND VALSARTAN 1 TABLET(S): 49; 51 TABLET, FILM COATED ORAL at 17:31

## 2024-09-10 RX ADMIN — APIXABAN 5 MILLIGRAM(S): 5 TABLET, FILM COATED ORAL at 06:57

## 2024-09-10 RX ADMIN — Medication 2 UNIT(S): at 08:09

## 2024-09-10 RX ADMIN — CARVEDILOL 3.12 MILLIGRAM(S): 6.25 TABLET ORAL at 17:31

## 2024-09-10 RX ADMIN — Medication 1: at 08:10

## 2024-09-10 RX ADMIN — Medication 40 MILLIGRAM(S): at 06:57

## 2024-09-10 RX ADMIN — ACETAMINOPHEN 650 MILLIGRAM(S): 325 TABLET ORAL at 09:57

## 2024-09-10 RX ADMIN — APIXABAN 5 MILLIGRAM(S): 5 TABLET, FILM COATED ORAL at 17:31

## 2024-09-10 RX ADMIN — Medication 80 MILLIGRAM(S): at 06:57

## 2024-09-10 RX ADMIN — Medication 100 MILLIGRAM(S): at 06:57

## 2024-09-10 RX ADMIN — ACETAMINOPHEN 650 MILLIGRAM(S): 325 TABLET ORAL at 10:55

## 2024-09-10 NOTE — PROGRESS NOTE ADULT - PROBLEM SELECTOR PLAN 3
Never smoker, known DM2  - trop 34-->30  - c/w atorvastatin 80 mg qhs  - c/w apixaban 5 mg BID  - started on Plavix 75 mg   - follow up cardiology recs regarding need for imdur  - cardiology following

## 2024-09-10 NOTE — PROGRESS NOTE ADULT - PROBLEM SELECTOR PROBLEM 2
Acute respiratory failure with hypoxia Tranexamic Acid Pregnancy And Lactation Text: It is unknown if this medication is safe during pregnancy or breast feeding.

## 2024-09-10 NOTE — PROGRESS NOTE ADULT - SUBJECTIVE AND OBJECTIVE BOX
Date of service 9/10/24      SOB improving, no chest pain    Review of Systems:   Constitutional: [ ] fevers, [ ] chills.   Skin: [ ] dry skin. [ ] rashes.  Psychiatric: [ ] depression, [ ] anxiety.   Gastrointestinal: [ ] BRBPR, [ ] melena.   Neurological: [ ] confusion. [ ] seizures. [ ] shuffling gait.   Ears,Nose,Mouth and Throat: [ ] ear pain [ ] sore throat.   Eyes: [ ] diplopia.   Respiratory: [ ] hemoptysis. [ ] shortness of breath  Cardiovascular: See HPI above  Hematologic/Lymphatic: [ ] anemia. [ ] painful nodes. [ ] prolonged bleeding.   Genitourinary: [ ] hematuria. [ ] flank pain.   Endocrine: [ ] significant change in weight. [ ] intolerance to heat and cold.     Review of systems [x ] otherwise negative, [ ] otherwise unable to obtain    FH: no family history of sudden cardiac death in first degree relatives    SH: [ ] tobacco, [ ] alcohol, [ ] drugs    acetaminophen     Tablet .. 650 milliGRAM(s) Oral every 6 hours PRN  aluminum hydroxide/magnesium hydroxide/simethicone Suspension 30 milliLiter(s) Oral every 4 hours PRN  apixaban 5 milliGRAM(s) Oral every 12 hours  atorvastatin 80 milliGRAM(s) Oral at bedtime  bisacodyl Suppository 10 milliGRAM(s) Rectal daily PRN  carvedilol 3.125 milliGRAM(s) Oral every 12 hours  clopidogrel Tablet 75 milliGRAM(s) Oral daily  dextrose Oral Gel 15 Gram(s) Oral once PRN  donepezil 10 milliGRAM(s) Oral at bedtime  furosemide   Injectable 80 milliGRAM(s) IV Push two times a day  gabapentin 100 milliGRAM(s) Oral two times a day  insulin glargine Injectable (LANTUS) 12 Unit(s) SubCutaneous at bedtime  insulin lispro (ADMELOG) corrective regimen sliding scale   SubCutaneous at bedtime  insulin lispro (ADMELOG) corrective regimen sliding scale   SubCutaneous three times a day before meals  insulin lispro Injectable (ADMELOG) 2 Unit(s) SubCutaneous three times a day before meals  melatonin 3 milliGRAM(s) Oral at bedtime PRN  ondansetron Injectable 4 milliGRAM(s) IV Push every 8 hours PRN  pantoprazole    Tablet 40 milliGRAM(s) Oral before breakfast  polyethylene glycol 3350 17 Gram(s) Oral daily  sacubitril 24 mG/valsartan 26 mG 1 Tablet(s) Oral two times a day  senna 2 Tablet(s) Oral at bedtime                            12.0   5.84  )-----------( 309      ( 10 Sep 2024 05:54 )             37.6       138  |  97<L>  |  23  ----------------------------<  160<H>  4.0   |  27  |  1.38<H>    Ca    9.3      10 Sep 2024 05:54  Phos  3.9     09-10  Mg     2.30     09-10      T(C): 36.5 (09-10-24 @ 09:45), Max: 36.8 (09-09-24 @ 17:37)  HR: 62 (09-10-24 @ 09:45) (61 - 66)  BP: 107/73 (09-10-24 @ 09:45) (105/60 - 121/72)  RR: 18 (09-10-24 @ 09:45) (15 - 18)  SpO2: 100% (09-10-24 @ 09:45) (100% - 100%)  Wt(kg): --    I&O's Summary    09 Sep 2024 07:01  -  10 Sep 2024 07:00  --------------------------------------------------------  IN: 0 mL / OUT: 2100 mL / NET: -2100 mL      General: Well nourished in no acute distress. Alert and Oriented * 3.   Head: Normocephalic and atraumatic.   Neck: No JVD. No bruits. Supple. Does not appear to be enlarged.   Cardiovascular: + S1,S2 ; RRR Soft systolic murmur at the left lower sternal border. No rubs noted.    Lungs: B/L crackles, L>R  Abdomen: + BS, soft. Non tender. Non distended. No rebound. No guarding.   Extremities: No clubbing/cyanosis/+edema.   Neurologic: Moves all four extremities. Full range of motion.   Skin: Warm and moist. The patient's skin has normal elasticity and good skin turgor.   Psychiatric: Appropriate mood and affect.  Musculoskeletal: Normal range of motion, normal strength    DATA      < from: Xray Chest 1 View- PORTABLE-Urgent (09.04.24 @ 21:40) >    IMPRESSION:  Bilateral perihilar and lower lung field hazy opacities, suggestive of pulmonary edema.       from: Cardiac Catheterization (08.18.22 @ 17:50) >  Diagnostic Conclusions:   Occluded proximal ramus stent. Patent LAD and diagonal stents.  Mild-moderate atherosclerosis in proximal Cx and mid RCA.      < from: Transthoracic Echocardiogram (03.30.23 @ 16:25) >  CONCLUSIONS:  1. Normal trileaflet aortic valve.  2. Normal left ventricular internal dimensions and wall thicknesses.  3. Moderate segmental left ventricular systolic dysfunction. Lateral and inferolateral wall hypokinesis.  4. Normal right ventricular size and function.  ------------------------------------------------------------------------  Confirmed on  3/31/2023 - 15:49:07 by BRIAN Soares      < from: Nuclear Stress Test-Exercise (04.02.23 @ 08:07) >  GATED ANALYSIS:  Post-stress gated wall motion analysis was performed (LVEF  = 34 %;LVEDV = 120 ml.), revealing overall severe hypokinesis.  ------------------------------------------------------------------------  IMPRESSIONS:Abnormal Study  * Chest Pain: No chest pain with administration of  Regadenoson.  * Symptom: Dyspnea.  * HR Response: Appropriate.  * BP Response: Appropriate.  * Heart Rhythm: Normal Sinus Rhythm - 65 BPM.  * Baseline ECG: Nonspecific ST-T wave abnormality.  * ECG Abnormalities: None.  * Arrhythmia: Occasional VPDs occurred during rest, stress  and recovery.  * There are large, moderate to severe defects in the apical, anterolateral, and lateral walls that are predominantly fixed, consistent with infarction with mild  seamus-infarct ischemia.  * Post-stress gated wall motion analysis was performed (LVEF = 34 %;LVEDV = 120 ml.), revealing overall severe  hypokinesis.  Conclusion:  There are large, moderate to severe defects in the apical,  anterolateral, and lateral walls that are predominantly  fixed, consistent with infarction with mild seamus-infarct  ischemia.  ------------------------------------------------------------------------  Confirmed on  4/2/2023 - 15:25:27 by Wilfredo Garibay MD, [facility],  [facility], [facility]  --------------------------------------------------------------------  < end of copied text >    < from: Cardiac Catheterization (04.04.23 @ 16:41) >  Diagnostic Conclusions:   Chronically occluded ramus artery stent. Mild atherosclerosis in proximal LAD. Patent mid LAD and ostial/proximal diagonal stents. Moderate atherosclerosis in proximal Cx. Mild atherosclerosis  in mid and distal RCA.    Recommendations:   Optimize medical therapy for ischemic heart disease. Aggressive risk factor modification.    < from: TTE W or WO Ultrasound Enhancing Agent (09.06.24 @ 16:50) >  CONCLUSIONS:   1. The left ventricular cavity is normal in size. Left ventricular wall thickness is normal. Abnormal (paradoxical) septal motion consistent with rightventricular pacemaker. Left ventricular systolic function is severely decreased with an ejection fraction visually estimated at 25 to 30%. There is global left ventricular hypokinesis. There is mild (grade 1) left ventricular diastolic dysfunction.  2. The right ventricular cavity is normal in size and right ventricular systolic function is normal. Tricuspid annular plane systolic excursion (TAPSE) is 2.6 cm (normal >=1.7 cm). A device lead is visualized in the right heart.   3. Structurally normal mitral valve with normal leaflet excursion. There is calcification of the mitral valve annulus. There is mild to moderate mitral regurgitation.   4. The left atrium is severely dilated with an indexed volume of 55.42 ml/m².   5. No prior echocardiogram is available for comparison.    < end of copied text >      ASSESSMENT/PLAN: Pt is an  84 year old Female with PMH DM, CAD s/p PCI to prox LAD/mid LAD/Diag/Ramus, last C 8/2022 with occluded prox Ramus stent, patent LAD and Diag stents, mild to mod atherosclerosis in pLcx and mRCA, managed medically, HTN, and CVA who presents with decompensated HFrEF and palpitations. She reports compliance with meds and diet. Is here visiting her grandson from [place].    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

## 2024-09-10 NOTE — PROGRESS NOTE ADULT - SUBJECTIVE AND OBJECTIVE BOX
Patient is a 84y old  Female who presents with a chief complaint of chest pain, sob (09 Sep 2024 14:07)    SUBJECTIVE / OVERNIGHT EVENTS: No acute events. Feels well. Daughters at bedside. No complaints or discomfort.     MEDICATIONS  (STANDING):  apixaban 5 milliGRAM(s) Oral every 12 hours  atorvastatin 80 milliGRAM(s) Oral at bedtime  carvedilol 3.125 milliGRAM(s) Oral every 12 hours  clopidogrel Tablet 75 milliGRAM(s) Oral daily  donepezil 10 milliGRAM(s) Oral at bedtime  furosemide   Injectable 80 milliGRAM(s) IV Push two times a day  gabapentin 100 milliGRAM(s) Oral two times a day  insulin glargine Injectable (LANTUS) 12 Unit(s) SubCutaneous at bedtime  insulin lispro (ADMELOG) corrective regimen sliding scale   SubCutaneous three times a day before meals  insulin lispro (ADMELOG) corrective regimen sliding scale   SubCutaneous at bedtime  insulin lispro Injectable (ADMELOG) 2 Unit(s) SubCutaneous three times a day before meals  pantoprazole    Tablet 40 milliGRAM(s) Oral before breakfast  polyethylene glycol 3350 17 Gram(s) Oral daily  sacubitril 24 mG/valsartan 26 mG 1 Tablet(s) Oral two times a day  senna 2 Tablet(s) Oral at bedtime    MEDICATIONS  (PRN):  acetaminophen     Tablet .. 650 milliGRAM(s) Oral every 6 hours PRN Temp greater or equal to 38C (100.4F), Mild Pain (1 - 3)  aluminum hydroxide/magnesium hydroxide/simethicone Suspension 30 milliLiter(s) Oral every 4 hours PRN Dyspepsia  bisacodyl Suppository 10 milliGRAM(s) Rectal daily PRN Constipation  dextrose Oral Gel 15 Gram(s) Oral once PRN Blood Glucose LESS THAN 70 milliGRAM(s)/deciliter  melatonin 3 milliGRAM(s) Oral at bedtime PRN Insomnia  ondansetron Injectable 4 milliGRAM(s) IV Push every 8 hours PRN Nausea and/or Vomiting    CAPILLARY BLOOD GLUCOSE    POCT Blood Glucose.: 223 mg/dL (10 Sep 2024 11:51)  POCT Blood Glucose.: 167 mg/dL (10 Sep 2024 07:43)  POCT Blood Glucose.: 162 mg/dL (09 Sep 2024 21:29)  POCT Blood Glucose.: 135 mg/dL (09 Sep 2024 17:18)  POCT Blood Glucose.: 232 mg/dL (09 Sep 2024 13:55)    I&O's Summary    09 Sep 2024 07:01  -  10 Sep 2024 07:00  --------------------------------------------------------  IN: 0 mL / OUT: 2100 mL / NET: -2100 mL    PHYSICAL EXAM:  Vital Signs Last 24 Hrs  T(C): 36.5 (10 Sep 2024 09:45), Max: 36.8 (09 Sep 2024 17:37)  T(F): 97.7 (10 Sep 2024 09:45), Max: 98.2 (09 Sep 2024 17:37)  HR: 62 (10 Sep 2024 09:45) (61 - 66)  BP: 107/73 (10 Sep 2024 09:45) (105/60 - 121/72)  BP(mean): --  RR: 18 (10 Sep 2024 09:45) (15 - 18)  SpO2: 100% (10 Sep 2024 09:45) (100% - 100%)    Parameters below as of 10 Sep 2024 09:45  Patient On (Oxygen Delivery Method): nasal cannula  O2 Flow (L/min): 2    CONSTITUTIONAL: NAD, laying in bed  EYES: conjunctiva and sclera clear  ENMT: Moist oral mucosa  RESPIRATORY: Normal respiratory effort; no wheezing or crackles, reduced bibasilar breath sounds  CARDIOVASCULAR: Regular rate and rhythm, normal S1 and S2, trace lower extremity edema  ABDOMEN: Nontender to palpation, normoactive bowel sounds, no rebound/guarding  PSYCH: calm    LABS:                        12.0   5.84  )-----------( 309      ( 10 Sep 2024 05:54 )             37.6     09-10    138  |  97<L>  |  23  ----------------------------<  160<H>  4.0   |  27  |  1.38<H>    Ca    9.3      10 Sep 2024 05:54  Phos  3.9     09-10  Mg     2.30     09-10    Urinalysis Basic - ( 10 Sep 2024 05:54 )    Color: x / Appearance: x / SG: x / pH: x  Gluc: 160 mg/dL / Ketone: x  / Bili: x / Urobili: x   Blood: x / Protein: x / Nitrite: x   Leuk Esterase: x / RBC: x / WBC x   Sq Epi: x / Non Sq Epi: x / Bacteria: x    RADIOLOGY & ADDITIONAL TESTS: Reviewed    COORDINATION OF CARE:  Care Discussed with Consultants/Other Providers [Y- ACP, Cardiology]

## 2024-09-10 NOTE — PROGRESS NOTE ADULT - PROBLEM SELECTOR PLAN 6
Eliquis ppx  PT rec [facility], family states no to facility, would be open to OP PT  dc pending cardiology clearance    updated  DA (son) at  9/9/2024, all questions answered, on board with plan of care  updated daughters at bedside 9/10/24

## 2024-09-11 LAB
ANION GAP SERPL CALC-SCNC: 12 MMOL/L — SIGNIFICANT CHANGE UP (ref 7–14)
APTT BLD: 45.8 SEC — HIGH (ref 24.5–35.6)
BASOPHILS # BLD AUTO: 0.02 K/UL — SIGNIFICANT CHANGE UP (ref 0–0.2)
BASOPHILS NFR BLD AUTO: 0.2 % — SIGNIFICANT CHANGE UP (ref 0–2)
BLD GP AB SCN SERPL QL: NEGATIVE — SIGNIFICANT CHANGE UP
BUN SERPL-MCNC: 28 MG/DL — HIGH (ref 7–23)
CALCIUM SERPL-MCNC: 8.9 MG/DL — SIGNIFICANT CHANGE UP (ref 8.4–10.5)
CHLORIDE SERPL-SCNC: 97 MMOL/L — LOW (ref 98–107)
CO2 SERPL-SCNC: 27 MMOL/L — SIGNIFICANT CHANGE UP (ref 22–31)
CREAT SERPL-MCNC: 1.41 MG/DL — HIGH (ref 0.5–1.3)
EGFR: 36 ML/MIN/1.73M2 — LOW
EOSINOPHIL # BLD AUTO: 0.08 K/UL — SIGNIFICANT CHANGE UP (ref 0–0.5)
EOSINOPHIL NFR BLD AUTO: 0.9 % — SIGNIFICANT CHANGE UP (ref 0–6)
GLUCOSE BLDC GLUCOMTR-MCNC: 136 MG/DL — HIGH (ref 70–99)
GLUCOSE BLDC GLUCOMTR-MCNC: 138 MG/DL — HIGH (ref 70–99)
GLUCOSE BLDC GLUCOMTR-MCNC: 156 MG/DL — HIGH (ref 70–99)
GLUCOSE BLDC GLUCOMTR-MCNC: 175 MG/DL — HIGH (ref 70–99)
GLUCOSE BLDC GLUCOMTR-MCNC: 197 MG/DL — HIGH (ref 70–99)
GLUCOSE SERPL-MCNC: 164 MG/DL — HIGH (ref 70–99)
HCT VFR BLD CALC: 35.9 % — SIGNIFICANT CHANGE UP (ref 34.5–45)
HGB BLD-MCNC: 11.5 G/DL — SIGNIFICANT CHANGE UP (ref 11.5–15.5)
IANC: 6.14 K/UL — SIGNIFICANT CHANGE UP (ref 1.8–7.4)
IMM GRANULOCYTES NFR BLD AUTO: 0.2 % — SIGNIFICANT CHANGE UP (ref 0–0.9)
INR BLD: 1.51 RATIO — HIGH (ref 0.85–1.18)
LYMPHOCYTES # BLD AUTO: 1.73 K/UL — SIGNIFICANT CHANGE UP (ref 1–3.3)
LYMPHOCYTES # BLD AUTO: 19.5 % — SIGNIFICANT CHANGE UP (ref 13–44)
MAGNESIUM SERPL-MCNC: 2.4 MG/DL — SIGNIFICANT CHANGE UP (ref 1.6–2.6)
MCHC RBC-ENTMCNC: 27.8 PG — SIGNIFICANT CHANGE UP (ref 27–34)
MCHC RBC-ENTMCNC: 32 GM/DL — SIGNIFICANT CHANGE UP (ref 32–36)
MCV RBC AUTO: 86.9 FL — SIGNIFICANT CHANGE UP (ref 80–100)
MONOCYTES # BLD AUTO: 0.89 K/UL — SIGNIFICANT CHANGE UP (ref 0–0.9)
MONOCYTES NFR BLD AUTO: 10 % — SIGNIFICANT CHANGE UP (ref 2–14)
NEUTROPHILS # BLD AUTO: 6.14 K/UL — SIGNIFICANT CHANGE UP (ref 1.8–7.4)
NEUTROPHILS NFR BLD AUTO: 69.2 % — SIGNIFICANT CHANGE UP (ref 43–77)
NRBC # BLD: 0 /100 WBCS — SIGNIFICANT CHANGE UP (ref 0–0)
NRBC # FLD: 0 K/UL — SIGNIFICANT CHANGE UP (ref 0–0)
PHOSPHATE SERPL-MCNC: 3.3 MG/DL — SIGNIFICANT CHANGE UP (ref 2.5–4.5)
PLATELET # BLD AUTO: 307 K/UL — SIGNIFICANT CHANGE UP (ref 150–400)
POTASSIUM SERPL-MCNC: 3.8 MMOL/L — SIGNIFICANT CHANGE UP (ref 3.5–5.3)
POTASSIUM SERPL-SCNC: 3.8 MMOL/L — SIGNIFICANT CHANGE UP (ref 3.5–5.3)
PROTHROM AB SERPL-ACNC: 16.7 SEC — HIGH (ref 9.5–13)
RBC # BLD: 4.13 M/UL — SIGNIFICANT CHANGE UP (ref 3.8–5.2)
RBC # FLD: 13.2 % — SIGNIFICANT CHANGE UP (ref 10.3–14.5)
RH IG SCN BLD-IMP: POSITIVE — SIGNIFICANT CHANGE UP
SODIUM SERPL-SCNC: 136 MMOL/L — SIGNIFICANT CHANGE UP (ref 135–145)
WBC # BLD: 8.88 K/UL — SIGNIFICANT CHANGE UP (ref 3.8–10.5)
WBC # FLD AUTO: 8.88 K/UL — SIGNIFICANT CHANGE UP (ref 3.8–10.5)

## 2024-09-11 PROCEDURE — 99232 SBSQ HOSP IP/OBS MODERATE 35: CPT

## 2024-09-11 PROCEDURE — 71045 X-RAY EXAM CHEST 1 VIEW: CPT | Mod: 26

## 2024-09-11 PROCEDURE — 93010 ELECTROCARDIOGRAM REPORT: CPT

## 2024-09-11 RX ORDER — FENTANYL CITRATE 50 UG/ML
25 INJECTION INTRAMUSCULAR; INTRAVENOUS
Refills: 0 | Status: DISCONTINUED | OUTPATIENT
Start: 2024-09-11 | End: 2024-09-12

## 2024-09-11 RX ORDER — CEFAZOLIN SODIUM 2 G/100ML
1000 INJECTION, SOLUTION INTRAVENOUS EVERY 8 HOURS
Refills: 0 | Status: COMPLETED | OUTPATIENT
Start: 2024-09-11 | End: 2024-09-12

## 2024-09-11 RX ORDER — ONDANSETRON 2 MG/ML
4 INJECTION, SOLUTION INTRAMUSCULAR; INTRAVENOUS ONCE
Refills: 0 | Status: DISCONTINUED | OUTPATIENT
Start: 2024-09-11 | End: 2024-09-12

## 2024-09-11 RX ORDER — CHLORHEXIDINE GLUCONATE 40 MG/ML
1 SOLUTION TOPICAL
Refills: 0 | Status: DISCONTINUED | OUTPATIENT
Start: 2024-09-11 | End: 2024-09-13

## 2024-09-11 RX ORDER — CEFAZOLIN SODIUM 2 G/100ML
1000 INJECTION, SOLUTION INTRAVENOUS
Refills: 0 | Status: DISCONTINUED | OUTPATIENT
Start: 2024-09-11 | End: 2024-09-11

## 2024-09-11 RX ORDER — PHENYLEPHRINE HYDROCHLORIDE 10 MG/ML
0.6 INJECTION INTRAVENOUS
Qty: 40 | Refills: 0 | Status: DISCONTINUED | OUTPATIENT
Start: 2024-09-11 | End: 2024-09-12

## 2024-09-11 RX ADMIN — PHENYLEPHRINE HYDROCHLORIDE 18.4 MICROGRAM(S)/KG/MIN: 10 INJECTION INTRAVENOUS at 21:41

## 2024-09-11 RX ADMIN — Medication 40 MILLIGRAM(S): at 08:35

## 2024-09-11 RX ADMIN — Medication 75 MILLIGRAM(S): at 19:21

## 2024-09-11 RX ADMIN — Medication 100 MILLIGRAM(S): at 05:34

## 2024-09-11 RX ADMIN — ACETAMINOPHEN 650 MILLIGRAM(S): 325 TABLET ORAL at 20:30

## 2024-09-11 RX ADMIN — PHENYLEPHRINE HYDROCHLORIDE 18.4 MICROGRAM(S)/KG/MIN: 10 INJECTION INTRAVENOUS at 16:35

## 2024-09-11 RX ADMIN — INSULIN GLARGINE 12 UNIT(S): 100 INJECTION, SOLUTION SUBCUTANEOUS at 22:34

## 2024-09-11 RX ADMIN — Medication 2 TABLET(S): at 21:41

## 2024-09-11 RX ADMIN — Medication 0: at 21:43

## 2024-09-11 RX ADMIN — Medication 80 MILLIGRAM(S): at 05:36

## 2024-09-11 RX ADMIN — CEFAZOLIN SODIUM 100 MILLIGRAM(S): 2 INJECTION, SOLUTION INTRAVENOUS at 21:43

## 2024-09-11 RX ADMIN — ACETAMINOPHEN 650 MILLIGRAM(S): 325 TABLET ORAL at 21:30

## 2024-09-11 RX ADMIN — Medication 100 MILLIGRAM(S): at 21:41

## 2024-09-11 RX ADMIN — APIXABAN 5 MILLIGRAM(S): 5 TABLET, FILM COATED ORAL at 21:41

## 2024-09-11 RX ADMIN — Medication 80 MILLIGRAM(S): at 21:41

## 2024-09-11 RX ADMIN — Medication 1: at 19:20

## 2024-09-11 RX ADMIN — CARVEDILOL 3.12 MILLIGRAM(S): 6.25 TABLET ORAL at 05:35

## 2024-09-11 RX ADMIN — APIXABAN 5 MILLIGRAM(S): 5 TABLET, FILM COATED ORAL at 05:35

## 2024-09-11 RX ADMIN — Medication 3 MILLIGRAM(S): at 21:41

## 2024-09-11 RX ADMIN — SACUBITRIL AND VALSARTAN 1 TABLET(S): 49; 51 TABLET, FILM COATED ORAL at 05:35

## 2024-09-11 NOTE — PRE-OP CHECKLIST - HEIGHT IN CM
6/26/2025                                                                           Eric Aviles is scheduled for outpatient surgery on July 28, 2025 at [facility] with Chase Mauricio MD. The procedure(s) being performed is/are: Right Anterior Total Hip Arthroplasty.         The patient has been advised to contact your office for pre-operative clearance. The patient needs the following test/exams (as marked):    __X___ History & Physical (should be no older that 30 days)    __X___ MRSA (Valid for 30 Days)  __X___ Type & Screen (Only to be done at an [facility])  __X___ EKG (Valid for 1 year)  _X____ CBC & BMP (Valid for 3 months)   _X____ Urinalysis Reflex to culture (Valid for 30 days)  __X___ Hemoglobin A1c             Please include any other tests you deem necessary for medical clearance and inform us if you refer patient to any specialist for presurgical clearance. Please fax these results to our office at [phone], as well as the pre-op department at [phone].  If you have any questions regarding this case, please contact our office at [phone]. Thank you for your assistance with this matter.                                                                                                                                                                                                                                                                                                                                  [phone]

## 2024-09-11 NOTE — PROGRESS NOTE ADULT - PROBLEM SELECTOR PLAN 2
Due to acute systolic HF, at home on Lasix 40 mg PO daily prn (not taking recently, was told to make it prn)  - c/w Lasix 80 IV mg BID   - Weaned to RA, continue to monitor O2 sat. Follow up ambulatory O2 sat on RA

## 2024-09-11 NOTE — PROGRESS NOTE ADULT - SUBJECTIVE AND OBJECTIVE BOX
Date of service 9/11/24      SOB improving, no chest pain.  consented/awaiting pacemaker upgrade today.    Review of Systems:   Constitutional: [ ] fevers, [ ] chills.   Skin: [ ] dry skin. [ ] rashes.  Psychiatric: [ ] depression, [ ] anxiety.   Gastrointestinal: [ ] BRBPR, [ ] melena.   Neurological: [ ] confusion. [ ] seizures. [ ] shuffling gait.   Ears,Nose,Mouth and Throat: [ ] ear pain [ ] sore throat.   Eyes: [ ] diplopia.   Respiratory: [ ] hemoptysis. [ ] shortness of breath  Cardiovascular: See HPI above  Hematologic/Lymphatic: [ ] anemia. [ ] painful nodes. [ ] prolonged bleeding.   Genitourinary: [ ] hematuria. [ ] flank pain.   Endocrine: [ ] significant change in weight. [ ] intolerance to heat and cold.     Review of systems [x ] otherwise negative, [ ] otherwise unable to obtain    FH: no family history of sudden cardiac death in first degree relatives    SH: [ ] tobacco, [ ] alcohol, [ ] drugs    acetaminophen     Tablet .. 650 milliGRAM(s) Oral every 6 hours PRN  aluminum hydroxide/magnesium hydroxide/simethicone Suspension 30 milliLiter(s) Oral every 4 hours PRN  apixaban 5 milliGRAM(s) Oral every 12 hours  atorvastatin 80 milliGRAM(s) Oral at bedtime  bisacodyl Suppository 10 milliGRAM(s) Rectal daily PRN  carvedilol 3.125 milliGRAM(s) Oral every 12 hours  clopidogrel Tablet 75 milliGRAM(s) Oral daily  dextrose Oral Gel 15 Gram(s) Oral once PRN  donepezil 10 milliGRAM(s) Oral at bedtime  furosemide   Injectable 80 milliGRAM(s) IV Push two times a day  gabapentin 100 milliGRAM(s) Oral two times a day  insulin glargine Injectable (LANTUS) 12 Unit(s) SubCutaneous at bedtime  insulin lispro (ADMELOG) corrective regimen sliding scale   SubCutaneous every 6 hours  insulin lispro Injectable (ADMELOG) 2 Unit(s) SubCutaneous three times a day before meals  melatonin 3 milliGRAM(s) Oral at bedtime PRN  ondansetron Injectable 4 milliGRAM(s) IV Push every 8 hours PRN  pantoprazole    Tablet 40 milliGRAM(s) Oral before breakfast  polyethylene glycol 3350 17 Gram(s) Oral daily  sacubitril 24 mG/valsartan 26 mG 1 Tablet(s) Oral two times a day  senna 2 Tablet(s) Oral at bedtime                            11.5   8.88  )-----------( 307      ( 11 Sep 2024 03:55 )             35.9       09-11    136  |  97<L>  |  28<H>  ----------------------------<  164<H>  3.8   |  27  |  1.41<H>    Ca    8.9      11 Sep 2024 03:55  Phos  3.3     09-11  Mg     2.40     09-11              T(C): 36.8 (09-11-24 @ 10:48), Max: 36.9 (09-10-24 @ 22:08)  HR: 60 (09-11-24 @ 10:48) (60 - 68)  BP: 118/65 (09-11-24 @ 10:48) (104/65 - 130/70)  RR: 20 (09-11-24 @ 10:48) (14 - 20)  SpO2: 100% (09-11-24 @ 10:48) (98% - 100%)  Wt(kg): --    I&O's Summary    10 Sep 2024 07:01  -  11 Sep 2024 07:00  --------------------------------------------------------  IN: 0 mL / OUT: 900 mL / NET: -900 mL      General: Well nourished in no acute distress. Alert and Oriented * 3.   Head: Normocephalic and atraumatic.   Neck: No JVD. No bruits. Supple. Does not appear to be enlarged.   Cardiovascular: + S1,S2 ; RRR Soft systolic murmur at the left lower sternal border. No rubs noted.    Lungs: B/L crackles, L>R  Abdomen: + BS, soft. Non tender. Non distended. No rebound. No guarding.   Extremities: No clubbing/cyanosis/+edema.   Neurologic: Moves all four extremities. Full range of motion.   Skin: Warm and moist. The patient's skin has normal elasticity and good skin turgor.   Psychiatric: Appropriate mood and affect.  Musculoskeletal: Normal range of motion, normal strength    DATA      < from: Xray Chest 1 View- PORTABLE-Urgent (09.04.24 @ 21:40) >    IMPRESSION:  Bilateral perihilar and lower lung field hazy opacities, suggestive of pulmonary edema.       from: Cardiac Catheterization (08.18.22 @ 17:50) >  Diagnostic Conclusions:   Occluded proximal ramus stent. Patent LAD and diagonal stents.  Mild-moderate atherosclerosis in proximal Cx and mid RCA.      < from: Transthoracic Echocardiogram (03.30.23 @ 16:25) >  CONCLUSIONS:  1. Normal trileaflet aortic valve.  2. Normal left ventricular internal dimensions and wall thicknesses.  3. Moderate segmental left ventricular systolic dysfunction. Lateral and inferolateral wall hypokinesis.  4. Normal right ventricular size and function.  ------------------------------------------------------------------------  Confirmed on  3/31/2023 - 15:49:07 by BRIAN Soares      < from: Nuclear Stress Test-Exercise (04.02.23 @ 08:07) >  GATED ANALYSIS:  Post-stress gated wall motion analysis was performed (LVEF  = 34 %;LVEDV = 120 ml.), revealing overall severe hypokinesis.  ------------------------------------------------------------------------  IMPRESSIONS:Abnormal Study  * Chest Pain: No chest pain with administration of  Regadenoson.  * Symptom: Dyspnea.  * HR Response: Appropriate.  * BP Response: Appropriate.  * Heart Rhythm: Normal Sinus Rhythm - 65 BPM.  * Baseline ECG: Nonspecific ST-T wave abnormality.  * ECG Abnormalities: None.  * Arrhythmia: Occasional VPDs occurred during rest, stress  and recovery.  * There are large, moderate to severe defects in the apical, anterolateral, and lateral walls that are predominantly fixed, consistent with infarction with mild  seamus-infarct ischemia.  * Post-stress gated wall motion analysis was performed (LVEF = 34 %;LVEDV = 120 ml.), revealing overall severe  hypokinesis.  Conclusion:  There are large, moderate to severe defects in the apical,  anterolateral, and lateral walls that are predominantly  fixed, consistent with infarction with mild seamus-infarct  ischemia.  ------------------------------------------------------------------------  Confirmed on  4/2/2023 - 15:25:27 by Wilfredo Garibay MD, [facility],  [facility]  --------------------------------------------------------------------  < end of copied text >    < from: Cardiac Catheterization (04.04.23 @ 16:41) >  Diagnostic Conclusions:   Chronically occluded ramus artery stent. Mild atherosclerosis in proximal LAD. Patent mid LAD and ostial/proximal diagonal stents. Moderate atherosclerosis in proximal Cx. Mild atherosclerosis  in mid and distal RCA.    Recommendations:   Optimize medical therapy for ischemic heart disease. Aggressive risk factor modification.    < from: TTE W or WO Ultrasound Enhancing Agent (09.06.24 @ 16:50) >  CONCLUSIONS:   1. The left ventricular cavity is normal in size. Left ventricular wall thickness is normal. Abnormal (paradoxical) septal motion consistent with rightventricular pacemaker. Left ventricular systolic function is severely decreased with an ejection fraction visually estimated at 25 to 30%. There is global left ventricular hypokinesis. There is mild (grade 1) left ventricular diastolic dysfunction.  2. The right ventricular cavity is normal in size and right ventricular systolic function is normal. Tricuspid annular plane systolic excursion (TAPSE) is 2.6 cm (normal >=1.7 cm). A device lead is visualized in the right heart.   3. Structurally normal mitral valve with normal leaflet excursion. There is calcification of the mitral valve annulus. There is mild to moderate mitral regurgitation.   4. The left atrium is severely dilated with an indexed volume of 55.42 ml/m².   5. No prior echocardiogram is available for comparison.    < end of copied text >      ASSESSMENT/PLAN: Pt is an  84 year old Female with PMH DM, CAD s/p PCI to prox LAD/mid LAD/Diag/Ramus, last C 8/2022 with occluded prox Ramus stent, patent LAD and Diag stents, mild to mod atherosclerosis in pLcx and mRCA, managed medically, HTN, and CVA who presents with decompensated HFrEF and palpitations. She reports compliance with meds and diet. Is here visiting her grandson from [place].    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  Cardiac Electrophysiology  [phone]

## 2024-09-11 NOTE — CHART NOTE - NSCHARTNOTEFT_GEN_A_CORE
EP Brief Operative Note    Diagnosis: nonischemic / mixed cardiomyopathy  Procedure: Venogram. Removal of pacemaker generator. Addition of RV pacing lead.  Addition of biventricular pacemaker generator.  Surgeon: Poncho Lopez M.D.  Findings: Successful upgrade to conduction system pacing to treat RV-pacing induced cardiomyopathy.  Venogram w/ tight stenosis at vascular entry site of both preexisting leads.  Vascular access with 18g needle and .035" guidewire.  Pocket created, wire internalized, and 9F introducer passed the stenosis with some forward pressure.    LBB pacing catheter inserted over a Glidewire, and an Ultipace active fixation lead was burrowed into the interventricular septum.  Good conduction system pacing QRS was produced.  3 leads interfaced w/ new generator, and old generator removed from the field.  D-STAT used to treat mild pocket bleeding.  Pocket closed in 3 layers, + PrineoDermabond.  PocketPress added on the skin.    EBL: minimal  Specimens: none  Post-op Diagnosis: same  Assistants: none      A/P)   Successful upgrade to conduction system pacing system (RA, RV, LBB pacing).  Programmed DDD 60-100bpm.  Phenylephrine infusion being used for precedex-mediated hypotension upon early arousal, similar to while she was sedated.  Patient awake and in good spirits with no chest pain or SOB.    Continue apixaban and clopidogrel.  Device check in morning.  PocketPress to be removed by myself in the morning.  CXR AP portable now.  EKG now.  2 more doses of Ancef 1g q8hrs.  Home tomorrow if she feels well.      Poncho Lopez M.D.  Cardiac Electrophysiology    office [phone]  pager [phone]

## 2024-09-11 NOTE — CHART NOTE - NSCHARTNOTEFT_GEN_A_CORE
Spoke with Dr. Poncho Lopez and he recommended that patient can restart her Eliquis tonight. Spoke with Dr. Poncho Lopez and he recommended that patient can restart her Eliquis tonight.    ADDENDUM: Patient still noted to be hypotensive post BIV PPM upgrade and received 500cc by anesthesia and additional 250cc bolus in IRS and was started on phenylephrine drip during and post procedure by anesthesia attending. Drip lowered from 0.6cc->0.4cc and then to 0.2cc and then patient became hypotensive to 70s systolic while earlier patient's blood pressure was maintaining 107/66. Will increase drip rate back to 0.6cc and notify CCU. Spoke with Dr. Poncho Lopez who recommended to place patient in CCU for closer monitoring.

## 2024-09-11 NOTE — PROGRESS NOTE ADULT - PROBLEM SELECTOR PLAN 6
Eliquis ppx  PT rec [facility], family states no to facility, would be open to OP PT  dc pending cardiology clearance    updated  DA (son) at  9/9/2024, all questions answered, on board with plan of care  updated daughter at bedside 9/10/24, 9/11/2024

## 2024-09-11 NOTE — PROGRESS NOTE ADULT - SUBJECTIVE AND OBJECTIVE BOX
Patient is a 84y old  Female who presents with a chief complaint of chest pain, sob (10 Sep 2024 12:56)    SUBJECTIVE / OVERNIGHT EVENTS: No acute events. Comfortable. Awaiting PPM upgrade today.     MEDICATIONS  (STANDING):  apixaban 5 milliGRAM(s) Oral every 12 hours  atorvastatin 80 milliGRAM(s) Oral at bedtime  carvedilol 3.125 milliGRAM(s) Oral every 12 hours  clopidogrel Tablet 75 milliGRAM(s) Oral daily  donepezil 10 milliGRAM(s) Oral at bedtime  furosemide   Injectable 80 milliGRAM(s) IV Push two times a day  gabapentin 100 milliGRAM(s) Oral two times a day  insulin glargine Injectable (LANTUS) 12 Unit(s) SubCutaneous at bedtime  insulin lispro (ADMELOG) corrective regimen sliding scale   SubCutaneous every 6 hours  insulin lispro Injectable (ADMELOG) 2 Unit(s) SubCutaneous three times a day before meals  pantoprazole    Tablet 40 milliGRAM(s) Oral before breakfast  polyethylene glycol 3350 17 Gram(s) Oral daily  sacubitril 24 mG/valsartan 26 mG 1 Tablet(s) Oral two times a day  senna 2 Tablet(s) Oral at bedtime    MEDICATIONS  (PRN):  acetaminophen     Tablet .. 650 milliGRAM(s) Oral every 6 hours PRN Temp greater or equal to 38C (100.4F), Mild Pain (1 - 3)  aluminum hydroxide/magnesium hydroxide/simethicone Suspension 30 milliLiter(s) Oral every 4 hours PRN Dyspepsia  bisacodyl Suppository 10 milliGRAM(s) Rectal daily PRN Constipation  dextrose Oral Gel 15 Gram(s) Oral once PRN Blood Glucose LESS THAN 70 milliGRAM(s)/deciliter  melatonin 3 milliGRAM(s) Oral at bedtime PRN Insomnia  ondansetron Injectable 4 milliGRAM(s) IV Push every 8 hours PRN Nausea and/or Vomiting      CAPILLARY BLOOD GLUCOSE      POCT Blood Glucose.: 175 mg/dL (11 Sep 2024 11:42)  POCT Blood Glucose.: 138 mg/dL (11 Sep 2024 05:48)  POCT Blood Glucose.: 169 mg/dL (10 Sep 2024 21:19)  POCT Blood Glucose.: 149 mg/dL (10 Sep 2024 17:02)    I&O's Summary    10 Sep 2024 07:01  -  11 Sep 2024 07:00  --------------------------------------------------------  IN: 0 mL / OUT: 900 mL / NET: -900 mL        PHYSICAL EXAM:  Vital Signs Last 24 Hrs  T(C): 36.8 (11 Sep 2024 10:48), Max: 36.9 (10 Sep 2024 22:08)  T(F): 98.2 (11 Sep 2024 10:48), Max: 98.4 (10 Sep 2024 22:08)  HR: 60 (11 Sep 2024 10:48) (60 - 68)  BP: 118/65 (11 Sep 2024 10:48) (104/65 - 130/70)  BP(mean): --  RR: 20 (11 Sep 2024 10:48) (14 - 20)  SpO2: 100% (11 Sep 2024 10:48) (98% - 100%)    Parameters below as of 11 Sep 2024 10:48  Patient On (Oxygen Delivery Method): nasal cannula  O2 Flow (L/min): 2    CONSTITUTIONAL: NAD, laying in bed  EYES: conjunctiva and sclera clear  ENMT: Moist oral mucosa  RESPIRATORY: Normal respiratory effort; lungs are clear to auscultation bilaterally  CARDIOVASCULAR: Regular rate and rhythm, normal S1 and S2, trace b/l lower extremity edema  ABDOMEN: Nontender to palpation, normoactive bowel sounds, no rebound/guarding  PSYCH: calm    LABS:                        11.5   8.88  )-----------( 307      ( 11 Sep 2024 03:55 )             35.9     09-11    136  |  97<L>  |  28<H>  ----------------------------<  164<H>  3.8   |  27  |  1.41<H>    Ca    8.9      11 Sep 2024 03:55  Phos  3.3     09-11  Mg     2.40     09-11      PT/INR - ( 11 Sep 2024 03:55 )   PT: 16.7 sec;   INR: 1.51 ratio         PTT - ( 11 Sep 2024 03:55 )  PTT:45.8 sec      Urinalysis Basic - ( 11 Sep 2024 03:55 )    Color: x / Appearance: x / SG: x / pH: x  Gluc: 164 mg/dL / Ketone: x  / Bili: x / Urobili: x   Blood: x / Protein: x / Nitrite: x   Leuk Esterase: x / RBC: x / WBC x   Sq Epi: x / Non Sq Epi: x / Bacteria: x      RADIOLOGY & ADDITIONAL TESTS: Reviewed    COORDINATION OF CARE:  Care Discussed with Consultants/Other Providers [Y- Medicine ACP]

## 2024-09-12 ENCOUNTER — RESULT REVIEW (OUTPATIENT)
Age: 84
End: 2024-09-12

## 2024-09-12 LAB
ANION GAP SERPL CALC-SCNC: 13 MMOL/L — SIGNIFICANT CHANGE UP (ref 7–14)
BUN SERPL-MCNC: 28 MG/DL — HIGH (ref 7–23)
CALCIUM SERPL-MCNC: 8.9 MG/DL — SIGNIFICANT CHANGE UP (ref 8.4–10.5)
CHLORIDE SERPL-SCNC: 97 MMOL/L — LOW (ref 98–107)
CO2 SERPL-SCNC: 26 MMOL/L — SIGNIFICANT CHANGE UP (ref 22–31)
CREAT SERPL-MCNC: 1.4 MG/DL — HIGH (ref 0.5–1.3)
EGFR: 37 ML/MIN/1.73M2 — LOW
GLUCOSE BLDC GLUCOMTR-MCNC: 136 MG/DL — HIGH (ref 70–99)
GLUCOSE BLDC GLUCOMTR-MCNC: 141 MG/DL — HIGH (ref 70–99)
GLUCOSE BLDC GLUCOMTR-MCNC: 168 MG/DL — HIGH (ref 70–99)
GLUCOSE BLDC GLUCOMTR-MCNC: 265 MG/DL — HIGH (ref 70–99)
GLUCOSE SERPL-MCNC: 121 MG/DL — HIGH (ref 70–99)
HCT VFR BLD CALC: 36.2 % — SIGNIFICANT CHANGE UP (ref 34.5–45)
HGB BLD-MCNC: 11 G/DL — LOW (ref 11.5–15.5)
MAGNESIUM SERPL-MCNC: 2.4 MG/DL — SIGNIFICANT CHANGE UP (ref 1.6–2.6)
MCHC RBC-ENTMCNC: 26.8 PG — LOW (ref 27–34)
MCHC RBC-ENTMCNC: 30.4 GM/DL — LOW (ref 32–36)
MCV RBC AUTO: 88.1 FL — SIGNIFICANT CHANGE UP (ref 80–100)
NRBC # BLD: 0 /100 WBCS — SIGNIFICANT CHANGE UP (ref 0–0)
NRBC # FLD: 0 K/UL — SIGNIFICANT CHANGE UP (ref 0–0)
PHOSPHATE SERPL-MCNC: 3.9 MG/DL — SIGNIFICANT CHANGE UP (ref 2.5–4.5)
PLATELET # BLD AUTO: 289 K/UL — SIGNIFICANT CHANGE UP (ref 150–400)
POTASSIUM SERPL-MCNC: 4.3 MMOL/L — SIGNIFICANT CHANGE UP (ref 3.5–5.3)
POTASSIUM SERPL-SCNC: 4.3 MMOL/L — SIGNIFICANT CHANGE UP (ref 3.5–5.3)
RBC # BLD: 4.11 M/UL — SIGNIFICANT CHANGE UP (ref 3.8–5.2)
RBC # FLD: 13.4 % — SIGNIFICANT CHANGE UP (ref 10.3–14.5)
SODIUM SERPL-SCNC: 136 MMOL/L — SIGNIFICANT CHANGE UP (ref 135–145)
WBC # BLD: 7.53 K/UL — SIGNIFICANT CHANGE UP (ref 3.8–10.5)
WBC # FLD AUTO: 7.53 K/UL — SIGNIFICANT CHANGE UP (ref 3.8–10.5)

## 2024-09-12 PROCEDURE — 93325 DOPPLER ECHO COLOR FLOW MAPG: CPT | Mod: 26,GC

## 2024-09-12 PROCEDURE — 93970 EXTREMITY STUDY: CPT | Mod: 26

## 2024-09-12 PROCEDURE — 99291 CRITICAL CARE FIRST HOUR: CPT

## 2024-09-12 PROCEDURE — 93321 DOPPLER ECHO F-UP/LMTD STD: CPT | Mod: 26

## 2024-09-12 PROCEDURE — 93308 TTE F-UP OR LMTD: CPT | Mod: 26,GC

## 2024-09-12 RX ORDER — DONEPEZIL HYDROCHLORIDE 5 MG/1
10 TABLET, FILM COATED ORAL AT BEDTIME
Refills: 0 | Status: DISCONTINUED | OUTPATIENT
Start: 2024-09-12 | End: 2024-09-20

## 2024-09-12 RX ORDER — SACUBITRIL AND VALSARTAN 49; 51 MG/1; MG/1
0.5 TABLET, FILM COATED ORAL
Refills: 0 | Status: DISCONTINUED | OUTPATIENT
Start: 2024-09-13 | End: 2024-09-13

## 2024-09-12 RX ORDER — ACETAMINOPHEN 325 MG/1
1000 TABLET ORAL ONCE
Refills: 0 | Status: COMPLETED | OUTPATIENT
Start: 2024-09-12 | End: 2024-09-12

## 2024-09-12 RX ORDER — METOPROLOL TARTRATE 100 MG/1
12.5 TABLET ORAL
Refills: 0 | Status: DISCONTINUED | OUTPATIENT
Start: 2024-09-13 | End: 2024-09-13

## 2024-09-12 RX ORDER — SACUBITRIL AND VALSARTAN 49; 51 MG/1; MG/1
0.5 TABLET, FILM COATED ORAL
Refills: 0 | Status: DISCONTINUED | OUTPATIENT
Start: 2024-09-12 | End: 2024-09-12

## 2024-09-12 RX ORDER — FUROSEMIDE 40 MG
40 TABLET ORAL
Refills: 0 | Status: DISCONTINUED | OUTPATIENT
Start: 2024-09-12 | End: 2024-09-13

## 2024-09-12 RX ADMIN — APIXABAN 5 MILLIGRAM(S): 5 TABLET, FILM COATED ORAL at 08:38

## 2024-09-12 RX ADMIN — APIXABAN 5 MILLIGRAM(S): 5 TABLET, FILM COATED ORAL at 21:21

## 2024-09-12 RX ADMIN — Medication 2 TABLET(S): at 21:21

## 2024-09-12 RX ADMIN — Medication 2 UNIT(S): at 11:27

## 2024-09-12 RX ADMIN — ACETAMINOPHEN 650 MILLIGRAM(S): 325 TABLET ORAL at 21:51

## 2024-09-12 RX ADMIN — Medication 2 UNIT(S): at 16:44

## 2024-09-12 RX ADMIN — ACETAMINOPHEN 650 MILLIGRAM(S): 325 TABLET ORAL at 06:45

## 2024-09-12 RX ADMIN — CHLORHEXIDINE GLUCONATE 1 APPLICATION(S): 40 SOLUTION TOPICAL at 08:39

## 2024-09-12 RX ADMIN — ACETAMINOPHEN 650 MILLIGRAM(S): 325 TABLET ORAL at 21:21

## 2024-09-12 RX ADMIN — ACETAMINOPHEN 650 MILLIGRAM(S): 325 TABLET ORAL at 05:59

## 2024-09-12 RX ADMIN — DONEPEZIL HYDROCHLORIDE 10 MILLIGRAM(S): 5 TABLET, FILM COATED ORAL at 21:21

## 2024-09-12 RX ADMIN — Medication 100 MILLIGRAM(S): at 16:43

## 2024-09-12 RX ADMIN — Medication 40 MILLIGRAM(S): at 12:08

## 2024-09-12 RX ADMIN — Medication 2 UNIT(S): at 08:27

## 2024-09-12 RX ADMIN — Medication 75 MILLIGRAM(S): at 11:26

## 2024-09-12 RX ADMIN — INSULIN GLARGINE 12 UNIT(S): 100 INJECTION, SOLUTION SUBCUTANEOUS at 21:22

## 2024-09-12 RX ADMIN — ACETAMINOPHEN 400 MILLIGRAM(S): 325 TABLET ORAL at 11:26

## 2024-09-12 RX ADMIN — POLYETHYLENE GLYCOL 3350 17 GRAM(S): 17 POWDER, FOR SOLUTION ORAL at 11:26

## 2024-09-12 RX ADMIN — Medication 40 MILLIGRAM(S): at 06:00

## 2024-09-12 RX ADMIN — ACETAMINOPHEN 1000 MILLIGRAM(S): 325 TABLET ORAL at 12:00

## 2024-09-12 RX ADMIN — Medication 80 MILLIGRAM(S): at 21:21

## 2024-09-12 RX ADMIN — CEFAZOLIN SODIUM 100 MILLIGRAM(S): 2 INJECTION, SOLUTION INTRAVENOUS at 05:59

## 2024-09-12 RX ADMIN — Medication 100 MILLIGRAM(S): at 06:00

## 2024-09-12 NOTE — DIETITIAN INITIAL EVALUATION ADULT - PERTINENT LABORATORY DATA
09-12    136  |  97<L>  |  28<H>  ----------------------------<  121<H>  4.3   |  26  |  1.40<H>    Ca    8.9      12 Sep 2024 05:24  Phos  3.9     09-12  Mg     2.40     09-12    POCT Blood Glucose.: 141 mg/dL (09-12-24 @ 07:43)  A1C with Estimated Average Glucose Result: 7.1 % (09-06-24 @ 06:06)

## 2024-09-12 NOTE — CHART NOTE - NSCHARTNOTEFT_GEN_A_CORE
CCU Transfer Note    Transfer from: CCU    Transfer to -  Telemetry bed:     Accepting Physician:  Signout given to:     HPI: 84y Female PMH with hx of CAD s/p stents, HFrEF ( LVEF 45% in 2023), bradycardia s/p PPM ([facility]) HTN, IDDM, SMA stenosis admitted for chest pain and sob x 2 days. Pt reports she resides in [place]  and only came to [place] ~3 days ago to visit family. Since arrival at [place] she noted progressive sob worse with exertion. She also developed chest tightness/pressure, also worse with exertion. She endorses feeling weak and cold, as well as occasional dry cough. No fever,  chills,  Nausea, diaphoresis, dizziness, palpitation. leg swelling. She reports taking lasix as needed for leg enema, which she has not needed in the past few weeks. Reports compliance with rest of her meds.     In ER. pt is afebrile. /80 SpO2 mid 80s on RA. high 90s on 2L O2. CXR b/l opacity c/w pulm edema. Labs notable for BNP 1200, Cr 1.3 Pt received iv lasix 80s, asa 162, admitted for acute hypoxic respiratory failure 2/2 CHF exacerbation     CCU Course:  hypotension post BIV PPM upgrade, received 500cc by anesthesia and additional 250cc bolus in IRS, started on phenylephrine drip during and post procedure by anesthesia attending.  Titrated off phenylephedrine since this morning.  Holding GDMT for now.  Uneventful CCU course.    PAST MEDICAL & SURGICAL HISTORY:  HTN (hypertension)    CAD (coronary artery disease)  S/P stent placemenet 2006, and reportedly 6/2019 in [place]    DM (diabetes mellitus)    GERD (gastroesophageal reflux disease)    CVA (cerebral vascular accident)  no residual deficits    HLD (hyperlipidemia)    CHF (congestive heart failure)    S/P primary angioplasty with coronary stent  x1 in 2006 at [facility]    S/P TKR (total knee replacement)  left    Vital Signs Last 24 Hrs  T(C): 37.4 (12 Sep 2024 16:00), Max: 37.4 (12 Sep 2024 16:00)  T(F): 99.3 (12 Sep 2024 16:00), Max: 99.3 (12 Sep 2024 16:00)  HR: 72 (12 Sep 2024 19:00) (60 - 72)  BP: 120/72 (12 Sep 2024 19:00) (85/50 - 130/65)  BP(mean): 78 (12 Sep 2024 19:00) (57 - 84)  RR: 18 (12 Sep 2024 19:00) (11 - 20)  SpO2: 99% (12 Sep 2024 19:00) (93% - 100%)    Parameters below as of 12 Sep 2024 19:00  Patient On (Oxygen Delivery Method): room air      I&O's Summary    11 Sep 2024 07:01  -  12 Sep 2024 07:00  --------------------------------------------------------  IN: 0 mL / OUT: 150 mL / NET: -150 mL    12 Sep 2024 07:01  -  12 Sep 2024 20:29  --------------------------------------------------------  IN: 0 mL / OUT: 600 mL / NET: -600 mL      Allergies - No Known Allergies    MEDICATIONS  (STANDING):  apixaban 5 milliGRAM(s) Oral every 12 hours  atorvastatin 80 milliGRAM(s) Oral at bedtime  chlorhexidine 2% Cloths 1 Application(s) Topical <User Schedule>  clopidogrel Tablet 75 milliGRAM(s) Oral daily  donepezil 10 milliGRAM(s) Oral at bedtime  furosemide   Injectable 40 milliGRAM(s) IV Push two times a day  gabapentin 100 milliGRAM(s) Oral two times a day  insulin glargine Injectable (LANTUS) 12 Unit(s) SubCutaneous at bedtime  insulin lispro (ADMELOG) corrective regimen sliding scale   SubCutaneous at bedtime  insulin lispro (ADMELOG) corrective regimen sliding scale   SubCutaneous three times a day before meals  insulin lispro Injectable (ADMELOG) 2 Unit(s) SubCutaneous three times a day before meals  pantoprazole    Tablet 40 milliGRAM(s) Oral before breakfast  polyethylene glycol 3350 17 Gram(s) Oral daily  senna 2 Tablet(s) Oral at bedtime    MEDICATIONS  (PRN):  acetaminophen     Tablet .. 650 milliGRAM(s) Oral every 6 hours PRN Temp greater or equal to 38C (100.4F), Mild Pain (1 - 3)  aluminum hydroxide/magnesium hydroxide/simethicone Suspension 30 milliLiter(s) Oral every 4 hours PRN Dyspepsia  bisacodyl Suppository 10 milliGRAM(s) Rectal daily PRN Constipation  melatonin 3 milliGRAM(s) Oral at bedtime PRN Insomnia  ondansetron Injectable 4 milliGRAM(s) IV Push every 8 hours PRN Nausea and/or Vomiting                        11.0   7.53  )-----------( 289      ( 12 Sep 2024 05:24 )             36.2     09-12    136  |  97<L>  |  28<H>  ----------------------------<  121<H>  4.3   |  26  |  1.40<H>    Ca    8.9      12 Sep 2024 05:24  Phos  3.9     09-12  Mg     2.40     09-12      PT/INR - ( 11 Sep 2024 03:55 )   PT: 16.7 sec;   INR: 1.51 ratio      PTT - ( 11 Sep 2024 03:55 )  PTT:45.8 sec    A/P: 84F Hx DM, CAD s/p PCI to pLAD/mLAD/Diag/Ramus, last C 8/2022 with occluded prox Ramus stent, patent LAD and Diag stents, mild to mod atherosclerosis in pLcx and mRCA, managed medically, HTN, and CVA who presents with decompensated HFrEF and palpitations.  Now s/p BiV PPM upgrade c/b hypotension post procedure requiring brief phenylephrine gtt.      Acute on Chronic HFrEF  - TTE noted: EF 25-30%  - Lasix 40 IV BID, strict I/Os, daily standing wts  - s/p upgraded BiV PPM, Pa/Lat CXR in AM  - GDMT: restart in AM    CAD  - previous stress test abnormal: fixed defects w/ seamus-infarct ischemia, last LHC: 100% ramus in stent restenosis,  - Repeat LHC: unchanged CAD- chronic occl ramus stent, patent mLAD and ostial/prox DIag stents and moderate atherosclerosis of pLcx  - GDMT: remains off due to post BiV PPM hypotension - plan to resume tomorrow if BP remains stable    - c/w apixaban, Plavix, high intensity statin     HTN  - Off GDMT, resume in AM if BP remains stable      Louise Mills, ACNP-BC  CCU [id number]

## 2024-09-12 NOTE — DIETITIAN INITIAL EVALUATION ADULT - OTHER INFO
84 year old female with a PMH of DM, CAD, HTN and CVA who presents with decompensated HFrEF and palpitations per chart.    Patient reports good appetite and consumed 75% of breakfast today per observation. No GI distress reported. Has no food allergies. UBW fluctuates between 185-195 lbs. in setting of heart failure. ABW is 180.1 lbs. (9/12), 182.1 lbs. (9/11), 175 lbs. (9/8) and 188.7 lbs. (9/7) per chart, accuracy of weights questionable at this time and likely due to bed scale. Noted w/ nonpitting generalized edema and no pressure injuries per RN flow sheet.    Reviewed importance of monitoring sodium/fat intake, food groups known to be high in sodium/fat, small frequent meals and continue to check weight daily. Provided heart failure nutrition handout.

## 2024-09-12 NOTE — PROGRESS NOTE ADULT - SUBJECTIVE AND OBJECTIVE BOX
Date of service 9/12/24      monitored in CCU overnight for IVF/ pressors, now off all, BP stable, no pain or SOB    Review of Systems:   Constitutional: [ ] fevers, [ ] chills.   Skin: [ ] dry skin. [ ] rashes.  Psychiatric: [ ] depression, [ ] anxiety.   Gastrointestinal: [ ] BRBPR, [ ] melena.   Neurological: [ ] confusion. [ ] seizures. [ ] shuffling gait.   Ears,Nose,Mouth and Throat: [ ] ear pain [ ] sore throat.   Eyes: [ ] diplopia.   Respiratory: [ ] hemoptysis. [ ] shortness of breath  Cardiovascular: See HPI above  Hematologic/Lymphatic: [ ] anemia. [ ] painful nodes. [ ] prolonged bleeding.   Genitourinary: [ ] hematuria. [ ] flank pain.   Endocrine: [ ] significant change in weight. [ ] intolerance to heat and cold.     Review of systems [x ] otherwise negative, [ ] otherwise unable to obtain    FH: no family history of sudden cardiac death in first degree relatives    SH: [ ] tobacco, [ ] alcohol, [ ] drugs    acetaminophen     Tablet .. 650 milliGRAM(s) Oral every 6 hours PRN  aluminum hydroxide/magnesium hydroxide/simethicone Suspension 30 milliLiter(s) Oral every 4 hours PRN  apixaban 5 milliGRAM(s) Oral every 12 hours  atorvastatin 80 milliGRAM(s) Oral at bedtime  bisacodyl Suppository 10 milliGRAM(s) Rectal daily PRN  chlorhexidine 2% Cloths 1 Application(s) Topical <User Schedule>  clopidogrel Tablet 75 milliGRAM(s) Oral daily  donepezil 10 milliGRAM(s) Oral at bedtime  furosemide   Injectable 40 milliGRAM(s) IV Push two times a day  gabapentin 100 milliGRAM(s) Oral two times a day  insulin glargine Injectable (LANTUS) 12 Unit(s) SubCutaneous at bedtime  insulin lispro (ADMELOG) corrective regimen sliding scale   SubCutaneous at bedtime  insulin lispro (ADMELOG) corrective regimen sliding scale   SubCutaneous three times a day before meals  insulin lispro Injectable (ADMELOG) 2 Unit(s) SubCutaneous three times a day before meals  melatonin 3 milliGRAM(s) Oral at bedtime PRN  ondansetron Injectable 4 milliGRAM(s) IV Push every 8 hours PRN  pantoprazole    Tablet 40 milliGRAM(s) Oral before breakfast  polyethylene glycol 3350 17 Gram(s) Oral daily  senna 2 Tablet(s) Oral at bedtime                            11.0   7.53  )-----------( 289      ( 12 Sep 2024 05:24 )             36.2       09-12    136  |  97<L>  |  28<H>  ----------------------------<  121<H>  4.3   |  26  |  1.40<H>    Ca    8.9      12 Sep 2024 05:24  Phos  3.9     09-12  Mg     2.40     09-12      T(C): 36.7 (09-12-24 @ 12:00), Max: 37 (09-11-24 @ 20:00)  HR: 65 (09-12-24 @ 15:00) (60 - 67)  BP: 114/52 (09-12-24 @ 15:00) (81/41 - 199/118)  RR: 18 (09-12-24 @ 15:00) (11 - 20)  SpO2: 99% (09-12-24 @ 15:00) (93% - 100%)  Wt(kg): --    I&O's Summary    11 Sep 2024 07:01  -  12 Sep 2024 07:00  --------------------------------------------------------  IN: 0 mL / OUT: 150 mL / NET: -150 mL    12 Sep 2024 07:01  -  12 Sep 2024 15:37  --------------------------------------------------------  IN: 0 mL / OUT: 300 mL / NET: -300 mL      General: Well nourished in no acute distress. Alert and Oriented * 3.   Head: Normocephalic and atraumatic.   Neck: No JVD. No bruits. Supple. Does not appear to be enlarged.   Cardiovascular: + S1,S2 ; RRR Soft systolic murmur at the left lower sternal border. No rubs noted.    Lungs: CTA B/L  Abdomen: + BS, soft. Non tender. Non distended. No rebound. No guarding.   Extremities: No clubbing/cyanosis/edema.   Neurologic: Moves all four extremities. Full range of motion.   Skin: Warm and moist. The patient's skin has normal elasticity and good skin turgor.   Psychiatric: Appropriate mood and affect.  Musculoskeletal: Normal range of motion, normal strength    DATA      < from: Xray Chest 1 View- PORTABLE-Urgent (09.04.24 @ 21:40) >    IMPRESSION:  Bilateral perihilar and lower lung field hazy opacities, suggestive of pulmonary edema.       from: Cardiac Catheterization (08.18.22 @ 17:50) >  Diagnostic Conclusions:   Occluded proximal ramus stent. Patent LAD and diagonal stents.  Mild-moderate atherosclerosis in proximal Cx and mid RCA.      < from: Transthoracic Echocardiogram (03.30.23 @ 16:25) >  CONCLUSIONS:  1. Normal trileaflet aortic valve.  2. Normal left ventricular internal dimensions and wall thicknesses.  3. Moderate segmental left ventricular systolic dysfunction. Lateral and inferolateral wall hypokinesis.  4. Normal right ventricular size and function.  ------------------------------------------------------------------------  Confirmed on  3/31/2023 - 15:49:07 by BRIAN Soares      < from: Nuclear Stress Test-Exercise (04.02.23 @ 08:07) >  GATED ANALYSIS:  Post-stress gated wall motion analysis was performed (LVEF  = 34 %;LVEDV = 120 ml.), revealing overall severe hypokinesis.  ------------------------------------------------------------------------  IMPRESSIONS:Abnormal Study  * Chest Pain: No chest pain with administration of  Regadenoson.  * Symptom: Dyspnea.  * HR Response: Appropriate.  * BP Response: Appropriate.  * Heart Rhythm: Normal Sinus Rhythm - 65 BPM.  * Baseline ECG: Nonspecific ST-T wave abnormality.  * ECG Abnormalities: None.  * Arrhythmia: Occasional VPDs occurred during rest, stress  and recovery.  * There are large, moderate to severe defects in the apical, anterolateral, and lateral walls that are predominantly fixed, consistent with infarction with mild  seamus-infarct ischemia.  * Post-stress gated wall motion analysis was performed (LVEF = 34 %;LVEDV = 120 ml.), revealing overall severe  hypokinesis.  Conclusion:  There are large, moderate to severe defects in the apical,  anterolateral, and lateral walls that are predominantly  fixed, consistent with infarction with mild seamus-infarct  ischemia.  ------------------------------------------------------------------------  Confirmed on  4/2/2023 - 15:25:27 by Wilfredo Garibay MD, [facility],  [facility], [facility]  --------------------------------------------------------------------  < end of copied text >    < from: Cardiac Catheterization (04.04.23 @ 16:41) >  Diagnostic Conclusions:   Chronically occluded ramus artery stent. Mild atherosclerosis in proximal LAD. Patent mid LAD and ostial/proximal diagonal stents. Moderate atherosclerosis in proximal Cx. Mild atherosclerosis  in mid and distal RCA.    Recommendations:   Optimize medical therapy for ischemic heart disease. Aggressive risk factor modification.    < from: TTE W or WO Ultrasound Enhancing Agent (09.06.24 @ 16:50) >  CONCLUSIONS:   1. The left ventricular cavity is normal in size. Left ventricular wall thickness is normal. Abnormal (paradoxical) septal motion consistent with rightventricular pacemaker. Left ventricular systolic function is severely decreased with an ejection fraction visually estimated at 25 to 30%. There is global left ventricular hypokinesis. There is mild (grade 1) left ventricular diastolic dysfunction.  2. The right ventricular cavity is normal in size and right ventricular systolic function is normal. Tricuspid annular plane systolic excursion (TAPSE) is 2.6 cm (normal >=1.7 cm). A device lead is visualized in the right heart.   3. Structurally normal mitral valve with normal leaflet excursion. There is calcification of the mitral valve annulus. There is mild to moderate mitral regurgitation.   4. The left atrium is severely dilated with an indexed volume of 55.42 ml/m².   5. No prior echocardiogram is available for comparison.    < end of copied text >      ASSESSMENT/PLAN: Pt is an  84 year old Female with PMH DM, CAD s/p PCI to prox LAD/mid LAD/Diag/Ramus, last LHC 8/2022 with occluded prox Ramus stent, patent LAD and Diag stents, mild to mod atherosclerosis in pLcx and mRCA, managed medically, HTN, and CVA who presents with decompensated HFrEF and palpitations. She reports compliance with meds and diet. Is here visiting her grandson from [place].    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

## 2024-09-12 NOTE — PROGRESS NOTE ADULT - SUBJECTIVE AND OBJECTIVE BOX
Department of Anesthesiology  Postprocedure Note    Patient: Amirah Arredondo  MRN: [id number]  YOB: 1977  Date of evaluation: 3/5/2021  Time:  6:55 PM     Procedure Summary     Date: 03/05/21 Room / Location: [place] [address] / [facility]    Anesthesia Start: 1054 Anesthesia Stop: 4651    Procedures:       COLON W/ANES. (11:30) (N/A )      COLONOSCOPY CONTROL HEMORRHAGE (N/A ) Diagnosis:       Family history of diverticulitis of colon      (FAMILY HX DIVERTICULITIS)    Surgeons: Stephanie Kwong MD Responsible Provider: Milan Squires MD    Anesthesia Type: MAC ASA Status: 2          Anesthesia Type: MAC    Thierno Phase I: Thierno Score: 10    Thierno Phase II: Thierno Score: 10    Last vitals: Reviewed and per EMR flowsheets.        Anesthesia Post Evaluation    Comments: Postoperative Anesthesia Note    Name:    Amirah Arredondo  MRN:      [id number]    Patient Vitals in the past 12 hrs:  03/05/21 1206, BP:112/74, Temp:98.1 °F (36.7 °C), Pulse:70, Resp:14, SpO2:100 %  03/05/21 1147, BP:(!) 110/56, Pulse:71, Resp:16, SpO2:100 %  03/05/21 1137, BP:102/65, Temp:97.1 °F (36.2 °C), Temp src:Temporal, Pulse:78, Resp:16, SpO2:100 %  03/05/21 1047, BP:122/79, Temp:97.3 °F (36.3 °C), Temp src:Temporal, Pulse:75, Resp:12, SpO2:99 %     LABS:    CBC  Lab Results       Component                Value               Date/Time                  WBC                      7.7                 01/22/2021 11:36 AM        HGB                      14.7                01/22/2021 11:36 AM        HCT                      44.3                01/22/2021 11:36 AM        PLT                      228                 01/22/2021 11:36 AM   RENAL  Lab Results       Component                Value               Date/Time                  NA                       140                 01/22/2021 11:36 AM        K                        4.8                 01/22/2021 11:36 AM CL                       101                 01/22/2021 11:36 AM        CO2                      26                  01/22/2021 11:36 AM        BUN                      13                  01/22/2021 11:36 AM        CREATININE               0.7                 01/22/2021 11:36 AM        GLUCOSE                  113 (H)             01/22/2021 11:36 AM   COAGS  No results found for: PROTIME, INR, APTT    Intake & Output:  No intake/output data recorded. Nausea & Vomiting:  No    Level of Consciousness:  Awake    Pain Assessment:  Adequate analgesia    Anesthesia Complications:  No apparent anesthetic complications    SUMMARY      Vital signs stable  OK to discharge from Stage I post anesthesia care.   Care transferred from Anesthesiology department on discharge from perioperative area   Patient is a 84y old  Female who presents with a chief complaint of chest pain, sob (11 Sep 2024 13:08)    HPI:  HPI:    84y Female PMH with hx of CAD s/p stents, HFrEF ( LVEF 45% in ), bradycardia s/p PPM ([facility]ott) HTN, IDDM, SMA stenosis admitted for chest pain and sob x 2 days. Pt reports she resides in [place]  and only came to [place] ~3 days ago to visit family. Since arrival at [place] she noted progressive sob worse with exertion. She also developed chest tightness/pressure since yesterday, also worse with exertion. She endorses feeling weak and cold, as well as occasional dry cough. No fever,  chills,  Nausea, diaphoresis, dizziness, palpitation. leg swelling. She reports taking lasix as needed for leg enema, which she has not needed in the past few weeks. Reports compliance with rest of her meds.     In ER. pt is afebrile. /80 SpO2 mid 80s on RA. high 90s on 2L O2. CXR b/l opacity c/w pulm edema. Labs notable for BNP 1200, Cr 1.3 Pt received iv lasix 80s, asa 162, admitted for acute hypoxic respiratory failure 2/2 CHF exacerbation     PAST MEDICAL & SURGICAL HISTORY:  HTN (hypertension)      CAD (coronary artery disease)  S/P stent placemenet , and reportedly 2019 in [place]      DM (diabetes mellitus)      GERD (gastroesophageal reflux disease)      CVA (cerebral vascular accident)  no residual deficits      HLD (hyperlipidemia)      CHF (congestive heart failure)      S/P primary angioplasty with coronary stent  x1 in  at [facility]      S/P TKR (total knee replacement)  left          Review of Systems:   CONSTITUTIONAL: No fever, weight loss, or fatigue  EYES: No eye pain, visual disturbances, or discharge  ENMT:  No difficulty hearing, tinnitus, vertigo; No sinus or throat pain  NECK: No pain or stiffness  RESPIRATORY: No cough, wheezing, chills or hemoptysis; + shortness of breath  CARDIOVASCULAR: + chest pain, no palpitations, dizziness, or leg swelling  GASTROINTESTINAL: No abdominal or epigastric pain. No nausea, vomiting, or hematemesis; No diarrhea or constipation. No melena or hematochezia.  GENITOURINARY: No dysuria, frequency, hematuria, or incontinence  NEUROLOGICAL: No headaches, memory loss, loss of strength, numbness, or tremors  SKIN: No itching, burning, rashes,   ENDOCRINE: cold intolerance;  MUSCULOSKELETAL: No joint pain or swelling; No muscle, back, or extremity pain  PSYCHIATRIC: No depression, anxiety, mood swings, or difficulty sleeping        Allergies    No Known Allergies    Intolerances        Social History:     FAMILY HISTORY:  FH: diabetes mellitus        MEDICATIONS  (STANDING):  apixaban 5 milliGRAM(s) Oral every 12 hours  atorvastatin 80 milliGRAM(s) Oral at bedtime  dextrose 5%. 1000 milliLiter(s) (50 mL/Hr) IV Continuous <Continuous>  donepezil 10 milliGRAM(s) Oral at bedtime  gabapentin 200 milliGRAM(s) Oral three times a day  insulin glargine Injectable (LANTUS) 8 Unit(s) SubCutaneous at bedtime  insulin lispro (ADMELOG) corrective regimen sliding scale   SubCutaneous at bedtime  insulin lispro (ADMELOG) corrective regimen sliding scale   SubCutaneous three times a day before meals  pantoprazole    Tablet 40 milliGRAM(s) Oral before breakfast  sacubitril 24 mG/valsartan 26 mG 1 Tablet(s) Oral two times a day    MEDICATIONS  (PRN):  acetaminophen     Tablet .. 650 milliGRAM(s) Oral every 6 hours PRN Temp greater or equal to 38C (100.4F), Mild Pain (1 - 3)  aluminum hydroxide/magnesium hydroxide/simethicone Suspension 30 milliLiter(s) Oral every 4 hours PRN Dyspepsia  dextrose Oral Gel 15 Gram(s) Oral once PRN Blood Glucose LESS THAN 70 milliGRAM(s)/deciliter  melatonin 3 milliGRAM(s) Oral at bedtime PRN Insomnia  ondansetron Injectable 4 milliGRAM(s) IV Push every 8 hours PRN Nausea and/or Vomiting      T(C): 37.3 (24 @ 12:01), Max: 37.3 (24 @ 12:01)  HR: 76 (24 @ 12:01) (72 - 78)  BP: 152/82 (24 @ 12:01) (130/85 - 152/82)  RR: 19 (24 @ 12:01) (16 - 22)  SpO2: 100% (24 @ 12:01) (94% - 100%)    Weight (kg): 81.6 (24 @ 16:00)  CAPILLARY BLOOD GLUCOSE        I&O's Summary      PHYSICAL EXAM:  GENERAL: NAD, well-developed  HEAD:  Atraumatic, Normocephalic  EYES: EOMI, PERRLA, conjunctiva and sclera clear  NECK: Supple, No elevated JVD  CHEST/LUNG: Clear to auscultation bilaterally; No wheeze  HEART: Regular rate and rhythm; No murmurs, rubs, or gallops  ABDOMEN: Soft, Nontender, Nondistended; Bowel sounds present  EXTREMITIES:  2+ Peripheral Pulses, No clubbing, cyanosis, or edema  PSYCH: AAOx3  NEUROLOGY: CN II-XII grossly intact, moving all extremities  SKIN: No rashes or lesions    LABS:                        10.4   8.21  )-----------( 253      ( 04 Sep 2024 20:25 )             32.8         139  |  99  |  17  ----------------------------<  132<H>  3.5   |  26  |  1.31<H>    Ca    9.2      04 Sep 2024 20:25    TPro  7.6  /  Alb  4.0  /  TBili  1.3<H>  /  DBili  x   /  AST  20  /  ALT  16  /  AlkPhos  64            Urinalysis Basic - ( 04 Sep 2024 20:25 )    Color: x / Appearance: x / SG: x / pH: x  Gluc: 132 mg/dL / Ketone: x  / Bili: x / Urobili: x   Blood: x / Protein: x / Nitrite: x   Leuk Esterase: x / RBC: x / WBC x   Sq Epi: x / Non Sq Epi: x / Bacteria: x          RADIOLOGY & ADDITIONAL TESTS:    ECG Personally Reviewed -     Imaging Personally Reviewed:    Consultant(s) Notes Reviewed:      Care Discussed with Consultants/Other Providers:   (05 Sep 2024 14:07)       INTERVAL HPI/OVERNIGHT EVENTS:   No overnight events   Afebrile, hemodynamically stable     Subjective:    ICU Vital Signs Last 24 Hrs  T(C): 36.9 (12 Sep 2024 08:00), Max: 37 (11 Sep 2024 20:00)  T(F): 98.5 (12 Sep 2024 08:00), Max: 98.6 (11 Sep 2024 20:00)  HR: 65 (12 Sep 2024 09:00) (60 - 70)  BP: 104/63 (12 Sep 2024 09:00) (81/41 - 199/118)  BP(mean): 73 (12 Sep 2024 09:00) (54 - 86)  ABP: --  ABP(mean): --  RR: 16 (12 Sep 2024 09:00) (13 - 20)  SpO2: 100% (12 Sep 2024 09:00) (93% - 100%)    O2 Parameters below as of 12 Sep 2024 08:00  Patient On (Oxygen Delivery Method): room air          I&O's Summary    11 Sep 2024 07:01  -  12 Sep 2024 07:00  --------------------------------------------------------  IN: 0 mL / OUT: 150 mL / NET: -150 mL          Daily Height in cm: 167.64 (11 Sep 2024 10:48)    Daily Weight in k.7 (12 Sep 2024 05:00)    Adult Advanced Hemodynamics Last 24 Hrs  CVP(mm Hg): --  CVP(cm H2O): --  CO: --  CI: --  PA: --  PA(mean): --  PCWP: --  SVR: --  SVRI: --  PVR: --  PVRI: --    EKG/Telemetry Events:    MEDICATIONS  (STANDING):  apixaban 5 milliGRAM(s) Oral every 12 hours  atorvastatin 80 milliGRAM(s) Oral at bedtime  chlorhexidine 2% Cloths 1 Application(s) Topical <User Schedule>  clopidogrel Tablet 75 milliGRAM(s) Oral daily  gabapentin 100 milliGRAM(s) Oral two times a day  insulin glargine Injectable (LANTUS) 12 Unit(s) SubCutaneous at bedtime  insulin lispro (ADMELOG) corrective regimen sliding scale   SubCutaneous at bedtime  insulin lispro Injectable (ADMELOG) 2 Unit(s) SubCutaneous three times a day before meals  pantoprazole    Tablet 40 milliGRAM(s) Oral before breakfast  phenylephrine    Infusion 0.6 MICROgram(s)/kG/Min (18.4 mL/Hr) IV Continuous <Continuous>  polyethylene glycol 3350 17 Gram(s) Oral daily  senna 2 Tablet(s) Oral at bedtime    MEDICATIONS  (PRN):  acetaminophen     Tablet .. 650 milliGRAM(s) Oral every 6 hours PRN Temp greater or equal to 38C (100.4F), Mild Pain (1 - 3)  aluminum hydroxide/magnesium hydroxide/simethicone Suspension 30 milliLiter(s) Oral every 4 hours PRN Dyspepsia  bisacodyl Suppository 10 milliGRAM(s) Rectal daily PRN Constipation  dextrose Oral Gel 15 Gram(s) Oral once PRN Blood Glucose LESS THAN 70 milliGRAM(s)/deciliter  fentaNYL    Injectable 25 MICROGram(s) IV Push every 5 minutes PRN Moderate Pain (4 - 6)  melatonin 3 milliGRAM(s) Oral at bedtime PRN Insomnia  ondansetron Injectable 4 milliGRAM(s) IV Push every 8 hours PRN Nausea and/or Vomiting  ondansetron Injectable 4 milliGRAM(s) IV Push once PRN Nausea and/or Vomiting      PHYSICAL EXAM:  GENERAL:   HEAD:  Atraumatic, Normocephalic  EYES: EOMI, PERRLA, conjunctiva and sclera clear  NECK: Supple, No JVD, Normal thyroid, no enlarged nodes  NERVOUS SYSTEM:  Alert & Awake.   CHEST/LUNG: B/L good air entry; No rales, rhonchi, or wheezing  HEART: S1S2 normal, no S3, Regular rate and rhythm; No murmurs  ABDOMEN: Soft, Nontender, Nondistended; Bowel sounds present  EXTREMITIES:  2+ Peripheral Pulses, No clubbing, cyanosis, or edema  LYMPH: No lymphadenopathy noted  SKIN: No rashes or lesions    LABS:                        11.0   7.53  )-----------( 289      ( 12 Sep 2024 05:24 )             36.2     09-12    136  |  97<L>  |  28<H>  ----------------------------<  121<H>  4.3   |  26  |  1.40<H>    Ca    8.9      12 Sep 2024 05:24  Phos  3.9     -12  Mg     2.40     09-12        PT/INR - ( 11 Sep 2024 03:55 )   PT: 16.7 sec;   INR: 1.51 ratio         PTT - ( 11 Sep 2024 03:55 )  PTT:45.8 sec  CAPILLARY BLOOD GLUCOSE      POCT Blood Glucose.: 141 mg/dL (12 Sep 2024 07:43)  POCT Blood Glucose.: 136 mg/dL (11 Sep 2024 21:39)  POCT Blood Glucose.: 197 mg/dL (11 Sep 2024 19:14)  POCT Blood Glucose.: 156 mg/dL (11 Sep 2024 14:56)  POCT Blood Glucose.: 175 mg/dL (11 Sep 2024 11:42)            Urinalysis Basic - ( 12 Sep 2024 05:24 )    Color: x / Appearance: x / SG: x / pH: x  Gluc: 121 mg/dL / Ketone: x  / Bili: x / Urobili: x   Blood: x / Protein: x / Nitrite: x   Leuk Esterase: x / RBC: x / WBC x   Sq Epi: x / Non Sq Epi: x / Bacteria: x          RADIOLOGY & ADDITIONAL TESTS:  CXR:        Care Discussed with Consultants/Other Providers [ x] YES  [ ] NO           Patient is a 84y old  Female who presents with a chief complaint of chest pain, sob (11 Sep 2024 13:08)      HPI:  84y Female PMH with hx of CAD s/p stents, HFrEF ( LVEF 45% in ), bradycardia s/p PPM ([facility]) HTN, IDDM, SMA stenosis admitted for chest pain and sob x 2 days. Pt reports she resides in [place]  and only came to [place] ~3 days ago to visit family. Since arrival at [place] she noted progressive sob worse with exertion. She also developed chest tightness/pressure, also worse with exertion. She endorses feeling weak and cold, as well as occasional dry cough. No fever,  chills,  Nausea, diaphoresis, dizziness, palpitation. leg swelling. She reports taking lasix as needed for leg enema, which she has not needed in the past few weeks. Reports compliance with rest of her meds.     In ER. pt is afebrile. /80 SpO2 mid 80s on RA. high 90s on 2L O2. CXR b/l opacity c/w pulm edema. Labs notable for BNP 1200, Cr 1.3 Pt received iv lasix 80s, asa 162, admitted for acute hypoxic respiratory failure 2/2 CHF exacerbation     Patient admitted to ccu for hypotension post BIV PPM upgrade and received 500cc by anesthesia and additional 250cc bolus in IRS and was started on phenylephrine drip during and post procedure by anesthesia attending.  overnight gissell titrated off.    PAST MEDICAL & SURGICAL HISTORY:  HTN (hypertension)      CAD (coronary artery disease)  S/P stent placemenet , and reportedly 2019 in [place]      DM (diabetes mellitus)      GERD (gastroesophageal reflux disease)      CVA (cerebral vascular accident)  no residual deficits      HLD (hyperlipidemia)      CHF (congestive heart failure)      S/P primary angioplasty with coronary stent  x1 in  at [facility]      S/P TKR (total knee replacement)  left          Review of Systems:   CONSTITUTIONAL: No fever, weight loss, or fatigue  EYES: No eye pain, visual disturbances, or discharge  ENMT:  No difficulty hearing, tinnitus, vertigo; No sinus or throat pain  NECK: No pain or stiffness  RESPIRATORY: No cough, wheezing, chills or hemoptysis; + shortness of breath  CARDIOVASCULAR: + chest pain, no palpitations, dizziness, or leg swelling  GASTROINTESTINAL: No abdominal or epigastric pain. No nausea, vomiting, or hematemesis; No diarrhea or constipation. No melena or hematochezia.  GENITOURINARY: No dysuria, frequency, hematuria, or incontinence  NEUROLOGICAL: No headaches, memory loss, loss of strength, numbness, or tremors  SKIN: No itching, burning, rashes,   ENDOCRINE: cold intolerance;  MUSCULOSKELETAL: No joint pain or swelling; No muscle, back, or extremity pain  PSYCHIATRIC: No depression, anxiety, mood swings, or difficulty sleeping        Allergies    No Known Allergies    Intolerances        Social History:     FAMILY HISTORY:  FH: diabetes mellitus        MEDICATIONS  (STANDING):  apixaban 5 milliGRAM(s) Oral every 12 hours  atorvastatin 80 milliGRAM(s) Oral at bedtime  dextrose 5%. 1000 milliLiter(s) (50 mL/Hr) IV Continuous <Continuous>  donepezil 10 milliGRAM(s) Oral at bedtime  gabapentin 200 milliGRAM(s) Oral three times a day  insulin glargine Injectable (LANTUS) 8 Unit(s) SubCutaneous at bedtime  insulin lispro (ADMELOG) corrective regimen sliding scale   SubCutaneous at bedtime  insulin lispro (ADMELOG) corrective regimen sliding scale   SubCutaneous three times a day before meals  pantoprazole    Tablet 40 milliGRAM(s) Oral before breakfast  sacubitril 24 mG/valsartan 26 mG 1 Tablet(s) Oral two times a day    MEDICATIONS  (PRN):  acetaminophen     Tablet .. 650 milliGRAM(s) Oral every 6 hours PRN Temp greater or equal to 38C (100.4F), Mild Pain (1 - 3)  aluminum hydroxide/magnesium hydroxide/simethicone Suspension 30 milliLiter(s) Oral every 4 hours PRN Dyspepsia  dextrose Oral Gel 15 Gram(s) Oral once PRN Blood Glucose LESS THAN 70 milliGRAM(s)/deciliter  melatonin 3 milliGRAM(s) Oral at bedtime PRN Insomnia  ondansetron Injectable 4 milliGRAM(s) IV Push every 8 hours PRN Nausea and/or Vomiting      T(C): 37.3 (24 @ 12:01), Max: 37.3 (24 @ 12:01)  HR: 76 (24 @ 12:01) (72 - 78)  BP: 152/82 (24 @ 12:01) (130/85 - 152/82)  RR: 19 (24 @ 12:01) (16 - 22)  SpO2: 100% (24 @ 12:01) (94% - 100%)    Weight (kg): 81.6 (24 @ 16:00)  CAPILLARY BLOOD GLUCOSE        I&O's Summary      PHYSICAL EXAM:  GENERAL: NAD, well-developed  HEAD:  Atraumatic, Normocephalic  EYES: EOMI, PERRLA, conjunctiva and sclera clear  NECK: Supple, No elevated JVD  CHEST/LUNG: Clear to auscultation bilaterally; No wheeze  HEART: Regular rate and rhythm; No murmurs, rubs, or gallops  ABDOMEN: Soft, Nontender, Nondistended; Bowel sounds present  EXTREMITIES:  2+ Peripheral Pulses, No clubbing, cyanosis, or edema  PSYCH: AAOx3  NEUROLOGY: CN II-XII grossly intact, moving all extremities  SKIN: No rashes or lesions    LABS:                        10.4   8.21  )-----------( 253      ( 04 Sep 2024 20:25 )             32.8         139  |  99  |  17  ----------------------------<  132<H>  3.5   |  26  |  1.31<H>    Ca    9.2      04 Sep 2024 20:25    TPro  7.6  /  Alb  4.0  /  TBili  1.3<H>  /  DBili  x   /  AST  20  /  ALT  16  /  AlkPhos  64  09-04          Urinalysis Basic - ( 04 Sep 2024 20:25 )    Color: x / Appearance: x / SG: x / pH: x  Gluc: 132 mg/dL / Ketone: x  / Bili: x / Urobili: x   Blood: x / Protein: x / Nitrite: x   Leuk Esterase: x / RBC: x / WBC x   Sq Epi: x / Non Sq Epi: x / Bacteria: x          RADIOLOGY & ADDITIONAL TESTS:    ECG Personally Reviewed -     Imaging Personally Reviewed:    Consultant(s) Notes Reviewed:      Care Discussed with Consultants/Other Providers:   (05 Sep 2024 14:07)       INTERVAL HPI/OVERNIGHT EVENTS:   No overnight events   Afebrile, hemodynamically stable     Subjective:    ICU Vital Signs Last 24 Hrs  T(C): 36.9 (12 Sep 2024 08:00), Max: 37 (11 Sep 2024 20:00)  T(F): 98.5 (12 Sep 2024 08:00), Max: 98.6 (11 Sep 2024 20:00)  HR: 65 (12 Sep 2024 09:00) (60 - 70)  BP: 104/63 (12 Sep 2024 09:00) (81/41 - 199/118)  BP(mean): 73 (12 Sep 2024 09:00) (54 - 86)  ABP: --  ABP(mean): --  RR: 16 (12 Sep 2024 09:00) (13 - 20)  SpO2: 100% (12 Sep 2024 09:00) (93% - 100%)    O2 Parameters below as of 12 Sep 2024 08:00  Patient On (Oxygen Delivery Method): room air          I&O's Summary    11 Sep 2024 07:01  -  12 Sep 2024 07:00  --------------------------------------------------------  IN: 0 mL / OUT: 150 mL / NET: -150 mL          Daily Height in cm: 167.64 (11 Sep 2024 10:48)    Daily Weight in k.7 (12 Sep 2024 05:00)    Adult Advanced Hemodynamics Last 24 Hrs  CVP(mm Hg): --  CVP(cm H2O): --  CO: --  CI: --  PA: --  PA(mean): --  PCWP: --  SVR: --  SVRI: --  PVR: --  PVRI: --    EKG/Telemetry Events:    MEDICATIONS  (STANDING):  apixaban 5 milliGRAM(s) Oral every 12 hours  atorvastatin 80 milliGRAM(s) Oral at bedtime  chlorhexidine 2% Cloths 1 Application(s) Topical <User Schedule>  clopidogrel Tablet 75 milliGRAM(s) Oral daily  gabapentin 100 milliGRAM(s) Oral two times a day  insulin glargine Injectable (LANTUS) 12 Unit(s) SubCutaneous at bedtime  insulin lispro (ADMELOG) corrective regimen sliding scale   SubCutaneous at bedtime  insulin lispro Injectable (ADMELOG) 2 Unit(s) SubCutaneous three times a day before meals  pantoprazole    Tablet 40 milliGRAM(s) Oral before breakfast  phenylephrine    Infusion 0.6 MICROgram(s)/kG/Min (18.4 mL/Hr) IV Continuous <Continuous>  polyethylene glycol 3350 17 Gram(s) Oral daily  senna 2 Tablet(s) Oral at bedtime    MEDICATIONS  (PRN):  acetaminophen     Tablet .. 650 milliGRAM(s) Oral every 6 hours PRN Temp greater or equal to 38C (100.4F), Mild Pain (1 - 3)  aluminum hydroxide/magnesium hydroxide/simethicone Suspension 30 milliLiter(s) Oral every 4 hours PRN Dyspepsia  bisacodyl Suppository 10 milliGRAM(s) Rectal daily PRN Constipation  dextrose Oral Gel 15 Gram(s) Oral once PRN Blood Glucose LESS THAN 70 milliGRAM(s)/deciliter  fentaNYL    Injectable 25 MICROGram(s) IV Push every 5 minutes PRN Moderate Pain (4 - 6)  melatonin 3 milliGRAM(s) Oral at bedtime PRN Insomnia  ondansetron Injectable 4 milliGRAM(s) IV Push every 8 hours PRN Nausea and/or Vomiting  ondansetron Injectable 4 milliGRAM(s) IV Push once PRN Nausea and/or Vomiting      PHYSICAL EXAM:  GENERAL:   HEAD:  Atraumatic, Normocephalic  EYES: EOMI, PERRLA, conjunctiva and sclera clear  NECK: Supple, No JVD, Normal thyroid, no enlarged nodes  NERVOUS SYSTEM:  Alert & Awake.   CHEST/LUNG: B/L good air entry; No rales, rhonchi, or wheezing  HEART: S1S2 normal, no S3, Regular rate and rhythm; No murmurs  ABDOMEN: Soft, Nontender, Nondistended; Bowel sounds present  EXTREMITIES:  2+ Peripheral Pulses, No clubbing, cyanosis, or edema  LYMPH: No lymphadenopathy noted  SKIN: No rashes or lesions    LABS:                        11.0   7.53  )-----------( 289      ( 12 Sep 2024 05:24 )             36.2         136  |  97<L>  |  28<H>  ----------------------------<  121<H>  4.3   |  26  |  1.40<H>    Ca    8.9      12 Sep 2024 05:24  Phos  3.9     09-12  Mg     2.40     09-12        PT/INR - ( 11 Sep 2024 03:55 )   PT: 16.7 sec;   INR: 1.51 ratio         PTT - ( 11 Sep 2024 03:55 )  PTT:45.8 sec  CAPILLARY BLOOD GLUCOSE      POCT Blood Glucose.: 141 mg/dL (12 Sep 2024 07:43)  POCT Blood Glucose.: 136 mg/dL (11 Sep 2024 21:39)  POCT Blood Glucose.: 197 mg/dL (11 Sep 2024 19:14)  POCT Blood Glucose.: 156 mg/dL (11 Sep 2024 14:56)  POCT Blood Glucose.: 175 mg/dL (11 Sep 2024 11:42)            Urinalysis Basic - ( 12 Sep 2024 05:24 )    Color: x / Appearance: x / SG: x / pH: x  Gluc: 121 mg/dL / Ketone: x  / Bili: x / Urobili: x   Blood: x / Protein: x / Nitrite: x   Leuk Esterase: x / RBC: x / WBC x   Sq Epi: x / Non Sq Epi: x / Bacteria: x          RADIOLOGY & ADDITIONAL TESTS:  CXR:        Care Discussed with Consultants/Other Providers [ x] YES  [ ] NO

## 2024-09-12 NOTE — DIETITIAN INITIAL EVALUATION ADULT - PROBLEM SELECTOR PLAN 3
p/w chest pain likely 2/2 CHF. low suspicion for ACS  -trop neg x 2. EKG A-sense, V-pace no new ischemic changes   -c/w statin. apixaban. started plavix 75  -f/u cardiology

## 2024-09-12 NOTE — DIETITIAN INITIAL EVALUATION ADULT - ORAL INTAKE PTA/DIET HISTORY
Patient seen for assessment. Reports good appetite at home. Follows a low sodium diet and checks weight daily. States granddaughter helps w/ making meals. A1c is 7.1% per chart.

## 2024-09-12 NOTE — DIETITIAN INITIAL EVALUATION ADULT - PROBLEM SELECTOR PLAN 2
-CXR pulm edema, BNP elevated   -LVEF 45% on echo 2023. repeat echo ordered   -s/p iv lasix 80 in ER, diuresing well. started on iv lasix 40 bid   -c/w entresto. started on coreg 3.125 bid   -EP consult for PPM interrogation r/p arrythmia as cause of CHF exacerbation   -cardiology consulted- f/u recs on GDMT  -daily weight, I&O

## 2024-09-12 NOTE — DIETITIAN INITIAL EVALUATION ADULT - PROBLEM SELECTOR PLAN 5
reports mid abd pain on and off, not related to food intake   -CT a/p SMA severe stenosis, stable since 2023. no e/o mesenteric ischemia   -cont to monitor- low threshold for vascular consult if abd worsens  -c/w statin, apixaban, plavix

## 2024-09-12 NOTE — DIETITIAN INITIAL EVALUATION ADULT - NSICDXPASTSURGICALHX_GEN_ALL_CORE_FT
PAST SURGICAL HISTORY:  S/P primary angioplasty with coronary stent x1 in 2006 at [facility]    S/P TKR (total knee replacement) left

## 2024-09-12 NOTE — PROGRESS NOTE ADULT - ASSESSMENT
ASSESSMENT/PLAN: Pt is an  84 year old Female with PMH DM, CAD s/p PCI to prox LAD/mid LAD/Diag/Ramus, last LHC 8/2022 with occluded prox Ramus stent, patent LAD and Diag stents, mild to mod atherosclerosis in pLcx and mRCA, managed medically, HTN, and CVA who presents with decompensated HFrEF and palpitations. She reports compliance with meds and diet. Is here visiting her grandson from [place].          Plan:  Acute on Chronic HFreF/CAD/HTN  --  previous ECHO noted with moderate segmental LV dysfxn, previous stress test abnormal mostly fixed defects with seamus-infarct ischemia last cath showed 100% ramus  in stent restenosis, repeat LHC pursued, revealing unchanged CAD- chronic occl ramus stent, patent mLAD and ostial/prox DIag stents and moderate atherosclerosis of pLcx  --on lasix 80 IV BID, O>I, diuresing well  -- ECHO noted - EF 25-30 %   -- consented for pacemaker upgrade ttreat her mixed cardiomyopathy (ischemic cardiomyopathy w/ superimposed RV pacing)  --C/w Plavix, Imdur, Eliquis, Entresto Coreg     ASSESSMENT/PLAN: Pt is an  84 year old Female with PMH DM, CAD s/p PCI to prox LAD/mid LAD/Diag/Ramus, last LHC 8/2022 with occluded prox Ramus stent, patent LAD and Diag stents, mild to mod atherosclerosis in pLcx and mRCA, managed medically, HTN, and CVA who presents with decompensated HFrEF and palpitations. She reports compliance with meds and diet. Is here visiting her grandson from [place].          Plan:    Neuro:  -No active issues  -Mentating well  -PRN Tylenol, dilaudid for pain      #PULM:  -Sating well on room air  -CXR notable for pulmonary congestion  -Cont. Diuresis, if BP tolerates    #CV:    Acute on Chronic HFreF/CAD/HTN  --  previous ECHO noted with moderate segmental LV dysfxn, previous stress test abnormal mostly fixed defects with seamus-infarct ischemia last cath showed 100% ramus  in stent restenosis, repeat LHC pursued, revealing unchanged CAD- chronic occl ramus stent, patent mLAD and ostial/prox DIag stents and moderate atherosclerosis of pLcx  -was on lasix 80 IV BID, O>I, diuresing well, -150 ovn  - ECHO noted - EF 25-30 % from 45% in 2023  -Now BiV paced right lead added  -Cont. eliquis, atorvastatin, plavix  -Holding Imdur, entresto lasix and coreg d/t hypotension, pressors off since 0530 am    #GI:  -BM yesterday  -Cont. bowel regimen   -Protonix    /Renal  -Urine output 150cc ovn, net - 150cc  -Lasix?  -BUN 28, Cr. 1.4    #Endo:  -FS w/ iss coverage  -Lantus 12 daily    #Heme:  -on Eliquis and plavix  -Venous duplex ordered for LE swelling     ASSESSMENT/PLAN: Pt is an  84 year old Female with PMH DM, CAD s/p PCI to prox LAD/mid LAD/Diag/Ramus, last C 8/2022 with occluded prox Ramus stent, patent LAD and Diag stents, mild to mod atherosclerosis in pLcx and mRCA, managed medically, HTN, and CVA who presents with decompensated HFrEF and palpitations. She reports compliance with meds and diet. Is here visiting her grandson from [place].          Plan:    Neuro:  -No active issues  -Mentating well  -PRN Tylenol, dilaudid for pain  -Alzhiemers? donepazil 10 held on the 5th d/t   -hx CVA on eliquis and plavix      #PULM:  -Sating well on room air  -CXR notable for pulmonary congestion  -Cont. Diuresis, if BP tolerates    #CV:    Acute on Chronic HFreF/CAD/HTN  --  previous ECHO noted with moderate segmental LV dysfxn, previous stress test abnormal mostly fixed defects with seamus-infarct ischemia last cath showed 100% ramus  in stent restenosis, repeat LHC pursued, revealing unchanged CAD- chronic occl ramus stent, patent mLAD and ostial/prox DIag stents and moderate atherosclerosis of pLcx  -was on lasix 80 IV BID, O>I, diuresing well, -150 ovn  - ECHO 9/6 noted - EF 25-30 % from 45% in 2023  -Now BiV paced right lead added yesterday  -Cont. eliquis, atorvastatin, plavix  -Holding Imdur, entresto lasix and coreg d/t hypotension, pressors off since 0530 am    #GI:  //SMA severe stenosis stable since 2023  -CTAP 9/5  -BM yesterday  -Cont. bowel regimen   -Protonix GI ppx      /Renal  -Urine output 150cc ovn, net - 150cc  -Lasix?  -BUN 28, Cr. 1.4    #Endo:  -FS w/ iss coverage  -Lantus 12 daily    #Heme:  -on Eliquis and plavix  -Venous duplex ordered for LE swelling     ASSESSMENT/PLAN: Pt is an  84 year old Female with PMH DM, CAD s/p PCI to prox LAD/mid LAD/Diag/Ramus, last LHC 8/2022 with occluded prox Ramus stent, patent LAD and Diag stents, mild to mod atherosclerosis in pLcx and mRCA, managed medically, HTN, and CVA who presents with decompensated HFrEF and palpitations. She reports compliance with meds and diet. Is here visiting her grandson from [place].          Plan:    Neuro:  -No active issues  -Mentating well  -PRN Tylenol, dilaudid for pain  -Donepazil 10mg restarted today  -hx CVA on eliquis and plavix      #PULM:  -Sating well on room air  -CXR notable for pulmonary congestion  -Cont. Diuresis    #CV:    Acute on Chronic HFreF/CAD/HTN  --  previous ECHO noted with moderate segmental LV dysfxn, previous stress test abnormal mostly fixed defects with seamus-infarct ischemia last cath showed 100% ramus  in stent restenosis, repeat LHC pursued, revealing unchanged CAD- chronic occl ramus stent, patent mLAD and ostial/prox DIag stents and moderate atherosclerosis of pLcx  -u/o decreased ovn, restart lasix 40mg BID  - ECHO 9/6 noted - EF 25-30 % from 45% in 2023  -Now BiV paced right lead added yesterday  -Cont. eliquis, atorvastatin, plavix  -Holding Imdur, entresto, and coreg d/t hypotension, pressors off since 0530 am    #GI:  //SMA severe stenosis stable since 2023  -CTAP 9/5  -BM yesterday  -Cont. bowel regimen   -Protonix GI ppx      /Renal  -Urine output 150cc ovn, net - 150cc  -Lasix 40mg  BID  -BUN 28, Cr. 1.4    #Endo:  -FS w/ iss coverage  -Lantus 12 daily    #Heme:  -on Eliquis and plavix  -Venous duplex completed, no evidence of LE DVT

## 2024-09-12 NOTE — DIETITIAN INITIAL EVALUATION ADULT - PERTINENT MEDS FT
MEDICATIONS  (STANDING):  acetaminophen   IVPB .. 1000 milliGRAM(s) IV Intermittent once  apixaban 5 milliGRAM(s) Oral every 12 hours  atorvastatin 80 milliGRAM(s) Oral at bedtime  chlorhexidine 2% Cloths 1 Application(s) Topical <User Schedule>  clopidogrel Tablet 75 milliGRAM(s) Oral daily  gabapentin 100 milliGRAM(s) Oral two times a day  insulin glargine Injectable (LANTUS) 12 Unit(s) SubCutaneous at bedtime  insulin lispro (ADMELOG) corrective regimen sliding scale   SubCutaneous at bedtime  insulin lispro Injectable (ADMELOG) 2 Unit(s) SubCutaneous three times a day before meals  pantoprazole    Tablet 40 milliGRAM(s) Oral before breakfast  phenylephrine    Infusion 0.6 MICROgram(s)/kG/Min (18.4 mL/Hr) IV Continuous <Continuous>  polyethylene glycol 3350 17 Gram(s) Oral daily  senna 2 Tablet(s) Oral at bedtime    MEDICATIONS  (PRN):  acetaminophen     Tablet .. 650 milliGRAM(s) Oral every 6 hours PRN Temp greater or equal to 38C (100.4F), Mild Pain (1 - 3)  aluminum hydroxide/magnesium hydroxide/simethicone Suspension 30 milliLiter(s) Oral every 4 hours PRN Dyspepsia  bisacodyl Suppository 10 milliGRAM(s) Rectal daily PRN Constipation  dextrose Oral Gel 15 Gram(s) Oral once PRN Blood Glucose LESS THAN 70 milliGRAM(s)/deciliter  fentaNYL    Injectable 25 MICROGram(s) IV Push every 5 minutes PRN Moderate Pain (4 - 6)  melatonin 3 milliGRAM(s) Oral at bedtime PRN Insomnia  ondansetron Injectable 4 milliGRAM(s) IV Push every 8 hours PRN Nausea and/or Vomiting  ondansetron Injectable 4 milliGRAM(s) IV Push once PRN Nausea and/or Vomiting

## 2024-09-13 LAB
ANION GAP SERPL CALC-SCNC: 12 MMOL/L — SIGNIFICANT CHANGE UP (ref 7–14)
BUN SERPL-MCNC: 27 MG/DL — HIGH (ref 7–23)
CALCIUM SERPL-MCNC: 8.7 MG/DL — SIGNIFICANT CHANGE UP (ref 8.4–10.5)
CHLORIDE SERPL-SCNC: 99 MMOL/L — SIGNIFICANT CHANGE UP (ref 98–107)
CO2 SERPL-SCNC: 26 MMOL/L — SIGNIFICANT CHANGE UP (ref 22–31)
CREAT SERPL-MCNC: 1.35 MG/DL — HIGH (ref 0.5–1.3)
EGFR: 38 ML/MIN/1.73M2 — LOW
GLUCOSE BLDC GLUCOMTR-MCNC: 135 MG/DL — HIGH (ref 70–99)
GLUCOSE BLDC GLUCOMTR-MCNC: 137 MG/DL — HIGH (ref 70–99)
GLUCOSE BLDC GLUCOMTR-MCNC: 208 MG/DL — HIGH (ref 70–99)
GLUCOSE BLDC GLUCOMTR-MCNC: 270 MG/DL — HIGH (ref 70–99)
GLUCOSE SERPL-MCNC: 141 MG/DL — HIGH (ref 70–99)
HCT VFR BLD CALC: 31.7 % — LOW (ref 34.5–45)
HGB BLD-MCNC: 10.3 G/DL — LOW (ref 11.5–15.5)
MAGNESIUM SERPL-MCNC: 2.3 MG/DL — SIGNIFICANT CHANGE UP (ref 1.6–2.6)
MCHC RBC-ENTMCNC: 28.4 PG — SIGNIFICANT CHANGE UP (ref 27–34)
MCHC RBC-ENTMCNC: 32.5 GM/DL — SIGNIFICANT CHANGE UP (ref 32–36)
MCV RBC AUTO: 87.3 FL — SIGNIFICANT CHANGE UP (ref 80–100)
MRSA PCR RESULT.: SIGNIFICANT CHANGE UP
NRBC # BLD: 0 /100 WBCS — SIGNIFICANT CHANGE UP (ref 0–0)
NRBC # FLD: 0 K/UL — SIGNIFICANT CHANGE UP (ref 0–0)
PHOSPHATE SERPL-MCNC: 3.1 MG/DL — SIGNIFICANT CHANGE UP (ref 2.5–4.5)
PLATELET # BLD AUTO: 245 K/UL — SIGNIFICANT CHANGE UP (ref 150–400)
POTASSIUM SERPL-MCNC: 4 MMOL/L — SIGNIFICANT CHANGE UP (ref 3.5–5.3)
POTASSIUM SERPL-SCNC: 4 MMOL/L — SIGNIFICANT CHANGE UP (ref 3.5–5.3)
RBC # BLD: 3.63 M/UL — LOW (ref 3.8–5.2)
RBC # FLD: 13.2 % — SIGNIFICANT CHANGE UP (ref 10.3–14.5)
S AUREUS DNA NOSE QL NAA+PROBE: SIGNIFICANT CHANGE UP
SODIUM SERPL-SCNC: 137 MMOL/L — SIGNIFICANT CHANGE UP (ref 135–145)
WBC # BLD: 7.28 K/UL — SIGNIFICANT CHANGE UP (ref 3.8–10.5)
WBC # FLD AUTO: 7.28 K/UL — SIGNIFICANT CHANGE UP (ref 3.8–10.5)

## 2024-09-13 PROCEDURE — 99291 CRITICAL CARE FIRST HOUR: CPT

## 2024-09-13 RX ORDER — FUROSEMIDE 40 MG
40 TABLET ORAL DAILY
Refills: 0 | Status: DISCONTINUED | OUTPATIENT
Start: 2024-09-14 | End: 2024-09-20

## 2024-09-13 RX ORDER — FUROSEMIDE 40 MG
40 TABLET ORAL DAILY
Refills: 0 | Status: DISCONTINUED | OUTPATIENT
Start: 2024-09-13 | End: 2024-09-13

## 2024-09-13 RX ORDER — METOPROLOL TARTRATE 100 MG/1
12.5 TABLET ORAL
Refills: 0 | Status: DISCONTINUED | OUTPATIENT
Start: 2024-09-13 | End: 2024-09-17

## 2024-09-13 RX ORDER — SACUBITRIL AND VALSARTAN 49; 51 MG/1; MG/1
1 TABLET, FILM COATED ORAL
Refills: 0 | Status: DISCONTINUED | OUTPATIENT
Start: 2024-09-13 | End: 2024-09-20

## 2024-09-13 RX ADMIN — APIXABAN 5 MILLIGRAM(S): 5 TABLET, FILM COATED ORAL at 17:28

## 2024-09-13 RX ADMIN — METOPROLOL TARTRATE 12.5 MILLIGRAM(S): 100 TABLET ORAL at 05:56

## 2024-09-13 RX ADMIN — Medication 75 MILLIGRAM(S): at 11:41

## 2024-09-13 RX ADMIN — METOPROLOL TARTRATE 12.5 MILLIGRAM(S): 100 TABLET ORAL at 17:28

## 2024-09-13 RX ADMIN — INSULIN GLARGINE 12 UNIT(S): 100 INJECTION, SOLUTION SUBCUTANEOUS at 22:55

## 2024-09-13 RX ADMIN — SACUBITRIL AND VALSARTAN 1 TABLET(S): 49; 51 TABLET, FILM COATED ORAL at 17:28

## 2024-09-13 RX ADMIN — Medication 40 MILLIGRAM(S): at 11:41

## 2024-09-13 RX ADMIN — Medication 80 MILLIGRAM(S): at 22:22

## 2024-09-13 RX ADMIN — Medication 2 UNIT(S): at 18:27

## 2024-09-13 RX ADMIN — Medication 3: at 11:51

## 2024-09-13 RX ADMIN — DONEPEZIL HYDROCHLORIDE 10 MILLIGRAM(S): 5 TABLET, FILM COATED ORAL at 22:22

## 2024-09-13 RX ADMIN — Medication 2 TABLET(S): at 22:22

## 2024-09-13 RX ADMIN — Medication 100 MILLIGRAM(S): at 05:56

## 2024-09-13 RX ADMIN — CHLORHEXIDINE GLUCONATE 1 APPLICATION(S): 40 SOLUTION TOPICAL at 06:07

## 2024-09-13 RX ADMIN — Medication 40 MILLIGRAM(S): at 05:56

## 2024-09-13 RX ADMIN — Medication 0: at 18:28

## 2024-09-13 RX ADMIN — Medication 2 UNIT(S): at 07:56

## 2024-09-13 RX ADMIN — Medication 100 MILLIGRAM(S): at 17:28

## 2024-09-13 RX ADMIN — Medication 2 UNIT(S): at 11:50

## 2024-09-13 RX ADMIN — APIXABAN 5 MILLIGRAM(S): 5 TABLET, FILM COATED ORAL at 05:56

## 2024-09-13 NOTE — CHART NOTE - NSCHARTNOTEFT_GEN_A_CORE
recovering well from pacemaker.  no change in small hematoma.  working on PT.  EP to sign off post device implant.  QRS ~100-110ms and pacemaker working well.

## 2024-09-13 NOTE — CHART NOTE - NSCHARTNOTEFT_GEN_A_CORE
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 Pt reports she resides in [place]  and only came to [place] ~3 days ago to visit family. Since arrival at [place] she noted progressive sob worse with exertion. She also developed chest tightness/pressure, also worse with exertion. She endorses feeling weak and cold, as well as occasional dry cough. No fever, chills, nausea, diaphoresis, dizziness, palpitation, leg swelling. She reports taking lasix as needed for leg enema, which she has not needed in the past few weeks. Reports compliance with rest of her meds.     In ED, patient was afebrile, /80, SpO2 mid 80s on RA, high 90s on 2L O2. CXR b/l opacity c/w pulm edema. Labs notable for BNP 1200, Cr 1.3 Pt received iv lasix 80s, asa 162, admitted for acute hypoxic respiratory failure 2/2 CHF exacerbation.    CCU Course: patient underwent BIV PPM upgrade but became hypotensive post procedure unresponsive to IVF and requiring gissell gtt, which was titrate this morning. GDMT resumed after stabilization of BP. Transfer to medicine for further titration of GDMT & diuresis.     To-Dos:  [ ] monitor BP, continue GDMT  [ ] continue diuresis, titrate based on volume status  [ ] PT consult for dispo planning

## 2024-09-13 NOTE — CHART NOTE - NSCHARTNOTESELECT_GEN_ALL_CORE
Event Note
PPM/Event Note
EP/Off Service Note
MAR Accept Note/Event Note
Telemetry/Transfer Note

## 2024-09-13 NOTE — CHART NOTE - NSCHARTNOTEFT_GEN_A_CORE
Sign out to   MAR : Dr Jeanette Lopez  ACP: Rashad Lao Sign out to   HIC: Dr Chance Araujo   MAR : Dr Jeanette Lopez  ACP: Rashad Lao

## 2024-09-13 NOTE — PROGRESS NOTE ADULT - CRITICAL CARE ATTENDING COMMENT
84 year old woman with CAD sp PCI to LAD/Diag/Ramus who presented with acute on chronic systolic heart failure. She had BiV upgrade and became hypotensive.    Meds:  furosemide 40 mg IV BID  metoprolol tartrate 12.5 mg BID   Entresto 24-26 mg 0.5 tablet BID  Apixaban  Atorvastatin  Plavix  Insulin  Neurontin    #Neuro- No active issues, on Plavix  #Pulm- No active issues  #CV- Patient with HFrEF now sp BiV upgrade with post procedural hypotension  Off levophed  Lasix as needed  Slowly resume GDMT
84 year old woman with CAD sp PCI to LAD/Diag/Ramus who presented with acute on chronic systolic heart failure. She had BiV upgrade and became hypotensive.    Meds:  Levophed (off)  Apixaban  Atorvastatin  Plavix  Insulin  Neurontin    #Neuro- No active issues, on Plavix  #Pulm- No active issues  #CV- Patient with HFrEF now sp BiV upgrade with post procedural hypotension  Now off levophed  Lasix as needed  Hold GDMT

## 2024-09-13 NOTE — PROGRESS NOTE ADULT - ASSESSMENT
HPI: 84y Female PMH with hx of CAD s/p CAD s/p PCI to prox LAD/mid LAD/Diag/Ramus, last [facility] 8/2022 with occluded prox Ramus stent, patent LAD and Diag stents, mild to mod atherosclerosis in pLcx and mRCA, managed medically, HFrEF ( LVEF 45% in 2023), bradycardia s/p PPM ([facility]ott) HTN, IDDM, SMA stenosis admitted for chest pain and sob x 2 days. Pt reports she resides in [place]  and only came to [place] ~3 days ago to visit family. Since arrival at [place] she noted progressive sob worse with exertion and one day od chest tightness/pressure on exertion. In ED found in ADHF. Admitted to floor for diuresis Patient underwent BiV-PPM  upgrade for DHF c/b hypotension post op and recived IVF and started on phenylephrine drip and transfer to CCU for further management.      Neuro:  -hx CVA on eliquis and plavix  -No active issues  -Mentating well  -PRN Tylenol,   - c/w Donepazil 10mgr        #PULM:  -Sating well on room air  -CXR notable for pulmonary congestion on admission  -c/w diuresiss    #CV:    # Acute on Chronic HFrEF/CAD/HTN    - CXR pulm edema, BNP elevated on admission  -Left ventricular systolic function is severely decreased with an ejection fraction visually estimated at 25 to 30%. There is global left ventricular hypokinesis. There is mild (grade 1) left ventricular diastolic dysfunction.  - s/p BiV ppm upgrade on 9/11  -dec IV lasix 40mg to daily  -  GDMT held for hypotension post procedure on 9/11  -  restarted on 1/2 dose on Entresto  24/25mg- will increase to full 24/25mg bid     #CAD- chronic occl ramus stent, patent mLAD and ostial/prox DIag stents and moderate atherosclerosis of pLcx  - trop 34-->30  - EKG with no acuter ischemic changes  -Cont. eliquis, atorvastatin, plavix    GI:  #SMA severe stenosis stable since 2023  -CTAP 9/5 revealed high-grade narrowing of the SMA and bilateral renal artery origins. no evidence of acute bowel ischemia. stable since 2023(The SMA stenosis places the patient at risk of mesenteric claudication; please correlate for post prandial pain)   -No abdomen pain noted , BM on 9/11  - c/w statin, apixaban, Plavix  -Cont. bowel regimen   -Protonix GI ppx      /Renal:  # ALLA  - Scr 1.3 on admission  - today scr 1.3  -change lasix IV bid to daily      #Endo:  -family reports pt on Lantus 48 and lipro 10 tid, but pt states she only takes 5-8 units lantus at home   - HbA1c 7.1%  -c/w Lantus 12 unit and ISS, DM diet, add 2 admelog w/ meals  - c/w FS AC and HS  - dec gabapentin to 100 mg TID for neuropathy pain  as previously concern for somnolence- now awake without concern.   -Monitor closely.    #Heme:  -on Eliquis and plavix  -Venous duplex completed, no evidence of LE DVT     DVT PPX  c/w eliquis   HPI: 84y Female PMH with hx of CAD s/p CAD s/p PCI to prox LAD/mid LAD/Diag/Ramus, last [facility] 8/2022 with occluded prox Ramus stent, patent LAD and Diag stents, mild to mod atherosclerosis in pLcx and mRCA, managed medically, HFrEF ( LVEF 45% in 2023), bradycardia s/p PPM ([facility]ott) HTN, IDDM, SMA stenosis admitted for chest pain and sob x 2 days. Pt reports she resides in [place]  and only came to [place] ~3 days ago to visit family. Since arrival at [place] she noted progressive sob worse with exertion and one day od chest tightness/pressure on exertion. In ED found in ADHF. Admitted to floor for diuresis Patient underwent BiV-PPM  upgrade for DHF c/b hypotension post op and recived IVF and started on phenylephrine drip and transfer to CCU for further management.      Neuro:  -hx CVA on eliquis and plavix  -No active issues  -Mentating well  -PRN Tylenol,   - c/w Donepazil 10mgr        #PULM:  -Sating well on room air  -CXR notable for pulmonary congestion on admission  -c/w diuresiss    #CV:    # Acute on Chronic HFrEF/CAD/HTN    - CXR pulm edema, BNP elevated on admission  -Left ventricular systolic function is severely decreased with an ejection fraction visually estimated at 25 to 30%. There is global left ventricular hypokinesis. There is mild (grade 1) left ventricular diastolic dysfunction.  - s/p BiV ppm upgrade on 9/11  -change lasix 40mg to PO daily  -  GDMT held for hypotension post procedure on 9/11  -  restarted on 1/2 dose on Entresto  24/25mg- will increase to full 24/25mg bid     #CAD- chronic occul ramus stent, patent mLAD and ostial/prox DIag stents and moderate atherosclerosis of pLcx  - trop 34-->30  - EKG with no acuter ischemic changes  -Cont. eliquis, atorvastatin, plavix      # Hypotension  - hypotension post BiV ppm  - s/p IVF ,was briefly on phenyleprine drip  - started on lopressor 12.5mg and Entresto on 9/12 once Bp improved  - Episode of dizziness after standing up at bedside on 9/13-> no orthostatic Blood Pressure: lying ; 137/59, sitting 135/53, standing 156/64  -change lasix IV to Po      GI:  #SMA severe stenosis stable since 2023  -CTAP 9/5 revealed high-grade narrowing of the SMA and bilateral renal artery origins. no evidence of acute bowel ischemia. stable since 2023(The SMA stenosis places the patient at risk of mesenteric claudication; please correlate for post prandial pain)   -No abdomen pain noted , BM on 9/11  - c/w statin, apixaban, Plavix  -Cont. bowel regimen   -Protonix GI ppx      /Renal:  # ALLA  - Scr 1.3 on admission  - today scr 1.3  -change lasix to Po daily      #Endo:  -family reports pt on Lantus 48 and lipro 10 tid, but pt states she only takes 5-8 units lantus at home   - HbA1c 7.1%  -c/w Lantus 12 unit and ISS, DM diet, add 2 admelog w/ meals  - c/w FS AC and HS  - dec gabapentin to 100 mg TID for neuropathy pain  as previously concern for somnolence- now awake without concern.   -Monitor closely.    #Heme:  -on Eliquis and plavix  -Venous duplex completed, no evidence of LE DVT     DVT PPX  c/w eliquis

## 2024-09-13 NOTE — PROGRESS NOTE ADULT - SUBJECTIVE AND OBJECTIVE BOX
Subjective/Objective: Patient alert ,oriented x3-4. PT denies chest pain ,sob. Reports that she feels good today. Reports mild PPM site pain     Tele event: V paced with intermittent AV paced    MEDICATIONS    apixaban 5 milliGRAM(s) Oral every 12 hours  clopidogrel Tablet 75 milliGRAM(s) Oral daily  furosemide   Injectable 40 milliGRAM(s) IV Push two times a day  metoprolol tartrate 12.5 milliGRAM(s) Oral two times a day  sacubitril 24 mG/valsartan 26 mG 0.5 Tablet(s) Oral two times a day  donepezil 10 milliGRAM(s) Oral at bedtime  gabapentin 100 milliGRAM(s) Oral two times a day  acetaminophen     Tablet .. 650 milliGRAM(s) Oral every 6 hours PRN  melatonin 3 milliGRAM(s) Oral at bedtime PRN  ondansetron Injectable 4 milliGRAM(s) IV Push every 8 hours PRN  aluminum hydroxide/magnesium hydroxide/simethicone Suspension 30 milliLiter(s) Oral every 4 hours PRN  bisacodyl Suppository 10 milliGRAM(s) Rectal daily PRN  pantoprazole    Tablet 40 milliGRAM(s) Oral before breakfast  polyethylene glycol 3350 17 Gram(s) Oral daily  senna 2 Tablet(s) Oral at bedtime  atorvastatin 80 milliGRAM(s) Oral at bedtime  insulin glargine Injectable (LANTUS) 12 Unit(s) SubCutaneous at bedtime  insulin lispro (ADMELOG) corrective regimen sliding scale   SubCutaneous at bedtime  insulin lispro (ADMELOG) corrective regimen sliding scale   SubCutaneous three times a day before meals  insulin lispro Injectable (ADMELOG) 2 Unit(s) SubCutaneous three times a day before meals    chlorhexidine 2% Cloths 1 Application(s) Topical <User Schedule>              ICU Vital Signs Last 24 Hrs  T(C): 37.3 (13 Sep 2024 04:00), Max: 37.5 (12 Sep 2024 20:00)  T(F): 99.1 (13 Sep 2024 04:00), Max: 99.5 (12 Sep 2024 20:00)  HR: 66 (13 Sep 2024 06:00) (61 - 78)  BP: 121/56 (13 Sep 2024 06:00) (91/73 - 140/75)  BP(mean): 72 (13 Sep 2024 06:00) (57 - 119)  ABP: --  ABP(mean): --  RR: 17 (13 Sep 2024 06:00) (11 - 20)  SpO2: 97% (13 Sep 2024 06:00) (92% - 100%)    O2 Parameters below as of 13 Sep 2024 06:00  Patient On (Oxygen Delivery Method): room air            PHYSICAL EXAMINATION  Appearance: NAD, no distress  HEENT: Moist Mucous Membranes, Anicteric, PERRL, EOMI  Cardiovascular: Regular rate and rhythm, Normal S1 S2, No JVD, No murmurs  Respiratory: b/l lungs fine crackles at bases to auscultation. No rales, No rhonchi, No wheezing.  Gastrointestinal:  Soft, Non-tender, + BS  Neurologic: Non-focal, A&Ox3  Skin: Warm and dry, No rashes, No ecchymosis, No cyanosis  Musculoskeletal: No clubbing, No cyanosis, No edema  Vascular: Peripheral pulses palpable 2+ bilaterally  Psychiatry: Mood & affect appropriate      	    		      I&O's Summary    12 Sep 2024 07:01  -  13 Sep 2024 07:00  --------------------------------------------------------  IN: 0 mL / OUT: 900 mL / NET: -900 mL    	     LABS:	  LABORATORY VALUES	 	                          10.3   7.28  )-----------( 245      ( 13 Sep 2024 05:02 )             31.7       09-13    137  |  99  |  27<H>  ----------------------------<  141<H>  4.0   |  26  |  1.35<H>  09-12    136  |  97<L>  |  28<H>  ----------------------------<  121<H>  4.3   |  26  |  1.40<H>    Ca    8.7      13 Sep 2024 05:02  Ca    8.9      12 Sep 2024 05:24  Phos  3.1     09-13  Phos  3.9     09-12  Mg     2.30     09-13  Mg     2.40     09-12            CARDIAC MARKERS:                      Urinalysis Basic - ( 13 Sep 2024 05:02 )    Color: x / Appearance: x / SG: x / pH: x  Gluc: 141 mg/dL / Ketone: x  / Bili: x / Urobili: x   Blood: x / Protein: x / Nitrite: x   Leuk Esterase: x / RBC: x / WBC x   Sq Epi: x / Non Sq Epi: x / Bacteria: x      CAPILLARY BLOOD GLUCOSE      POCT Blood Glucose.: 168 mg/dL (12 Sep 2024 21:08)            < from: TTE Limited W or WO Ultrasound Enhancing Agent (09.12.24 @ 08:13) >  FINDINGS:     Left Ventricle:  Left ventricular systolic function is normal.     Mitral Valve:  Structurally normal mitral valve with normal leaflet excursion. There is mild mitral regurgitation.     Tricuspid Valve:  Structurally normal tricuspid valve with normal leaflet excursion.     Pericardium:  No pericardial effusion seen.  ______________________________________    < end of copied text >      < from: TTE W or WO Ultrasound Enhancing Agent (09.06.24 @ 16:50) >   1. The left ventricular cavity is normal in size. Left ventricular wall thickness is normal. Abnormal (paradoxical) septal motion consistent with rightventricular pacemaker. Left ventricular systolic function is severely decreased with an ejection fraction visually estimated at 25 to 30%. There is global left ventricular hypokinesis. There is mild (grade 1) left ventricular diastolic dysfunction.  2. The right ventricular cavity is normal in size and right ventricular systolic function is normal. Tricuspid annular plane systolic excursion (TAPSE) is 2.6 cm (normal >=1.7 cm). A device lead is visualized in the right heart.   3. Structurally normal mitral valve with normal leaflet excursion. There is calcification of the mitral valve annulus. There is mild to moderate mitral regurgitation.   4. The left atrium is severely dilated with an indexed volume of 55.42 ml/m².   5. No prior echocardiogram is available for comparison.    < end of copied text >       Subjective/Objective: Patient alert ,oriented x3-4. PT denies chest pain ,sob. Reports that she feels good today. Reports mild PPM site pain. Patient felt dizziness after get up at bedside     Tele event: V paced with intermittent AV paced    MEDICATIONS    apixaban 5 milliGRAM(s) Oral every 12 hours  clopidogrel Tablet 75 milliGRAM(s) Oral daily  furosemide   Injectable 40 milliGRAM(s) IV Push two times a day  metoprolol tartrate 12.5 milliGRAM(s) Oral two times a day  sacubitril 24 mG/valsartan 26 mG 0.5 Tablet(s) Oral two times a day  donepezil 10 milliGRAM(s) Oral at bedtime  gabapentin 100 milliGRAM(s) Oral two times a day  acetaminophen     Tablet .. 650 milliGRAM(s) Oral every 6 hours PRN  melatonin 3 milliGRAM(s) Oral at bedtime PRN  ondansetron Injectable 4 milliGRAM(s) IV Push every 8 hours PRN  aluminum hydroxide/magnesium hydroxide/simethicone Suspension 30 milliLiter(s) Oral every 4 hours PRN  bisacodyl Suppository 10 milliGRAM(s) Rectal daily PRN  pantoprazole    Tablet 40 milliGRAM(s) Oral before breakfast  polyethylene glycol 3350 17 Gram(s) Oral daily  senna 2 Tablet(s) Oral at bedtime  atorvastatin 80 milliGRAM(s) Oral at bedtime  insulin glargine Injectable (LANTUS) 12 Unit(s) SubCutaneous at bedtime  insulin lispro (ADMELOG) corrective regimen sliding scale   SubCutaneous at bedtime  insulin lispro (ADMELOG) corrective regimen sliding scale   SubCutaneous three times a day before meals  insulin lispro Injectable (ADMELOG) 2 Unit(s) SubCutaneous three times a day before meals    chlorhexidine 2% Cloths 1 Application(s) Topical <User Schedule>              ICU Vital Signs Last 24 Hrs  T(C): 37.3 (13 Sep 2024 04:00), Max: 37.5 (12 Sep 2024 20:00)  T(F): 99.1 (13 Sep 2024 04:00), Max: 99.5 (12 Sep 2024 20:00)  HR: 66 (13 Sep 2024 06:00) (61 - 78)  BP: 121/56 (13 Sep 2024 06:00) (91/73 - 140/75)  BP(mean): 72 (13 Sep 2024 06:00) (57 - 119)  ABP: --  ABP(mean): --  RR: 17 (13 Sep 2024 06:00) (11 - 20)  SpO2: 97% (13 Sep 2024 06:00) (92% - 100%)    O2 Parameters below as of 13 Sep 2024 06:00  Patient On (Oxygen Delivery Method): room air            PHYSICAL EXAMINATION  Appearance: NAD, no distress  HEENT: Moist Mucous Membranes, Anicteric, PERRL, EOMI  Cardiovascular: Regular rate and rhythm, Normal S1 S2, No JVD, No murmurs  Respiratory: b/l lungs fine crackles at bases to auscultation. No rales, No rhonchi, No wheezing.  Gastrointestinal:  Soft, Non-tender, + BS  Neurologic: Non-focal, A&Ox3  Skin: Warm and dry, No rashes, No ecchymosis, No cyanosis  Musculoskeletal: No clubbing, No cyanosis, No edema  Vascular: Peripheral pulses palpable 2+ bilaterally  Psychiatry: Mood & affect appropriate      	    		      I&O's Summary    12 Sep 2024 07:01  -  13 Sep 2024 07:00  --------------------------------------------------------  IN: 0 mL / OUT: 900 mL / NET: -900 mL    	     LABS:	  LABORATORY VALUES	 	                          10.3   7.28  )-----------( 245      ( 13 Sep 2024 05:02 )             31.7       09-13    137  |  99  |  27<H>  ----------------------------<  141<H>  4.0   |  26  |  1.35<H>  09-12    136  |  97<L>  |  28<H>  ----------------------------<  121<H>  4.3   |  26  |  1.40<H>    Ca    8.7      13 Sep 2024 05:02  Ca    8.9      12 Sep 2024 05:24  Phos  3.1     09-13  Phos  3.9     09-12  Mg     2.30     09-13  Mg     2.40     09-12            CARDIAC MARKERS:                      Urinalysis Basic - ( 13 Sep 2024 05:02 )    Color: x / Appearance: x / SG: x / pH: x  Gluc: 141 mg/dL / Ketone: x  / Bili: x / Urobili: x   Blood: x / Protein: x / Nitrite: x   Leuk Esterase: x / RBC: x / WBC x   Sq Epi: x / Non Sq Epi: x / Bacteria: x      CAPILLARY BLOOD GLUCOSE      POCT Blood Glucose.: 168 mg/dL (12 Sep 2024 21:08)            < from: TTE Limited W or WO Ultrasound Enhancing Agent (09.12.24 @ 08:13) >  FINDINGS:     Left Ventricle:  Left ventricular systolic function is normal.     Mitral Valve:  Structurally normal mitral valve with normal leaflet excursion. There is mild mitral regurgitation.     Tricuspid Valve:  Structurally normal tricuspid valve with normal leaflet excursion.     Pericardium:  No pericardial effusion seen.  ______________________________________    < end of copied text >      < from: TTE W or WO Ultrasound Enhancing Agent (09.06.24 @ 16:50) >   1. The left ventricular cavity is normal in size. Left ventricular wall thickness is normal. Abnormal (paradoxical) septal motion consistent with rightventricular pacemaker. Left ventricular systolic function is severely decreased with an ejection fraction visually estimated at 25 to 30%. There is global left ventricular hypokinesis. There is mild (grade 1) left ventricular diastolic dysfunction.  2. The right ventricular cavity is normal in size and right ventricular systolic function is normal. Tricuspid annular plane systolic excursion (TAPSE) is 2.6 cm (normal >=1.7 cm). A device lead is visualized in the right heart.   3. Structurally normal mitral valve with normal leaflet excursion. There is calcification of the mitral valve annulus. There is mild to moderate mitral regurgitation.   4. The left atrium is severely dilated with an indexed volume of 55.42 ml/m².   5. No prior echocardiogram is available for comparison.    < end of copied text >       Subjective/Objective: Patient alert ,oriented x3-4. PT denies chest pain ,sob. Reports that she feels good today. Reports mild PPM site pain. Patient felt dizziness after get up at bedside . Orthostatics Blood Pressure negative       Tele event: V paced with intermittent AV paced    MEDICATIONS    apixaban 5 milliGRAM(s) Oral every 12 hours  clopidogrel Tablet 75 milliGRAM(s) Oral daily  furosemide   Injectable 40 milliGRAM(s) IV Push two times a day  metoprolol tartrate 12.5 milliGRAM(s) Oral two times a day  sacubitril 24 mG/valsartan 26 mG 0.5 Tablet(s) Oral two times a day  donepezil 10 milliGRAM(s) Oral at bedtime  gabapentin 100 milliGRAM(s) Oral two times a day  acetaminophen     Tablet .. 650 milliGRAM(s) Oral every 6 hours PRN  melatonin 3 milliGRAM(s) Oral at bedtime PRN  ondansetron Injectable 4 milliGRAM(s) IV Push every 8 hours PRN  aluminum hydroxide/magnesium hydroxide/simethicone Suspension 30 milliLiter(s) Oral every 4 hours PRN  bisacodyl Suppository 10 milliGRAM(s) Rectal daily PRN  pantoprazole    Tablet 40 milliGRAM(s) Oral before breakfast  polyethylene glycol 3350 17 Gram(s) Oral daily  senna 2 Tablet(s) Oral at bedtime  atorvastatin 80 milliGRAM(s) Oral at bedtime  insulin glargine Injectable (LANTUS) 12 Unit(s) SubCutaneous at bedtime  insulin lispro (ADMELOG) corrective regimen sliding scale   SubCutaneous at bedtime  insulin lispro (ADMELOG) corrective regimen sliding scale   SubCutaneous three times a day before meals  insulin lispro Injectable (ADMELOG) 2 Unit(s) SubCutaneous three times a day before meals    chlorhexidine 2% Cloths 1 Application(s) Topical <User Schedule>              ICU Vital Signs Last 24 Hrs  T(C): 37.3 (13 Sep 2024 04:00), Max: 37.5 (12 Sep 2024 20:00)  T(F): 99.1 (13 Sep 2024 04:00), Max: 99.5 (12 Sep 2024 20:00)  HR: 66 (13 Sep 2024 06:00) (61 - 78)  BP: 121/56 (13 Sep 2024 06:00) (91/73 - 140/75)  BP(mean): 72 (13 Sep 2024 06:00) (57 - 119)  ABP: --  ABP(mean): --  RR: 17 (13 Sep 2024 06:00) (11 - 20)  SpO2: 97% (13 Sep 2024 06:00) (92% - 100%)    O2 Parameters below as of 13 Sep 2024 06:00  Patient On (Oxygen Delivery Method): room air            PHYSICAL EXAMINATION  Appearance: NAD, no distress  HEENT: Moist Mucous Membranes, Anicteric, PERRL, EOMI  Cardiovascular: Regular rate and rhythm, Normal S1 S2, No JVD, No murmurs  Respiratory: b/l lungs fine crackles at bases to auscultation. No rales, No rhonchi, No wheezing.  Gastrointestinal:  Soft, Non-tender, + BS  Neurologic: Non-focal, A&Ox3  Skin: Warm and dry, No rashes, No ecchymosis, No cyanosis  Musculoskeletal: No clubbing, No cyanosis, No edema  Vascular: Peripheral pulses palpable 2+ bilaterally  Psychiatry: Mood & affect appropriate      	    		      I&O's Summary    12 Sep 2024 07:01  -  13 Sep 2024 07:00  --------------------------------------------------------  IN: 0 mL / OUT: 900 mL / NET: -900 mL    	     LABS:	  LABORATORY VALUES	 	                          10.3   7.28  )-----------( 245      ( 13 Sep 2024 05:02 )             31.7       09-13    137  |  99  |  27<H>  ----------------------------<  141<H>  4.0   |  26  |  1.35<H>  09-12    136  |  97<L>  |  28<H>  ----------------------------<  121<H>  4.3   |  26  |  1.40<H>    Ca    8.7      13 Sep 2024 05:02  Ca    8.9      12 Sep 2024 05:24  Phos  3.1     09-13  Phos  3.9     09-12  Mg     2.30     09-13  Mg     2.40     09-12            CARDIAC MARKERS:                      Urinalysis Basic - ( 13 Sep 2024 05:02 )    Color: x / Appearance: x / SG: x / pH: x  Gluc: 141 mg/dL / Ketone: x  / Bili: x / Urobili: x   Blood: x / Protein: x / Nitrite: x   Leuk Esterase: x / RBC: x / WBC x   Sq Epi: x / Non Sq Epi: x / Bacteria: x      CAPILLARY BLOOD GLUCOSE      POCT Blood Glucose.: 168 mg/dL (12 Sep 2024 21:08)            < from: TTE Limited W or WO Ultrasound Enhancing Agent (09.12.24 @ 08:13) >  FINDINGS:     Left Ventricle:  Left ventricular systolic function is normal.     Mitral Valve:  Structurally normal mitral valve with normal leaflet excursion. There is mild mitral regurgitation.     Tricuspid Valve:  Structurally normal tricuspid valve with normal leaflet excursion.     Pericardium:  No pericardial effusion seen.  ______________________________________    < end of copied text >      < from: TTE W or WO Ultrasound Enhancing Agent (09.06.24 @ 16:50) >   1. The left ventricular cavity is normal in size. Left ventricular wall thickness is normal. Abnormal (paradoxical) septal motion consistent with rightventricular pacemaker. Left ventricular systolic function is severely decreased with an ejection fraction visually estimated at 25 to 30%. There is global left ventricular hypokinesis. There is mild (grade 1) left ventricular diastolic dysfunction.  2. The right ventricular cavity is normal in size and right ventricular systolic function is normal. Tricuspid annular plane systolic excursion (TAPSE) is 2.6 cm (normal >=1.7 cm). A device lead is visualized in the right heart.   3. Structurally normal mitral valve with normal leaflet excursion. There is calcification of the mitral valve annulus. There is mild to moderate mitral regurgitation.   4. The left atrium is severely dilated with an indexed volume of 55.42 ml/m².   5. No prior echocardiogram is available for comparison.    < end of copied text >

## 2024-09-14 DIAGNOSIS — E78.5 HYPERLIPIDEMIA, UNSPECIFIED: ICD-10-CM

## 2024-09-14 DIAGNOSIS — E11.9 TYPE 2 DIABETES MELLITUS WITHOUT COMPLICATIONS: ICD-10-CM

## 2024-09-14 DIAGNOSIS — G30.9 ALZHEIMER'S DISEASE, UNSPECIFIED: ICD-10-CM

## 2024-09-14 DIAGNOSIS — Z86.73 PERSONAL HISTORY OF TRANSIENT ISCHEMIC ATTACK (TIA), AND CEREBRAL INFARCTION WITHOUT RESIDUAL DEFICITS: ICD-10-CM

## 2024-09-14 DIAGNOSIS — I10 ESSENTIAL (PRIMARY) HYPERTENSION: ICD-10-CM

## 2024-09-14 DIAGNOSIS — Z29.9 ENCOUNTER FOR PROPHYLACTIC MEASURES, UNSPECIFIED: ICD-10-CM

## 2024-09-14 LAB
ANION GAP SERPL CALC-SCNC: 12 MMOL/L — SIGNIFICANT CHANGE UP (ref 7–14)
APPEARANCE UR: ABNORMAL
BACTERIA # UR AUTO: ABNORMAL /HPF
BASOPHILS # BLD AUTO: 0.02 K/UL — SIGNIFICANT CHANGE UP (ref 0–0.2)
BASOPHILS NFR BLD AUTO: 0.3 % — SIGNIFICANT CHANGE UP (ref 0–2)
BILIRUB UR-MCNC: NEGATIVE — SIGNIFICANT CHANGE UP
BLD GP AB SCN SERPL QL: NEGATIVE — SIGNIFICANT CHANGE UP
BUN SERPL-MCNC: 20 MG/DL — SIGNIFICANT CHANGE UP (ref 7–23)
CALCIUM SERPL-MCNC: 8.7 MG/DL — SIGNIFICANT CHANGE UP (ref 8.4–10.5)
CAST: 1 /LPF — SIGNIFICANT CHANGE UP (ref 0–4)
CHLORIDE SERPL-SCNC: 101 MMOL/L — SIGNIFICANT CHANGE UP (ref 98–107)
CO2 SERPL-SCNC: 25 MMOL/L — SIGNIFICANT CHANGE UP (ref 22–31)
COLOR SPEC: YELLOW — SIGNIFICANT CHANGE UP
CREAT SERPL-MCNC: 1.16 MG/DL — SIGNIFICANT CHANGE UP (ref 0.5–1.3)
DIFF PNL FLD: ABNORMAL
EGFR: 46 ML/MIN/1.73M2 — LOW
EOSINOPHIL # BLD AUTO: 0.05 K/UL — SIGNIFICANT CHANGE UP (ref 0–0.5)
EOSINOPHIL NFR BLD AUTO: 0.7 % — SIGNIFICANT CHANGE UP (ref 0–6)
GLUCOSE BLDC GLUCOMTR-MCNC: 182 MG/DL — HIGH (ref 70–99)
GLUCOSE BLDC GLUCOMTR-MCNC: 189 MG/DL — HIGH (ref 70–99)
GLUCOSE BLDC GLUCOMTR-MCNC: 208 MG/DL — HIGH (ref 70–99)
GLUCOSE BLDC GLUCOMTR-MCNC: 218 MG/DL — HIGH (ref 70–99)
GLUCOSE BLDC GLUCOMTR-MCNC: 235 MG/DL — HIGH (ref 70–99)
GLUCOSE SERPL-MCNC: 176 MG/DL — HIGH (ref 70–99)
GLUCOSE UR QL: NEGATIVE MG/DL — SIGNIFICANT CHANGE UP
HCT VFR BLD CALC: 28.8 % — LOW (ref 34.5–45)
HCT VFR BLD CALC: 32.6 % — LOW (ref 34.5–45)
HGB BLD-MCNC: 10.2 G/DL — LOW (ref 11.5–15.5)
HGB BLD-MCNC: 9.1 G/DL — LOW (ref 11.5–15.5)
IANC: 4.15 K/UL — SIGNIFICANT CHANGE UP (ref 1.8–7.4)
IMM GRANULOCYTES NFR BLD AUTO: 0.3 % — SIGNIFICANT CHANGE UP (ref 0–0.9)
KETONES UR-MCNC: NEGATIVE MG/DL — SIGNIFICANT CHANGE UP
LEUKOCYTE ESTERASE UR-ACNC: ABNORMAL
LYMPHOCYTES # BLD AUTO: 1.74 K/UL — SIGNIFICANT CHANGE UP (ref 1–3.3)
LYMPHOCYTES # BLD AUTO: 25.9 % — SIGNIFICANT CHANGE UP (ref 13–44)
MAGNESIUM SERPL-MCNC: 2.2 MG/DL — SIGNIFICANT CHANGE UP (ref 1.6–2.6)
MCHC RBC-ENTMCNC: 27.3 PG — SIGNIFICANT CHANGE UP (ref 27–34)
MCHC RBC-ENTMCNC: 27.7 PG — SIGNIFICANT CHANGE UP (ref 27–34)
MCHC RBC-ENTMCNC: 31.3 GM/DL — LOW (ref 32–36)
MCHC RBC-ENTMCNC: 31.6 GM/DL — LOW (ref 32–36)
MCV RBC AUTO: 87.4 FL — SIGNIFICANT CHANGE UP (ref 80–100)
MCV RBC AUTO: 87.8 FL — SIGNIFICANT CHANGE UP (ref 80–100)
MONOCYTES # BLD AUTO: 0.74 K/UL — SIGNIFICANT CHANGE UP (ref 0–0.9)
MONOCYTES NFR BLD AUTO: 11 % — SIGNIFICANT CHANGE UP (ref 2–14)
NEUTROPHILS # BLD AUTO: 4.15 K/UL — SIGNIFICANT CHANGE UP (ref 1.8–7.4)
NEUTROPHILS NFR BLD AUTO: 61.8 % — SIGNIFICANT CHANGE UP (ref 43–77)
NITRITE UR-MCNC: NEGATIVE — SIGNIFICANT CHANGE UP
NRBC # BLD: 0 /100 WBCS — SIGNIFICANT CHANGE UP (ref 0–0)
NRBC # BLD: 0 /100 WBCS — SIGNIFICANT CHANGE UP (ref 0–0)
NRBC # FLD: 0 K/UL — SIGNIFICANT CHANGE UP (ref 0–0)
NRBC # FLD: 0 K/UL — SIGNIFICANT CHANGE UP (ref 0–0)
PH UR: 5.5 — SIGNIFICANT CHANGE UP (ref 5–8)
PHOSPHATE SERPL-MCNC: 2.8 MG/DL — SIGNIFICANT CHANGE UP (ref 2.5–4.5)
PLATELET # BLD AUTO: 259 K/UL — SIGNIFICANT CHANGE UP (ref 150–400)
PLATELET # BLD AUTO: 277 K/UL — SIGNIFICANT CHANGE UP (ref 150–400)
POTASSIUM SERPL-MCNC: 3.9 MMOL/L — SIGNIFICANT CHANGE UP (ref 3.5–5.3)
POTASSIUM SERPL-SCNC: 3.9 MMOL/L — SIGNIFICANT CHANGE UP (ref 3.5–5.3)
PROT UR-MCNC: NEGATIVE MG/DL — SIGNIFICANT CHANGE UP
RBC # BLD: 3.28 M/UL — LOW (ref 3.8–5.2)
RBC # BLD: 3.73 M/UL — LOW (ref 3.8–5.2)
RBC # FLD: 13.4 % — SIGNIFICANT CHANGE UP (ref 10.3–14.5)
RBC # FLD: 13.5 % — SIGNIFICANT CHANGE UP (ref 10.3–14.5)
RBC CASTS # UR COMP ASSIST: 61 /HPF — HIGH (ref 0–4)
REVIEW: SIGNIFICANT CHANGE UP
RH IG SCN BLD-IMP: POSITIVE — SIGNIFICANT CHANGE UP
SODIUM SERPL-SCNC: 138 MMOL/L — SIGNIFICANT CHANGE UP (ref 135–145)
SP GR SPEC: 1.01 — SIGNIFICANT CHANGE UP (ref 1–1.03)
SQUAMOUS # UR AUTO: 5 /HPF — SIGNIFICANT CHANGE UP (ref 0–5)
UROBILINOGEN FLD QL: 0.2 MG/DL — SIGNIFICANT CHANGE UP (ref 0.2–1)
WBC # BLD: 6.72 K/UL — SIGNIFICANT CHANGE UP (ref 3.8–10.5)
WBC # BLD: 7.06 K/UL — SIGNIFICANT CHANGE UP (ref 3.8–10.5)
WBC # FLD AUTO: 6.72 K/UL — SIGNIFICANT CHANGE UP (ref 3.8–10.5)
WBC # FLD AUTO: 7.06 K/UL — SIGNIFICANT CHANGE UP (ref 3.8–10.5)
WBC UR QL: 20 /HPF — HIGH (ref 0–5)

## 2024-09-14 PROCEDURE — 99233 SBSQ HOSP IP/OBS HIGH 50: CPT

## 2024-09-14 RX ADMIN — Medication 40 MILLIGRAM(S): at 05:32

## 2024-09-14 RX ADMIN — Medication 100 MILLIGRAM(S): at 05:34

## 2024-09-14 RX ADMIN — Medication 2 UNIT(S): at 09:06

## 2024-09-14 RX ADMIN — DONEPEZIL HYDROCHLORIDE 10 MILLIGRAM(S): 5 TABLET, FILM COATED ORAL at 21:48

## 2024-09-14 RX ADMIN — Medication 80 MILLIGRAM(S): at 21:48

## 2024-09-14 RX ADMIN — INSULIN GLARGINE 12 UNIT(S): 100 INJECTION, SOLUTION SUBCUTANEOUS at 21:49

## 2024-09-14 RX ADMIN — APIXABAN 5 MILLIGRAM(S): 5 TABLET, FILM COATED ORAL at 05:33

## 2024-09-14 RX ADMIN — METOPROLOL TARTRATE 12.5 MILLIGRAM(S): 100 TABLET ORAL at 17:58

## 2024-09-14 RX ADMIN — Medication 2 TABLET(S): at 21:48

## 2024-09-14 RX ADMIN — Medication 2: at 17:56

## 2024-09-14 RX ADMIN — Medication 3 MILLIGRAM(S): at 21:48

## 2024-09-14 RX ADMIN — Medication 75 MILLIGRAM(S): at 12:43

## 2024-09-14 RX ADMIN — Medication 2 UNIT(S): at 17:56

## 2024-09-14 RX ADMIN — METOPROLOL TARTRATE 12.5 MILLIGRAM(S): 100 TABLET ORAL at 05:33

## 2024-09-14 RX ADMIN — Medication 40 MILLIGRAM(S): at 05:33

## 2024-09-14 RX ADMIN — Medication 2 UNIT(S): at 12:43

## 2024-09-14 RX ADMIN — Medication 2: at 12:44

## 2024-09-14 RX ADMIN — APIXABAN 5 MILLIGRAM(S): 5 TABLET, FILM COATED ORAL at 17:55

## 2024-09-14 RX ADMIN — Medication 1: at 09:07

## 2024-09-14 RX ADMIN — SACUBITRIL AND VALSARTAN 1 TABLET(S): 49; 51 TABLET, FILM COATED ORAL at 17:56

## 2024-09-14 RX ADMIN — Medication 100 MILLIGRAM(S): at 17:55

## 2024-09-14 RX ADMIN — SACUBITRIL AND VALSARTAN 1 TABLET(S): 49; 51 TABLET, FILM COATED ORAL at 05:32

## 2024-09-14 NOTE — PROGRESS NOTE ADULT - PROBLEM SELECTOR PLAN 8
DVT prop - on eliquis  Dispo - PT recommends [facility] however family not sure about rehab placement.  Will continue to discuss

## 2024-09-14 NOTE — PROGRESS NOTE ADULT - SUBJECTIVE AND OBJECTIVE BOX
[facility] Division of Hospital Medicine  Abdias Melgar MD  Available via MS Teams  Pager: [phone]    SUBJECTIVE / OVERNIGHT EVENTS:  Patient seen and examined at the bedside.   No acute complaints  Reports some dizziness with standing and weakness with walking  Family members at the bedside - case discussed    ADDITIONAL REVIEW OF SYSTEMS:    MEDICATIONS  (STANDING):  apixaban 5 milliGRAM(s) Oral every 12 hours  atorvastatin 80 milliGRAM(s) Oral at bedtime  clopidogrel Tablet 75 milliGRAM(s) Oral daily  donepezil 10 milliGRAM(s) Oral at bedtime  furosemide    Tablet 40 milliGRAM(s) Oral daily  gabapentin 100 milliGRAM(s) Oral two times a day  insulin glargine Injectable (LANTUS) 12 Unit(s) SubCutaneous at bedtime  insulin lispro (ADMELOG) corrective regimen sliding scale   SubCutaneous at bedtime  insulin lispro (ADMELOG) corrective regimen sliding scale   SubCutaneous three times a day before meals  insulin lispro Injectable (ADMELOG) 2 Unit(s) SubCutaneous three times a day before meals  metoprolol tartrate 12.5 milliGRAM(s) Oral two times a day  pantoprazole    Tablet 40 milliGRAM(s) Oral before breakfast  polyethylene glycol 3350 17 Gram(s) Oral daily  sacubitril 24 mG/valsartan 26 mG 1 Tablet(s) Oral two times a day  senna 2 Tablet(s) Oral at bedtime    MEDICATIONS  (PRN):  acetaminophen     Tablet .. 650 milliGRAM(s) Oral every 6 hours PRN Temp greater or equal to 38C (100.4F), Mild Pain (1 - 3)  bisacodyl Suppository 10 milliGRAM(s) Rectal daily PRN Constipation  melatonin 3 milliGRAM(s) Oral at bedtime PRN Insomnia      I&O's Summary    13 Sep 2024 07:01  -  14 Sep 2024 07:00  --------------------------------------------------------  IN: 320 mL / OUT: 1100 mL / NET: -780 mL    14 Sep 2024 07:01  -  14 Sep 2024 16:29  --------------------------------------------------------  IN: 500 mL / OUT: 0 mL / NET: 500 mL        PHYSICAL EXAM:  Vital Signs Last 24 Hrs  T(C): 36.7 (14 Sep 2024 13:00), Max: 37.1 (13 Sep 2024 21:18)  T(F): 98 (14 Sep 2024 13:00), Max: 98.7 (13 Sep 2024 21:18)  HR: 66 (14 Sep 2024 13:00) (60 - 70)  BP: 126/63 (14 Sep 2024 13:00) (117/60 - 146/82)  BP(mean): --  RR: 17 (14 Sep 2024 13:00) (16 - 18)  SpO2: 98% (14 Sep 2024 13:00) (97% - 98%)    Parameters below as of 14 Sep 2024 13:00  Patient On (Oxygen Delivery Method): room air      CONSTITUTIONAL: NAD  EYES: PERRLA  ENMT: Moist oral mucosa, normal dentition  NECK: Supple, no thyromegaly  RESPIRATORY: Normal respiratory effort; lungs are clear to auscultation bilaterally  CARDIOVASCULAR: S1 and S2 +ve, no murmur/rub/gallop;   ABDOMEN: Nontender to palpation, normoactive bowel sounds, no rebound/guarding;  EXTREMITIES: no significant pedal edema.    NEUROLOGY: CN 2-12 are intact and symmetric; no gross motor or sensory deficits   PSYCH: A+O to person, place, and time; affect appropriate  SKIN: No rashes; no palpable lesions.  PPM scar to L upper chest clean and dry      LABS:                        9.1    6.72  )-----------( 259      ( 14 Sep 2024 05:50 )             28.8     09-14    138  |  101  |  20  ----------------------------<  176<H>  3.9   |  25  |  1.16    Ca    8.7      14 Sep 2024 05:50  Phos  2.8     09-14  Mg     2.20     09-14            Urinalysis Basic - ( 14 Sep 2024 05:50 )    Color: x / Appearance: x / SG: x / pH: x  Gluc: 176 mg/dL / Ketone: x  / Bili: x / Urobili: x   Blood: x / Protein: x / Nitrite: x   Leuk Esterase: x / RBC: x / WBC x   Sq Epi: x / Non Sq Epi: x / Bacteria: x

## 2024-09-14 NOTE — PROVIDER CONTACT NOTE (OTHER) - ASSESSMENT
Pt AAOx4, denies chest pain, n/v, palpitations. No acute distress noted. Safety maintained.
Patient vitals stable. Patient mental status at baseline

## 2024-09-14 NOTE — PROGRESS NOTE ADULT - ASSESSMENT
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  Patient with for BIV PPM upgrade and developed hypotension post procedure requiring pressors.  Was stabilized and transferred to medicine.

## 2024-09-14 NOTE — PROVIDER CONTACT NOTE (OTHER) - BACKGROUND
presenting with shortness of breath and chest pain for 1 day. 3 days ago she began having palpitations, and today developed substernal chest pain
Patient is a 83 y/o F admitted for chest pain

## 2024-09-14 NOTE — PROGRESS NOTE ADULT - SUBJECTIVE AND OBJECTIVE BOX
Date of service 9/14/24       Ms Olivo is resting in bed, PPM site C/D/I,   tolerating A/C      Review of Systems:   Constitutional: [ ] fevers, [ ] chills.   Skin: [ ] dry skin. [ ] rashes.  Psychiatric: [ ] depression, [ ] anxiety.   Gastrointestinal: [ ] BRBPR, [ ] melena.   Neurological: [ ] confusion. [ ] seizures. [ ] shuffling gait.   Ears,Nose,Mouth and Throat: [ ] ear pain [ ] sore throat.   Eyes: [ ] diplopia.   Respiratory: [ ] hemoptysis. [ ] shortness of breath  Cardiovascular: See HPI above  Hematologic/Lymphatic: [ ] anemia. [ ] painful nodes. [ ] prolonged bleeding.   Genitourinary: [ ] hematuria. [ ] flank pain.   Endocrine: [ ] significant change in weight. [ ] intolerance to heat and cold.     Review of systems [x ] otherwise negative, [ ] otherwise unable to obtain    FH: no family history of sudden cardiac death in first degree relatives    SH: [ ] tobacco, [ ] alcohol, [ ] drugs          acetaminophen     Tablet .. 650 milliGRAM(s) Oral every 6 hours PRN  apixaban 5 milliGRAM(s) Oral every 12 hours  atorvastatin 80 milliGRAM(s) Oral at bedtime  bisacodyl Suppository 10 milliGRAM(s) Rectal daily PRN  clopidogrel Tablet 75 milliGRAM(s) Oral daily  donepezil 10 milliGRAM(s) Oral at bedtime  furosemide    Tablet 40 milliGRAM(s) Oral daily  gabapentin 100 milliGRAM(s) Oral two times a day  insulin glargine Injectable (LANTUS) 12 Unit(s) SubCutaneous at bedtime  insulin lispro (ADMELOG) corrective regimen sliding scale   SubCutaneous at bedtime  insulin lispro (ADMELOG) corrective regimen sliding scale   SubCutaneous three times a day before meals  insulin lispro Injectable (ADMELOG) 2 Unit(s) SubCutaneous three times a day before meals  melatonin 3 milliGRAM(s) Oral at bedtime PRN  metoprolol tartrate 12.5 milliGRAM(s) Oral two times a day  pantoprazole    Tablet 40 milliGRAM(s) Oral before breakfast  polyethylene glycol 3350 17 Gram(s) Oral daily  sacubitril 24 mG/valsartan 26 mG 1 Tablet(s) Oral two times a day  senna 2 Tablet(s) Oral at bedtime                            9.1    6.72  )-----------( 259      ( 14 Sep 2024 05:50 )             28.8       Hemoglobin: 9.1 g/dL (09-14 @ 05:50)  Hemoglobin: 10.3 g/dL (09-13 @ 05:02)  Hemoglobin: 11.0 g/dL (09-12 @ 05:24)  Hemoglobin: 11.5 g/dL (09-11 @ 03:55)  Hemoglobin: 12.0 g/dL (09-10 @ 05:54)      09-14    138  |  101  |  20  ----------------------------<  176<H>  3.9   |  25  |  1.16    Ca    8.7      14 Sep 2024 05:50  Phos  2.8     09-14  Mg     2.20     09-14      Creatinine Trend: 1.16<--, 1.35<--, 1.40<--, 1.41<--, 1.38<--, 1.45<--    COAGS:           T(C): 36.6 (09-14-24 @ 09:15), Max: 37.3 (09-13-24 @ 16:00)  HR: 60 (09-14-24 @ 09:15) (60 - 70)  BP: 117/60 (09-14-24 @ 09:15) (113/80 - 147/62)  RR: 16 (09-14-24 @ 09:15) (16 - 20)  SpO2: 98% (09-14-24 @ 09:15) (97% - 98%)  Wt(kg): --    I&O's Summary    13 Sep 2024 07:01  -  14 Sep 2024 07:00  --------------------------------------------------------  IN: 320 mL / OUT: 1100 mL / NET: -780 mL    14 Sep 2024 07:01  -  14 Sep 2024 10:04  --------------------------------------------------------  IN: 200 mL / OUT: 0 mL / NET: 200 mL      General: Well nourished in no acute distress. Alert and Oriented * 3.   Head: Normocephalic and atraumatic.   Neck: No JVD. No bruits. Supple. Does not appear to be enlarged.   Cardiovascular: + S1,S2 ; RRR Soft systolic murmur at the left lower sternal border. No rubs noted.    Lungs: CTA B/L  Abdomen: + BS, soft. Non tender. Non distended. No rebound. No guarding.   Extremities: No clubbing/cyanosis/edema.   Neurologic: Moves all four extremities. Full range of motion.   Skin: Warm and moist. The patient's skin has normal elasticity and good skin turgor.   Psychiatric: Appropriate mood and affect.  Musculoskeletal: Normal range of motion, normal strength    DATA      < from: Xray Chest 1 View- PORTABLE-Urgent (09.04.24 @ 21:40) >    IMPRESSION:  Bilateral perihilar and lower lung field hazy opacities, suggestive of pulmonary edema.       from: Cardiac Catheterization (08.18.22 @ 17:50) >  Diagnostic Conclusions:   Occluded proximal ramus stent. Patent LAD and diagonal stents.  Mild-moderate atherosclerosis in proximal Cx and mid RCA.      < from: Transthoracic Echocardiogram (03.30.23 @ 16:25) >  CONCLUSIONS:  1. Normal trileaflet aortic valve.  2. Normal left ventricular internal dimensions and wall thicknesses.  3. Moderate segmental left ventricular systolic dysfunction. Lateral and inferolateral wall hypokinesis.  4. Normal right ventricular size and function.  ------------------------------------------------------------------------  Confirmed on  3/31/2023 - 15:49:07 by BRIAN Soares      < from: Nuclear Stress Test-Exercise (04.02.23 @ 08:07) >  GATED ANALYSIS:  Post-stress gated wall motion analysis was performed (LVEF  = 34 %;LVEDV = 120 ml.), revealing overall severe hypokinesis.  ------------------------------------------------------------------------  IMPRESSIONS:Abnormal Study  * Chest Pain: No chest pain with administration of  Regadenoson.  * Symptom: Dyspnea.  * HR Response: Appropriate.  * BP Response: Appropriate.  * Heart Rhythm: Normal Sinus Rhythm - 65 BPM.  * Baseline ECG: Nonspecific ST-T wave abnormality.  * ECG Abnormalities: None.  * Arrhythmia: Occasional VPDs occurred during rest, stress  and recovery.  * There are large, moderate to severe defects in the apical, anterolateral, and lateral walls that are predominantly fixed, consistent with infarction with mild  seamus-infarct ischemia.  * Post-stress gated wall motion analysis was performed (LVEF = 34 %;LVEDV = 120 ml.), revealing overall severe  hypokinesis.  Conclusion:  There are large, moderate to severe defects in the apical,  anterolateral, and lateral walls that are predominantly  fixed, consistent with infarction with mild seamus-infarct  ischemia.  ------------------------------------------------------------------------  Confirmed on  4/2/2023 - 15:25:27 by Wilfredo Garibay MD, [facility],  [facility]  --------------------------------------------------------------------  < end of copied text >    < from: Cardiac Catheterization (04.04.23 @ 16:41) >  Diagnostic Conclusions:   Chronically occluded ramus artery stent. Mild atherosclerosis in proximal LAD. Patent mid LAD and ostial/proximal diagonal stents. Moderate atherosclerosis in proximal Cx. Mild atherosclerosis  in mid and distal RCA.    Recommendations:   Optimize medical therapy for ischemic heart disease. Aggressive risk factor modification.    < from: TTE W or WO Ultrasound Enhancing Agent (09.06.24 @ 16:50) >  CONCLUSIONS:   1. The left ventricular cavity is normal in size. Left ventricular wall thickness is normal. Abnormal (paradoxical) septal motion consistent with rightventricular pacemaker. Left ventricular systolic function is severely decreased with an ejection fraction visually estimated at 25 to 30%. There is global left ventricular hypokinesis. There is mild (grade 1) left ventricular diastolic dysfunction.  2. The right ventricular cavity is normal in size and right ventricular systolic function is normal. Tricuspid annular plane systolic excursion (TAPSE) is 2.6 cm (normal >=1.7 cm). A device lead is visualized in the right heart.   3. Structurally normal mitral valve with normal leaflet excursion. There is calcification of the mitral valve annulus. There is mild to moderate mitral regurgitation.   4. The left atrium is severely dilated with an indexed volume of 55.42 ml/m².   5. No prior echocardiogram is available for comparison.    < end of copied text >      ASSESSMENT/PLAN: Pt is an  84 year old Female with PMH DM, CAD s/p PCI to prox LAD/mid LAD/Diag/Ramus, last LHC 8/2022 with occluded prox Ramus stent, patent LAD and Diag stents, mild to mod atherosclerosis in pLcx and mRCA, managed medically, HTN, and CVA who presents with decompensated HFrEF and palpitations. She reports compliance with meds and diet. Is here visiting her grandson from [place].    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

## 2024-09-15 DIAGNOSIS — R10.9 UNSPECIFIED ABDOMINAL PAIN: ICD-10-CM

## 2024-09-15 LAB
ANION GAP SERPL CALC-SCNC: 13 MMOL/L — SIGNIFICANT CHANGE UP (ref 7–14)
BASOPHILS # BLD AUTO: 0.02 K/UL — SIGNIFICANT CHANGE UP (ref 0–0.2)
BASOPHILS NFR BLD AUTO: 0.3 % — SIGNIFICANT CHANGE UP (ref 0–2)
BUN SERPL-MCNC: 16 MG/DL — SIGNIFICANT CHANGE UP (ref 7–23)
CALCIUM SERPL-MCNC: 9.4 MG/DL — SIGNIFICANT CHANGE UP (ref 8.4–10.5)
CHLORIDE SERPL-SCNC: 102 MMOL/L — SIGNIFICANT CHANGE UP (ref 98–107)
CO2 SERPL-SCNC: 25 MMOL/L — SIGNIFICANT CHANGE UP (ref 22–31)
CREAT SERPL-MCNC: 1.03 MG/DL — SIGNIFICANT CHANGE UP (ref 0.5–1.3)
EGFR: 53 ML/MIN/1.73M2 — LOW
EOSINOPHIL # BLD AUTO: 0.09 K/UL — SIGNIFICANT CHANGE UP (ref 0–0.5)
EOSINOPHIL NFR BLD AUTO: 1.4 % — SIGNIFICANT CHANGE UP (ref 0–6)
GLUCOSE BLDC GLUCOMTR-MCNC: 155 MG/DL — HIGH (ref 70–99)
GLUCOSE BLDC GLUCOMTR-MCNC: 159 MG/DL — HIGH (ref 70–99)
GLUCOSE BLDC GLUCOMTR-MCNC: 163 MG/DL — HIGH (ref 70–99)
GLUCOSE BLDC GLUCOMTR-MCNC: 274 MG/DL — HIGH (ref 70–99)
GLUCOSE SERPL-MCNC: 161 MG/DL — HIGH (ref 70–99)
HCT VFR BLD CALC: 30.8 % — LOW (ref 34.5–45)
HGB BLD-MCNC: 9.8 G/DL — LOW (ref 11.5–15.5)
IANC: 4.15 K/UL — SIGNIFICANT CHANGE UP (ref 1.8–7.4)
IMM GRANULOCYTES NFR BLD AUTO: 0.3 % — SIGNIFICANT CHANGE UP (ref 0–0.9)
LYMPHOCYTES # BLD AUTO: 1.66 K/UL — SIGNIFICANT CHANGE UP (ref 1–3.3)
LYMPHOCYTES # BLD AUTO: 25.2 % — SIGNIFICANT CHANGE UP (ref 13–44)
MAGNESIUM SERPL-MCNC: 2.2 MG/DL — SIGNIFICANT CHANGE UP (ref 1.6–2.6)
MCHC RBC-ENTMCNC: 27.5 PG — SIGNIFICANT CHANGE UP (ref 27–34)
MCHC RBC-ENTMCNC: 31.8 GM/DL — LOW (ref 32–36)
MCV RBC AUTO: 86.3 FL — SIGNIFICANT CHANGE UP (ref 80–100)
MONOCYTES # BLD AUTO: 0.65 K/UL — SIGNIFICANT CHANGE UP (ref 0–0.9)
MONOCYTES NFR BLD AUTO: 9.9 % — SIGNIFICANT CHANGE UP (ref 2–14)
NEUTROPHILS # BLD AUTO: 4.15 K/UL — SIGNIFICANT CHANGE UP (ref 1.8–7.4)
NEUTROPHILS NFR BLD AUTO: 62.9 % — SIGNIFICANT CHANGE UP (ref 43–77)
NRBC # BLD: 0 /100 WBCS — SIGNIFICANT CHANGE UP (ref 0–0)
NRBC # FLD: 0 K/UL — SIGNIFICANT CHANGE UP (ref 0–0)
PHOSPHATE SERPL-MCNC: 4 MG/DL — SIGNIFICANT CHANGE UP (ref 2.5–4.5)
PLATELET # BLD AUTO: 252 K/UL — SIGNIFICANT CHANGE UP (ref 150–400)
POTASSIUM SERPL-MCNC: 4 MMOL/L — SIGNIFICANT CHANGE UP (ref 3.5–5.3)
POTASSIUM SERPL-SCNC: 4 MMOL/L — SIGNIFICANT CHANGE UP (ref 3.5–5.3)
RBC # BLD: 3.57 M/UL — LOW (ref 3.8–5.2)
RBC # FLD: 13.3 % — SIGNIFICANT CHANGE UP (ref 10.3–14.5)
SODIUM SERPL-SCNC: 140 MMOL/L — SIGNIFICANT CHANGE UP (ref 135–145)
WBC # BLD: 6.59 K/UL — SIGNIFICANT CHANGE UP (ref 3.8–10.5)
WBC # FLD AUTO: 6.59 K/UL — SIGNIFICANT CHANGE UP (ref 3.8–10.5)

## 2024-09-15 PROCEDURE — 99233 SBSQ HOSP IP/OBS HIGH 50: CPT

## 2024-09-15 PROCEDURE — 74177 CT ABD & PELVIS W/CONTRAST: CPT | Mod: 26

## 2024-09-15 RX ORDER — SULFAMETHOXAZOLE AND TRIMETHOPRIM 800; 160 MG/1; MG/1
1 TABLET ORAL
Refills: 0 | Status: DISCONTINUED | OUTPATIENT
Start: 2024-09-15 | End: 2024-09-17

## 2024-09-15 RX ORDER — DICYCLOMINE HCL 20 MG
10 TABLET ORAL
Refills: 0 | Status: DISCONTINUED | OUTPATIENT
Start: 2024-09-15 | End: 2024-09-20

## 2024-09-15 RX ADMIN — Medication 2 UNIT(S): at 12:47

## 2024-09-15 RX ADMIN — SACUBITRIL AND VALSARTAN 1 TABLET(S): 49; 51 TABLET, FILM COATED ORAL at 17:21

## 2024-09-15 RX ADMIN — SULFAMETHOXAZOLE AND TRIMETHOPRIM 1 TABLET(S): 800; 160 TABLET ORAL at 17:21

## 2024-09-15 RX ADMIN — APIXABAN 5 MILLIGRAM(S): 5 TABLET, FILM COATED ORAL at 17:20

## 2024-09-15 RX ADMIN — Medication 100 MILLIGRAM(S): at 17:24

## 2024-09-15 RX ADMIN — DONEPEZIL HYDROCHLORIDE 10 MILLIGRAM(S): 5 TABLET, FILM COATED ORAL at 21:17

## 2024-09-15 RX ADMIN — Medication 80 MILLIGRAM(S): at 21:17

## 2024-09-15 RX ADMIN — Medication 1: at 18:06

## 2024-09-15 RX ADMIN — APIXABAN 5 MILLIGRAM(S): 5 TABLET, FILM COATED ORAL at 06:03

## 2024-09-15 RX ADMIN — Medication 40 MILLIGRAM(S): at 06:03

## 2024-09-15 RX ADMIN — METOPROLOL TARTRATE 12.5 MILLIGRAM(S): 100 TABLET ORAL at 17:20

## 2024-09-15 RX ADMIN — Medication 10 MILLIGRAM(S): at 17:21

## 2024-09-15 RX ADMIN — Medication 100 MILLIGRAM(S): at 09:18

## 2024-09-15 RX ADMIN — Medication 75 MILLIGRAM(S): at 12:49

## 2024-09-15 RX ADMIN — INSULIN GLARGINE 12 UNIT(S): 100 INJECTION, SOLUTION SUBCUTANEOUS at 21:43

## 2024-09-15 RX ADMIN — Medication 2 UNIT(S): at 18:05

## 2024-09-15 RX ADMIN — Medication 2 TABLET(S): at 21:17

## 2024-09-15 RX ADMIN — Medication 3: at 12:47

## 2024-09-15 RX ADMIN — Medication 2 UNIT(S): at 08:55

## 2024-09-15 RX ADMIN — Medication 1: at 08:56

## 2024-09-15 RX ADMIN — Medication 100 MILLIGRAM(S): at 06:02

## 2024-09-15 RX ADMIN — METOPROLOL TARTRATE 12.5 MILLIGRAM(S): 100 TABLET ORAL at 06:03

## 2024-09-15 RX ADMIN — SACUBITRIL AND VALSARTAN 1 TABLET(S): 49; 51 TABLET, FILM COATED ORAL at 06:04

## 2024-09-15 NOTE — PROGRESS NOTE ADULT - PROBLEM SELECTOR PLAN 8
DVT prop - on eliquis  Dispo - PT recommends [facility] however family not sure about rehab placement.  Will continue to discuss continue lipitor

## 2024-09-15 NOTE — PROGRESS NOTE ADULT - SUBJECTIVE AND OBJECTIVE BOX
Patient is a 84y old  Female who presents with a chief complaint of chest pain, sob (15 Sep 2024 10:11)      SUBJECTIVE / OVERNIGHT EVENTS:    MEDICATIONS  (STANDING):  apixaban 5 milliGRAM(s) Oral every 12 hours  atorvastatin 80 milliGRAM(s) Oral at bedtime  clopidogrel Tablet 75 milliGRAM(s) Oral daily  donepezil 10 milliGRAM(s) Oral at bedtime  furosemide    Tablet 40 milliGRAM(s) Oral daily  gabapentin 100 milliGRAM(s) Oral two times a day  insulin glargine Injectable (LANTUS) 12 Unit(s) SubCutaneous at bedtime  insulin lispro (ADMELOG) corrective regimen sliding scale   SubCutaneous at bedtime  insulin lispro (ADMELOG) corrective regimen sliding scale   SubCutaneous three times a day before meals  insulin lispro Injectable (ADMELOG) 2 Unit(s) SubCutaneous three times a day before meals  metoprolol tartrate 12.5 milliGRAM(s) Oral two times a day  pantoprazole    Tablet 40 milliGRAM(s) Oral before breakfast  polyethylene glycol 3350 17 Gram(s) Oral daily  sacubitril 24 mG/valsartan 26 mG 1 Tablet(s) Oral two times a day  senna 2 Tablet(s) Oral at bedtime  trimethoprim  160 mG/sulfamethoxazole 800 mG 1 Tablet(s) Oral two times a day    MEDICATIONS  (PRN):  acetaminophen     Tablet .. 650 milliGRAM(s) Oral every 6 hours PRN Temp greater or equal to 38C (100.4F), Mild Pain (1 - 3)  bisacodyl Suppository 10 milliGRAM(s) Rectal daily PRN Constipation  melatonin 3 milliGRAM(s) Oral at bedtime PRN Insomnia      Vital Signs Last 24 Hrs  T(C): 36.7 (15 Sep 2024 11:33), Max: 36.7 (14 Sep 2024 20:18)  T(F): 98.1 (15 Sep 2024 11:33), Max: 98.1 (14 Sep 2024 20:18)  HR: 60 (15 Sep 2024 11:33) (60 - 72)  BP: 125/64 (15 Sep 2024 11:33) (125/64 - 161/65)  BP(mean): --  RR: 17 (15 Sep 2024 11:33) (16 - 18)  SpO2: 100% (15 Sep 2024 11:33) (97% - 100%)    Parameters below as of 15 Sep 2024 09:00  Patient On (Oxygen Delivery Method): room air      CAPILLARY BLOOD GLUCOSE      POCT Blood Glucose.: 274 mg/dL (15 Sep 2024 12:20)  POCT Blood Glucose.: 163 mg/dL (15 Sep 2024 08:35)  POCT Blood Glucose.: 235 mg/dL (14 Sep 2024 21:29)  POCT Blood Glucose.: 208 mg/dL (14 Sep 2024 17:24)    I&O's Summary    14 Sep 2024 07:01  -  15 Sep 2024 07:00  --------------------------------------------------------  IN: 700 mL / OUT: 1100 mL / NET: -400 mL    15 Sep 2024 07:01  -  15 Sep 2024 13:42  --------------------------------------------------------  IN: 200 mL / OUT: 1100 mL / NET: -900 mL        PHYSICAL EXAM:  GENERAL: NAD, well-developed  HEAD:  Atraumatic, Normocephalic  EYES: EOMI, PERRLA, conjunctiva and sclera clear  NECK: Supple, No JVD  CHEST/LUNG: Clear to auscultation bilaterally; No wheeze  HEART: Regular rate and rhythm; No murmurs, rubs, or gallops  ABDOMEN: Soft, Nontender, Nondistended; Bowel sounds present  EXTREMITIES:  2+ Peripheral Pulses, No clubbing, cyanosis, or edema  PSYCH: AAOx3  NEUROLOGY: non-focal  SKIN: No rashes or lesions    LABS:                        9.8    6.59  )-----------( 252      ( 15 Sep 2024 05:46 )             30.8     09-15    140  |  102  |  16  ----------------------------<  161<H>  4.0   |  25  |  1.03    Ca    9.4      15 Sep 2024 05:46  Phos  4.0     09-15  Mg     2.20     09-15            Urinalysis Basic - ( 15 Sep 2024 05:46 )    Color: x / Appearance: x / SG: x / pH: x  Gluc: 161 mg/dL / Ketone: x  / Bili: x / Urobili: x   Blood: x / Protein: x / Nitrite: x   Leuk Esterase: x / RBC: x / WBC x   Sq Epi: x / Non Sq Epi: x / Bacteria: x        RADIOLOGY & ADDITIONAL TESTS:    Imaging Personally Reviewed:    Consultant(s) Notes Reviewed:      Care Discussed with Consultants/Other Providers:   Patient is a 84y old  Female who presents with a chief complaint of chest pain, sob (15 Sep 2024 10:11)      SUBJECTIVE / OVERNIGHT EVENTS:  Patient says she has severe, nonradiating, crapping, periumbilical pain shortly after eating that last for 15 minutes and improves with BMs. She says she has had it for weeks but has worsened since yesterday. Spoke to son who says she has a H/O irritable bowel and takes dicyclomine 10mg tid but this hasn't been restarted since admission.     MEDICATIONS  (STANDING):  apixaban 5 milliGRAM(s) Oral every 12 hours  atorvastatin 80 milliGRAM(s) Oral at bedtime  clopidogrel Tablet 75 milliGRAM(s) Oral daily  donepezil 10 milliGRAM(s) Oral at bedtime  furosemide    Tablet 40 milliGRAM(s) Oral daily  gabapentin 100 milliGRAM(s) Oral two times a day  insulin glargine Injectable (LANTUS) 12 Unit(s) SubCutaneous at bedtime  insulin lispro (ADMELOG) corrective regimen sliding scale   SubCutaneous at bedtime  insulin lispro (ADMELOG) corrective regimen sliding scale   SubCutaneous three times a day before meals  insulin lispro Injectable (ADMELOG) 2 Unit(s) SubCutaneous three times a day before meals  metoprolol tartrate 12.5 milliGRAM(s) Oral two times a day  pantoprazole    Tablet 40 milliGRAM(s) Oral before breakfast  polyethylene glycol 3350 17 Gram(s) Oral daily  sacubitril 24 mG/valsartan 26 mG 1 Tablet(s) Oral two times a day  senna 2 Tablet(s) Oral at bedtime  trimethoprim  160 mG/sulfamethoxazole 800 mG 1 Tablet(s) Oral two times a day    MEDICATIONS  (PRN):  acetaminophen     Tablet .. 650 milliGRAM(s) Oral every 6 hours PRN Temp greater or equal to 38C (100.4F), Mild Pain (1 - 3)  bisacodyl Suppository 10 milliGRAM(s) Rectal daily PRN Constipation  melatonin 3 milliGRAM(s) Oral at bedtime PRN Insomnia      Vital Signs Last 24 Hrs  T(C): 36.7 (15 Sep 2024 11:33), Max: 36.7 (14 Sep 2024 20:18)  T(F): 98.1 (15 Sep 2024 11:33), Max: 98.1 (14 Sep 2024 20:18)  HR: 60 (15 Sep 2024 11:33) (60 - 72)  BP: 125/64 (15 Sep 2024 11:33) (125/64 - 161/65)  BP(mean): --  RR: 17 (15 Sep 2024 11:33) (16 - 18)  SpO2: 100% (15 Sep 2024 11:33) (97% - 100%)    Parameters below as of 15 Sep 2024 09:00  Patient On (Oxygen Delivery Method): room air      CAPILLARY BLOOD GLUCOSE      POCT Blood Glucose.: 274 mg/dL (15 Sep 2024 12:20)  POCT Blood Glucose.: 163 mg/dL (15 Sep 2024 08:35)  POCT Blood Glucose.: 235 mg/dL (14 Sep 2024 21:29)  POCT Blood Glucose.: 208 mg/dL (14 Sep 2024 17:24)    I&O's Summary    14 Sep 2024 07:01  -  15 Sep 2024 07:00  --------------------------------------------------------  IN: 700 mL / OUT: 1100 mL / NET: -400 mL    15 Sep 2024 07:01  -  15 Sep 2024 13:42  --------------------------------------------------------  IN: 200 mL / OUT: 1100 mL / NET: -900 mL        PHYSICAL EXAM:  GENERAL: NAD, well-developed  HEAD:  Atraumatic, Normocephalic  EYES: EOMI, PERRLA, conjunctiva and sclera clear  NECK: Supple, No JVD  CHEST/LUNG: Clear to auscultation bilaterally; No wheeze  HEART: Regular rate and rhythm; No murmurs, rubs, or gallops  ABDOMEN: Soft, mild periumbilical tenderness, Nondistended; Bowel sounds present. No guarding  EXTREMITIES:  2+ Peripheral Pulses, No clubbing, cyanosis, or edema  PSYCH: AAOx3  NEUROLOGY: non-focal  SKIN: No rashes or lesions    LABS:                        9.8    6.59  )-----------( 252      ( 15 Sep 2024 05:46 )             30.8     09-15    140  |  102  |  16  ----------------------------<  161<H>  4.0   |  25  |  1.03    Ca    9.4      15 Sep 2024 05:46  Phos  4.0     09-15  Mg     2.20     09-15            Urinalysis Basic - ( 15 Sep 2024 05:46 )    Color: x / Appearance: x / SG: x / pH: x  Gluc: 161 mg/dL / Ketone: x  / Bili: x / Urobili: x   Blood: x / Protein: x / Nitrite: x   Leuk Esterase: x / RBC: x / WBC x   Sq Epi: x / Non Sq Epi: x / Bacteria: x        RADIOLOGY & ADDITIONAL TESTS:    Imaging Personally Reviewed:    Consultant(s) Notes Reviewed:      Care Discussed with Consultants/Other Providers:

## 2024-09-15 NOTE — PROGRESS NOTE ADULT - ASSESSMENT
84 y.o. 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  Patient went for BIV PPM upgrade and developed hypotension post procedure requiring pressors.  Was stabilized and transferred to medicine.   84 y.o. 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  Patient went for BIV PPM upgrade and developed hypotension post procedure requiring pressors.  Was stabilized and transferred to medicine.

## 2024-09-15 NOTE — PROGRESS NOTE ADULT - SUBJECTIVE AND OBJECTIVE BOX
Date of service 9/15/24       NO events overnight. pt resting in bed  ppm site unchanged    Review of Systems:   Constitutional: [ ] fevers, [ ] chills.   Skin: [ ] dry skin. [ ] rashes.  Psychiatric: [ ] depression, [ ] anxiety.   Gastrointestinal: [ ] BRBPR, [ ] melena.   Neurological: [ ] confusion. [ ] seizures. [ ] shuffling gait.   Ears,Nose,Mouth and Throat: [ ] ear pain [ ] sore throat.   Eyes: [ ] diplopia.   Respiratory: [ ] hemoptysis. [ ] shortness of breath  Cardiovascular: See HPI above  Hematologic/Lymphatic: [ ] anemia. [ ] painful nodes. [ ] prolonged bleeding.   Genitourinary: [ ] hematuria. [ ] flank pain.   Endocrine: [ ] significant change in weight. [ ] intolerance to heat and cold.     Review of systems [x ] otherwise negative, [ ] otherwise unable to obtain    FH: no family history of sudden cardiac death in first degree relatives    SH: [ ] tobacco, [ ] alcohol, [ ] drugs             acetaminophen     Tablet .. 650 milliGRAM(s) Oral every 6 hours PRN  apixaban 5 milliGRAM(s) Oral every 12 hours  atorvastatin 80 milliGRAM(s) Oral at bedtime  bisacodyl Suppository 10 milliGRAM(s) Rectal daily PRN  cefTRIAXone   IVPB 1000 milliGRAM(s) IV Intermittent every 24 hours  clopidogrel Tablet 75 milliGRAM(s) Oral daily  donepezil 10 milliGRAM(s) Oral at bedtime  furosemide    Tablet 40 milliGRAM(s) Oral daily  gabapentin 100 milliGRAM(s) Oral two times a day  insulin glargine Injectable (LANTUS) 12 Unit(s) SubCutaneous at bedtime  insulin lispro (ADMELOG) corrective regimen sliding scale   SubCutaneous three times a day before meals  insulin lispro (ADMELOG) corrective regimen sliding scale   SubCutaneous at bedtime  insulin lispro Injectable (ADMELOG) 2 Unit(s) SubCutaneous three times a day before meals  melatonin 3 milliGRAM(s) Oral at bedtime PRN  metoprolol tartrate 12.5 milliGRAM(s) Oral two times a day  pantoprazole    Tablet 40 milliGRAM(s) Oral before breakfast  polyethylene glycol 3350 17 Gram(s) Oral daily  sacubitril 24 mG/valsartan 26 mG 1 Tablet(s) Oral two times a day  senna 2 Tablet(s) Oral at bedtime  trimethoprim  160 mG/sulfamethoxazole 800 mG 1 Tablet(s) Oral two times a day                            9.8    6.59  )-----------( 252      ( 15 Sep 2024 05:46 )             30.8       Hemoglobin: 9.8 g/dL (09-15 @ 05:46)  Hemoglobin: 10.2 g/dL (09-14 @ 18:13)  Hemoglobin: 9.1 g/dL (09-14 @ 05:50)  Hemoglobin: 10.3 g/dL (09-13 @ 05:02)  Hemoglobin: 11.0 g/dL (09-12 @ 05:24)      09-15    140  |  102  |  16  ----------------------------<  161<H>  4.0   |  25  |  1.03    Ca    9.4      15 Sep 2024 05:46  Phos  4.0     09-15  Mg     2.20     09-15      Creatinine Trend: 1.03<--, 1.16<--, 1.35<--, 1.40<--, 1.41<--, 1.38<--    COAGS:           T(C): 36.7 (09-15-24 @ 09:00), Max: 36.7 (09-14-24 @ 13:00)  HR: 61 (09-15-24 @ 09:00) (60 - 72)  BP: 133/62 (09-15-24 @ 09:00) (126/63 - 161/65)  RR: 16 (09-15-24 @ 09:00) (16 - 18)  SpO2: 98% (09-15-24 @ 09:00) (97% - 100%)  Wt(kg): --    I&O's Summary    14 Sep 2024 07:01  -  15 Sep 2024 07:00  --------------------------------------------------------  IN: 700 mL / OUT: 1100 mL / NET: -400 mL    15 Sep 2024 07:01  -  15 Sep 2024 10:12  --------------------------------------------------------  IN: 200 mL / OUT: 1100 mL / NET: -900 mL      General: Well nourished in no acute distress. Alert and Oriented * 3.   Head: Normocephalic and atraumatic.   Neck: No JVD. No bruits. Supple. Does not appear to be enlarged.   Cardiovascular: + S1,S2 ; RRR Soft systolic murmur at the left lower sternal border. No rubs noted.    Lungs: CTA B/L  Abdomen: + BS, soft. Non tender. Non distended. No rebound. No guarding.   Extremities: No clubbing/cyanosis/edema.   Neurologic: Moves all four extremities. Full range of motion.   Skin: Warm and moist. The patient's skin has normal elasticity and good skin turgor.   Psychiatric: Appropriate mood and affect.  Musculoskeletal: Normal range of motion, normal strength    DATA      < from: Xray Chest 1 View- PORTABLE-Urgent (09.04.24 @ 21:40) >    IMPRESSION:  Bilateral perihilar and lower lung field hazy opacities, suggestive of pulmonary edema.       from: Cardiac Catheterization (08.18.22 @ 17:50) >  Diagnostic Conclusions:   Occluded proximal ramus stent. Patent LAD and diagonal stents.  Mild-moderate atherosclerosis in proximal Cx and mid RCA.      < from: Transthoracic Echocardiogram (03.30.23 @ 16:25) >  CONCLUSIONS:  1. Normal trileaflet aortic valve.  2. Normal left ventricular internal dimensions and wall thicknesses.  3. Moderate segmental left ventricular systolic dysfunction. Lateral and inferolateral wall hypokinesis.  4. Normal right ventricular size and function.  ------------------------------------------------------------------------  Confirmed on  3/31/2023 - 15:49:07 by Badewattie Rupinder,  M.D. [facility]      < from: Nuclear Stress Test-Exercise (04.02.23 @ 08:07) >  GATED ANALYSIS:  Post-stress gated wall motion analysis was performed (LVEF  = 34 %;LVEDV = 120 ml.), revealing overall severe hypokinesis.  ------------------------------------------------------------------------  IMPRESSIONS:Abnormal Study  * Chest Pain: No chest pain with administration of  Regadenoson.  * Symptom: Dyspnea.  * HR Response: Appropriate.  * BP Response: Appropriate.  * Heart Rhythm: Normal Sinus Rhythm - 65 BPM.  * Baseline ECG: Nonspecific ST-T wave abnormality.  * ECG Abnormalities: None.  * Arrhythmia: Occasional VPDs occurred during rest, stress  and recovery.  * There are large, moderate to severe defects in the apical, anterolateral, and lateral walls that are predominantly fixed, consistent with infarction with mild  seamus-infarct ischemia.  * Post-stress gated wall motion analysis was performed (LVEF = 34 %;LVEDV = 120 ml.), revealing overall severe  hypokinesis.  Conclusion:  There are large, moderate to severe defects in the apical,  anterolateral, and lateral walls that are predominantly  fixed, consistent with infarction with mild seamus-infarct  ischemia.  ------------------------------------------------------------------------  Confirmed on  4/2/2023 - 15:25:27 by Wilfredo Garibay MD, [facility],  JAMILAH, JAMESVI  --------------------------------------------------------------------  < end of copied text >    < from: Cardiac Catheterization (04.04.23 @ 16:41) >  Diagnostic Conclusions:   Chronically occluded ramus artery stent. Mild atherosclerosis in proximal LAD. Patent mid LAD and ostial/proximal diagonal stents. Moderate atherosclerosis in proximal Cx. Mild atherosclerosis  in mid and distal RCA.    Recommendations:   Optimize medical therapy for ischemic heart disease. Aggressive risk factor modification.    < from: TTE W or WO Ultrasound Enhancing Agent (09.06.24 @ 16:50) >  CONCLUSIONS:   1. The left ventricular cavity is normal in size. Left ventricular wall thickness is normal. Abnormal (paradoxical) septal motion consistent with rightventricular pacemaker. Left ventricular systolic function is severely decreased with an ejection fraction visually estimated at 25 to 30%. There is global left ventricular hypokinesis. There is mild (grade 1) left ventricular diastolic dysfunction.  2. The right ventricular cavity is normal in size and right ventricular systolic function is normal. Tricuspid annular plane systolic excursion (TAPSE) is 2.6 cm (normal >=1.7 cm). A device lead is visualized in the right heart.   3. Structurally normal mitral valve with normal leaflet excursion. There is calcification of the mitral valve annulus. There is mild to moderate mitral regurgitation.   4. The left atrium is severely dilated with an indexed volume of 55.42 ml/m².   5. No prior echocardiogram is available for comparison.    < end of copied text >      ASSESSMENT/PLAN: Pt is an  84 year old Female with PMH DM, CAD s/p PCI to prox LAD/mid LAD/Diag/Ramus, last LHC 8/2022 with occluded prox Ramus stent, patent LAD and Diag stents, mild to mod atherosclerosis in pLcx and mRCA, managed medically, HTN, and CVA who presents with decompensated HFrEF and palpitations. She reports compliance with meds and diet. Is here visiting her grandson from [place].    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

## 2024-09-15 NOTE — PROGRESS NOTE ADULT - PROBLEM SELECTOR PLAN 2
as per above   On eliquis, plavix -acute periumbilical pain shortly after eating that lasts for 15 minutes and improves with BMs.   -Pain present for weeks but has worsened since yesterday.   Spoke to son " SCOTTY @ [phone] who says she has a H/O irritable bowel syndrome and takes dicyclomine 10mg tid but this hasn't been given since she was  since she was admission.   -restarted dicyclomine   -Since patient had hypotension requiring pressors on this admission and has a H/O of SMA stenosis will check CT angio of abdomen/pelvis to r/o bowel ischemia.   -give Morphine 1mg IV q 4hre prn for pain  -consult vascular surgery if abnormal CT scan.

## 2024-09-16 LAB
ANION GAP SERPL CALC-SCNC: 11 MMOL/L — SIGNIFICANT CHANGE UP (ref 7–14)
BASOPHILS # BLD AUTO: 0.02 K/UL — SIGNIFICANT CHANGE UP (ref 0–0.2)
BASOPHILS NFR BLD AUTO: 0.3 % — SIGNIFICANT CHANGE UP (ref 0–2)
BUN SERPL-MCNC: 16 MG/DL — SIGNIFICANT CHANGE UP (ref 7–23)
CALCIUM SERPL-MCNC: 8.8 MG/DL — SIGNIFICANT CHANGE UP (ref 8.4–10.5)
CHLORIDE SERPL-SCNC: 101 MMOL/L — SIGNIFICANT CHANGE UP (ref 98–107)
CO2 SERPL-SCNC: 24 MMOL/L — SIGNIFICANT CHANGE UP (ref 22–31)
CREAT SERPL-MCNC: 1.19 MG/DL — SIGNIFICANT CHANGE UP (ref 0.5–1.3)
EGFR: 45 ML/MIN/1.73M2 — LOW
EOSINOPHIL # BLD AUTO: 0.09 K/UL — SIGNIFICANT CHANGE UP (ref 0–0.5)
EOSINOPHIL NFR BLD AUTO: 1.3 % — SIGNIFICANT CHANGE UP (ref 0–6)
GLUCOSE BLDC GLUCOMTR-MCNC: 145 MG/DL — HIGH (ref 70–99)
GLUCOSE BLDC GLUCOMTR-MCNC: 150 MG/DL — HIGH (ref 70–99)
GLUCOSE BLDC GLUCOMTR-MCNC: 176 MG/DL — HIGH (ref 70–99)
GLUCOSE BLDC GLUCOMTR-MCNC: 237 MG/DL — HIGH (ref 70–99)
GLUCOSE SERPL-MCNC: 141 MG/DL — HIGH (ref 70–99)
HCT VFR BLD CALC: 32.2 % — LOW (ref 34.5–45)
HGB BLD-MCNC: 10.1 G/DL — LOW (ref 11.5–15.5)
IANC: 4.32 K/UL — SIGNIFICANT CHANGE UP (ref 1.8–7.4)
IMM GRANULOCYTES NFR BLD AUTO: 0.7 % — SIGNIFICANT CHANGE UP (ref 0–0.9)
LYMPHOCYTES # BLD AUTO: 1.93 K/UL — SIGNIFICANT CHANGE UP (ref 1–3.3)
LYMPHOCYTES # BLD AUTO: 27.3 % — SIGNIFICANT CHANGE UP (ref 13–44)
MAGNESIUM SERPL-MCNC: 2.1 MG/DL — SIGNIFICANT CHANGE UP (ref 1.6–2.6)
MCHC RBC-ENTMCNC: 27.6 PG — SIGNIFICANT CHANGE UP (ref 27–34)
MCHC RBC-ENTMCNC: 31.4 GM/DL — LOW (ref 32–36)
MCV RBC AUTO: 88 FL — SIGNIFICANT CHANGE UP (ref 80–100)
MONOCYTES # BLD AUTO: 0.65 K/UL — SIGNIFICANT CHANGE UP (ref 0–0.9)
MONOCYTES NFR BLD AUTO: 9.2 % — SIGNIFICANT CHANGE UP (ref 2–14)
NEUTROPHILS # BLD AUTO: 4.32 K/UL — SIGNIFICANT CHANGE UP (ref 1.8–7.4)
NEUTROPHILS NFR BLD AUTO: 61.2 % — SIGNIFICANT CHANGE UP (ref 43–77)
NRBC # BLD: 0 /100 WBCS — SIGNIFICANT CHANGE UP (ref 0–0)
NRBC # FLD: 0 K/UL — SIGNIFICANT CHANGE UP (ref 0–0)
PHOSPHATE SERPL-MCNC: 3.6 MG/DL — SIGNIFICANT CHANGE UP (ref 2.5–4.5)
PLATELET # BLD AUTO: 287 K/UL — SIGNIFICANT CHANGE UP (ref 150–400)
POTASSIUM SERPL-MCNC: 4 MMOL/L — SIGNIFICANT CHANGE UP (ref 3.5–5.3)
POTASSIUM SERPL-SCNC: 4 MMOL/L — SIGNIFICANT CHANGE UP (ref 3.5–5.3)
RBC # BLD: 3.66 M/UL — LOW (ref 3.8–5.2)
RBC # FLD: 13.2 % — SIGNIFICANT CHANGE UP (ref 10.3–14.5)
SODIUM SERPL-SCNC: 136 MMOL/L — SIGNIFICANT CHANGE UP (ref 135–145)
WBC # BLD: 7.06 K/UL — SIGNIFICANT CHANGE UP (ref 3.8–10.5)
WBC # FLD AUTO: 7.06 K/UL — SIGNIFICANT CHANGE UP (ref 3.8–10.5)

## 2024-09-16 PROCEDURE — 99232 SBSQ HOSP IP/OBS MODERATE 35: CPT

## 2024-09-16 RX ORDER — SODIUM CHLORIDE 0.65 %
1 AEROSOL, SPRAY (ML) NASAL THREE TIMES A DAY
Refills: 0 | Status: DISCONTINUED | OUTPATIENT
Start: 2024-09-16 | End: 2024-09-20

## 2024-09-16 RX ADMIN — Medication 10 MILLIGRAM(S): at 16:47

## 2024-09-16 RX ADMIN — APIXABAN 5 MILLIGRAM(S): 5 TABLET, FILM COATED ORAL at 05:10

## 2024-09-16 RX ADMIN — APIXABAN 5 MILLIGRAM(S): 5 TABLET, FILM COATED ORAL at 18:11

## 2024-09-16 RX ADMIN — Medication 2 TABLET(S): at 21:17

## 2024-09-16 RX ADMIN — Medication 40 MILLIGRAM(S): at 05:10

## 2024-09-16 RX ADMIN — DONEPEZIL HYDROCHLORIDE 10 MILLIGRAM(S): 5 TABLET, FILM COATED ORAL at 21:17

## 2024-09-16 RX ADMIN — METOPROLOL TARTRATE 12.5 MILLIGRAM(S): 100 TABLET ORAL at 18:11

## 2024-09-16 RX ADMIN — Medication 2 UNIT(S): at 08:53

## 2024-09-16 RX ADMIN — Medication 75 MILLIGRAM(S): at 13:01

## 2024-09-16 RX ADMIN — Medication 2 UNIT(S): at 13:02

## 2024-09-16 RX ADMIN — Medication 1: at 08:54

## 2024-09-16 RX ADMIN — Medication 2 UNIT(S): at 18:14

## 2024-09-16 RX ADMIN — Medication 1 SPRAY(S): at 21:17

## 2024-09-16 RX ADMIN — Medication 100 MILLIGRAM(S): at 18:14

## 2024-09-16 RX ADMIN — SACUBITRIL AND VALSARTAN 1 TABLET(S): 49; 51 TABLET, FILM COATED ORAL at 18:11

## 2024-09-16 RX ADMIN — SULFAMETHOXAZOLE AND TRIMETHOPRIM 1 TABLET(S): 800; 160 TABLET ORAL at 05:11

## 2024-09-16 RX ADMIN — Medication 10 MILLIGRAM(S): at 05:10

## 2024-09-16 RX ADMIN — INSULIN GLARGINE 12 UNIT(S): 100 INJECTION, SOLUTION SUBCUTANEOUS at 21:45

## 2024-09-16 RX ADMIN — METOPROLOL TARTRATE 12.5 MILLIGRAM(S): 100 TABLET ORAL at 05:11

## 2024-09-16 RX ADMIN — SULFAMETHOXAZOLE AND TRIMETHOPRIM 1 TABLET(S): 800; 160 TABLET ORAL at 18:11

## 2024-09-16 RX ADMIN — Medication 80 MILLIGRAM(S): at 21:18

## 2024-09-16 RX ADMIN — SACUBITRIL AND VALSARTAN 1 TABLET(S): 49; 51 TABLET, FILM COATED ORAL at 05:11

## 2024-09-16 RX ADMIN — Medication 100 MILLIGRAM(S): at 05:11

## 2024-09-16 RX ADMIN — Medication 10 MILLIGRAM(S): at 13:01

## 2024-09-16 NOTE — PROGRESS NOTE ADULT - ASSESSMENT
84 y.o. 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  Patient went for BIV PPM upgrade and developed hypotension post procedure requiring pressors.  Was stabilized and transferred to medicine.

## 2024-09-16 NOTE — PROGRESS NOTE ADULT - PROBLEM SELECTOR PLAN 2
-likely d/t IBS   -Pain present for weeks but has worsened since yesterday.   -Resumed dicyclomine 10mg tid   -CT angio of abdomen/pelvis negative for mesenteric ischemia

## 2024-09-16 NOTE — PROGRESS NOTE ADULT - SUBJECTIVE AND OBJECTIVE BOX
PROGRESS NOTE:     Patient is a 84y old  Female who presents with a chief complaint of chest pain, sob (15 Sep 2024 13:42)      SUBJECTIVE / OVERNIGHT EVENTS: No acute events.     ADDITIONAL REVIEW OF SYSTEMS:    MEDICATIONS  (STANDING):  apixaban 5 milliGRAM(s) Oral every 12 hours  atorvastatin 80 milliGRAM(s) Oral at bedtime  clopidogrel Tablet 75 milliGRAM(s) Oral daily  dicyclomine 10 milliGRAM(s) Oral three times a day before meals  donepezil 10 milliGRAM(s) Oral at bedtime  furosemide    Tablet 40 milliGRAM(s) Oral daily  gabapentin 100 milliGRAM(s) Oral two times a day  insulin glargine Injectable (LANTUS) 12 Unit(s) SubCutaneous at bedtime  insulin lispro (ADMELOG) corrective regimen sliding scale   SubCutaneous at bedtime  insulin lispro (ADMELOG) corrective regimen sliding scale   SubCutaneous three times a day before meals  insulin lispro Injectable (ADMELOG) 2 Unit(s) SubCutaneous three times a day before meals  metoprolol tartrate 12.5 milliGRAM(s) Oral two times a day  pantoprazole    Tablet 40 milliGRAM(s) Oral before breakfast  polyethylene glycol 3350 17 Gram(s) Oral daily  sacubitril 24 mG/valsartan 26 mG 1 Tablet(s) Oral two times a day  senna 2 Tablet(s) Oral at bedtime  trimethoprim  160 mG/sulfamethoxazole 800 mG 1 Tablet(s) Oral two times a day    MEDICATIONS  (PRN):  acetaminophen     Tablet .. 650 milliGRAM(s) Oral every 6 hours PRN Temp greater or equal to 38C (100.4F), Mild Pain (1 - 3)  bisacodyl Suppository 10 milliGRAM(s) Rectal daily PRN Constipation  melatonin 3 milliGRAM(s) Oral at bedtime PRN Insomnia  morphine  - Injectable 1 milliGRAM(s) IV Push every 4 hours PRN Severe Pain (7 - 10)      CAPILLARY BLOOD GLUCOSE      POCT Blood Glucose.: 145 mg/dL (16 Sep 2024 12:28)  POCT Blood Glucose.: 176 mg/dL (16 Sep 2024 08:38)  POCT Blood Glucose.: 155 mg/dL (15 Sep 2024 21:34)  POCT Blood Glucose.: 159 mg/dL (15 Sep 2024 17:39)    I&O's Summary    15 Sep 2024 07:01  -  16 Sep 2024 07:00  --------------------------------------------------------  IN: 600 mL / OUT: 1900 mL / NET: -1300 mL        PHYSICAL EXAM:  Vital Signs Last 24 Hrs  T(C): 36.8 (16 Sep 2024 11:00), Max: 36.8 (15 Sep 2024 15:30)  T(F): 98.2 (16 Sep 2024 11:00), Max: 98.3 (15 Sep 2024 15:30)  HR: 66 (16 Sep 2024 11:00) (62 - 70)  BP: 125/66 (16 Sep 2024 11:00) (125/66 - 141/66)  BP(mean): --  RR: 18 (16 Sep 2024 11:00) (16 - 18)  SpO2: 98% (16 Sep 2024 11:00) (98% - 99%)    Parameters below as of 16 Sep 2024 11:00  Patient On (Oxygen Delivery Method): room air      CONSTITUTIONAL: NAD  EYES: PERRLA  ENMT: Moist oral mucosa, normal dentition  NECK: Supple, no thyromegaly  RESPIRATORY: Normal respiratory effort; lungs are clear to auscultation bilaterally  CARDIOVASCULAR: S1 and S2 +ve, no murmur/rub/gallop;   ABDOMEN: Nontender to palpation, normoactive bowel sounds, no rebound/guarding;  EXTREMITIES: no significant pedal edema.    NEUROLOGY: CN 2-12 are intact and symmetric; no gross motor or sensory deficits   PSYCH: A+O to person, place, and time; affect appropriate  SKIN: No rashes; no palpable lesions.  PPM scar to L upper chest clean and dry    LABS:                        10.1   7.06  )-----------( 287      ( 16 Sep 2024 07:02 )             32.2     09-16    136  |  101  |  16  ----------------------------<  141<H>  4.0   |  24  |  1.19    Ca    8.8      16 Sep 2024 07:02  Phos  3.6     09-16  Mg     2.10     09-16            Urinalysis Basic - ( 16 Sep 2024 07:02 )    Color: x / Appearance: x / SG: x / pH: x  Gluc: 141 mg/dL / Ketone: x  / Bili: x / Urobili: x   Blood: x / Protein: x / Nitrite: x   Leuk Esterase: x / RBC: x / WBC x   Sq Epi: x / Non Sq Epi: x / Bacteria: x          RADIOLOGY & ADDITIONAL TESTS:  Results Reviewed:   Imaging Personally Reviewed:  Electrocardiogram Personally Reviewed:    COORDINATION OF CARE:  Care Discussed with Consultants/Other Providers [Y/N]:  Prior or Outpatient Records Reviewed [Y/N]:

## 2024-09-16 NOTE — PROGRESS NOTE ADULT - PROBLEM SELECTOR PLAN 4
DM2   Hgb A1c 7.1   Continue insulin corrective scale  Continue Lantus 12 units qhs   Continue Admelog 2 units TID w/ meals   Monitor fingersticks

## 2024-09-17 DIAGNOSIS — N39.0 URINARY TRACT INFECTION, SITE NOT SPECIFIED: ICD-10-CM

## 2024-09-17 DIAGNOSIS — N17.9 ACUTE KIDNEY FAILURE, UNSPECIFIED: ICD-10-CM

## 2024-09-17 LAB
ANION GAP SERPL CALC-SCNC: 13 MMOL/L — SIGNIFICANT CHANGE UP (ref 7–14)
BUN SERPL-MCNC: 16 MG/DL — SIGNIFICANT CHANGE UP (ref 7–23)
CALCIUM SERPL-MCNC: 9.2 MG/DL — SIGNIFICANT CHANGE UP (ref 8.4–10.5)
CHLORIDE SERPL-SCNC: 103 MMOL/L — SIGNIFICANT CHANGE UP (ref 98–107)
CO2 SERPL-SCNC: 22 MMOL/L — SIGNIFICANT CHANGE UP (ref 22–31)
CREAT SERPL-MCNC: 1.45 MG/DL — HIGH (ref 0.5–1.3)
EGFR: 35 ML/MIN/1.73M2 — LOW
GLUCOSE BLDC GLUCOMTR-MCNC: 131 MG/DL — HIGH (ref 70–99)
GLUCOSE BLDC GLUCOMTR-MCNC: 168 MG/DL — HIGH (ref 70–99)
GLUCOSE BLDC GLUCOMTR-MCNC: 183 MG/DL — HIGH (ref 70–99)
GLUCOSE BLDC GLUCOMTR-MCNC: 188 MG/DL — HIGH (ref 70–99)
GLUCOSE SERPL-MCNC: 149 MG/DL — HIGH (ref 70–99)
HCT VFR BLD CALC: 30.3 % — LOW (ref 34.5–45)
HGB BLD-MCNC: 9.8 G/DL — LOW (ref 11.5–15.5)
MAGNESIUM SERPL-MCNC: 2.2 MG/DL — SIGNIFICANT CHANGE UP (ref 1.6–2.6)
MCHC RBC-ENTMCNC: 27.8 PG — SIGNIFICANT CHANGE UP (ref 27–34)
MCHC RBC-ENTMCNC: 32.3 GM/DL — SIGNIFICANT CHANGE UP (ref 32–36)
MCV RBC AUTO: 86.1 FL — SIGNIFICANT CHANGE UP (ref 80–100)
NRBC # BLD: 0 /100 WBCS — SIGNIFICANT CHANGE UP (ref 0–0)
NRBC # FLD: 0 K/UL — SIGNIFICANT CHANGE UP (ref 0–0)
PHOSPHATE SERPL-MCNC: 3.7 MG/DL — SIGNIFICANT CHANGE UP (ref 2.5–4.5)
PLATELET # BLD AUTO: 285 K/UL — SIGNIFICANT CHANGE UP (ref 150–400)
POTASSIUM SERPL-MCNC: 4.1 MMOL/L — SIGNIFICANT CHANGE UP (ref 3.5–5.3)
POTASSIUM SERPL-SCNC: 4.1 MMOL/L — SIGNIFICANT CHANGE UP (ref 3.5–5.3)
RBC # BLD: 3.52 M/UL — LOW (ref 3.8–5.2)
RBC # FLD: 13.2 % — SIGNIFICANT CHANGE UP (ref 10.3–14.5)
SARS-COV-2 RNA SPEC QL NAA+PROBE: SIGNIFICANT CHANGE UP
SODIUM SERPL-SCNC: 138 MMOL/L — SIGNIFICANT CHANGE UP (ref 135–145)
WBC # BLD: 6.66 K/UL — SIGNIFICANT CHANGE UP (ref 3.8–10.5)
WBC # FLD AUTO: 6.66 K/UL — SIGNIFICANT CHANGE UP (ref 3.8–10.5)

## 2024-09-17 PROCEDURE — 99232 SBSQ HOSP IP/OBS MODERATE 35: CPT

## 2024-09-17 RX ORDER — METOPROLOL TARTRATE 100 MG/1
12.5 TABLET ORAL ONCE
Refills: 0 | Status: COMPLETED | OUTPATIENT
Start: 2024-09-17 | End: 2024-09-17

## 2024-09-17 RX ORDER — METOPROLOL TARTRATE 100 MG/1
25 TABLET ORAL DAILY
Refills: 0 | Status: DISCONTINUED | OUTPATIENT
Start: 2024-09-18 | End: 2024-09-18

## 2024-09-17 RX ADMIN — Medication 100 MILLIGRAM(S): at 17:17

## 2024-09-17 RX ADMIN — Medication 40 MILLIGRAM(S): at 05:09

## 2024-09-17 RX ADMIN — Medication 1: at 09:01

## 2024-09-17 RX ADMIN — Medication 2 UNIT(S): at 12:48

## 2024-09-17 RX ADMIN — APIXABAN 5 MILLIGRAM(S): 5 TABLET, FILM COATED ORAL at 05:09

## 2024-09-17 RX ADMIN — Medication 2 UNIT(S): at 09:01

## 2024-09-17 RX ADMIN — SACUBITRIL AND VALSARTAN 1 TABLET(S): 49; 51 TABLET, FILM COATED ORAL at 17:17

## 2024-09-17 RX ADMIN — Medication 80 MILLIGRAM(S): at 22:15

## 2024-09-17 RX ADMIN — INSULIN GLARGINE 12 UNIT(S): 100 INJECTION, SOLUTION SUBCUTANEOUS at 22:14

## 2024-09-17 RX ADMIN — Medication 100 MILLIGRAM(S): at 05:08

## 2024-09-17 RX ADMIN — METOPROLOL TARTRATE 12.5 MILLIGRAM(S): 100 TABLET ORAL at 17:17

## 2024-09-17 RX ADMIN — Medication 2 UNIT(S): at 17:32

## 2024-09-17 RX ADMIN — Medication 1 SPRAY(S): at 05:12

## 2024-09-17 RX ADMIN — METOPROLOL TARTRATE 12.5 MILLIGRAM(S): 100 TABLET ORAL at 05:09

## 2024-09-17 RX ADMIN — SACUBITRIL AND VALSARTAN 1 TABLET(S): 49; 51 TABLET, FILM COATED ORAL at 05:08

## 2024-09-17 RX ADMIN — Medication 10 MILLIGRAM(S): at 17:17

## 2024-09-17 RX ADMIN — APIXABAN 5 MILLIGRAM(S): 5 TABLET, FILM COATED ORAL at 17:17

## 2024-09-17 RX ADMIN — Medication 2 TABLET(S): at 22:16

## 2024-09-17 RX ADMIN — SULFAMETHOXAZOLE AND TRIMETHOPRIM 1 TABLET(S): 800; 160 TABLET ORAL at 05:09

## 2024-09-17 RX ADMIN — Medication 75 MILLIGRAM(S): at 12:48

## 2024-09-17 RX ADMIN — Medication 10 MILLIGRAM(S): at 12:48

## 2024-09-17 RX ADMIN — Medication 10 MILLIGRAM(S): at 05:09

## 2024-09-17 RX ADMIN — DONEPEZIL HYDROCHLORIDE 10 MILLIGRAM(S): 5 TABLET, FILM COATED ORAL at 22:16

## 2024-09-17 RX ADMIN — Medication 1: at 12:48

## 2024-09-17 NOTE — PROGRESS NOTE ADULT - PROBLEM SELECTOR PLAN 6
continue aricept DM2   Hgb A1c 7.1   Continue insulin corrective scale  Continue Lantus 12 units qhs   Continue Admelog 2 units TID w/ meals   Monitor fingersticks

## 2024-09-17 NOTE — PROGRESS NOTE ADULT - PROBLEM SELECTOR PLAN 7
DVT prop - on eliquis  Dispo - PT recommends [facility]    Awaiting rehab Continue eliquis, plavix, statin

## 2024-09-17 NOTE — PROGRESS NOTE ADULT - PROBLEM SELECTOR PLAN 3
Continue Plavix and statin -urine culture w/ GNR's, awaiting identification and sensitivities   -switch Bactrim to Cipro

## 2024-09-17 NOTE — PROGRESS NOTE ADULT - PROBLEM SELECTOR PLAN 4
DM2   Hgb A1c 7.1   Continue insulin corrective scale  Continue Lantus 12 units qhs   Continue Admelog 2 units TID w/ meals   Monitor fingersticks -likely d/t Bactrim   -d/c Bactrim   -avoid nephrotoxic agents   -monitor Cr

## 2024-09-17 NOTE — PROGRESS NOTE ADULT - SUBJECTIVE AND OBJECTIVE BOX
Date of service 9/17/24       No chest pain or SOB, ROS otherwise negative    Meds  acetaminophen     Tablet .. 650 milliGRAM(s) Oral every 6 hours PRN  apixaban 5 milliGRAM(s) Oral every 12 hours  atorvastatin 80 milliGRAM(s) Oral at bedtime  bisacodyl Suppository 10 milliGRAM(s) Rectal daily PRN  clopidogrel Tablet 75 milliGRAM(s) Oral daily  dicyclomine 10 milliGRAM(s) Oral three times a day before meals  donepezil 10 milliGRAM(s) Oral at bedtime  furosemide    Tablet 40 milliGRAM(s) Oral daily  gabapentin 100 milliGRAM(s) Oral two times a day  insulin glargine Injectable (LANTUS) 12 Unit(s) SubCutaneous at bedtime  insulin lispro (ADMELOG) corrective regimen sliding scale   SubCutaneous at bedtime  insulin lispro (ADMELOG) corrective regimen sliding scale   SubCutaneous three times a day before meals  insulin lispro Injectable (ADMELOG) 2 Unit(s) SubCutaneous three times a day before meals  melatonin 3 milliGRAM(s) Oral at bedtime PRN  metoprolol tartrate 12.5 milliGRAM(s) Oral two times a day  morphine  - Injectable 1 milliGRAM(s) IV Push every 4 hours PRN  pantoprazole    Tablet 40 milliGRAM(s) Oral before breakfast  polyethylene glycol 3350 17 Gram(s) Oral daily  sacubitril 24 mG/valsartan 26 mG 1 Tablet(s) Oral two times a day  senna 2 Tablet(s) Oral at bedtime  sodium chloride 0.65% Nasal 1 Spray(s) Both Nostrils three times a day PRN  trimethoprim  160 mG/sulfamethoxazole 800 mG 1 Tablet(s) Oral two times a day                            9.8    6.66  )-----------( 285      ( 17 Sep 2024 05:51 )             30.3       09-17    138  |  103  |  16  ----------------------------<  149[H]  4.1   |  22  |  1.45[H]    Ca    9.2      17 Sep 2024 05:51  Phos  3.7     09-17  Mg     2.20     09-17      T(C): 36.5 (09-17-24 @ 11:59), Max: 36.8 (09-16-24 @ 20:30)  HR: 60 (09-17-24 @ 11:59) (60 - 67)  BP: 134/68 (09-17-24 @ 11:59) (120/73 - 144/64)  RR: 18 (09-17-24 @ 11:59) (18 - 18)  SpO2: 100% (09-17-24 @ 11:59) (98% - 100%)  Wt(kg): --    I&O's Summary    16 Sep 2024 07:01  -  17 Sep 2024 07:00  --------------------------------------------------------  IN: 500 mL / OUT: 1700 mL / NET: -1200 mL    17 Sep 2024 07:01  -  17 Sep 2024 12:17  --------------------------------------------------------  IN: 250 mL / OUT: 0 mL / NET: 250 mL      General: Well nourished in no acute distress. Alert and Oriented * 3.   Head: Normocephalic and atraumatic.   Neck: No JVD. No bruits. Supple. Does not appear to be enlarged.   Cardiovascular: + S1,S2 ; RRR Soft systolic murmur at the left lower sternal border. No rubs noted.    Lungs: CTA B/L  Abdomen: + BS, soft. Non tender. Non distended. No rebound. No guarding.   Extremities: No clubbing/cyanosis/edema.   Neurologic: Moves all four extremities. Full range of motion.   Skin: Warm and moist. The patient's skin has normal elasticity and good skin turgor.   Psychiatric: Appropriate mood and affect.  Musculoskeletal: Normal range of motion, normal strength    DATA      < from: Xray Chest 1 View- PORTABLE-Urgent (09.04.24 @ 21:40) >    IMPRESSION:  Bilateral perihilar and lower lung field hazy opacities, suggestive of pulmonary edema.       from: Cardiac Catheterization (08.18.22 @ 17:50) >  Diagnostic Conclusions:   Occluded proximal ramus stent. Patent LAD and diagonal stents.  Mild-moderate atherosclerosis in proximal Cx and mid RCA.      < from: Transthoracic Echocardiogram (03.30.23 @ 16:25) >  CONCLUSIONS:  1. Normal trileaflet aortic valve.  2. Normal left ventricular internal dimensions and wall thicknesses.  3. Moderate segmental left ventricular systolic dysfunction. Lateral and inferolateral wall hypokinesis.  4. Normal right ventricular size and function.  ------------------------------------------------------------------------  Confirmed on  3/31/2023 - 15:49:07 by Raciel Bucio M.D. RPVI      < from: Nuclear Stress Test-Exercise (04.02.23 @ 08:07) >  GATED ANALYSIS:  Post-stress gated wall motion analysis was performed (LVEF  = 34 %;LVEDV = 120 ml.), revealing overall severe hypokinesis.  ------------------------------------------------------------------------  IMPRESSIONS:Abnormal Study  * Chest Pain: No chest pain with administration of  Regadenoson.  * Symptom: Dyspnea.  * HR Response: Appropriate.  * BP Response: Appropriate.  * Heart Rhythm: Normal Sinus Rhythm - 65 BPM.  * Baseline ECG: Nonspecific ST-T wave abnormality.  * ECG Abnormalities: None.  * Arrhythmia: Occasional VPDs occurred during rest, stress  and recovery.  * There are large, moderate to severe defects in the apical, anterolateral, and lateral walls that are predominantly fixed, consistent with infarction with mild  seamus-infarct ischemia.  * Post-stress gated wall motion analysis was performed (LVEF = 34 %;LVEDV = 120 ml.), revealing overall severe  hypokinesis.  Conclusion:  There are large, moderate to severe defects in the apical,  anterolateral, and lateral walls that are predominantly  fixed, consistent with infarction with mild seamus-infarct  ischemia.  ------------------------------------------------------------------------  Confirmed on  4/2/2023 - 15:25:27 by Wilfredo Garibay MD, [facility],  [facility], RPVI  --------------------------------------------------------------------  < end of copied text >    < from: Cardiac Catheterization (04.04.23 @ 16:41) >  Diagnostic Conclusions:   Chronically occluded ramus artery stent. Mild atherosclerosis in proximal LAD. Patent mid LAD and ostial/proximal diagonal stents. Moderate atherosclerosis in proximal Cx. Mild atherosclerosis  in mid and distal RCA.    Recommendations:   Optimize medical therapy for ischemic heart disease. Aggressive risk factor modification.    < from: TTE W or WO Ultrasound Enhancing Agent (09.06.24 @ 16:50) >  CONCLUSIONS:   1. The left ventricular cavity is normal in size. Left ventricular wall thickness is normal. Abnormal (paradoxical) septal motion consistent with rightventricular pacemaker. Left ventricular systolic function is severely decreased with an ejection fraction visually estimated at 25 to 30%. There is global left ventricular hypokinesis. There is mild (grade 1) left ventricular diastolic dysfunction.  2. The right ventricular cavity is normal in size and right ventricular systolic function is normal. Tricuspid annular plane systolic excursion (TAPSE) is 2.6 cm (normal >=1.7 cm). A device lead is visualized in the right heart.   3. Structurally normal mitral valve with normal leaflet excursion. There is calcification of the mitral valve annulus. There is mild to moderate mitral regurgitation.   4. The left atrium is severely dilated with an indexed volume of 55.42 ml/m².   5. No prior echocardiogram is available for comparison.    < end of copied text >      ASSESSMENT/PLAN: Pt is an  84 year old Female with PMH DM, CAD s/p PCI to prox LAD/mid LAD/Diag/Ramus, last LHC 8/2022 with occluded prox Ramus stent, patent LAD and Diag stents, mild to mod atherosclerosis in pLcx and mRCA, managed medically, HTN, and CVA who presents with decompensated HFrEF and palpitations. She reports compliance with meds and diet. Is here visiting her grandson from [place].    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

## 2024-09-18 DIAGNOSIS — R82.71 BACTERIURIA: ICD-10-CM

## 2024-09-18 LAB
-  AMOXICILLIN/CLAVULANIC ACID: SIGNIFICANT CHANGE UP
-  AMPICILLIN/SULBACTAM: SIGNIFICANT CHANGE UP
-  AMPICILLIN: SIGNIFICANT CHANGE UP
-  AZTREONAM: SIGNIFICANT CHANGE UP
-  CEFAZOLIN: SIGNIFICANT CHANGE UP
-  CEFEPIME: SIGNIFICANT CHANGE UP
-  CEFOXITIN: SIGNIFICANT CHANGE UP
-  CEFTRIAXONE: SIGNIFICANT CHANGE UP
-  CEFUROXIME: SIGNIFICANT CHANGE UP
-  CIPROFLOXACIN: SIGNIFICANT CHANGE UP
-  ERTAPENEM: SIGNIFICANT CHANGE UP
-  GENTAMICIN: SIGNIFICANT CHANGE UP
-  IMIPENEM: SIGNIFICANT CHANGE UP
-  LEVOFLOXACIN: SIGNIFICANT CHANGE UP
-  MEROPENEM: SIGNIFICANT CHANGE UP
-  NITROFURANTOIN: SIGNIFICANT CHANGE UP
-  PIPERACILLIN/TAZOBACTAM: SIGNIFICANT CHANGE UP
-  TOBRAMYCIN: SIGNIFICANT CHANGE UP
-  TRIMETHOPRIM/SULFAMETHOXAZOLE: SIGNIFICANT CHANGE UP
ANION GAP SERPL CALC-SCNC: 10 MMOL/L — SIGNIFICANT CHANGE UP (ref 7–14)
BUN SERPL-MCNC: 19 MG/DL — SIGNIFICANT CHANGE UP (ref 7–23)
CALCIUM SERPL-MCNC: 8.9 MG/DL — SIGNIFICANT CHANGE UP (ref 8.4–10.5)
CHLORIDE SERPL-SCNC: 102 MMOL/L — SIGNIFICANT CHANGE UP (ref 98–107)
CO2 SERPL-SCNC: 26 MMOL/L — SIGNIFICANT CHANGE UP (ref 22–31)
CREAT SERPL-MCNC: 1.52 MG/DL — HIGH (ref 0.5–1.3)
CULTURE RESULTS: ABNORMAL
EGFR: 33 ML/MIN/1.73M2 — LOW
GLUCOSE BLDC GLUCOMTR-MCNC: 102 MG/DL — HIGH (ref 70–99)
GLUCOSE BLDC GLUCOMTR-MCNC: 157 MG/DL — HIGH (ref 70–99)
GLUCOSE BLDC GLUCOMTR-MCNC: 175 MG/DL — HIGH (ref 70–99)
GLUCOSE BLDC GLUCOMTR-MCNC: 231 MG/DL — HIGH (ref 70–99)
GLUCOSE SERPL-MCNC: 130 MG/DL — HIGH (ref 70–99)
HCT VFR BLD CALC: 29.9 % — LOW (ref 34.5–45)
HGB BLD-MCNC: 9.5 G/DL — LOW (ref 11.5–15.5)
MAGNESIUM SERPL-MCNC: 2.3 MG/DL — SIGNIFICANT CHANGE UP (ref 1.6–2.6)
MCHC RBC-ENTMCNC: 27.5 PG — SIGNIFICANT CHANGE UP (ref 27–34)
MCHC RBC-ENTMCNC: 31.8 GM/DL — LOW (ref 32–36)
MCV RBC AUTO: 86.4 FL — SIGNIFICANT CHANGE UP (ref 80–100)
METHOD TYPE: SIGNIFICANT CHANGE UP
NRBC # BLD: 0 /100 WBCS — SIGNIFICANT CHANGE UP (ref 0–0)
NRBC # FLD: 0 K/UL — SIGNIFICANT CHANGE UP (ref 0–0)
ORGANISM # SPEC MICROSCOPIC CNT: ABNORMAL
ORGANISM # SPEC MICROSCOPIC CNT: ABNORMAL
PHOSPHATE SERPL-MCNC: 4.1 MG/DL — SIGNIFICANT CHANGE UP (ref 2.5–4.5)
PLATELET # BLD AUTO: 277 K/UL — SIGNIFICANT CHANGE UP (ref 150–400)
POTASSIUM SERPL-MCNC: 4.4 MMOL/L — SIGNIFICANT CHANGE UP (ref 3.5–5.3)
POTASSIUM SERPL-SCNC: 4.4 MMOL/L — SIGNIFICANT CHANGE UP (ref 3.5–5.3)
RBC # BLD: 3.46 M/UL — LOW (ref 3.8–5.2)
RBC # FLD: 13.6 % — SIGNIFICANT CHANGE UP (ref 10.3–14.5)
SODIUM SERPL-SCNC: 138 MMOL/L — SIGNIFICANT CHANGE UP (ref 135–145)
SPECIMEN SOURCE: SIGNIFICANT CHANGE UP
WBC # BLD: 7.07 K/UL — SIGNIFICANT CHANGE UP (ref 3.8–10.5)
WBC # FLD AUTO: 7.07 K/UL — SIGNIFICANT CHANGE UP (ref 3.8–10.5)

## 2024-09-18 PROCEDURE — 99232 SBSQ HOSP IP/OBS MODERATE 35: CPT

## 2024-09-18 RX ORDER — CARVEDILOL 6.25 MG/1
3.12 TABLET ORAL EVERY 12 HOURS
Refills: 0 | Status: DISCONTINUED | OUTPATIENT
Start: 2024-09-18 | End: 2024-09-20

## 2024-09-18 RX ADMIN — Medication 1: at 09:03

## 2024-09-18 RX ADMIN — Medication 10 MILLIGRAM(S): at 12:37

## 2024-09-18 RX ADMIN — CARVEDILOL 3.12 MILLIGRAM(S): 6.25 TABLET ORAL at 17:59

## 2024-09-18 RX ADMIN — Medication 2 UNIT(S): at 18:00

## 2024-09-18 RX ADMIN — METOPROLOL TARTRATE 25 MILLIGRAM(S): 100 TABLET ORAL at 05:19

## 2024-09-18 RX ADMIN — Medication 100 MILLIGRAM(S): at 05:18

## 2024-09-18 RX ADMIN — Medication 10 MILLIGRAM(S): at 17:59

## 2024-09-18 RX ADMIN — Medication 2: at 12:38

## 2024-09-18 RX ADMIN — INSULIN GLARGINE 12 UNIT(S): 100 INJECTION, SOLUTION SUBCUTANEOUS at 22:41

## 2024-09-18 RX ADMIN — SACUBITRIL AND VALSARTAN 1 TABLET(S): 49; 51 TABLET, FILM COATED ORAL at 05:19

## 2024-09-18 RX ADMIN — DONEPEZIL HYDROCHLORIDE 10 MILLIGRAM(S): 5 TABLET, FILM COATED ORAL at 22:42

## 2024-09-18 RX ADMIN — Medication 40 MILLIGRAM(S): at 05:19

## 2024-09-18 RX ADMIN — POLYETHYLENE GLYCOL 3350 17 GRAM(S): 17 POWDER, FOR SOLUTION ORAL at 12:42

## 2024-09-18 RX ADMIN — Medication 100 MILLIGRAM(S): at 17:59

## 2024-09-18 RX ADMIN — Medication 75 MILLIGRAM(S): at 12:37

## 2024-09-18 RX ADMIN — Medication 2 UNIT(S): at 12:37

## 2024-09-18 RX ADMIN — Medication 10 MILLIGRAM(S): at 09:02

## 2024-09-18 RX ADMIN — Medication 2 UNIT(S): at 09:03

## 2024-09-18 RX ADMIN — SACUBITRIL AND VALSARTAN 1 TABLET(S): 49; 51 TABLET, FILM COATED ORAL at 17:59

## 2024-09-18 RX ADMIN — APIXABAN 5 MILLIGRAM(S): 5 TABLET, FILM COATED ORAL at 18:00

## 2024-09-18 RX ADMIN — Medication 2 TABLET(S): at 22:42

## 2024-09-18 RX ADMIN — Medication 80 MILLIGRAM(S): at 22:42

## 2024-09-18 RX ADMIN — APIXABAN 5 MILLIGRAM(S): 5 TABLET, FILM COATED ORAL at 05:18

## 2024-09-18 NOTE — PROGRESS NOTE ADULT - TIME BILLING
Time spent includes direct patient care  (interview and examination of patient), discussion with other providers, support staff and/or patient's family members, review of medical records, ordering diagnostic tests and analyzing results, and documentation.
- Reviewing, and interpreting labs and testing.  - Independently obtaining a review of systems and performing a physical exam  - Reviewing consultant documentation/recommendations in addition to discussing plan of care with consultants.  - Counselling and educating patient and family regarding interpretation of aforementioned items and plan of care.
Reviewed lab data, radiology results, consultants' recommendations, documentation in [place], discussed with family, ACP, interdisciplinary staff and/or intervention were performed.
Time spent includes direct patient care  (interview and examination of patient), discussion with other providers, support staff and/or patient's family members, review of medical records, ordering diagnostic tests and analyzing results, and documentation.
Review of laboratory data, radiology results, consultant recommendations, EMR documentation, discussion with patient/advanced care providers and interdisciplinary staff.
Reviewed lab data, radiology results, consultants' recommendations, documentation in [place], discussed with family, ACP, interdisciplinary staff and/or intervention were performed.
Review of laboratory data, radiology results, consultant recommendations, EMR documentation, discussion with patient/advanced care providers and interdisciplinary staff.
Review of laboratory data, radiology results, consultant recommendations, EMR documentation, discussion with patient/advanced care providers and interdisciplinary staff.
Reviewed lab data, radiology results, consultants' recommendations, documentation in [place], discussed with family, ACP, interdisciplinary staff and/or intervention were performed.
Time spent includes direct patient care  (interview and examination of patient), discussion with other providers, support staff and/or patient's family members, review of medical records, ordering diagnostic tests and analyzing results, and documentation.
Reviewed lab data, radiology results, consultants' recommendations, documentation in [place], discussed with family, ACP, interdisciplinary staff and/or intervention were performed.

## 2024-09-18 NOTE — PROGRESS NOTE ADULT - SUBJECTIVE AND OBJECTIVE BOX
PROGRESS NOTE:     Patient is a 84y old  Female who presents with a chief complaint of chest pain, sob (18 Sep 2024 11:24)      SUBJECTIVE / OVERNIGHT EVENTS: No acute events.     ADDITIONAL REVIEW OF SYSTEMS:    MEDICATIONS  (STANDING):  apixaban 5 milliGRAM(s) Oral every 12 hours  atorvastatin 80 milliGRAM(s) Oral at bedtime  carvedilol 3.125 milliGRAM(s) Oral every 12 hours  clopidogrel Tablet 75 milliGRAM(s) Oral daily  dicyclomine 10 milliGRAM(s) Oral three times a day before meals  donepezil 10 milliGRAM(s) Oral at bedtime  furosemide    Tablet 40 milliGRAM(s) Oral daily  gabapentin 100 milliGRAM(s) Oral two times a day  insulin glargine Injectable (LANTUS) 12 Unit(s) SubCutaneous at bedtime  insulin lispro (ADMELOG) corrective regimen sliding scale   SubCutaneous at bedtime  insulin lispro (ADMELOG) corrective regimen sliding scale   SubCutaneous three times a day before meals  insulin lispro Injectable (ADMELOG) 2 Unit(s) SubCutaneous three times a day before meals  pantoprazole    Tablet 40 milliGRAM(s) Oral before breakfast  polyethylene glycol 3350 17 Gram(s) Oral daily  sacubitril 24 mG/valsartan 26 mG 1 Tablet(s) Oral two times a day  senna 2 Tablet(s) Oral at bedtime    MEDICATIONS  (PRN):  acetaminophen     Tablet .. 650 milliGRAM(s) Oral every 6 hours PRN Temp greater or equal to 38C (100.4F), Mild Pain (1 - 3)  bisacodyl Suppository 10 milliGRAM(s) Rectal daily PRN Constipation  melatonin 3 milliGRAM(s) Oral at bedtime PRN Insomnia  morphine  - Injectable 1 milliGRAM(s) IV Push every 4 hours PRN Severe Pain (7 - 10)  sodium chloride 0.65% Nasal 1 Spray(s) Both Nostrils three times a day PRN Nasal Congestion      CAPILLARY BLOOD GLUCOSE      POCT Blood Glucose.: 231 mg/dL (18 Sep 2024 12:18)  POCT Blood Glucose.: 157 mg/dL (18 Sep 2024 08:31)  POCT Blood Glucose.: 168 mg/dL (17 Sep 2024 22:10)  POCT Blood Glucose.: 131 mg/dL (17 Sep 2024 17:23)    I&O's Summary    17 Sep 2024 07:01  -  18 Sep 2024 07:00  --------------------------------------------------------  IN: 750 mL / OUT: 650 mL / NET: 100 mL        PHYSICAL EXAM:  Vital Signs Last 24 Hrs  T(C): 36.6 (18 Sep 2024 11:58), Max: 37.2 (17 Sep 2024 20:00)  T(F): 97.9 (18 Sep 2024 11:58), Max: 98.9 (17 Sep 2024 20:00)  HR: 60 (18 Sep 2024 11:58) (60 - 62)  BP: 120/60 (18 Sep 2024 11:58) (120/60 - 137/66)  BP(mean): --  RR: 18 (18 Sep 2024 11:58) (18 - 18)  SpO2: 100% (18 Sep 2024 11:58) (100% - 100%)    Parameters below as of 18 Sep 2024 05:10  Patient On (Oxygen Delivery Method): room air      CONSTITUTIONAL: NAD  EYES: PERRLA  ENMT: Moist oral mucosa, normal dentition  NECK: Supple, no thyromegaly  RESPIRATORY: Normal respiratory effort; lungs are clear to auscultation bilaterally  CARDIOVASCULAR: S1 and S2 +ve, no murmur/rub/gallop;   ABDOMEN: Nontender to palpation, normoactive bowel sounds, no rebound/guarding;  EXTREMITIES: no significant pedal edema.    NEUROLOGY: CN 2-12 are intact and symmetric; no gross motor or sensory deficits   PSYCH: A+O to person, place, and time; affect appropriate  SKIN: No rashes; no palpable lesions.  PPM scar to L upper chest clean and dry    LABS:                        9.5    7.07  )-----------( 277      ( 18 Sep 2024 05:48 )             29.9     09-18    138  |  102  |  19  ----------------------------<  130[H]  4.4   |  26  |  1.52[H]    Ca    8.9      18 Sep 2024 05:48  Phos  4.1     09-18  Mg     2.30     09-18            Urinalysis Basic - ( 18 Sep 2024 05:48 )    Color: x / Appearance: x / SG: x / pH: x  Gluc: 130 mg/dL / Ketone: x  / Bili: x / Urobili: x   Blood: x / Protein: x / Nitrite: x   Leuk Esterase: x / RBC: x / WBC x   Sq Epi: x / Non Sq Epi: x / Bacteria: x          RADIOLOGY & ADDITIONAL TESTS:  Results Reviewed:   Imaging Personally Reviewed:  Electrocardiogram Personally Reviewed:    COORDINATION OF CARE:  Care Discussed with Consultants/Other Providers [Y/N]:  Prior or Outpatient Records Reviewed [Y/N]:

## 2024-09-18 NOTE — PROGRESS NOTE ADULT - PROBLEM SELECTOR PLAN 2
-likely d/t IBS   -Pain present for weeks   -Resumed dicyclomine 10mg tid   -CT angio of abdomen/pelvis negative for mesenteric ischemia

## 2024-09-18 NOTE — PHARMACOTHERAPY INTERVENTION NOTE - COMMENTS
Discharge medications reviewed with the patient and her daughters. Current medication schedule was discussed in detail including: medication name, indication, dose, administration times, treatment duration, side effects, and special instructions. Also discussed CHF management. Patient and daughters counseled on heart failure medication indications, administration, and side effects. Also discussed fluid and salt restrictions, and maintaining a log of daily weights. Patient's daughter reports patient is adherent to daily weights, medications, salt restriction and 1L fluid restriction. Discussed warning signs of fluid overload (SOB, orthopnea, CEDEÑO, edema, etc.) and when to seek medical attention. Patient and daughters verbalized understanding.  Questions and concerns were answered and addressed. Patient provided with CHF booklet.    Destinee Bravo, MomoD, BCPS  Clinical Pharmacy Specialist  [id number]

## 2024-09-18 NOTE — PROGRESS NOTE ADULT - SUBJECTIVE AND OBJECTIVE BOX
Date of service 9/18/24       No chest pain or SOB, ROS otherwise negative    Meds  acetaminophen     Tablet .. 650 milliGRAM(s) Oral every 6 hours PRN  apixaban 5 milliGRAM(s) Oral every 12 hours  atorvastatin 80 milliGRAM(s) Oral at bedtime  bisacodyl Suppository 10 milliGRAM(s) Rectal daily PRN  carvedilol 3.125 milliGRAM(s) Oral every 12 hours  clopidogrel Tablet 75 milliGRAM(s) Oral daily  dicyclomine 10 milliGRAM(s) Oral three times a day before meals  donepezil 10 milliGRAM(s) Oral at bedtime  furosemide    Tablet 40 milliGRAM(s) Oral daily  gabapentin 100 milliGRAM(s) Oral two times a day  insulin glargine Injectable (LANTUS) 12 Unit(s) SubCutaneous at bedtime  insulin lispro (ADMELOG) corrective regimen sliding scale   SubCutaneous three times a day before meals  insulin lispro (ADMELOG) corrective regimen sliding scale   SubCutaneous at bedtime  insulin lispro Injectable (ADMELOG) 2 Unit(s) SubCutaneous three times a day before meals  melatonin 3 milliGRAM(s) Oral at bedtime PRN  morphine  - Injectable 1 milliGRAM(s) IV Push every 4 hours PRN  pantoprazole    Tablet 40 milliGRAM(s) Oral before breakfast  polyethylene glycol 3350 17 Gram(s) Oral daily  sacubitril 24 mG/valsartan 26 mG 1 Tablet(s) Oral two times a day  senna 2 Tablet(s) Oral at bedtime  sodium chloride 0.65% Nasal 1 Spray(s) Both Nostrils three times a day PRN                            9.5    7.07  )-----------( 277      ( 18 Sep 2024 05:48 )             29.9       09-18    138  |  102  |  19  ----------------------------<  130[H]  4.4   |  26  |  1.52[H]    Ca    8.9      18 Sep 2024 05:48  Phos  4.1     09-18  Mg     2.30     09-18      T(C): 36.8 (09-18-24 @ 05:10), Max: 37.2 (09-17-24 @ 20:00)  HR: 61 (09-18-24 @ 05:10) (60 - 62)  BP: 137/66 (09-18-24 @ 05:10) (122/64 - 137/66)  RR: 18 (09-18-24 @ 05:10) (18 - 18)  SpO2: 100% (09-18-24 @ 05:10) (100% - 100%)  Wt(kg): --    I&O's Summary    17 Sep 2024 07:01  -  18 Sep 2024 07:00  --------------------------------------------------------  IN: 750 mL / OUT: 650 mL / NET: 100 mL    General: Well nourished in no acute distress. Alert and Oriented * 3.   Head: Normocephalic and atraumatic.   Neck: No JVD. No bruits. Supple. Does not appear to be enlarged.   Cardiovascular: + S1,S2 ; RRR Soft systolic murmur at the left lower sternal border. No rubs noted.    Lungs: CTA B/L  Abdomen: + BS, soft. Non tender. Non distended. No rebound. No guarding.   Extremities: No clubbing/cyanosis/edema.   Neurologic: Moves all four extremities. Full range of motion.   Skin: Warm and moist. The patient's skin has normal elasticity and good skin turgor.   Psychiatric: Appropriate mood and affect.  Musculoskeletal: Normal range of motion, normal strength    DATA      < from: Xray Chest 1 View- PORTABLE-Urgent (09.04.24 @ 21:40) >    IMPRESSION:  Bilateral perihilar and lower lung field hazy opacities, suggestive of pulmonary edema.       from: Cardiac Catheterization (08.18.22 @ 17:50) >  Diagnostic Conclusions:   Occluded proximal ramus stent. Patent LAD and diagonal stents.  Mild-moderate atherosclerosis in proximal Cx and mid RCA.      < from: Transthoracic Echocardiogram (03.30.23 @ 16:25) >  CONCLUSIONS:  1. Normal trileaflet aortic valve.  2. Normal left ventricular internal dimensions and wall thicknesses.  3. Moderate segmental left ventricular systolic dysfunction. Lateral and inferolateral wall hypokinesis.  4. Normal right ventricular size and function.  ------------------------------------------------------------------------  Confirmed on  3/31/2023 - 15:49:07 by Raciel Bucio M.D. RPVI      < from: Nuclear Stress Test-Exercise (04.02.23 @ 08:07) >  GATED ANALYSIS:  Post-stress gated wall motion analysis was performed (LVEF  = 34 %;LVEDV = 120 ml.), revealing overall severe hypokinesis.  ------------------------------------------------------------------------  IMPRESSIONS:Abnormal Study  * Chest Pain: No chest pain with administration of  Regadenoson.  * Symptom: Dyspnea.  * HR Response: Appropriate.  * BP Response: Appropriate.  * Heart Rhythm: Normal Sinus Rhythm - 65 BPM.  * Baseline ECG: Nonspecific ST-T wave abnormality.  * ECG Abnormalities: None.  * Arrhythmia: Occasional VPDs occurred during rest, stress  and recovery.  * There are large, moderate to severe defects in the apical, anterolateral, and lateral walls that are predominantly fixed, consistent with infarction with mild  seamus-infarct ischemia.  * Post-stress gated wall motion analysis was performed (LVEF = 34 %;LVEDV = 120 ml.), revealing overall severe  hypokinesis.  Conclusion:  There are large, moderate to severe defects in the apical,  anterolateral, and lateral walls that are predominantly  fixed, consistent with infarction with mild seamus-infarct  ischemia.  ------------------------------------------------------------------------  Confirmed on  4/2/2023 - 15:25:27 by Wilfredo Garibay MD, [facility],  JAMILAH, RPVI  --------------------------------------------------------------------  < end of copied text >    < from: Cardiac Catheterization (04.04.23 @ 16:41) >  Diagnostic Conclusions:   Chronically occluded ramus artery stent. Mild atherosclerosis in proximal LAD. Patent mid LAD and ostial/proximal diagonal stents. Moderate atherosclerosis in proximal Cx. Mild atherosclerosis  in mid and distal RCA.    Recommendations:   Optimize medical therapy for ischemic heart disease. Aggressive risk factor modification.    < from: TTE W or WO Ultrasound Enhancing Agent (09.06.24 @ 16:50) >  CONCLUSIONS:   1. The left ventricular cavity is normal in size. Left ventricular wall thickness is normal. Abnormal (paradoxical) septal motion consistent with rightventricular pacemaker. Left ventricular systolic function is severely decreased with an ejection fraction visually estimated at 25 to 30%. There is global left ventricular hypokinesis. There is mild (grade 1) left ventricular diastolic dysfunction.  2. The right ventricular cavity is normal in size and right ventricular systolic function is normal. Tricuspid annular plane systolic excursion (TAPSE) is 2.6 cm (normal >=1.7 cm). A device lead is visualized in the right heart.   3. Structurally normal mitral valve with normal leaflet excursion. There is calcification of the mitral valve annulus. There is mild to moderate mitral regurgitation.   4. The left atrium is severely dilated with an indexed volume of 55.42 ml/m².   5. No prior echocardiogram is available for comparison.    < end of copied text >      ASSESSMENT/PLAN: Pt is an  84 year old Female with PMH DM, CAD s/p PCI to prox LAD/mid LAD/Diag/Ramus, last [facility] 8/2022 with occluded prox Ramus stent, patent LAD and Diag stents, mild to mod atherosclerosis in pLcx and mRCA, managed medically, HTN, and CVA who presents with decompensated HFrEF and palpitations. She reports compliance with meds and diet. Is here visiting her grandson from [place].    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

## 2024-09-19 LAB
ANION GAP SERPL CALC-SCNC: 12 MMOL/L — SIGNIFICANT CHANGE UP (ref 7–14)
BUN SERPL-MCNC: 20 MG/DL — SIGNIFICANT CHANGE UP (ref 7–23)
CALCIUM SERPL-MCNC: 8.9 MG/DL — SIGNIFICANT CHANGE UP (ref 8.4–10.5)
CHLORIDE SERPL-SCNC: 101 MMOL/L — SIGNIFICANT CHANGE UP (ref 98–107)
CO2 SERPL-SCNC: 23 MMOL/L — SIGNIFICANT CHANGE UP (ref 22–31)
CREAT SERPL-MCNC: 1.5 MG/DL — HIGH (ref 0.5–1.3)
EGFR: 34 ML/MIN/1.73M2 — LOW
GLUCOSE BLDC GLUCOMTR-MCNC: 124 MG/DL — HIGH (ref 70–99)
GLUCOSE BLDC GLUCOMTR-MCNC: 140 MG/DL — HIGH (ref 70–99)
GLUCOSE BLDC GLUCOMTR-MCNC: 181 MG/DL — HIGH (ref 70–99)
GLUCOSE BLDC GLUCOMTR-MCNC: 299 MG/DL — HIGH (ref 70–99)
GLUCOSE SERPL-MCNC: 139 MG/DL — HIGH (ref 70–99)
MAGNESIUM SERPL-MCNC: 2.3 MG/DL — SIGNIFICANT CHANGE UP (ref 1.6–2.6)
PHOSPHATE SERPL-MCNC: 3.8 MG/DL — SIGNIFICANT CHANGE UP (ref 2.5–4.5)
POTASSIUM SERPL-MCNC: 4.7 MMOL/L — SIGNIFICANT CHANGE UP (ref 3.5–5.3)
POTASSIUM SERPL-SCNC: 4.7 MMOL/L — SIGNIFICANT CHANGE UP (ref 3.5–5.3)
SODIUM SERPL-SCNC: 136 MMOL/L — SIGNIFICANT CHANGE UP (ref 135–145)

## 2024-09-19 PROCEDURE — 99232 SBSQ HOSP IP/OBS MODERATE 35: CPT

## 2024-09-19 PROCEDURE — 74018 RADEX ABDOMEN 1 VIEW: CPT | Mod: 26

## 2024-09-19 RX ADMIN — CARVEDILOL 3.12 MILLIGRAM(S): 6.25 TABLET ORAL at 05:07

## 2024-09-19 RX ADMIN — CARVEDILOL 3.12 MILLIGRAM(S): 6.25 TABLET ORAL at 18:33

## 2024-09-19 RX ADMIN — Medication 40 MILLIGRAM(S): at 05:07

## 2024-09-19 RX ADMIN — Medication 80 MILLIGRAM(S): at 22:27

## 2024-09-19 RX ADMIN — Medication 2 UNIT(S): at 12:52

## 2024-09-19 RX ADMIN — Medication 75 MILLIGRAM(S): at 12:51

## 2024-09-19 RX ADMIN — Medication 40 MILLIGRAM(S): at 05:27

## 2024-09-19 RX ADMIN — Medication 10 MILLIGRAM(S): at 18:34

## 2024-09-19 RX ADMIN — SACUBITRIL AND VALSARTAN 1 TABLET(S): 49; 51 TABLET, FILM COATED ORAL at 05:07

## 2024-09-19 RX ADMIN — SACUBITRIL AND VALSARTAN 1 TABLET(S): 49; 51 TABLET, FILM COATED ORAL at 18:33

## 2024-09-19 RX ADMIN — Medication 10 MILLIGRAM(S): at 12:50

## 2024-09-19 RX ADMIN — Medication 2 UNIT(S): at 08:52

## 2024-09-19 RX ADMIN — INSULIN GLARGINE 12 UNIT(S): 100 INJECTION, SOLUTION SUBCUTANEOUS at 22:21

## 2024-09-19 RX ADMIN — Medication 3: at 12:52

## 2024-09-19 RX ADMIN — APIXABAN 5 MILLIGRAM(S): 5 TABLET, FILM COATED ORAL at 18:33

## 2024-09-19 RX ADMIN — DONEPEZIL HYDROCHLORIDE 10 MILLIGRAM(S): 5 TABLET, FILM COATED ORAL at 22:27

## 2024-09-19 RX ADMIN — Medication 2 UNIT(S): at 18:32

## 2024-09-19 RX ADMIN — APIXABAN 5 MILLIGRAM(S): 5 TABLET, FILM COATED ORAL at 05:07

## 2024-09-19 RX ADMIN — Medication 100 MILLIGRAM(S): at 05:07

## 2024-09-19 RX ADMIN — ACETAMINOPHEN 650 MILLIGRAM(S): 325 TABLET ORAL at 11:45

## 2024-09-19 RX ADMIN — Medication 10 MILLIGRAM(S): at 08:52

## 2024-09-19 RX ADMIN — ACETAMINOPHEN 650 MILLIGRAM(S): 325 TABLET ORAL at 12:29

## 2024-09-19 RX ADMIN — Medication 100 MILLIGRAM(S): at 18:36

## 2024-09-19 NOTE — PROGRESS NOTE ADULT - NS ATTEND AMEND GEN_ALL_CORE FT
able to lie flat comfortably.  agreeable to PPM upgrade tomorrow.  type + screen ordered.  NPO at midnight.  routine labs for tomorrow  - CBC, BMP, coags.  will consent in holding area.  continue pre-op Apixaban and Clopidogrel, will use procoagulant agents in the pocket at closure.  will follow with you.  If feeling well, could go home next day.
doing well from a CHF perspective.  LVEF worse now than last year / pre-pacemaker.  continue IV lasix today.  we may change to oral diuretics on Tuesday.  continue BB + Entresto.    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  ideally she should have had a biventricular pacemaker or conduction system pacemaker at the outset.  we discussed risk involved which include worsening renal function (may need to use contrast dye) and risk of infection (~3% on upgrade vs 1% at implant).  patient and family agreeable, but will discuss amongst themselves before signing consent forms.  will work on scheduling.  will order type+screen labs.
Patient seen and examined.  Agree with above.   Continue with medical management of cardiomyopathy   Hematoma around device stable   Monitor H/H     Oma Dumont MD
Patient seen and examined.  Agree with above.   Site hematoma stable with no increase in size  H/H stable  Continue with medical management of cardiomyopathy     Oma Dumont MD
Patient seen and examined. Agree with plan as detailed in PA/NP Note.     Isidro Reaves MD  Pager: [phone]
Patient seen and examined. Agree with plan as detailed in PA/NP Note.     Switch lopressor to Coreg      Isidro Reaves MD  Pager: [phone]
Patient seen and examined. Agree with plan as detailed in PA/NP Note.     Switch to coreg    Isidro Reaves MD  Pager: [phone]
Patient seen and examined. Agree with plan as detailed in PA/NP Note.     C/w Plavix, Eliquis, Entresto Coreg      Isidro Reaves MD  Pager: [phone]
seen post op from upgrade of dual chamber pacemaker to biventricular/conduction system pacing (added an RV lead deep in the interventricular septum).  dressing removed. dermabond + sutures intact.  small hematoma at inferior aspect of pocket.  minimally tender.  EKG now narrow-complex with conduction system pacing.  patient feels well.  CXR with leads in appropraite position and no pneumothorax.    EP to sign off.  OK to use ICE PACKS PRN for pain at pocket site.  will give followup for 10-14 days for device check and wound check at our office.  long term followup w/ her doctors in [place].

## 2024-09-19 NOTE — PROGRESS NOTE ADULT - PROBLEM SELECTOR PLAN 3
-No urinary symptoms   -urine culture w/ Klebsiella treated with Bactrim (9/15-17)  -d/c antibiotics

## 2024-09-19 NOTE — PROGRESS NOTE ADULT - SUBJECTIVE AND OBJECTIVE BOX
Date of service 9/19/24       No chest pain or SOB, c/o abdominal pain after BM, ROS otherwise negative    Meds  acetaminophen     Tablet .. 650 milliGRAM(s) Oral every 6 hours PRN  apixaban 5 milliGRAM(s) Oral every 12 hours  atorvastatin 80 milliGRAM(s) Oral at bedtime  bisacodyl Suppository 10 milliGRAM(s) Rectal daily PRN  carvedilol 3.125 milliGRAM(s) Oral every 12 hours  clopidogrel Tablet 75 milliGRAM(s) Oral daily  dicyclomine 10 milliGRAM(s) Oral three times a day before meals  donepezil 10 milliGRAM(s) Oral at bedtime  furosemide    Tablet 40 milliGRAM(s) Oral daily  gabapentin 100 milliGRAM(s) Oral two times a day  insulin glargine Injectable (LANTUS) 12 Unit(s) SubCutaneous at bedtime  insulin lispro (ADMELOG) corrective regimen sliding scale   SubCutaneous three times a day before meals  insulin lispro (ADMELOG) corrective regimen sliding scale   SubCutaneous at bedtime  insulin lispro Injectable (ADMELOG) 2 Unit(s) SubCutaneous three times a day before meals  melatonin 3 milliGRAM(s) Oral at bedtime PRN  morphine  - Injectable 1 milliGRAM(s) IV Push every 4 hours PRN  pantoprazole    Tablet 40 milliGRAM(s) Oral before breakfast  polyethylene glycol 3350 17 Gram(s) Oral daily  sacubitril 24 mG/valsartan 26 mG 1 Tablet(s) Oral two times a day  senna 2 Tablet(s) Oral at bedtime  sodium chloride 0.65% Nasal 1 Spray(s) Both Nostrils three times a day PRN                            9.5    7.07  )-----------( 277      ( 18 Sep 2024 05:48 )             29.9       09-19    136  |  101  |  20  ----------------------------<  139[H]  4.7   |  23  |  1.50[H]    Ca    8.9      19 Sep 2024 06:40  Phos  3.8     09-19  Mg     2.30     09-19        T(C): 36.8 (09-19-24 @ 12:00), Max: 37 (09-19-24 @ 05:00)  HR: 60 (09-19-24 @ 12:00) (60 - 80)  BP: 109/58 (09-19-24 @ 12:00) (109/58 - 127/72)  RR: 18 (09-19-24 @ 12:00) (18 - 18)  SpO2: 93% (09-19-24 @ 12:00) (93% - 100%)  Wt(kg): --    I&O's Summary    18 Sep 2024 07:01  -  19 Sep 2024 07:00  --------------------------------------------------------  IN: 400 mL / OUT: 600 mL / NET: -200 mL      General: Well nourished in no acute distress. Alert and Oriented * 3.   Head: Normocephalic and atraumatic.   Neck: No JVD. No bruits. Supple. Does not appear to be enlarged.   Cardiovascular: + S1,S2 ; RRR Soft systolic murmur at the left lower sternal border. No rubs noted.    Lungs: CTA B/L  Abdomen: + BS, soft. Non tender. Non distended. No rebound. No guarding.   Extremities: No clubbing/cyanosis/edema.   Neurologic: Moves all four extremities. Full range of motion.   Skin: Warm and moist. The patient's skin has normal elasticity and good skin turgor.   Psychiatric: Appropriate mood and affect.  Musculoskeletal: Normal range of motion, normal strength    DATA      < from: Xray Chest 1 View- PORTABLE-Urgent (09.04.24 @ 21:40) >    IMPRESSION:  Bilateral perihilar and lower lung field hazy opacities, suggestive of pulmonary edema.       from: Cardiac Catheterization (08.18.22 @ 17:50) >  Diagnostic Conclusions:   Occluded proximal ramus stent. Patent LAD and diagonal stents.  Mild-moderate atherosclerosis in proximal Cx and mid RCA.      < from: Transthoracic Echocardiogram (03.30.23 @ 16:25) >  CONCLUSIONS:  1. Normal trileaflet aortic valve.  2. Normal left ventricular internal dimensions and wall thicknesses.  3. Moderate segmental left ventricular systolic dysfunction. Lateral and inferolateral wall hypokinesis.  4. Normal right ventricular size and function.  ------------------------------------------------------------------------  Confirmed on  3/31/2023 - 15:49:07 by Raciel Bucio M.D. RPVI      < from: Nuclear Stress Test-Exercise (04.02.23 @ 08:07) >  GATED ANALYSIS:  Post-stress gated wall motion analysis was performed (LVEF  = 34 %;LVEDV = 120 ml.), revealing overall severe hypokinesis.  ------------------------------------------------------------------------  IMPRESSIONS:Abnormal Study  * Chest Pain: No chest pain with administration of  Regadenoson.  * Symptom: Dyspnea.  * HR Response: Appropriate.  * BP Response: Appropriate.  * Heart Rhythm: Normal Sinus Rhythm - 65 BPM.  * Baseline ECG: Nonspecific ST-T wave abnormality.  * ECG Abnormalities: None.  * Arrhythmia: Occasional VPDs occurred during rest, stress  and recovery.  * There are large, moderate to severe defects in the apical, anterolateral, and lateral walls that are predominantly fixed, consistent with infarction with mild  seamus-infarct ischemia.  * Post-stress gated wall motion analysis was performed (LVEF = 34 %;LVEDV = 120 ml.), revealing overall severe  hypokinesis.  Conclusion:  There are large, moderate to severe defects in the apical,  anterolateral, and lateral walls that are predominantly  fixed, consistent with infarction with mild seamus-infarct  ischemia.  ------------------------------------------------------------------------  Confirmed on  4/2/2023 - 15:25:27 by Wilfredo Garibay MD, [facility],  [facility], RPVI  --------------------------------------------------------------------  < end of copied text >    < from: Cardiac Catheterization (04.04.23 @ 16:41) >  Diagnostic Conclusions:   Chronically occluded ramus artery stent. Mild atherosclerosis in proximal LAD. Patent mid LAD and ostial/proximal diagonal stents. Moderate atherosclerosis in proximal Cx. Mild atherosclerosis  in mid and distal RCA.    Recommendations:   Optimize medical therapy for ischemic heart disease. Aggressive risk factor modification.    < from: TTE W or WO Ultrasound Enhancing Agent (09.06.24 @ 16:50) >  CONCLUSIONS:   1. The left ventricular cavity is normal in size. Left ventricular wall thickness is normal. Abnormal (paradoxical) septal motion consistent with rightventricular pacemaker. Left ventricular systolic function is severely decreased with an ejection fraction visually estimated at 25 to 30%. There is global left ventricular hypokinesis. There is mild (grade 1) left ventricular diastolic dysfunction.  2. The right ventricular cavity is normal in size and right ventricular systolic function is normal. Tricuspid annular plane systolic excursion (TAPSE) is 2.6 cm (normal >=1.7 cm). A device lead is visualized in the right heart.   3. Structurally normal mitral valve with normal leaflet excursion. There is calcification of the mitral valve annulus. There is mild to moderate mitral regurgitation.   4. The left atrium is severely dilated with an indexed volume of 55.42 ml/m².   5. No prior echocardiogram is available for comparison.    < end of copied text >      ASSESSMENT/PLAN: Pt is an  84 year old Female with PMH DM, CAD s/p PCI to prox LAD/mid LAD/Diag/Ramus, last [facility] 8/2022 with occluded prox Ramus stent, patent LAD and Diag stents, mild to mod atherosclerosis in pLcx and mRCA, managed medically, HTN, and CVA who presents with decompensated HFrEF and palpitations. She reports compliance with meds and diet. Is here visiting her grandson from [place].    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 650 milliGRAM(s) Oral every 6 hours PRN  apixaban 5 milliGRAM(s) Oral every 12 hours  atorvastatin 80 milliGRAM(s) Oral at bedtime  bisacodyl Suppository 10 milliGRAM(s) Rectal daily PRN  carvedilol 3.125 milliGRAM(s) Oral every 12 hours  clopidogrel Tablet 75 milliGRAM(s) Oral daily  dicyclomine 10 milliGRAM(s) Oral three times a day before meals  donepezil 10 milliGRAM(s) Oral at bedtime  furosemide    Tablet 40 milliGRAM(s) Oral daily  gabapentin 100 milliGRAM(s) Oral two times a day  insulin glargine Injectable (LANTUS) 12 Unit(s) SubCutaneous at bedtime  insulin lispro (ADMELOG) corrective regimen sliding scale   SubCutaneous three times a day before meals  insulin lispro (ADMELOG) corrective regimen sliding scale   SubCutaneous at bedtime  insulin lispro Injectable (ADMELOG) 2 Unit(s) SubCutaneous three times a day before meals  melatonin 3 milliGRAM(s) Oral at bedtime PRN  morphine  - Injectable 1 milliGRAM(s) IV Push every 4 hours PRN  pantoprazole    Tablet 40 milliGRAM(s) Oral before breakfast  polyethylene glycol 3350 17 Gram(s) Oral daily  sacubitril 24 mG/valsartan 26 mG 1 Tablet(s) Oral two times a day  senna 2 Tablet(s) Oral at bedtime  sodium chloride 0.65% Nasal 1 Spray(s) Both Nostrils three times a day PRN                            9.5    7.07  )-----------( 277      ( 18 Sep 2024 05:48 )             29.9       09-19    136  |  101  |  20  ----------------------------<  139[H]  4.7   |  23  |  1.50[H]    Ca    8.9      19 Sep 2024 06:40  Phos  3.8     09-19  Mg     2.30     09-19        T(C): 36.8 (09-19-24 @ 12:00), Max: 37 (09-19-24 @ 05:00)  HR: 60 (09-19-24 @ 12:00) (60 - 80)  BP: 109/58 (09-19-24 @ 12:00) (109/58 - 127/72)  RR: 18 (09-19-24 @ 12:00) (18 - 18)  SpO2: 93% (09-19-24 @ 12:00) (93% - 100%)  Wt(kg): --    I&O's Summary    18 Sep 2024 07:01  -  19 Sep 2024 07:00  --------------------------------------------------------  IN: 400 mL / OUT: 600 mL / NET: -200 mL      General: Well nourished in no acute distress. Alert and Oriented * 3.   Head: Normocephalic and atraumatic.   Neck: No JVD. No bruits. Supple. Does not appear to be enlarged.   Cardiovascular: + S1,S2 ; RRR Soft systolic murmur at the left lower sternal border. No rubs noted.    Lungs: CTA B/L  Abdomen: + BS, soft. Non tender. Non distended. No rebound. No guarding.   Extremities: No clubbing/cyanosis/edema.   Neurologic: Moves all four extremities. Full range of motion.   Skin: Warm and moist. The patient's skin has normal elasticity and good skin turgor.   Psychiatric: Appropriate mood and affect.  Musculoskeletal: Normal range of motion, normal strength    DATA      < from: Xray Chest 1 View- PORTABLE-Urgent (09.04.24 @ 21:40) >    IMPRESSION:  Bilateral perihilar and lower lung field hazy opacities, suggestive of pulmonary edema.       from: Cardiac Catheterization (08.18.22 @ 17:50) >  Diagnostic Conclusions:   Occluded proximal ramus stent. Patent LAD and diagonal stents.  Mild-moderate atherosclerosis in proximal Cx and mid RCA.      < from: Transthoracic Echocardiogram (03.30.23 @ 16:25) >  CONCLUSIONS:  1. Normal trileaflet aortic valve.  2. Normal left ventricular internal dimensions and wall thicknesses.  3. Moderate segmental left ventricular systolic dysfunction. Lateral and inferolateral wall hypokinesis.  4. Normal right ventricular size and function.  ------------------------------------------------------------------------  Confirmed on  3/31/2023 - 15:49:07 by Raciel Bucio M.D. RPVI      < from: Nuclear Stress Test-Exercise (04.02.23 @ 08:07) >  GATED ANALYSIS:  Post-stress gated wall motion analysis was performed (LVEF  = 34 %;LVEDV = 120 ml.), revealing overall severe hypokinesis.  ------------------------------------------------------------------------  IMPRESSIONS:Abnormal Study  * Chest Pain: No chest pain with administration of  Regadenoson.  * Symptom: Dyspnea.  * HR Response: Appropriate.  * BP Response: Appropriate.  * Heart Rhythm: Normal Sinus Rhythm - 65 BPM.  * Baseline ECG: Nonspecific ST-T wave abnormality.  * ECG Abnormalities: None.  * Arrhythmia: Occasional VPDs occurred during rest, stress  and recovery.  * There are large, moderate to severe defects in the apical, anterolateral, and lateral walls that are predominantly fixed, consistent with infarction with mild  seamus-infarct ischemia.  * Post-stress gated wall motion analysis was performed (LVEF = 34 %;LVEDV = 120 ml.), revealing overall severe  hypokinesis.  Conclusion:  There are large, moderate to severe defects in the apical,  anterolateral, and lateral walls that are predominantly  fixed, consistent with infarction with mild seamus-infarct  ischemia.  ------------------------------------------------------------------------  Confirmed on  4/2/2023 - 15:25:27 by Wilfredo Garibay MD, [facility],  [facility], RPVI  --------------------------------------------------------------------  < end of copied text >    < from: Cardiac Catheterization (04.04.23 @ 16:41) >  Diagnostic Conclusions:   Chronically occluded ramus artery stent. Mild atherosclerosis in proximal LAD. Patent mid LAD and ostial/proximal diagonal stents. Moderate atherosclerosis in proximal Cx. Mild atherosclerosis  in mid and distal RCA.    Recommendations:   Optimize medical therapy for ischemic heart disease. Aggressive risk factor modification.    < from: TTE W or WO Ultrasound Enhancing Agent (09.06.24 @ 16:50) >  CONCLUSIONS:   1. The left ventricular cavity is normal in size. Left ventricular wall thickness is normal. Abnormal (paradoxical) septal motion consistent with rightventricular pacemaker. Left ventricular systolic function is severely decreased with an ejection fraction visually estimated at 25 to 30%. There is global left ventricular hypokinesis. There is mild (grade 1) left ventricular diastolic dysfunction.  2. The right ventricular cavity is normal in size and right ventricular systolic function is normal. Tricuspid annular plane systolic excursion (TAPSE) is 2.6 cm (normal >=1.7 cm). A device lead is visualized in the right heart.   3. Structurally normal mitral valve with normal leaflet excursion. There is calcification of the mitral valve annulus. There is mild to moderate mitral regurgitation.   4. The left atrium is severely dilated with an indexed volume of 55.42 ml/m².   5. No prior echocardiogram is available for comparison.    < end of copied text >      ASSESSMENT/PLAN: Pt is an  84 year old Female with PMH DM, CAD s/p PCI to prox LAD/mid LAD/Diag/Ramus, last [facility] 8/2022 with occluded prox Ramus stent, patent LAD and Diag stents, mild to mod atherosclerosis in pLcx and mRCA, managed medically, HTN, and CVA who presents with decompensated HFrEF and palpitations. She reports compliance with meds and diet. Is here visiting her grandson from [place].    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

## 2024-09-19 NOTE — PROGRESS NOTE ADULT - NS ATTEND OPT1 GEN_ALL_CORE

## 2024-09-19 NOTE — PROGRESS NOTE ADULT - SUBJECTIVE AND OBJECTIVE BOX
Dr. Angela Juarez  Pager [phone]    PROGRESS NOTE:     Patient is a 84y old  Female who presents with a chief complaint of chest pain, sob (18 Sep 2024 13:02)      SUBJECTIVE / OVERNIGHT EVENTS: afebrile   ADDITIONAL REVIEW OF SYSTEMS: denies chest pain or sob     MEDICATIONS  (STANDING):  apixaban 5 milliGRAM(s) Oral every 12 hours  atorvastatin 80 milliGRAM(s) Oral at bedtime  carvedilol 3.125 milliGRAM(s) Oral every 12 hours  clopidogrel Tablet 75 milliGRAM(s) Oral daily  dicyclomine 10 milliGRAM(s) Oral three times a day before meals  donepezil 10 milliGRAM(s) Oral at bedtime  furosemide    Tablet 40 milliGRAM(s) Oral daily  gabapentin 100 milliGRAM(s) Oral two times a day  insulin glargine Injectable (LANTUS) 12 Unit(s) SubCutaneous at bedtime  insulin lispro (ADMELOG) corrective regimen sliding scale   SubCutaneous three times a day before meals  insulin lispro (ADMELOG) corrective regimen sliding scale   SubCutaneous at bedtime  insulin lispro Injectable (ADMELOG) 2 Unit(s) SubCutaneous three times a day before meals  pantoprazole    Tablet 40 milliGRAM(s) Oral before breakfast  polyethylene glycol 3350 17 Gram(s) Oral daily  sacubitril 24 mG/valsartan 26 mG 1 Tablet(s) Oral two times a day  senna 2 Tablet(s) Oral at bedtime    MEDICATIONS  (PRN):  acetaminophen     Tablet .. 650 milliGRAM(s) Oral every 6 hours PRN Temp greater or equal to 38C (100.4F), Mild Pain (1 - 3)  bisacodyl Suppository 10 milliGRAM(s) Rectal daily PRN Constipation  melatonin 3 milliGRAM(s) Oral at bedtime PRN Insomnia  morphine  - Injectable 1 milliGRAM(s) IV Push every 4 hours PRN Severe Pain (7 - 10)  sodium chloride 0.65% Nasal 1 Spray(s) Both Nostrils three times a day PRN Nasal Congestion      CAPILLARY BLOOD GLUCOSE      POCT Blood Glucose.: 299 mg/dL (19 Sep 2024 12:16)  POCT Blood Glucose.: 140 mg/dL (19 Sep 2024 08:26)  POCT Blood Glucose.: 175 mg/dL (18 Sep 2024 21:58)  POCT Blood Glucose.: 102 mg/dL (18 Sep 2024 17:41)    I&O's Summary    18 Sep 2024 07:01  -  19 Sep 2024 07:00  --------------------------------------------------------  IN: 400 mL / OUT: 600 mL / NET: -200 mL        PHYSICAL EXAM:  Vital Signs Last 24 Hrs  T(C): 36.8 (19 Sep 2024 12:00), Max: 37 (19 Sep 2024 05:00)  T(F): 98.3 (19 Sep 2024 12:00), Max: 98.6 (19 Sep 2024 05:00)  HR: 60 (19 Sep 2024 12:00) (60 - 80)  BP: 109/58 (19 Sep 2024 12:00) (109/58 - 127/72)  BP(mean): --  RR: 18 (19 Sep 2024 12:00) (18 - 18)  SpO2: 93% (19 Sep 2024 12:00) (93% - 100%)    Parameters below as of 19 Sep 2024 05:00  Patient On (Oxygen Delivery Method): room air        CONSTITUTIONAL: NAD  EYES: PERRLA  ENMT: Moist oral mucosa, normal dentition  NECK: Supple, no thyromegaly  RESPIRATORY: Normal respiratory effort; lungs are clear to auscultation bilaterally  CARDIOVASCULAR: S1 and S2 +ve, no murmur/rub/gallop;   ABDOMEN: Nontender to palpation, normoactive bowel sounds, no rebound/guarding;  EXTREMITIES: no significant pedal edema.    NEUROLOGY: CN 2-12 are intact and symmetric; no gross motor or sensory deficits   PSYCH: A+O to person, place, and time; affect appropriate  SKIN: No rashes; no palpable lesions.  PPM scar to L upper chest clean and dry  LABS:                        9.5    7.07  )-----------( 277      ( 18 Sep 2024 05:48 )             29.9     09-19    136  |  101  |  20  ----------------------------<  139[H]  4.7   |  23  |  1.50[H]    Ca    8.9      19 Sep 2024 06:40  Phos  3.8     09-19  Mg     2.30     09-19            Urinalysis Basic - ( 19 Sep 2024 06:40 )    Color: x / Appearance: x / SG: x / pH: x  Gluc: 139 mg/dL / Ketone: x  / Bili: x / Urobili: x   Blood: x / Protein: x / Nitrite: x   Leuk Esterase: x / RBC: x / WBC x   Sq Epi: x / Non Sq Epi: x / Bacteria: x          RADIOLOGY & ADDITIONAL TESTS:  Results Reviewed:   Imaging Personally Reviewed:  Electrocardiogram Personally Reviewed:    COORDINATION OF CARE:  Care Discussed with Consultants/Other Providers [Y/N]:  Prior or Outpatient Records Reviewed [Y/N]:

## 2024-09-20 ENCOUNTER — TRANSCRIPTION ENCOUNTER (OUTPATIENT)
Age: 84
End: 2024-09-20

## 2024-09-20 VITALS
HEART RATE: 60 BPM | SYSTOLIC BLOOD PRESSURE: 111 MMHG | OXYGEN SATURATION: 100 % | TEMPERATURE: 98 F | DIASTOLIC BLOOD PRESSURE: 68 MMHG | RESPIRATION RATE: 18 BRPM

## 2024-09-20 LAB
ANION GAP SERPL CALC-SCNC: 15 MMOL/L — HIGH (ref 7–14)
BUN SERPL-MCNC: 17 MG/DL — SIGNIFICANT CHANGE UP (ref 7–23)
CALCIUM SERPL-MCNC: 9.3 MG/DL — SIGNIFICANT CHANGE UP (ref 8.4–10.5)
CHLORIDE SERPL-SCNC: 103 MMOL/L — SIGNIFICANT CHANGE UP (ref 98–107)
CO2 SERPL-SCNC: 21 MMOL/L — LOW (ref 22–31)
CREAT SERPL-MCNC: 1.39 MG/DL — HIGH (ref 0.5–1.3)
EGFR: 37 ML/MIN/1.73M2 — LOW
GLUCOSE BLDC GLUCOMTR-MCNC: 179 MG/DL — HIGH (ref 70–99)
GLUCOSE BLDC GLUCOMTR-MCNC: 190 MG/DL — HIGH (ref 70–99)
GLUCOSE SERPL-MCNC: 167 MG/DL — HIGH (ref 70–99)
HCT VFR BLD CALC: 32.3 % — LOW (ref 34.5–45)
HGB BLD-MCNC: 10.2 G/DL — LOW (ref 11.5–15.5)
MAGNESIUM SERPL-MCNC: 2.4 MG/DL — SIGNIFICANT CHANGE UP (ref 1.6–2.6)
MCHC RBC-ENTMCNC: 27.6 PG — SIGNIFICANT CHANGE UP (ref 27–34)
MCHC RBC-ENTMCNC: 31.6 GM/DL — LOW (ref 32–36)
MCV RBC AUTO: 87.5 FL — SIGNIFICANT CHANGE UP (ref 80–100)
NRBC # BLD: 0 /100 WBCS — SIGNIFICANT CHANGE UP (ref 0–0)
NRBC # FLD: 0 K/UL — SIGNIFICANT CHANGE UP (ref 0–0)
PHOSPHATE SERPL-MCNC: 3.9 MG/DL — SIGNIFICANT CHANGE UP (ref 2.5–4.5)
PLATELET # BLD AUTO: 293 K/UL — SIGNIFICANT CHANGE UP (ref 150–400)
POTASSIUM SERPL-MCNC: 4.5 MMOL/L — SIGNIFICANT CHANGE UP (ref 3.5–5.3)
POTASSIUM SERPL-SCNC: 4.5 MMOL/L — SIGNIFICANT CHANGE UP (ref 3.5–5.3)
RBC # BLD: 3.69 M/UL — LOW (ref 3.8–5.2)
RBC # FLD: 13.5 % — SIGNIFICANT CHANGE UP (ref 10.3–14.5)
SARS-COV-2 RNA SPEC QL NAA+PROBE: SIGNIFICANT CHANGE UP
SODIUM SERPL-SCNC: 139 MMOL/L — SIGNIFICANT CHANGE UP (ref 135–145)
WBC # BLD: 5.8 K/UL — SIGNIFICANT CHANGE UP (ref 3.8–10.5)
WBC # FLD AUTO: 5.8 K/UL — SIGNIFICANT CHANGE UP (ref 3.8–10.5)

## 2024-09-20 PROCEDURE — 99239 HOSP IP/OBS DSCHRG MGMT >30: CPT

## 2024-09-20 RX ORDER — CARVEDILOL 6.25 MG/1
1 TABLET ORAL
Qty: 0 | Refills: 0 | DISCHARGE
Start: 2024-09-20

## 2024-09-20 RX ORDER — AMLODIPINE BESYLATE 10 MG/1
1 TABLET ORAL
Refills: 0 | DISCHARGE

## 2024-09-20 RX ORDER — GABAPENTIN 100 MG
200 CAPSULE ORAL
Refills: 0 | DISCHARGE

## 2024-09-20 RX ORDER — SENNA 187 MG
2 TABLET ORAL
Qty: 0 | Refills: 0 | DISCHARGE
Start: 2024-09-20

## 2024-09-20 RX ORDER — INSULIN ASPART 100 [IU]/ML
2 INJECTION, SOLUTION INTRAVENOUS; SUBCUTANEOUS
Qty: 1 | Refills: 0
Start: 2024-09-20 | End: 2024-10-09

## 2024-09-20 RX ORDER — DICYCLOMINE HCL 20 MG
1 TABLET ORAL
Qty: 0 | Refills: 0 | DISCHARGE
Start: 2024-09-20

## 2024-09-20 RX ORDER — GABAPENTIN 100 MG
1 CAPSULE ORAL
Qty: 0 | Refills: 0 | DISCHARGE
Start: 2024-09-20

## 2024-09-20 RX ORDER — FUROSEMIDE 40 MG
1 TABLET ORAL
Qty: 0 | Refills: 0 | DISCHARGE
Start: 2024-09-20

## 2024-09-20 RX ORDER — INSULIN GLARGINE 100 [IU]/ML
48 INJECTION, SOLUTION SUBCUTANEOUS
Refills: 0 | DISCHARGE

## 2024-09-20 RX ORDER — POLYETHYLENE GLYCOL 3350 17 G/17G
17 POWDER, FOR SOLUTION ORAL
Qty: 0 | Refills: 0 | DISCHARGE
Start: 2024-09-20

## 2024-09-20 RX ORDER — INSULIN GLARGINE 100 [IU]/ML
12 INJECTION, SOLUTION SUBCUTANEOUS
Qty: 0 | Refills: 0 | DISCHARGE
Start: 2024-09-20

## 2024-09-20 RX ORDER — FUROSEMIDE 40 MG
1 TABLET ORAL
Refills: 0 | DISCHARGE

## 2024-09-20 RX ADMIN — Medication 75 MILLIGRAM(S): at 11:24

## 2024-09-20 RX ADMIN — Medication 100 MILLIGRAM(S): at 05:28

## 2024-09-20 RX ADMIN — Medication 2 UNIT(S): at 13:03

## 2024-09-20 RX ADMIN — Medication 40 MILLIGRAM(S): at 05:25

## 2024-09-20 RX ADMIN — Medication 1: at 13:03

## 2024-09-20 RX ADMIN — Medication 2 UNIT(S): at 09:25

## 2024-09-20 RX ADMIN — APIXABAN 5 MILLIGRAM(S): 5 TABLET, FILM COATED ORAL at 05:24

## 2024-09-20 RX ADMIN — Medication 40 MILLIGRAM(S): at 05:24

## 2024-09-20 RX ADMIN — CARVEDILOL 3.12 MILLIGRAM(S): 6.25 TABLET ORAL at 05:25

## 2024-09-20 RX ADMIN — Medication 10 MILLIGRAM(S): at 11:23

## 2024-09-20 RX ADMIN — SACUBITRIL AND VALSARTAN 1 TABLET(S): 49; 51 TABLET, FILM COATED ORAL at 05:24

## 2024-09-20 RX ADMIN — Medication 10 MILLIGRAM(S): at 05:25

## 2024-09-20 RX ADMIN — Medication 1: at 09:25

## 2024-09-20 NOTE — PROGRESS NOTE ADULT - SUBJECTIVE AND OBJECTIVE BOX
DATE OF SERVICE: 09-20-24    Patient denies chest pain or shortness of breath.   Review of symptoms otherwise negative.    MEDICATIONS:  acetaminophen     Tablet .. 650 milliGRAM(s) Oral every 6 hours PRN  apixaban 5 milliGRAM(s) Oral every 12 hours  atorvastatin 80 milliGRAM(s) Oral at bedtime  bisacodyl Suppository 10 milliGRAM(s) Rectal daily PRN  carvedilol 3.125 milliGRAM(s) Oral every 12 hours  clopidogrel Tablet 75 milliGRAM(s) Oral daily  dicyclomine 10 milliGRAM(s) Oral three times a day before meals  donepezil 10 milliGRAM(s) Oral at bedtime  furosemide    Tablet 40 milliGRAM(s) Oral daily  gabapentin 100 milliGRAM(s) Oral two times a day  insulin glargine Injectable (LANTUS) 12 Unit(s) SubCutaneous at bedtime  insulin lispro (ADMELOG) corrective regimen sliding scale   SubCutaneous at bedtime  insulin lispro (ADMELOG) corrective regimen sliding scale   SubCutaneous three times a day before meals  insulin lispro Injectable (ADMELOG) 2 Unit(s) SubCutaneous three times a day before meals  melatonin 3 milliGRAM(s) Oral at bedtime PRN  morphine  - Injectable 1 milliGRAM(s) IV Push every 4 hours PRN  pantoprazole    Tablet 40 milliGRAM(s) Oral before breakfast  polyethylene glycol 3350 17 Gram(s) Oral daily  sacubitril 24 mG/valsartan 26 mG 1 Tablet(s) Oral two times a day  senna 2 Tablet(s) Oral at bedtime  sodium chloride 0.65% Nasal 1 Spray(s) Both Nostrils three times a day PRN      LABS:                        10.2   5.80  )-----------( 293      ( 20 Sep 2024 05:15 )             32.3       Hemoglobin: 10.2 g/dL (09-20 @ 05:15)  Hemoglobin: 9.5 g/dL (09-18 @ 05:48)  Hemoglobin: 9.8 g/dL (09-17 @ 05:51)  Hemoglobin: 10.1 g/dL (09-16 @ 07:02)      09-20    139  |  103  |  17  ----------------------------<  167[H]  4.5   |  21[L]  |  1.39[H]    Ca    9.3      20 Sep 2024 05:15  Phos  3.9     09-20  Mg     2.40     09-20      Creatinine Trend: 1.39<--, 1.50<--, 1.52<--, 1.45<--, 1.19<--, 1.03<--    COAGS:           PHYSICAL EXAM:  T(C): 36.3 (09-20-24 @ 08:00), Max: 36.8 (09-19-24 @ 12:00)  HR: 60 (09-20-24 @ 08:00) (60 - 63)  BP: 115/61 (09-20-24 @ 08:00) (109/58 - 143/65)  RR: 18 (09-20-24 @ 08:00) (18 - 18)  SpO2: 100% (09-20-24 @ 08:00) (93% - 100%)  Wt(kg): --    I&O's Summary    19 Sep 2024 07:01  -  20 Sep 2024 07:00  --------------------------------------------------------  IN: 500 mL / OUT: 700 mL / NET: -200 mL        General: Well nourished in no acute distress. Alert and Oriented * 3.   Head: Normocephalic and atraumatic.   Neck: No JVD. No bruits. Supple. Does not appear to be enlarged.   Cardiovascular: + S1,S2 ; RRR Soft systolic murmur at the left lower sternal border. No rubs noted.    Lungs: CTA B/L  Abdomen: + BS, soft. Non tender. Non distended. No rebound. No guarding.   Extremities: No clubbing/cyanosis/edema.   Neurologic: Moves all four extremities. Full range of motion.   Skin: Warm and moist. The patient's skin has normal elasticity and good skin turgor.   Psychiatric: Appropriate mood and affect.  Musculoskeletal: Normal range of motion, normal strength    DATA      < from: Xray Chest 1 View- PORTABLE-Urgent (09.04.24 @ 21:40) >    IMPRESSION:  Bilateral perihilar and lower lung field hazy opacities, suggestive of pulmonary edema.       from: Cardiac Catheterization (08.18.22 @ 17:50) >  Diagnostic Conclusions:   Occluded proximal ramus stent. Patent LAD and diagonal stents.  Mild-moderate atherosclerosis in proximal Cx and mid RCA.      < from: Transthoracic Echocardiogram (03.30.23 @ 16:25) >  CONCLUSIONS:  1. Normal trileaflet aortic valve.  2. Normal left ventricular internal dimensions and wall thicknesses.  3. Moderate segmental left ventricular systolic dysfunction. Lateral and inferolateral wall hypokinesis.  4. Normal right ventricular size and function.  ------------------------------------------------------------------------  Confirmed on  3/31/2023 - 15:49:07 by Raciel Bucio M.D. RPVI      < from: Nuclear Stress Test-Exercise (04.02.23 @ 08:07) >  GATED ANALYSIS:  Post-stress gated wall motion analysis was performed (LVEF  = 34 %;LVEDV = 120 ml.), revealing overall severe hypokinesis.  ------------------------------------------------------------------------  IMPRESSIONS:Abnormal Study  * Chest Pain: No chest pain with administration of  Regadenoson.  * Symptom: Dyspnea.  * HR Response: Appropriate.  * BP Response: Appropriate.  * Heart Rhythm: Normal Sinus Rhythm - 65 BPM.  * Baseline ECG: Nonspecific ST-T wave abnormality.  * ECG Abnormalities: None.  * Arrhythmia: Occasional VPDs occurred during rest, stress  and recovery.  * There are large, moderate to severe defects in the apical, anterolateral, and lateral walls that are predominantly fixed, consistent with infarction with mild  seamus-infarct ischemia.  * Post-stress gated wall motion analysis was performed (LVEF = 34 %;LVEDV = 120 ml.), revealing overall severe  hypokinesis.  Conclusion:  There are large, moderate to severe defects in the apical,  anterolateral, and lateral walls that are predominantly  fixed, consistent with infarction with mild seamus-infarct  ischemia.  ------------------------------------------------------------------------  Confirmed on  4/2/2023 - 15:25:27 by Wilfredo Garibay MD, [facility],  [facility], [facility]  --------------------------------------------------------------------  < end of copied text >    < from: Cardiac Catheterization (04.04.23 @ 16:41) >  Diagnostic Conclusions:   Chronically occluded ramus artery stent. Mild atherosclerosis in proximal LAD. Patent mid LAD and ostial/proximal diagonal stents. Moderate atherosclerosis in proximal Cx. Mild atherosclerosis  in mid and distal RCA.    Recommendations:   Optimize medical therapy for ischemic heart disease. Aggressive risk factor modification.    < from: TTE W or WO Ultrasound Enhancing Agent (09.06.24 @ 16:50) >  CONCLUSIONS:   1. The left ventricular cavity is normal in size. Left ventricular wall thickness is normal. Abnormal (paradoxical) septal motion consistent with rightventricular pacemaker. Left ventricular systolic function is severely decreased with an ejection fraction visually estimated at 25 to 30%. There is global left ventricular hypokinesis. There is mild (grade 1) left ventricular diastolic dysfunction.  2. The right ventricular cavity is normal in size and right ventricular systolic function is normal. Tricuspid annular plane systolic excursion (TAPSE) is 2.6 cm (normal >=1.7 cm). A device lead is visualized in the right heart.   3. Structurally normal mitral valve with normal leaflet excursion. There is calcification of the mitral valve annulus. There is mild to moderate mitral regurgitation.   4. The left atrium is severely dilated with an indexed volume of 55.42 ml/m².   5. No prior echocardiogram is available for comparison.    < end of copied text >      ASSESSMENT/PLAN: Pt is an  84 year old Female with PMH DM, CAD s/p PCI to prox LAD/mid LAD/Diag/Ramus, last LHC 8/2022 with occluded prox Ramus stent, patent LAD and Diag stents, mild to mod atherosclerosis in pLcx and mRCA, managed medically, HTN, and CVA who presents with decompensated HFrEF and palpitations. She reports compliance with meds and diet. Is here visiting her grandson from [place].    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

## 2024-09-20 NOTE — PROGRESS NOTE ADULT - PROBLEM SELECTOR PROBLEM 1
Acute exacerbation of CHF (congestive heart failure)
Acute respiratory failure with hypoxia
Acute exacerbation of CHF (congestive heart failure)

## 2024-09-20 NOTE — DISCHARGE NOTE NURSING/CASE MANAGEMENT/SOCIAL WORK - PATIENT PORTAL LINK FT
You can access the FollowMyHealth Patient Portal offered by [facility] by registering at the following website: [email or web address]. By joining [facility]’s FollowMyHealth portal, you will also be able to view your health information using other applications (apps) compatible with our system.

## 2024-09-20 NOTE — DISCHARGE NOTE NURSING/CASE MANAGEMENT/SOCIAL WORK - NSDCCRNAME_GEN_ALL_CORE_FT
[facility] [address] [facility] [place] [address]

## 2024-09-20 NOTE — PROGRESS NOTE ADULT - SUBJECTIVE AND OBJECTIVE BOX
PROGRESS NOTE:     Patient is a 84y old  Female who presents with a chief complaint of chest pain, sob (20 Sep 2024 10:16)      SUBJECTIVE / OVERNIGHT EVENTS: No acute events.     ADDITIONAL REVIEW OF SYSTEMS:    MEDICATIONS  (STANDING):  apixaban 5 milliGRAM(s) Oral every 12 hours  atorvastatin 80 milliGRAM(s) Oral at bedtime  carvedilol 3.125 milliGRAM(s) Oral every 12 hours  clopidogrel Tablet 75 milliGRAM(s) Oral daily  dicyclomine 10 milliGRAM(s) Oral three times a day before meals  donepezil 10 milliGRAM(s) Oral at bedtime  furosemide    Tablet 40 milliGRAM(s) Oral daily  gabapentin 100 milliGRAM(s) Oral two times a day  insulin glargine Injectable (LANTUS) 12 Unit(s) SubCutaneous at bedtime  insulin lispro (ADMELOG) corrective regimen sliding scale   SubCutaneous three times a day before meals  insulin lispro (ADMELOG) corrective regimen sliding scale   SubCutaneous at bedtime  insulin lispro Injectable (ADMELOG) 2 Unit(s) SubCutaneous three times a day before meals  pantoprazole    Tablet 40 milliGRAM(s) Oral before breakfast  polyethylene glycol 3350 17 Gram(s) Oral daily  sacubitril 24 mG/valsartan 26 mG 1 Tablet(s) Oral two times a day  senna 2 Tablet(s) Oral at bedtime    MEDICATIONS  (PRN):  acetaminophen     Tablet .. 650 milliGRAM(s) Oral every 6 hours PRN Temp greater or equal to 38C (100.4F), Mild Pain (1 - 3)  bisacodyl Suppository 10 milliGRAM(s) Rectal daily PRN Constipation  melatonin 3 milliGRAM(s) Oral at bedtime PRN Insomnia  morphine  - Injectable 1 milliGRAM(s) IV Push every 4 hours PRN Severe Pain (7 - 10)  sodium chloride 0.65% Nasal 1 Spray(s) Both Nostrils three times a day PRN Nasal Congestion      CAPILLARY BLOOD GLUCOSE      POCT Blood Glucose.: 190 mg/dL (20 Sep 2024 09:00)  POCT Blood Glucose.: 181 mg/dL (19 Sep 2024 21:50)  POCT Blood Glucose.: 124 mg/dL (19 Sep 2024 17:52)  POCT Blood Glucose.: 299 mg/dL (19 Sep 2024 12:16)    I&O's Summary    19 Sep 2024 07:01  -  20 Sep 2024 07:00  --------------------------------------------------------  IN: 500 mL / OUT: 700 mL / NET: -200 mL        PHYSICAL EXAM:  Vital Signs Last 24 Hrs  T(C): 36.3 (20 Sep 2024 08:00), Max: 36.8 (19 Sep 2024 12:00)  T(F): 97.4 (20 Sep 2024 08:00), Max: 98.3 (19 Sep 2024 12:00)  HR: 60 (20 Sep 2024 08:00) (60 - 63)  BP: 115/61 (20 Sep 2024 08:00) (109/58 - 143/65)  BP(mean): --  RR: 18 (20 Sep 2024 08:00) (18 - 18)  SpO2: 100% (20 Sep 2024 08:00) (93% - 100%)    Parameters below as of 20 Sep 2024 08:00  Patient On (Oxygen Delivery Method): room air      CONSTITUTIONAL: NAD  EYES: PERRLA  ENMT: Moist oral mucosa, normal dentition  NECK: Supple, no thyromegaly  RESPIRATORY: Normal respiratory effort; lungs are clear to auscultation bilaterally  CARDIOVASCULAR: S1 and S2 +ve, no murmur/rub/gallop;   ABDOMEN: Nontender to palpation, normoactive bowel sounds, no rebound/guarding;  EXTREMITIES: no significant pedal edema.    NEUROLOGY: CN 2-12 are intact and symmetric; no gross motor or sensory deficits   PSYCH: A+O to person, place, and time; affect appropriate  SKIN: No rashes; no palpable lesions.  PPM scar to L upper chest clean and dry    LABS:                        10.2   5.80  )-----------( 293      ( 20 Sep 2024 05:15 )             32.3     09-20    139  |  103  |  17  ----------------------------<  167[H]  4.5   |  21[L]  |  1.39[H]    Ca    9.3      20 Sep 2024 05:15  Phos  3.9     09-20  Mg     2.40     09-20            Urinalysis Basic - ( 20 Sep 2024 05:15 )    Color: x / Appearance: x / SG: x / pH: x  Gluc: 167 mg/dL / Ketone: x  / Bili: x / Urobili: x   Blood: x / Protein: x / Nitrite: x   Leuk Esterase: x / RBC: x / WBC x   Sq Epi: x / Non Sq Epi: x / Bacteria: x          RADIOLOGY & ADDITIONAL TESTS:  Results Reviewed:   Imaging Personally Reviewed:  Electrocardiogram Personally Reviewed:    COORDINATION OF CARE:  Care Discussed with Consultants/Other Providers [Y/N]:  Prior or Outpatient Records Reviewed [Y/N]:

## 2024-09-20 NOTE — PROGRESS NOTE ADULT - PROBLEM SELECTOR PLAN 1
Acute on chronic HFrEF   Patient is now s/p BIV PPM   Required pressors for a short time, now off pressors  Continue Coreg and Entresto 24/26  Continue Lasix 40 mg PO daily  Cardiology f/up
Acute on chronic HFrEF   Patient is now s/p BIV PPM   Required pressors for a short time, now off pressors  Continue Coreg and Entresto 24/26  Continue Lasix 40 mg PO daily  Cardiology f/up
CXR pulm edema, BNP elevated;  LVEF 45% on echo  - Appreciate Cardiology Consult  - s/p IV Lasix 80 in ER, c/w Lasix 80 BID per Cardiology  - c/w Entresto 24/26 mg (1 yr on it per son)  - c/w coreg 3.125 bid   - EP PPM interrogation 9/6: normal sensing pacing via iterative testing, excellent threshold capture, no events recorded, no reprogramming  - strict I&Os, daily weights; on bladder scan w/ straight cath prn   - TTE 9/6:  Abnormal (paradoxical) septal motion consistent with right ventricular pacemaker. Left ventricular systolic function is severely decreased with an ejection fraction visually estimated at 25 to 30%. There is global left ventricular hypokinesis. There is mild (grade 1) left ventricular diastolic dysfunction.  - EP planning BiV upgrade tomorrow, follow up recommendations
CXR pulm edema, BNP elevated;  LVEF 45% on echo  - Appreciate Cardiology Consult  - s/p IV Lasix 80 in ER, c/w Lasix 80 BID per Cardiology  - c/w Entresto 24/26 mg (1 yr on it per son)  - c/w coreg 3.125 bid   - EP PPM interrogation 9/6: normal sensing pacing via iterative testing, excellent threshold capture, no events recorded, no reprogramming  - strict I&Os, daily weights; on bladder scan w/ straight cath prn   - TTE 9/6:  Abnormal (paradoxical) septal motion consistent with right ventricular pacemaker. Left ventricular systolic function is severely decreased with an ejection fraction visually estimated at 25 to 30%. There is global left ventricular hypokinesis. There is mild (grade 1) left ventricular diastolic dysfunction.  - EP planning BiV upgrade today, follow up additional Cardiology recs
Patient is now s/p BIV PPM - required pressors for a short time  Now off pressors, restarting BP meds    Metoprolol 12.5 BID and Entresto 24/26  Continue lasix 40 mg PO daily  BP has been WNL however patient notes dizziness upon standing  Will continue to monitor  F/u further cardio recs  Seen by PT - [facility]
Acute on chronic HFrEF   Patient is now s/p BIV PPM   Required pressors for a short time, now off pressors  Continue Coreg and Entresto 24/26  Continue Lasix 40 mg PO daily  Cardiology f/up
CXR pulm edema, BNP elevated;  LVEF 45% on echo 2023   - s/p IV Lasix 80 in ER, c/w Lasix 80 BID per d/w cards on 9/8  - c/w Entresto 24/26 mg (1 yr on it per son)  - c/w coreg 3.125 bid   - EP PPM interrogation 9/6: normal sensing pacing via iterative testing, excellent threshold capture, no events recorded, no reprogramming  - cardiology following  - strict I&Os, daily weights; on bladder scan w/ straight cath prn   - TTE 9/6:  Abnormal (paradoxical) septal motion consistent with right ventricular pacemaker. Left ventricular systolic function is severely decreased with an ejection fraction visually estimated at 25 to 30%. There is global left ventricular hypokinesis. There is mild (grade 1) left ventricular diastolic dysfunction.  - cards states will call EP 9/9 re: need for BiV upgrade
Patient is now s/p BIV PPM - required pressors for a short time  Now off pressors, restarting BP meds    Metoprolol 12.5 BID and Entresto 24/26  Continue lasix 40 mg PO daily  BP has been WNL however patient notes dizziness upon standing  Will continue to monitor  F/u further cardio recs  Seen by PT - [facility]
CXR pulm edema, BNP elevated;  LVEF 45% on echo 2023   - s/p IV Lasix 80 in ER,  c/w Lasix 80 BID   - c/w Entresto 24/26 mg (1 yr)  - c/w coreg 3.125 bid   - EP PPM interrogation 9/6:  Normal sensing pacing via iterative testing, excellent threshold capture, no events recorded, no reprogramming  - cardiology following  - strict I&Os, daily weights; on bladder scan w/ straight cath prn   - TTE 9/6:  Abnormal (paradoxical) septal motion consistent with right ventricular pacemaker. Left ventricular systolic function is severely decreased with an ejection fraction visually estimated at 25 to 30%. There is global left ventricular hypokinesis. There is mild (grade 1) left ventricular diastolic dysfunction.  - f/u cards re: BiV upgrade
Acute on chronic HFrEF   Patient is now s/p BIV PPM   Required pressors for a short time, now off pressors  Continue Toprol 25mg daily and Entresto 24/26  Continue Lasix 40 mg PO daily  Cardiology f/up
Due to acute systolic HF, at home on Lasix 40 mg PO daily prn (not taking recently, was told to make it prn)  - c/w Lasix 80 IV mg BID   - titrate off O2 as tolerated
CXR pulm edema, BNP elevated;  LVEF 45% on echo  - Appreciate Cardiology Consult  - s/p IV Lasix 80 in ER, c/w Lasix 80 BID per Cardiology  - c/w Entresto 24/26 mg (1 yr on it per son)  - c/w coreg 3.125 bid   - EP PPM interrogation 9/6: normal sensing pacing via iterative testing, excellent threshold capture, no events recorded, no reprogramming  - strict I&Os, daily weights; on bladder scan w/ straight cath prn   - TTE 9/6:  Abnormal (paradoxical) septal motion consistent with right ventricular pacemaker. Left ventricular systolic function is severely decreased with an ejection fraction visually estimated at 25 to 30%. There is global left ventricular hypokinesis. There is mild (grade 1) left ventricular diastolic dysfunction.  - EP considering BiV upgrade, follow up recommendations
Acute on chronic HFrEF   Patient is now s/p BIV PPM   Required pressors for a short time, now off pressors  Continue Metoprolol 12.5 BID and Entresto 24/26  Continue Lasix 40 mg PO daily  Cardiology f/up

## 2024-09-20 NOTE — PROGRESS NOTE ADULT - PROBLEM SELECTOR PLAN 9
DVT prop - on eliquis  Dispo - PT recommends [facility]    Awaiting rehab
DVT prop - on eliquis  Dispo - PT recommends [facility]    DC to rehab, d/c time 36 minutes
DVT prop - on eliquis  Dispo - PT recommends [facility]    Awaiting rehab, pending insurance auth
DVT prop - on eliquis  Dispo - PT recommends [facility]    Awaiting rehab
DVT prop - on eliquis  Dispo - PT recommends [facility] however family not sure about rehab placement.  Will continue to discuss

## 2024-09-20 NOTE — DISCHARGE NOTE NURSING/CASE MANAGEMENT/SOCIAL WORK - NSDCFUADDAPPT_GEN_ALL_CORE_FT
Follow up with Dr. Lindquist for wound check on 9/24 at 1040AM, [address] [facility] [address], [facility], [phone]

## 2024-09-20 NOTE — PROGRESS NOTE ADULT - PROVIDER SPECIALTY LIST ADULT
Cardiology
Hospitalist
CCU
Cardiology
Hospitalist
Cardiology
Hospitalist
CCU
Hospitalist

## 2024-09-20 NOTE — PROGRESS NOTE ADULT - REASON FOR ADMISSION
chest pain, sob

## 2024-09-20 NOTE — DISCHARGE NOTE NURSING/CASE MANAGEMENT/SOCIAL WORK - NSDCPEFALRISK_GEN_ALL_CORE
For information on Fall & Injury Prevention, visit: [email or web address] OR  [email or web address] OR  https://www.cdc.gov/steadi/patient.html

## 2024-09-23 NOTE — H&P ADULT. - VTE RISK COMMENTS
"Subjective   Patient ID: Malathi Castellanos is a 55 y.o. female who presents for Follow-up (Acquired hypothyroidism; Meniere's disease of both ears/). I last saw the patient 8/21/2024    HPI       Patient is here for a F/U  Pt said no to flu vaccine    Past medical, surgical, and family history reviewed.  Reviewed and documented all medications   Pt eating well, exercising as tolerated and taking medications as directed    Patient unfortunately did not loose any weight since her last visit.      Review of Systems  Except positives as noted in the CC & HPI      Constitutional: Denies fevers, chills, night sweats, fatigue, weight changes, change in appetite    Eyes: Denies blurry vision, double vision    ENT: Denies otalgia, trouble hearing, tinnitus, vertigo, nasal congestion, rhinorrhea, sore throat    Neck: Denies swelling, masses    Cardiovascular: Denies chest pain, palpitations, edema, orthopnea, syncope    Respiratory: Denies dyspnea, cough, wheezing, postural nocturnal dyspnea    Gastrointestinal: Denies abdominal pain, nausea, vomiting, diarrhea, constipation, melena, hematochezia    Genitourinary: Denies dysuria, hematuria, frequency, urgency    Musculoskeletal: Denies back pain, arthralgias, myalgias    Integumentary: Denies skin lesions, rashes, masses    Neurological: Denies dizziness, headaches, confusion, limb weakness, paresthesias, syncope, convulsions    Psychiatric: Denies depression, anxiety, homicidal ideations, suicidal ideations, sleep disturbances    Endocrine: Denies polyphagia, polydipsia, polyuria, weakness, hair thinning, heat intolerance, cold intolerance, weight changes    Heme/Lymph: Denies easy bruising, easy bleeding, swollen glands    Objective   /82 (BP Location: Left arm)   Pulse 78   Temp 36.2 °C (97.2 °F)   Resp 16   Ht 1.727 m (5' 8\")   Wt 84.4 kg (186 lb)   SpO2 96%   BMI 28.28 kg/m²     Physical Exam  112/82 on recheck of BP in the right arm.     Gen. Appearance - " well-developed, well-nourished, 55 y.o., White female in no acute distress.     Skin - warm, pink and dry without rash or concerning lesions.     Mental Status - alert and oriented times 3. Normal mood and affect appropriate to mood.     Neck - supple without lymphadenopathy. Carotid pulses are normal without bruits. Thyroid is normal in midline without nodules. Mild tenderness to palpation with induration in the mid trapezius bilaterally.     Chest - lungs are clear to auscultation without rales, rhonchi or wheezes.    Heart - regular, rate, and rhythm without murmurs, rubs or gallops.     Abdomen - soft, Mildly obese, protuberant, nontender, nondistended. No masses, hepatomegaly or splenomegaly is noted. No rebound, rigidity or guarding is noted. Bowel sounds are normoactive.     Extremities - no cyanosis, clubbing or edema. Pedal pulses are 2+ normal at the dorsalis pedis and posterior tibial pulses bilaterally.     Neurological - cranial nerves II through XII are grossly intact. Motor strength 5/5 at all fours.     Assessment/Plan   1. Overweight with body mass index (BMI) of 28 to 28.9 in adult  semaglutide, weight loss, (WEGOVY) 0.25 mg/0.5 mL pen injector      2. Contusion of knee, initial encounter  cyclobenzaprine (Flexeril) 10 mg tablet      3. Sprain of knee, initial encounter  cyclobenzaprine (Flexeril) 10 mg tablet      4. Acquired hypothyroidism            Patient to continue current medications (with any exceptions as noted) and diet. Follow-up in 1 month(s) otherwise as needed.      Patient was given refill(s) on:   Cyclobenzaprine HCl 10 mg, TAKE 1 TAB BY MOUTH THREE TIMES DAILY AS NEEDED     Rx(s) sent to pharmacy.     Patient was given samples of Wegovy 0.25 mg. Her first injection was given in the office today in the posterior left upper arm.    Consider returning to phentermine at her next visit if needed    Patient's goal weight is 170 pounds.    Scribe Attestation  By signing my name below, I,  Faizan Rogers   attest that this documentation has been prepared under the direction and in the presence of Allan Tubbs MD.     Medium risk

## 2024-09-30 NOTE — ED ADULT TRIAGE NOTE - CHIEF COMPLAINT QUOTE
Pt c/o diarrhea X 2 weeks. Was discharged yesterday, taking Pepcid. Today had X 1 episode of dark stool. Endorsing some lower abdominal pain. Denies N/V. Phx: CAD s/p stents, Afib (on eliquis), HTN, HLD, CHF, DM. . 01-Oct-2024

## 2024-12-03 NOTE — PATIENT PROFILE ADULT. - AS SC BRADEN M[employer]ILITY
Patient: Kristine Hernandez    Procedure Summary       Date: 12/03/24 Room / Location:  [facility] OR 08 /  [facility]    Anesthesia Start: 1355 Anesthesia Stop: 1451    Procedure: LEFT EXTRACORPOREAL SHOCKWAVE LITHOTRIPSY (Left) Diagnosis:       Left ureteral stone      (Left ureteral stone [N20.1])    Surgeons: Inocencio Herman MD Provider: Brian Garcia CRNA    Anesthesia Type: general ASA Status: 2            Anesthesia Type: general    Vitals  Vitals Value Taken Time   /79 12/03/24 1547   Temp 98 °F (36.7 °C) 12/03/24 1535   Pulse 89 12/03/24 1547   Resp 16 12/03/24 1547   SpO2 99 % 12/03/24 1547           Post Anesthesia Care and Evaluation    Patient location during evaluation: PACU  Patient participation: complete - patient participated  Level of consciousness: awake and alert  Pain management: adequate    Airway patency: patent  Anesthetic complications: No anesthetic complications    Cardiovascular status: acceptable  Respiratory status: acceptable  Hydration status: acceptable    Comments: Blood pressure 131/76, pulse 84, temperature 98 °F (36.7 °C), temperature source Temporal, resp. rate 16, SpO2 98%, not currently breastfeeding.    Pt discharged from PACU based on mayda score >8  No anesthesia care post op    
(4) no limitation

## 2025-01-19 ENCOUNTER — EMERGENCY (EMERGENCY)
Facility: HOSPITAL | Age: 85
LOS: 1 days | Discharge: HOME | End: 2025-01-19
Attending: EMERGENCY MEDICINE | Admitting: EMERGENCY MEDICINE
Payer: MEDICARE

## 2025-01-19 VITALS
DIASTOLIC BLOOD PRESSURE: 94 MMHG | WEIGHT: 188.05 LBS | SYSTOLIC BLOOD PRESSURE: 174 MMHG | TEMPERATURE: 100 F | RESPIRATION RATE: 18 BRPM | HEART RATE: 69 BPM | OXYGEN SATURATION: 95 %

## 2025-01-19 LAB
APTT BLD: 41.4 SEC — HIGH (ref 24.5–35.6)
BLOOD GAS VENOUS COMPREHENSIVE RESULT: SIGNIFICANT CHANGE UP
INR BLD: 1.1 RATIO — SIGNIFICANT CHANGE UP (ref 0.85–1.16)
PROTHROM AB SERPL-ACNC: 12.8 SEC — SIGNIFICANT CHANGE UP (ref 9.9–13.4)

## 2025-01-19 PROCEDURE — 93010 ELECTROCARDIOGRAM REPORT: CPT

## 2025-01-19 PROCEDURE — 99285 EMERGENCY DEPT VISIT HI MDM: CPT

## 2025-01-19 PROCEDURE — 71046 X-RAY EXAM CHEST 2 VIEWS: CPT | Mod: 26

## 2025-01-19 RX ORDER — KETOROLAC TROMETHAMINE 30 MG/ML
15 INJECTION INTRAMUSCULAR; INTRAVENOUS ONCE
Refills: 0 | Status: DISCONTINUED | OUTPATIENT
Start: 2025-01-19 | End: 2025-01-19

## 2025-01-19 RX ORDER — FUROSEMIDE 20 MG
80 TABLET ORAL ONCE
Refills: 0 | Status: COMPLETED | OUTPATIENT
Start: 2025-01-19 | End: 2025-01-19

## 2025-01-19 NOTE — ED PROVIDER NOTE - PATIENT PORTAL LINK FT
You can access the FollowMyHealth Patient Portal offered by [facility] by registering at the following website: [email or web address]. By joining [employer]’s FollowMyHealth portal, you will also be able to view your health information using other applications (apps) compatible with our system.

## 2025-01-19 NOTE — ED PROVIDER NOTE - OBJECTIVE STATEMENT
83 y/o female hx HTN, IDDM, CAD s/p stents, HFrEF (LVEF 45% in 2023), bradycardia s/p PPM (Monte), DVT presents with shortness of breath. 83 y/o female hx HTN, IDDM, CAD s/p stents, HFrEF (LVEF 45% in 2023), bradycardia s/p PPM (Monte), DVT presents with shortness of breath. She has had URI symptoms for last week, multiple family members w/ similar symptoms, though today noticed it was more difficult to breathe. She takes 40 lasix oral daily, has not noticed significantly worsened swelling. She endorses pain everywhere, though denies exertional chest pain, palpitations, n/v, abdominal pain, dysuria/hematuria. 83 y/o female hx HTN, IDDM, CAD s/p stents, HFrEF (LVEF 45% in 2023), bradycardia s/p PPM (Monte), DVT presents with shortness of breath. She has had URI symptoms for last week, multiple family members w/ similar symptoms, though today noticed it was more difficult to breathe. She takes 40 lasix oral daily, has not noticed significantly worsened swelling. She endorses pain everywhere, though denies exertional chest pain, palpitations, n/v, abdominal pain, dysuria/hematuria.  Attending - Agree with above.  I evaluated patient myself. 83 y/o F with daughter at bedside.  c/o cough x 1-2 weeks, sometimes productive of phlegm, nasal congestion, sore throat, and diarrhea.  Noted home pulse ox was fluctuating from 90 to 100, so came to ED for eval.  Extensive PMH noted including CHF, PPM, DM2.

## 2025-01-19 NOTE — ED ADULT NURSE NOTE - OBJECTIVE STATEMENT
Pt received to rm 8, Aox2-3. Pt is an 84 year old female c/o flu like symptoms. Endorses n/v/d, productive cough and SOB upon exertion x2 weeks. Pt endorses chest pain, stating "it hurts when I cough." Hx of HTN, DM2, CHF. Breathing even and unlabored, vitals as documented. Pt denies SOB at this time, 96% on room air. Denies dizziness, weakness, blurry vision. No edema noted, skin warm dry intact and normal for race. Labs drawn and sent, 22g IV placed to L hand. No acute distress noted at this time, bed in lowest side rails up call bell in reach. Daughter at bedside. Awaiting imaging.

## 2025-01-19 NOTE — ED ADULT TRIAGE NOTE - CHIEF COMPLAINT QUOTE
C/o flu like symptoms x 6 days. endorsing N/D, productive cough, chest tightness, generalized weakness, myalgias, fevers. + sick contact. Hx DM II, HTN, CAD s/p stent, CVA

## 2025-01-19 NOTE — ED PROVIDER NOTE - PHYSICAL EXAMINATION
see mdm see mdm    ATTENDING PHYSICAL EXAM  GEN - mild tachypnea with RR 20-22.  Answers questions appropriately.  O2 98%RA.  HEAD - NC/AT; EYES/NOSE - PERRL, EOMI, mucous membranes moist, no discharge; THROAT: Oral cavity and pharynx normal. No inflammation, swelling, exudate, or lesions  NECK: Neck supple, non-tender without lymphadenopathy, no masses, no JVD  PULMONARY - coarse BSs b/l with bibasilar rales  CARDIAC -+PPM in place, s1s2, RRR, no M,R,G  ABDOMEN - +NABS, ND, NT, soft, no guarding, no rebound, no masses   BACK - no CVA tenderness, No vertebral or paravertebral tenderness  EXTREMITIES - symmetric pulses, 2+ dp, capillary refill < 2 seconds, no clubbing, no cyanosis, trace edema b/l

## 2025-01-19 NOTE — ED ADULT NURSE NOTE - NSFALLHARMRISKINTERV_ED_ALL_ED

## 2025-01-19 NOTE — ED PROVIDER NOTE - CLINICAL SUMMARY MEDICAL DECISION MAKING FREE TEXT BOX
85 y/o female hx HTN, IDDM, CAD s/p stents, HFrEF (LVEF 45% in 2023), bradycardia s/p PPM (Monte), DVT presents with shortness of breath. 85 y/o female hx HTN, IDDM, CAD s/p stents, HFrEF (LVEF 45% in 2023), bradycardia s/p PPM (Monte), DVT presents with shortness of breath. She is hemodynamically stable, afebrile, on exam she appears tired with coarse lung sounds BL w/ bibasilar crackles no wheezing no significantly increased WOB, abdomen soft, legs w/ mild swelling BL. Presentation concerning for URI/pneumonia vs CHF exacerbation, will get labs, flu swab, cxr, give NSAIDs, reassess.

## 2025-01-20 VITALS
HEART RATE: 60 BPM | OXYGEN SATURATION: 100 % | SYSTOLIC BLOOD PRESSURE: 161 MMHG | RESPIRATION RATE: 15 BRPM | DIASTOLIC BLOOD PRESSURE: 68 MMHG | TEMPERATURE: 98 F

## 2025-01-20 LAB
ALBUMIN SERPL ELPH-MCNC: 3.7 G/DL — SIGNIFICANT CHANGE UP (ref 3.3–5)
ALP SERPL-CCNC: 58 U/L — SIGNIFICANT CHANGE UP (ref 40–120)
ALT FLD-CCNC: 8 U/L — SIGNIFICANT CHANGE UP (ref 4–33)
ANION GAP SERPL CALC-SCNC: 17 MMOL/L — HIGH (ref 7–14)
AST SERPL-CCNC: 25 U/L — SIGNIFICANT CHANGE UP (ref 4–32)
BASOPHILS # BLD AUTO: 0.01 K/UL — SIGNIFICANT CHANGE UP (ref 0–0.2)
BASOPHILS NFR BLD AUTO: 0.2 % — SIGNIFICANT CHANGE UP (ref 0–2)
BILIRUB SERPL-MCNC: 0.6 MG/DL — SIGNIFICANT CHANGE UP (ref 0.2–1.2)
BUN SERPL-MCNC: 13 MG/DL — SIGNIFICANT CHANGE UP (ref 7–23)
CALCIUM SERPL-MCNC: 8.3 MG/DL — LOW (ref 8.4–10.5)
CHLORIDE SERPL-SCNC: 101 MMOL/L — SIGNIFICANT CHANGE UP (ref 98–107)
CO2 SERPL-SCNC: 20 MMOL/L — LOW (ref 22–31)
CREAT SERPL-MCNC: 0.99 MG/DL — SIGNIFICANT CHANGE UP (ref 0.5–1.3)
EGFR: 56 ML/MIN/1.73M2 — LOW
EOSINOPHIL # BLD AUTO: 0.01 K/UL — SIGNIFICANT CHANGE UP (ref 0–0.5)
EOSINOPHIL NFR BLD AUTO: 0.2 % — SIGNIFICANT CHANGE UP (ref 0–6)
FLUAV AG NPH QL: DETECTED
FLUBV AG NPH QL: SIGNIFICANT CHANGE UP
GLUCOSE SERPL-MCNC: 90 MG/DL — SIGNIFICANT CHANGE UP (ref 70–99)
HCT VFR BLD CALC: 32 % — LOW (ref 34.5–45)
HGB BLD-MCNC: 10.2 G/DL — LOW (ref 11.5–15.5)
IANC: 3.2 K/UL — SIGNIFICANT CHANGE UP (ref 1.8–7.4)
IMM GRANULOCYTES NFR BLD AUTO: 0.4 % — SIGNIFICANT CHANGE UP (ref 0–0.9)
LYMPHOCYTES # BLD AUTO: 1.22 K/UL — SIGNIFICANT CHANGE UP (ref 1–3.3)
LYMPHOCYTES # BLD AUTO: 24.9 % — SIGNIFICANT CHANGE UP (ref 13–44)
MCHC RBC-ENTMCNC: 27.3 PG — SIGNIFICANT CHANGE UP (ref 27–34)
MCHC RBC-ENTMCNC: 31.9 G/DL — LOW (ref 32–36)
MCV RBC AUTO: 85.6 FL — SIGNIFICANT CHANGE UP (ref 80–100)
MONOCYTES # BLD AUTO: 0.44 K/UL — SIGNIFICANT CHANGE UP (ref 0–0.9)
MONOCYTES NFR BLD AUTO: 9 % — SIGNIFICANT CHANGE UP (ref 2–14)
NEUTROPHILS # BLD AUTO: 3.2 K/UL — SIGNIFICANT CHANGE UP (ref 1.8–7.4)
NEUTROPHILS NFR BLD AUTO: 65.3 % — SIGNIFICANT CHANGE UP (ref 43–77)
NRBC # BLD: 0 /100 WBCS — SIGNIFICANT CHANGE UP (ref 0–0)
NRBC # FLD: 0 K/UL — SIGNIFICANT CHANGE UP (ref 0–0)
NT-PROBNP SERPL-SCNC: 3262 PG/ML — HIGH
PLATELET # BLD AUTO: 164 K/UL — SIGNIFICANT CHANGE UP (ref 150–400)
POTASSIUM SERPL-MCNC: 3.4 MMOL/L — LOW (ref 3.5–5.3)
POTASSIUM SERPL-SCNC: 3.4 MMOL/L — LOW (ref 3.5–5.3)
PROT SERPL-MCNC: 6.6 G/DL — SIGNIFICANT CHANGE UP (ref 6–8.3)
RBC # BLD: 3.74 M/UL — LOW (ref 3.8–5.2)
RBC # FLD: 14 % — SIGNIFICANT CHANGE UP (ref 10.3–14.5)
RSV RNA NPH QL NAA+NON-PROBE: SIGNIFICANT CHANGE UP
SARS-COV-2 RNA SPEC QL NAA+PROBE: SIGNIFICANT CHANGE UP
SODIUM SERPL-SCNC: 138 MMOL/L — SIGNIFICANT CHANGE UP (ref 135–145)
TROPONIN T, HIGH SENSITIVITY RESULT: 30 NG/L — SIGNIFICANT CHANGE UP
TROPONIN T, HIGH SENSITIVITY RESULT: 31 NG/L — SIGNIFICANT CHANGE UP
WBC # BLD: 4.9 K/UL — SIGNIFICANT CHANGE UP (ref 3.8–10.5)
WBC # FLD AUTO: 4.9 K/UL — SIGNIFICANT CHANGE UP (ref 3.8–10.5)

## 2025-01-20 RX ORDER — ACETAMINOPHEN 80 MG/.8ML
650 SOLUTION/ DROPS ORAL ONCE
Refills: 0 | Status: COMPLETED | OUTPATIENT
Start: 2025-01-20 | End: 2025-01-20

## 2025-01-20 RX ADMIN — ACETAMINOPHEN 650 MILLIGRAM(S): 80 SOLUTION/ DROPS ORAL at 01:01

## 2025-01-20 RX ADMIN — Medication 80 MILLIGRAM(S): at 00:31

## 2025-01-20 RX ADMIN — KETOROLAC TROMETHAMINE 15 MILLIGRAM(S): 30 INJECTION INTRAMUSCULAR; INTRAVENOUS at 00:35

## 2025-01-20 NOTE — ED ADULT NURSE REASSESSMENT NOTE - NS ED NURSE REASSESS COMMENT FT1
Report received from NAHUN Avelar. Pt baseline mental status. Respirations even and unlabored on room air. Pt ambulatory oxygen saturation 97% on room air. Denies  headache, dizziness, chest pain and SOB at this time. Pt discharged by MD Moore at bedside. Pt brought out of ED with wheelchair. Safety maintained throughout.

## 2025-01-25 ENCOUNTER — EMERGENCY (EMERGENCY)
Facility: HOSPITAL | Age: 85
LOS: 1 days | Discharge: HOME | End: 2025-01-25
Attending: STUDENT IN AN ORGANIZED HEALTH CARE EDUCATION/TRAINING PROGRAM | Admitting: EMERGENCY MEDICINE
Payer: MEDICARE

## 2025-01-25 VITALS
SYSTOLIC BLOOD PRESSURE: 192 MMHG | TEMPERATURE: 98 F | RESPIRATION RATE: 19 BRPM | WEIGHT: 164.91 LBS | DIASTOLIC BLOOD PRESSURE: 93 MMHG | OXYGEN SATURATION: 99 % | HEART RATE: 65 BPM

## 2025-01-25 LAB
ALBUMIN SERPL ELPH-MCNC: 3.8 G/DL — SIGNIFICANT CHANGE UP (ref 3.3–5)
ALP SERPL-CCNC: 59 U/L — SIGNIFICANT CHANGE UP (ref 40–120)
ALT FLD-CCNC: 9 U/L — SIGNIFICANT CHANGE UP (ref 4–33)
ANION GAP SERPL CALC-SCNC: 12 MMOL/L — SIGNIFICANT CHANGE UP (ref 7–14)
AST SERPL-CCNC: 16 U/L — SIGNIFICANT CHANGE UP (ref 4–32)
BASOPHILS # BLD AUTO: 0.01 K/UL — SIGNIFICANT CHANGE UP (ref 0–0.2)
BASOPHILS NFR BLD AUTO: 0.2 % — SIGNIFICANT CHANGE UP (ref 0–2)
BILIRUB SERPL-MCNC: 1 MG/DL — SIGNIFICANT CHANGE UP (ref 0.2–1.2)
BUN SERPL-MCNC: 9 MG/DL — SIGNIFICANT CHANGE UP (ref 7–23)
CALCIUM SERPL-MCNC: 9 MG/DL — SIGNIFICANT CHANGE UP (ref 8.4–10.5)
CHLORIDE SERPL-SCNC: 103 MMOL/L — SIGNIFICANT CHANGE UP (ref 98–107)
CO2 SERPL-SCNC: 26 MMOL/L — SIGNIFICANT CHANGE UP (ref 22–31)
CREAT SERPL-MCNC: 0.94 MG/DL — SIGNIFICANT CHANGE UP (ref 0.5–1.3)
EGFR: 59 ML/MIN/1.73M2 — LOW
EOSINOPHIL # BLD AUTO: 0.12 K/UL — SIGNIFICANT CHANGE UP (ref 0–0.5)
EOSINOPHIL NFR BLD AUTO: 1.8 % — SIGNIFICANT CHANGE UP (ref 0–6)
GLUCOSE SERPL-MCNC: 156 MG/DL — HIGH (ref 70–99)
HCT VFR BLD CALC: 32.6 % — LOW (ref 34.5–45)
HGB BLD-MCNC: 10.2 G/DL — LOW (ref 11.5–15.5)
IANC: 4.31 K/UL — SIGNIFICANT CHANGE UP (ref 1.8–7.4)
IMM GRANULOCYTES NFR BLD AUTO: 0.3 % — SIGNIFICANT CHANGE UP (ref 0–0.9)
LACTATE BLDV-MCNC: 1.3 MMOL/L — SIGNIFICANT CHANGE UP (ref 0.5–2)
LYMPHOCYTES # BLD AUTO: 1.43 K/UL — SIGNIFICANT CHANGE UP (ref 1–3.3)
LYMPHOCYTES # BLD AUTO: 21.6 % — SIGNIFICANT CHANGE UP (ref 13–44)
MCHC RBC-ENTMCNC: 27 PG — SIGNIFICANT CHANGE UP (ref 27–34)
MCHC RBC-ENTMCNC: 31.3 G/DL — LOW (ref 32–36)
MCV RBC AUTO: 86.2 FL — SIGNIFICANT CHANGE UP (ref 80–100)
MONOCYTES # BLD AUTO: 0.73 K/UL — SIGNIFICANT CHANGE UP (ref 0–0.9)
MONOCYTES NFR BLD AUTO: 11 % — SIGNIFICANT CHANGE UP (ref 2–14)
NEUTROPHILS # BLD AUTO: 4.31 K/UL — SIGNIFICANT CHANGE UP (ref 1.8–7.4)
NEUTROPHILS NFR BLD AUTO: 65.1 % — SIGNIFICANT CHANGE UP (ref 43–77)
NRBC # BLD: 0 /100 WBCS — SIGNIFICANT CHANGE UP (ref 0–0)
NRBC # FLD: 0 K/UL — SIGNIFICANT CHANGE UP (ref 0–0)
PLATELET # BLD AUTO: 274 K/UL — SIGNIFICANT CHANGE UP (ref 150–400)
POTASSIUM SERPL-MCNC: 3.8 MMOL/L — SIGNIFICANT CHANGE UP (ref 3.5–5.3)
POTASSIUM SERPL-SCNC: 3.8 MMOL/L — SIGNIFICANT CHANGE UP (ref 3.5–5.3)
PROT SERPL-MCNC: 7.7 G/DL — SIGNIFICANT CHANGE UP (ref 6–8.3)
RBC # BLD: 3.78 M/UL — LOW (ref 3.8–5.2)
RBC # FLD: 14 % — SIGNIFICANT CHANGE UP (ref 10.3–14.5)
SODIUM SERPL-SCNC: 141 MMOL/L — SIGNIFICANT CHANGE UP (ref 135–145)
TROPONIN T, HIGH SENSITIVITY RESULT: 21 NG/L — SIGNIFICANT CHANGE UP
WBC # BLD: 6.62 K/UL — SIGNIFICANT CHANGE UP (ref 3.8–10.5)
WBC # FLD AUTO: 6.62 K/UL — SIGNIFICANT CHANGE UP (ref 3.8–10.5)

## 2025-01-25 PROCEDURE — 93010 ELECTROCARDIOGRAM REPORT: CPT

## 2025-01-25 PROCEDURE — 99284 EMERGENCY DEPT VISIT MOD MDM: CPT

## 2025-01-25 NOTE — ED ADULT NURSE NOTE - NS ED NOTE  TALK SOMEONE YN
Patient: Jos Zuleta    Procedure Summary     Date Anesthesia Start Anesthesia Stop Room / Location    08/15/17 1042 1129 [facility] /  [facility]       Procedure Diagnosis Surgeon Provider     SECTION REPEAT (N/A Abdomen) No diagnosis on file. MD Joe Padilla MD          Anesthesia Type: spinal, ITN  Last vitals  BP   94/50 (08/15/17 1130)    Temp   97.7 °F (36.5 °C) (08/15/17 1130)    Pulse   80 (08/15/17 1130)   Resp   18 (08/15/17 1130)    SpO2   96 % (08/15/17 1130)      Post Anesthesia Care and Evaluation    Patient location during evaluation: bedside  Patient participation: complete - patient participated  Level of consciousness: awake and alert  Pain management: adequate  Airway patency: patent  Anesthetic complications: No anesthetic complications    Cardiovascular status: acceptable  Respiratory status: acceptable  Hydration status: acceptable       No

## 2025-01-25 NOTE — ED ADULT NURSE NOTE - NSFALLRISKINTERV_ED_ALL_ED

## 2025-01-25 NOTE — ED ADULT TRIAGE NOTE - CHIEF COMPLAINT QUOTE
Pt c/o cough, generalized weakness, lethargy. Diagnosed with flu. Denies SOB, Chest pain. Well appearing

## 2025-01-25 NOTE — ED ADULT NURSE NOTE - OBJECTIVE STATEMENT
received pt rm5. A&ox3 RR even unlabored completing sentences. pt brought in by family for generalized weakness, lethargy. states pt recently diagnosed with flu. endorsing persistent cough. Denies SOB, Chest pain. denies h/a, dizziness/lightheadedness, falls/trauma, abd pain, n/v/d, fevers/chills, gu sx.  Well appearing on assessment. safety measures maintained throughout care of patient.  provider at bedside for eval, pending orders at this time.

## 2025-01-26 VITALS
DIASTOLIC BLOOD PRESSURE: 95 MMHG | TEMPERATURE: 98 F | SYSTOLIC BLOOD PRESSURE: 178 MMHG | OXYGEN SATURATION: 97 % | RESPIRATION RATE: 18 BRPM | HEART RATE: 64 BPM

## 2025-01-26 LAB
ADD ON TEST-SPECIMEN IN LAB: SIGNIFICANT CHANGE UP
TROPONIN T, HIGH SENSITIVITY RESULT: 23 NG/L — SIGNIFICANT CHANGE UP

## 2025-01-26 PROCEDURE — 71045 X-RAY EXAM CHEST 1 VIEW: CPT | Mod: 26

## 2025-01-26 NOTE — ED PROVIDER NOTE - PATIENT PORTAL LINK FT
You can access the FollowMyHealth Patient Portal offered by [facility] by registering at the following website: [email or web address]. By joining [employer]’s FollowMyHealth portal, you will also be able to view your health information using other applications (apps) compatible with our system.

## 2025-01-26 NOTE — ED PROVIDER NOTE - CLINICAL SUMMARY MEDICAL DECISION MAKING FREE TEXT BOX
84 yo F with h/o HTN, CVA, DM who presents to the ED c/o cough and shortness of breath. Pt was seen in this ED <1 week ago for the same symptoms and tested positive for influenza. Daughter is concerned because symptoms have not gone away. No fever, chills, abdominal pain, chest pain, n/v/d. In the ED, pt well appearing, saturating well on room air, lungs CTAB, no longer extremity swelling. Pt does not appear fluid overloaded. Symptoms most likely secondary to known influenza infection but will obtain labs to rule out CHF exacerbation, CXR to rule out pneumonia

## 2025-01-26 NOTE — ED PROVIDER NOTE - NSICDXPASTSURGICALHX_GEN_ALL_CORE_FT
PAST SURGICAL HISTORY:  S/P primary angioplasty with coronary stent x1 in 2006 at [facility]    S/P TKR (total knee replacement) left

## 2025-01-26 NOTE — ED ADULT NURSE REASSESSMENT NOTE - NS ED NURSE REASSESS COMMENT FT1
pt remains at baseline mental status, RR even unlabored completing full sentences. pt resting in stretcher comfortably at this time, no new complaints offered. rpt troponin drawn and sent as ordered. rpt vs per flowsheet. stretcher lowest position siderails up safety measures in place.

## 2025-01-26 NOTE — ED PROVIDER NOTE - WR ORDER NAME 1
[facility]   [address] [place] [id number]  Phone: [phone]  Fax: [phone]              Dear Dr. Diaz,    Your patient, Ruby Pradhan (: 1964), was scheduled at [facility].  Ruby's encounter diagnosis is:  1. Osteoporosis, unspecified osteoporosis type, unspecified pathological fracture presence  ISTAT CREATININE    ISTAT IONIZED CA    ISTAT CREATININE    ISTAT IONIZED CA    Pack Heat    Pack Heat     She arrived for her appointment, and  the scheduled treatment was   given. These medications were administered to the patient: We administered zoledronic Acid..  Ruby Pradhan  tolerated treatment. In addition, the following labs were drawn    Recent Results (from the past 24 hour(s))   ISTAT CREATININE    Collection Time: 10/02/19  4:45 PM   Result Value Ref Range    Istat Creatinine 0.9 0.5 - 1.4 mg/dL   ISTAT IONIZED CA    Collection Time: 10/02/19  4:45 PM   Result Value Ref Range    Istat Ionized Calcium 1.21 1.10 - 1.30 mmol/L            Her next appointment is did not reschedule; because she must see her provider first.    For more information, you may review the nurse's progress notes in chart review under the notes section.       Sincerely,  Clare Lincoln R.N.   Xray Chest 1 View- PORTABLE-Urgent

## 2025-01-26 NOTE — ED PROVIDER NOTE - OBJECTIVE STATEMENT
86 yo F with h/o HTN, CVA, DM who presents to the ED c/o cough and shortness of breath. Pt was seen in this ED <1 week ago for the same symptoms and tested positive for influenza. Daughter is concerned because symptoms have not gone away. No fever, chills, abdominal pain, chest pain, n/v/d.

## 2025-01-26 NOTE — ED ADULT NURSE REASSESSMENT NOTE - NS ED NURSE REASSESS COMMENT FT1
Break RN: Pt is A&Ox4, resting in stretcher with no complaints at this time. Respirations even and unlabored, chest rise equal b/l. Pt denies chest pain, SOB, abdominal pain, N/V/D, h/a, dizziness, numbness/tingling or any urinary symptoms at this time. No acute distress noted. Safety maintained throughout.

## 2025-02-04 NOTE — PROGRESS NOTE ADULT - I WAS PHYSICALLY PRESENT FOR THE KEY PORTIONS OF THE EVALUATION AND MANAGEMENT (E/M) SERVICE PROVIDED.  I AGREE WITH THE ABOVE HISTORY, PHYSICAL, AND PLAN WHICH I HAVE REVIEWED AND EDITED WHERE APPROPRIATE
MEDICARE WELLNESS VISIT + SOAP NOTE    Patient Care Team:  Magnanao, Ulysses M, DO as PCP - General (Family Practice)  Sana King MD as Pain Medicine (Anesthesiology - Pain Medicine)  Hernesto Lizarraga DO as Dermatology (Dermatology)  Tommy Rapp OD as Optometry (Optometry)    Susan presents for her Subsequent Annual Medicare Wellness Visit with Medicare Wellness Visit, Diabetes, and Hypertension   Acute and chronic problems were discussed separately below.    Status MWV Topics Details (Refresh Note to Update)    Depression Screening (Trend)   PHQ2 Interpretation Negative          PHQ2: Score = 2     Depression screening is negative no further plan needed.  Length of screening was 10 minutes.    Blood Pressure  Weight  BMI     /64  Current Weight 159 lbs  body mass index is 30.04 kg/m².  BMI is within normal range.      Advanced Directives on File Yes on file.    Advance care plan was voluntary discussed with the patient.  Advanced directives, DNR, DNI, living will and power of  reviewed in detail with  Length of time spent during discussion was 17 minutes.  No changes were noted on documentation     Health Risk Assessment  (Click to Review or Edit)   Completed      Falls Risk  Have you had a fall in the past year?.................................Yes.  Do you feel unsteady when standing or walking?........Yes.  Do you worry about falling?..................................................Yes.         Tobacco/Alcohol/Drugs Former Smoker   Tobacco Pack Years 5   reports current alcohol use.   reports no history of drug use.      Opioid Review (Update Meds)      No opioid on med list.  is not taking opioid medications.      Cognitive/Functional Status  (Optional MOCA  Mini Cog)   No evidence of cognitive dysfunction by direct observation.    Vision and Hearing Screens     Not performed      Care Gaps/Screenings  (Click to Review and Order) See patient instructions and orders         The  following items on the Medicare Health Risk Assessment were found to be positive  3.) During the past 4 weeks, how would you rate your health?: Fair     4.) During the past 4 weeks, what was the hardest physical activity you could do for at least 2 minutes?: Light     5.) Do you do moderate to strenuous exercise (brisk walk) for about 20 minutes for 3 or more days per week?: No, I usually do not exercise this much     6 b.) How many servings of High Fiber / Whole Grain Foods to you have each day ( 1 serving = 1 cup cold cereal, 1/2 cup cooked cereal, 1 slice bread): 1 per day     7a.) Have you had a fall in the past year?: Yes     7b.) Do you feel unsteady when standing or walking?: Yes     7c.) Do you worry about falling?: Yes     11c.) Teeth or Denture Problems: Always     11d.) Bodily pain: Always     11e.) Tiredness or Fatigue: Often     11l.) Sexual Problems: Always     13.) Do you need help with any of the following activities?: Go shopping for groceries or clothes, Do your housework or laundry     15.) How confident are you that you can control and manage most of your health problems?: Not very confident         I reviewed and updated the past Medical, Surgical, Family, Social History, Allergies and Medications in Epic: Yes    Family History   Problem Relation Age of Onset    COPD Mother     Other Mother         Blood coagulation disorder    Heart disease Father     Stroke Father     Cancer Father         colon    Heart disease Brother     Patient is unaware of any medical problems Maternal Grandmother     Patient is unaware of any medical problems Maternal Grandfather     Patient is unaware of any medical problems Paternal Grandmother     Patient is unaware of any medical problems Paternal Grandfather     Patient is unaware of any medical problems Daughter     Myocardial Infarction Other     Cancer Other     Other Other         colitis         SOAP NOTE       Subjective     Susan is a 70 year old here for  Medicare Wellness Visit, Diabetes, and Hypertension  She has chronic back pain as well she has seen pain management.    Review of Systems   Constitutional:  Negative for fatigue, fever and unexpected weight change.   Respiratory:  Negative for cough, choking and shortness of breath.    Cardiovascular:  Negative for chest pain, palpitations and leg swelling.   Gastrointestinal:  Negative for anal bleeding, blood in stool and constipation.   Musculoskeletal:  Negative for back pain, gait problem and joint swelling.   Skin:  Negative for pallor and rash.   Neurological:  Positive for numbness.   All other systems reviewed and are negative.        SDOH Former Smoker   Tobacco Pack Years 5     Objective   Vitals:    02/04/25 0850   BP: 130/64   Pulse: 74   Resp: 18   Temp: 97.1 °F (36.2 °C)   TempSrc: Temporal   SpO2: 94%   Weight: 72.1 kg (159 lb)   Height: 5' 1\" (1.549 m)   BMI (Calculated): 30.04     Physical Exam  Vitals and nursing note reviewed.   Constitutional:       Appearance: Normal appearance. She is well-developed and normal weight.   HENT:      Head: Normocephalic and atraumatic.      Right Ear: Tympanic membrane and ear canal normal.      Left Ear: Tympanic membrane and ear canal normal.      Neck: Normal range of motion and neck supple.   Eyes:      Extraocular Movements: Extraocular movements intact.      Pupils: Pupils are equal, round, and reactive to light.   Neck:      Vascular: No carotid bruit.   Cardiovascular:      Rate and Rhythm: Normal rate and regular rhythm.      Pulses: Normal pulses.      Heart sounds: Normal heart sounds.   Pulmonary:      Effort: Pulmonary effort is normal.      Breath sounds: Normal breath sounds. No wheezing.   Abdominal:      Palpations: Abdomen is soft. There is no mass.      Tenderness: There is no abdominal tenderness.   Musculoskeletal:         General: Normal range of motion.   Lymphadenopathy:      Cervical: No cervical adenopathy.   Skin:     General: Skin is  warm.   Neurological:      General: No focal deficit present.      Mental Status: She is alert and oriented to person, place, and time.   Psychiatric:         Mood and Affect: Mood normal.         Behavior: Behavior normal.                  ASSESSMENT AND PLAN            1. Medicare annual wellness visit, subsequent  2. Annual physical exam  3. Screening for cardiovascular condition  -     Lipid Panel With Reflex  4. Type 2 diabetes mellitus without complication, without long-term current use of insulin  (CMD)  Assessment & Plan:  Monitor: The problem is stable.  Evaluation: Labs/tests ordered, see encounter summary.  Assessment/Treatment:  Continue current treatment/monitoring regimen.  Orders:  -     CBC with Automated Differential  -     Lipid Panel With Reflex  -     Comprehensive Metabolic Panel  -     Thyroid Stimulating Hormone Reflex  -     Urinalysis & Reflex Microscopy  -     Microalbumin Urine Random  -     Glycohemoglobin  5. Essential hypertension  -     CBC with Automated Differential  -     Lipid Panel With Reflex  -     Comprehensive Metabolic Panel  -     Thyroid Stimulating Hormone Reflex  6. Primary osteoarthritis of right hip  7. Pure hypercholesterolemia  -     CBC with Automated Differential  -     Lipid Panel With Reflex  -     Comprehensive Metabolic Panel  -     Thyroid Stimulating Hormone Reflex  -     Urinalysis & Reflex Microscopy  8. Current use of proton pump inhibitor  -     Vitamin B12  9. Advanced directives, counseling/discussion [Z71.89]  10. Screening for depression [Z13.31]      Return in about 6 months (around 8/4/2025) for MEDICATION MANAGEMENT, DIABETES, HTN.  Informed patient that we will contact when results are reviewed.  Recommend to continue all current medications prescribed.    Ulysses M Magnanao, DO              Statement Selected

## 2025-03-12 NOTE — ED ADULT NURSE NOTE - FALL HARM RISK TYPE OF ASSESSMENT
Chloraprep per Dr. Mauricio, 3 min dry time observed prior to draping  
Dressing applied to insertion site.
Pt arrived to OR on cart. Transferred to OR table. Safety strap applied. Pt padded with pillows in prone  position. Pt prepped with Chloraprep with a fire risk of 1. Bandaids applied to injection sites. Pt. Transferred to ACS on cart.   
Admission

## 2025-04-06 ENCOUNTER — EMERGENCY (EMERGENCY)
Facility: HOSPITAL | Age: 85
LOS: 1 days | End: 2025-04-06
Attending: STUDENT IN AN ORGANIZED HEALTH CARE EDUCATION/TRAINING PROGRAM | Admitting: STUDENT IN AN ORGANIZED HEALTH CARE EDUCATION/TRAINING PROGRAM
Payer: MEDICARE

## 2025-04-06 VITALS
RESPIRATION RATE: 18 BRPM | SYSTOLIC BLOOD PRESSURE: 161 MMHG | HEART RATE: 65 BPM | TEMPERATURE: 97 F | OXYGEN SATURATION: 100 % | DIASTOLIC BLOOD PRESSURE: 84 MMHG

## 2025-04-06 VITALS
RESPIRATION RATE: 16 BRPM | OXYGEN SATURATION: 100 % | TEMPERATURE: 97 F | SYSTOLIC BLOOD PRESSURE: 181 MMHG | WEIGHT: 186.07 LBS | DIASTOLIC BLOOD PRESSURE: 80 MMHG | HEART RATE: 73 BPM

## 2025-04-06 LAB
ALBUMIN SERPL ELPH-MCNC: 3.9 G/DL — SIGNIFICANT CHANGE UP (ref 3.3–5)
ALP SERPL-CCNC: 71 U/L — SIGNIFICANT CHANGE UP (ref 40–120)
ALT FLD-CCNC: 8 U/L — SIGNIFICANT CHANGE UP (ref 4–33)
ANION GAP SERPL CALC-SCNC: 17 MMOL/L — HIGH (ref 7–14)
APPEARANCE UR: CLEAR — SIGNIFICANT CHANGE UP
AST SERPL-CCNC: 17 U/L — SIGNIFICANT CHANGE UP (ref 4–32)
BACTERIA # UR AUTO: NEGATIVE /HPF — SIGNIFICANT CHANGE UP
BASOPHILS # BLD AUTO: 0.02 K/UL — SIGNIFICANT CHANGE UP (ref 0–0.2)
BASOPHILS NFR BLD AUTO: 0.3 % — SIGNIFICANT CHANGE UP (ref 0–2)
BILIRUB SERPL-MCNC: 1.1 MG/DL — SIGNIFICANT CHANGE UP (ref 0.2–1.2)
BILIRUB UR-MCNC: NEGATIVE — SIGNIFICANT CHANGE UP
BUN SERPL-MCNC: 14 MG/DL — SIGNIFICANT CHANGE UP (ref 7–23)
CALCIUM SERPL-MCNC: 9.4 MG/DL — SIGNIFICANT CHANGE UP (ref 8.4–10.5)
CAST: 0 /LPF — SIGNIFICANT CHANGE UP (ref 0–4)
CHLORIDE SERPL-SCNC: 102 MMOL/L — SIGNIFICANT CHANGE UP (ref 98–107)
CO2 SERPL-SCNC: 19 MMOL/L — LOW (ref 22–31)
COLOR SPEC: YELLOW — SIGNIFICANT CHANGE UP
COMMENT - URINE: SIGNIFICANT CHANGE UP
CREAT SERPL-MCNC: 0.97 MG/DL — SIGNIFICANT CHANGE UP (ref 0.5–1.3)
DIFF PNL FLD: NEGATIVE — SIGNIFICANT CHANGE UP
EGFR: 57 ML/MIN/1.73M2 — LOW
EGFR: 57 ML/MIN/1.73M2 — LOW
EOSINOPHIL # BLD AUTO: 0.04 K/UL — SIGNIFICANT CHANGE UP (ref 0–0.5)
EOSINOPHIL NFR BLD AUTO: 0.5 % — SIGNIFICANT CHANGE UP (ref 0–6)
EPI CELLS # UR: PRESENT
FLUAV AG NPH QL: SIGNIFICANT CHANGE UP
FLUBV AG NPH QL: SIGNIFICANT CHANGE UP
GAS PNL BLDV: SIGNIFICANT CHANGE UP
GLUCOSE SERPL-MCNC: 156 MG/DL — HIGH (ref 70–99)
GLUCOSE UR QL: NEGATIVE MG/DL — SIGNIFICANT CHANGE UP
HCT VFR BLD CALC: 37.9 % — SIGNIFICANT CHANGE UP (ref 34.5–45)
HGB BLD-MCNC: 11.9 G/DL — SIGNIFICANT CHANGE UP (ref 11.5–15.5)
IANC: 4.58 K/UL — SIGNIFICANT CHANGE UP (ref 1.8–7.4)
IMM GRANULOCYTES NFR BLD AUTO: 0.7 % — SIGNIFICANT CHANGE UP (ref 0–0.9)
KETONES UR-MCNC: NEGATIVE MG/DL — SIGNIFICANT CHANGE UP
LEUKOCYTE ESTERASE UR-ACNC: NEGATIVE — SIGNIFICANT CHANGE UP
LYMPHOCYTES # BLD AUTO: 2 K/UL — SIGNIFICANT CHANGE UP (ref 1–3.3)
LYMPHOCYTES # BLD AUTO: 27.1 % — SIGNIFICANT CHANGE UP (ref 13–44)
MCHC RBC-ENTMCNC: 26.6 PG — LOW (ref 27–34)
MCHC RBC-ENTMCNC: 31.4 G/DL — LOW (ref 32–36)
MCV RBC AUTO: 84.8 FL — SIGNIFICANT CHANGE UP (ref 80–100)
MONOCYTES # BLD AUTO: 0.68 K/UL — SIGNIFICANT CHANGE UP (ref 0–0.9)
MONOCYTES NFR BLD AUTO: 9.2 % — SIGNIFICANT CHANGE UP (ref 2–14)
NEUTROPHILS # BLD AUTO: 4.58 K/UL — SIGNIFICANT CHANGE UP (ref 1.8–7.4)
NEUTROPHILS NFR BLD AUTO: 62.2 % — SIGNIFICANT CHANGE UP (ref 43–77)
NITRITE UR-MCNC: NEGATIVE — SIGNIFICANT CHANGE UP
NRBC # BLD AUTO: 0 K/UL — SIGNIFICANT CHANGE UP (ref 0–0)
NRBC # FLD: 0 K/UL — SIGNIFICANT CHANGE UP (ref 0–0)
NRBC BLD AUTO-RTO: 0 /100 WBCS — SIGNIFICANT CHANGE UP (ref 0–0)
NT-PROBNP SERPL-SCNC: 975 PG/ML — HIGH
PH UR: 6 — SIGNIFICANT CHANGE UP (ref 5–8)
PLATELET # BLD AUTO: 201 K/UL — SIGNIFICANT CHANGE UP (ref 150–400)
POTASSIUM SERPL-MCNC: 4 MMOL/L — SIGNIFICANT CHANGE UP (ref 3.5–5.3)
POTASSIUM SERPL-SCNC: 4 MMOL/L — SIGNIFICANT CHANGE UP (ref 3.5–5.3)
PROT SERPL-MCNC: 7.5 G/DL — SIGNIFICANT CHANGE UP (ref 6–8.3)
PROT UR-MCNC: NEGATIVE MG/DL — SIGNIFICANT CHANGE UP
RBC # BLD: 4.47 M/UL — SIGNIFICANT CHANGE UP (ref 3.8–5.2)
RBC # FLD: 13.6 % — SIGNIFICANT CHANGE UP (ref 10.3–14.5)
RBC CASTS # UR COMP ASSIST: 0 /HPF — SIGNIFICANT CHANGE UP (ref 0–4)
RSV RNA NPH QL NAA+NON-PROBE: SIGNIFICANT CHANGE UP
SARS-COV-2 RNA SPEC QL NAA+PROBE: SIGNIFICANT CHANGE UP
SODIUM SERPL-SCNC: 138 MMOL/L — SIGNIFICANT CHANGE UP (ref 135–145)
SOURCE RESPIRATORY: SIGNIFICANT CHANGE UP
SP GR SPEC: 1.06 — HIGH (ref 1–1.03)
SQUAMOUS # UR AUTO: 4 /HPF — SIGNIFICANT CHANGE UP (ref 0–5)
TROPONIN T, HIGH SENSITIVITY RESULT: 21 NG/L — SIGNIFICANT CHANGE UP
TROPONIN T, HIGH SENSITIVITY RESULT: 23 NG/L — SIGNIFICANT CHANGE UP
UROBILINOGEN FLD QL: 0.2 MG/DL — SIGNIFICANT CHANGE UP (ref 0.2–1)
WBC # BLD: 7.37 K/UL — SIGNIFICANT CHANGE UP (ref 3.8–10.5)
WBC # FLD AUTO: 7.37 K/UL — SIGNIFICANT CHANGE UP (ref 3.8–10.5)
WBC UR QL: 9 /HPF — HIGH (ref 0–5)

## 2025-04-06 PROCEDURE — 93010 ELECTROCARDIOGRAM REPORT: CPT

## 2025-04-06 PROCEDURE — 99285 EMERGENCY DEPT VISIT HI MDM: CPT

## 2025-04-06 PROCEDURE — 74177 CT ABD & PELVIS W/CONTRAST: CPT | Mod: 26

## 2025-04-06 PROCEDURE — 71045 X-RAY EXAM CHEST 1 VIEW: CPT | Mod: 26

## 2025-04-06 RX ORDER — ACETAMINOPHEN 500 MG/5ML
1000 LIQUID (ML) ORAL ONCE
Refills: 0 | Status: COMPLETED | OUTPATIENT
Start: 2025-04-06 | End: 2025-04-06

## 2025-04-06 RX ADMIN — Medication 400 MILLIGRAM(S): at 13:38

## 2025-04-06 NOTE — ED PROVIDER NOTE - PATIENT PORTAL LINK FT
You can access the FollowMyHealth Patient Portal offered by [facility] by registering at the following website: [email or web address]. By joining [employer]’s FollowMyHealth portal, you will also be able to view your health information using other applications (apps) compatible with our system.

## 2025-04-06 NOTE — ED ADULT NURSE NOTE - CHIEF COMPLAINT QUOTE
Pt c/o body aches, cough, shortness of breath and b/l LE swelling x few days. Also endorsing urinary frequency. Respirations even and unlabored. Denies fever, chills, chest pain, pmhx: DM2, CHF, CVA, CAD

## 2025-04-06 NOTE — ED PROVIDER NOTE - NSFOLLOWUPINSTRUCTIONS_ED_ALL_ED_FT
1. You presented to the emergency department for muscle aches and swelling of lower extremities. You are likely experiencing a mild exacerbation of your known heart failure.     2. Your evaluation in the emergency department included a physician evaluation, CT scan, blood work. Your work-up did not reveal any findings indicating the need for admission to the hospital or any emergent interventions at this time.     3. It is recommended that you follow-up with your CARDIOLOGIST for a repeat evaluation, and potentially further testing and treatment.     4. Please continue taking your regular medications as prescribed.     5. PLEASE RETURN TO THE EMERGENCY DEPARTMENT IMMEDIATELY IF you develop any fevers not responding to over the counter medications, uncontrollable nausea and vomiting, an inability to tolerate eating and drinking, difficulty breathing, chest pain, a severe increase in your symptoms or pain, or any other new symptoms that concern you.

## 2025-04-06 NOTE — ED ADULT NURSE REASSESSMENT NOTE - NS ED NURSE REASSESS COMMENT FT1
Break RN: Patient awake and resting in stretcher; respirations even and unlabored, no signs/symptoms of acute distress. Safety measures in place, family at bedside. Patient pending xray.

## 2025-04-06 NOTE — ED PROVIDER NOTE - PROGRESS NOTE DETAILS
Celine Ashford (PGY2): Reassessed patient and she is feeling much improved. She understands her results, which are indicative only of mild heart failure exacerbation. No indications of concerning infectious or metabolic cause of her symptoms. Pt very much wants to return home. She has outpatient f/u with her cardiologist already scheduled in two weeks. Pt plans to call them tomorrow to discuss today's visit as well. Vitals stable, pt ambulating and mentating well. Plan for dispo home with return precautions.

## 2025-04-06 NOTE — ED ADULT TRIAGE NOTE - CHIEF COMPLAINT QUOTE
Pt c/o body aches, cough, shortness of breath and b/l LE swelling x few days. Also endorsing urinary frequency. Denies fever, chills, chest pain, pmhx: DM2, CHF, CVA, CAD Pt c/o body aches, cough, shortness of breath and b/l LE swelling x few days. Also endorsing urinary frequency. Respirations even and unlabored. Denies fever, chills, chest pain, pmhx: DM2, CHF, CVA, CAD

## 2025-04-06 NOTE — ED ADULT NURSE NOTE - OBJECTIVE STATEMENT
pt is 85y old female received awake and  responsive, c/o of cough, body aches and mild sob for few days, cardiac monitor is on, vss, nad noted will continue to monitor

## 2025-04-06 NOTE — ED PROVIDER NOTE - ATTENDING CONTRIBUTION TO CARE
85 y/o female hx HTN, IDDM, CAD s/p stents, HFrEF (LVEF 45% in 2023), bradycardia s/p PPM (Monte), DVT presents with shortness of breath, body aches, weakness, fatigue, lower extremity swelling, cough x 1 week. Recent influenza infection here two months ago with very similar symptoms. She takes 40 lasix oral daily, however has noticed a 6 weigth gain. Cough productive of yellow sputum. SOB worse with lying down and exertion. Denies chest pain, but endorses chest discomfort after coughing episode. Several episodes of n/v over the past week, Also endorsing urinary frequency, much more than usual. No dysuria or hematuria.    agree with above: 85 y/o female hx HTN, IDDM, CAD s/p stents, HFrEF (LVEF 45% in 2023), bradycardia s/p PPM (Monte), DVT presents with shortness of breath, body aches, weakness, fatigue, lower extremity swelling, cough x 1 week. also complains of abdominal pain   pt well appearing, mild foot edema, abd tender in lower abd, lungs clear  likely viral syndrome, ro pulm edema, pna, abd path

## 2025-04-06 NOTE — ED PROVIDER NOTE - CLINICAL SUMMARY MEDICAL DECISION MAKING FREE TEXT BOX
85 y/o female hx HTN, IDDM, CAD s/p stents, HFrEF (LVEF 45% in 2023), bradycardia s/p PPM (Monte), DVT presents with shortness of breath, body aches, weakness, fatigue, lower extremity swelling, cough x 1 week. Recent influenza infection here two months ago with very similar symptoms. She takes 40 lasix oral daily, however has noticed a 6 weigth gain. Cough productive of yellow sputum. SOB worse with lying down and exertion. Denies chest pain, but endorses chest discomfort after coughing episode. Several episodes of n/v over the past week, Also endorsing urinary frequency, much more than usual. No dysuria or hematuria.    Plan for

## 2025-04-08 LAB
-  AMPICILLIN: SIGNIFICANT CHANGE UP
-  CIPROFLOXACIN: SIGNIFICANT CHANGE UP
-  LEVOFLOXACIN: SIGNIFICANT CHANGE UP
-  NITROFURANTOIN: SIGNIFICANT CHANGE UP
-  TETRACYCLINE: SIGNIFICANT CHANGE UP
-  VANCOMYCIN: SIGNIFICANT CHANGE UP
CULTURE RESULTS: ABNORMAL
METHOD TYPE: SIGNIFICANT CHANGE UP
ORGANISM # SPEC MICROSCOPIC CNT: ABNORMAL
ORGANISM # SPEC MICROSCOPIC CNT: ABNORMAL
SPECIMEN SOURCE: SIGNIFICANT CHANGE UP

## 2025-04-22 NOTE — ED PROVIDER NOTE - DOMESTIC TRAVEL HIGH RISK QUESTION
I attest my time as attending is greater than 50% of the total combined time spent on qualifying patient care activities by the PA/NP and attending. I attest my time as attending is greater than 50% of the total combined time spent on qualifying patient care activities by the PA/NP and attending. I attest my time as attending is greater than 50% of the total combined time spent on qualifying patient care activities by the PA/NP and attending. No

## 2025-05-20 ENCOUNTER — INPATIENT (INPATIENT)
Facility: HOSPITAL | Age: 85
LOS: 4 days | Discharge: HOME | End: 2025-05-25
Attending: INTERNAL MEDICINE | Admitting: INTERNAL MEDICINE
Payer: MEDICARE

## 2025-05-20 ENCOUNTER — TRANSCRIPTION ENCOUNTER (OUTPATIENT)
Age: 85
End: 2025-05-20

## 2025-05-20 ENCOUNTER — RESULT REVIEW (OUTPATIENT)
Age: 85
End: 2025-05-20

## 2025-05-20 VITALS
OXYGEN SATURATION: 98 % | HEART RATE: 81 BPM | TEMPERATURE: 97 F | DIASTOLIC BLOOD PRESSURE: 110 MMHG | RESPIRATION RATE: 20 BRPM | SYSTOLIC BLOOD PRESSURE: 193 MMHG

## 2025-05-20 DIAGNOSIS — G89.4 CHRONIC PAIN SYNDROME: ICD-10-CM

## 2025-05-20 DIAGNOSIS — Z29.9 ENCOUNTER FOR PROPHYLACTIC MEASURES, UNSPECIFIED: ICD-10-CM

## 2025-05-20 DIAGNOSIS — E11.9 TYPE 2 DIABETES MELLITUS WITHOUT COMPLICATIONS: ICD-10-CM

## 2025-05-20 DIAGNOSIS — I16.0 HYPERTENSIVE URGENCY: ICD-10-CM

## 2025-05-20 DIAGNOSIS — I82.509 CHRONIC EMBOLISM AND THROMBOSIS OF UNSPECIFIED DEEP VEINS OF UNSPECIFIED LOWER EXTREMITY: ICD-10-CM

## 2025-05-20 DIAGNOSIS — E78.5 HYPERLIPIDEMIA, UNSPECIFIED: ICD-10-CM

## 2025-05-20 DIAGNOSIS — I25.10 ATHEROSCLEROTIC HEART DISEASE OF NATIVE CORONARY ARTERY WITHOUT ANGINA PECTORIS: ICD-10-CM

## 2025-05-20 DIAGNOSIS — I50.9 HEART FAILURE, UNSPECIFIED: ICD-10-CM

## 2025-05-20 DIAGNOSIS — I50.23 ACUTE ON CHRONIC SYSTOLIC (CONGESTIVE) HEART FAILURE: ICD-10-CM

## 2025-05-20 LAB
ADD ON TEST-SPECIMEN IN LAB: SIGNIFICANT CHANGE UP
ALBUMIN SERPL ELPH-MCNC: 4.2 G/DL — SIGNIFICANT CHANGE UP (ref 3.3–5)
ALP SERPL-CCNC: 75 U/L — SIGNIFICANT CHANGE UP (ref 40–120)
ALT FLD-CCNC: 11 U/L — SIGNIFICANT CHANGE UP (ref 4–33)
ANION GAP SERPL CALC-SCNC: 14 MMOL/L — SIGNIFICANT CHANGE UP (ref 7–14)
AST SERPL-CCNC: 18 U/L — SIGNIFICANT CHANGE UP (ref 4–32)
B PERT DNA SPEC QL NAA+PROBE: SIGNIFICANT CHANGE UP
B PERT+PARAPERT DNA PNL SPEC NAA+PROBE: SIGNIFICANT CHANGE UP
B-OH-BUTYR SERPL-SCNC: <0 MMOL/L — SIGNIFICANT CHANGE UP (ref 0–0.4)
BASOPHILS # BLD AUTO: 0.02 K/UL — SIGNIFICANT CHANGE UP (ref 0–0.2)
BASOPHILS NFR BLD AUTO: 0.3 % — SIGNIFICANT CHANGE UP (ref 0–2)
BILIRUB SERPL-MCNC: 1.2 MG/DL — SIGNIFICANT CHANGE UP (ref 0.2–1.2)
BLOOD GAS VENOUS COMPREHENSIVE RESULT: SIGNIFICANT CHANGE UP
BUN SERPL-MCNC: 13 MG/DL — SIGNIFICANT CHANGE UP (ref 7–23)
C PNEUM DNA SPEC QL NAA+PROBE: SIGNIFICANT CHANGE UP
CALCIUM SERPL-MCNC: 9.2 MG/DL — SIGNIFICANT CHANGE UP (ref 8.4–10.5)
CHLORIDE SERPL-SCNC: 102 MMOL/L — SIGNIFICANT CHANGE UP (ref 98–107)
CO2 SERPL-SCNC: 22 MMOL/L — SIGNIFICANT CHANGE UP (ref 22–31)
CREAT SERPL-MCNC: 0.9 MG/DL — SIGNIFICANT CHANGE UP (ref 0.5–1.3)
EGFR: 62 ML/MIN/1.73M2 — SIGNIFICANT CHANGE UP
EGFR: 62 ML/MIN/1.73M2 — SIGNIFICANT CHANGE UP
EOSINOPHIL # BLD AUTO: 0.07 K/UL — SIGNIFICANT CHANGE UP (ref 0–0.5)
EOSINOPHIL NFR BLD AUTO: 1 % — SIGNIFICANT CHANGE UP (ref 0–6)
FLUAV AG NPH QL: SIGNIFICANT CHANGE UP
FLUAV SUBTYP SPEC NAA+PROBE: SIGNIFICANT CHANGE UP
FLUBV AG NPH QL: SIGNIFICANT CHANGE UP
FLUBV RNA SPEC QL NAA+PROBE: SIGNIFICANT CHANGE UP
GLUCOSE BLDC GLUCOMTR-MCNC: 166 MG/DL — HIGH (ref 70–99)
GLUCOSE BLDC GLUCOMTR-MCNC: 196 MG/DL — HIGH (ref 70–99)
GLUCOSE BLDC GLUCOMTR-MCNC: 225 MG/DL — HIGH (ref 70–99)
GLUCOSE SERPL-MCNC: 234 MG/DL — HIGH (ref 70–99)
HADV DNA SPEC QL NAA+PROBE: SIGNIFICANT CHANGE UP
HCOV 229E RNA SPEC QL NAA+PROBE: SIGNIFICANT CHANGE UP
HCOV HKU1 RNA SPEC QL NAA+PROBE: SIGNIFICANT CHANGE UP
HCOV NL63 RNA SPEC QL NAA+PROBE: SIGNIFICANT CHANGE UP
HCOV OC43 RNA SPEC QL NAA+PROBE: SIGNIFICANT CHANGE UP
HCT VFR BLD CALC: 37.2 % — SIGNIFICANT CHANGE UP (ref 34.5–45)
HGB BLD-MCNC: 11.5 G/DL — SIGNIFICANT CHANGE UP (ref 11.5–15.5)
HMPV RNA SPEC QL NAA+PROBE: SIGNIFICANT CHANGE UP
HPIV1 RNA SPEC QL NAA+PROBE: SIGNIFICANT CHANGE UP
HPIV2 RNA SPEC QL NAA+PROBE: SIGNIFICANT CHANGE UP
HPIV3 RNA SPEC QL NAA+PROBE: SIGNIFICANT CHANGE UP
HPIV4 RNA SPEC QL NAA+PROBE: SIGNIFICANT CHANGE UP
IANC: 4.99 K/UL — SIGNIFICANT CHANGE UP (ref 1.8–7.4)
IMM GRANULOCYTES NFR BLD AUTO: 0.4 % — SIGNIFICANT CHANGE UP (ref 0–0.9)
LYMPHOCYTES # BLD AUTO: 1.62 K/UL — SIGNIFICANT CHANGE UP (ref 1–3.3)
LYMPHOCYTES # BLD AUTO: 22.3 % — SIGNIFICANT CHANGE UP (ref 13–44)
M PNEUMO DNA SPEC QL NAA+PROBE: SIGNIFICANT CHANGE UP
MAGNESIUM SERPL-MCNC: 1.9 MG/DL — SIGNIFICANT CHANGE UP (ref 1.6–2.6)
MCHC RBC-ENTMCNC: 26.8 PG — LOW (ref 27–34)
MCHC RBC-ENTMCNC: 30.9 G/DL — LOW (ref 32–36)
MCV RBC AUTO: 86.7 FL — SIGNIFICANT CHANGE UP (ref 80–100)
MONOCYTES # BLD AUTO: 0.54 K/UL — SIGNIFICANT CHANGE UP (ref 0–0.9)
MONOCYTES NFR BLD AUTO: 7.4 % — SIGNIFICANT CHANGE UP (ref 2–14)
NEUTROPHILS # BLD AUTO: 4.99 K/UL — SIGNIFICANT CHANGE UP (ref 1.8–7.4)
NEUTROPHILS NFR BLD AUTO: 68.6 % — SIGNIFICANT CHANGE UP (ref 43–77)
NRBC # BLD AUTO: 0 K/UL — SIGNIFICANT CHANGE UP (ref 0–0)
NRBC # FLD: 0 K/UL — SIGNIFICANT CHANGE UP (ref 0–0)
NRBC BLD AUTO-RTO: 0 /100 WBCS — SIGNIFICANT CHANGE UP (ref 0–0)
NT-PROBNP SERPL-SCNC: 769 PG/ML — HIGH
PLATELET # BLD AUTO: 212 K/UL — SIGNIFICANT CHANGE UP (ref 150–400)
POTASSIUM SERPL-MCNC: 4.2 MMOL/L — SIGNIFICANT CHANGE UP (ref 3.5–5.3)
POTASSIUM SERPL-SCNC: 4.2 MMOL/L — SIGNIFICANT CHANGE UP (ref 3.5–5.3)
PROT SERPL-MCNC: 7.6 G/DL — SIGNIFICANT CHANGE UP (ref 6–8.3)
RAPID RVP RESULT: SIGNIFICANT CHANGE UP
RBC # BLD: 4.29 M/UL — SIGNIFICANT CHANGE UP (ref 3.8–5.2)
RBC # FLD: 14.1 % — SIGNIFICANT CHANGE UP (ref 10.3–14.5)
RSV RNA NPH QL NAA+NON-PROBE: SIGNIFICANT CHANGE UP
RSV RNA SPEC QL NAA+PROBE: SIGNIFICANT CHANGE UP
RV+EV RNA SPEC QL NAA+PROBE: SIGNIFICANT CHANGE UP
SARS-COV-2 RNA SPEC QL NAA+PROBE: SIGNIFICANT CHANGE UP
SARS-COV-2 RNA SPEC QL NAA+PROBE: SIGNIFICANT CHANGE UP
SODIUM SERPL-SCNC: 138 MMOL/L — SIGNIFICANT CHANGE UP (ref 135–145)
SOURCE RESPIRATORY: SIGNIFICANT CHANGE UP
TROPONIN T, HIGH SENSITIVITY RESULT: 20 NG/L — SIGNIFICANT CHANGE UP
TROPONIN T, HIGH SENSITIVITY RESULT: 21 NG/L — SIGNIFICANT CHANGE UP
WBC # BLD: 7.27 K/UL — SIGNIFICANT CHANGE UP (ref 3.8–10.5)
WBC # FLD AUTO: 7.27 K/UL — SIGNIFICANT CHANGE UP (ref 3.8–10.5)

## 2025-05-20 PROCEDURE — 71045 X-RAY EXAM CHEST 1 VIEW: CPT | Mod: 26

## 2025-05-20 PROCEDURE — 93306 TTE W/DOPPLER COMPLETE: CPT | Mod: 26

## 2025-05-20 PROCEDURE — 99285 EMERGENCY DEPT VISIT HI MDM: CPT

## 2025-05-20 PROCEDURE — 99053 MED SERV 10PM-8AM 24 HR FAC: CPT

## 2025-05-20 PROCEDURE — 93281 PM DEVICE PROGR EVAL MULTI: CPT | Mod: 26

## 2025-05-20 PROCEDURE — 99223 1ST HOSP IP/OBS HIGH 75: CPT

## 2025-05-20 RX ORDER — DEXTROSE 50 % IN WATER 50 %
15 SYRINGE (ML) INTRAVENOUS ONCE
Refills: 0 | Status: DISCONTINUED | OUTPATIENT
Start: 2025-05-20 | End: 2025-05-25

## 2025-05-20 RX ORDER — SODIUM CHLORIDE 9 G/1000ML
1000 INJECTION, SOLUTION INTRAVENOUS
Refills: 0 | Status: DISCONTINUED | OUTPATIENT
Start: 2025-05-20 | End: 2025-05-25

## 2025-05-20 RX ORDER — PREGABALIN 50 MG/1
50 CAPSULE ORAL
Refills: 0 | Status: DISCONTINUED | OUTPATIENT
Start: 2025-05-20 | End: 2025-05-25

## 2025-05-20 RX ORDER — ACETAMINOPHEN 500 MG/5ML
1000 LIQUID (ML) ORAL ONCE
Refills: 0 | Status: COMPLETED | OUTPATIENT
Start: 2025-05-20 | End: 2025-05-20

## 2025-05-20 RX ORDER — GLUCAGON 3 MG/1
1 POWDER NASAL ONCE
Refills: 0 | Status: DISCONTINUED | OUTPATIENT
Start: 2025-05-20 | End: 2025-05-25

## 2025-05-20 RX ORDER — INSULIN ASPART 100 [IU]/ML
0 INJECTION, SOLUTION INTRAVENOUS; SUBCUTANEOUS
Refills: 0 | DISCHARGE

## 2025-05-20 RX ORDER — INSULIN LISPRO 100 U/ML
3 INJECTION, SOLUTION INTRAVENOUS; SUBCUTANEOUS
Refills: 0 | Status: DISCONTINUED | OUTPATIENT
Start: 2025-05-20 | End: 2025-05-25

## 2025-05-20 RX ORDER — SACUBITRIL AND VALSARTAN 49; 51 MG/1; MG/1
1 TABLET, FILM COATED ORAL
Refills: 0 | Status: DISCONTINUED | OUTPATIENT
Start: 2025-05-20 | End: 2025-05-25

## 2025-05-20 RX ORDER — ATORVASTATIN CALCIUM 80 MG/1
80 TABLET, FILM COATED ORAL AT BEDTIME
Refills: 0 | Status: DISCONTINUED | OUTPATIENT
Start: 2025-05-20 | End: 2025-05-25

## 2025-05-20 RX ORDER — INSULIN LISPRO 100 U/ML
INJECTION, SOLUTION INTRAVENOUS; SUBCUTANEOUS
Refills: 0 | Status: DISCONTINUED | OUTPATIENT
Start: 2025-05-20 | End: 2025-05-25

## 2025-05-20 RX ORDER — CLOPIDOGREL BISULFATE 75 MG/1
75 TABLET, FILM COATED ORAL DAILY
Refills: 0 | Status: DISCONTINUED | OUTPATIENT
Start: 2025-05-20 | End: 2025-05-25

## 2025-05-20 RX ORDER — FUROSEMIDE 10 MG/ML
40 INJECTION INTRAMUSCULAR; INTRAVENOUS DAILY
Refills: 0 | Status: DISCONTINUED | OUTPATIENT
Start: 2025-05-20 | End: 2025-05-21

## 2025-05-20 RX ORDER — DONEPEZIL HYDROCHLORIDE 5 MG/1
10 TABLET ORAL AT BEDTIME
Refills: 0 | Status: DISCONTINUED | OUTPATIENT
Start: 2025-05-20 | End: 2025-05-25

## 2025-05-20 RX ORDER — CARVEDILOL 3.12 MG/1
3.12 TABLET, FILM COATED ORAL EVERY 12 HOURS
Refills: 0 | Status: DISCONTINUED | OUTPATIENT
Start: 2025-05-20 | End: 2025-05-25

## 2025-05-20 RX ORDER — DEXTROSE 50 % IN WATER 50 %
25 SYRINGE (ML) INTRAVENOUS ONCE
Refills: 0 | Status: DISCONTINUED | OUTPATIENT
Start: 2025-05-20 | End: 2025-05-25

## 2025-05-20 RX ORDER — APIXABAN 2.5 MG/1
5 TABLET, FILM COATED ORAL
Refills: 0 | Status: DISCONTINUED | OUTPATIENT
Start: 2025-05-20 | End: 2025-05-25

## 2025-05-20 RX ORDER — KETOROLAC TROMETHAMINE 30 MG/ML
15 INJECTION, SOLUTION INTRAMUSCULAR; INTRAVENOUS ONCE
Refills: 0 | Status: DISCONTINUED | OUTPATIENT
Start: 2025-05-20 | End: 2025-05-20

## 2025-05-20 RX ORDER — DEXTROSE 50 % IN WATER 50 %
12.5 SYRINGE (ML) INTRAVENOUS ONCE
Refills: 0 | Status: DISCONTINUED | OUTPATIENT
Start: 2025-05-20 | End: 2025-05-25

## 2025-05-20 RX ORDER — DEXTROMETHORPHAN HBR, GUAIFENESIN 200 MG/10ML
200 LIQUID ORAL EVERY 6 HOURS
Refills: 0 | Status: DISCONTINUED | OUTPATIENT
Start: 2025-05-20 | End: 2025-05-25

## 2025-05-20 RX ORDER — INSULIN GLARGINE-YFGN 100 [IU]/ML
12 INJECTION, SOLUTION SUBCUTANEOUS AT BEDTIME
Refills: 0 | Status: DISCONTINUED | OUTPATIENT
Start: 2025-05-20 | End: 2025-05-25

## 2025-05-20 RX ADMIN — SACUBITRIL AND VALSARTAN 1 TABLET(S): 49; 51 TABLET, FILM COATED ORAL at 12:42

## 2025-05-20 RX ADMIN — DONEPEZIL HYDROCHLORIDE 10 MILLIGRAM(S): 5 TABLET ORAL at 21:54

## 2025-05-20 RX ADMIN — KETOROLAC TROMETHAMINE 15 MILLIGRAM(S): 30 INJECTION, SOLUTION INTRAMUSCULAR; INTRAVENOUS at 11:01

## 2025-05-20 RX ADMIN — INSULIN LISPRO 3 UNIT(S): 100 INJECTION, SOLUTION INTRAVENOUS; SUBCUTANEOUS at 17:09

## 2025-05-20 RX ADMIN — FUROSEMIDE 40 MILLIGRAM(S): 10 INJECTION INTRAMUSCULAR; INTRAVENOUS at 07:47

## 2025-05-20 RX ADMIN — CLOPIDOGREL BISULFATE 75 MILLIGRAM(S): 75 TABLET, FILM COATED ORAL at 13:44

## 2025-05-20 RX ADMIN — INSULIN GLARGINE-YFGN 12 UNIT(S): 100 INJECTION, SOLUTION SUBCUTANEOUS at 21:54

## 2025-05-20 RX ADMIN — ATORVASTATIN CALCIUM 80 MILLIGRAM(S): 80 TABLET, FILM COATED ORAL at 21:54

## 2025-05-20 RX ADMIN — INSULIN LISPRO 1: 100 INJECTION, SOLUTION INTRAVENOUS; SUBCUTANEOUS at 17:10

## 2025-05-20 RX ADMIN — CARVEDILOL 3.12 MILLIGRAM(S): 3.12 TABLET, FILM COATED ORAL at 12:42

## 2025-05-20 RX ADMIN — Medication 400 MILLIGRAM(S): at 07:20

## 2025-05-20 RX ADMIN — PREGABALIN 50 MILLIGRAM(S): 50 CAPSULE ORAL at 17:08

## 2025-05-20 RX ADMIN — APIXABAN 5 MILLIGRAM(S): 2.5 TABLET, FILM COATED ORAL at 17:08

## 2025-05-20 RX ADMIN — DEXTROMETHORPHAN HBR, GUAIFENESIN 200 MILLIGRAM(S): 200 LIQUID ORAL at 13:44

## 2025-05-20 NOTE — ED ADULT NURSE NOTE - OBJECTIVE STATEMENT
Patient received in 4, A&Ox3 ambulatory at baseline with cane pmh: HTN, CHF, CAD, DM, pacemaker presenting to ED c/o SOB, generalized weakness x1 day. Denies CP, n/v/d fever chills, abdominal pain, visual changes, urinary symptoms. Left 20g placed, labs drawn and sent. Patient received in 4, A&Ox3 ambulatory at baseline with cane pmh: HTN, CHF, CAD, DM, pacemaker presenting to ED c/o SOB, cough, myalgias, generalized weakness x2 weeks. Pt c/o midsternal non radiating chest pain and left arm pain. Denies n/v/d fever chills, abdominal pain, visual changes, urinary symptoms. Pt states she did not take her morning meds today. Left 20g placed, labs drawn and sent.

## 2025-05-20 NOTE — ED ADULT NURSE REASSESSMENT NOTE - NS ED NURSE REASSESS COMMENT FT1
report received from overnight RN Marisa. A&Ox3. NAD. pt denies SOB, chest pain, dizziness, weakness, urinary symptoms, HA, n/v/d, fevers, chills, pain. respirations are even and unlabored. IV is patent. call bell at bedside. primafit placed via clean procedure. safety precautions maintained.

## 2025-05-20 NOTE — H&P ADULT - ASSESSMENT
Patient is an 85F with a PMH of DM, HTN, CAD s/p stents, HFrEF - biventricular PPM placed 2024, bradycardia, DVt on eliquis who presents to the ED for cough/dyspnea.

## 2025-05-20 NOTE — DISCHARGE NOTE PROVIDER - CARE PROVIDER_API CALL
Fadi Koenig  Cardiology  [address], [address]  [place] [address]  Phone: [phone]  Fax: [phone]  Follow Up Time:    Alan Rojas  Cardiology  [address], [address]  [place] [address]  Phone: [phone]  Fax: [phone]  Follow Up Time:

## 2025-05-20 NOTE — CONSULT NOTE ADULT - SUBJECTIVE AND OBJECTIVE BOX
date of consult 5/20/25    HISTORY OF PRESENT ILLNESS: HPI:    85 year old Female with PMH DM, CAD s/p PCI to prox LAD/mid LAD/Diag/Ramus, last [facility] 8/2022 with occluded prox Ramus stent, patent LAD and Diag stents, mild to mod atherosclerosis in pLcx and mRCA, managed medically, HTN, and CVA, last admitted 9/2024 with acute on chronic HFrEF, s/p upgrade to BIV-PPM at that time (for ischemic cardiomyopathy w/ superimposed RV pacing- (MOISÉS), presents with a few days of CEDEÑO, cough, orthopnea, and decreased appetite c/w acute on chronic HFrEF.  Reports compliance with meds including Lasix.  Reports no travel, no LE edema, no palps, syncope. No dietary indiscretions.  No sick contacts.    per chart-- Cough is at times productive but mostly dry.  Patient reportedly having significant coughing spells at home with sporadic dyspnea afterwards.  Symptoms progressively worsening into today.  This morning, patient reportedly with significant dyspnea at rest.  Checked her SpO2 at home and it was in the 80s per daughter.  Brought to the ED for evaluation.  Patient also complaining of generalized pain.  Notes that she chronically has pain to her L wrist, worse with flexion/extension of the rest.  Patient states that the pain frequently radiates to her L chest and then to her R side - with pain from her R shoulder to her R foot.  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  Has been started on gabapentin for pain however patient notes significant sedation after using gabapentin 100mg.  Reports similar sedation while trying tramadol in the past as well.  No other complaints.  NKDA.  Denies tobacco or alcohol use.  Reports compliance with medication however missed her meds this AM as she was brought to ED.  /110 in triage.  Labs benign.  Ambulates with a cane at baseline.  Will admit to tele. (20 May 2025 12:08)      PAST MEDICAL & SURGICAL HISTORY:  HTN (hypertension)      CAD (coronary artery disease)  S/P stent placemenet 2006, and reportedly 6/2019 in [place]      DM (diabetes mellitus)      GERD (gastroesophageal reflux disease)      CVA (cerebral vascular accident)  no residual deficits      HLD (hyperlipidemia)      CHF (congestive heart failure)      S/P primary angioplasty with coronary stent  x1 in 2006 at [facility]      S/P TKR (total knee replacement)  left      MEDICATIONS  (STANDING):  apixaban 5 milliGRAM(s) Oral two times a day  atorvastatin 80 milliGRAM(s) Oral at bedtime  carvedilol 3.125 milliGRAM(s) Oral every 12 hours  clopidogrel Tablet 75 milliGRAM(s) Oral daily  dextrose 5%. 1000 milliLiter(s) (50 mL/Hr) IV Continuous <Continuous>  dextrose 5%. 1000 milliLiter(s) (100 mL/Hr) IV Continuous <Continuous>  dextrose 50% Injectable 25 Gram(s) IV Push once  dextrose 50% Injectable 12.5 Gram(s) IV Push once  dextrose 50% Injectable 25 Gram(s) IV Push once  donepezil 10 milliGRAM(s) Oral at bedtime  furosemide   Injectable 40 milliGRAM(s) IV Push daily  glucagon  Injectable 1 milliGRAM(s) IntraMuscular once  insulin glargine Injectable (LANTUS) 12 Unit(s) SubCutaneous at bedtime  insulin lispro (ADMELOG) corrective regimen sliding scale   SubCutaneous three times a day before meals  insulin lispro Injectable (ADMELOG) 3 Unit(s) SubCutaneous three times a day before meals  pregabalin 50 milliGRAM(s) Oral two times a day  sacubitril 24 mG/valsartan 26 mG 1 Tablet(s) Oral two times a day      Allergies  No Known Allergies    FAMILY HISTORY:  FH: diabetes mellitus  Noncontributory for premature coronary disease or sudden cardiac death    SOCIAL HISTORY:    [x ] Non-smoker  [ ] Smoker  [ ] Alcohol    FLU VACCINE THIS YEAR STARTS IN AUGUST:  [ ] Yes    [ ] No    IF OVER 65 HAVE YOU EVER HAD A PNA VACCINE:  [ ] Yes    [ ] No       [ ] N/A      REVIEW OF SYSTEMS:  [ ]chest pain  [ x ]shortness of breath  [  ]palpitations  [  ]syncope  [ ]near syncope [ ]upper extremity weakness   [ ] lower extremity weakness  [  ]diplopia  [  ]altered mental status   [  ]fevers  [ ]chills [ ]nausea  [ ]vomiting  [  ]dysphagia    [ ]abdominal pain  [ ]melena  [ ]BRBPR    [  ]epistaxis  [  ]rash    [ ]lower extremity edema        [x ] All others negative	  [ ] Unable to obtain      LABS:	 	    CARDIAC MARKERS:  Troponin T, High Sensitivity Result: 20, 21                          11.5   7.27  )-----------( 212      ( 20 May 2025 07:28 )             37.2     Hb Trend: 11.5<--    05-20    138  |  102  |  13  ----------------------------<  234[H]  4.2   |  22  |  0.90    Ca    9.2      20 May 2025 07:28  Mg     1.90     05-20    TPro  7.6  /  Alb  4.2  /  TBili  1.2  /  DBili  x   /  AST  18  /  ALT  11  /  AlkPhos  75  05-20    Creatinine Trend: 0.90<--    PHYSICAL EXAM:  T(C): 36.6 (05-20-25 @ 12:32), Max: 36.6 (05-20-25 @ 12:32)  HR: 73 (05-20-25 @ 12:32) (63 - 81)  BP: 172/86 (05-20-25 @ 12:32) (172/82 - 193/110)  RR: 18 (05-20-25 @ 12:32) (16 - 20)  SpO2: 100% (05-20-25 @ 12:32) (98% - 100%)    Gen: Appears well in NAD  HEENT:  (-)icterus (-)pallor  CV: N S1 S2 1/6 SHANIQUA (+)2 Pulses B/l  Resp:  bibasilar crackles  GI: (+) BS Soft, NT, ND  Lymph:  (-)Edema, (-)obvious lymphadenopathy  Skin: Warm to touch, Normal turgor  Psych: Appropriate mood and affect      TELEMETRY: 	      ECG:  	V Paced    < from: TTE Limited W or WO Ultrasound Enhancing Agent (09.12.24 @ 08:13) >     CONCLUSIONS:      1. Left ventricular systolic function is normal.   2. No pericardial effusion seen.    < end of copied text >    < from: TTE W or WO Ultrasound Enhancing Agent (09.06.24 @ 16:50) >  CONCLUSIONS:      1. The left ventricular cavity is normal in size. Left ventricular wall thickness is normal. Abnormal (paradoxical) septal motion consistent with rightventricular pacemaker. Left ventricular systolic function is severely decreased with an ejection fraction visually estimated at 25 to 30%. There is global left ventricular hypokinesis. There is mild (grade 1) left ventricular diastolic dysfunction.  2. The right ventricular cavity is normal in size and right ventricular systolic function is normal. Tricuspid annular plane systolic excursion (TAPSE) is 2.6 cm (normal >=1.7 cm). A device lead is visualized in the right heart.   3. Structurally normal mitral valve with normal leaflet excursion. There is calcification of the mitral valve annulus. There is mild to moderate mitral regurgitation.   4. The left atrium is severely dilated with an indexed volume of 55.42 ml/m².   5. No prior echocardiogram is available for comparison.    < end of copied text >    < from: Cardiac Catheterization (04.04.23 @ 16:41) >    Diagnostic Conclusions:   Chronically occluded ramus artery stent. Mild atherosclerosis in  proximal LAD. Patent mid LAD and ostial/proximal diagonal  stents. Moderate atherosclerosis in proximal Cx. Mild atherosclerosis  in mid and distal RCA.    Recommendations:   Optimize medical therapy for ischemic heart disease. Aggressive risk  factor modification.    < end of copied text >      ASSESSMENT/PLAN: 	  85 year old Female with PMH DM, CAD s/p PCI to prox LAD/mid LAD/Diag/Ramus, last LHC 8/2022 with occluded prox Ramus stent, patent LAD and Diag stents, mild to mod atherosclerosis in pLcx and mRCA, managed medically, HTN, and CVA, DVT, on Eliquis, who was last admitted 9/2024 with acute on chronic HFrEF, s/p upgrade to STJ BIV-PPM at that time (for ischemic cardiomyopathy w/ superimposed RV pacing- (CURIEL), presents with a few days of CEDEÑO, cough, orthopnea, and decreased appetite c/w acute on chronic HFrEF.     --check repeat TTE, EF had improved with BIV-PPM in September  --will review office records and recent testing  --Cont IV Lasix, O>I, if no significant response will change to IV Bumex  --cont Coreg, Entresto for cardiomyopathy  --check device interrogation  --previous stress test abnormal mostly fixed defects with seamus-infarct ischemia and cath 8/2022 showed 100% ramus in stent restenosis, repeat LHC pursued 4/2023 revealing unchanged CAD- chronic occl ramus stent, patent mLAD and ostial/prox Diag stents and moderate atherosclerosis of pLcx.    further reccs pending above    Thank you for allowing us to participate in the care of our mutual patient.  Please do not hesitate to call with any questions.    Hannah CALVILLO  [phone]

## 2025-05-20 NOTE — ED ADULT NURSE REASSESSMENT NOTE - NS ED NURSE REASSESS COMMENT FT1
pt c/o right upper and lower extremity pain. MD Tong made aware. NAD. pt denies SOB, chest pain, dizziness, weakness, urinary symptoms, HA, n/v/d, fevers, chills. respirations are even and unlabored. positive pulse, motor and sensory with equal strength in upper and lower extremities. PERRLA. EKG obtained. NSR on cardiac monitor. safety precautions maintained. call bell at bedside.

## 2025-05-20 NOTE — H&P ADULT - PROBLEM SELECTOR PLAN 1
Patient presents with history of cough and dyspnea  /110 in triage however SpO2 98 on RA    Currently 100% on RA  Respiratory exam benign and patient without much edema    Patient was given IVP lasix 40 once in ED.  Will continue daily for now  Suspected CHF exacerbation however will r/o other causes  Full RVP pending.  CXR not done yet - will review    If CXR is unclear, will consider CT chest  Reportedly compliant with eliquis at home, low risk for PE.  (also comfortable on RA)  TTE ordered.  Will hold off on cardio eval for now     Patient had biventricular upgrade to PPM last year at [facility] with improvement of EF  PT eval pending.  Amb sats.  (ambulatory with a cane at baseline)

## 2025-05-20 NOTE — CONSULT NOTE ADULT - NS ATTEND AMEND GEN_ALL_CORE FT
returns to hospital with mild CHF exacerbation, up a few lbs.  edema limited to gut area / JVD.  no leg edema.  increase diuretic to bumex 2mg/day.  based on intake/output/labs, can decide on daily vs BID tomorrow. plan to send out on bumex.  EKG, s/p upgrade to LBB conduction system pacing shows QRS <110ms, with large R wave in V1.  stable from date of implant.  will follow with you.  no invasive testing anticipated.

## 2025-05-20 NOTE — DISCHARGE NOTE PROVIDER - NSDCCPCAREPLAN_GEN_ALL_CORE_FT
PRINCIPAL DISCHARGE DIAGNOSIS  Diagnosis: Heart failure  Assessment and Plan of Treatment: You were treated for a heart failure exacerbation with IV lasix and have improved. Cardiology evaluated you and recommends taking Bumex 2mg daily in addition to all other medications. Please follow up with your cardiologist in 1-2 weeks. Monitor for any shortness of breath, weight gain or swelling of hands or feet.      SECONDARY DISCHARGE DIAGNOSES  Diagnosis: Chronic pain syndrome  Assessment and Plan of Treatment: We started Lyrica to help with your pain,. Take as ordered. Please follow up with your PCP and / or pain specialist.     PRINCIPAL DISCHARGE DIAGNOSIS  Diagnosis: Heart failure  Assessment and Plan of Treatment: You were treated for a heart failure exacerbation with IV bumex and have improved. Cardiology evaluated you and recommends taking Bumex 2mg daily in addition to all other medications. Please follow up with your cardiologist in 1-2 weeks. Monitor for any shortness of breath, weight gain or swelling of hands or feet.      SECONDARY DISCHARGE DIAGNOSES  Diagnosis: Chronic pain syndrome  Assessment and Plan of Treatment: We started Lyrica to help with your pain,. Take as ordered. Please follow up with your PCP and / or pain specialist.     PRINCIPAL DISCHARGE DIAGNOSIS  Diagnosis: Heart failure  Assessment and Plan of Treatment: You were treated for a heart failure exacerbation with medication to remove excess fluid from your body (IV bumex) and have improved. Cardiology evaluated you and recommends taking a pill called Bumex 2mg daily in addition to all other medications. Please follow up with your cardiologist in 1-2 weeks. Monitor for any shortness of breath, weight gain or swelling of hands or feet.      SECONDARY DISCHARGE DIAGNOSES  Diagnosis: Chronic pain syndrome  Assessment and Plan of Treatment: We started Lyrica to help with your pain,. Take as ordered. Please follow up with your PCP and / or pain specialist.

## 2025-05-20 NOTE — PATIENT PROFILE ADULT - FUNCTIONAL ASSESSMENT - BASIC MOBILITY 6.
3-calculated by average/Not able to assess (calculate score using [facility] averaging method)  3-calculated by average/Not able to assess (calculate score using [facility] averaging method)

## 2025-05-20 NOTE — DISCHARGE NOTE PROVIDER - ATTENDING DISCHARGE PHYSICAL EXAMINATION:
CONSTITUTIONAL: NAD, well-groomed resting in bed  EYES: PERRLA; conjunctiva and sclera clear  ENMT: Moist oral mucosa, no pharyngeal injection or exudates  NECK: Supple  RESPIRATORY: Normal respiratory effort; lungs are clear to auscultation bilaterally, no rales or rhonchi   CARDIOVASCULAR: Regular rate and rhythm, normal S1 and S2, no murmur/rub/gallop  ABDOMEN: Nontender to palpation, normoactive bowel sounds, no rebound/guarding;  MUSCULOSKELETAL: no clubbing or cyanosis of digits; no joint swelling or tenderness to palpation, no LE edema  PSYCH: A+O to person, place, and time; affect appropriate  NEUROLOGY: CN 2-12 are intact and symmetric; no gross sensory deficits   SKIN: No rashes; no palpable lesions

## 2025-05-20 NOTE — ED ADULT TRIAGE NOTE - CHIEF COMPLAINT QUOTE
c/o cough xfew weeks, developed SOB x1 day with full bodyaches. denies h/a, dizziness/lightheadedness, abd pain, cp, gu sx, fevers/chills.  past med hx HTN, CHF, Pacemaker (did not take any home medications today).

## 2025-05-20 NOTE — DISCHARGE NOTE PROVIDER - NSDCMRMEDTOKEN_GEN_ALL_CORE_FT
apixaban 5 mg oral tablet: 1 tab(s) orally every 12 hours  atorvastatin 80 mg oral tablet: 1 tab(s) orally once a day (at bedtime)  carvedilol 3.125 mg oral tablet: 1 tab(s) orally every 12 hours  clopidogrel 75 mg oral tablet: 1 tab(s) orally once a day  donepezil 10 mg oral tablet: 1 tab(s) orally once a day  furosemide 40 mg oral tablet: 1 tab(s) orally once a day  gabapentin 100 mg oral capsule: 1 cap(s) orally 3 times a day  insulin glargine 100 units/mL subcutaneous solution: 12 unit(s) subcutaneous once a day (at bedtime)  NovoLOG FlexPen 100 units/mL injectable solution: Inject insulin based on sliding scale 3 times a day (before meals)   apixaban 5 mg oral tablet: 1 tab(s) orally every 12 hours  atorvastatin 80 mg oral tablet: 1 tab(s) orally once a day (at bedtime)  bumetanide 2 mg oral tablet: 1 tab(s) orally once a day  carvedilol 3.125 mg oral tablet: 1 tab(s) orally every 12 hours  clopidogrel 75 mg oral tablet: 1 tab(s) orally once a day  donepezil 10 mg oral tablet: 1 tab(s) orally once a day  insulin glargine 100 units/mL subcutaneous solution: 12 unit(s) subcutaneous once a day (at bedtime)  NovoLOG FlexPen 100 units/mL injectable solution: Inject insulin based on sliding scale 3 times a day (before meals)  pregabalin 50 mg oral capsule: 1 cap(s) orally 2 times a day MDD: 2 pills  sacubitril-valsartan 24 mg-26 mg oral tablet: 1 tab(s) orally 2 times a day

## 2025-05-20 NOTE — H&P ADULT - NSHPREVIEWOFSYSTEMS_GEN_ALL_CORE
Constitutional: no fever, chills, night sweats  Ears: no hearing changes or ear pain,   Nose: no nasal congestion, sinus pain, or rhinorrhea  Cardio: chest pain positive.  No orthopnea, edema, or palpitations  Resp: dyspnea, cough positive.  No wheezing  GI: no nausea, vomiting, diarrhea, constipation, hematochezia, or melena  : no dysuria, urinary frequency, hematuria  MSK: back pain, neck pain, LE pain positive.   Skin: no rash, pruritis   Neuro: no weakness, dizziness, lightheadedness, syncope   Heme/Lymph: no bruising or bleeding

## 2025-05-20 NOTE — ED PROVIDER NOTE - PROGRESS NOTE DETAILS
pt having arm and leg pain no weakness prt ekg performed a sensed v paced non ischemic and rpt bp 170/82.

## 2025-05-20 NOTE — H&P ADULT - PROBLEM SELECTOR PLAN 3
Patient reports L chest pain along with pain radiating to and from the chest  Could be from history of coughing, also could be from chronic radiculopathy  Reportedly unable to tolerate gabapentin 100 mg  Will trial lyrica 50mg BID inpatient

## 2025-05-20 NOTE — PHARMACOTHERAPY INTERVENTION NOTE - COMMENTS
Medication history is complete. Medication list updated in Outpatient Medication Record (OMR) via [employer] and patient's daughter over the phone.     Home Medications:  carvedilol 3.125 mg oral tablet: 1 tab(s) orally every 12 hours   clopidogrel 75 mg oral tablet: 1 tab(s) orally once a day  donepezil 10 mg oral tablet: 1 tab(s) orally once a day  furosemide 40 mg oral tablet: 1 tab(s) orally once a day  gabapentin 100 mg oral capsule: 1 cap(s) orally 3 times a day   insulin glargine 100 units/mL subcutaneous solution: 12 unit(s) subcutaneous once a day (at bedtime)   NovoLOG FlexPen 100 units/mL injectable solution: Inject insulin based on sliding scale 3 times a day (before meals)  apixaban 5 mg oral tablet: 1 tab(s) orally every 12 hours   atorvastatin 80 mg oral tablet: 1 tab(s) orally once a day (at bedtime)     Please call spectra [phone] if you have any questions.

## 2025-05-20 NOTE — H&P ADULT - PROBLEM SELECTOR PLAN 8
DVT prop: eliquis as per above   CC diet    Family contacts:  Son - KARL Olivo - [phone]  Daughter - Michell

## 2025-05-20 NOTE — ED PROVIDER NOTE - NS ED ROS FT
General: denies fever,   HENT: +nasal congestion,   CV: +chest pain,   Resp: +difficulty breathing, cough  Abdominal: denies nausea, vomiting, diarrhea, abdominal pain  MSK: denies muscle aches, leg swelling  Skin: denies rashes,

## 2025-05-20 NOTE — H&P ADULT - PROBLEM SELECTOR PLAN 2
/110 in triage.  Remains elevated  Will start home meds STAT  Carvedilol 3.125mg bid and entresto 24/26mg

## 2025-05-20 NOTE — DISCHARGE NOTE PROVIDER - HOSPITAL COURSE
85 year old female with a PMH of DM, HTN, CAD s/p stents, HFrEF - biventricular PPM placed 2024, bradycardia, DVT on eliquis who presents to the ED for cough/dyspnea. Found to be in an acute heart failure exacerbation. Received short course of IV diuresis and now euvolemic. Device interrogation with normal function. Previous stress test abnormal mostly fixed defects with seamus-infarct ischemia and cath 8/2022 showed 100% ramus in stent restenosis, repeat LHC pursued 4/2023 revealing unchanged CAD- chronic occl ramus stent, patent mLAD and ostial/prox Diag stents and moderate atherosclerosis of pLcx.Patient also with chronic pain syndrome- chest pain may be from coughing or chronic radiculopathy; unable to tolerate gabapentin - trialing lyrica.     Case discussed with Dr. Moraes on 5/23/24. The patient is medically stable for discharge.    85 year old female with a PMH of DM, HTN, CAD s/p stents, HFrEF - biventricular PPM placed 2024, bradycardia, DVT on eliquis who presents to the ED for cough/dyspnea. Found to be in an acute heart failure exacerbation. Received short course of IV diuresis and now euvolemic. Device interrogation with normal function. Previous stress test abnormal mostly fixed defects with seamus-infarct ischemia and cath 8/2022 showed 100% ramus in stent restenosis, repeat LHC pursued 4/2023 revealing unchanged CAD- chronic occl ramus stent, patent mLAD and ostial/prox Diag stents and moderate atherosclerosis of pLcx.Patient also with chronic pain syndrome- chest pain may be from coughing or chronic radiculopathy; unable to tolerate gabapentin - trialing lyrica.    85 year old female with a PMH of DM, HTN, CAD s/p stents, HFrEF - biventricular PPM placed 2024, bradycardia, DVT on eliquis who presents to the ED for cough/dyspnea. Found to be in an acute heart failure exacerbation. Received short course of IV diuresis and now euvolemic. Device interrogation with normal function. Previous stress test abnormal mostly fixed defects with seamus-infarct ischemia and cath 8/2022 showed 100% ramus in stent restenosis, repeat LHC pursued 4/2023 revealing unchanged CAD- chronic occl ramus stent, patent mLAD and ostial/prox Diag stents and moderate atherosclerosis of pLcx. Cardiology evaluated the patient and she was started on bumex, and entresto. Her symptoms improved. Patient also with chronic pain syndrome- chest pain may be from coughing or chronic radiculopathy; unable to tolerate gabapentin - trialing lyrica.        The patient is afebrile, hemodynamically stable and medically optimized for discharge to home with follow up with Cardiology and PCP. On day of discharge, patient is clinically stable with no new exam findings or acute symptoms compared to prior. The patient was seen by the attending physician on the date of discharge and deemed stable and acceptable for discharge. The patient's chronic medical conditions were treated accordingly per the patient's home medication regimen. The patient's medication reconciliation (with changes made to chronic medications), follow up appointments, discharge orders, instructions, and significant lab and diagnostic studies are as noted.      Important Medication Changes and Reason:  start entresto  start bumex 2mg     Active or Pending Issues Requiring Follow-up:  Cardiology- HF  pcp    Advanced Directives:   [X] Full code  [ ] DNR  [ ] Hospice    Discharge Diagnoses:  #Acute on chronic systolic congestive heart failure  #Pain of right lower extremity  # Hypertensive urgency  #Chronic pain syndrome  #Chronic DVT         85 year old female with a PMH of DM, HTN, CAD s/p stents, HFrEF - biventricular PPM placed 2024, bradycardia, DVT on eliquis who presents to the ED for cough/dyspnea. Found to be in an acute heart failure exacerbation. Received short course of IV diuresis and now euvolemic. Device interrogation with normal function. Previous stress test abnormal mostly fixed defects with seamus-infarct ischemia and cath 8/2022 showed 100% ramus in stent restenosis, repeat LHC pursued 4/2023 revealing unchanged CAD- chronic occl ramus stent, patent mLAD and ostial/prox Diag stents and moderate atherosclerosis of pLcx. Cardiology evaluated the patient and she was started on bumex, and entresto. Her symptoms improved. Patient also with chronic pain syndrome- chest pain may be from coughing or chronic radiculopathy; unable to tolerate gabapentin - trialing lyrica.        The patient is afebrile, hemodynamically stable and medically optimized for discharge to home with follow up with Cardiology and PCP. On day of discharge, patient is clinically stable with no new exam findings or acute symptoms compared to prior. The patient was seen by the attending physician on the date of discharge and deemed stable and acceptable for discharge. The patient's chronic medical conditions were treated accordingly per the patient's home medication regimen. The patient's medication reconciliation (with changes made to chronic medications), follow up appointments, discharge orders, instructions, and significant lab and diagnostic studies are as noted.           Important Medication Changes and Reason:  start entresto  start bumex 2mg     Active or Pending Issues Requiring Follow-up:  Cardiology- HF  pcp    Advanced Directives:   [X] Full code  [ ] DNR  [ ] Hospice    Discharge Diagnoses:  #Acute on chronic systolic congestive heart failure  #Pain of right lower extremity  # Hypertensive urgency  #Chronic pain syndrome  #Chronic DVT         85 year old female with a PMH of DM, HTN, CAD s/p stents, HFrEF - biventricular PPM placed 2024, bradycardia, DVT on eliquis who presents to the ED for cough/dyspnea. Found to be in an acute heart failure exacerbation. Received short course of IV diuresis and now euvolemic. Device interrogation with normal function. Previous stress test abnormal mostly fixed defects with seamus-infarct ischemia and cath 8/2022 showed 100% ramus in stent restenosis, repeat LHC pursued 4/2023 revealing unchanged CAD- chronic occl ramus stent, patent mLAD and ostial/prox Diag stents and moderate atherosclerosis of pLcx. Cardiology evaluated the patient and she was started on bumex, and entresto. Her symptoms improved. Patient also with chronic pain syndrome- chest pain may be from coughing or chronic radiculopathy; unable to tolerate gabapentin - trialing lyrica.            The patient is afebrile, hemodynamically stable and medically optimized for discharge to home with follow up with Cardiology and PCP. On day of discharge, patient is clinically stable with no new exam findings or acute symptoms compared to prior. The patient was seen by the attending physician on the date of discharge and deemed stable and acceptable for discharge. The patient's chronic medical conditions were treated accordingly per the patient's home medication regimen. The patient's medication reconciliation (with changes made to chronic medications), follow up appointments, discharge orders, instructions, and significant lab and diagnostic studies are as noted.           Important Medication Changes and Reason:  start entresto  start bumex 2mg     Active or Pending Issues Requiring Follow-up:  Cardiology- HF  pcp    Advanced Directives:   [X] Full code  [ ] DNR  [ ] Hospice    Discharge Diagnoses:  #Acute on chronic systolic congestive heart failure  #Pain of right lower extremity  # Hypertensive urgency  #Chronic pain syndrome  #Chronic DVT

## 2025-05-20 NOTE — H&P ADULT - NSICDXPASTSURGICALHX_GEN_ALL_CORE_FT
PAST SURGICAL HISTORY:  S/P primary angioplasty with coronary stent x1 in 2006 at [facility]    S/P TKR (total knee replacement) left

## 2025-05-20 NOTE — H&P ADULT - NSHPLABSRESULTS_GEN_ALL_CORE
Recent Vitals  T(C): 36.1 (05-20-25 @ 06:47), Max: 36.1 (05-20-25 @ 06:47)  HR: 63 (05-20-25 @ 10:30) (63 - 81)  BP: 172/82 (05-20-25 @ 10:30) (172/82 - 193/110)  RR: 16 (05-20-25 @ 10:30) (16 - 20)  SpO2: 100% (05-20-25 @ 10:30) (98% - 100%)                        11.5   7.27  )-----------( 212      ( 20 May 2025 07:28 )             37.2     05-20    138  |  102  |  13  ----------------------------<  234[H]  4.2   |  22  |  0.90    Ca    9.2      20 May 2025 07:28  Mg     1.90     05-20    TPro  7.6  /  Alb  4.2  /  TBili  1.2  /  DBili  x   /  AST  18  /  ALT  11  /  AlkPhos  75  05-20      LIVER FUNCTIONS - ( 20 May 2025 07:28 )  Alb: 4.2 g/dL / Pro: 7.6 g/dL / ALK PHOS: 75 U/L / ALT: 11 U/L / AST: 18 U/L / GGT: x           Urinalysis Basic - ( 20 May 2025 07:28 )    Color: x / Appearance: x / SG: x / pH: x  Gluc: 234 mg/dL / Ketone: x  / Bili: x / Urobili: x   Blood: x / Protein: x / Nitrite: x   Leuk Esterase: x / RBC: x / WBC x   Sq Epi: x / Non Sq Epi: x / Bacteria: x          apixaban 5 milliGRAM(s) Oral two times a day  atorvastatin 80 milliGRAM(s) Oral at bedtime  carvedilol 3.125 milliGRAM(s) Oral every 12 hours  clopidogrel Tablet 75 milliGRAM(s) Oral daily  dextrose 5%. 1000 milliLiter(s) IV Continuous <Continuous>  dextrose 5%. 1000 milliLiter(s) IV Continuous <Continuous>  dextrose 50% Injectable 25 Gram(s) IV Push once  dextrose 50% Injectable 12.5 Gram(s) IV Push once  dextrose 50% Injectable 25 Gram(s) IV Push once  dextrose Oral Gel 15 Gram(s) Oral once PRN  donepezil 10 milliGRAM(s) Oral at bedtime  furosemide   Injectable 40 milliGRAM(s) IV Push daily  glucagon  Injectable 1 milliGRAM(s) IntraMuscular once  insulin glargine Injectable (LANTUS) 12 Unit(s) SubCutaneous at bedtime  insulin lispro (ADMELOG) corrective regimen sliding scale   SubCutaneous three times a day before meals  insulin lispro Injectable (ADMELOG) 3 Unit(s) SubCutaneous three times a day before meals  sacubitril 24 mG/valsartan 26 mG 1 Tablet(s) Oral two times a day    Home Medications:  carvedilol 3.125 mg oral tablet: 1 tab(s) orally every 12 hours (20 May 2025 11:14)  clopidogrel 75 mg oral tablet: 1 tab(s) orally once a day (20 May 2025 11:14)  donepezil 10 mg oral tablet: 1 tab(s) orally once a day (20 May 2025 11:14)  furosemide 40 mg oral tablet: 1 tab(s) orally once a day (20 May 2025 11:14)  gabapentin 100 mg oral capsule: 1 cap(s) orally 3 times a day (20 May 2025 11:14)  insulin glargine 100 units/mL subcutaneous solution: 12 unit(s) subcutaneous once a day (at bedtime) (20 May 2025 11:14)  NovoLOG FlexPen 100 units/mL injectable solution: Inject insulin based on sliding scale 3 times a day (before meals) (20 May 2025 11:14)

## 2025-05-20 NOTE — ED PROVIDER NOTE - ATTENDING CONTRIBUTION TO CARE
85F h/o HTN, type 2 DM on insulin, CAD s/p stents, HFrEF, bradycardia s/p PPM, prior DVT p/w cough and sob. Pt noted symptoms for the past 2 weeks, at times with cough productive sputum. Last night unable to sleep due to shortness of breath prompting ED visit this AM. Also reporting body aches. No abd pain, fever/chills, known sick contacts or recent travels.   GEN: awake and alert, non-toxic appearing  CV: rrr, no appreciable murmur  PULM: crackles b/l at bases  ABD: soft, ntnd  EXT: no edema/swelling  SKIN: no rash/petechaie/ecchymosis  MDM: 85F h/o HTN, type 2 DM on insulin, CAD s/p stents, HFrEF, bradycardia s/p PPM, prior DVT p/w cough and sob. DDx includes but not limited to heart failure exacerbation vs PNA vs viral illness. Less suspicious for flash pulm edema given duration of symptoms. Given POCUS findings would be less suspicion for PE, also on full AC with Eliquis of which she repots compliance. Will obtain CXR to assess for pulm edema vs focal consolidation, RVP to assess viral etiology, pro-BNP to assess for vol overload, trop and EKG to assess ACS, Will also check renal function and electrolytes. Check cbc for evidence suggestive of acute infectious process. Lasix ordered while awaiting results of workup. Anticipate likely admission.

## 2025-05-20 NOTE — H&P ADULT - HISTORY OF PRESENT ILLNESS
Patient is an 85F with a PMH of DM, HTN, CAD s/p stents, HFrEF - biventricular PPM placed 2024, bradycardia, DVt on eliquis who presents to the ED for cough/dyspnea. Daughter - Evelyn - at the bedside to assist in history taking.  Patient reports that for 2-3 weeks she has had cough and dyspnea.  Cough is at times productive but mostly dry.  Patient reportedly having significant coughing spells at home with sporadic dyspnea afterwards.  Symptoms progressively worsening into today.  This morning, patient reportedly with significant dyspnea at rest.  Checked her SpO2 at home and it was in the 80s per daughter.  Brought to the ED for evaluation.  Patient also complaining of generalized pain.  Notes that she chronically has pain to her L wrist, worse with flexion/extension of the rest.  Patient states that the pain frequently radiates to her L chest and then to her R side - with pain from her R shoulder to her R foot.  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  Has been started on gabapentin for pain however patient notes significant sedation after using gabapentin 100mg.  Reports similar sedation while trying tramadol in the past as well.  No other complaints.  NKDA.  Denies tobacco or alcohol use.  Reports compliance with medication however missed her meds this AM as she was brought to ED.  /110 in triage.  Labs benign.  Ambulates with a cane at baseline.  Will admit to tele.

## 2025-05-20 NOTE — H&P ADULT - NSHPPHYSICALEXAM_GEN_ALL_CORE
Physical exam:  General: patient in no acute distress, resting comfortably  Head:  Atraumatic, Normocephalic  Eyes: EOMI, PERRLA, clear sclera  Neck: Supple, thyroid nontender, non enlarged  Cardio: S1/S2 +ve, regular rate and rhythm, no M/G/R  Resp: clear to ausculation bilaterally, no significant rales  GI: abdomen soft, nontender, non distended, no guarding, BS +ve x 4  Ext: trace pedal edema  Neuro: CN 2-12 intact, no significant motor or sensory deficits.  Skin: No rashes or lesions

## 2025-05-20 NOTE — ED ADULT NURSE NOTE - NSFALLHARMRISKINTERV_ED_ALL_ED

## 2025-05-20 NOTE — ED PROVIDER NOTE - CHIEF COMPLAINT
The patient is a 85y Female complaining of shortness of breath.
bed mobility training/gait training/transfer training

## 2025-05-20 NOTE — DISCHARGE NOTE PROVIDER - NSDCFUADDAPPT_GEN_ALL_CORE_FT
APPTS ARE READY TO BE MADE: [X] YES    Best Family or Patient Contact (if needed):    Additional Information about above appointments (if needed):    1: Yvette Bedoya (cardiology within 1 week of discharge--please schedule for week of 5/26)  2:   3:     Other comments or requests:    Follow up with Dr Rojas 5/29 in [facility] [phone]     Follow up with Dr Rojas 5/29 in [facility] [phone]      Prior to outreaching the patient, it was visible that the patient has secured a follow up appointment which was not scheduled by our team.  05/29 with Dr. Rojas; Cardiology in [facility]

## 2025-05-20 NOTE — ED PROVIDER NOTE - PHYSICAL EXAMINATION
GENERAL: well appearing in no acute distress, non-toxic appearing  HEAD: normocephalic, atraumatic  CARDIAC: regular rate and rhythm, normal S1S2, no appreciable murmurs, 2+ pulses in UE/LE b/l  PULM: crackles b/l bases worse on R base  GI: abdomen nondistended, soft, nontender, no guarding, rebound tenderness  MSK: no peripheral edema, no calf tenderness b/l  SKIN: well-perfused, extremities warm, no visible rashes

## 2025-05-20 NOTE — ED PROVIDER NOTE - CLINICAL SUMMARY MEDICAL DECISION MAKING FREE TEXT BOX
85HTN, IDDM, CAD s/p stents, HFrEF (LVEF 45% in 2023), bradycardia s/p PPM ([place]), DVT presents for SOB for past 2 weeks w productive cough, and body aches. this morning felt like she couldn't breath, endorses midsternal non radiating chest pain and L arm pain. denies fevers, n/v, abdominal pain, diarrhea, leg swelling. states she did not take her morning meds. has been eating/drinking ok. on arrival bp 193/110 HR 81 orally afebrile 98% on RA. pt last saw cardiologist Dr. Rodriguez this past month no change in meds. on exam pt has bibasilar crackles worse on R, abdomen soft non tender, skin warm well perfused, bedside US showing b line predominant bilaterally and globally reduced squeeze, w small pericardial effusion on R, IVC w significant respiratory variation, VTI about 14. concern for HF exacerbation, will diurese given pt appears acutely oerloaded. do not think pt is in flash pulm edema symptoms appear to be going on for months as per chart review, vs infectious etiology. will give diuresis, and work up for HF vs pneumonia.

## 2025-05-21 LAB
A1C WITH ESTIMATED AVERAGE GLUCOSE RESULT: 8.5 % — HIGH (ref 4–5.6)
ANION GAP SERPL CALC-SCNC: 14 MMOL/L — SIGNIFICANT CHANGE UP (ref 7–14)
BUN SERPL-MCNC: 17 MG/DL — SIGNIFICANT CHANGE UP (ref 7–23)
CALCIUM SERPL-MCNC: 9.3 MG/DL — SIGNIFICANT CHANGE UP (ref 8.4–10.5)
CHLORIDE SERPL-SCNC: 101 MMOL/L — SIGNIFICANT CHANGE UP (ref 98–107)
CO2 SERPL-SCNC: 23 MMOL/L — SIGNIFICANT CHANGE UP (ref 22–31)
CREAT SERPL-MCNC: 0.95 MG/DL — SIGNIFICANT CHANGE UP (ref 0.5–1.3)
EGFR: 58 ML/MIN/1.73M2 — LOW
EGFR: 58 ML/MIN/1.73M2 — LOW
ESTIMATED AVERAGE GLUCOSE: 197 — SIGNIFICANT CHANGE UP
GLUCOSE BLDC GLUCOMTR-MCNC: 175 MG/DL — HIGH (ref 70–99)
GLUCOSE BLDC GLUCOMTR-MCNC: 181 MG/DL — HIGH (ref 70–99)
GLUCOSE BLDC GLUCOMTR-MCNC: 196 MG/DL — HIGH (ref 70–99)
GLUCOSE BLDC GLUCOMTR-MCNC: 231 MG/DL — HIGH (ref 70–99)
GLUCOSE SERPL-MCNC: 172 MG/DL — HIGH (ref 70–99)
HCT VFR BLD CALC: 36.3 % — SIGNIFICANT CHANGE UP (ref 34.5–45)
HGB BLD-MCNC: 11.4 G/DL — LOW (ref 11.5–15.5)
MAGNESIUM SERPL-MCNC: 1.9 MG/DL — SIGNIFICANT CHANGE UP (ref 1.6–2.6)
MCHC RBC-ENTMCNC: 27 PG — SIGNIFICANT CHANGE UP (ref 27–34)
MCHC RBC-ENTMCNC: 31.4 G/DL — LOW (ref 32–36)
MCV RBC AUTO: 85.8 FL — SIGNIFICANT CHANGE UP (ref 80–100)
MRSA PCR RESULT.: SIGNIFICANT CHANGE UP
NRBC # BLD AUTO: 0 K/UL — SIGNIFICANT CHANGE UP (ref 0–0)
NRBC # FLD: 0 K/UL — SIGNIFICANT CHANGE UP (ref 0–0)
NRBC BLD AUTO-RTO: 0 /100 WBCS — SIGNIFICANT CHANGE UP (ref 0–0)
PHOSPHATE SERPL-MCNC: 4 MG/DL — SIGNIFICANT CHANGE UP (ref 2.5–4.5)
PLATELET # BLD AUTO: 213 K/UL — SIGNIFICANT CHANGE UP (ref 150–400)
POTASSIUM SERPL-MCNC: 3.6 MMOL/L — SIGNIFICANT CHANGE UP (ref 3.5–5.3)
POTASSIUM SERPL-SCNC: 3.6 MMOL/L — SIGNIFICANT CHANGE UP (ref 3.5–5.3)
RBC # BLD: 4.23 M/UL — SIGNIFICANT CHANGE UP (ref 3.8–5.2)
RBC # FLD: 14.2 % — SIGNIFICANT CHANGE UP (ref 10.3–14.5)
S AUREUS DNA NOSE QL NAA+PROBE: SIGNIFICANT CHANGE UP
SODIUM SERPL-SCNC: 138 MMOL/L — SIGNIFICANT CHANGE UP (ref 135–145)
WBC # BLD: 5.47 K/UL — SIGNIFICANT CHANGE UP (ref 3.8–10.5)
WBC # FLD AUTO: 5.47 K/UL — SIGNIFICANT CHANGE UP (ref 3.8–10.5)

## 2025-05-21 PROCEDURE — 99233 SBSQ HOSP IP/OBS HIGH 50: CPT

## 2025-05-21 RX ORDER — BUMETANIDE 1 MG/1
2 TABLET ORAL ONCE
Refills: 0 | Status: COMPLETED | OUTPATIENT
Start: 2025-05-21 | End: 2025-05-21

## 2025-05-21 RX ORDER — ALBUTEROL SULFATE 2.5 MG/3ML
1 VIAL, NEBULIZER (ML) INHALATION EVERY 4 HOURS
Refills: 0 | Status: DISCONTINUED | OUTPATIENT
Start: 2025-05-21 | End: 2025-05-25

## 2025-05-21 RX ORDER — ALBUTEROL SULFATE 2.5 MG/3ML
2.5 VIAL, NEBULIZER (ML) INHALATION EVERY 6 HOURS
Refills: 0 | Status: DISCONTINUED | OUTPATIENT
Start: 2025-05-21 | End: 2025-05-25

## 2025-05-21 RX ADMIN — INSULIN LISPRO 3 UNIT(S): 100 INJECTION, SOLUTION INTRAVENOUS; SUBCUTANEOUS at 07:46

## 2025-05-21 RX ADMIN — INSULIN LISPRO 1: 100 INJECTION, SOLUTION INTRAVENOUS; SUBCUTANEOUS at 17:43

## 2025-05-21 RX ADMIN — INSULIN LISPRO 3 UNIT(S): 100 INJECTION, SOLUTION INTRAVENOUS; SUBCUTANEOUS at 17:42

## 2025-05-21 RX ADMIN — INSULIN GLARGINE-YFGN 12 UNIT(S): 100 INJECTION, SOLUTION SUBCUTANEOUS at 22:40

## 2025-05-21 RX ADMIN — INSULIN LISPRO 1: 100 INJECTION, SOLUTION INTRAVENOUS; SUBCUTANEOUS at 11:58

## 2025-05-21 RX ADMIN — PREGABALIN 50 MILLIGRAM(S): 50 CAPSULE ORAL at 06:14

## 2025-05-21 RX ADMIN — ATORVASTATIN CALCIUM 80 MILLIGRAM(S): 80 TABLET, FILM COATED ORAL at 21:18

## 2025-05-21 RX ADMIN — INSULIN LISPRO 2: 100 INJECTION, SOLUTION INTRAVENOUS; SUBCUTANEOUS at 07:47

## 2025-05-21 RX ADMIN — PREGABALIN 50 MILLIGRAM(S): 50 CAPSULE ORAL at 17:42

## 2025-05-21 RX ADMIN — CLOPIDOGREL BISULFATE 75 MILLIGRAM(S): 75 TABLET, FILM COATED ORAL at 11:27

## 2025-05-21 RX ADMIN — CARVEDILOL 3.12 MILLIGRAM(S): 3.12 TABLET, FILM COATED ORAL at 17:45

## 2025-05-21 RX ADMIN — FUROSEMIDE 40 MILLIGRAM(S): 10 INJECTION INTRAMUSCULAR; INTRAVENOUS at 06:15

## 2025-05-21 RX ADMIN — SACUBITRIL AND VALSARTAN 1 TABLET(S): 49; 51 TABLET, FILM COATED ORAL at 06:14

## 2025-05-21 RX ADMIN — DONEPEZIL HYDROCHLORIDE 10 MILLIGRAM(S): 5 TABLET ORAL at 21:18

## 2025-05-21 RX ADMIN — Medication 1 APPLICATION(S): at 11:28

## 2025-05-21 RX ADMIN — CARVEDILOL 3.12 MILLIGRAM(S): 3.12 TABLET, FILM COATED ORAL at 06:13

## 2025-05-21 RX ADMIN — BUMETANIDE 2 MILLIGRAM(S): 1 TABLET ORAL at 13:16

## 2025-05-21 RX ADMIN — SACUBITRIL AND VALSARTAN 1 TABLET(S): 49; 51 TABLET, FILM COATED ORAL at 17:44

## 2025-05-21 RX ADMIN — Medication 2.5 MILLIGRAM(S): at 22:30

## 2025-05-21 RX ADMIN — INSULIN LISPRO 3 UNIT(S): 100 INJECTION, SOLUTION INTRAVENOUS; SUBCUTANEOUS at 11:57

## 2025-05-21 RX ADMIN — APIXABAN 5 MILLIGRAM(S): 2.5 TABLET, FILM COATED ORAL at 06:14

## 2025-05-21 RX ADMIN — APIXABAN 5 MILLIGRAM(S): 2.5 TABLET, FILM COATED ORAL at 17:44

## 2025-05-21 NOTE — PROGRESS NOTE ADULT - SUBJECTIVE AND OBJECTIVE BOX
Patient is a 85y old  Female who presents with a chief complaint of Dyspnea r/o CHF (21 May 2025 13:04)    SUBJECTIVE / OVERNIGHT EVENTS: No acute events. Comfortable, breathing improving since prior. No chest pain. Daughters at bedside.    MEDICATIONS  (STANDING):  apixaban 5 milliGRAM(s) Oral two times a day  atorvastatin 80 milliGRAM(s) Oral at bedtime  carvedilol 3.125 milliGRAM(s) Oral every 12 hours  chlorhexidine 2% Cloths 1 Application(s) Topical daily  clopidogrel Tablet 75 milliGRAM(s) Oral daily  dextrose 5%. 1000 milliLiter(s) (50 mL/Hr) IV Continuous <Continuous>  dextrose 5%. 1000 milliLiter(s) (100 mL/Hr) IV Continuous <Continuous>  dextrose 50% Injectable 25 Gram(s) IV Push once  dextrose 50% Injectable 12.5 Gram(s) IV Push once  dextrose 50% Injectable 25 Gram(s) IV Push once  donepezil 10 milliGRAM(s) Oral at bedtime  glucagon  Injectable 1 milliGRAM(s) IntraMuscular once  insulin glargine Injectable (LANTUS) 12 Unit(s) SubCutaneous at bedtime  insulin lispro (ADMELOG) corrective regimen sliding scale   SubCutaneous three times a day before meals  insulin lispro Injectable (ADMELOG) 3 Unit(s) SubCutaneous three times a day before meals  pregabalin 50 milliGRAM(s) Oral two times a day  sacubitril 24 mG/valsartan 26 mG 1 Tablet(s) Oral two times a day    MEDICATIONS  (PRN):  dextrose Oral Gel 15 Gram(s) Oral once PRN Blood Glucose LESS THAN 70 milliGRAM(s)/deciliter  guaiFENesin Oral Liquid (Sugar-Free) 200 milliGRAM(s) Oral every 6 hours PRN Cough      CAPILLARY BLOOD GLUCOSE      POCT Blood Glucose.: 175 mg/dL (21 May 2025 11:46)  POCT Blood Glucose.: 231 mg/dL (21 May 2025 07:36)  POCT Blood Glucose.: 166 mg/dL (20 May 2025 21:45)  POCT Blood Glucose.: 196 mg/dL (20 May 2025 16:54)    I&O's Summary    20 May 2025 07:01  -  21 May 2025 07:00  --------------------------------------------------------  IN: 410 mL / OUT: 950 mL / NET: -540 mL    21 May 2025 07:01  -  21 May 2025 14:17  --------------------------------------------------------  IN: 250 mL / OUT: 0 mL / NET: 250 mL    PHYSICAL EXAM:  Vital Signs Last 24 Hrs  T(C): 36.6 (21 May 2025 11:20), Max: 36.9 (20 May 2025 21:24)  T(F): 97.9 (21 May 2025 11:20), Max: 98.4 (20 May 2025 21:24)  HR: 61 (21 May 2025 13:15) (60 - 64)  BP: 136/73 (21 May 2025 13:15) (136/73 - 154/97)  BP(mean): --  RR: 18 (21 May 2025 11:20) (18 - 18)  SpO2: 99% (21 May 2025 11:20) (93% - 99%)    Parameters below as of 21 May 2025 11:20  Patient On (Oxygen Delivery Method): room air    CONSTITUTIONAL: NAD, laying in bed  EYES: conjunctiva and sclera clear  ENMT: Moist oral mucosa  NECK: Supple  RESPIRATORY: Normal respiratory effort; lungs are clear to auscultation bilaterally  CARDIOVASCULAR: Regular rate and rhythm, normal S1 and S2, No lower extremity edema  ABDOMEN: Nontender to palpation, normoactive bowel sounds, no rebound/guarding  PSYCH: calm    LABS:                        11.4   5.47  )-----------( 213      ( 21 May 2025 06:00 )             36.3     05-21    138  |  101  |  17  ----------------------------<  172[H]  3.6   |  23  |  0.95    Ca    9.3      21 May 2025 06:00  Phos  4.0     05-21  Mg     1.90     05-21    TPro  7.6  /  Alb  4.2  /  TBili  1.2  /  DBili  x   /  AST  18  /  ALT  11  /  AlkPhos  75  05-20    Urinalysis Basic - ( 21 May 2025 06:00 )    Color: x / Appearance: x / SG: x / pH: x  Gluc: 172 mg/dL / Ketone: x  / Bili: x / Urobili: x   Blood: x / Protein: x / Nitrite: x   Leuk Esterase: x / RBC: x / WBC x   Sq Epi: x / Non Sq Epi: x / Bacteria: x    RADIOLOGY & ADDITIONAL TESTS: Reviewed    COORDINATION OF CARE:  Care Discussed with Consultants/Other Providers [Y- Medicine ACP, CM, SW, RN, Cardiology]

## 2025-05-21 NOTE — PROGRESS NOTE ADULT - SUBJECTIVE AND OBJECTIVE BOX
Date of service 5/21/25    still SOB, no chest pain or Palps  ROS otherwise negative    MEDS  apixaban 5 milliGRAM(s) Oral two times a day  atorvastatin 80 milliGRAM(s) Oral at bedtime  buMETAnide Injectable 2 milliGRAM(s) IV Push once  carvedilol 3.125 milliGRAM(s) Oral every 12 hours  chlorhexidine 2% Cloths 1 Application(s) Topical daily  clopidogrel Tablet 75 milliGRAM(s) Oral daily  donepezil 10 milliGRAM(s) Oral at bedtime  glucagon  Injectable 1 milliGRAM(s) IntraMuscular once  guaiFENesin Oral Liquid (Sugar-Free) 200 milliGRAM(s) Oral every 6 hours PRN  insulin glargine Injectable (LANTUS) 12 Unit(s) SubCutaneous at bedtime  insulin lispro (ADMELOG) corrective regimen sliding scale   SubCutaneous three times a day before meals  insulin lispro Injectable (ADMELOG) 3 Unit(s) SubCutaneous three times a day before meals  pregabalin 50 milliGRAM(s) Oral two times a day  sacubitril 24 mG/valsartan 26 mG 1 Tablet(s) Oral two times a day                            11.4   5.47  )-----------( 213      ( 21 May 2025 06:00 )             36.3     138  |  101  |  17  ----------------------------<  172[H]  3.6   |  23  |  0.95    Ca    9.3      21 May 2025 06:00  Phos  4.0     05-21  Mg     1.90     05-21    TPro  7.6  /  Alb  4.2  /  TBili  1.2  /  DBili  x   /  AST  18  /  ALT  11  /  AlkPhos  75  05-20        T(C): 36.6 (05-21-25 @ 11:20), Max: 36.9 (05-20-25 @ 21:24)  HR: 60 (05-21-25 @ 11:20) (60 - 64)  BP: 144/85 (05-21-25 @ 11:20) (144/85 - 154/97)  RR: 18 (05-21-25 @ 11:20) (18 - 18)  SpO2: 99% (05-21-25 @ 11:20) (93% - 99%)  Wt(kg): --    I&O's Summary    20 May 2025 07:01  -  21 May 2025 07:00  --------------------------------------------------------  IN: 410 mL / OUT: 950 mL / NET: -540 mL        General: Well nourished in no acute distress. Alert and Oriented * 3.   Head: Normocephalic and atraumatic.   Neck: + JVD. No bruits. Supple. Does not appear to be enlarged.   Cardiovascular: + S1,S2 ; RRR Soft systolic murmur at the left lower sternal border. No rubs noted.    Lungs: CTA b/l. No rhonchi, rales or wheezes.   Abdomen: + BS, soft. Non tender. Non distended. No rebound. No guarding.   Extremities: No clubbing/cyanosis/edema.   Neurologic: Moves all four extremities. Full range of motion.   Skin: Warm and moist. The patient's skin has normal elasticity and good skin turgor.   Psychiatric: Appropriate mood and affect.  Musculoskeletal: Normal range of motion, normal strength    DATA    TELEMETRY: 	      ECG:  	V Paced    < from: TTE Limited W or WO Ultrasound Enhancing Agent (09.12.24 @ 08:13) >     CONCLUSIONS:      1. Left ventricular systolic function is normal.   2. No pericardial effusion seen.    < end of copied text >    < from: TTE W or WO Ultrasound Enhancing Agent (09.06.24 @ 16:50) >  CONCLUSIONS:      1. The left ventricular cavity is normal in size. Left ventricular wall thickness is normal. Abnormal (paradoxical) septal motion consistent with rightventricular pacemaker. Left ventricular systolic function is severely decreased with an ejection fraction visually estimated at 25 to 30%. There is global left ventricular hypokinesis. There is mild (grade 1) left ventricular diastolic dysfunction.  2. The right ventricular cavity is normal in size and right ventricular systolic function is normal. Tricuspid annular plane systolic excursion (TAPSE) is 2.6 cm (normal >=1.7 cm). A device lead is visualized in the right heart.   3. Structurally normal mitral valve with normal leaflet excursion. There is calcification of the mitral valve annulus. There is mild to moderate mitral regurgitation.   4. The left atrium is severely dilated with an indexed volume of 55.42 ml/m².   5. No prior echocardiogram is available for comparison.    < end of copied text >    < from: Cardiac Catheterization (04.04.23 @ 16:41) >    Diagnostic Conclusions:   Chronically occluded ramus artery stent. Mild atherosclerosis in  proximal LAD. Patent mid LAD and ostial/proximal diagonal  stents. Moderate atherosclerosis in proximal Cx. Mild atherosclerosis  in mid and distal RCA.    Recommendations:   Optimize medical therapy for ischemic heart disease. Aggressive risk  factor modification.    < end of copied text >      < from: TTE W or WO Ultrasound Enhancing Agent (05.20.25 @ 14:49) >     CONCLUSIONS:      1. Left ventricular cavity is severely dilated. Left ventricular systolic function is severely decreased with an ejection fraction of 39 % by Baires's method of disks.   2. There is severe (grade 3) left ventricular diastolic dysfunction.   3. Normal right ventricular cavity size, with normal wall thickness, and probably normal right ventricular systolic function.   4. Left atrium is severely dilated.   5. The right atrium is dilated.   6. Mild mitral regurgitation.   7. Structurally normal pulmonic valve with normal leaflet excursion.   8. No significant valvular disease.   9. No pericardial effusion seen.  10. Compared to the transthoracic echocardiogram performed on 9/6/2024, The LVEF is somewhat improved at 39 versus 25-30% on the prior study. Regional dysfunction as described is now evident.  11. Echocardiographic evidence of pulmonary hyptertension.  12. Mild left ventricular hypertrophy.  13. Mild mitral valve leaflet calcification.  14. Mild tricuspid regurgitation.  15. The inferior vena cava is normal in size measuring 1.21 cm in diameter, (normal <2.1cm) with normal inspiratory collapse (normal >50%) consistent with normal right atrial pressure (~3, range 0-5mmHg).  16. The interatrial septum appears intact.  17. There is a device lead seen in the right atrium.  18. There is calcification of the aortic valve leaflets.    < end of copied text >      ASSESSMENT/PLAN: 	  85 year old Female with PMH DM, CAD s/p PCI to prox LAD/mid LAD/Diag/Ramus, last [facility] 8/2022 with occluded prox Ramus stent, patent LAD and Diag stents, mild to mod atherosclerosis in pLcx and mRCA, managed medically, HTN, and CVA, DVT, on Eliquis, who was last admitted 9/2024 with acute on chronic HFrEF, s/p upgrade to STJ BIV-PPM at that time (for ischemic cardiomyopathy w/ superimposed RV pacing- (MOISÉS), presents with a few days of CEDEÑO, cough, orthopnea, and decreased appetite c/w acute on chronic HFrEF.     --Repeat TTE with LVEF 39%  --will review office records and recent testing  --Give IV Bumex 2mg x 1 and re eval tomorrow AM  --cont Coreg, Entresto for cardiomyopathy  --device interrogation with normal function  --previous stress test abnormal mostly fixed defects with seamus-infarct ischemia and cath 8/2022 showed 100% ramus in stent restenosis, repeat LHC pursued 4/2023 revealing unchanged CAD- chronic occl ramus stent, patent mLAD and ostial/prox Diag stents and moderate atherosclerosis of pLcx.    further reccs pending above    Thank you for allowing us to participate in the care of our mutual patient.  Please do not hesitate to call with any questions.    Hannah CALVILLO  [phone]

## 2025-05-21 NOTE — PHYSICAL THERAPY INITIAL EVALUATION ADULT - GENERAL OBSERVATIONS, REHAB EVAL
Pt received semi-supine in bed, +telemetry, +primafit, all lines intact, in NAD. Pt agreeable to PT evaluation.

## 2025-05-21 NOTE — PHYSICAL THERAPY INITIAL EVALUATION ADULT - ADDITIONAL COMMENTS
Pt lives in a private house with daughter, +6 steps to enter, resides on the first floor. Pt was independent with all functional mobility using a Single Axis Cane, requires some assistance from family for ADL performance.     Pt left semi-supine in bed, all lines intact, all needs in reach, bed alarm set, in NAD. NAHUN Bloom aware. Heart rate 62 beats per minute. Pt lives in a private house with daughter, +6 steps to enter, resides on the first floor. Pt was independent with all functional mobility using a Single Axis Cane, requires some assistance from family for ADL performance.     Pt left seated in chair at bedside, all lines intact, all needs in reach, chair alarm set, in NAD. NAHUN Bloom aware. Heart rate 62 beats per minute.

## 2025-05-22 LAB
ANION GAP SERPL CALC-SCNC: 16 MMOL/L — HIGH (ref 7–14)
BUN SERPL-MCNC: 28 MG/DL — HIGH (ref 7–23)
CALCIUM SERPL-MCNC: 9.3 MG/DL — SIGNIFICANT CHANGE UP (ref 8.4–10.5)
CHLORIDE SERPL-SCNC: 98 MMOL/L — SIGNIFICANT CHANGE UP (ref 98–107)
CO2 SERPL-SCNC: 22 MMOL/L — SIGNIFICANT CHANGE UP (ref 22–31)
CREAT SERPL-MCNC: 1.1 MG/DL — SIGNIFICANT CHANGE UP (ref 0.5–1.3)
EGFR: 49 ML/MIN/1.73M2 — LOW
EGFR: 49 ML/MIN/1.73M2 — LOW
GLUCOSE BLDC GLUCOMTR-MCNC: 103 MG/DL — HIGH (ref 70–99)
GLUCOSE BLDC GLUCOMTR-MCNC: 158 MG/DL — HIGH (ref 70–99)
GLUCOSE BLDC GLUCOMTR-MCNC: 229 MG/DL — HIGH (ref 70–99)
GLUCOSE BLDC GLUCOMTR-MCNC: 239 MG/DL — HIGH (ref 70–99)
GLUCOSE SERPL-MCNC: 180 MG/DL — HIGH (ref 70–99)
HCT VFR BLD CALC: 37.6 % — SIGNIFICANT CHANGE UP (ref 34.5–45)
HGB BLD-MCNC: 11.8 G/DL — SIGNIFICANT CHANGE UP (ref 11.5–15.5)
MAGNESIUM SERPL-MCNC: 1.9 MG/DL — SIGNIFICANT CHANGE UP (ref 1.6–2.6)
MCHC RBC-ENTMCNC: 26.3 PG — LOW (ref 27–34)
MCHC RBC-ENTMCNC: 31.4 G/DL — LOW (ref 32–36)
MCV RBC AUTO: 83.7 FL — SIGNIFICANT CHANGE UP (ref 80–100)
NRBC # BLD AUTO: 0 K/UL — SIGNIFICANT CHANGE UP (ref 0–0)
NRBC # FLD: 0 K/UL — SIGNIFICANT CHANGE UP (ref 0–0)
NRBC BLD AUTO-RTO: 0 /100 WBCS — SIGNIFICANT CHANGE UP (ref 0–0)
PHOSPHATE SERPL-MCNC: 5.2 MG/DL — HIGH (ref 2.5–4.5)
PLATELET # BLD AUTO: 225 K/UL — SIGNIFICANT CHANGE UP (ref 150–400)
POTASSIUM SERPL-MCNC: 3.4 MMOL/L — LOW (ref 3.5–5.3)
POTASSIUM SERPL-SCNC: 3.4 MMOL/L — LOW (ref 3.5–5.3)
RBC # BLD: 4.49 M/UL — SIGNIFICANT CHANGE UP (ref 3.8–5.2)
RBC # FLD: 13.8 % — SIGNIFICANT CHANGE UP (ref 10.3–14.5)
SODIUM SERPL-SCNC: 136 MMOL/L — SIGNIFICANT CHANGE UP (ref 135–145)
WBC # BLD: 5.7 K/UL — SIGNIFICANT CHANGE UP (ref 3.8–10.5)
WBC # FLD AUTO: 5.7 K/UL — SIGNIFICANT CHANGE UP (ref 3.8–10.5)

## 2025-05-22 PROCEDURE — 99233 SBSQ HOSP IP/OBS HIGH 50: CPT

## 2025-05-22 RX ORDER — POLYETHYLENE GLYCOL 3350 17 G/17G
17 POWDER, FOR SOLUTION ORAL DAILY
Refills: 0 | Status: DISCONTINUED | OUTPATIENT
Start: 2025-05-22 | End: 2025-05-25

## 2025-05-22 RX ORDER — BUMETANIDE 1 MG/1
2 TABLET ORAL ONCE
Refills: 0 | Status: COMPLETED | OUTPATIENT
Start: 2025-05-22 | End: 2025-05-22

## 2025-05-22 RX ORDER — MAGNESIUM, ALUMINUM HYDROXIDE 200-200 MG
30 TABLET,CHEWABLE ORAL EVERY 6 HOURS
Refills: 0 | Status: DISCONTINUED | OUTPATIENT
Start: 2025-05-22 | End: 2025-05-25

## 2025-05-22 RX ORDER — ACETAMINOPHEN 500 MG/5ML
650 LIQUID (ML) ORAL EVERY 6 HOURS
Refills: 0 | Status: DISCONTINUED | OUTPATIENT
Start: 2025-05-22 | End: 2025-05-25

## 2025-05-22 RX ADMIN — CLOPIDOGREL BISULFATE 75 MILLIGRAM(S): 75 TABLET, FILM COATED ORAL at 11:10

## 2025-05-22 RX ADMIN — PREGABALIN 50 MILLIGRAM(S): 50 CAPSULE ORAL at 17:01

## 2025-05-22 RX ADMIN — ATORVASTATIN CALCIUM 80 MILLIGRAM(S): 80 TABLET, FILM COATED ORAL at 21:06

## 2025-05-22 RX ADMIN — Medication 2.5 MILLIGRAM(S): at 16:11

## 2025-05-22 RX ADMIN — Medication 20 MILLIEQUIVALENT(S): at 09:51

## 2025-05-22 RX ADMIN — INSULIN LISPRO 3 UNIT(S): 100 INJECTION, SOLUTION INTRAVENOUS; SUBCUTANEOUS at 17:01

## 2025-05-22 RX ADMIN — SACUBITRIL AND VALSARTAN 1 TABLET(S): 49; 51 TABLET, FILM COATED ORAL at 17:01

## 2025-05-22 RX ADMIN — INSULIN LISPRO 3 UNIT(S): 100 INJECTION, SOLUTION INTRAVENOUS; SUBCUTANEOUS at 08:06

## 2025-05-22 RX ADMIN — BUMETANIDE 2 MILLIGRAM(S): 1 TABLET ORAL at 15:17

## 2025-05-22 RX ADMIN — DONEPEZIL HYDROCHLORIDE 10 MILLIGRAM(S): 5 TABLET ORAL at 21:06

## 2025-05-22 RX ADMIN — SACUBITRIL AND VALSARTAN 1 TABLET(S): 49; 51 TABLET, FILM COATED ORAL at 06:15

## 2025-05-22 RX ADMIN — Medication 650 MILLIGRAM(S): at 15:04

## 2025-05-22 RX ADMIN — Medication 2.5 MILLIGRAM(S): at 10:40

## 2025-05-22 RX ADMIN — PREGABALIN 50 MILLIGRAM(S): 50 CAPSULE ORAL at 06:15

## 2025-05-22 RX ADMIN — APIXABAN 5 MILLIGRAM(S): 2.5 TABLET, FILM COATED ORAL at 17:01

## 2025-05-22 RX ADMIN — INSULIN LISPRO 2: 100 INJECTION, SOLUTION INTRAVENOUS; SUBCUTANEOUS at 11:59

## 2025-05-22 RX ADMIN — INSULIN LISPRO 3 UNIT(S): 100 INJECTION, SOLUTION INTRAVENOUS; SUBCUTANEOUS at 11:58

## 2025-05-22 RX ADMIN — Medication 2.5 MILLIGRAM(S): at 03:52

## 2025-05-22 RX ADMIN — CARVEDILOL 3.12 MILLIGRAM(S): 3.12 TABLET, FILM COATED ORAL at 06:15

## 2025-05-22 RX ADMIN — Medication 1 APPLICATION(S): at 11:10

## 2025-05-22 RX ADMIN — INSULIN GLARGINE-YFGN 12 UNIT(S): 100 INJECTION, SOLUTION SUBCUTANEOUS at 22:49

## 2025-05-22 RX ADMIN — APIXABAN 5 MILLIGRAM(S): 2.5 TABLET, FILM COATED ORAL at 06:15

## 2025-05-22 RX ADMIN — Medication 2.5 MILLIGRAM(S): at 21:20

## 2025-05-22 RX ADMIN — INSULIN LISPRO 2: 100 INJECTION, SOLUTION INTRAVENOUS; SUBCUTANEOUS at 08:06

## 2025-05-22 RX ADMIN — CARVEDILOL 3.12 MILLIGRAM(S): 3.12 TABLET, FILM COATED ORAL at 17:01

## 2025-05-22 NOTE — PROGRESS NOTE ADULT - SUBJECTIVE AND OBJECTIVE BOX
Patient is a 85y old  Female who presents with a chief complaint of Dyspnea r/o CHF (21 May 2025 14:16)    SUBJECTIVE / OVERNIGHT EVENTS: No acute events. Comfortable, sitting up in chair. Breathing slightly better than before. No chest pain or dizziness.     MEDICATIONS  (STANDING):  albuterol    0.083% 2.5 milliGRAM(s) Nebulizer every 6 hours  albuterol    90 MICROgram(s) HFA Inhaler 1 Puff(s) Inhalation every 4 hours  apixaban 5 milliGRAM(s) Oral two times a day  atorvastatin 80 milliGRAM(s) Oral at bedtime  carvedilol 3.125 milliGRAM(s) Oral every 12 hours  chlorhexidine 2% Cloths 1 Application(s) Topical daily  clopidogrel Tablet 75 milliGRAM(s) Oral daily  dextrose 5%. 1000 milliLiter(s) (50 mL/Hr) IV Continuous <Continuous>  dextrose 5%. 1000 milliLiter(s) (100 mL/Hr) IV Continuous <Continuous>  dextrose 50% Injectable 25 Gram(s) IV Push once  dextrose 50% Injectable 12.5 Gram(s) IV Push once  dextrose 50% Injectable 25 Gram(s) IV Push once  donepezil 10 milliGRAM(s) Oral at bedtime  glucagon  Injectable 1 milliGRAM(s) IntraMuscular once  insulin glargine Injectable (LANTUS) 12 Unit(s) SubCutaneous at bedtime  insulin lispro (ADMELOG) corrective regimen sliding scale   SubCutaneous three times a day before meals  insulin lispro Injectable (ADMELOG) 3 Unit(s) SubCutaneous three times a day before meals  pregabalin 50 milliGRAM(s) Oral two times a day  sacubitril 24 mG/valsartan 26 mG 1 Tablet(s) Oral two times a day    MEDICATIONS  (PRN):  dextrose Oral Gel 15 Gram(s) Oral once PRN Blood Glucose LESS THAN 70 milliGRAM(s)/deciliter  guaiFENesin Oral Liquid (Sugar-Free) 200 milliGRAM(s) Oral every 6 hours PRN Cough      CAPILLARY BLOOD GLUCOSE      POCT Blood Glucose.: 239 mg/dL (22 May 2025 11:53)  POCT Blood Glucose.: 229 mg/dL (22 May 2025 08:02)  POCT Blood Glucose.: 196 mg/dL (21 May 2025 22:24)  POCT Blood Glucose.: 181 mg/dL (21 May 2025 17:03)    I&O's Summary    21 May 2025 07:01  -  22 May 2025 07:00  --------------------------------------------------------  IN: 727 mL / OUT: 1200 mL / NET: -473 mL    22 May 2025 07:01  -  22 May 2025 13:59  --------------------------------------------------------  IN: 370 mL / OUT: 100 mL / NET: 270 mL        PHYSICAL EXAM:  Vital Signs Last 24 Hrs  T(C): 36.4 (22 May 2025 11:10), Max: 36.6 (21 May 2025 17:40)  T(F): 97.5 (22 May 2025 11:10), Max: 97.9 (21 May 2025 17:40)  HR: 61 (22 May 2025 11:10) (61 - 75)  BP: 119/71 (22 May 2025 11:10) (119/71 - 141/92)  BP(mean): --  RR: 18 (22 May 2025 11:10) (16 - 18)  SpO2: 95% (22 May 2025 11:10) (95% - 100%)    Parameters below as of 22 May 2025 11:10  Patient On (Oxygen Delivery Method): room air    CONSTITUTIONAL: NAD, in chair  EYES: conjunctiva and sclera clear  ENMT: Moist oral mucosa  RESPIRATORY: Normal respiratory effort; lungs are clear to auscultation bilaterally  CARDIOVASCULAR: Regular rate and rhythm, normal S1 and S2, No lower extremity edema  ABDOMEN: Nontender to palpation, normoactive bowel sounds, no rebound/guarding  PSYCH: calm    LABS:                        11.8   5.70  )-----------( 225      ( 22 May 2025 04:00 )             37.6     05-22    136  |  98  |  28[H]  ----------------------------<  180[H]  3.4[L]   |  22  |  1.10    Ca    9.3      22 May 2025 04:00  Phos  5.2     05-22  Mg     1.90     05-22    Urinalysis Basic - ( 22 May 2025 04:00 )    Color: x / Appearance: x / SG: x / pH: x  Gluc: 180 mg/dL / Ketone: x  / Bili: x / Urobili: x   Blood: x / Protein: x / Nitrite: x   Leuk Esterase: x / RBC: x / WBC x   Sq Epi: x / Non Sq Epi: x / Bacteria: x    RADIOLOGY & ADDITIONAL TESTS: Reviewed    COORDINATION OF CARE:  Care Discussed with Consultants/Other Providers [Y- Medicine ACP, CM, SW, RN]

## 2025-05-22 NOTE — PROGRESS NOTE ADULT - SUBJECTIVE AND OBJECTIVE BOX
Date of service 5/22/25    still SOB, no chest pain or Palps  ROS otherwise negative    albuterol    0.083% 2.5 milliGRAM(s) Nebulizer every 6 hours  albuterol    90 MICROgram(s) HFA Inhaler 1 Puff(s) Inhalation every 4 hours  apixaban 5 milliGRAM(s) Oral two times a day  atorvastatin 80 milliGRAM(s) Oral at bedtime  carvedilol 3.125 milliGRAM(s) Oral every 12 hours  chlorhexidine 2% Cloths 1 Application(s) Topical daily  clopidogrel Tablet 75 milliGRAM(s) Oral daily  dextrose 5%. 1000 milliLiter(s) IV Continuous <Continuous>  dextrose 5%. 1000 milliLiter(s) IV Continuous <Continuous>  dextrose 50% Injectable 25 Gram(s) IV Push once  dextrose 50% Injectable 12.5 Gram(s) IV Push once  dextrose 50% Injectable 25 Gram(s) IV Push once  dextrose Oral Gel 15 Gram(s) Oral once PRN  donepezil 10 milliGRAM(s) Oral at bedtime  glucagon  Injectable 1 milliGRAM(s) IntraMuscular once  guaiFENesin Oral Liquid (Sugar-Free) 200 milliGRAM(s) Oral every 6 hours PRN  insulin glargine Injectable (LANTUS) 12 Unit(s) SubCutaneous at bedtime  insulin lispro (ADMELOG) corrective regimen sliding scale   SubCutaneous three times a day before meals  insulin lispro Injectable (ADMELOG) 3 Unit(s) SubCutaneous three times a day before meals  pregabalin 50 milliGRAM(s) Oral two times a day  sacubitril 24 mG/valsartan 26 mG 1 Tablet(s) Oral two times a day                            11.8   5.70  )-----------( 225      ( 22 May 2025 04:00 )             37.6       05-22    136  |  98  |  28[H]  ----------------------------<  180[H]  3.4[L]   |  22  |  1.10    Ca    9.3      22 May 2025 04:00  Phos  5.2     05-22  Mg     1.90     05-22    T(C): 36.4 (05-22-25 @ 11:10), Max: 36.6 (05-21-25 @ 17:40)  HR: 61 (05-22-25 @ 11:10) (61 - 75)  BP: 119/71 (05-22-25 @ 11:10) (119/71 - 141/92)  RR: 18 (05-22-25 @ 11:10) (16 - 18)  SpO2: 95% (05-22-25 @ 11:10) (95% - 100%)  Wt(kg): --    I&O's Summary    21 May 2025 07:01  -  22 May 2025 07:00  --------------------------------------------------------  IN: 727 mL / OUT: 1200 mL / NET: -473 mL    22 May 2025 07:01  -  22 May 2025 14:14  --------------------------------------------------------  IN: 370 mL / OUT: 100 mL / NET: 270 mL    General: Well nourished in no acute distress. Alert and Oriented * 3.   Head: Normocephalic and atraumatic.   Neck: + JVD. No bruits. Supple. Does not appear to be enlarged.   Cardiovascular: + S1,S2 ; RRR Soft systolic murmur at the left lower sternal border. No rubs noted.    Lungs: CTA b/l. No rhonchi, rales or wheezes.   Abdomen: + BS, soft. Non tender. Non distended. No rebound. No guarding.   Extremities: No clubbing/cyanosis/edema.   Neurologic: Moves all four extremities. Full range of motion.   Skin: Warm and moist. The patient's skin has normal elasticity and good skin turgor.   Psychiatric: Appropriate mood and affect.  Musculoskeletal: Normal range of motion, normal strength    DATA    TELEMETRY: 	      ECG:  	V Paced    < from: TTE Limited W or WO Ultrasound Enhancing Agent (09.12.24 @ 08:13) >     CONCLUSIONS:      1. Left ventricular systolic function is normal.   2. No pericardial effusion seen.    < end of copied text >    < from: TTE W or WO Ultrasound Enhancing Agent (09.06.24 @ 16:50) >  CONCLUSIONS:      1. The left ventricular cavity is normal in size. Left ventricular wall thickness is normal. Abnormal (paradoxical) septal motion consistent with rightventricular pacemaker. Left ventricular systolic function is severely decreased with an ejection fraction visually estimated at 25 to 30%. There is global left ventricular hypokinesis. There is mild (grade 1) left ventricular diastolic dysfunction.  2. The right ventricular cavity is normal in size and right ventricular systolic function is normal. Tricuspid annular plane systolic excursion (TAPSE) is 2.6 cm (normal >=1.7 cm). A device lead is visualized in the right heart.   3. Structurally normal mitral valve with normal leaflet excursion. There is calcification of the mitral valve annulus. There is mild to moderate mitral regurgitation.   4. The left atrium is severely dilated with an indexed volume of 55.42 ml/m².   5. No prior echocardiogram is available for comparison.    < end of copied text >    < from: Cardiac Catheterization (04.04.23 @ 16:41) >    Diagnostic Conclusions:   Chronically occluded ramus artery stent. Mild atherosclerosis in  proximal LAD. Patent mid LAD and ostial/proximal diagonal  stents. Moderate atherosclerosis in proximal Cx. Mild atherosclerosis  in mid and distal RCA.    Recommendations:   Optimize medical therapy for ischemic heart disease. Aggressive risk  factor modification.    < end of copied text >      < from: TTE W or WO Ultrasound Enhancing Agent (05.20.25 @ 14:49) >     CONCLUSIONS:      1. Left ventricular cavity is severely dilated. Left ventricular systolic function is severely decreased with an ejection fraction of 39 % by Baires's method of disks.   2. There is severe (grade 3) left ventricular diastolic dysfunction.   3. Normal right ventricular cavity size, with normal wall thickness, and probably normal right ventricular systolic function.   4. Left atrium is severely dilated.   5. The right atrium is dilated.   6. Mild mitral regurgitation.   7. Structurally normal pulmonic valve with normal leaflet excursion.   8. No significant valvular disease.   9. No pericardial effusion seen.  10. Compared to the transthoracic echocardiogram performed on 9/6/2024, The LVEF is somewhat improved at 39 versus 25-30% on the prior study. Regional dysfunction as described is now evident.  11. Echocardiographic evidence of pulmonary hyptertension.  12. Mild left ventricular hypertrophy.  13. Mild mitral valve leaflet calcification.  14. Mild tricuspid regurgitation.  15. The inferior vena cava is normal in size measuring 1.21 cm in diameter, (normal <2.1cm) with normal inspiratory collapse (normal >50%) consistent with normal right atrial pressure (~3, range 0-5mmHg).  16. The interatrial septum appears intact.  17. There is a device lead seen in the right atrium.  18. There is calcification of the aortic valve leaflets.    < end of copied text >      ASSESSMENT/PLAN: 	  85 year old Female with PMH DM, CAD s/p PCI to prox LAD/mid LAD/Diag/Ramus, last [facility] 8/2022 with occluded prox Ramus stent, patent LAD and Diag stents, mild to mod atherosclerosis in pLcx and mRCA, managed medically, HTN, and CVA, DVT, on Eliquis, who was last admitted 9/2024 with acute on chronic HFrEF, s/p upgrade to STJ BIV-PPM at that time (for ischemic cardiomyopathy w/ superimposed RV pacing- (MOISÉS), presents with a few days of CEDEÑO, cough, orthopnea, and decreased appetite c/w acute on chronic HFrEF.     --Repeat TTE with LVEF 39%  --will review office records and recent testing  --Continue bumex 2mg IV daily.  Tomorrow (5/23) will start oral bumex 2mg daily in transition for discharge planning.  --cont Coreg, Entresto for cardiomyopathy  --device interrogation with normal function  --previous stress test abnormal mostly fixed defects with seamus-infarct ischemia and cath 8/2022 showed 100% ramus in stent restenosis, repeat C pursued 4/2023 revealing unchanged CAD- chronic occl ramus stent, patent mLAD and ostial/prox Diag stents and moderate atherosclerosis of pLcx.    Poncho Lopez M.D.  Cardiac Electrophysiology  [phone]

## 2025-05-23 ENCOUNTER — TRANSCRIPTION ENCOUNTER (OUTPATIENT)
Age: 85
End: 2025-05-23

## 2025-05-23 LAB
GLUCOSE BLDC GLUCOMTR-MCNC: 163 MG/DL — HIGH (ref 70–99)
GLUCOSE BLDC GLUCOMTR-MCNC: 195 MG/DL — HIGH (ref 70–99)
GLUCOSE BLDC GLUCOMTR-MCNC: 218 MG/DL — HIGH (ref 70–99)
GLUCOSE BLDC GLUCOMTR-MCNC: 241 MG/DL — HIGH (ref 70–99)

## 2025-05-23 PROCEDURE — 99233 SBSQ HOSP IP/OBS HIGH 50: CPT

## 2025-05-23 RX ORDER — SACUBITRIL AND VALSARTAN 49; 51 MG/1; MG/1
1 TABLET, FILM COATED ORAL
Qty: 60 | Refills: 0
Start: 2025-05-23 | End: 2025-06-21

## 2025-05-23 RX ORDER — BUMETANIDE 1 MG/1
1 TABLET ORAL
Qty: 30 | Refills: 0
Start: 2025-05-23 | End: 2025-06-21

## 2025-05-23 RX ORDER — BUMETANIDE 1 MG/1
2 TABLET ORAL DAILY
Refills: 0 | Status: DISCONTINUED | OUTPATIENT
Start: 2025-05-23 | End: 2025-05-25

## 2025-05-23 RX ORDER — PREGABALIN 50 MG/1
1 CAPSULE ORAL
Qty: 60 | Refills: 0
Start: 2025-05-23 | End: 2025-06-21

## 2025-05-23 RX ADMIN — INSULIN LISPRO 2: 100 INJECTION, SOLUTION INTRAVENOUS; SUBCUTANEOUS at 17:37

## 2025-05-23 RX ADMIN — DONEPEZIL HYDROCHLORIDE 10 MILLIGRAM(S): 5 TABLET ORAL at 21:38

## 2025-05-23 RX ADMIN — INSULIN GLARGINE-YFGN 12 UNIT(S): 100 INJECTION, SOLUTION SUBCUTANEOUS at 21:37

## 2025-05-23 RX ADMIN — CARVEDILOL 3.12 MILLIGRAM(S): 3.12 TABLET, FILM COATED ORAL at 05:43

## 2025-05-23 RX ADMIN — ATORVASTATIN CALCIUM 80 MILLIGRAM(S): 80 TABLET, FILM COATED ORAL at 21:39

## 2025-05-23 RX ADMIN — POLYETHYLENE GLYCOL 3350 17 GRAM(S): 17 POWDER, FOR SOLUTION ORAL at 12:01

## 2025-05-23 RX ADMIN — INSULIN LISPRO 3 UNIT(S): 100 INJECTION, SOLUTION INTRAVENOUS; SUBCUTANEOUS at 17:36

## 2025-05-23 RX ADMIN — BUMETANIDE 2 MILLIGRAM(S): 1 TABLET ORAL at 12:01

## 2025-05-23 RX ADMIN — INSULIN LISPRO 1: 100 INJECTION, SOLUTION INTRAVENOUS; SUBCUTANEOUS at 08:22

## 2025-05-23 RX ADMIN — Medication 2.5 MILLIGRAM(S): at 21:44

## 2025-05-23 RX ADMIN — Medication 2.5 MILLIGRAM(S): at 10:25

## 2025-05-23 RX ADMIN — Medication 2.5 MILLIGRAM(S): at 02:39

## 2025-05-23 RX ADMIN — DEXTROMETHORPHAN HBR, GUAIFENESIN 200 MILLIGRAM(S): 200 LIQUID ORAL at 21:39

## 2025-05-23 RX ADMIN — SACUBITRIL AND VALSARTAN 1 TABLET(S): 49; 51 TABLET, FILM COATED ORAL at 05:43

## 2025-05-23 RX ADMIN — Medication 650 MILLIGRAM(S): at 20:23

## 2025-05-23 RX ADMIN — Medication 650 MILLIGRAM(S): at 12:41

## 2025-05-23 RX ADMIN — Medication 650 MILLIGRAM(S): at 13:41

## 2025-05-23 RX ADMIN — INSULIN LISPRO 1: 100 INJECTION, SOLUTION INTRAVENOUS; SUBCUTANEOUS at 12:03

## 2025-05-23 RX ADMIN — APIXABAN 5 MILLIGRAM(S): 2.5 TABLET, FILM COATED ORAL at 17:36

## 2025-05-23 RX ADMIN — INSULIN LISPRO 3 UNIT(S): 100 INJECTION, SOLUTION INTRAVENOUS; SUBCUTANEOUS at 12:03

## 2025-05-23 RX ADMIN — CLOPIDOGREL BISULFATE 75 MILLIGRAM(S): 75 TABLET, FILM COATED ORAL at 12:02

## 2025-05-23 RX ADMIN — INSULIN LISPRO 3 UNIT(S): 100 INJECTION, SOLUTION INTRAVENOUS; SUBCUTANEOUS at 08:22

## 2025-05-23 RX ADMIN — PREGABALIN 50 MILLIGRAM(S): 50 CAPSULE ORAL at 17:36

## 2025-05-23 RX ADMIN — APIXABAN 5 MILLIGRAM(S): 2.5 TABLET, FILM COATED ORAL at 05:44

## 2025-05-23 RX ADMIN — PREGABALIN 50 MILLIGRAM(S): 50 CAPSULE ORAL at 05:43

## 2025-05-23 RX ADMIN — Medication 1 APPLICATION(S): at 12:02

## 2025-05-23 RX ADMIN — Medication 2.5 MILLIGRAM(S): at 15:02

## 2025-05-23 NOTE — PROGRESS NOTE ADULT - NS ATTEND AMEND GEN_ALL_CORE FT
transition from IV bumex to PO today.  if feeling well tomorrow, discharge in AM.  followup w/ Dr Rojas at [facility].
trying higher dose bumex for mild CHF exacerbation, and optimize vasodilator GDMT for systolic BP currently 140s-150s.  goal ~120.  pacemaker appears to be in good working order by device check and surface ECG.  dietary management of salt intake, and need to establish new dry weight before discharge.

## 2025-05-23 NOTE — PROGRESS NOTE ADULT - SUBJECTIVE AND OBJECTIVE BOX
Date of service 5/23/25    no chest pain, SOB or Palps  ROS otherwise negative    acetaminophen     Tablet .. 650 milliGRAM(s) Oral every 6 hours PRN  albuterol    0.083% 2.5 milliGRAM(s) Nebulizer every 6 hours  albuterol    90 MICROgram(s) HFA Inhaler 1 Puff(s) Inhalation every 4 hours  aluminum hydroxide/magnesium hydroxide/simethicone Suspension 30 milliLiter(s) Oral every 6 hours PRN  apixaban 5 milliGRAM(s) Oral two times a day  atorvastatin 80 milliGRAM(s) Oral at bedtime  buMETAnide 2 milliGRAM(s) Oral daily  carvedilol 3.125 milliGRAM(s) Oral every 12 hours  chlorhexidine 2% Cloths 1 Application(s) Topical daily  clopidogrel Tablet 75 milliGRAM(s) Oral daily  dextrose 5%. 1000 milliLiter(s) IV Continuous <Continuous>  dextrose 5%. 1000 milliLiter(s) IV Continuous <Continuous>  dextrose 50% Injectable 25 Gram(s) IV Push once  dextrose 50% Injectable 12.5 Gram(s) IV Push once  dextrose 50% Injectable 25 Gram(s) IV Push once  dextrose Oral Gel 15 Gram(s) Oral once PRN  donepezil 10 milliGRAM(s) Oral at bedtime  glucagon  Injectable 1 milliGRAM(s) IntraMuscular once  guaiFENesin Oral Liquid (Sugar-Free) 200 milliGRAM(s) Oral every 6 hours PRN  insulin glargine Injectable (LANTUS) 12 Unit(s) SubCutaneous at bedtime  insulin lispro (ADMELOG) corrective regimen sliding scale   SubCutaneous three times a day before meals  insulin lispro Injectable (ADMELOG) 3 Unit(s) SubCutaneous three times a day before meals  polyethylene glycol 3350 17 Gram(s) Oral daily  pregabalin 50 milliGRAM(s) Oral two times a day  sacubitril 24 mG/valsartan 26 mG 1 Tablet(s) Oral two times a day                            11.8   5.70  )-----------( 225      ( 22 May 2025 04:00 )             37.6       Hemoglobin: 11.8 g/dL (05-22 @ 04:00)  Hemoglobin: 11.4 g/dL (05-21 @ 06:00)  Hemoglobin: 11.5 g/dL (05-20 @ 07:28)      05-22    136  |  98  |  28[H]  ----------------------------<  180[H]  3.4[L]   |  22  |  1.10    Ca    9.3      22 May 2025 04:00  Phos  5.2     05-22  Mg     1.90     05-22      Creatinine Trend: 1.10<--, 0.95<--, 0.90<--      T(C): 36.4 (05-23-25 @ 05:00), Max: 36.7 (05-22-25 @ 17:00)  HR: 65 (05-23-25 @ 05:00) (64 - 75)  BP: 142/82 (05-23-25 @ 05:00) (130/76 - 145/77)  RR: 17 (05-23-25 @ 05:00) (17 - 18)  SpO2: 96% (05-23-25 @ 05:00) (95% - 100%)  Wt(kg): --    I&O's Summary    22 May 2025 07:01  -  23 May 2025 07:00  --------------------------------------------------------  IN: 730 mL / OUT: 1200 mL / NET: -470 mL    23 May 2025 07:01  -  23 May 2025 11:44  --------------------------------------------------------  IN: 240 mL / OUT: 0 mL / NET: 240 mL        General: Well nourished in no acute distress. Alert and Oriented * 3.   Head: Normocephalic and atraumatic.   Neck: No JVD. No bruits. Supple. Does not appear to be enlarged.   Cardiovascular: + S1,S2 ; RRR Soft systolic murmur at the left lower sternal border. No rubs noted.    Lungs: CTA b/l. No rhonchi, rales or wheezes.   Abdomen: + BS, soft. Non tender. Non distended. No rebound. No guarding.   Extremities: No clubbing/cyanosis/edema.   Neurologic: Moves all four extremities. Full range of motion.   Skin: Warm and moist. The patient's skin has normal elasticity and good skin turgor.   Psychiatric: Appropriate mood and affect.  Musculoskeletal: Normal range of motion, normal strength    DATA    TELEMETRY: 	      ECG:  	V Paced    < from: TTE Limited W or WO Ultrasound Enhancing Agent (09.12.24 @ 08:13) >     CONCLUSIONS:      1. Left ventricular systolic function is normal.   2. No pericardial effusion seen.    < end of copied text >    < from: TTE W or WO Ultrasound Enhancing Agent (09.06.24 @ 16:50) >  CONCLUSIONS:      1. The left ventricular cavity is normal in size. Left ventricular wall thickness is normal. Abnormal (paradoxical) septal motion consistent with rightventricular pacemaker. Left ventricular systolic function is severely decreased with an ejection fraction visually estimated at 25 to 30%. There is global left ventricular hypokinesis. There is mild (grade 1) left ventricular diastolic dysfunction.  2. The right ventricular cavity is normal in size and right ventricular systolic function is normal. Tricuspid annular plane systolic excursion (TAPSE) is 2.6 cm (normal >=1.7 cm). A device lead is visualized in the right heart.   3. Structurally normal mitral valve with normal leaflet excursion. There is calcification of the mitral valve annulus. There is mild to moderate mitral regurgitation.   4. The left atrium is severely dilated with an indexed volume of 55.42 ml/m².   5. No prior echocardiogram is available for comparison.    < end of copied text >    < from: Cardiac Catheterization (04.04.23 @ 16:41) >    Diagnostic Conclusions:   Chronically occluded ramus artery stent. Mild atherosclerosis in  proximal LAD. Patent mid LAD and ostial/proximal diagonal  stents. Moderate atherosclerosis in proximal Cx. Mild atherosclerosis  in mid and distal RCA.    Recommendations:   Optimize medical therapy for ischemic heart disease. Aggressive risk  factor modification.    < end of copied text >      < from: TTE W or WO Ultrasound Enhancing Agent (05.20.25 @ 14:49) >     CONCLUSIONS:      1. Left ventricular cavity is severely dilated. Left ventricular systolic function is severely decreased with an ejection fraction of 39 % by Baires's method of disks.   2. There is severe (grade 3) left ventricular diastolic dysfunction.   3. Normal right ventricular cavity size, with normal wall thickness, and probably normal right ventricular systolic function.   4. Left atrium is severely dilated.   5. The right atrium is dilated.   6. Mild mitral regurgitation.   7. Structurally normal pulmonic valve with normal leaflet excursion.   8. No significant valvular disease.   9. No pericardial effusion seen.  10. Compared to the transthoracic echocardiogram performed on 9/6/2024, The LVEF is somewhat improved at 39 versus 25-30% on the prior study. Regional dysfunction as described is now evident.  11. Echocardiographic evidence of pulmonary hyptertension.  12. Mild left ventricular hypertrophy.  13. Mild mitral valve leaflet calcification.  14. Mild tricuspid regurgitation.  15. The inferior vena cava is normal in size measuring 1.21 cm in diameter, (normal <2.1cm) with normal inspiratory collapse (normal >50%) consistent with normal right atrial pressure (~3, range 0-5mmHg).  16. The interatrial septum appears intact.  17. There is a device lead seen in the right atrium.  18. There is calcification of the aortic valve leaflets.    < end of copied text >      ASSESSMENT/PLAN: 	  85 year old Female with PMH DM, CAD s/p PCI to prox LAD/mid LAD/Diag/Ramus, last [facility] 8/2022 with occluded prox Ramus stent, patent LAD and Diag stents, mild to mod atherosclerosis in pLcx and mRCA, managed medically, HTN, and CVA, DVT, on Eliquis, who was last admitted 9/2024 with acute on chronic HFrEF, s/p upgrade to STJ BIV-PPM at that time (for ischemic cardiomyopathy w/ superimposed RV pacing- (CURIEL), presents with a few days of CEDEÑO, cough, orthopnea, and decreased appetite c/w acute on chronic HFrEF.     --Repeat TTE with LVEF 39%  --will review office records and recent testing  --s/p IV Bumex, now euvolemic, start oral bumex 2mg daily   --cont Coreg, Entresto for cardiomyopathy  --device interrogation with normal function  --previous stress test abnormal mostly fixed defects with seamus-infarct ischemia and cath 8/2022 showed 100% ramus in stent restenosis, repeat [facility] pursued 4/2023 revealing unchanged CAD- chronic occl ramus stent, patent mLAD and ostial/prox Diag stents and moderate atherosclerosis of pLcx.  --DC in AM     F/u with Dr Rojas 5/29 in [facility] [phone]    Hannah CALVILLO  [phone]

## 2025-05-23 NOTE — PROGRESS NOTE ADULT - SUBJECTIVE AND OBJECTIVE BOX
Patient is a 85y old  Female who presents with a chief complaint of Dyspnea r/o CHF (23 May 2025 11:43)    SUBJECTIVE / OVERNIGHT EVENTS: No acute events. Comfortable. No chest pain. Breathing feels comfortable. Daughter at bedside.     MEDICATIONS  (STANDING):  albuterol    0.083% 2.5 milliGRAM(s) Nebulizer every 6 hours  albuterol    90 MICROgram(s) HFA Inhaler 1 Puff(s) Inhalation every 4 hours  apixaban 5 milliGRAM(s) Oral two times a day  atorvastatin 80 milliGRAM(s) Oral at bedtime  buMETAnide 2 milliGRAM(s) Oral daily  carvedilol 3.125 milliGRAM(s) Oral every 12 hours  chlorhexidine 2% Cloths 1 Application(s) Topical daily  clopidogrel Tablet 75 milliGRAM(s) Oral daily  dextrose 5%. 1000 milliLiter(s) (100 mL/Hr) IV Continuous <Continuous>  dextrose 5%. 1000 milliLiter(s) (50 mL/Hr) IV Continuous <Continuous>  dextrose 50% Injectable 25 Gram(s) IV Push once  dextrose 50% Injectable 12.5 Gram(s) IV Push once  dextrose 50% Injectable 25 Gram(s) IV Push once  donepezil 10 milliGRAM(s) Oral at bedtime  glucagon  Injectable 1 milliGRAM(s) IntraMuscular once  insulin glargine Injectable (LANTUS) 12 Unit(s) SubCutaneous at bedtime  insulin lispro (ADMELOG) corrective regimen sliding scale   SubCutaneous three times a day before meals  insulin lispro Injectable (ADMELOG) 3 Unit(s) SubCutaneous three times a day before meals  polyethylene glycol 3350 17 Gram(s) Oral daily  pregabalin 50 milliGRAM(s) Oral two times a day  sacubitril 24 mG/valsartan 26 mG 1 Tablet(s) Oral two times a day    MEDICATIONS  (PRN):  acetaminophen     Tablet .. 650 milliGRAM(s) Oral every 6 hours PRN Mild Pain (1 - 3), Moderate Pain (4 - 6), Severe Pain (7 - 10)  aluminum hydroxide/magnesium hydroxide/simethicone Suspension 30 milliLiter(s) Oral every 6 hours PRN Dyspepsia  dextrose Oral Gel 15 Gram(s) Oral once PRN Blood Glucose LESS THAN 70 milliGRAM(s)/deciliter  guaiFENesin Oral Liquid (Sugar-Free) 200 milliGRAM(s) Oral every 6 hours PRN Cough      CAPILLARY BLOOD GLUCOSE      POCT Blood Glucose.: 195 mg/dL (23 May 2025 11:56)  POCT Blood Glucose.: 163 mg/dL (23 May 2025 07:56)  POCT Blood Glucose.: 158 mg/dL (22 May 2025 22:17)  POCT Blood Glucose.: 103 mg/dL (22 May 2025 16:55)    I&O's Summary    22 May 2025 07:01  -  23 May 2025 07:00  --------------------------------------------------------  IN: 730 mL / OUT: 1200 mL / NET: -470 mL    23 May 2025 07:01  -  23 May 2025 12:46  --------------------------------------------------------  IN: 240 mL / OUT: 0 mL / NET: 240 mL        PHYSICAL EXAM:  Vital Signs Last 24 Hrs  T(C): 36.4 (23 May 2025 05:00), Max: 36.7 (22 May 2025 17:00)  T(F): 97.5 (23 May 2025 05:00), Max: 98.1 (22 May 2025 17:00)  HR: 65 (23 May 2025 05:00) (64 - 75)  BP: 142/82 (23 May 2025 05:00) (130/76 - 145/77)  BP(mean): --  RR: 17 (23 May 2025 05:00) (17 - 18)  SpO2: 96% (23 May 2025 05:00) (95% - 100%)    Parameters below as of 23 May 2025 05:00  Patient On (Oxygen Delivery Method): room air    CONSTITUTIONAL: NAD, laying in bed  EYES: conjunctiva and sclera clear  ENMT: Moist oral mucosa  RESPIRATORY: Normal respiratory effort; lungs are clear to auscultation bilaterally  CARDIOVASCULAR: Regular rate and rhythm, normal S1 and S2, No lower extremity edema  ABDOMEN: Nontender to palpation, normoactive bowel sounds, no rebound/guarding  PSYCH: calm    LABS:                        11.8   5.70  )-----------( 225      ( 22 May 2025 04:00 )             37.6     05-22    136  |  98  |  28[H]  ----------------------------<  180[H]  3.4[L]   |  22  |  1.10    Ca    9.3      22 May 2025 04:00  Phos  5.2     05-22  Mg     1.90     05-22    Urinalysis Basic - ( 22 May 2025 04:00 )    Color: x / Appearance: x / SG: x / pH: x  Gluc: 180 mg/dL / Ketone: x  / Bili: x / Urobili: x   Blood: x / Protein: x / Nitrite: x   Leuk Esterase: x / RBC: x / WBC x   Sq Epi: x / Non Sq Epi: x / Bacteria: x    RADIOLOGY & ADDITIONAL TESTS: Reviewed    COORDINATION OF CARE:  Care Discussed with Consultants/Other Providers [Y- Medicine ACP, CM, SW, RN]

## 2025-05-23 NOTE — DISCHARGE NOTE NURSING/CASE MANAGEMENT/SOCIAL WORK - PATIENT PORTAL LINK FT
You can access the FollowMyHealth Patient Portal offered by [facility] by registering at the following website: [email or web address]. By joining [employer]’s FollowMyHealth portal, you will also be able to view your health information using other applications (apps) compatible with our system.

## 2025-05-23 NOTE — DISCHARGE NOTE NURSING/CASE MANAGEMENT/SOCIAL WORK - FINANCIAL ASSISTANCE
[facility] provides services at a reduced cost to those who are determined to be eligible through [facility]’s financial assistance program. Information regarding [facility]’s financial assistance program can be found by going to [email or web address] or by calling [phone].

## 2025-05-23 NOTE — DISCHARGE NOTE NURSING/CASE MANAGEMENT/SOCIAL WORK - NSDCPEFALRISK_GEN_ALL_CORE
For information on Fall & Injury Prevention, visit: [email or web address] OR  [email or web address] OR  https://www.cdc.gov/steadi/patient.html

## 2025-05-24 DIAGNOSIS — M79.604 PAIN IN RIGHT LEG: ICD-10-CM

## 2025-05-24 LAB
ANION GAP SERPL CALC-SCNC: 14 MMOL/L — SIGNIFICANT CHANGE UP (ref 7–14)
BUN SERPL-MCNC: 32 MG/DL — HIGH (ref 7–23)
CALCIUM SERPL-MCNC: 9.3 MG/DL — SIGNIFICANT CHANGE UP (ref 8.4–10.5)
CHLORIDE SERPL-SCNC: 101 MMOL/L — SIGNIFICANT CHANGE UP (ref 98–107)
CO2 SERPL-SCNC: 24 MMOL/L — SIGNIFICANT CHANGE UP (ref 22–31)
CREAT SERPL-MCNC: 1.14 MG/DL — SIGNIFICANT CHANGE UP (ref 0.5–1.3)
EGFR: 47 ML/MIN/1.73M2 — LOW
EGFR: 47 ML/MIN/1.73M2 — LOW
GLUCOSE BLDC GLUCOMTR-MCNC: 151 MG/DL — HIGH (ref 70–99)
GLUCOSE BLDC GLUCOMTR-MCNC: 170 MG/DL — HIGH (ref 70–99)
GLUCOSE BLDC GLUCOMTR-MCNC: 215 MG/DL — HIGH (ref 70–99)
GLUCOSE BLDC GLUCOMTR-MCNC: 216 MG/DL — HIGH (ref 70–99)
GLUCOSE SERPL-MCNC: 194 MG/DL — HIGH (ref 70–99)
HCT VFR BLD CALC: 39.2 % — SIGNIFICANT CHANGE UP (ref 34.5–45)
HGB BLD-MCNC: 12.2 G/DL — SIGNIFICANT CHANGE UP (ref 11.5–15.5)
MAGNESIUM SERPL-MCNC: 2.1 MG/DL — SIGNIFICANT CHANGE UP (ref 1.6–2.6)
MCHC RBC-ENTMCNC: 26.3 PG — LOW (ref 27–34)
MCHC RBC-ENTMCNC: 31.1 G/DL — LOW (ref 32–36)
MCV RBC AUTO: 84.7 FL — SIGNIFICANT CHANGE UP (ref 80–100)
NRBC # BLD AUTO: 0 K/UL — SIGNIFICANT CHANGE UP (ref 0–0)
NRBC # FLD: 0 K/UL — SIGNIFICANT CHANGE UP (ref 0–0)
NRBC BLD AUTO-RTO: 0 /100 WBCS — SIGNIFICANT CHANGE UP (ref 0–0)
PHOSPHATE SERPL-MCNC: 4.2 MG/DL — SIGNIFICANT CHANGE UP (ref 2.5–4.5)
PLATELET # BLD AUTO: 227 K/UL — SIGNIFICANT CHANGE UP (ref 150–400)
POTASSIUM SERPL-MCNC: 3.9 MMOL/L — SIGNIFICANT CHANGE UP (ref 3.5–5.3)
POTASSIUM SERPL-SCNC: 3.9 MMOL/L — SIGNIFICANT CHANGE UP (ref 3.5–5.3)
RBC # BLD: 4.63 M/UL — SIGNIFICANT CHANGE UP (ref 3.8–5.2)
RBC # FLD: 14.3 % — SIGNIFICANT CHANGE UP (ref 10.3–14.5)
SODIUM SERPL-SCNC: 139 MMOL/L — SIGNIFICANT CHANGE UP (ref 135–145)
WBC # BLD: 7.08 K/UL — SIGNIFICANT CHANGE UP (ref 3.8–10.5)
WBC # FLD AUTO: 7.08 K/UL — SIGNIFICANT CHANGE UP (ref 3.8–10.5)

## 2025-05-24 PROCEDURE — 99232 SBSQ HOSP IP/OBS MODERATE 35: CPT

## 2025-05-24 RX ADMIN — Medication 1 APPLICATION(S): at 12:19

## 2025-05-24 RX ADMIN — SACUBITRIL AND VALSARTAN 1 TABLET(S): 49; 51 TABLET, FILM COATED ORAL at 05:31

## 2025-05-24 RX ADMIN — INSULIN GLARGINE-YFGN 12 UNIT(S): 100 INJECTION, SOLUTION SUBCUTANEOUS at 21:36

## 2025-05-24 RX ADMIN — APIXABAN 5 MILLIGRAM(S): 2.5 TABLET, FILM COATED ORAL at 05:31

## 2025-05-24 RX ADMIN — PREGABALIN 50 MILLIGRAM(S): 50 CAPSULE ORAL at 17:08

## 2025-05-24 RX ADMIN — DONEPEZIL HYDROCHLORIDE 10 MILLIGRAM(S): 5 TABLET ORAL at 21:36

## 2025-05-24 RX ADMIN — Medication 2.5 MILLIGRAM(S): at 16:19

## 2025-05-24 RX ADMIN — Medication 650 MILLIGRAM(S): at 10:28

## 2025-05-24 RX ADMIN — Medication 2.5 MILLIGRAM(S): at 03:41

## 2025-05-24 RX ADMIN — INSULIN LISPRO 2: 100 INJECTION, SOLUTION INTRAVENOUS; SUBCUTANEOUS at 08:20

## 2025-05-24 RX ADMIN — PREGABALIN 50 MILLIGRAM(S): 50 CAPSULE ORAL at 05:29

## 2025-05-24 RX ADMIN — BUMETANIDE 2 MILLIGRAM(S): 1 TABLET ORAL at 05:30

## 2025-05-24 RX ADMIN — POLYETHYLENE GLYCOL 3350 17 GRAM(S): 17 POWDER, FOR SOLUTION ORAL at 12:16

## 2025-05-24 RX ADMIN — CARVEDILOL 3.12 MILLIGRAM(S): 3.12 TABLET, FILM COATED ORAL at 05:30

## 2025-05-24 RX ADMIN — CARVEDILOL 3.12 MILLIGRAM(S): 3.12 TABLET, FILM COATED ORAL at 17:09

## 2025-05-24 RX ADMIN — INSULIN LISPRO 3 UNIT(S): 100 INJECTION, SOLUTION INTRAVENOUS; SUBCUTANEOUS at 12:09

## 2025-05-24 RX ADMIN — Medication 2.5 MILLIGRAM(S): at 21:58

## 2025-05-24 RX ADMIN — CLOPIDOGREL BISULFATE 75 MILLIGRAM(S): 75 TABLET, FILM COATED ORAL at 12:16

## 2025-05-24 RX ADMIN — Medication 2.5 MILLIGRAM(S): at 10:33

## 2025-05-24 RX ADMIN — INSULIN LISPRO 3 UNIT(S): 100 INJECTION, SOLUTION INTRAVENOUS; SUBCUTANEOUS at 08:19

## 2025-05-24 RX ADMIN — INSULIN LISPRO 1: 100 INJECTION, SOLUTION INTRAVENOUS; SUBCUTANEOUS at 17:10

## 2025-05-24 RX ADMIN — ATORVASTATIN CALCIUM 80 MILLIGRAM(S): 80 TABLET, FILM COATED ORAL at 21:36

## 2025-05-24 RX ADMIN — APIXABAN 5 MILLIGRAM(S): 2.5 TABLET, FILM COATED ORAL at 17:10

## 2025-05-24 RX ADMIN — INSULIN LISPRO 3 UNIT(S): 100 INJECTION, SOLUTION INTRAVENOUS; SUBCUTANEOUS at 17:10

## 2025-05-24 RX ADMIN — INSULIN LISPRO 2: 100 INJECTION, SOLUTION INTRAVENOUS; SUBCUTANEOUS at 12:10

## 2025-05-24 RX ADMIN — SACUBITRIL AND VALSARTAN 1 TABLET(S): 49; 51 TABLET, FILM COATED ORAL at 17:09

## 2025-05-24 NOTE — PROGRESS NOTE ADULT - PROBLEM SELECTOR PLAN 7
c/w atorvastatin
c/w atorvastatin
Lantus 12units qhs and lispro 3TID  insulin corrective scale  A1c 8.5
c/w atorvastatin

## 2025-05-24 NOTE — PROGRESS NOTE ADULT - ASSESSMENT
85 year old female with a PMH of DM, HTN, CAD s/p stents, HFrEF - biventricular PPM placed 2024, bradycardia, DVT on eliquis who presents to the ED for cough/dyspnea.

## 2025-05-24 NOTE — PROGRESS NOTE ADULT - PROBLEM SELECTOR PLAN 2
per patient she early this morning she experienced sharp shooting pain from her neck down to her right leg  pain has improved without medication  now has difficulty walking  -will reassess gait today  -PT re-jose rafael  -spoke to daughter who believes this may be 2/2 anxiety about going home  -if pain and gait improve, will d/c tomorrow per patient she early this morning she experienced sharp shooting pain from her neck down to her right leg  pain has improved without medication  now has difficulty walking  possible muscle spasm  -will reassess gait today  -PT re-jose rafael  -spoke to daughter who believes this may be 2/2 anxiety about going home  -if pain and gait improve, will d/c tomorrow  -tylenol, could trial muscle relaxant

## 2025-05-24 NOTE — PROGRESS NOTE ADULT - PROBLEM SELECTOR PLAN 1
Appreciate Cardiology consultation. TTE with EF 39%.   - Bumex 2mg x1 on 5/21 as per Cardiology, monitor UOP, BP, BMP closely  - C/w coreg, entresto  - CXR unremarkable  - F/u PT eval, ambulatory O2 sats  - Follow up Cardiology recs regarding further diuresis
Appreciate Cardiology consultation. TTE with EF 39%.   - Bumex 2mg x1 today as per Cardiology, monitor UOP, BP, BMP closely  - C/w coreg, entresto  - CXR unremarkable  - F/u PT eval, ambulatory O2 sats
Appreciate Cardiology consultation. TTE with EF 39%.   - S/p IV bumex, discussed with Cardiology attending, recommending oral bumex 2mg qd  - Monitor UOP, BP, BMP closely  - C/w coreg, entresto  - CXR unremarkable  - PT rec home PT. F/u ambulatory O2 sats  - Close OP cardiology follow up
Appreciate Cardiology consultation. TTE with EF 39%.   - S/p IV bumex, discussed with Cardiology attending, recommending oral bumex 2mg qd  - Monitor UOP, BP, BMP closely  - C/w coreg, entresto  - CXR unremarkable  - PT rec home PT. F/u ambulatory O2 sats  - Close OP cardiology follow up

## 2025-05-24 NOTE — PROGRESS NOTE ADULT - SUBJECTIVE AND OBJECTIVE BOX
Division of Hospital Medicine  Hernandez Bajwa MD  Available via MS Teams  Pager: [phone]      Patient is a 85y old  Female who presents with a chief complaint of Dyspnea r/o CHF (24 May 2025 15:43)      SUBJECTIVE / OVERNIGHT EVENTS: No acute overnight events. Pt seen and examined. Denies fevers, chills, CP, SOB, Abdominal pain, N/V, Constipation, Diarrhea    ADDITIONAL REVIEW OF SYSTEMS:    MEDICATIONS  (STANDING):  albuterol    0.083% 2.5 milliGRAM(s) Nebulizer every 6 hours  albuterol    90 MICROgram(s) HFA Inhaler 1 Puff(s) Inhalation every 4 hours  apixaban 5 milliGRAM(s) Oral two times a day  atorvastatin 80 milliGRAM(s) Oral at bedtime  buMETAnide 2 milliGRAM(s) Oral daily  carvedilol 3.125 milliGRAM(s) Oral every 12 hours  chlorhexidine 2% Cloths 1 Application(s) Topical daily  clopidogrel Tablet 75 milliGRAM(s) Oral daily  dextrose 5%. 1000 milliLiter(s) (50 mL/Hr) IV Continuous <Continuous>  dextrose 5%. 1000 milliLiter(s) (100 mL/Hr) IV Continuous <Continuous>  dextrose 50% Injectable 25 Gram(s) IV Push once  dextrose 50% Injectable 12.5 Gram(s) IV Push once  dextrose 50% Injectable 25 Gram(s) IV Push once  donepezil 10 milliGRAM(s) Oral at bedtime  glucagon  Injectable 1 milliGRAM(s) IntraMuscular once  insulin glargine Injectable (LANTUS) 12 Unit(s) SubCutaneous at bedtime  insulin lispro (ADMELOG) corrective regimen sliding scale   SubCutaneous three times a day before meals  insulin lispro Injectable (ADMELOG) 3 Unit(s) SubCutaneous three times a day before meals  polyethylene glycol 3350 17 Gram(s) Oral daily  pregabalin 50 milliGRAM(s) Oral two times a day  sacubitril 24 mG/valsartan 26 mG 1 Tablet(s) Oral two times a day    MEDICATIONS  (PRN):  acetaminophen     Tablet .. 650 milliGRAM(s) Oral every 6 hours PRN Mild Pain (1 - 3), Moderate Pain (4 - 6), Severe Pain (7 - 10)  aluminum hydroxide/magnesium hydroxide/simethicone Suspension 30 milliLiter(s) Oral every 6 hours PRN Dyspepsia  dextrose Oral Gel 15 Gram(s) Oral once PRN Blood Glucose LESS THAN 70 milliGRAM(s)/deciliter  guaiFENesin Oral Liquid (Sugar-Free) 200 milliGRAM(s) Oral every 6 hours PRN Cough      I&O's Summary    23 May 2025 07:01  -  24 May 2025 07:00  --------------------------------------------------------  IN: 1040 mL / OUT: 1550 mL / NET: -510 mL    24 May 2025 07:01  -  24 May 2025 16:50  --------------------------------------------------------  IN: 0 mL / OUT: 1200 mL / NET: -1200 mL        PHYSICAL EXAM:  Vital Signs Last 24 Hrs  T(C): 36.7 (24 May 2025 13:00), Max: 36.7 (24 May 2025 13:00)  T(F): 98.1 (24 May 2025 13:00), Max: 98.1 (24 May 2025 13:00)  HR: 70 (24 May 2025 16:19) (64 - 75)  BP: 145/68 (24 May 2025 13:00) (116/65 - 151/87)  BP(mean): --  RR: 18 (24 May 2025 13:00) (17 - 18)  SpO2: 94% (24 May 2025 16:19) (94% - 100%)    Parameters below as of 24 May 2025 16:19  Patient On (Oxygen Delivery Method): room air      CONSTITUTIONAL: NAD, well-groomed  EYES: PERRLA; conjunctiva and sclera clear  ENMT: Moist oral mucosa, no pharyngeal injection or exudates; normal dentition  NECK: Supple, no palpable masses;  RESPIRATORY: Normal respiratory effort; lungs are clear to auscultation bilaterally  CARDIOVASCULAR: Regular rate and rhythm, normal S1 and S2, no murmur/rub/gallop; No lower extremity edema; Peripheral pulses are 2+ bilaterally  ABDOMEN: Nontender to palpation, normoactive bowel sounds, no rebound/guarding; No hepatosplenomegaly  MUSCULOSKELETAL:   no clubbing or cyanosis of digits; no joint swelling or tenderness to palpation  PSYCH: A+O to person, place, and time; affect appropriate  NEUROLOGY: CN 2-12 are intact and symmetric; no gross sensory deficits   SKIN: No rashes; no palpable lesions    LABS:                        12.2   7.08  )-----------( 227      ( 24 May 2025 05:00 )             39.2     05-24    139  |  101  |  32[H]  ----------------------------<  194[H]  3.9   |  24  |  1.14    Ca    9.3      24 May 2025 05:00  Phos  4.2     05-24  Mg     2.10     05-24            Urinalysis Basic - ( 24 May 2025 05:00 )    Color: x / Appearance: x / SG: x / pH: x  Gluc: 194 mg/dL / Ketone: x  / Bili: x / Urobili: x   Blood: x / Protein: x / Nitrite: x   Leuk Esterase: x / RBC: x / WBC x   Sq Epi: x / Non Sq Epi: x / Bacteria: x        SARS-CoV-2: NotDetec (20 May 2025 07:28)      RADIOLOGY & ADDITIONAL TESTS:  New Imaging Personally Reviewed Today:  New Electrocardiogram Personally Reviewed Today:  Other Results Reviewed Today:   Prior or Outpatient Records Reviewed Today with Summary:    COORDINATION OF CARE:  Consultant Communication and Details of Discussion (where applicable):     Division of Hospital Medicine  Hernandez Bajwa MD  Available via MS Teams  Pager: [phone]      Patient is a 85y old  Female who presents with a chief complaint of Dyspnea r/o CHF (24 May 2025 15:43)      SUBJECTIVE / OVERNIGHT EVENTS: No acute overnight events. Pt seen and examined. She reports difficulty ambulating this morning 2/2 pain in right leg. Pain has improved since onset. However, she states that she needed assistance walking. Denies fevers, chills, CP, SOB, Abdominal pain, N/V, Constipation, Diarrhea    ADDITIONAL REVIEW OF SYSTEMS:    MEDICATIONS  (STANDING):  albuterol    0.083% 2.5 milliGRAM(s) Nebulizer every 6 hours  albuterol    90 MICROgram(s) HFA Inhaler 1 Puff(s) Inhalation every 4 hours  apixaban 5 milliGRAM(s) Oral two times a day  atorvastatin 80 milliGRAM(s) Oral at bedtime  buMETAnide 2 milliGRAM(s) Oral daily  carvedilol 3.125 milliGRAM(s) Oral every 12 hours  chlorhexidine 2% Cloths 1 Application(s) Topical daily  clopidogrel Tablet 75 milliGRAM(s) Oral daily  dextrose 5%. 1000 milliLiter(s) (50 mL/Hr) IV Continuous <Continuous>  dextrose 5%. 1000 milliLiter(s) (100 mL/Hr) IV Continuous <Continuous>  dextrose 50% Injectable 25 Gram(s) IV Push once  dextrose 50% Injectable 12.5 Gram(s) IV Push once  dextrose 50% Injectable 25 Gram(s) IV Push once  donepezil 10 milliGRAM(s) Oral at bedtime  glucagon  Injectable 1 milliGRAM(s) IntraMuscular once  insulin glargine Injectable (LANTUS) 12 Unit(s) SubCutaneous at bedtime  insulin lispro (ADMELOG) corrective regimen sliding scale   SubCutaneous three times a day before meals  insulin lispro Injectable (ADMELOG) 3 Unit(s) SubCutaneous three times a day before meals  polyethylene glycol 3350 17 Gram(s) Oral daily  pregabalin 50 milliGRAM(s) Oral two times a day  sacubitril 24 mG/valsartan 26 mG 1 Tablet(s) Oral two times a day    MEDICATIONS  (PRN):  acetaminophen     Tablet .. 650 milliGRAM(s) Oral every 6 hours PRN Mild Pain (1 - 3), Moderate Pain (4 - 6), Severe Pain (7 - 10)  aluminum hydroxide/magnesium hydroxide/simethicone Suspension 30 milliLiter(s) Oral every 6 hours PRN Dyspepsia  dextrose Oral Gel 15 Gram(s) Oral once PRN Blood Glucose LESS THAN 70 milliGRAM(s)/deciliter  guaiFENesin Oral Liquid (Sugar-Free) 200 milliGRAM(s) Oral every 6 hours PRN Cough      I&O's Summary    23 May 2025 07:01  -  24 May 2025 07:00  --------------------------------------------------------  IN: 1040 mL / OUT: 1550 mL / NET: -510 mL    24 May 2025 07:01  -  24 May 2025 16:50  --------------------------------------------------------  IN: 0 mL / OUT: 1200 mL / NET: -1200 mL        PHYSICAL EXAM:  Vital Signs Last 24 Hrs  T(C): 36.7 (24 May 2025 13:00), Max: 36.7 (24 May 2025 13:00)  T(F): 98.1 (24 May 2025 13:00), Max: 98.1 (24 May 2025 13:00)  HR: 70 (24 May 2025 16:19) (64 - 75)  BP: 145/68 (24 May 2025 13:00) (116/65 - 151/87)  BP(mean): --  RR: 18 (24 May 2025 13:00) (17 - 18)  SpO2: 94% (24 May 2025 16:19) (94% - 100%)    Parameters below as of 24 May 2025 16:19  Patient On (Oxygen Delivery Method): room air      CONSTITUTIONAL: NAD, well-groomed resting in bed  EYES: PERRLA; conjunctiva and sclera clear  ENMT: Moist oral mucosa, no pharyngeal injection or exudates  NECK: Supple  RESPIRATORY: Normal respiratory effort; lungs are clear to auscultation bilaterally, no rales or rhonchi   CARDIOVASCULAR: Regular rate and rhythm, normal S1 and S2, no murmur/rub/gallop  ABDOMEN: Nontender to palpation, normoactive bowel sounds, no rebound/guarding;  MUSCULOSKELETAL: no clubbing or cyanosis of digits; no joint swelling or tenderness to palpation, no LE edema  PSYCH: A+O to person, place, and time; affect appropriate  NEUROLOGY: CN 2-12 are intact and symmetric; no gross sensory deficits   SKIN: No rashes; no palpable lesions    LABS:                        12.2   7.08  )-----------( 227      ( 24 May 2025 05:00 )             39.2     05-24    139  |  101  |  32[H]  ----------------------------<  194[H]  3.9   |  24  |  1.14    Ca    9.3      24 May 2025 05:00  Phos  4.2     05-24  Mg     2.10     05-24            Urinalysis Basic - ( 24 May 2025 05:00 )    Color: x / Appearance: x / SG: x / pH: x  Gluc: 194 mg/dL / Ketone: x  / Bili: x / Urobili: x   Blood: x / Protein: x / Nitrite: x   Leuk Esterase: x / RBC: x / WBC x   Sq Epi: x / Non Sq Epi: x / Bacteria: x        SARS-CoV-2: NotDetec (20 May 2025 07:28)      RADIOLOGY & ADDITIONAL TESTS:  New Imaging Personally Reviewed Today:  New Electrocardiogram Personally Reviewed Today:  Other Results Reviewed Today:   Prior or Outpatient Records Reviewed Today with Summary:    COORDINATION OF CARE:  Consultant Communication and Details of Discussion (where applicable):

## 2025-05-24 NOTE — PROGRESS NOTE ADULT - PROBLEM SELECTOR PROBLEM 5
CAD (coronary artery disease)
Chronic deep vein thrombosis (DVT)

## 2025-05-24 NOTE — PROGRESS NOTE ADULT - PROBLEM SELECTOR PLAN 4
Continue eliquis
Patient reports L chest pain along with pain radiating to and from the chest  Could be from history of coughing, also could be from chronic radiculopathy  Reportedly unable to tolerate gabapentin 100 mg  Started on trial of lyrica 50mg BID inpatient, tolerating

## 2025-05-24 NOTE — PROGRESS NOTE ADULT - SUBJECTIVE AND OBJECTIVE BOX
Date of service 5/24/25    no chest pain, SOB or Palps  ROS otherwise negative    acetaminophen     Tablet .. 650 milliGRAM(s) Oral every 6 hours PRN  albuterol    0.083% 2.5 milliGRAM(s) Nebulizer every 6 hours  albuterol    90 MICROgram(s) HFA Inhaler 1 Puff(s) Inhalation every 4 hours  aluminum hydroxide/magnesium hydroxide/simethicone Suspension 30 milliLiter(s) Oral every 6 hours PRN  apixaban 5 milliGRAM(s) Oral two times a day  atorvastatin 80 milliGRAM(s) Oral at bedtime  buMETAnide 2 milliGRAM(s) Oral daily  carvedilol 3.125 milliGRAM(s) Oral every 12 hours  chlorhexidine 2% Cloths 1 Application(s) Topical daily  clopidogrel Tablet 75 milliGRAM(s) Oral daily  dextrose 5%. 1000 milliLiter(s) IV Continuous <Continuous>  dextrose 5%. 1000 milliLiter(s) IV Continuous <Continuous>  dextrose 50% Injectable 25 Gram(s) IV Push once  dextrose 50% Injectable 12.5 Gram(s) IV Push once  dextrose 50% Injectable 25 Gram(s) IV Push once  dextrose Oral Gel 15 Gram(s) Oral once PRN  donepezil 10 milliGRAM(s) Oral at bedtime  glucagon  Injectable 1 milliGRAM(s) IntraMuscular once  guaiFENesin Oral Liquid (Sugar-Free) 200 milliGRAM(s) Oral every 6 hours PRN  insulin glargine Injectable (LANTUS) 12 Unit(s) SubCutaneous at bedtime  insulin lispro (ADMELOG) corrective regimen sliding scale   SubCutaneous three times a day before meals  insulin lispro Injectable (ADMELOG) 3 Unit(s) SubCutaneous three times a day before meals  polyethylene glycol 3350 17 Gram(s) Oral daily  pregabalin 50 milliGRAM(s) Oral two times a day  sacubitril 24 mG/valsartan 26 mG 1 Tablet(s) Oral two times a day                       12.2   7.08  )-----------( 227      ( 24 May 2025 05:00 )             39.2       05-24    139  |  101  |  32[H]  ----------------------------<  194[H]  3.9   |  24  |  1.14    Ca    9.3      24 May 2025 05:00  Phos  4.2     05-24  Mg     2.10     05-24    T(C): 36.7 (05-24-25 @ 13:00), Max: 36.7 (05-24-25 @ 13:00)  HR: 69 (05-24-25 @ 13:00) (64 - 75)  BP: 145/68 (05-24-25 @ 13:00) (116/65 - 151/87)  RR: 18 (05-24-25 @ 13:00) (17 - 18)  SpO2: 97% (05-24-25 @ 13:00) (96% - 100%)  Wt(kg): --    I&O's Summary    23 May 2025 07:01  -  24 May 2025 07:00  --------------------------------------------------------  IN: 1040 mL / OUT: 1550 mL / NET: -510 mL    24 May 2025 07:01  -  24 May 2025 15:45  --------------------------------------------------------  IN: 0 mL / OUT: 1200 mL / NET: -1200 mL    General: Well nourished in no acute distress. Alert and Oriented * 3.   Head: Normocephalic and atraumatic.   Neck: No JVD. No bruits. Supple. Does not appear to be enlarged.   Cardiovascular: + S1,S2 ; RRR Soft systolic murmur at the left lower sternal border. No rubs noted.    Lungs: CTA b/l. No rhonchi, rales or wheezes.   Abdomen: + BS, soft. Non tender. Non distended. No rebound. No guarding.   Extremities: No clubbing/cyanosis/edema.   Neurologic: Moves all four extremities. Full range of motion.   Skin: Warm and moist. The patient's skin has normal elasticity and good skin turgor.   Psychiatric: Appropriate mood and affect.  Musculoskeletal: Normal range of motion, normal strength    DATA    TELEMETRY: 	      ECG:  	V Paced    < from: TTE Limited W or WO Ultrasound Enhancing Agent (09.12.24 @ 08:13) >     CONCLUSIONS:      1. Left ventricular systolic function is normal.   2. No pericardial effusion seen.    < end of copied text >    < from: TTE W or WO Ultrasound Enhancing Agent (09.06.24 @ 16:50) >  CONCLUSIONS:      1. The left ventricular cavity is normal in size. Left ventricular wall thickness is normal. Abnormal (paradoxical) septal motion consistent with rightventricular pacemaker. Left ventricular systolic function is severely decreased with an ejection fraction visually estimated at 25 to 30%. There is global left ventricular hypokinesis. There is mild (grade 1) left ventricular diastolic dysfunction.  2. The right ventricular cavity is normal in size and right ventricular systolic function is normal. Tricuspid annular plane systolic excursion (TAPSE) is 2.6 cm (normal >=1.7 cm). A device lead is visualized in the right heart.   3. Structurally normal mitral valve with normal leaflet excursion. There is calcification of the mitral valve annulus. There is mild to moderate mitral regurgitation.   4. The left atrium is severely dilated with an indexed volume of 55.42 ml/m².   5. No prior echocardiogram is available for comparison.    < end of copied text >    < from: Cardiac Catheterization (04.04.23 @ 16:41) >    Diagnostic Conclusions:   Chronically occluded ramus artery stent. Mild atherosclerosis in  proximal LAD. Patent mid LAD and ostial/proximal diagonal  stents. Moderate atherosclerosis in proximal Cx. Mild atherosclerosis  in mid and distal RCA.    Recommendations:   Optimize medical therapy for ischemic heart disease. Aggressive risk  factor modification.    < end of copied text >      < from: TTE W or WO Ultrasound Enhancing Agent (05.20.25 @ 14:49) >     CONCLUSIONS:      1. Left ventricular cavity is severely dilated. Left ventricular systolic function is severely decreased with an ejection fraction of 39 % by Baires's method of disks.   2. There is severe (grade 3) left ventricular diastolic dysfunction.   3. Normal right ventricular cavity size, with normal wall thickness, and probably normal right ventricular systolic function.   4. Left atrium is severely dilated.   5. The right atrium is dilated.   6. Mild mitral regurgitation.   7. Structurally normal pulmonic valve with normal leaflet excursion.   8. No significant valvular disease.   9. No pericardial effusion seen.  10. Compared to the transthoracic echocardiogram performed on 9/6/2024, The LVEF is somewhat improved at 39 versus 25-30% on the prior study. Regional dysfunction as described is now evident.  11. Echocardiographic evidence of pulmonary hyptertension.  12. Mild left ventricular hypertrophy.  13. Mild mitral valve leaflet calcification.  14. Mild tricuspid regurgitation.  15. The inferior vena cava is normal in size measuring 1.21 cm in diameter, (normal <2.1cm) with normal inspiratory collapse (normal >50%) consistent with normal right atrial pressure (~3, range 0-5mmHg).  16. The interatrial septum appears intact.  17. There is a device lead seen in the right atrium.  18. There is calcification of the aortic valve leaflets.    < end of copied text >      ASSESSMENT/PLAN: 	  85 year old Female with PMH DM, CAD s/p PCI to prox LAD/mid LAD/Diag/Ramus, last C 8/2022 with occluded prox Ramus stent, patent LAD and Diag stents, mild to mod atherosclerosis in pLcx and mRCA, managed medically, HTN, and CVA, DVT, on Eliquis, who was last admitted 9/2024 with acute on chronic HFrEF, s/p upgrade to STJ BIV-PPM at that time (for ischemic cardiomyopathy w/ superimposed RV pacing- (MOISÉS), presents with a few days of CEDEÑO, cough, orthopnea, and decreased appetite c/w acute on chronic HFrEF.     --Repeat TTE with LVEF 39%  --will review office records and recent testing  --s/p IV Bumex, now euvolemic, start oral bumex 2mg daily   --cont Coreg, Entresto for cardiomyopathy  --device interrogation with normal function  --previous stress test abnormal mostly fixed defects with seamus-infarct ischemia and cath 8/2022 showed 100% ramus in stent restenosis, repeat [facility] pursued 4/2023 revealing unchanged CAD- chronic occl ramus stent, patent mLAD and ostial/prox Diag stents and moderate atherosclerosis of pLcx.  --From my perspective, OK FOR DISCHARGE TODAY from a Cardiac perspective... limited due to difficulty ambulating!    F/u with Dr Rojas 5/29 in [place] office [phone]    Poncho Lopez M.D.  Cardiac Electrophysiology  [phone]

## 2025-05-24 NOTE — PROGRESS NOTE ADULT - PROBLEM SELECTOR PLAN 6
Lantus 12units qhs and lispro 3TID  insulin corrective scale  A1c 8.5
Lantus 12units qhs and lispro 3TID  insulin corrective scale  A1c 8.5
c/w Plavix, coreg, statin
Lantus 12units qhs and lispro 3TID  insulin corrective scale  A1c 8.5

## 2025-05-24 NOTE — PROGRESS NOTE ADULT - PROBLEM SELECTOR PROBLEM 4
Chronic pain syndrome
Chronic deep vein thrombosis (DVT)

## 2025-05-24 NOTE — PROGRESS NOTE ADULT - TIME BILLING
Review of laboratory data, radiology results, consultant recommendations, EMR documentation, discussion with patient/advanced care providers and interdisciplinary staff.
Time spent on review of vitals, physical exam, documentation, and discussion of plan of care with patient and patient family.

## 2025-05-24 NOTE — PROGRESS NOTE ADULT - PROBLEM SELECTOR PLAN 3
Patient reports L chest pain along with pain radiating to and from the chest  Could be from history of coughing, also could be from chronic radiculopathy  Reportedly unable to tolerate gabapentin 100 mg  Started on trial of lyrica 50mg BID inpatient, tolerating
BP improved  C/w coreg, entresto
Patient reports L chest pain along with pain radiating to and from the chest  Could be from history of coughing, also could be from chronic radiculopathy  Reportedly unable to tolerate gabapentin 100 mg  Started on trial of lyrica 50mg BID inpatient, monitor closely
Patient reports L chest pain along with pain radiating to and from the chest  Could be from history of coughing, also could be from chronic radiculopathy  Reportedly unable to tolerate gabapentin 100 mg  Started on trial of lyrica 50mg BID inpatient, monitor closely

## 2025-05-24 NOTE — PROGRESS NOTE ADULT - PROBLEM SELECTOR PLAN 8
DVT prop: eliquis as per above     Son updated via telephone
DVT prop: eliquis as per above     Daughters updated at bedside
DVT prop: eliquis as per above     Daughter updated at bedside 5/23
c/w atorvastatin

## 2025-05-25 VITALS
TEMPERATURE: 98 F | HEART RATE: 66 BPM | OXYGEN SATURATION: 99 % | DIASTOLIC BLOOD PRESSURE: 76 MMHG | RESPIRATION RATE: 17 BRPM | SYSTOLIC BLOOD PRESSURE: 138 MMHG

## 2025-05-25 LAB
ANION GAP SERPL CALC-SCNC: 13 MMOL/L — SIGNIFICANT CHANGE UP (ref 7–14)
BUN SERPL-MCNC: 27 MG/DL — HIGH (ref 7–23)
CALCIUM SERPL-MCNC: 9.5 MG/DL — SIGNIFICANT CHANGE UP (ref 8.4–10.5)
CHLORIDE SERPL-SCNC: 100 MMOL/L — SIGNIFICANT CHANGE UP (ref 98–107)
CO2 SERPL-SCNC: 25 MMOL/L — SIGNIFICANT CHANGE UP (ref 22–31)
CREAT SERPL-MCNC: 1.05 MG/DL — SIGNIFICANT CHANGE UP (ref 0.5–1.3)
EGFR: 51 ML/MIN/1.73M2 — LOW
EGFR: 51 ML/MIN/1.73M2 — LOW
GLUCOSE BLDC GLUCOMTR-MCNC: 221 MG/DL — HIGH (ref 70–99)
GLUCOSE BLDC GLUCOMTR-MCNC: 254 MG/DL — HIGH (ref 70–99)
GLUCOSE SERPL-MCNC: 201 MG/DL — HIGH (ref 70–99)
HCT VFR BLD CALC: 40.3 % — SIGNIFICANT CHANGE UP (ref 34.5–45)
HGB BLD-MCNC: 12.5 G/DL — SIGNIFICANT CHANGE UP (ref 11.5–15.5)
MAGNESIUM SERPL-MCNC: 2.1 MG/DL — SIGNIFICANT CHANGE UP (ref 1.6–2.6)
MCHC RBC-ENTMCNC: 26.4 PG — LOW (ref 27–34)
MCHC RBC-ENTMCNC: 31 G/DL — LOW (ref 32–36)
MCV RBC AUTO: 85.2 FL — SIGNIFICANT CHANGE UP (ref 80–100)
NRBC # BLD AUTO: 0 K/UL — SIGNIFICANT CHANGE UP (ref 0–0)
NRBC # FLD: 0 K/UL — SIGNIFICANT CHANGE UP (ref 0–0)
NRBC BLD AUTO-RTO: 0 /100 WBCS — SIGNIFICANT CHANGE UP (ref 0–0)
PHOSPHATE SERPL-MCNC: 3.1 MG/DL — SIGNIFICANT CHANGE UP (ref 2.5–4.5)
PLATELET # BLD AUTO: 223 K/UL — SIGNIFICANT CHANGE UP (ref 150–400)
POTASSIUM SERPL-MCNC: 4.2 MMOL/L — SIGNIFICANT CHANGE UP (ref 3.5–5.3)
POTASSIUM SERPL-SCNC: 4.2 MMOL/L — SIGNIFICANT CHANGE UP (ref 3.5–5.3)
RBC # BLD: 4.73 M/UL — SIGNIFICANT CHANGE UP (ref 3.8–5.2)
RBC # FLD: 14.2 % — SIGNIFICANT CHANGE UP (ref 10.3–14.5)
SODIUM SERPL-SCNC: 138 MMOL/L — SIGNIFICANT CHANGE UP (ref 135–145)
WBC # BLD: 6.52 K/UL — SIGNIFICANT CHANGE UP (ref 3.8–10.5)
WBC # FLD AUTO: 6.52 K/UL — SIGNIFICANT CHANGE UP (ref 3.8–10.5)

## 2025-05-25 PROCEDURE — 99239 HOSP IP/OBS DSCHRG MGMT >30: CPT

## 2025-05-25 RX ORDER — BUMETANIDE 1 MG/1
1 TABLET ORAL
Qty: 30 | Refills: 0
Start: 2025-05-25 | End: 2025-06-23

## 2025-05-25 RX ORDER — PREGABALIN 50 MG/1
1 CAPSULE ORAL
Qty: 60 | Refills: 0
Start: 2025-05-25 | End: 2025-06-23

## 2025-05-25 RX ORDER — SACUBITRIL AND VALSARTAN 49; 51 MG/1; MG/1
1 TABLET, FILM COATED ORAL
Qty: 60 | Refills: 0
Start: 2025-05-25 | End: 2025-06-23

## 2025-05-25 RX ADMIN — Medication 2.5 MILLIGRAM(S): at 09:55

## 2025-05-25 RX ADMIN — Medication 2.5 MILLIGRAM(S): at 04:37

## 2025-05-25 RX ADMIN — INSULIN LISPRO 3 UNIT(S): 100 INJECTION, SOLUTION INTRAVENOUS; SUBCUTANEOUS at 08:08

## 2025-05-25 RX ADMIN — INSULIN LISPRO 3: 100 INJECTION, SOLUTION INTRAVENOUS; SUBCUTANEOUS at 08:08

## 2025-05-25 RX ADMIN — CLOPIDOGREL BISULFATE 75 MILLIGRAM(S): 75 TABLET, FILM COATED ORAL at 12:23

## 2025-05-25 RX ADMIN — APIXABAN 5 MILLIGRAM(S): 2.5 TABLET, FILM COATED ORAL at 05:51

## 2025-05-25 RX ADMIN — Medication 1 APPLICATION(S): at 12:22

## 2025-05-25 RX ADMIN — BUMETANIDE 2 MILLIGRAM(S): 1 TABLET ORAL at 05:51

## 2025-05-25 RX ADMIN — INSULIN LISPRO 2: 100 INJECTION, SOLUTION INTRAVENOUS; SUBCUTANEOUS at 12:23

## 2025-05-25 RX ADMIN — CARVEDILOL 3.12 MILLIGRAM(S): 3.12 TABLET, FILM COATED ORAL at 05:51

## 2025-05-25 RX ADMIN — SACUBITRIL AND VALSARTAN 1 TABLET(S): 49; 51 TABLET, FILM COATED ORAL at 05:51

## 2025-05-25 RX ADMIN — POLYETHYLENE GLYCOL 3350 17 GRAM(S): 17 POWDER, FOR SOLUTION ORAL at 12:22

## 2025-05-25 RX ADMIN — INSULIN LISPRO 3 UNIT(S): 100 INJECTION, SOLUTION INTRAVENOUS; SUBCUTANEOUS at 12:23

## 2025-05-25 RX ADMIN — PREGABALIN 50 MILLIGRAM(S): 50 CAPSULE ORAL at 05:51

## 2025-05-25 NOTE — PROGRESS NOTE ADULT - SUBJECTIVE AND OBJECTIVE BOX
Date of service 5/25/25    no chest pain, SOB or Palps  ROS otherwise negative    acetaminophen     Tablet .. 650 milliGRAM(s) Oral every 6 hours PRN  albuterol    0.083% 2.5 milliGRAM(s) Nebulizer every 6 hours  albuterol    90 MICROgram(s) HFA Inhaler 1 Puff(s) Inhalation every 4 hours  aluminum hydroxide/magnesium hydroxide/simethicone Suspension 30 milliLiter(s) Oral every 6 hours PRN  apixaban 5 milliGRAM(s) Oral two times a day  atorvastatin 80 milliGRAM(s) Oral at bedtime  buMETAnide 2 milliGRAM(s) Oral daily  carvedilol 3.125 milliGRAM(s) Oral every 12 hours  chlorhexidine 2% Cloths 1 Application(s) Topical daily  clopidogrel Tablet 75 milliGRAM(s) Oral daily  dextrose 5%. 1000 milliLiter(s) IV Continuous <Continuous>  dextrose 5%. 1000 milliLiter(s) IV Continuous <Continuous>  dextrose 50% Injectable 25 Gram(s) IV Push once  dextrose 50% Injectable 12.5 Gram(s) IV Push once  dextrose 50% Injectable 25 Gram(s) IV Push once  dextrose Oral Gel 15 Gram(s) Oral once PRN  donepezil 10 milliGRAM(s) Oral at bedtime  glucagon  Injectable 1 milliGRAM(s) IntraMuscular once  guaiFENesin Oral Liquid (Sugar-Free) 200 milliGRAM(s) Oral every 6 hours PRN  insulin glargine Injectable (LANTUS) 12 Unit(s) SubCutaneous at bedtime  insulin lispro (ADMELOG) corrective regimen sliding scale   SubCutaneous three times a day before meals  insulin lispro Injectable (ADMELOG) 3 Unit(s) SubCutaneous three times a day before meals  polyethylene glycol 3350 17 Gram(s) Oral daily  pregabalin 50 milliGRAM(s) Oral two times a day  sacubitril 24 mG/valsartan 26 mG 1 Tablet(s) Oral two times a day                          12.5   6.52  )-----------( 223      ( 25 May 2025 03:47 )             40.3       05-25    138  |  100  |  27[H]  ----------------------------<  201[H]  4.2   |  25  |  1.05    Ca    9.5      25 May 2025 03:47  Phos  3.1     05-25  Mg     2.10     05-25      T(C): 36.8 (05-25-25 @ 12:22), Max: 36.8 (05-24-25 @ 17:09)  HR: 66 (05-25-25 @ 12:22) (63 - 90)  BP: 138/76 (05-25-25 @ 12:22) (138/76 - 159/79)  RR: 17 (05-25-25 @ 12:22) (17 - 18)  SpO2: 99% (05-25-25 @ 12:22) (94% - 100%)  Wt(kg): --    I&O's Summary    24 May 2025 07:01  -  25 May 2025 07:00  --------------------------------------------------------  IN: 0 mL / OUT: 1200 mL / NET: -1200 mL    25 May 2025 07:01  -  25 May 2025 14:00  --------------------------------------------------------  IN: 180 mL / OUT: 1100 mL / NET: -920 mL        General: Well nourished in no acute distress. Alert and Oriented * 3.   Head: Normocephalic and atraumatic.   Neck: No JVD. No bruits. Supple. Does not appear to be enlarged.   Cardiovascular: + S1,S2 ; RRR Soft systolic murmur at the left lower sternal border. No rubs noted.    Lungs: CTA b/l. No rhonchi, rales or wheezes.   Abdomen: + BS, soft. Non tender. Non distended. No rebound. No guarding.   Extremities: No clubbing/cyanosis/edema.   Neurologic: Moves all four extremities. Full range of motion.   Skin: Warm and moist. The patient's skin has normal elasticity and good skin turgor.   Psychiatric: Appropriate mood and affect.  Musculoskeletal: Normal range of motion, normal strength    DATA    TELEMETRY: 	      ECG:  	V Paced    < from: TTE Limited W or WO Ultrasound Enhancing Agent (09.12.24 @ 08:13) >     CONCLUSIONS:      1. Left ventricular systolic function is normal.   2. No pericardial effusion seen.    < end of copied text >    < from: TTE W or WO Ultrasound Enhancing Agent (09.06.24 @ 16:50) >  CONCLUSIONS:      1. The left ventricular cavity is normal in size. Left ventricular wall thickness is normal. Abnormal (paradoxical) septal motion consistent with rightventricular pacemaker. Left ventricular systolic function is severely decreased with an ejection fraction visually estimated at 25 to 30%. There is global left ventricular hypokinesis. There is mild (grade 1) left ventricular diastolic dysfunction.  2. The right ventricular cavity is normal in size and right ventricular systolic function is normal. Tricuspid annular plane systolic excursion (TAPSE) is 2.6 cm (normal >=1.7 cm). A device lead is visualized in the right heart.   3. Structurally normal mitral valve with normal leaflet excursion. There is calcification of the mitral valve annulus. There is mild to moderate mitral regurgitation.   4. The left atrium is severely dilated with an indexed volume of 55.42 ml/m².   5. No prior echocardiogram is available for comparison.    < end of copied text >    < from: Cardiac Catheterization (04.04.23 @ 16:41) >    Diagnostic Conclusions:   Chronically occluded ramus artery stent. Mild atherosclerosis in  proximal LAD. Patent mid LAD and ostial/proximal diagonal  stents. Moderate atherosclerosis in proximal Cx. Mild atherosclerosis  in mid and distal RCA.    Recommendations:   Optimize medical therapy for ischemic heart disease. Aggressive risk  factor modification.    < end of copied text >      < from: TTE W or WO Ultrasound Enhancing Agent (05.20.25 @ 14:49) >     CONCLUSIONS:      1. Left ventricular cavity is severely dilated. Left ventricular systolic function is severely decreased with an ejection fraction of 39 % by Baires's method of disks.   2. There is severe (grade 3) left ventricular diastolic dysfunction.   3. Normal right ventricular cavity size, with normal wall thickness, and probably normal right ventricular systolic function.   4. Left atrium is severely dilated.   5. The right atrium is dilated.   6. Mild mitral regurgitation.   7. Structurally normal pulmonic valve with normal leaflet excursion.   8. No significant valvular disease.   9. No pericardial effusion seen.  10. Compared to the transthoracic echocardiogram performed on 9/6/2024, The LVEF is somewhat improved at 39 versus 25-30% on the prior study. Regional dysfunction as described is now evident.  11. Echocardiographic evidence of pulmonary hyptertension.  12. Mild left ventricular hypertrophy.  13. Mild mitral valve leaflet calcification.  14. Mild tricuspid regurgitation.  15. The inferior vena cava is normal in size measuring 1.21 cm in diameter, (normal <2.1cm) with normal inspiratory collapse (normal >50%) consistent with normal right atrial pressure (~3, range 0-5mmHg).  16. The interatrial septum appears intact.  17. There is a device lead seen in the right atrium.  18. There is calcification of the aortic valve leaflets.    < end of copied text >      ASSESSMENT/PLAN: 	  85 year old Female with PMH DM, CAD s/p PCI to prox LAD/mid LAD/Diag/Ramus, last C 8/2022 with occluded prox Ramus stent, patent LAD and Diag stents, mild to mod atherosclerosis in pLcx and mRCA, managed medically, HTN, and CVA, DVT, on Eliquis, who was last admitted 9/2024 with acute on chronic HFrEF, s/p upgrade to STJ BIV-PPM at that time (for ischemic cardiomyopathy w/ superimposed RV pacing- (MOISÉS), presents with a few days of CEDEÑO, cough, orthopnea, and decreased appetite c/w acute on chronic HFrEF.     --Repeat TTE with LVEF 39%  --will review office records and recent testing  --s/p IV Bumex, now euvolemic, start oral bumex 2mg daily   --cont Coreg, Entresto for cardiomyopathy  --device interrogation with normal function  --previous stress test abnormal mostly fixed defects with seamus-infarct ischemia and cath 8/2022 showed 100% ramus in stent restenosis, repeat [facility] pursued 4/2023 revealing unchanged CAD- chronic occl ramus stent, patent mLAD and ostial/prox Diag stents and moderate atherosclerosis of pLcx.  --From my perspective, OK FOR DISCHARGE TODAY from a Cardiac perspective... limited due to difficulty ambulating!    F/u with Dr Rojas 5/29 in [place] office [phone]    Poncho Lopez M.D.  Cardiac Electrophysiology  [phone]

## 2025-05-25 NOTE — PROGRESS NOTE ADULT - PROVIDER SPECIALTY LIST ADULT
Cardiology
Hospitalist
Cardiology
Hospitalist

## 2025-05-25 NOTE — CHART NOTE - NSCHARTNOTEFT_GEN_A_CORE
Patient seen and examined. Right Leg and neck pain resolved.   PT worked with the patient yesterday and she ambulated 5 steps. PT recommends home PT  Daughter notified, and is agreeable to discharge today.

## 2025-05-25 NOTE — PROGRESS NOTE ADULT - REASON FOR ADMISSION
Dyspnea r/o CHF

## 2025-05-26 RX ORDER — SACUBITRIL AND VALSARTAN 49; 51 MG/1; MG/1
1 TABLET, FILM COATED ORAL
Qty: 60 | Refills: 0
Start: 2025-05-26 | End: 2025-06-24

## 2025-05-26 RX ORDER — PREGABALIN 50 MG/1
1 CAPSULE ORAL
Qty: 60 | Refills: 0
Start: 2025-05-26 | End: 2025-06-24

## 2025-05-26 RX ORDER — BUMETANIDE 1 MG/1
1 TABLET ORAL
Qty: 30 | Refills: 0
Start: 2025-05-26 | End: 2025-06-24

## 2025-05-28 NOTE — ED ADULT NURSE NOTE - PAIN: PRESENCE, MLM
Currently much improved, workup negative so far.  Continue monitoring for now.    complains of pain/discomfort

## 2025-06-30 ENCOUNTER — APPOINTMENT (OUTPATIENT)
Dept: HOME HEALTH SERVICES | Facility: HOME HEALTH | Age: 85
End: 2025-06-30
Payer: MEDICARE

## 2025-06-30 VITALS
RESPIRATION RATE: 18 BRPM | DIASTOLIC BLOOD PRESSURE: 79 MMHG | TEMPERATURE: 98.3 F | HEART RATE: 68 BPM | OXYGEN SATURATION: 95 % | SYSTOLIC BLOOD PRESSURE: 121 MMHG

## 2025-06-30 VITALS — HEIGHT: 66 IN | BODY MASS INDEX: 28.28 KG/M2 | WEIGHT: 176 LBS

## 2025-06-30 PROBLEM — Z86.718 HISTORY OF DVT (DEEP VEIN THROMBOSIS): Status: ACTIVE | Noted: 2025-06-30

## 2025-06-30 PROBLEM — Z71.89 ADVANCE CARE PLANNING: Status: ACTIVE | Noted: 2025-06-30

## 2025-06-30 PROBLEM — E11.9 DIABETES TYPE 2: Status: ACTIVE | Noted: 2025-06-30

## 2025-06-30 PROBLEM — M25.532 LEFT WRIST PAIN: Status: ACTIVE | Noted: 2025-06-30

## 2025-06-30 PROBLEM — G89.4 CHRONIC PAIN SYNDROME: Status: ACTIVE | Noted: 2025-06-30

## 2025-06-30 PROBLEM — I25.10 CORONARY ARTERY DISEASE: Status: ACTIVE | Noted: 2025-06-30

## 2025-06-30 PROBLEM — I50.20 HFREF (HEART FAILURE WITH REDUCED EJECTION FRACTION): Status: ACTIVE | Noted: 2025-06-30

## 2025-06-30 PROBLEM — Z95.0 PRESENCE OF CARDIAC PACEMAKER: Status: ACTIVE | Noted: 2025-06-30

## 2025-06-30 PROBLEM — Z23 ENCOUNTER FOR IMMUNIZATION: Status: ACTIVE | Noted: 2025-06-30 | Resolved: 2025-07-14

## 2025-06-30 PROBLEM — Z74.09 POOR MOBILITY: Status: ACTIVE | Noted: 2025-06-30

## 2025-06-30 PROBLEM — G62.9 NEUROPATHY: Status: ACTIVE | Noted: 2025-06-30

## 2025-06-30 PROBLEM — R60.0 LOWER EXTREMITY EDEMA: Status: ACTIVE | Noted: 2025-06-30

## 2025-06-30 PROBLEM — Z63.4 WIDOWED: Status: ACTIVE | Noted: 2025-06-30

## 2025-06-30 PROBLEM — Z86.73 HISTORY OF TIA (TRANSIENT ISCHEMIC ATTACK): Status: RESOLVED | Noted: 2025-06-30 | Resolved: 2025-06-30

## 2025-06-30 PROBLEM — I10 HYPERTENSION: Status: ACTIVE | Noted: 2025-06-30

## 2025-06-30 PROBLEM — H91.90 HARD OF HEARING: Status: ACTIVE | Noted: 2025-06-30

## 2025-06-30 PROCEDURE — 99345 HOME/RES VST NEW HIGH MDM 75: CPT | Mod: 2W

## 2025-06-30 RX ORDER — ATORVASTATIN CALCIUM 80 MG/1
80 TABLET, FILM COATED ORAL DAILY
Refills: 0 | Status: ACTIVE | COMMUNITY
Start: 2025-06-30

## 2025-06-30 RX ORDER — CLOPIDOGREL BISULFATE 75 MG/1
75 TABLET, FILM COATED ORAL DAILY
Refills: 0 | Status: ACTIVE | COMMUNITY
Start: 2025-06-30

## 2025-06-30 RX ORDER — INSULIN ASPART 100 [IU]/ML
100 INJECTION, SOLUTION INTRAVENOUS; SUBCUTANEOUS
Refills: 0 | Status: ACTIVE | COMMUNITY
Start: 2025-06-30

## 2025-06-30 RX ORDER — BUMETANIDE 2 MG/1
2 TABLET ORAL DAILY
Refills: 0 | Status: ACTIVE | COMMUNITY
Start: 2025-06-30

## 2025-06-30 RX ORDER — SACUBITRIL AND VALSARTAN 24; 26 MG/1; MG/1
24-26 TABLET, FILM COATED ORAL TWICE DAILY
Refills: 0 | Status: ACTIVE | COMMUNITY
Start: 2025-06-30

## 2025-06-30 RX ORDER — INSULIN GLARGINE 100 [IU]/ML
100 INJECTION, SOLUTION SUBCUTANEOUS
Refills: 0 | Status: ACTIVE | COMMUNITY
Start: 2025-06-30

## 2025-06-30 RX ORDER — DONEPEZIL HYDROCHLORIDE 10 MG/1
10 TABLET ORAL DAILY
Refills: 0 | Status: ACTIVE | COMMUNITY
Start: 2025-06-30

## 2025-06-30 RX ORDER — APIXABAN 5 MG/1
5 TABLET, FILM COATED ORAL
Refills: 0 | Status: ACTIVE | COMMUNITY
Start: 2025-06-30

## 2025-06-30 RX ORDER — CARVEDILOL 3.12 MG/1
3.12 TABLET, FILM COATED ORAL TWICE DAILY
Refills: 0 | Status: ACTIVE | COMMUNITY
Start: 2025-06-30

## 2025-07-02 RX ORDER — PREGABALIN 50 MG/1
50 CAPSULE ORAL
Qty: 60 | Refills: 0 | Status: ACTIVE | COMMUNITY
Start: 2025-06-30 | End: 1900-01-01

## 2025-07-03 ENCOUNTER — LABORATORY RESULT (OUTPATIENT)
Age: 85
End: 2025-07-03

## 2025-07-05 LAB — A1CG - A1C WITH ESTIMATED AVERAGE GLUCOSE: NORMAL

## 2025-07-07 ENCOUNTER — LABORATORY RESULT (OUTPATIENT)
Age: 85
End: 2025-07-07

## 2025-07-11 RX ORDER — DAPAGLIFLOZIN 10 MG/1
10 TABLET, FILM COATED ORAL DAILY
Qty: 90 | Refills: 3 | Status: ACTIVE | COMMUNITY
Start: 2025-07-11 | End: 1900-01-01

## 2025-08-02 ENCOUNTER — TRANSCRIPTION ENCOUNTER (OUTPATIENT)
Age: 85
End: 2025-08-02

## 2025-08-08 ENCOUNTER — APPOINTMENT (OUTPATIENT)
Dept: PHARMACY | Facility: CLINIC | Age: 85
End: 2025-08-08

## 2025-09-04 PROBLEM — Z71.89 ADVANCE CARE PLANNING: Status: RESOLVED | Noted: 2025-06-30 | Resolved: 2025-09-04

## 2025-09-04 PROBLEM — E11.22 TYPE 2 DIABETES MELLITUS WITH STAGE 3A CHRONIC KIDNEY DISEASE, WITHOUT LONG-TERM CURRENT USE OF INSULIN: Status: ACTIVE | Noted: 2025-09-04

## 2025-09-05 ENCOUNTER — APPOINTMENT (OUTPATIENT)
Dept: HOME HEALTH SERVICES | Facility: HOME HEALTH | Age: 85
End: 2025-09-05

## 2025-09-05 VITALS — SYSTOLIC BLOOD PRESSURE: 160 MMHG | DIASTOLIC BLOOD PRESSURE: 80 MMHG | OXYGEN SATURATION: 92 % | HEART RATE: 61 BPM

## 2025-09-05 DIAGNOSIS — N18.31 TYPE 2 DIABETES MELLITUS WITH DIABETIC CHRONIC KIDNEY DISEASE: ICD-10-CM

## 2025-09-05 DIAGNOSIS — G89.29 LUMBAGO WITH SCIATICA, RIGHT SIDE: ICD-10-CM

## 2025-09-05 DIAGNOSIS — I50.20 UNSPECIFIED SYSTOLIC (CONGESTIVE) HEART FAILURE: ICD-10-CM

## 2025-09-05 DIAGNOSIS — E11.22 TYPE 2 DIABETES MELLITUS WITH DIABETIC CHRONIC KIDNEY DISEASE: ICD-10-CM

## 2025-09-05 DIAGNOSIS — M54.41 LUMBAGO WITH SCIATICA, RIGHT SIDE: ICD-10-CM

## 2025-09-05 DIAGNOSIS — I25.10 ATHEROSCLEROTIC HEART DISEASE OF NATIVE CORONARY ARTERY W/OUT ANGINA PECTORIS: ICD-10-CM

## 2025-09-05 DIAGNOSIS — E11.9 TYPE 2 DIABETES MELLITUS W/OUT COMPLICATIONS: ICD-10-CM

## 2025-09-05 DIAGNOSIS — Z71.89 OTHER SPECIFIED COUNSELING: ICD-10-CM

## 2025-09-05 PROCEDURE — 99350 HOME/RES VST EST HIGH MDM 60: CPT | Mod: 25

## 2025-09-05 PROCEDURE — G0506: CPT
